# Patient Record
Sex: FEMALE | Race: WHITE | NOT HISPANIC OR LATINO | Employment: OTHER | URBAN - METROPOLITAN AREA
[De-identification: names, ages, dates, MRNs, and addresses within clinical notes are randomized per-mention and may not be internally consistent; named-entity substitution may affect disease eponyms.]

---

## 2017-02-06 ENCOUNTER — APPOINTMENT (OUTPATIENT)
Dept: GENERAL RADIOLOGY | Facility: CLINIC | Age: 72
End: 2017-02-06
Attending: EMERGENCY MEDICINE
Payer: COMMERCIAL

## 2017-02-06 ENCOUNTER — HOSPITAL ENCOUNTER (OUTPATIENT)
Facility: CLINIC | Age: 72
Setting detail: OBSERVATION
Discharge: HOME | End: 2017-02-07
Attending: EMERGENCY MEDICINE | Admitting: HOSPITALIST
Payer: COMMERCIAL

## 2017-02-06 ENCOUNTER — APPOINTMENT (OUTPATIENT)
Dept: CT IMAGING | Facility: CLINIC | Age: 72
End: 2017-02-06
Attending: EMERGENCY MEDICINE
Payer: COMMERCIAL

## 2017-02-06 DIAGNOSIS — R07.9 CHEST PAIN, UNSPECIFIED TYPE: ICD-10-CM

## 2017-02-06 LAB
ANION GAP SERPL CALCULATED.3IONS-SCNC: 7 MMOL/L (ref 3–14)
BASOPHILS # BLD AUTO: 0.1 10E9/L (ref 0–0.2)
BASOPHILS NFR BLD AUTO: 0.7 %
BUN SERPL-MCNC: 12 MG/DL (ref 7–30)
CALCIUM SERPL-MCNC: 9 MG/DL (ref 8.5–10.1)
CHLORIDE SERPL-SCNC: 104 MMOL/L (ref 94–109)
CO2 SERPL-SCNC: 29 MMOL/L (ref 20–32)
CREAT SERPL-MCNC: 0.56 MG/DL (ref 0.52–1.04)
D DIMER PPP FEU-MCNC: 3 UG/ML FEU (ref 0–0.5)
DIFFERENTIAL METHOD BLD: NORMAL
EOSINOPHIL # BLD AUTO: 0.2 10E9/L (ref 0–0.7)
EOSINOPHIL NFR BLD AUTO: 1.9 %
ERYTHROCYTE [DISTWIDTH] IN BLOOD BY AUTOMATED COUNT: 13.3 % (ref 10–15)
GFR SERPL CREATININE-BSD FRML MDRD: NORMAL ML/MIN/1.7M2
GLUCOSE SERPL-MCNC: 96 MG/DL (ref 70–99)
HCT VFR BLD AUTO: 40.5 % (ref 35–47)
HGB BLD-MCNC: 13.7 G/DL (ref 11.7–15.7)
IMM GRANULOCYTES # BLD: 0 10E9/L (ref 0–0.4)
IMM GRANULOCYTES NFR BLD: 0.4 %
LYMPHOCYTES # BLD AUTO: 1.3 10E9/L (ref 0.8–5.3)
LYMPHOCYTES NFR BLD AUTO: 13.1 %
MCH RBC QN AUTO: 31.6 PG (ref 26.5–33)
MCHC RBC AUTO-ENTMCNC: 33.8 G/DL (ref 31.5–36.5)
MCV RBC AUTO: 93 FL (ref 78–100)
MONOCYTES # BLD AUTO: 0.6 10E9/L (ref 0–1.3)
MONOCYTES NFR BLD AUTO: 5.7 %
NEUTROPHILS # BLD AUTO: 7.8 10E9/L (ref 1.6–8.3)
NEUTROPHILS NFR BLD AUTO: 78.2 %
NRBC # BLD AUTO: 0 10*3/UL
NRBC BLD AUTO-RTO: 0 /100
PLATELET # BLD AUTO: 274 10E9/L (ref 150–450)
POTASSIUM SERPL-SCNC: 3.6 MMOL/L (ref 3.4–5.3)
RBC # BLD AUTO: 4.34 10E12/L (ref 3.8–5.2)
SODIUM SERPL-SCNC: 140 MMOL/L (ref 133–144)
TROPONIN I BLD-MCNC: 0 UG/L (ref 0–0.1)
TROPONIN I SERPL-MCNC: NORMAL UG/L (ref 0–0.04)
WBC # BLD AUTO: 9.9 10E9/L (ref 4–11)

## 2017-02-06 PROCEDURE — 93005 ELECTROCARDIOGRAM TRACING: CPT

## 2017-02-06 PROCEDURE — 85025 COMPLETE CBC W/AUTO DIFF WBC: CPT | Performed by: EMERGENCY MEDICINE

## 2017-02-06 PROCEDURE — 84484 ASSAY OF TROPONIN QUANT: CPT

## 2017-02-06 PROCEDURE — 25000132 ZZH RX MED GY IP 250 OP 250 PS 637: Performed by: EMERGENCY MEDICINE

## 2017-02-06 PROCEDURE — 99220 ZZC INITIAL OBSERVATION CARE,LEVL III: CPT | Performed by: PHYSICIAN ASSISTANT

## 2017-02-06 PROCEDURE — 71260 CT THORAX DX C+: CPT

## 2017-02-06 PROCEDURE — 36415 COLL VENOUS BLD VENIPUNCTURE: CPT | Performed by: PHYSICIAN ASSISTANT

## 2017-02-06 PROCEDURE — 84484 ASSAY OF TROPONIN QUANT: CPT | Mod: 91 | Performed by: PHYSICIAN ASSISTANT

## 2017-02-06 PROCEDURE — G0378 HOSPITAL OBSERVATION PER HR: HCPCS

## 2017-02-06 PROCEDURE — 80048 BASIC METABOLIC PNL TOTAL CA: CPT | Performed by: EMERGENCY MEDICINE

## 2017-02-06 PROCEDURE — 25500064 ZZH RX 255 OP 636: Performed by: EMERGENCY MEDICINE

## 2017-02-06 PROCEDURE — 25000128 H RX IP 250 OP 636: Performed by: EMERGENCY MEDICINE

## 2017-02-06 PROCEDURE — 85379 FIBRIN DEGRADATION QUANT: CPT | Performed by: EMERGENCY MEDICINE

## 2017-02-06 PROCEDURE — 71020 XR CHEST 2 VW: CPT

## 2017-02-06 PROCEDURE — 99285 EMERGENCY DEPT VISIT HI MDM: CPT | Mod: 25

## 2017-02-06 RX ORDER — ALUMINA, MAGNESIA, AND SIMETHICONE 2400; 2400; 240 MG/30ML; MG/30ML; MG/30ML
15-30 SUSPENSION ORAL EVERY 4 HOURS PRN
Status: DISCONTINUED | OUTPATIENT
Start: 2017-02-06 | End: 2017-02-07 | Stop reason: HOSPADM

## 2017-02-06 RX ORDER — ASPIRIN 81 MG/1
324 TABLET, CHEWABLE ORAL ONCE
Status: COMPLETED | OUTPATIENT
Start: 2017-02-06 | End: 2017-02-06

## 2017-02-06 RX ORDER — MORPHINE SULFATE 2 MG/ML
1 INJECTION, SOLUTION INTRAMUSCULAR; INTRAVENOUS
Status: DISCONTINUED | OUTPATIENT
Start: 2017-02-06 | End: 2017-02-07 | Stop reason: HOSPADM

## 2017-02-06 RX ORDER — LIDOCAINE 40 MG/G
CREAM TOPICAL
Status: DISCONTINUED | OUTPATIENT
Start: 2017-02-06 | End: 2017-02-06

## 2017-02-06 RX ORDER — NALOXONE HYDROCHLORIDE 0.4 MG/ML
.1-.4 INJECTION, SOLUTION INTRAMUSCULAR; INTRAVENOUS; SUBCUTANEOUS
Status: DISCONTINUED | OUTPATIENT
Start: 2017-02-06 | End: 2017-02-07 | Stop reason: HOSPADM

## 2017-02-06 RX ORDER — NITROGLYCERIN 0.4 MG/1
0.4 TABLET SUBLINGUAL EVERY 5 MIN PRN
Status: DISCONTINUED | OUTPATIENT
Start: 2017-02-06 | End: 2017-02-07 | Stop reason: HOSPADM

## 2017-02-06 RX ORDER — ACETAMINOPHEN 325 MG/1
650 TABLET ORAL EVERY 4 HOURS PRN
Status: DISCONTINUED | OUTPATIENT
Start: 2017-02-06 | End: 2017-02-07 | Stop reason: HOSPADM

## 2017-02-06 RX ORDER — KETOROLAC TROMETHAMINE 30 MG/ML
15 INJECTION, SOLUTION INTRAMUSCULAR; INTRAVENOUS EVERY 6 HOURS PRN
Status: DISCONTINUED | OUTPATIENT
Start: 2017-02-06 | End: 2017-02-07 | Stop reason: HOSPADM

## 2017-02-06 RX ORDER — LIDOCAINE 40 MG/G
CREAM TOPICAL
Status: DISCONTINUED | OUTPATIENT
Start: 2017-02-06 | End: 2017-02-07 | Stop reason: HOSPADM

## 2017-02-06 RX ORDER — ASPIRIN 81 MG/1
81 TABLET ORAL DAILY
Status: DISCONTINUED | OUTPATIENT
Start: 2017-02-07 | End: 2017-02-07 | Stop reason: HOSPADM

## 2017-02-06 RX ORDER — IOPAMIDOL 755 MG/ML
500 INJECTION, SOLUTION INTRAVASCULAR ONCE
Status: COMPLETED | OUTPATIENT
Start: 2017-02-06 | End: 2017-02-06

## 2017-02-06 RX ORDER — NITROGLYCERIN 0.4 MG/1
0.4 TABLET SUBLINGUAL
Status: COMPLETED | OUTPATIENT
Start: 2017-02-06 | End: 2017-02-06

## 2017-02-06 RX ORDER — ACETAMINOPHEN 650 MG/1
650 SUPPOSITORY RECTAL EVERY 4 HOURS PRN
Status: DISCONTINUED | OUTPATIENT
Start: 2017-02-06 | End: 2017-02-07 | Stop reason: HOSPADM

## 2017-02-06 RX ADMIN — NITROGLYCERIN 0.4 MG: 0.4 TABLET SUBLINGUAL at 15:35

## 2017-02-06 RX ADMIN — ASPIRIN 81 MG 324 MG: 81 TABLET ORAL at 15:34

## 2017-02-06 RX ADMIN — SODIUM CHLORIDE 78 ML: 9 INJECTION, SOLUTION INTRAVENOUS at 17:19

## 2017-02-06 RX ADMIN — IOPAMIDOL 63 ML: 755 INJECTION, SOLUTION INTRAVENOUS at 17:19

## 2017-02-06 ASSESSMENT — ENCOUNTER SYMPTOMS
FEVER: 0
NAUSEA: 1
CHILLS: 0
SHORTNESS OF BREATH: 1

## 2017-02-06 NOTE — ED AVS SNAPSHOT
[facility] Observation Department    [address]    [facility] [address]    Phone:  [phone]                                       Tosin Nina   MRN: [id number]    Department:  [facility] Observation Department   Date of Visit:  2/6/2017           After Visit Summary Signature Page     I have received my discharge instructions, and my questions have been answered. I have discussed any challenges I see with this plan with the nurse or doctor.    ..........................................................................................................................................  Patient/Patient Representative Signature      ..........................................................................................................................................  Patient Representative Print Name and Relationship to Patient    ..................................................               ................................................  Date                                            Time    ..........................................................................................................................................  Reviewed by Signature/Title    ...................................................              ..............................................  Date                                                            Time

## 2017-02-06 NOTE — Clinical Note
Admitting Physician: RANCHO MORALEZ [[id number]]   [id number]   Special needs: Fall Risk [8]   Special needs: Telemetry [17]   Bed request comments: obs br @ 7709

## 2017-02-06 NOTE — ED AVS SNAPSHOT
MRN:[id number]                      After Visit Summary   2/6/2017    Tosin Nina    MRN: [id number]           Thank you!     Thank you for choosing [facility] for your care. Our goal is always to provide you with excellent care. Hearing back from our patients is one way we can continue to improve our services. Please take a few minutes to complete the written survey that you may receive in the mail after you visit. If you would like to speak to someone directly about your visit please contact Patient Relations at [phone]. Thank you!          Patient Information     Date Of Birth          1945        About your hospital stay     You were admitted on:  February 6, 2017 You last received care in the:  [facility] Observation Department    You were discharged on:  February 7, 2017        Reason for your hospital stay       You were hospitalized with chest pain.  You had a CT of the chest which normal.  Your laboratory work up was normal.  You had a normal stress echocardiogram with no evidence of stress-induced ischemia.                  Who to Call     For medical emergencies, please call 911.  For non-urgent questions about your medical care, please call your primary care provider or clinic, [phone]          Attending Provider     Provider    Sy Castañeda MD Farnan, MD Jorge Luis Quintero, Terrell Trujillo MD       Primary Care Provider Office Phone # Fax #    Esther Back [phone]       [facility] [address] [place] [address]         When to contact your care team       Notify for fever >101.5; black or bloody stools; severe, persistent, or worsening nausea, vomiting, abdominal pain or distention, diarrhea, constipation; chest pain, difficulty breathing, swelling; dizziness, weakness, lightheadedness, fainting.  Proceed to the nearest emergency room for any urgent concerns.                  After Care  "Instructions     Activity       Your activity upon discharge: activity as tolerated            Diet       Follow this diet upon discharge: Orders Placed This Encounter  Regular                  Follow-up Appointments     Follow-up and recommended labs and tests        Follow up with PCP within 1 week.                             Further instructions from your care team       You have been advised to stay in the hospital overnight for evaluation of your chest pain.    Pending Results     No orders found for last 2 day(s).            Statement of Approval     Ordered          17 1150  I have reviewed and agree with all the recommendations and orders detailed in this document.   EFFECTIVE NOW     Approved and electronically signed by:  Maeve Harding PA-C             Admission Information        Provider Department Dept Phone    2017 Terrell Kang MD  Observation Dept [phone]      Your Vitals Were     Blood Pressure Pulse Temperature    128/64 mmHg 72 96.5  F (35.8  C) (Oral)    Respirations Height Weight    18 1.499 m (4' 11\") 74.588 kg (164 lb 7 oz)    BMI (Body Mass Index) Pulse Oximetry       33.19 kg/m2 95%       MyChart Information     Swish lets you send messages to your doctor, view your test results, renew your prescriptions, schedule appointments and more. To sign up, go to [email or web address] . Click on \"Log in\" on the left side of the screen, which will take you to the Welcome page. Then click on \"Sign up Now\" on the right side of the page.     You will be asked to enter the access code listed below, as well as some personal information. Please follow the directions to create your username and password.     Your access code is: [email or web address]  Expires: 2017 11:50 AM     Your access code will  in 90 days. If you need help or a new code, please call your [place] clinic or [phone].        Care EveryWhere ID     This is your Care EveryWhere ID. This could be used by " other organizations to access your [place] medical records  [id number]           Review of your medicines      CONTINUE these medicines which have NOT CHANGED        Dose / Directions    COZAAR PO        Dose:  100 mg   Take 100 mg by mouth daily   Refills:  0       HYDROCHLOROTHIAZIDE PO        Dose:  50 mg   Take 50 mg by mouth daily   Refills:  0       SYNTHROID PO        Dose:  125 mcg   Take 125 mcg by mouth daily   Refills:  0                Protect others around you: Learn how to safely use, store and throw away your medicines at www.disposemymeds.org.             Medication List: This is a list of all your medications and when to take them. Check marks below indicate your daily home schedule. Keep this list as a reference.      Medications           Morning Afternoon Evening Bedtime As Needed    COZAAR PO   Take 100 mg by mouth daily   Last time this was given:  100 mg on 2/7/2017  8:17 AM                                HYDROCHLOROTHIAZIDE PO   Take 50 mg by mouth daily   Last time this was given:  50 mg on 2/7/2017  8:18 AM                                SYNTHROID PO   Take 125 mcg by mouth daily   Last time this was given:  125 mcg on 2/7/2017  8:18 AM

## 2017-02-06 NOTE — ED PROVIDER NOTES
"  History     Chief Complaint:    Chest Pain      HPI   Tosin Nina is a 71 year old female with a history of hypertension and hypothyroidism who presents with chest pain. Last night at approximately 1700 the patient had onset of mid sternal chest discomfort and initially thought this was reflux. Her pain has persisted since then and will intermittently improve but has never completely gone away. Her pain is currently 3/10 and not positionally affected. She has felt nauseous and tired this morning and feels mildly short of breath. No fevers or chills. Her cholesterol is borderline but no history of diabetes and she is a non smoker. Patient has never had a stress test.    Cardiac Risk Factors   Sex: Female   Tobacco: Negative   Hypertension: Positive  Diabetes: Negative  Hyperlipidemia: \"Borderline\"  Family History: Father  of MI at 48 years old    Allergies:  Amoxicillin       Medications:    Losartan potassium  Percocet   Ibuprofen  Hydralazine   Benicar   Levothyroxine sodium    Past Medical History:    Hypertension   Thyroid disease  Spinal stenosis      Past Surgical History:    Thyroidectomy      Family History:    MI - father    Social History:  Marital Status:   Presents to the ED with    Tobacco Use: No  Alcohol Use: Yes   PCP: Esther Back     Review of Systems   Constitutional: Negative for fever and chills.   Respiratory: Positive for shortness of breath.    Cardiovascular: Positive for chest pain.   Gastrointestinal: Positive for nausea.   All other systems reviewed and are negative.      Physical Exam     Patient Vitals for the past 24 hrs:   BP Temp Temp src Pulse Heart Rate Resp SpO2 Weight   17 1650 106/69 mmHg - - - - - 97 % -   17 1640 101/63 mmHg - - - - - 96 % -   17 1633 - - - - - - 98 % -   17 1632 139/78 mmHg - - - - - - -   17 1540 (!) 150/100 mmHg - - - 90 - - -   17 1509 151/85 mmHg - - - - - - -   17 1341 177/89 mmHg " 98.1  F (36.7  C) Temporal 72 - 16 100 % 72.576 kg (160 lb)       Physical Exam  General: Laying on gurney, alert, comfortable  HEENT:   The scalp and head appear normal    The pupils are equal, round, and reactive to light    Extraocular muscles are intact.    The nose is normal.    The oropharynx is normal.      Uvula is in the midline.  There is no peritonsillar abscess.  Neck:  Normal range of motion.    Lungs:  Clear.      No rales, no wheezing.      There is no tachypnea.  Non-labored.  Cardiac: Regular rate.      Normal S1 and S2.      No S3 or S4.      No pathological murmur.      No pericardial rub.  Abdomen: Soft. No distension. No tympani. No rebound. Non-tender. No epigastric tenderness. No reproducible substernal tenderness.   Lymph: No anterior or posterior cervical lymphadenopathy noted.  MS:  Normal tone.      Normal movement of all extremities.    Neuro:  Normal mentation.  No focal motor or sensory changes.      Speech normal.  Psych:  Awake.     Alert.      Normal affect.      Appropriate interactions.  Skin:  No rash.      No lesions.            Emergency Department Course   ECG:  @ 1330  Indication: chest pain  Vent. Rate 70 bpm. CT interval 152 ms. QRS duration 72 ms. QT/QTc 400/432 ms. P-R-T axis 24 -9 44.   Normal sinus rhythm. Normal ECG.    Read by Dr. Castañeda.    Imaging:  Chest CT, IV contrast only - PE protocol  Preliminary Result  IMPRESSION: No CT evidence of pulmonary embolism. No pulmonary  consolidation.    XR Chest 2 Views  Preliminary Result  IMPRESSION: No acute infiltrates are identified.         Radiographic findings were communicated with the patient who voiced understanding of the findings.    Laboratory:  Troponin POCT: 0.00  CBC:  WBC 9.9, HGB 13.7, , otherwise WNL  BMP: WNL (Creatinine 0.56)  D dimer: 3.0 (H)     Interventions:  Nitroglycerin 0.4 mg SL    Emergency Department Course:  Nursing notes and vitals reviewed.  I performed an exam of the patient as documented  above.   EKG was done, interpretation as above.   A peripheral IV was established. Blood was drawn from the patient. This was sent for laboratory testing, findings above.   The patient was sent for a CT and chest xray while in the emergency department, findings above.   Findings and plan explained to the patient who consents to admission.   (1808) I discussed the patient with Marilu Lucia of the hospitalist service, who will admit the patient to a obs bed for further monitoring, evaluation, and treatment.       Impression & Plan      HEART Score  Background  Calculates the overall risk of adverse event in patient's presenting with chest pain.  Based on 5 criteria (each assigned 0-2 points) including suspiciousness of history, EKG, age, risk factors and troponin.    Data  71 year old female   does not have a problem list on file.   reports that she has never smoked. She does not have any smokeless tobacco history on file.  family history is not on file.  TROPI   *   2/7/2017    Value: <0.015  The 99th percentile for upper reference range is 0.045 ug/L.  Troponin values in   the range of 0.045 - 0.120 ug/L may be associated with risks of adverse   clinical events.    Criteria   0-2 points for each of 5 items (maximum of 10 points):  Score 0- History slightly suspicious for coronary syndrome  Score 0- EKG Normal  Score 1- Age 45 to 65 years old  Score 2- Three or more risk factors for or history of atherosclerotic disease  Score 0- Within normal limits for troponin levels  Interpretation  Risk of adverse outcome  Heart Score: 4  Total Score 4-6- Adverse Outcome Risk 20.3% - Supports admission with standard rule-out management -serial troponins and stress testing    **Risk factors:  HTN, borderline hyperlipidemia, FMH      WELLS SCORE (Pre-test probability for PE)    Clinical signs/Suspected DVT (3)  An alternative diagnosis is less likely than PE (PE is most likely diagnosis) (3)  HR > 100 (1.5)  Immobilization or  surgery in the last 4 weeks (1.5)  Previous DVT or PE (1.5)  Hemoptysis (1)  Malignancy on treatment, treated in last 6 months, palliative)  (1)    Score: 0    Interpretation:    Total number of points, Interpretation based on literature:  0-1:  Probability of PE 3.6%  2-6:  Probability of PE 20%  > 6:  Probability of PE 66%    0-4:  Probability of PE 7.8% [PE unlikely]  >4:   Probability of PE 40% [PE likely]    Score combined with Negative d-dimer:  Score of 0-1 and Negative d-dimer: 99.5% NPV  Score of 0-4 and Negative d-dimer, 0.5% had VTE at 3 months.        Medical Decision Making:  This patient came in with chest pain. Her HEART score is 4. She ruled out for PE. There is no evidence of pneumothorax, no evidence of pericarditis, myocarditis, aortic dissection, or epigastric cause for her symptoms. Normal troponin. She is at increased risk of adverse event if discharged without complete workup. For that reason, she is being admitted to tele observation with a rule out protocol, further enzymatic testing, and EKG. If these all continue to be normal and she remains pain free, she will do a stress test in the morning. She states she can walk on a treadmill.    She did receive aspirin here and she did receive Nitro which took away her symptoms.      Diagnosis:    ICD-10-CM    1.  2.  3.  4. Chest pain, unspecified type  History of hypertension  History of hypercholesterolemia  Strong family history of heart disease at a young age R07.9      Disposition:  Admit for cardiac rule out.       Era Mackey  2/6/2017   [facility] EMERGENCY DEPARTMENT    I, Era Mackey, am serving as a scribe on 2/6/2017 at 2:15 PM to personally document services performed by Dr. Castañeda based on my observations and the provider's statements to me.       Sy Castañeda MD  02/08/17 0049

## 2017-02-06 NOTE — ED NOTES
"Pt very tearful, states \"the blood pressure cuff hurts my arm\". Assisted pt up to bathroom, pt ambulated independently w/o feeling dizzy or light-headed. ABCs intact.   "

## 2017-02-07 ENCOUNTER — APPOINTMENT (OUTPATIENT)
Dept: CARDIOLOGY | Facility: CLINIC | Age: 72
End: 2017-02-07
Attending: PHYSICIAN ASSISTANT
Payer: COMMERCIAL

## 2017-02-07 VITALS
TEMPERATURE: 96.5 F | SYSTOLIC BLOOD PRESSURE: 128 MMHG | HEART RATE: 72 BPM | HEIGHT: 59 IN | BODY MASS INDEX: 33.15 KG/M2 | WEIGHT: 164.44 LBS | OXYGEN SATURATION: 95 % | RESPIRATION RATE: 18 BRPM | DIASTOLIC BLOOD PRESSURE: 64 MMHG

## 2017-02-07 LAB
CHOLEST SERPL-MCNC: 181 MG/DL
HDLC SERPL-MCNC: 69 MG/DL
INTERPRETATION ECG - MUSE: NORMAL
LDLC SERPL CALC-MCNC: 89 MG/DL
NONHDLC SERPL-MCNC: 112 MG/DL
TRIGL SERPL-MCNC: 116 MG/DL
TROPONIN I SERPL-MCNC: NORMAL UG/L (ref 0–0.04)

## 2017-02-07 PROCEDURE — 93350 STRESS TTE ONLY: CPT | Mod: 26 | Performed by: INTERNAL MEDICINE

## 2017-02-07 PROCEDURE — G0378 HOSPITAL OBSERVATION PER HR: HCPCS

## 2017-02-07 PROCEDURE — 25500064 ZZH RX 255 OP 636: Performed by: HOSPITALIST

## 2017-02-07 PROCEDURE — 36415 COLL VENOUS BLD VENIPUNCTURE: CPT | Performed by: HOSPITALIST

## 2017-02-07 PROCEDURE — 25000132 ZZH RX MED GY IP 250 OP 250 PS 637: Performed by: PHYSICIAN ASSISTANT

## 2017-02-07 PROCEDURE — 84484 ASSAY OF TROPONIN QUANT: CPT | Performed by: PHYSICIAN ASSISTANT

## 2017-02-07 PROCEDURE — 93321 DOPPLER ECHO F-UP/LMTD STD: CPT | Mod: 26 | Performed by: INTERNAL MEDICINE

## 2017-02-07 PROCEDURE — 99217 ZZC OBSERVATION CARE DISCHARGE: CPT | Performed by: PHYSICIAN ASSISTANT

## 2017-02-07 PROCEDURE — 36415 COLL VENOUS BLD VENIPUNCTURE: CPT | Performed by: PHYSICIAN ASSISTANT

## 2017-02-07 PROCEDURE — 80061 LIPID PANEL: CPT | Performed by: HOSPITALIST

## 2017-02-07 PROCEDURE — 40000264 ECHO STRESS TEST WITH LUMASON

## 2017-02-07 PROCEDURE — 93018 CV STRESS TEST I&R ONLY: CPT | Performed by: INTERNAL MEDICINE

## 2017-02-07 PROCEDURE — 93325 DOPPLER ECHO COLOR FLOW MAPG: CPT | Mod: 26 | Performed by: INTERNAL MEDICINE

## 2017-02-07 RX ORDER — LEVOTHYROXINE SODIUM 125 UG/1
125 TABLET ORAL DAILY
Status: DISCONTINUED | OUTPATIENT
Start: 2017-02-07 | End: 2017-02-07 | Stop reason: HOSPADM

## 2017-02-07 RX ORDER — HYDROCHLOROTHIAZIDE 50 MG/1
50 TABLET ORAL DAILY
Status: DISCONTINUED | OUTPATIENT
Start: 2017-02-07 | End: 2017-02-07 | Stop reason: HOSPADM

## 2017-02-07 RX ORDER — LOSARTAN POTASSIUM 25 MG/1
100 TABLET ORAL DAILY
Status: DISCONTINUED | OUTPATIENT
Start: 2017-02-07 | End: 2017-02-07 | Stop reason: HOSPADM

## 2017-02-07 RX ADMIN — LOSARTAN POTASSIUM 100 MG: 25 TABLET, FILM COATED ORAL at 08:17

## 2017-02-07 RX ADMIN — LEVOTHYROXINE SODIUM 125 MCG: 125 TABLET ORAL at 08:18

## 2017-02-07 RX ADMIN — HYDROCHLOROTHIAZIDE 50 MG: 50 TABLET ORAL at 08:18

## 2017-02-07 RX ADMIN — SULFUR HEXAFLUORIDE 5 ML: KIT at 07:19

## 2017-02-07 RX ADMIN — ASPIRIN 81 MG: 81 TABLET, COATED ORAL at 08:18

## 2017-02-07 NOTE — H&P
Former[facility], later [facility] Outpatient / Observation Unit  History and Physical Exam     Tosin iNna MRN# [id number]   YOB: 1945 Age: 71 year old      Date of Admission: 2/6/2017    Primary care provider: Esther Back          Assessment:   Tosin Nina is a 71 year old female with a PMH significant for HTN, hypothyroidism, IBS, and spinal stenosis who presents with chest pain.     Work up in ED reveals: BMP unremarkable, negative troponin, CBC WNL, d-dimer of 3.0, CXR negative, CT of chest negative for PE, and EKG shows rate of 70 bpm in NSR.      Patient is being registered to observation for further evaluation and to rule out possible ACS.     1. Acute chest pain: suspect acid reflux, central chest pain with mild shortness of breath and associated nausea. No prior h/o CAD. Negative troponin. D-dimer is elevated but CT of chest is negative for PE. Patient's pain is currently minimal. Will obtain serial troponins and feel this would be sufficient rule out with no prior h/o CAD and could have a stress test as an outpatient. Patient and family would prefer stress testing here, so a stress echocardiogram will be ordered for tomorrow AM.   2. HTN: stable continue home regimen of Losartan and HCTZ  3. Elevated triglycerides: most recent lipid panel in 10/2016 which showed elevated triglycerides of 184 with remainder of panel WNL. Not on any medication for management and following with PCP about this.   4. Hypothyroidism: continue Levothyroxine           Plan:     1. [place] to Observation  2. Continue telemetry  3. Follow serial troponins, check fasting lipids   4. Stress testing with Stress Echo  5. Cont Aspirin EC 81 mg po daily  6. Blood pressure control  7. Morphine and nitroglycerine PRN for pain    8. Regular diet, No caffeine if Nuclear testing selected    DVT prophylaxis: pt at low risk, encourage ambulation  Code Status: Full   Dispo: discharge home within 24-48 hours                 Chief  Complaint:   Chest Pain         History of Present Illness:    [place], a 71 year old female, presents to the ED with complaint of chest pain. Patient states that she started having chest pain yesterday evening around 5-6 PM and thought it was indigestion and went to bed. She was able to sleep through the night but when she woke up she still had central chest pain that would worsen with a deep inhale which also caused mild shortness of breath. She notes associated nausea. Denies vomiting, F/C, RECINOS, LE edema, diaphoresis, cough, congestion, or burning sensation. The patient states that she walked on her treadmill and rode her stationary bike yesterday for a total of 25 minutes without chest pain. She states that her pain did improve with a sublingual nitro in the ED. No prior h/o CAD or previous stress test performed. Denies recent h/o heavy lifting or GERD. Patient does note a previous episode with similar symptoms last year but this was related to an infected gallbladder and much more severe than her current presentation.      Cardiac risk factors: positive for abnormal lipids, family history and hypertension             Past Medical History:     Past Medical History   Diagnosis Date     Hypertension      Thyroid disease      Spinal stenosis           Past Surgical History:     Past Surgical History   Procedure Laterality Date     Thyroidectomy            Social History:     Social History     Social History     Marital Status:      Spouse Name: N/A     Number of Children: N/A     Years of Education: N/A     Occupational History     Not on file.     Social History Main Topics     Smoking status: Never Smoker      Smokeless tobacco: Not on file     Alcohol Use: Yes      Comment: occasionally     Drug Use: No     Sexual Activity: Not on file     Other Topics Concern     Not on file     Social History Narrative          Family History:   Family history reviewed with patient and father has h/o MI.           Allergies:      Allergies   Allergen Reactions     Amoxicillin Hives           Medications:     Prior to Admission medications    Medication Sig Last Dose Taking? Auth Provider   LOSARTAN POTASSIUM PO    Reported, Patient   oxyCODONE-acetaminophen (PERCOCET) 5-325 MG per tablet Take 1-2 tablets by mouth every 4 hours as needed for moderate to severe pain   Mehul Pinto MD   ibuprofen (ADVIL,MOTRIN) 200 MG tablet Take 3 tablets (600 mg) by mouth every 8 hours as needed for mild pain   Mehul Pinto MD   HYDRALAZINE-HCTZ OR None Entered   Reported, Patient   BENICAR OR None Entered   Reported, Patient   LEVOTHYROXINE SODIUM OR None Entered   Reported, Patient   CENTRUM OR None Entered   Reported, Patient          Review of Systems:   A Comprehensive greater than 10 system review of systems was carried out.  Pertinent positives and negatives are noted above.  Otherwise negative for contributory information.          Physical Exam:   Blood pressure 146/77, pulse 72, temperature 98.1  F (36.7  C), temperature source Temporal, resp. rate 16, weight 72.576 kg (160 lb), SpO2 97 %.    GENERAL: healthy, alert and no distress  EYES: Eyes grossly normal to inspection, extraocular movements - intact, and PERRL  HENT: ear canals- normal; TMs- normal; Nose- normal; Mouth- no ulcers, no lesions  NECK: no tenderness, no adenopathy, no asymmetry, no masses, no stiffness; thyroid- normal to palpation  RESP: lungs clear to auscultation - no rales, no rhonchi, no wheezes  CV: regular rates and rhythm, normal S1 S2, no S3 or S4, no murmur, click or rub and peripheral pulses strong  ABDOMEN: soft, no tenderness, no  hepatosplenomegaly, no masses, normal bowel sounds  MS: extremities- no gross deformities noted, no edema  SKIN: no suspicious lesions, no rashes  NEURO: strength and tone- normal, sensory exam- grossly normal, mentation- intact, speech- normal, reflexes- symmetric  PSYCH: Alert and oriented times 3; coherent speech.  Affect is normal.         Data:     EKG demonstrates: rate of 70 bpm in NSR.     Results for orders placed or performed during the hospital encounter of 02/06/17   Chest CT, IV contrast only - PE protocol    Narrative    CT CHEST PULMONARY EMBOLISM W CONTRAST 2/6/2017 5:28 PM     HISTORY: Shortness of breath.     CONTRAST DOSE:  63mL Isovue-370    Radiation dose for this scan was reduced using automated exposure  control, adjustment of the mA and/or kV according to patient size, or  iterative reconstruction technique.    FINDINGS:  There is a good contrast bolus within the pulmonary  arteries. There are no pulmonary arterial filling defects to indicate  pulmonary embolism. Although contrast enhancement within the aorta is  less optimal, there is no apparent aneurysm or dissection. The  mediastinum and claudette appear within normal limits. The lungs appear  clear. No pleural effusion or pneumothorax. Cholecystectomy surgical  clips are noted.      Impression    IMPRESSION: No CT evidence of pulmonary embolism. No pulmonary  consolidation.   XR Chest 2 Views    Narrative    XR CHEST 2 VW   2/6/2017 4:35 PM     HISTORY: Chest pain    COMPARISON: CT dated 9/19/2014    FINDINGS: The heart is negative.  There is a prominent fat pad at the  left lung base. This is also seen on the previous CT scan. No active  infiltrate identified. The pulmonary vasculature is normal.  The bones  and soft tissues are unremarkable.      Impression    IMPRESSION: No acute infiltrates are identified.       CBC with platelets differential   Result Value Ref Range    WBC 9.9 4.0 - 11.0 10e9/L    RBC Count 4.34 3.8 - 5.2 10e12/L    Hemoglobin 13.7 11.7 - 15.7 g/dL    Hematocrit 40.5 35.0 - 47.0 %    MCV 93 78 - 100 fl    MCH 31.6 26.5 - 33.0 pg    MCHC 33.8 31.5 - 36.5 g/dL    RDW 13.3 10.0 - 15.0 %    Platelet Count 274 150 - 450 10e9/L    Diff Method Automated Method     % Neutrophils 78.2 %    % Lymphocytes 13.1 %    % Monocytes 5.7 %    %  Eosinophils 1.9 %    % Basophils 0.7 %    % Immature Granulocytes 0.4 %    Nucleated RBCs 0 0 /100    Absolute Neutrophil 7.8 1.6 - 8.3 10e9/L    Absolute Lymphocytes 1.3 0.8 - 5.3 10e9/L    Absolute Monocytes 0.6 0.0 - 1.3 10e9/L    Absolute Eosinophils 0.2 0.0 - 0.7 10e9/L    Absolute Basophils 0.1 0.0 - 0.2 10e9/L    Abs Immature Granulocytes 0.0 0 - 0.4 10e9/L    Absolute Nucleated RBC 0.0    Basic metabolic panel   Result Value Ref Range    Sodium 140 133 - 144 mmol/L    Potassium 3.6 3.4 - 5.3 mmol/L    Chloride 104 94 - 109 mmol/L    Carbon Dioxide 29 20 - 32 mmol/L    Anion Gap 7 3 - 14 mmol/L    Glucose 96 70 - 99 mg/dL    Urea Nitrogen 12 7 - 30 mg/dL    Creatinine 0.56 0.52 - 1.04 mg/dL    GFR Estimate >90  Non  GFR Calc   >60 mL/min/1.7m2    GFR Estimate If Black >90   GFR Calc   >60 mL/min/1.7m2    Calcium 9.0 8.5 - 10.1 mg/dL   D dimer quantitative   Result Value Ref Range    D Dimer 3.0 (H) 0.0 - 0.50 ug/ml FEU   EKG 12 lead   Result Value Ref Range    Interpretation ECG Click View Image link to view waveform and result    Troponin POCT   Result Value Ref Range    Troponin I 0.00 0.00 - 0.10 ug/L       Tara Lucia PA-C

## 2017-02-07 NOTE — ED NOTES
PRIMARY DIAGNOSIS: CHEST PAIN   OUTPATIENT/OBSERVATION GOALS TO BE MET BEFORE DISCHARGE:   1. Negative Serial Troponin No, neg x2, last draw at 0230   2. Resolution of chest pain Yes    3. Pain status: Pain free.   4. Negative stress test No- Scheduled for tomorrow 2/7   5. Stable vital signs Yes   6. ADLs back to baseline? Yes   7. Activity and level of assistance: Ambulating independently.   8. Barriers to discharge noted Yes- Serial trops and exercise stress test   9.Interpretation of rhythm per telemetry tech: NSR HR 87   Is patient a falls risk? No   Falls armband on? No   Within Arm's Reach? No.Reason not within arm's reach is: N/A   Bed alarm turned on? No   Personal alarm in place and turned on? No   VSS except BP elevated, pt does not tolerate tightness of cuff. Denies CP and other cardiac sx. Trop neg x2, last draw at 0230. Exercise stress test scheduled for tomorrow 2/7. Will continue to monitor.

## 2017-02-07 NOTE — ED NOTES
OBSERVATION patient IN TIME: 1930  ROOM # 208-1    Living Situation (if not independent, order SW consult): Independent with   Facility name: N/A    Activity level at baseline: Independent  Activity level on admit: Independent    Is patient a falls risk? No  Falls armband on? No  Within Arm's Reach? No.Reason not within arm's reach is: N/A  Bed alarm turned on?   No  Personal alarm in place and turned on?   No    Patient registered to observation; given Patient Bill of Rights; given the opportunity to ask questions about observation status and their plan of care.  Patient has been oriented to the observation room, bathroom, and call light is in place.    : - Christiano

## 2017-02-07 NOTE — DISCHARGE SUMMARY
[facility] Observation Unit Discharge Summary          Tosin Nina MRN# [id number]   Age: 71 year old YOB: 1945     Date of Admission:  2/6/2017  Date of Discharge::  2/7/2017  Admitting Physician:  Terrell Kang MD  Discharge Physician:  Maeve Harding PA-C  Primary Physician: Esther Back     Primary Discharge Diagnoses:   Acute Chest Pain     Secondary Discharge Diagnoses:   HTN  Hypothyroidism  IBS  Spinal Stenosis     Hospital Course:   For detail history, please refer to H & P from 2/6/2017. In brief, this is a 71 year old female with a PMH significant for HTN, hypothyroidism, IBS, and spinal stenosis who presented to the ED on 2/6/2017 with chest pain.  Work up revealed BMP unremarkable, negative troponin x3, CBC WNL, lipid panel wnl, d-dimer of 3.0, CXR negative, CT of chest negative for PE, and EKG shows rate of 70 bpm in NSR.  Patient underwent an exercise stress echocardiogram which was a normal stress echocardiogram with no evidence of stress-induced ischemia. Etiology of patient's symptoms were likely related to gastric reflux.  Symptoms had resolved at the time of discharge and patient hemodynamically stable.  Patient to follow up with PCP within one week.         Procedures/Imaging:     Results for orders placed or performed during the hospital encounter of 02/06/17   Chest CT, IV contrast only - PE protocol    Narrative    CT CHEST PULMONARY EMBOLISM W CONTRAST 2/6/2017 5:28 PM     HISTORY: Shortness of breath.     CONTRAST DOSE:  63mL Isovue-370    Radiation dose for this scan was reduced using automated exposure  control, adjustment of the mA and/or kV according to patient size, or  iterative reconstruction technique.    FINDINGS:  There is a good contrast bolus within the pulmonary  arteries. There are no pulmonary arterial filling defects to indicate  pulmonary embolism. Although contrast enhancement within the aorta is  less optimal, there is  "no apparent aneurysm or dissection. The  mediastinum and claudette appear within normal limits. The lungs appear  clear. No pleural effusion or pneumothorax. Cholecystectomy surgical  clips are noted.      Impression    IMPRESSION: No CT evidence of pulmonary embolism. No pulmonary  consolidation.    CHRISTINA CAST MD   XR Chest 2 Views    Narrative    XR CHEST 2 VW   2017 4:35 PM     HISTORY: Chest pain    COMPARISON: CT dated 2014    FINDINGS: The heart is negative.  There is a prominent fat pad at the  left lung base. This is also seen on the previous CT scan. No active  infiltrate identified. The pulmonary vasculature is normal.  The bones  and soft tissues are unremarkable.      Impression    IMPRESSION: No acute infiltrates are identified.        SANDRA ROBISON MD     Consultations:   none    Code Status:   full    Allergies:     Allergies   Allergen Reactions     Amoxicillin Hives        Subjective:   Patient describes a midsternal chest pain after eating a bowl of chili on 17.  Her symptoms persisted to the next day 2017 and were associated with shortness of breath and nausea.  Symptoms resolved completely last night and she tolerated a po diet.  Currently denies chest pain, shortness of breath, cough, abdominal pain, nausea, emesis.  She reports a history of a cholecystectomy.  She states she feels fine and is ready to go home.        Physical Exam:   /64 mmHg  Pulse 72  Temp(Src) 96.5  F (35.8  C) (Oral)  Resp 18  Ht 1.499 m (4' 11\")  Wt 74.588 kg (164 lb 7 oz)  BMI 33.19 kg/m2  SpO2 95%  Temp (24hrs), Av.5  F (36.4  C), Min:96.5  F (35.8  C), Max:98.2  F (36.8  C)    Blood pressure 128/64, pulse 72, temperature 96.5  F (35.8  C), temperature source Oral, resp. rate 18, height 1.499 m (4' 11\"), weight 74.588 kg (164 lb 7 oz), SpO2 95 %.  General: Alert, interactive, NAD, sitting up in bed fully dressed.  HEENT: AT/NC, sclera anicteric, PERRL, EOMI  Resp: clear to auscultation " bilaterally, no crackles or wheezes  Cardiac: regular rate and rhythm, no murmur  Abdomen: Soft, nontender, nondistended. +BS.    Neuro: Alert & oriented x 3   Discharge Medicatios:        Discharge Medication List as of 2/7/2017 12:01 PM      CONTINUE these medications which have NOT CHANGED    Details   Levothyroxine Sodium (SYNTHROID PO) Take 125 mcg by mouth daily, Historical      Losartan Potassium (COZAAR PO) Take 100 mg by mouth daily, Historical      HYDROCHLOROTHIAZIDE PO Take 50 mg by mouth daily, Historical             Instructions Given to Patient as Discharge:     Discharge Procedure Orders  Reason for your hospital stay   Order Comments: You were hospitalized with chest pain.  You had a CT of the chest which normal.  Your laboratory work up was normal.  You had a normal stress echocardiogram with no evidence of stress-induced ischemia.     Follow-up and recommended labs and tests    Order Comments: Follow up with PCP within 1 week.     Activity   Order Comments: Your activity upon discharge: activity as tolerated   Order Specific Question Answer Comments   Is discharge order? Yes      When to contact your care team   Order Comments: Notify for fever >101.5; black or bloody stools; severe, persistent, or worsening nausea, vomiting, abdominal pain or distention, diarrhea, constipation; chest pain, difficulty breathing, swelling; dizziness, weakness, lightheadedness, fainting.  Proceed to the nearest emergency room for any urgent concerns.     Full Code     Diet   Order Comments: Follow this diet upon discharge: Orders Placed This Encounter  Regular   Order Specific Question Answer Comments   Is discharge order? Yes          Pending Tests at Discharge:   none    Discharge Disposition:   Discharged to home     Maeve Harding MS, PA-C  Hospitalist Service  Pager [phone]    >30 minutes was spent in discharge planning, care coordination, physical examination and medication reconciliation on the date of  discharge, 2/7/2017

## 2017-02-07 NOTE — ED NOTES
PRIMARY DIAGNOSIS: CHEST PAIN   OUTPATIENT/OBSERVATION GOALS TO BE MET BEFORE DISCHARGE:   1. Negative Serial Troponin Yes x3  2. Resolution of chest pain Yes   3. Pain status: Pain free.   4. Negative stress test: Results pending  5. Stable vital signs Yes   6. ADLs back to baseline? Yes   7. Activity and level of assistance: Ambulating independently.   8. Barriers to discharge noted: No  9.Interpretation of rhythm per telemetry tech: NSR HR 87   Is patient a falls risk? No     VSS, except BP elevated at 149/82. Denies CP and other cardiac sx. Trop neg x3. Exercise stress test results pending. Will continue to monitor and provide supportives cares.

## 2017-02-07 NOTE — PHARMACY-ADMISSION MEDICATION HISTORY
Admission medication history interview status for this patient is complete. See [facility] admission navigator for allergy information, prior to admission medications and immunization status.     Medication history interview source(s):Patient  Medication history resources (including written lists, pill bottles, clinic record):pill bottles    Changes made to PTA medication list:  Added: all  Deleted: percocet and advil  Changed: none    Actions taken by pharmacist (provider contacted, etc):None     Additional medication history information:None    Medication reconciliation/reorder completed by provider prior to medication history? No    For patients on insulin therapy: no (Yes/No)   Lantus/levemir/NPH/Mix 70/30 dose: _____ in AM/PM or twice daily   Sliding scale Novolog Y/N   If Yes, do you have a baseline novolog pre-meal dose: ______units with meals   Patients eat three meals a day: Y/N   Any Barriers to therapy: cost of medications/comfortable with giving injections (if applicable)/ comfortable and confident with current diabetes regimen       Prior to Admission medications    Medication Sig Last Dose Taking? Auth Provider   Levothyroxine Sodium (SYNTHROID PO) Take 125 mcg by mouth daily 2/6/2017 at am Yes Unknown, Entered By History   Losartan Potassium (COZAAR PO) Take 100 mg by mouth daily 2/6/2017 at am Yes Unknown, Entered By History   HYDROCHLOROTHIAZIDE PO Take 50 mg by mouth daily 2/6/2017 at am Yes Unknown, Entered By History

## 2017-03-15 ENCOUNTER — TRANSFERRED RECORDS (OUTPATIENT)
Dept: HEALTH INFORMATION MANAGEMENT | Facility: CLINIC | Age: 72
End: 2017-03-15

## 2017-03-28 ENCOUNTER — TRANSFERRED RECORDS (OUTPATIENT)
Dept: HEALTH INFORMATION MANAGEMENT | Facility: CLINIC | Age: 72
End: 2017-03-28

## 2017-03-29 ENCOUNTER — TRANSFERRED RECORDS (OUTPATIENT)
Dept: HEALTH INFORMATION MANAGEMENT | Facility: CLINIC | Age: 72
End: 2017-03-29

## 2017-03-30 ENCOUNTER — MEDICAL CORRESPONDENCE (OUTPATIENT)
Dept: HEALTH INFORMATION MANAGEMENT | Facility: CLINIC | Age: 72
End: 2017-03-30

## 2017-04-07 ENCOUNTER — TRANSFERRED RECORDS (OUTPATIENT)
Dept: HEALTH INFORMATION MANAGEMENT | Facility: CLINIC | Age: 72
End: 2017-04-07

## 2017-04-11 ENCOUNTER — APPOINTMENT (OUTPATIENT)
Dept: CT IMAGING | Facility: CLINIC | Age: 72
DRG: 374 | End: 2017-04-11
Attending: INTERNAL MEDICINE
Payer: COMMERCIAL

## 2017-04-11 ENCOUNTER — HOSPITAL ENCOUNTER (INPATIENT)
Facility: CLINIC | Age: 72
LOS: 5 days | Discharge: NURSING, REHAB OR LONG-TERM CARE | DRG: 374 | End: 2017-04-16
Attending: INTERNAL MEDICINE | Admitting: INTERNAL MEDICINE
Payer: COMMERCIAL

## 2017-04-11 ENCOUNTER — APPOINTMENT (OUTPATIENT)
Dept: GENERAL RADIOLOGY | Facility: CLINIC | Age: 72
DRG: 374 | End: 2017-04-11
Attending: INTERNAL MEDICINE
Payer: COMMERCIAL

## 2017-04-11 DIAGNOSIS — R77.8 ELEVATED TOTAL PROTEIN: ICD-10-CM

## 2017-04-11 DIAGNOSIS — R19.00 ABDOMINAL MASS, UNSPECIFIED LOCATION: ICD-10-CM

## 2017-04-11 DIAGNOSIS — I26.99 OTHER PULMONARY EMBOLISM WITHOUT ACUTE COR PULMONALE, UNSPECIFIED CHRONICITY (H): ICD-10-CM

## 2017-04-11 DIAGNOSIS — R20.2 PARESTHESIAS: ICD-10-CM

## 2017-04-11 DIAGNOSIS — R26.81 UNSTEADY GAIT: ICD-10-CM

## 2017-04-11 DIAGNOSIS — C56.9 OVARIAN CANCER, UNSPECIFIED LATERALITY (H): Primary | ICD-10-CM

## 2017-04-11 DIAGNOSIS — H55.00 NYSTAGMUS: ICD-10-CM

## 2017-04-11 DIAGNOSIS — R42 DIZZINESS: ICD-10-CM

## 2017-04-11 LAB
ALBUMIN SERPL-MCNC: 3.6 G/DL (ref 3.4–5)
ALBUMIN UR-MCNC: NEGATIVE MG/DL
ALP SERPL-CCNC: 140 U/L (ref 40–150)
ALT SERPL W P-5'-P-CCNC: 25 U/L (ref 0–50)
ANION GAP SERPL CALCULATED.3IONS-SCNC: 10 MMOL/L (ref 3–14)
APPEARANCE UR: CLEAR
AST SERPL W P-5'-P-CCNC: 17 U/L (ref 0–45)
BASOPHILS # BLD AUTO: 0 10E9/L (ref 0–0.2)
BASOPHILS NFR BLD AUTO: 0.4 %
BILIRUB SERPL-MCNC: 0.6 MG/DL (ref 0.2–1.3)
BILIRUB UR QL STRIP: NEGATIVE
BUN SERPL-MCNC: 9 MG/DL (ref 7–30)
CALCIUM SERPL-MCNC: 8.7 MG/DL (ref 8.5–10.1)
CHLORIDE SERPL-SCNC: 101 MMOL/L (ref 94–109)
CO2 SERPL-SCNC: 26 MMOL/L (ref 20–32)
COLOR UR AUTO: ABNORMAL
CREAT SERPL-MCNC: 0.57 MG/DL (ref 0.52–1.04)
CRP SERPL-MCNC: 14 MG/L (ref 0–8)
DIFFERENTIAL METHOD BLD: ABNORMAL
EOSINOPHIL # BLD AUTO: 0.1 10E9/L (ref 0–0.7)
EOSINOPHIL NFR BLD AUTO: 1.2 %
ERYTHROCYTE [DISTWIDTH] IN BLOOD BY AUTOMATED COUNT: 13.7 % (ref 10–15)
ERYTHROCYTE [SEDIMENTATION RATE] IN BLOOD BY WESTERGREN METHOD: 17 MM/H (ref 0–30)
GFR SERPL CREATININE-BSD FRML MDRD: ABNORMAL ML/MIN/1.7M2
GLUCOSE SERPL-MCNC: 104 MG/DL (ref 70–99)
GLUCOSE UR STRIP-MCNC: NEGATIVE MG/DL
HCT VFR BLD AUTO: 42.8 % (ref 35–47)
HGB BLD-MCNC: 15 G/DL (ref 11.7–15.7)
HGB UR QL STRIP: NEGATIVE
IMM GRANULOCYTES # BLD: 0 10E9/L (ref 0–0.4)
IMM GRANULOCYTES NFR BLD: 0.3 %
INR PPP: 1.06 (ref 0.86–1.14)
KETONES UR STRIP-MCNC: 5 MG/DL
LEUKOCYTE ESTERASE UR QL STRIP: NEGATIVE
LYMPHOCYTES # BLD AUTO: 0.9 10E9/L (ref 0.8–5.3)
LYMPHOCYTES NFR BLD AUTO: 8.6 %
MCH RBC QN AUTO: 31.9 PG (ref 26.5–33)
MCHC RBC AUTO-ENTMCNC: 35 G/DL (ref 31.5–36.5)
MCV RBC AUTO: 91 FL (ref 78–100)
MONOCYTES # BLD AUTO: 0.9 10E9/L (ref 0–1.3)
MONOCYTES NFR BLD AUTO: 7.9 %
NEUTROPHILS # BLD AUTO: 8.9 10E9/L (ref 1.6–8.3)
NEUTROPHILS NFR BLD AUTO: 81.6 %
NITRATE UR QL: NEGATIVE
NRBC # BLD AUTO: 0 10*3/UL
NRBC BLD AUTO-RTO: 0 /100
PH UR STRIP: 7 PH (ref 5–7)
PLATELET # BLD AUTO: 259 10E9/L (ref 150–450)
POTASSIUM SERPL-SCNC: 3.1 MMOL/L (ref 3.4–5.3)
PROT SERPL-MCNC: 9.2 G/DL (ref 6.8–8.8)
RADIOLOGIST FLAGS: ABNORMAL
RBC # BLD AUTO: 4.7 10E12/L (ref 3.8–5.2)
SODIUM SERPL-SCNC: 137 MMOL/L (ref 133–144)
SP GR UR STRIP: 1 (ref 1–1.03)
URN SPEC COLLECT METH UR: ABNORMAL
UROBILINOGEN UR STRIP-MCNC: NORMAL MG/DL (ref 0–2)
WBC # BLD AUTO: 10.9 10E9/L (ref 4–11)

## 2017-04-11 PROCEDURE — 86304 IMMUNOASSAY TUMOR CA 125: CPT | Performed by: INTERNAL MEDICINE

## 2017-04-11 PROCEDURE — 36415 COLL VENOUS BLD VENIPUNCTURE: CPT | Performed by: INTERNAL MEDICINE

## 2017-04-11 PROCEDURE — 25000125 ZZHC RX 250: Performed by: RADIOLOGY

## 2017-04-11 PROCEDURE — 86901 BLOOD TYPING SEROLOGIC RH(D): CPT | Performed by: INTERNAL MEDICINE

## 2017-04-11 PROCEDURE — 81003 URINALYSIS AUTO W/O SCOPE: CPT | Performed by: INTERNAL MEDICINE

## 2017-04-11 PROCEDURE — 99223 1ST HOSP IP/OBS HIGH 75: CPT | Mod: AI | Performed by: INTERNAL MEDICINE

## 2017-04-11 PROCEDURE — 82378 CARCINOEMBRYONIC ANTIGEN: CPT | Performed by: INTERNAL MEDICINE

## 2017-04-11 PROCEDURE — 86335 IMMUNFIX E-PHORSIS/URINE/CSF: CPT | Performed by: INTERNAL MEDICINE

## 2017-04-11 PROCEDURE — 99285 EMERGENCY DEPT VISIT HI MDM: CPT | Mod: Z6 | Performed by: INTERNAL MEDICINE

## 2017-04-11 PROCEDURE — 82784 ASSAY IGA/IGD/IGG/IGM EACH: CPT | Performed by: INTERNAL MEDICINE

## 2017-04-11 PROCEDURE — 40000135 MR OUTSIDE READ

## 2017-04-11 PROCEDURE — 86850 RBC ANTIBODY SCREEN: CPT | Performed by: INTERNAL MEDICINE

## 2017-04-11 PROCEDURE — 93005 ELECTROCARDIOGRAM TRACING: CPT | Performed by: INTERNAL MEDICINE

## 2017-04-11 PROCEDURE — 86900 BLOOD TYPING SEROLOGIC ABO: CPT | Performed by: INTERNAL MEDICINE

## 2017-04-11 PROCEDURE — 86140 C-REACTIVE PROTEIN: CPT | Performed by: INTERNAL MEDICINE

## 2017-04-11 PROCEDURE — 85652 RBC SED RATE AUTOMATED: CPT | Performed by: INTERNAL MEDICINE

## 2017-04-11 PROCEDURE — 85025 COMPLETE CBC W/AUTO DIFF WBC: CPT | Performed by: INTERNAL MEDICINE

## 2017-04-11 PROCEDURE — 85610 PROTHROMBIN TIME: CPT | Performed by: INTERNAL MEDICINE

## 2017-04-11 PROCEDURE — 80053 COMPREHEN METABOLIC PANEL: CPT | Performed by: INTERNAL MEDICINE

## 2017-04-11 PROCEDURE — 86334 IMMUNOFIX E-PHORESIS SERUM: CPT | Performed by: INTERNAL MEDICINE

## 2017-04-11 PROCEDURE — 86301 IMMUNOASSAY TUMOR CA 19-9: CPT | Performed by: INTERNAL MEDICINE

## 2017-04-11 PROCEDURE — 25500064 ZZH RX 255 OP 636: Performed by: RADIOLOGY

## 2017-04-11 PROCEDURE — 12000001 ZZH R&B MED SURG/OB UMMC

## 2017-04-11 PROCEDURE — 74177 CT ABD & PELVIS W/CONTRAST: CPT

## 2017-04-11 PROCEDURE — 99285 EMERGENCY DEPT VISIT HI MDM: CPT | Mod: 25 | Performed by: INTERNAL MEDICINE

## 2017-04-11 PROCEDURE — 71260 CT THORAX DX C+: CPT

## 2017-04-11 RX ORDER — LIDOCAINE 40 MG/G
CREAM TOPICAL
Status: DISCONTINUED | OUTPATIENT
Start: 2017-04-11 | End: 2017-04-16 | Stop reason: HOSPADM

## 2017-04-11 RX ORDER — POTASSIUM CHLORIDE 1.5 G/1.58G
20-40 POWDER, FOR SOLUTION ORAL
Status: DISCONTINUED | OUTPATIENT
Start: 2017-04-11 | End: 2017-04-16 | Stop reason: HOSPADM

## 2017-04-11 RX ORDER — PROCHLORPERAZINE MALEATE 5 MG
5 TABLET ORAL EVERY 6 HOURS PRN
Status: DISCONTINUED | OUTPATIENT
Start: 2017-04-11 | End: 2017-04-16 | Stop reason: HOSPADM

## 2017-04-11 RX ORDER — PROCHLORPERAZINE 25 MG
12.5 SUPPOSITORY, RECTAL RECTAL EVERY 12 HOURS PRN
Status: DISCONTINUED | OUTPATIENT
Start: 2017-04-11 | End: 2017-04-16 | Stop reason: HOSPADM

## 2017-04-11 RX ORDER — NALOXONE HYDROCHLORIDE 0.4 MG/ML
.1-.4 INJECTION, SOLUTION INTRAMUSCULAR; INTRAVENOUS; SUBCUTANEOUS
Status: DISCONTINUED | OUTPATIENT
Start: 2017-04-11 | End: 2017-04-11

## 2017-04-11 RX ORDER — ONDANSETRON 4 MG/1
4 TABLET, ORALLY DISINTEGRATING ORAL EVERY 6 HOURS PRN
Status: DISCONTINUED | OUTPATIENT
Start: 2017-04-11 | End: 2017-04-14

## 2017-04-11 RX ORDER — AMOXICILLIN 250 MG
1-2 CAPSULE ORAL 2 TIMES DAILY PRN
Status: DISCONTINUED | OUTPATIENT
Start: 2017-04-11 | End: 2017-04-11

## 2017-04-11 RX ORDER — MAGNESIUM SULFATE HEPTAHYDRATE 40 MG/ML
4 INJECTION, SOLUTION INTRAVENOUS EVERY 4 HOURS PRN
Status: DISCONTINUED | OUTPATIENT
Start: 2017-04-11 | End: 2017-04-16 | Stop reason: HOSPADM

## 2017-04-11 RX ORDER — POTASSIUM CL/LIDO/0.9 % NACL 10MEQ/0.1L
10 INTRAVENOUS SOLUTION, PIGGYBACK (ML) INTRAVENOUS
Status: DISCONTINUED | OUTPATIENT
Start: 2017-04-11 | End: 2017-04-16 | Stop reason: HOSPADM

## 2017-04-11 RX ORDER — POTASSIUM CHLORIDE 29.8 MG/ML
20 INJECTION INTRAVENOUS
Status: DISCONTINUED | OUTPATIENT
Start: 2017-04-11 | End: 2017-04-16 | Stop reason: HOSPADM

## 2017-04-11 RX ORDER — ONDANSETRON 4 MG/1
4 TABLET, ORALLY DISINTEGRATING ORAL EVERY 6 HOURS PRN
Status: DISCONTINUED | OUTPATIENT
Start: 2017-04-11 | End: 2017-04-11

## 2017-04-11 RX ORDER — POTASSIUM CHLORIDE 7.45 MG/ML
10 INJECTION INTRAVENOUS
Status: DISCONTINUED | OUTPATIENT
Start: 2017-04-11 | End: 2017-04-16 | Stop reason: HOSPADM

## 2017-04-11 RX ORDER — ONDANSETRON 2 MG/ML
4 INJECTION INTRAMUSCULAR; INTRAVENOUS EVERY 6 HOURS PRN
Status: DISCONTINUED | OUTPATIENT
Start: 2017-04-11 | End: 2017-04-11

## 2017-04-11 RX ORDER — LEVOTHYROXINE SODIUM 125 UG/1
125 TABLET ORAL DAILY
Status: DISCONTINUED | OUTPATIENT
Start: 2017-04-12 | End: 2017-04-16 | Stop reason: HOSPADM

## 2017-04-11 RX ORDER — IOPAMIDOL 755 MG/ML
100 INJECTION, SOLUTION INTRAVASCULAR ONCE
Status: COMPLETED | OUTPATIENT
Start: 2017-04-11 | End: 2017-04-11

## 2017-04-11 RX ORDER — POTASSIUM CHLORIDE 750 MG/1
20-40 TABLET, EXTENDED RELEASE ORAL
Status: DISCONTINUED | OUTPATIENT
Start: 2017-04-11 | End: 2017-04-16 | Stop reason: HOSPADM

## 2017-04-11 RX ORDER — ONDANSETRON 2 MG/ML
4 INJECTION INTRAMUSCULAR; INTRAVENOUS EVERY 6 HOURS PRN
Status: DISCONTINUED | OUTPATIENT
Start: 2017-04-11 | End: 2017-04-14

## 2017-04-11 RX ORDER — POLYETHYLENE GLYCOL 3350 17 G/17G
17 POWDER, FOR SOLUTION ORAL DAILY PRN
Status: DISCONTINUED | OUTPATIENT
Start: 2017-04-11 | End: 2017-04-16 | Stop reason: HOSPADM

## 2017-04-11 RX ORDER — NALOXONE HYDROCHLORIDE 0.4 MG/ML
.1-.4 INJECTION, SOLUTION INTRAMUSCULAR; INTRAVENOUS; SUBCUTANEOUS
Status: DISCONTINUED | OUTPATIENT
Start: 2017-04-11 | End: 2017-04-16 | Stop reason: HOSPADM

## 2017-04-11 RX ORDER — AMOXICILLIN 250 MG
1-2 CAPSULE ORAL 2 TIMES DAILY PRN
Status: DISCONTINUED | OUTPATIENT
Start: 2017-04-11 | End: 2017-04-16 | Stop reason: HOSPADM

## 2017-04-11 RX ORDER — HYDROCHLOROTHIAZIDE 50 MG/1
50 TABLET ORAL DAILY
Status: DISCONTINUED | OUTPATIENT
Start: 2017-04-12 | End: 2017-04-16 | Stop reason: HOSPADM

## 2017-04-11 RX ORDER — ACETAMINOPHEN 325 MG/1
650 TABLET ORAL EVERY 4 HOURS PRN
Status: DISCONTINUED | OUTPATIENT
Start: 2017-04-11 | End: 2017-04-11

## 2017-04-11 RX ORDER — LOSARTAN POTASSIUM 100 MG/1
100 TABLET ORAL DAILY
Status: DISCONTINUED | OUTPATIENT
Start: 2017-04-12 | End: 2017-04-16 | Stop reason: HOSPADM

## 2017-04-11 RX ORDER — ACETAMINOPHEN 325 MG/1
650 TABLET ORAL EVERY 4 HOURS PRN
Status: DISCONTINUED | OUTPATIENT
Start: 2017-04-11 | End: 2017-04-16 | Stop reason: HOSPADM

## 2017-04-11 RX ADMIN — IOPAMIDOL 100 ML: 755 INJECTION, SOLUTION INTRAVENOUS at 15:06

## 2017-04-11 RX ADMIN — SODIUM CHLORIDE, PRESERVATIVE FREE 75 ML: 5 INJECTION INTRAVENOUS at 15:06

## 2017-04-11 ASSESSMENT — ENCOUNTER SYMPTOMS
DIZZINESS: 1
WEAKNESS: 1

## 2017-04-11 NOTE — CONSULTS
"[facility]  Neurology Consultation    Patient Name:  Tosin Nina  MRN:  [id number]    :  1945  Date of Service:  2017  Primary care provider:  Esther Back      Neurology consultation service was asked to see Tosin Nina by Emergency department to evaluate nystagmus.    History of Present Illness:   71 year old female h/o spinal stenosis who presents with generalized weakness and gradual deterioration in her ability to walk over the past two months. The patient has experienced increasing unsteadiness which she describes more as \"rocking back and forth\" than the room spinning. This has gradually made it more difficult for her to walk. She initially needed to use a cane, then a walker, and is now completely unable to walk. She feels this is due primarily to her unsteadiness rather than motor weakness. She denies problems with hearing but does note a sensation of ear fullness on the left side. She denies headache, nausea/vomiting, or pre-syncope.     She also reports vision problems over the last two weeks. She says that she has difficulty focusing on objects and reports that things seem to be \"scrolling up and down.\" She has trouble reading the newspaper but is able to watch the TV at farther distances.     The patient also endorses intermittent tingling that started in her upper extremities, moved to her lower extremities, and has since largely resolved. However, she does still report a \"sandpaper\" sensation in her fingers.     She is followed by Dr. Esposito at [facility] and underwent MRIs of the brain and spine as well as an EMG on 3/29/17 which she reports were negative. Paraneoplastic labs were also sent to [place] and are pending.    ROS  A 10-point ROS was performed as per HPI.     PMH  Past Medical History:   Diagnosis Date     Hypertension      Spinal stenosis      Thyroid disease      Past Surgical History: "   Procedure Laterality Date     THYROIDECTOMY         Medications     (Not in a hospital admission)    Allergies  Allergies   Allergen Reactions     Amoxicillin Hives       Social History  I have reviewed this patient's social history    Family History    This patient has no significant family history      Physical Examination   Vitals: BP (!) 143/93  Pulse 79  Temp 97.6  F (36.4  C) (Oral)  Resp 18  SpO2 97%  General: Adult, in NAD, cooperative  HEENT: NC/AT, no icterus, op pink and moist  Abdomen: S/NT/ND  Extremities: No LE swelling.  Skin: No rash or lesion.   Psych: Mood pleasant, affect congruent  Neuro:  Mental Status: Awake and alert, cooperative and interacting appropriately. No difficulty following commands. Oriented. Speech fluent, clear and coherent.   Cranial nerves: Vertical and horizontal nystagmus present on gaze testing, worse with right horizontal eye movement. PERRL. Facial sensation intact in all distributions. Facial movements symmetric. Uvula midline, palate elevation symmetric. Tongue protrusion midline with full lateral motion.   Motor: Tone normal. Muscle bulk symmetric and appropriate. No tremors or other involuntary movements observed. No pronator drift. Strength 5/5 and symmetric in upper and lower extremities.  Reflexes: 2+ and symmetric throughout upper and lower extremities. No clonus. Toes mute.  Sensory:  Normal to light touch throughout upper and lower extremities.   Coordination: FNF no dysmetria  Station and Gait: Significant difficulty rising from bed. Unable to stand for extended period of time without support. Unable to ambulate. Romberg attempted, but patient stated she could not close her eyes while standing.      Investigations   - MRI brain and spine 3/29/17 from outside facility with possible   - Paraneoplastic labs (serum) sent to [facility], pending  - CT CAP 4/11/17  (Radiologist's read)  1. Right lower and upper lobar pulmonary emboli without CT evidence of  right heart  strain.  2. Abdominopelvic lymphadenopathy, omental caking, and peritoneal  carcinomatosis. This is most suspicious for metastatic left ovarian  cancer. There is an irregular soft tissue mass about the sigmoid colon  that could potentially represent a primary malignancy, though  metastatic serosal implants are favored as an etiology for this.     Impression/Recommendations:  71 year old female h/o spinal stenosis who presents with gradual deterioration in her ability to walk over the past two months 2/2 sensation of unsteadiness. Currently completely unable to walk, wheelchair bound.      # Generalized weakness 2/2 sensation of unsteadiness  # Vertical and horizontal nystagmus:  MRI from outside facility 3/29/17 appears to show pontine T1 contrast enhancement. Immediate concern was for possible paraneoplastic syndrome vs metastasis vs primary tumor. As such, we ordered a CT CAP which showed likely diffuse metastatic disease throughout the abdomen (suspicious for left ovarian primary). Also positive for right lower and upper lobar pulmonary emboli. Etiology of symptoms thus likely CNS metastasis vs paraneoplastic process. In light of her CT results, notably the pulmonary emboli, it is felt she would be better managed by the medicine service with neurology team to follow for ongoing neurological deficits.   - Awaiting radiology read on MRI from 3/29/17 regarding pontine enhancing lesion and degree of spinal stenosis  - Paraneoplastic labs (serum) sent to [place], pending  - Neurology will continue to follow        Thank you for involving neurology in the care of Tosin Nina.  Please do not hesitate to call with questions/concerns (consult pager [phone]).      Patient was seen and discussed with Dr. Arriaza.    Ja Cruz, MS3  Diomedes Jones MD  Neurology PGY2  P: [id number]    Neurology Staff Addendum  I have seen and evaluated the patient today and discussed with the resident team and agree with the  documentation.  Patient appears to have metastatic ovarian cancer.  Etiology of brain lessons either paraneoplastic or metastatic.    We will continue to follow.     BLANCA MARION MD

## 2017-04-11 NOTE — ED PROVIDER NOTES
"  History     Chief Complaint   Patient presents with     Generalized Weakness     HPI  Tosin Nina is a 71 year old female with a history of hypertension, hypothyroidism, status-post thyroidectomy (2014) and spinal stenosis who presents with dizziness and generalized weakness. The patient reports that she has been declining over the past couple months. She notes that she went from walking unassisted to requiring a walker just to walk to the bathroom and has otherwise been spending most of the time in bed. She notes that she initially started using a cane about a month ago when she started experiencing \"rocking in my head.\" She complains of feeling dizzy and off balance, though denies room spinning sensation. The patient saw her primary care physician on 3/15/17 regarding this and was referred to a neurologist, who she saw on 3/29/17. She notes that she has since undergone an MRIs of the brain and spine as well as an EMG at [facility]. She notes that all of the results have come back negative without acute changes to explain her symptoms. She was referred to the [facility], and reports she had several tests done, though she has not had relief from her symptoms. She saw her neurologist yesterday, and reports that she had a blood test done to test for cancer, though she is unsure what test it was. The test was sent to [place] and the results won't come back for a couple weeks. Over the past couple weeks, she feels that she has declined very quickly, and now has difficulty moving her head or getting up from bed due to the dizziness. She felt since she was declining so quickly, she needed to be evaluated more urgently, prompting her arrival to the [place] ED.    Currently, the patient reports that she still has the \"rocking\" sensation in her head and reports that her vision has declined over the past couple weeks; she describes her vision as \"scrolling\" and states that " "she cannot focus on things in the distance. She is able to read, though it is difficult for her to read more than a few words at a time because of the vision movement. She also complains of paresthesias in the bilateral feet and hands. She is followed by Dr. Esposito at [facility]. The patient has never smoked. She notes that she has a history of a multinodular goiter, for which she underwent a total thyroidectomy in 2014.     Past Medical History:   Diagnosis Date     Hypertension      Spinal stenosis      Thyroid disease        Past Surgical History:   Procedure Laterality Date     THYROIDECTOMY         No family history on file.    Social History   Substance Use Topics     Smoking status: Never Smoker     Smokeless tobacco: Not on file     Alcohol use Yes      Comment: occasionally     Current Facility-Administered Medications   Medication     iopamidol (ISOVUE-370) solution 100 mL     sodium chloride 0.9 % for CT scan flush dose 75 mL     Current Outpatient Prescriptions   Medication     Levothyroxine Sodium (SYNTHROID PO)     Losartan Potassium (COZAAR PO)     HYDROCHLOROTHIAZIDE PO        Allergies   Allergen Reactions     Amoxicillin Hives      I have reviewed the Medications, Allergies, Past Medical and Surgical History, and Social History in the Epic system.    Review of Systems   Eyes: Positive for visual disturbance (\"scrolling\" vision).   Musculoskeletal: Positive for gait problem (imbalance).   Neurological: Positive for dizziness and weakness (generalized).        Positive for paresthesias in the bilateral hands and feet.   All other systems reviewed and are negative.      Physical Exam      Physical Exam   Constitutional: She is oriented to person, place, and time. No distress.   HENT:   Head: Atraumatic.   Mouth/Throat: Oropharynx is clear and moist. No oropharyngeal exudate.   Eyes: Pupils are equal, round, and reactive to light. No scleral icterus.   Neck: Neck supple. No JVD " present.   Cardiovascular: Normal rate, normal heart sounds and intact distal pulses.  Exam reveals no gallop and no friction rub.    No murmur heard.  Pulmonary/Chest: Effort normal and breath sounds normal. No respiratory distress. She has no wheezes. She has no rales. She exhibits no tenderness.   Abdominal: Soft. Bowel sounds are normal. She exhibits no distension and no mass. There is no tenderness. There is no rebound and no guarding.   Musculoskeletal: She exhibits no edema or tenderness.   Neurological: She is alert and oriented to person, place, and time. A sensory deficit (paresthesia on glove and stocking distribution) is present. No cranial nerve deficit. She exhibits normal muscle tone. Coordination normal. GCS eye subscore is 4. GCS verbal subscore is 5. GCS motor subscore is 6.   Vertical nystagmus   Skin: Skin is warm. No rash noted. She is not diaphoretic.       ED Course     ED Course     Procedures     10:32 AM  The patient was seen and examined by Dr. Lew in Room 17.          Results for orders placed or performed during the hospital encounter of 04/11/17 (from the past 24 hour(s))   CBC with platelets differential     Status: Abnormal    Collection Time: 04/11/17 11:24 AM   Result Value Ref Range    WBC 10.9 4.0 - 11.0 10e9/L    RBC Count 4.70 3.8 - 5.2 10e12/L    Hemoglobin 15.0 11.7 - 15.7 g/dL    Hematocrit 42.8 35.0 - 47.0 %    MCV 91 78 - 100 fl    MCH 31.9 26.5 - 33.0 pg    MCHC 35.0 31.5 - 36.5 g/dL    RDW 13.7 10.0 - 15.0 %    Platelet Count 259 150 - 450 10e9/L    Diff Method Automated Method     % Neutrophils 81.6 %    % Lymphocytes 8.6 %    % Monocytes 7.9 %    % Eosinophils 1.2 %    % Basophils 0.4 %    % Immature Granulocytes 0.3 %    Nucleated RBCs 0 0 /100    Absolute Neutrophil 8.9 (H) 1.6 - 8.3 10e9/L    Absolute Lymphocytes 0.9 0.8 - 5.3 10e9/L    Absolute Monocytes 0.9 0.0 - 1.3 10e9/L    Absolute Eosinophils 0.1 0.0 - 0.7 10e9/L    Absolute Basophils 0.0 0.0 - 0.2 10e9/L     Abs Immature Granulocytes 0.0 0 - 0.4 10e9/L    Absolute Nucleated RBC 0.0    ABO/Rh type and screen     Status: None (In process)    Collection Time: 04/11/17 11:24 AM   Result Value Ref Range    ABO Pending     RH(D) Pending     Antibody Screen Pending     Test Valid Only At Pending     Specimen Expires Pending    INR     Status: None    Collection Time: 04/11/17 11:24 AM   Result Value Ref Range    INR 1.06 0.86 - 1.14   Comprehensive metabolic panel     Status: Abnormal    Collection Time: 04/11/17 11:24 AM   Result Value Ref Range    Sodium 137 133 - 144 mmol/L    Potassium 3.1 (L) 3.4 - 5.3 mmol/L    Chloride 101 94 - 109 mmol/L    Carbon Dioxide 26 20 - 32 mmol/L    Anion Gap 10 3 - 14 mmol/L    Glucose 104 (H) 70 - 99 mg/dL    Urea Nitrogen 9 7 - 30 mg/dL    Creatinine 0.57 0.52 - 1.04 mg/dL    GFR Estimate >90  Non  GFR Calc   >60 mL/min/1.7m2    GFR Estimate If Black >90   GFR Calc   >60 mL/min/1.7m2    Calcium 8.7 8.5 - 10.1 mg/dL    Bilirubin Total 0.6 0.2 - 1.3 mg/dL    Albumin 3.6 3.4 - 5.0 g/dL    Protein Total 9.2 (H) 6.8 - 8.8 g/dL    Alkaline Phosphatase 140 40 - 150 U/L    ALT 25 0 - 50 U/L    AST 17 0 - 45 U/L   CRP inflammation     Status: Abnormal    Collection Time: 04/11/17 11:24 AM   Result Value Ref Range    CRP Inflammation 14.0 (H) 0.0 - 8.0 mg/L   Erythrocyte sedimentation rate auto     Status: None    Collection Time: 04/11/17 11:24 AM   Result Value Ref Range    Sed Rate 17 0 - 30 mm/h   UA reflex to Microscopic and Culture     Status: Abnormal    Collection Time: 04/11/17 12:10 PM   Result Value Ref Range    Color Urine Light Yellow     Appearance Urine Clear     Glucose Urine Negative NEG mg/dL    Bilirubin Urine Negative NEG    Ketones Urine 5 (A) NEG mg/dL    Specific Gravity Urine 1.004 1.003 - 1.035    Blood Urine Negative NEG    pH Urine 7.0 5.0 - 7.0 pH    Protein Albumin Urine Negative NEG mg/dL    Urobilinogen mg/dL Normal 0.0 - 2.0 mg/dL     Nitrite Urine Negative NEG    Leukocyte Esterase Urine Negative NEG    Source Midstream Urine              Labs Ordered and Resulted from Time of ED Arrival Up to the Time of Departure from the ED   CBC WITH PLATELETS DIFFERENTIAL - Abnormal; Notable for the following:        Result Value    Absolute Neutrophil 8.9 (*)     All other components within normal limits   COMPREHENSIVE METABOLIC PANEL - Abnormal; Notable for the following:     Potassium 3.1 (*)     Glucose 104 (*)     Protein Total 9.2 (*)     All other components within normal limits   CRP INFLAMMATION - Abnormal; Notable for the following:     CRP Inflammation 14.0 (*)     All other components within normal limits   UA MACROSCOPIC WITH REFLEX TO MICRO AND CULTURE - Abnormal; Notable for the following:     Ketones Urine 5 (*)     All other components within normal limits   INR   ERYTHROCYTE SEDIMENTATION RATE AUTO   PROTEIN IMMUNOFIXATION SERUM   PROTEIN IMMUNOFIXATION URINE   ABO/RH TYPE AND SCREEN       Assessments & Plan (with Medical Decision Making)  Subacute progressive dizziness, glove and stocking distribution paresthesia now to the point not able to ambulate even with walker, also noted to have elevated total protein, paraneoplastic serologies done yesterday at [facility] to be done at [facility], Neuro consult done-admit to 6A Dr Arriaza. Also sent off immunofixation serum and urine for poss paraprotein/monoclonal.       I have reviewed the nursing notes.    I have reviewed the findings, diagnosis, plan and need for follow up with the patient.    New Prescriptions    No medications on file       Final diagnoses:   Dizziness   Paresthesias   Elevated total protein   Unsteady gait   Nystagmus     INorth, am serving as a trained medical scribe to document services personally performed by Roxann Lew MD, based on the provider's statements to me.   I, Roxann Lew MD, was physically present and have reviewed and verified the  accuracy of this note documented by [place].     4/11/2017   [facility], [place], EMERGENCY DEPARTMENT     Roxann Lew MD  04/11/17 [id number]

## 2017-04-11 NOTE — LETTER
Health Information Management Services               Recipient:  [facility]    Sender:  CEASAR Zuluaga, [facility]  [address]   Pager [phone]  Phone [phone]    Date: April 14, 2017  Patient Name:  Tosin Nina  Routing Message:    Please consider for TCU placement. Notes to follow. Thanks!    The documents accompanying this notice contain confidential information belonging to the sender.  This information is intended only for the use of the individual or entity named above.  The authorized recipient of this information is prohibited from disclosing this information to any other party and is required to destroy the information after its stated need has been fulfilled, unless otherwise required by state law.      If you are not the intended recipient, you are hereby notified that any disclosure, copying, distribution or action taken in reliance on the contents of these documents is strictly prohibited. If you have received this document in error, please notify [place] immediately at [phone].  You may return the document via fax ([phone]) or return mail  (Health Information Management, , [address] [place] [address]).

## 2017-04-11 NOTE — IP AVS SNAPSHOT
Tosin Nina #[id number] (CSN: [id number])  (71 year old F)  (Adm: 17)     [id number]               [address] [facility]: [phone]            Patient Demographics     Patient Name Sex          Age SSN Address Phone    Tosin Nina Female 1945 (71 year old) [id number] [address] [facility] [place] [address] [phone] (Home)  [phone] (Mobile)      Emergency Contact(s)     Name Relation Home Work Mobile    Manoj Nina Spouse [phone]      Admission Information     Attending Provider Admitting Provider Admission Type Admission Date/Time    Mitzi Rios MD Tokar, Esra, MD Emergency 17  1023    Discharge Date Hospital Service Auth/Cert Status Service Area     Internal Medicine [facility]    Unit Room/Bed Admission Status       [facility] 7416/7416-01 Admission (Confirmed)       Admission     Complaint    Abdominal mass      Hospital Account     Name Acct ID Class Status Primary Coverage    Tosin Nina [id number] Inpatient Open UCARE - UCARE SENIORS NON FPA            Guarantor Account (for Hospital Account #[id number])     Name Relation to Pt Service Area Active? Acct Type    Tosin Nina  FCS Yes Personal/Family    Address Phone          [address] [place] [id number] [phone](H)              Coverage Information (for Hospital Account #[id number])     F/O Payor/Plan Precert #    UCARE/UCARE SENIORS NON FPA     Subscriber Subscriber #    Tosin Nina [id number]    Address Phone    [address]  [place] [address] [phone]                                                INTERAGENCY TRANSFER FORM - PHYSICIAN ORDERS   2017                       [address] [facility]: [phone]            Attending Provider: Mitzi Rios MD     Allergies:  Amoxicillin    Infection:  None   Service:  INTERNAL MED    Ht:  --   Wt:  69.6 kg (153 lb 6.4 oz)    Admission Wt:  71.2 kg (157 lb)    BMI:  --   BSA:  --            ED Clinical Impression     Diagnosis Description Comment Added By Time Added    Dizziness [R42] Dizziness [R42]  Roxann Lew MD 4/11/2017  2:52 PM    Paresthesias [R20.2] Paresthesias [R20.2]  Roxann Lew MD 4/11/2017  2:52 PM    Elevated total protein [R77.8] Elevated total protein [R77.8]  Roxann Lew MD 4/11/2017  2:52 PM    Unsteady gait [R26.81] Unsteady gait [R26.81]  Roxann Lew MD 4/11/2017  2:52 PM    Nystagmus [H55.00] Nystagmus [H55.00]  Roxann Lew MD 4/11/2017  2:54 PM    Other pulmonary embolism without acute cor pulmonale, unspecified chronicity (H) [I26.99] Other pulmonary embolism without acute cor pulmonale, unspecified chronicity (H) [I26.99]  Katerin Tabor MD 4/11/2017  6:17 PM    Abdominal mass, unspecified location [R19.00] Abdominal mass, unspecified location [R19.00]  Katerin Tabor MD 4/11/2017  6:17 PM      Hospital Problems as of 4/16/2017              Priority Class Noted POA    Abdominal mass Medium  4/11/2017 Yes    Ovarian cancer, unspecified laterality (H) Medium  4/14/2017 Unknown    Acute pulmonary embolism (H) Medium  4/14/2017 Yes      Non-Hospital Problems as of 4/16/2017              Priority Class Noted    Thyroid nodule Medium  10/7/2010    History of total knee replacement Medium  11/8/2014    Hypertension Medium  4/6/2016      Code Status History     Date Active Date Inactive Code Status Order ID Comments User Context    4/16/2017  7:47 AM  Full Code [id number]  Mitzi Rios MD Outpatient    4/11/2017  8:54 PM 4/16/2017  7:47 AM Full Code [id number]  Diomedes Jones MD Inpatient    2/7/2017 11:49 AM 4/11/2017  8:54 PM Full Code [id number]  Maeve Harding PA-C Outpatient    2/6/2017  7:58 PM 2/7/2017 11:49 AM Full Code [id number]  Tara Lucia PA-C ED      Current Code Status     Date Active Code Status Order ID Comments User Context       Prior       Summary of Visit     Reason for your hospital stay       You were diagnosed with metastatic ovarian cancer.  You got your first dose of chemotherapy.  We also found blood clots in your lungs and you are on shots of blood thinner twice a day for this.                Medication Review      START taking        Dose / Directions Comments    acetaminophen 325 MG tablet   Commonly known as:  TYLENOL        Dose:  650 mg   Take 2 tablets (650 mg) by mouth every 4 hours as needed for mild pain   Quantity:  100 tablet   Refills:  0        enoxaparin 80 MG/0.8ML injection   Commonly known as:  LOVENOX   Used for:  Other pulmonary embolism without acute cor pulmonale, unspecified chronicity (H)        Dose:  1 mg/kg   Inject 0.69 mLs (69 mg) Subcutaneous every 12 hours   Refills:  0        ondansetron 4 MG ODT tab   Commonly known as:  ZOFRAN-ODT        Dose:  4 mg   Start taking on:  4/17/2017   Take 1 tablet (4 mg) by mouth every 6 hours as needed for nausea   Quantity:  120 tablet   Refills:  0        ondansetron 4 MG tablet   Commonly known as:  ZOFRAN        Dose:  4 mg   Take 1 tablet (4 mg) by mouth every 6 hours as needed for nausea   Quantity:  20 tablet   Refills:  0        polyethylene glycol Packet   Commonly known as:  MIRALAX/GLYCOLAX        Dose:  17 g   Take 17 g by mouth daily as needed for constipation   Quantity:  7 packet   Refills:  0        prochlorperazine 5 MG tablet   Commonly known as:  COMPAZINE        Dose:  5 mg   Take 1 tablet (5 mg) by mouth every 6 hours as needed for vomiting   Quantity:  90 tablet   Refills:  0        senna-docusate 8.6-50 MG per tablet   Commonly known as:  SENOKOT-S;PERICOLACE        Dose:  1-2 tablet   Take 1-2 tablets by mouth 2 times daily as needed (constipation )   Quantity:  100 tablet   Refills:  0          CONTINUE these medications which may have CHANGED, or have new prescriptions. If we are uncertain of the size of tablets/capsules you have at home, strength may  be listed as something that might have changed.        Dose / Directions Comments    hydrochlorothiazide 25 MG tablet   Commonly known as:  HYDRODIURIL   This may have changed:  medication strength   Used for:  Dizziness        Dose:  50 mg   Take 2 tablets (50 mg) by mouth daily   Quantity:  30 tablet   Refills:  0        levothyroxine 125 MCG tablet   Commonly known as:  SYNTHROID   This may have changed:  medication strength   Used for:  Other pulmonary embolism without acute cor pulmonale, unspecified chronicity (H)        Dose:  125 mcg   Take 1 tablet (125 mcg) by mouth daily   Quantity:  30 tablet   Refills:  0        losartan 100 MG tablet   Commonly known as:  COZAAR   This may have changed:  medication strength   Used for:  Dizziness        Dose:  100 mg   Take 1 tablet (100 mg) by mouth daily   Quantity:  30 tablet   Refills:  0        Vitamin D (Cholecalciferol) 1000 UNITS Tabs   This may have changed:  medication strength   Used for:  Paresthesias        Dose:  2000 Units   Take 2,000 Units by mouth daily   Quantity:  60 tablet   Refills:  0          CONTINUE these medications which have NOT CHANGED        Dose / Directions Comments    IBUPROFEN PO        Dose:  200 mg   Take 200 mg by mouth every 6 hours as needed for moderate pain   Refills:  0          STOP taking     potassium chloride jere er   Commonly known as:  K-DUR                   After Care     Activity - Up with nursing assistance       Patient is unsteady on her feet.  As this improves her order can change.       Advance Diet as Tolerated       Follow this diet upon discharge: Orders Placed This Encounter      Snacks/Supplements Adult: Ensure Plus (Adult); Between Meals      Regular Diet Adult       General info for SNF       Length of Stay Estimate: Short Term Care: Estimated # of Days <30  Condition at Discharge: Stable  Level of care:skilled   Rehabilitation Potential: Good  Admission H&P remains valid and up-to-date: Yes  Recent  Chemotherapy: Date:          4/14/17             Use Nursing Home Standing Orders: Yes       Mantoux instructions       Give two-step Mantoux (PPD) Per Facility Policy Yes             Radiology & Cardiology Orders     IR Chest Port Placement > 5 Yrs of Age                 Referrals     PALLIATIVE CARE REFERRAL       Your provider has referred you to Palliative Care Services.    To schedule an Outpatient Palliative Care Referral appointment, please call: {LOCATIONS:549308}.    Please be aware that coverage of these services is subject to the terms and limitations of your health insurance plan.  Call member services at your health plan with any benefit or coverage questions.    Please bring the following with you to your appointment:    (1) Any X-Rays, CTs or MRIs which have been performed.  Contact the facility where they were done to arrange for  prior to your scheduled appointment.  (2) If you have recently seen a provider outside of the [facility], please bring your most recent clinic note and/or imaging results  (3) List of current medications - please bring ALL of the medications that you are currently taking (in their original bottles) to your appointment  (4) This referral request  (5) Any documents/labs given to you for this referral    Services Requested: {Services:418250}    Please complete the following questions:  1. What is patient's life-limiting diagnosis? ***  2. What is the reason for the referral? ***  3. What is the patient's prognosis? ***    Palliative Care Definition:  Palliative Care is specialized medical care for people with serious illness.  This type of care is focused on providing patients with relief from symptoms, pain and stresses of serious illness - whatever the diagnosis may be.  The goal of Palliative Care is to improve quality of life for both the patient and the family.  Palliative Care is provided by a team of doctors, nurses and other specialists who work with the  patient s other doctors to provide an extra layer of support.  Palliative Care is appropriate at any age and at any stage in a serious illness, and can be provided together with curative treatment.               Follow-Up Appointment Instructions     Follow Up and recommended labs and tests       Patient has multiple upcoming appointments per her AVS including chemotherapy in 1 week, appointment with Oncology Team at [facility].             Your next 10 appointments already scheduled     Apr 20, 2017 10:40 AM CDT   Return Visit with SALVADOR Solis CNP   [facility] ([facility])    [facility]  [address]  [facility] [address]   [phone]            Apr 21, 2017  9:30 AM CDT   Level 3 with RH INFUSION CHAIR 8   [facility] ([facility])    [facility]  [address]  [facility] [address]   [phone]            Apr 28, 2017  8:00 AM CDT   Level 3 with RH INFUSION CHAIR 12   [facility] ([facility])    [facility]  [address]  [facility] [address]   [phone]            May 04, 2017  9:00 AM CDT   Level 3 with RH INFUSION CHAIR 9   [facility] ([facility])    [facility]  [address]  [facility] [address]   [phone]            May 04, 2017  9:20 AM CDT   Return Visit with SALVADOR Solis CNP   [facility] ([facility])    [facility]  [address]  [facility] [address]   [phone]            May 16, 2017 11:00 AM CDT   New Visit with Nessa Foster MD   [facility] ([facility])    [facility]  [address]  [facility] [address]   [phone]              Statement of Approval     Ordered          04/16/17 0749  I have reviewed and agree with all the recommendations and orders detailed in this document.  EFFECTIVE NOW     Approved and electronically signed by:  Mitzi Rios MD                                                 INTERAGENCY TRANSFER FORM - NURSING   4/11/2017                       [address] [facility] BANK: [phone]            Attending Provider: Mitzi Rios MD     Allergies:  Amoxicillin    Infection:  None   Service:  INTERNAL MED    Ht:  --   Wt:  69.6 kg (153 lb 6.4 oz)   Admission Wt:  71.2 kg (157 lb)    BMI:  --   BSA:  --            Advance Directives        Does patient have a scanned Advance Directive/ACP document in EPIC?           No        Immunizations     None      ASSESSMENT     Discharge Profile Flowsheet     DISCHARGE NEEDS ASSESSMENT     SKIN      Equipment Currently Used at Home  walker, standard;other (see comments) (transport chair, gait belt ) 04/13/17 1054   Inspection  Full 04/16/17 0936    Transportation Available  family or friend will provide 04/13/17 1054   Skin areas NOT inspected  Nose;Cheek, left;Cheek, right;Ear, left;Ear, right 04/12/17 1722    GASTROINTESTINAL (ADULT,PEDIATRIC,OB)     Skin WDL  WDL 04/16/17 0936    GI WDL  WDL 04/16/17 0936   Skin Color/Characteristics  pale 04/16/17 0107    All Quadrants Bowel Sounds  hypoactive 04/16/17 0936   Skin Temperature  warm 04/16/17 0936    Last Bowel Movement  04/15/17 04/16/17 0107   Skin Moisture  dry 04/16/17 0107    GI Signs/Symptoms  diarrhea 04/16/17 0107   Skin Elasticity  quick return to original state 04/16/17 0107    Passing flatus  yes 04/16/17 0936   Skin Integrity  intact 04/16/17 0107    COMMUNICATION ASSESSMENT     SAFETY      Patient's communication style  spoken language (English or Bilingual) 04/11/17 2031   Safety WDL  WDL 04/16/17 0936                 Assessment WDL (Within Defined  "Limits) Definitions           Safety WDL     Effective: 09/28/15    Row Information: <b>WDL Definition:</b> Bed in low position, wheels locked; call light in reach; upper side rails up x 2; ID band on<br> <font color=\"gray\"><i>Item=AS safety wdl>>List=AS safety wdl>>Version=F14</i></font>      Skin WDL     Effective: 09/28/15    Row Information: <b>WDL Definition:</b> Warm; dry; intact; elastic; without discoloration; pressure points without redness<br> <font color=\"gray\"><i>Item=AS skin wdl>>List=AS skin wdl>>Version=F14</i></font>      Vitals     Vital Signs Flowsheet     VITAL SIGNS     HEIGHT AND WEIGHT      Temp  96.5  F (35.8  C) 04/16/17 0727   Weight  69.6 kg (153 lb 6.4 oz) 04/15/17 2257    Temp src  Oral 04/16/17 0727   JENNY COMA SCALE      Resp  16 04/16/17 0727   Best Eye Response  4-->(E4) spontaneous 04/14/17 1444    Pulse  81 04/16/17 0727   Best Motor Response  6-->(M6) obeys commands 04/14/17 1444    Pulse/Heart Rate Source  Monitor 04/16/17 0727   Best Verbal Response  5-->(V5) oriented 04/14/17 1444    BP  125/68 04/16/17 0727   [place] Coma Scale Score  15 04/14/17 1444    BP Location  Left arm 04/16/17 0727   POSITIONING      OXYGEN THERAPY     Body Position  independently positioning 04/16/17 0931    SpO2  95 % 04/16/17 0727   Head of Bed (HOB)  HOB at 30-45 degrees 04/16/17 0936    O2 Device  None (Room air) 04/16/17 0727   Chair  Upright in chair 04/15/17 1031    Oxygen Delivery  1 LPM 04/12/17 2238   Positioning/Transfer Devices  pillows;in use 04/15/17 1856    PAIN/COMFORT     DAILY CARE      Patient Currently in Pain  denies 04/16/17 0930   Activity Type  activity encouraged 04/16/17 0931    Preferred Pain Scale  CAPA (Clinically Aligned Pain Assessment) ([facility] Adults Only) 04/16/17 0930   Activity Level of Assistance  assistance, 1 person 04/16/17 0931    CLINICALLY ALIGNED PAIN ASSESSMENT (CAPA) ([facility] ADULTS ONLY)     ECG      Comfort  " negligible pain 04/16/17 0930   ECG Rhythm  Sinus rhythm 04/12/17 1112    Change in Pain  about the same 04/14/17 0616   Ectopy  None 04/12/17 1112    Pain Control  partially effective 04/14/17 0616   Lead Monitored  Lead II 04/12/17 1112    Functioning  can do everything I need to 04/14/17 0616   ANALGESIA SIDE EFFECTS MONITORING      Sleep  normal sleep 04/14/17 0616   Side Effects Monitoring: Respiratory Quality  R 04/16/17 0547    CHEMO VALIDATION     Side Effects Monitoring: Respiratory Depth  N 04/16/17 0547    Chemo order double check RN 1  chemo 04/14/17 1444   Side Effects Monitoring: Sedation Level  1 04/16/17 0547            Patient Lines/Drains/Airways Status    Active LINES/DRAINS/AIRWAYS     Name: Placement date: Placement time: Site: Days: Last dressing change:    Peripheral IV 04/11/17 Right Upper forearm 04/11/17   1125   Upper forearm   4             Patient Lines/Drains/Airways Status    Active PICC/CVC     None            Intake/Output Detail Report     Date Intake     Output Net    Shift P.O. I.V. IV Piggyback Total Urine Total       Day 04/15/17 0000 - 04/15/17 0659 -- -- -- -- 400 400 -400    Maral 04/15/17 0700 - 04/15/17 1459 360 -- -- 360 -- -- 360    Noc 04/15/17 1500 - 04/15/17 2359 -- -- -- -- 450 450 -450    Day 04/16/17 0000 - 04/16/17 0659 360 0 -- 360 400 400 -40    Maral 04/16/17 0700 - 04/16/17 1459 120 -- -- 120 50 50 70      Last Void/BM       Most Recent Value    Urine Occurrence 1 [pt forgot to save urine] at 04/15/2017 1630    Stool Occurrence 1 at 04/13/2017 0722      Case Management/Discharge Planning     Case Management/Discharge Planning Flowsheet     LIVING ENVIRONMENT     MH/BH CAREGIVER      Lives With  spouse 04/13/17 1054   Filed Complexity Screen Score  7 04/12/17 0806    Living Arrangements  house 04/13/17 1054   ABUSE RISK SCREEN      COPING/STRESS     QUESTION TO PATIENT:  Has a member of your family or a partner(now or in the past) intimidated, hurt, manipulated, or  controlled you in any way?  no 04/11/17 2210    Major Change/Loss/Stressor  hospitalization;illness;medical condition/diagnosis 04/11/17 2210   QUESTION TO PATIENT: Do you feel safe going back to the place where you are living?  yes 04/11/17 2210    DISCHARGE PLANNING     OBSERVATION: Is there reason to believe there has been maltreatment of a vulnerable adult (ie. Physical/Sexual/Emotional abuse, self neglect, lack of adequate food, shelter, medical care, or financial exploitation)?  no 04/11/17 2210    Transportation Available  family or friend will provide 04/13/17 1054   (R) MENTAL HEALTH SUICIDE RISK      FINAL RESOURCES     Are you depressed or being treated for depression?  No 04/11/17 2210    Equipment Currently Used at Home  walker, standard;other (see comments) (transport chair, gait belt ) 04/13/17 1054                       [address] [facility] BANK: [phone]            Medication Administration Report for Tosin Nina as of 04/16/17 1120   Legend:    Given Hold Not Given Due Canceled Entry Other Actions    Time Time (Time) Time  Time-Action       Inactive    Active    Linked        Medications 04/10/17 04/11/17 04/12/17 04/13/17 04/14/17 04/15/17 04/16/17    0.9% sodium chloride BOLUS  Dose: 250 mL Freq: ONCE Route: IV  Start: 04/14/17 1700   Admin Instructions: Concomitant fluids to run chemo in if needed.         (1640)-Not Given [C]             0.9% sodium chloride infusion  Rate: 200 mL/hr Freq: CONTINUOUS PRN Route: IV  PRN Comment: refractory hypotension associated with hypersensitivity  Start: 04/14/17 1441   End: 04/16/17 1440          1440-Med Discontinued       acetaminophen (TYLENOL) tablet 650 mg  Dose: 650 mg Freq: EVERY 4 HOURS PRN Route: PO  PRN Reason: mild pain  Start: 04/11/17 2054   Admin Instructions: Alternate ibuprofen (if ordered) with acetaminophen.  Maximum acetaminophen dose from all sources = 75 mg/kg/day not to exceed 4 grams/day.               albuterol (PROAIR  HFA/PROVENTIL HFA/VENTOLIN HFA) Inhaler 1-2 puff  Dose: 1-2 puff Freq: ONCE PRN Route: IN  PRN Comment: refractory bronchospasm associated with hypersensitivity  Start: 04/14/17 1441   End: 04/16/17 1440 1440-Med Discontinued       albuterol neb solution 2.5 mg  Dose: 2.5 mg Freq: ONCE PRN Route: NEBULIZATION  PRN Comment: refractory bronchospasm associated with hypersensitivity  Start: 04/14/17 1441   End: 04/16/17 1440          1440-Med Discontinued       cholecalciferol (vitamin D) tablet 2,000 Units  Dose: 2,000 Units Freq: DAILY Route: PO  Start: 04/12/17 0800      (0818)-Not Given        0810 (2,000 Units)-Given        0815 (2,000 Units)-Given        0754 (2,000 Units)-Given        0823 (2,000 Units)-Given           diphenhydrAMINE (BENADRYL) injection 50 mg  Dose: 50 mg Freq: ONCE PRN Route: IV  PRN Comment: rash, hives, itching, facial flushing associated with hypersensitivity  Start: 04/14/17 1441   End: 04/16/17 8562          2359-Med Discontinued       enoxaparin (LOVENOX) injection 70 mg  Dose: 1 mg/kg Freq: EVERY 12 HOURS Route: SC  Start: 04/14/17 1345   Admin Instructions: Adjust dose frequency to every 24 hours per renal guidelines if CrCL = 15-30 mL/min         1438 (70 mg)-Given        0609 (70 mg)-Given       1825 (70 mg)-Given        0530 (70 mg)-Given       [ ] 1800           EPINEPHrine (ADRENALIN) injection 0.3 mg  Dose: 0.3 mg Freq: EVERY 5 MIN PRN Route: IM  PRN Comment: swollen lips / tongue, airway obstruction, or hypotension associated with hypersensitivity  Start: 04/14/17 1441   End: 04/16/17 1440   Admin Instructions: May repeat x1 dose. Administer in the mid outer thigh.  Not for direct undiluted intravenous injection (1mg/ml = 1:1000). Protect from light.           1440-Med Discontinued       hydrochlorothiazide (HYDRODIURIL) tablet 50 mg  Dose: 50 mg Freq: DAILY Route: PO  Start: 04/12/17 0800      0817 (50 mg)-Given        0810 (50 mg)-Given        0815 (50 mg)-Given      "   0754 (50 mg)-Given        0823 (50 mg)-Given           levothyroxine (SYNTHROID/LEVOTHROID) tablet 125 mcg  Dose: 125 mcg Freq: DAILY Route: PO  Start: 04/12/17 0800      0817 (125 mcg)-Given        0810 (125 mcg)-Given        0816 (125 mcg)-Given        0754 (125 mcg)-Given        0823 (125 mcg)-Given           lidocaine (LMX4) kit  Freq: EVERY 1 HOUR PRN Route: Top  PRN Reason: pain  PRN Comment: with VAD insertion or accessing implanted port.  Start: 04/11/17 2054   Admin Instructions: Do NOT give if patient has a history of allergy to any local anesthetic or any \"wolf\" product.   Apply 30 minutes prior to VAD insertion or port access.  MAX Dose:  2.5 g (  of 5 g tube)               lidocaine 1 % 1 mL  Dose: 1 mL Freq: EVERY 1 HOUR PRN Route: OTHER  PRN Comment: mild pain with VAD insertion or accessing implanted port  Start: 04/11/17 2054   Admin Instructions: Do NOT give if patient has a history of allergy to any local anesthetic or any \"wolf\" product. MAX dose 1 mL subcutaneous OR intradermal in divided doses.               LORazepam (ATIVAN) half-tab 0.25 mg  Dose: 0.25 mg Freq: EVERY 6 HOURS PRN Route: PO  PRN Reason: anxiety  Start: 04/13/17 1646       1802 (0.25 mg)-Given        0816 (0.25 mg)-Given             LORazepam (ATIVAN) injection 0.5-1 mg  Dose: 0.5-1 mg Freq: EVERY 6 HOURS PRN Route: IV  PRN Reasons: anxiety,other  PRN Comment: nausea/vomiting  Start: 04/14/17 1441   Admin Instructions: IV Push over 2-5 minutes.  For IV PUSH: Dilute with equal volume of NS.               losartan (COZAAR) tablet 100 mg  Dose: 100 mg Freq: DAILY Route: PO  Start: 04/12/17 0800      0817 (100 mg)-Given        0810 (100 mg)-Given        0815 (100 mg)-Given        0754 (100 mg)-Given        0823 (100 mg)-Given           magnesium sulfate 4 g in 100 mL sterile water (premade)  Dose: 4 g Freq: EVERY 4 HOURS PRN Route: IV  PRN Reason: magnesium supplementation  Start: 04/11/17 2054   Admin Instructions: For serum " Mg++ less than 1.6 mg/dL  Give 4 g and recheck magnesium level 2 hours after dose, and next AM.               MEDICATION INSTRUCTION  Freq: CONTINUOUS PRN Route: XX  PRN Comment: hypersensitivity reaction  Start: 04/14/17 1441   End: 04/16/17 1440   Admin Instructions: Hypersensitivity Reaction: Slow rate or stop medication infusion if hypersensitivity reaction occurs. Obtain Hypersensitivity Kit located on patient care unit. Administer only the hypersensitivity medications required for appropriate symptom relief.           1440-Med Discontinued       meperidine (DEMEROL) injection 25 mg  Dose: 25 mg Freq: EVERY 30 MIN PRN Route: IV  PRN Comment: rigors associated with hypersensitivity.  Start: 04/14/17 1441   End: 04/16/17 2359          2359-Med Discontinued       methylPREDNISolone sodium succinate (solu-MEDROL) injection 125 mg  Dose: 125 mg Freq: ONCE PRN Route: IV  PRN Comment: swollen lips / tongue, refractory hypotension, or bronchospasm associated with hypersensitivity  Start: 04/14/17 1441   End: 04/16/17 1440   Admin Instructions: Doses greater than 125 mg need to be in at least 50 mL IVPB           1440-Med Discontinued       naloxone (NARCAN) injection 0.1-0.4 mg  Dose: 0.1-0.4 mg Freq: EVERY 2 MIN PRN Route: IV  PRN Reason: opioid reversal  Start: 04/11/17 2054   Admin Instructions: For respiratory rate LESS than or EQUAL to 8.  Partial reversal dose:  0.1 mg titrated q 2 minutes for Analgesia Side Effects Monitoring Sedation Level of 3 (frequently drowsy, arousable, drifts to sleep during conversation).Full reversal dose:  0.4 mg bolus for Analgesia Side Effects Monitoring Sedation Level of 4 (somnolent, minimal or no response to stimulation).               polyethylene glycol (MIRALAX/GLYCOLAX) Packet 17 g  Dose: 17 g Freq: DAILY PRN Route: PO  PRN Reason: constipation  Start: 04/11/17 2054   Admin Instructions: Give in 8oz of  water, juice, or soda. Hold for loose stools.  This is the second step of  a three step constipation treatment protocol.  1 Packet = 17 grams. Mixed prescribed dose in 8 ounces of water. Follow with 8 oz. of water.               potassium chloride (KLOR-CON) Packet 20-40 mEq  Dose: 20-40 mEq Freq: EVERY 2 HOURS PRN Route: ORAL OR FEED  PRN Reason: potassium supplementation  Start: 04/11/17 2054   Admin Instructions: Use if unable to tolerate tablets.  If Serum K+ 3.0-3.3, dose = 60 mEq po total dose (40 mEq x1 followed in 2 hours by 20 mEq x1). Recheck K+ level 4 hours after dose and the next AM.  If Serum K+ 2.5-2.9, dose = 80 mEq po total dose (40 mEq Q2H x2). Recheck K+ level 4 hours after dose and the next AM.  If Serum K+ less than 2.5, See IV order.  Dissolve packet contents in 4-8 ounces of cold water or juice.               potassium chloride 10 mEq in 100 mL intermittent infusion  Dose: 10 mEq Freq: EVERY 1 HOUR PRN Route: IV  PRN Reason: potassium supplementation  Start: 04/11/17 2054   Admin Instructions: Infuse via PERIPHERAL LINE or CENTRAL LINE. Use for central line replacement if patient weight less than 65 kg, if patient is on TPN with high potassium content or if unit does not stock 20 mEq bags.   If Serum K+ 3.0-3.3, dose = 10 mEq/hr x4 doses (40 mEq IV total dose). Recheck K+ level 2 hours after dose and the next AM.   If Serum K+ less than 3.0, dose = 10 mEq/hr x6 doses (60 mEq IV total dose). Recheck K+ level 2 hours after dose and the next AM.               potassium chloride 10 mEq in 100 mL intermittent infusion with 10 mg lidocaine  Dose: 10 mEq Freq: EVERY 1 HOUR PRN Route: IV  PRN Reason: potassium supplementation  Start: 04/11/17 2054   Admin Instructions: Infuse via PERIPHERAL LINE. Use potassium with lidocaine for pain with peripheral administration.  If Serum K+ 3.0-3.3, dose = 10 mEq/hr x4 doses (40 mEq IV total dose). Recheck K+ level 2 hours after dose and the next AM.  If Serum K+ less than 3.0, dose = 10 mEq/hr x6 doses (60 mEq IV total dose). Recheck K+  level 2 hours after dose and the next AM.       0406 (10 mEq)-New Bag [C]               potassium chloride 20 mEq in 50 mL intermittent infusion  Dose: 20 mEq Freq: EVERY 1 HOUR PRN Route: IV  PRN Reason: potassium supplementation  Start: 04/11/17 2054   Admin Instructions: Infuse via CENTRAL LINE Only. May need EKG if less than 65 kg or on TPN - Max rate is 0.3 mEq/kg/hr for patients not on EKG monitoring.   If Serum K+ 3.0-3.3, dose = 20 mEq/hr x2 doses (40 mEq IV total dose). Recheck K+ level 2 hours after dose and the next AM.  If Serum K+ less than 3.0, dose = 20 mEq/hr x3 doses (60 mEq IV total dose). Recheck K+ level 2 hours after dose and the next AM.               potassium chloride SA (K-DUR/KLOR-CON M) CR tablet 20-40 mEq  Dose: 20-40 mEq Freq: EVERY 2 HOURS PRN Route: PO  PRN Reason: potassium supplementation  Start: 04/11/17 2054   Admin Instructions: Use if able to take PO.   If Serum K+ 3.0-3.3, dose = 60 mEq po total dose (40 mEq x1 followed in 2 hours by 20 mEq x1). Recheck K+ level 4 hours after dose and the next AM.  If Serum K+ 2.5-2.9, dose = 80 mEq po total dose (40 mEq Q2H x2). Recheck K+ level 4 hours after dose and the next AM.  If Serum K+ less than 2.5, See IV order.  DO NOT CRUSH.        0834 (40 mEq)-Given       1026 (20 mEq)-Given              prochlorperazine (COMPAZINE) injection 5 mg  Dose: 5 mg Freq: EVERY 6 HOURS PRN Route: IV  PRN Reasons: nausea,vomiting  Start: 04/11/17 2054   Admin Instructions: This is Step 2 of nausea and vomiting management.   If nausea not resolved in 15 minutes, give metoclopramide (REGLAN) if ordered (step 3 of nausea and vomiting management)                     Or  prochlorperazine (COMPAZINE) tablet 5 mg  Dose: 5 mg Freq: EVERY 6 HOURS PRN Route: PO  PRN Reason: vomiting  Start: 04/11/17 2054   Admin Instructions: This is Step 2 of nausea and vomiting management.   If nausea not resolved in 15 minutes, give metoclopramide (REGLAN) if ordered (step 3 of  nausea and vomiting management)           0823 (5 mg)-Given          Or  prochlorperazine (COMPAZINE) Suppository 12.5 mg  Dose: 12.5 mg Freq: EVERY 12 HOURS PRN Route: RE  PRN Reasons: nausea,vomiting  Start: 04/11/17 2054   Admin Instructions: This is Step 2 of nausea and vomiting management.   If nausea not resolved in 15 minutes, give metoclopramide (REGLAN) if ordered (step 3 of nausea and vomiting management)                      senna-docusate (SENOKOT-S;PERICOLACE) 8.6-50 MG per tablet 1-2 tablet  Dose: 1-2 tablet Freq: 2 TIMES DAILY PRN Route: PO  PRN Comment: constipation   Start: 04/11/17 2054   Admin Instructions: If no bowel movement in 24 hours, increase to 2 tablets PO BID.  Hold for loose stools.   This is the first step of a three step constipation treatment protocol.               sodium chloride (PF) 0.9% PF flush 3 mL  Dose: 3 mL Freq: EVERY 8 HOURS Route: IK  Start: 04/11/17 2100   Admin Instructions: And Q1H PRN, to lock peripheral IV dormant line.      2212 (3 mL)-Given        (0407)-Not Given       (1300)-Not Given       2101 (3 mL)-Given        0640 (3 mL)-Given       (1447)-Not Given       2122 (3 mL)-Given        0413 (3 mL)-Given       (1302)-Not Given       (2052)-Not Given        0500 (3 mL)-Given       1336 (3 mL)-Given [C]       2240 (3 mL)-Given        0530 (3 mL)-Given       [ ] 1300       [ ] 2100           sodium chloride (PF) 0.9% PF flush 3 mL  Dose: 3 mL Freq: EVERY 1 HOUR PRN Route: IK  PRN Reason: line flush  PRN Comment: for peripheral IV flush post IV meds  Start: 04/11/17 2054      0218 (3 mL)-Given [C]              Future Medications  Medications 04/10/17 04/11/17 04/12/17 04/13/17 04/14/17 04/15/17 04/16/17       ondansetron (ZOFRAN-ODT) ODT tab 4 mg  Dose: 4 mg Freq: EVERY 6 HOURS PRN Route: PO  PRN Reason: nausea  Start: 04/17/17 0000   Admin Instructions: This is Step 1 of nausea and vomiting management.  If nausea not resolved in 15 minutes, go to Step 2  prochlorperazine (COMPAZINE). Do not push through foil backing. Peel back foil and gently remove. Place on tongue immediately. Administration with liquid unnecessary              Or  ondansetron (ZOFRAN) injection 4 mg  Dose: 4 mg Freq: EVERY 6 HOURS PRN Route: IV  PRN Reasons: nausea,vomiting  Start: 04/17/17 0000   Admin Instructions: This is Step 1 of nausea and vomiting management.  If nausea not resolved in 15 minutes, go to Step 2 prochlorperazine (COMPAZINE).  Irritant.              Completed Medications  Medications 04/10/17 04/11/17 04/12/17 04/13/17 04/14/17 04/15/17 04/16/17         Dose: 605 mg Freq: ONCE Route: IV  Last Dose: 605 mg (04/14/17 2004)  Start: 04/14/17 1800   End: 04/14/17 2049   Admin Instructions: Irritant         2004 (605 mg)-New Bag               Dose: 12 mg Freq: ONCE Route: IV  Start: 04/14/17 1630   End: 04/14/17 1754   Admin Instructions: 30-min prior to chemo.         1739 (12 mg)-Given               Dose: 25 mg Freq: ONCE Route: IV  Start: 04/14/17 1630   End: 04/14/17 1810   Admin Instructions: 30-min prior to chemo.         1810 (25 mg)-Given            Or    Dose: 25 mg Freq: ONCE Route: PO  Start: 04/14/17 1630   End: 04/14/17 1810   Admin Instructions: 30-min prior to chemo.                        Dose: 40 mg Freq: ONCE Route: IV  Start: 04/14/17 1630   End: 04/14/17 1703   Admin Instructions: 30-min prior to chemo.         1703 (40 mg)-Given               Dose: 80 Units/kg Freq: ONCE Route: IV  Start: 04/13/17 1930   End: 04/13/17 2120   Admin Instructions: Max Dose: 4000 units if patient getting tenecteplase (TNKase) and greater than 70 kg.  High Intensity Heparin Treatment  Nurse to administer dose from existing infusion. If no infusion bag or syringe for this order is available, contact pharmacist to re-enter medication order.        2120 (5,700 Units)-Given                Dose: 80 mg/m2 Freq: ONCE Route: IV  Last Dose: 135 mg (04/14/17 9855)  Start: 04/14/17 1700   End:  04/14/17 1946   Admin Instructions: Possible Vesicant. Use McGaw/Excel bag. Taxol tubing required.  Possible Vesicant.  Dose or Frequency adjustments may be needed for hepatic impairment.   See Chemo Hepatic Dosing reference link for further information.            1846 (135 mg)-New Bag               Dose: 0.25 mg Freq: ONCE Route: IV  Start: 04/14/17 1630   End: 04/14/17 1810   Admin Instructions: Give 30 minutes prior to chemo.  IV Push.         1810 (0.25 mg)-Given            Discontinued Medications  Medications 04/10/17 04/11/17 04/12/17 04/13/17 04/14/17 04/15/17 04/16/17         Dose: 12 mg Freq: ONCE Route: IV  Start: 04/14/17 1445   End: 04/14/17 1457   Admin Instructions: Give 30 minutes prior to chemo.         1457-Med Discontinued  (1700)-Not Given               Dose: 25 mg Freq: ONCE Route: IV  Start: 04/14/17 1445   End: 04/14/17 1457   Admin Instructions: Give 30 minutes prior to chemo. Give IV or PO. Do not give both routes.         1457-Med Discontinued  (1700)-Not Given               Dose: 25 mg Freq: ONCE Route: PO  Start: 04/14/17 1445   End: 04/14/17 1457   Admin Instructions: Give 30 minutes prior to chemo. Give IV or PO. Do not give both routes.         1457-Med Discontinued  (1700)-Not Given               Dose: 0.3 mg Freq: EVERY 5 MIN PRN Route: IM  PRN Comment: swollen lips / tongue, airway obstruction, or hypotension associated with hypersensitivity  Start: 04/14/17 1441   End: 04/14/17 1451   Admin Instructions: May repeat x1 dose. Administer in the mid outer thigh.         1451-Med Discontinued           Dose: 40 mg Freq: ONCE Route: IV  Start: 04/14/17 1445   End: 04/14/17 1457   Admin Instructions: Give 30 mins before chemotherapy.         1457-Med Discontinued  (1700)-Not Given               Rate: 0-35 mL/hr Freq: CONTINUOUS Route: IV  Last Dose: Stopped (04/14/17 1300)  Start: 04/13/17 1930   End: 04/14/17 1333   Admin Instructions: Starting Infusion Rate = 1,300 Units/hr (actual  weight) (18 units/kg/hr).  High Intensity Heparin Treatment.  GOAL: Heparin Xa (10a) LEVEL = 0.30-0.70 international IU/mL (PTT =  seconds).  Max 1000 units/hr if patient getting TNK and greater than 70 kg.  Beginning with the Heparin Xa (10a) Level collected 6 hours after starting heparin, adjust heparin according to guidelines.    Release Heparin Xa (10a) Level order for blood draw 6 hours after start of infusion and 6 hours after any dose change.    If Xa (10a) less than 0.1 see bolus orders and INCREASE by 400 units/hr.    If Xa (10a) 0.1 - 0.17 see bolus orders and INCREASE by 300 units/hr.    If Xa (10a) 0.18 - 0.29 see bolus orders and INCREASE by 150 units/hr.    If Xa (10a) 0.3 - 0.7 then NO CHANGE in rate .    If Xa (10a) 0.71 - 1.01 then HOLD 30 min and Reduce by 100 units/hr.    If Xa (10a) 1.02 - 2 then HOLD 60 min and Reduce by 150 units/hr     If Xa (10a) greater than 2 then HOLD 60 min and Reduce by 200 units/hr .        2121 (1,300 Units/hr)-New Bag        0413-Stopped [C]       0515 (1,150 Units/hr)-Restarted [C]       1259 (1,050 Units/hr)-Rate/Dose Change [C]       1300-Stopped [C]       1333-Med Discontinued           Freq: EVERY 6 HOURS PRN Route: IV  PRN Comment: GOAL: Heparin Xa (10a) LEVEL = 0.30-0.70 IU/mL (PTT =  seconds).  Start: 04/13/17 1928   End: 04/14/17 1333   Admin Instructions: Beginning with the Heparin Xa (10a) level collected 6 hours AFTER starting heparin:  If Heparin Xa (10a) less than  0.1, then bolus dose = 4,250 Units (actual weight) (60 Units/kg x 71.2 kg (actual weight))    If Heparin Xa (10a) 0.1 to 0.17, then bolus dose = 2,850 Units (actual weight) (40 Units/kg x 71.2 kg (actual weight))    If Heparin Xa (10a) 0.18 to 0.29, then bolus dose = 2,150 Units (actual weight) (30 Units/kg x 71.2 kg (actual weight))  Nurse to administer dose from existing infusion. If no infusion bag or syringe for this order is available, contact pharmacist to re-enter medication  order.         1333-Med Discontinued           Dose: 4 mg Freq: EVERY 6 HOURS PRN Route: PO  PRN Reason: nausea  Start: 04/11/17 2054   End: 04/14/17 1521   Admin Instructions: This is Step 1 of nausea and vomiting management.  If nausea not resolved in 15 minutes, go to Step 2 prochlorperazine (COMPAZINE). Do not push through foil backing. Peel back foil and gently remove. Place on tongue immediately. Administration with liquid unnecessary         1521-Med Discontinued        Or    Dose: 4 mg Freq: EVERY 6 HOURS PRN Route: IV  PRN Reasons: nausea,vomiting  Start: 04/11/17 2054   End: 04/14/17 1521   Admin Instructions: This is Step 1 of nausea and vomiting management.  If nausea not resolved in 15 minutes, go to Step 2 prochlorperazine (COMPAZINE).  Irritant.         1521-Med Discontinued      Medications 04/10/17 04/11/17 04/12/17 04/13/17 04/14/17 04/15/17 04/16/17               INTERAGENCY TRANSFER FORM - NOTES (H&P, Discharge Summary, Consults, Procedures, Therapies)   4/11/2017                       [address] [facility] BANK: [phone]               History & Physicals      H&P by Shahana Castro MD at 4/11/2017 10:06 PM     Author:  Shahana Castro MD Service:  General Medicine Author Type:  Physician    Filed:  4/12/2017  3:32 PM Date of Service:  4/11/2017 10:06 PM Note Created:  4/11/2017 10:06 PM    Status:  Signed :  Shahana Castro MD (Physician)                                   History & Physical     Tosin Nina MRN# [id number]   YOB: 1945 Age: 71 year old      Date of Admission:  4/11/2017  Chief Complaint:      Imbalance, vision changes    HPI:      Virginia is a 70 yo F with a h/o thyroid disease s/p thyroidectomy in 2014, HTN, IBS and spinal stenosis with worsening neuropathy over the past year who presents with 1 month of worsening dizziness.  In February she was not walking very well, which she thought may be due to tramadol she was taking for back pain.  In early March, she  started to use a cane when going out for concerts, etc.  She was feeling more fatigued and had her thyroid checked as well.  On 3/10 she was still driving and felt her vision was good. ON 3/15 she saw her PCP and complained of more imbalance and was referred to Neurology.  She was able to see them 2 weeks later, and by that time she was needing to use a walker for imbalance.  She had an MRI there that was reportedly normal, and had some other labs sent.  [facility] was [facility], where she has been working with therapies.  Since then she has needed to use a transport chair and started to have difficulties focusing with her vision that she describes as a scrolling sensation. If she stays still, it is easier to focus.  Sunday, she was unable to read the newspaper and today she had difficulty reading on the internet.  She states she rocks back and forth when she walks.  No HA except for 1 day last week, which was relieved with motrin.  No syncope, CP or SOB.  She feels fatigued and has lost an unknown amt of weight. Decreased appetite.  Has nausea with only 1 episode of emesis last Thursday that was NB/NB.      She has had increased diarrhea over the past 2 weeks, which she states is normal for her when she has increased anxiety, which she has had over the past few weeks.      Since last November she has also had increased numbness in her hands and feet.  Sx's first started in L hand then R hand then RLE.  She now describes a sandpaper type feeling in both hands.      Of note, she was admitted in February for chest pain, and had a normal CTA chest and stress ECHO.    In the ED, Neurology was consulted and noticed vertical nystagmus and upon their review of MRI were concerned about a metastasis to medulla.  CT C/A/P then obtained which revealed a large intraabdominal mass.[BK1.1]        Past Medical History:   Diagnosis Date     Hypertension      Spinal stenosis      Thyroid disease       Past Surgical History:   Procedure Laterality Date     THYROIDECTOMY          Allergies   Allergen Reactions     Amoxicillin Hives     No current outpatient prescriptions on file.[BK1.2]     Family Hx:   Mom - breast cancer, dx'ed at age 69,  at age 79   CHF  Dad -  of MI at age 51 and had an MI in his 40s[BK1.1]      Social History     Social History     Marital status:      Spouse name: N/A     Number of children: N/A     Years of education: N/A     Occupational History     Not on file.     Social History Main Topics     Smoking status: Never Smoker     Smokeless tobacco: Not on file     Alcohol use Yes      Comment: occasionally     Drug use: No     Sexual activity: Not on file     Other Topics Concern     Not on file     Social History Narrative[BK1.2]   Lives with her .  Has 1 daughter and 1 stepson.  Likes to golf and garden.  1-2 drinks per month.  No tobacco or drug use.     ROS:      The remainder of the complete ROS was negative unless noted in the HPI.    Physical Exam:[BK1.1]      /70  Pulse 83  Temp 96.3  F (35.7  C) (Oral)  Resp 18  SpO2 96%[BK1.2]    General: Alert, interactive, NAD, sitting up in chair  HEENT: AT/NC, sclera anicteric, PERRL, EOMI but with horizontal and vertical nystagmus, OP clear with MMM  Resp: clear to auscultation bilaterally, no crackles or wheezes  Cardiac: regular rate and rhythm, no murmurs, rubs or gallops  Abdomen: Soft, nontender, nondistended. +BS, no HSM or masses, no rebound or guarding.  Extremities: wwp, No LE edema  Skin: Warm and dry, no jaundice or rash  Neuro: Alert & oriented to person, place, time. CN II-XII intact, 5/5 motor BUE and BLE, intact to LT sensation throughout but reports sandpaper feeling per HPI, normal FTN with R hand, mild dysmetria with L hand, no dysdiadochokinesia, did not assess gait.      Lab:      CBC[BK1.1]  Recent Labs  Lab 17  1124   WBC 10.9   RBC 4.70   HGB 15.0   HCT 42.8   MCV 91   MCH 31.9  "  MCHC 35.0   RDW 13.7   [BK1.2]     BMP[BK1.1]  Recent Labs  Lab 04/11/17  1124      POTASSIUM 3.1*   CHLORIDE 101   CO2 26   ANIONGAP 10   *   BUN 9   CR 0.57   GFRESTIMATED >90Non  GFR Calc   GFRESTBLACK >90African American GFR Calc   PATTI 8.7[BK1.2]       INR[BK1.1]  Recent Labs  Lab 04/11/17  1124   INR 1.06[BK1.2]     Liver panel[BK1.1]  Recent Labs  Lab 04/11/17  1124   PROTTOTAL 9.2*   ALBUMIN 3.6   BILITOTAL 0.6   ALKPHOS 140   AST 17   ALT 25[BK1.2]       Imaging/procedures:      Read of Outside MRI Brain: Pending    Read of Outside MRI Spine:   \"1. Multilevel cervical spondylosis with mild spinal canal narrowing at  C5-6 and C6-C7. Moderate left neural foraminal stenosis at C5-6 and  moderate bilateral at C6-7. Compared to 7/18/2016 there has been no  significant change in the degenerative findings.   2. Multilevel thoracic spondylosis with mild to moderate spinal canal  stenosis at T9-10 and T12-L1. Mild spinal canal narrowing at T10-11  and T11-12.\"    CT C/A/P:   \"1. Right lower and upper lobar pulmonary emboli without CT evidence of  right heart strain.  2. Abdominopelvic lymphadenopathy, omental caking, and peritoneal  carcinomatosis. This is most suspicious for metastatic left ovarian  cancer. There is an irregular soft tissue mass about the sigmoid colon  that could potentially represent a primary malignancy, though  metastatic serosal implants are favored as an etiology for this. \"    Assessment/Plan:      Virginia is a 72 yo F with a h/o thyroid disease s/p thyroidectomy in 2014, HTN, IBS and spinal stenosis with worsening neuropathy over the past year who presents with 1 month of worsening dizziness.     1. Abdominal mass concerning for malignancy - Gyn Onc consulted, appreciate involvement.  - IR biopsy tomorrow of omentum  - CEA and  pending    2. Imbalance and Dizziness - Likely 2/2 CNS mets vs paraneoplastic syndrome  - Paraneoplastic labs sent and " pending  - Await formal read of outside MRI brain  - Neurology consulted, appreciate recs  - PT, OT and SLP consults    3. Segmental PE's - Pt with 2 segmental PE's on CT scan. No SOB, CP, tachycardia, hypoxia or hypotension.    -[BK1.1] Discussed with Neurology regarding bleeding risk in brain.[ET1.1] Will not treat at this time due to risk of hemorrhagic conversion with possible brain metastases. Await final read of MRI brain and biopsy results as this will affect risk of hemorrhage.     4. HTN - Continue losartan and HCTZ    5. Hypothyroidism - Synthroid 125 mcg qday      FEN:  Regular diet. NPO at [facility] protocol   - Nutrition consult    PPX: PCD's    Disposition: Admit to medicine.  Pending therapy and biopsy evals    Code Status: Full code      Patient and plan discussed with Dr. Castro  Please do not hesitate to contact with me with questions about this patient.       Marck Dobbins MD  Internal Medicine-Pediatrics, PGY4  [phone][BK1.1]      Physician Attestation  I, Shahana Castro MD, saw this patient with the resident and agree with the resident s findings and plan of care as documented in the resident s note with my edits.     I personally reviewed vital signs, medications, labs and imaging.    Shahana Castro MD  Date of Service (when I saw the patient): 4/11/17[ET1.1]             Revision History        User Key Date/Time User Provider Type Action    > ET1.1 4/12/2017  3:32 PM Shahana Castro MD Physician Sign     BK1.2 4/11/2017 10:33 PM Marck Dobbins MD Resident Sign     BK1.1 4/11/2017 10:06 PM Marck Dobbins MD Resident                      Discharge Summaries      Discharge Summaries by Mitzi Rios MD at 4/16/2017  8:00 AM     Author:  Mitzi Rios MD Service:  Hospitalist Author Type:  Physician    Filed:  4/16/2017  8:22 AM Date of Service:  4/16/2017  8:00 AM Note Created:  4/16/2017  7:54 AM    Status:  Signed :  Mitzi Rios MD (Physician)          DISCHARGE SUMMARY FOR Tosin Nina    Admission Date: 4/11/2017    Discharge Date:  April 16, 2017    Primary Discharge Diagnosis:  Stage 4 ovarian cancer      Secondary Discharge Diagnosis:  Patient Active Problem List   Diagnosis     Abdominal mass     Ovarian cancer, unspecified laterality (H)     Acute pulmonary embolism (H)     Hypertension     History of total knee replacement     Thyroid nodule       History of Present Illness  Please see the history and physicial for full details.  Tosin Nina is a 71 year old year old female admitted for ongoing loss of balance and dizziness for over 1 month.  Please see the history and physical dated 4/11/2017 for full details.    Virginia is a 70 yo F with a h/o thyroid disease s/p thyroidectomy in 2014, HTN, IBS and spinal stenosis with worsening neuropathy over the past year who presents with 1 month of worsening dizziness. In February she was not walking very well, which she thought may be due to tramadol she was taking for back pain. In early March, she started to use a cane when going out for concerts, etc. She was feeling more fatigued and had her thyroid checked as well. On 3/10 she was still driving and felt her vision was good. ON 3/15 she saw her PCP and complained of more imbalance and was referred to Neurology. She was able to see them 2 weeks later, and by that time she was needing to use a walker for imbalance. She had an MRI there that was reportedly normal, and had some other labs sent. [facility] where she has been working with therapies. Since then she has needed to use a transport chair and started to have difficulties focusing with her vision that she describes as a scrolling sensation. If she stays still, it is easier to focus. Sunday, she was unable to read the newspaper and today she had difficulty reading on the internet. She states she rocks back and forth when she walks. No  HA except for 1 day last week, which was relieved with motrin. No syncope, CP or SOB. She feels fatigued and has lost an unknown amt of weight. Decreased appetite. Has nausea with only 1 episode of emesis last Thursday that was NB/NB.      She has had increased diarrhea over the past 2 weeks, which she states is normal for her when she has increased anxiety, which she has had over the past few weeks.      Since last November she has also had increased numbness in her hands and feet. Sx's first started in L hand then R hand then RLE. She now describes a sandpaper type feeling in both hands.      Of note, she was admitted in February for chest pain, and had a normal CTA chest and stress ECHO.     In the ED, Neurology was consulted and noticed vertical nystagmus and upon their review of MRI were concerned about a metastasis to medulla. CT C/A/P then obtained which revealed a large intraabdominal mass.     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  Ca125 also very high, making ovarian cancer the most likely diagnosis.  Gyn/Onc saw her and she was given carboplat and taxol here in the hospital, which she tolerated very well.  Their plan is neoadjuvant chemotherapy with IV carboplatin AUC 6 day 1 + dose-dense paclitaxel 80 mg/m2 days 1,8,15 every 21 days per the [facility] protocol (Brit BOND et al and the [facility]. Dose-dense paclitaxel once a week in combination with carboplatin every 3 weeks for advanced ovarain cancer: A phase 3, open-label, randomised controlled trial. Lancet 2009;374(8202):1330-8.). Her  was overwhelmed with the diagnosis and it would be helpful for him to be given support  group information.  -She should follow up with[KB1.1] Oncology as below[KB1.3].[KB1.2]  -Tentative plan for port placement this coming Thursday at [facility]; Oncology was setting this up.[KB1.3]  -Patient has a lot of questions about prognosis.  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  The brain MRI was re-read here by neuroradiology and NO lesions were noted.  She was felt to have paraneoplastic syndrome as the cause for her symptoms and we hope these will improve over the next several weeks and she can go back home.  -Paraneoplastic panel is still PENDING (it was drawn at [facility]) and should be followed up upon as an outpatient[KB1.3].  -Due to her vision issues PT/OT felt rehab would be indicated.  Patient lives on 4 levels with no bathroom or bedroom on main level.    3. PEs: Patient was incidentally noted to have subsegmental PEs on the CT scan done in the ED.  She has had no symptoms with these.  We treated her with heparin and then LMWH shots BID.  Since this is cancer-related, she would do better to stay on LMWH.  -As patient gets close to dc from rehab she needs to learn to give herself her own injections.  -Risks of bleeding were discussed with patient and .[KB1.2]      Discharge Medication List   Tosin Nina   Home Medication Instructions JOLIE:[id number]    Printed on:04/16/17 2702   Medication Information                      acetaminophen (TYLENOL) 325 MG tablet  Take 2 tablets (650 mg) by mouth every 4 hours as needed for mild pain             enoxaparin (LOVENOX) 80 MG/0.8ML injection  Inject 0.69 mLs (69 mg) Subcutaneous every 12 hours             hydrochlorothiazide (HYDRODIURIL) 25 MG tablet  Take 2 tablets (50 mg) by mouth daily             IBUPROFEN PO  Take 200 mg by mouth every 6 hours as needed for moderate pain              levothyroxine (SYNTHROID) 125 MCG tablet  Take 1 tablet (125 mcg) by mouth daily             losartan (COZAAR) 100 MG tablet  Take 1 tablet (100 mg) by mouth daily             ondansetron (ZOFRAN) 4 MG tablet  Take 1 tablet (4 mg) by mouth every 6 hours as needed for nausea             ondansetron (ZOFRAN-ODT) 4 MG ODT tab  Take 1 tablet (4 mg) by mouth every 6 hours as needed for nausea             polyethylene glycol (MIRALAX/GLYCOLAX) Packet  Take 17 g by mouth daily as needed for constipation             prochlorperazine (COMPAZINE) 5 MG tablet  Take 1 tablet (5 mg) by mouth every 6 hours as needed for vomiting             senna-docusate (SENOKOT-S;PERICOLACE) 8.6-50 MG per tablet  Take 1-2 tablets by mouth 2 times daily as needed (constipation )             Vitamin D, Cholecalciferol, 1000 UNITS TABS  Take 2,000 Units by mouth daily                 Discharge Instructions  Follow up with Oncology on the 20th at [facility] (Adelina Bird NP)  Next infusion on the 21st also at [facility]  Appointment with Dr. Ennis on 5/16/17  Please do not hesitate to call our Oncology group with any questions or concerns[KB1.1]  Consider calling the Palliative Care Clinic for an appointment.  Their number is [phone]      It was my absolute pleasure to take care of this wonderful woman and I hope she does well.[KB1.2]  Total time spent on discharge planning, coordination of care, medication reconciliation and performance of physical exam on day of discharge was 45 minutes.    Mitzi Rios MD, MSEd  Hospitalist  Professor of Medicine  [facility]  [phone][KB1.1]         Revision History        User Key Date/Time User Provider Type Action    > KB1.2 4/16/2017  8:22 AM Mitzi Rios MD Physician Sign     KB1.3 4/16/2017  8:01 AM Mitzi Rios MD Physician      KB1.1 4/16/2017  7:54 AM Mitzi Rios MD Physician                      Consult Notes      Consults by Gibran Degroot MD at  4/14/2017 12:03 PM     Author:  Gibran Degroot MD Service:  Physical Medicine and Rehabilitation Author Type:  Physician    Filed:  4/14/2017  1:35 PM Date of Service:  4/14/2017 12:03 PM Note Created:  4/14/2017 12:03 PM    Status:  Signed :  Gibran Degroot MD (Physician)         [facility]   PM&R CONSULT    Consulting Provider:[RN1.1] Dr. Rios[RN1.2]  Reason for Consult: Assessment of rehabilitation   Location of Patient:[RN1.1] 5B[RN1.2]  Date of Encounter: 4/14/2017       ASSESSMENT/PLAN:    M[RN1.1]r[RN1.2]s. Tosin Nina is a[RN1.1] right handed[RN1.2] 71 year old who[RN1.1] has a recent diagnosis of ovarian cancer during workup of progressive dizziness and nystagmus. The visual symptoms and dizziness have resulted in an impairment of her gait/mobility as well as ADLs and this has been progressive over the past 7 weeks. She is still being worked up for causes of her neurological symptoms, Brain MRI was found to be normal and a paraneoplastic panel is currently pending. The neuropathy in her extremities[RN1.2] and visual difficulties due to nystagmus are[JA1.1] also likely contributing to her gait difficulties.  Mrs. Nina is set to begin chemotherapy for her ovarian cancer today with another session planned for this next week and subsequent weekly infusions. Unfortunately this would preclude her from a stay in the ARU. However it would be entirely appropriate for her to undergo rehabilitation in a TCU setting. When discussing this with the patient and her , they indicate that they would prefer the TCU at [facility].[RN1.2]    Thank you for the consult. Please call for any questions. Pager number [phone][RN1.1]    Kendell Blood DO  PGY-4 PM&R Resident[RN1.2]      HPI:    Mrs. Nina is a 71 year old with past medical history including thyroidectomy in 2014, HTN, IBS, spinal stenosis, progressive 1 year history of peripheral  neuropathy who presented due to worsening gait difficulties and dizziness. Her gait has been unsteady for a couple months now but progressively worsening since February. [facility] where she has been working with therapy but her balance and vision continued to worsen. Due to a continued decline in her function, she presented to the [facility] ED for further evaluation. A brain MRI previously was performed which was somewhat concerning for a metastatic lesion. As a result CT chest/abd/pelvis was performed on recommendation by the neurology team which revealed a large intraabdominal mass. She underwent biopsy of this mass on 4/12/2017 and pathology revealed a high grade serous carcinoma, labs show an elevated CA-125. She has been seen by the oncology team and is set to start treatment with carboplatin/taxol within the next couple days.     A re-read of the outside MRI was performed and at this time there is low suspicion for intracranial metastasis. She is being evaluated for other paraneoplastic syndromes at this time and this is felt to be the likely cause of her imbalance and dizziness.   It should be noted that the chest CT found segmental PEs, however the patient is asymptomatic from a respiratory stand point at this time. She is on high intensity heparin as treatment.[RN1.1]    When seen and examined at the bedside this afternoon, Mrs. Nina states that she is feeling ok. Anxious about recent diagnosis and starting treatment. She states that she has had a steady worsening of her dizziness and decline in gait over the past 6-7 weeks. She states that she has a history of cervical and lumbar stenosis which has caused numbnes/tingling/burning sensations in her upper and lower extremities in the past. The constant tingling in her hands and feet have been present for months to the past year. Discussed various options for rehabilitation with the patient and her and her   [facility].[RN1.2]      PREVIOUS LEVEL OF FUNCTION[RN1.1]   Independent with mobility, adls, speech and cognition prior to march. Steady decline in mobility starting in late feb/early march. Started using a cane, then walker then wheelchair.[RN1.2]      CURRENT FUNCTION   PT[RN1.1]: Ambulated 30' with fww and min-CGA. STS and SPT with fww and min-CGA.[RN1.2]    OT[RN1.1]: STS with CGA. Ambulates with alysia-cga and fww.[RN1.2]      SOCIAL HISTORY/Home Setting/Support:[RN1.1]  Lives with  in a multilevel home. 7 steps from garage to main floor. 8 steps up from there to the top floor. Bed and bath are all on top floor.[RN1.2]    Social History     Social History     Marital status:      Spouse name: N/A     Number of children: N/A     Years of education: N/A     Social History Main Topics     Smoking status: Never Smoker     Smokeless tobacco: Not on file     Alcohol use Yes      Comment: occasionally     Drug use: No     Sexual activity: Not on file     Other Topics Concern     Not on file     Social History Narrative         Past Medical History:  Past Medical History:   Diagnosis Date     Hypertension      Spinal stenosis      Thyroid disease        Current Medications:  Current Facility-Administered Medications   Medication     LORazepam (ATIVAN) half-tab 0.25 mg     heparin  drip 25,000 units in 0.45% NaCl 250 mL (see additional administration details for dose)     heparin bolus from infusion pump     hydrochlorothiazide (HYDRODIURIL) tablet 50 mg     levothyroxine (SYNTHROID/LEVOTHROID) tablet 125 mcg     losartan (COZAAR) tablet 100 mg     acetaminophen (TYLENOL) tablet 650 mg     polyethylene glycol (MIRALAX/GLYCOLAX) Packet 17 g     prochlorperazine (COMPAZINE) injection 5 mg    Or     prochlorperazine (COMPAZINE) tablet 5 mg    Or     prochlorperazine (COMPAZINE) Suppository 12.5 mg     cholecalciferol (vitamin D) tablet 2,000 Units     naloxone (NARCAN)  injection 0.1-0.4 mg     lidocaine 1 % 1 mL     lidocaine (LMX4) kit     sodium chloride (PF) 0.9% PF flush 3 mL     sodium chloride (PF) 0.9% PF flush 3 mL     potassium chloride SA (K-DUR/KLOR-CON M) CR tablet 20-40 mEq     potassium chloride (KLOR-CON) Packet 20-40 mEq     potassium chloride 10 mEq in 100 mL intermittent infusion     potassium chloride 10 mEq in 100 mL intermittent infusion with 10 mg lidocaine     potassium chloride 20 mEq in 50 mL intermittent infusion     magnesium sulfate 4 g in 100 mL sterile water (premade)     senna-docusate (SENOKOT-S;PERICOLACE) 8.6-50 MG per tablet 1-2 tablet     ondansetron (ZOFRAN-ODT) ODT tab 4 mg    Or     ondansetron (ZOFRAN) injection 4 mg         Review of Systems:  Total of ten systems reviewed, pertinent positives and negatives as follows  Instability with standing and walking.[RN1.1]    Vision is off, feels like everything is scrolling.  Tingling in the bilateral lowers and in the hands.[RN1.2]  No problems with bladder.   LBM  No chest pain. No cough or SOB.  No headache or photophobia.   No nausea, or abdominal pain.  No joint pain, muscle pain or swelling.    Remainder of the review of the systems was negative.      Labs   Lab Results   Component Value Date    WBC 11.1 (H) 04/13/2017    HGB 13.9 04/13/2017    HCT 41.5 04/13/2017    MCV 92 04/13/2017     04/13/2017     Lab Results   Component Value Date     04/12/2017    POTASSIUM 3.5 04/14/2017    CHLORIDE 105 04/12/2017    CO2 21 04/12/2017    GLC 97 04/12/2017     Lab Results   Component Value Date    GFRESTIMATED >90  Non  GFR Calc   04/12/2017    GFRESTBLACK >90   GFR Calc   04/12/2017     Lab Results   Component Value Date    AST 7 04/12/2017    ALT 20 04/12/2017    ALKPHOS 124 04/12/2017    BILITOTAL 0.6 04/12/2017     Lab Results   Component Value Date    INR 1.06 04/11/2017     Lab Results   Component Value Date    BUN 8 04/12/2017    CR 0.59 04/12/2017        ON EXAMINATION:  Vitals:    04/13/17 2118 04/14/17 0400 04/14/17 0655 04/14/17 0813   BP:  138/74 126/71 137/62   BP Location:  Left arm Left arm Left arm   Pulse:  74 83 75   Resp:  16 16    Temp:  97.1  F (36.2  C) 97.4  F (36.3  C)    TempSrc:  Oral Oral    SpO2:  96% 95%    Weight: 68.5 kg (151 lb)          Physical Exam:  Blood pressure 137/62, pulse 75, temperature 97.4  F (36.3  C), temperature source Oral, resp. rate 16, weight 68.5 kg (151 lb), SpO2 95 %.    GEN: NAD, seated comfortably in[RN1.1] bedside chair,[RN1.2] She is alert, appropriate, cooperative  HEENT: NCAT[RN1.1], horizontal and vertical nystagmus noted, worse when looking to the right. MMM. Hearing is functional.[RN1.2]  RESPIRATORY: CTAB  CARDIAC: RRR  MSK: full active and passive ROM at all major joints of the bilaterally upper and lower extremities  No muscle atrophy noted  ABD: soft, non tender, pos BS  NEURO:   CRANIAL NERVES:[RN1.1] CN II-XII are grossly intact and symmetric.[RN1.2]   Sensation: sensation to[RN1.1] light touch is symmetric and intact. Proprioception at the great toe is intact and symmetric.[RN1.2]   Strength:[RN1.1] 5/5 in bilateral shoulder abduction, 4/5 bilateral elbow flexion, 5/5 bilateral elbow extension, 5/5 wrist extension, 5/5 finger abduction. Hip flexion is 5/5 bilaterally, right knee extension is 4/5 otherwise lower extremities are 5/5 and symmetric.[RN1.2]   Cognition: fund of knowledge and train of thought appropriate[RN1.1]. Speech is fluent and intact.[RN1.2]  SKIN: no rashes or lesions noted.    EXT:[RN1.1] No edema or erythema noted.[RN1.2]  PSYCH:[RN1.1] Signs of depression noted, flat affect.[RN1.2]    I, Dr. Degroot, have independently seen and examined the patient. I have reviewed the above resident's note and agree with content.    JÚNIOR Degroot MD[JA1.1]             Revision History        User Key Date/Time User Provider Type Action    > JA1.1 4/14/2017  1:35 PM Gibran Degroot MD Physician Sign      "RN1.2 2017  1:21 PM Kendell Blood MD Resident Sign     RN1.1 2017 12:03 PM Kendell Blood MD Resident             Consults by Tati Castro MD at 2017  5:13 PM     Author:  Tati Castro MD Service:  Gyn/Onc Author Type:  Physician    Filed:  2017  3:15 PM Date of Service:  2017  5:13 PM Note Created:  2017  5:12 PM    Status:  Addendum :  Tati Castro MD (Physician)         Gynecology Oncology Consult Note    Reason for Consult: Possible GYN malignancy    HPI: Tosin Nina is a 71 year old with Hx of HTN, hypothyroidism s/p thyroidectomy and spinal stenosis who presented to the ED with dizziness and generalized weakness.[LL1.1] Patient reporting that since February she has noted increasing abnormalities of her gait.  Feels unsteady on her feet.  Has followed up with Neurology[LL1.2] for this[LL1.3].[LL1.2] Paraneoplastic labs sent and pending. MRI at outside hospital with[LL1.3] possible[LL1.2] brain mets.[LL1.3] Reports additionally in the last week she has had trouble with her vision, reporting feeling like the room[LL1.2] is \"rolling.\" Reports in the last month she has noticed weight loss, unable to quantify amount.  Additionally reports some mild diarrhea, in the mornings, and decreased appetite.  Denies any nausea or vomiting, constipation, diarrhea, skin changes, or vaginal bleeding.[LL1.3]    Imaging in the ED[LL1.1] noted[MS1.1] abdominopelvic lymphadenopathy, omental caking, and peritoneal carcinomatosis, and an irregular lobulated mass involving the sigmoid colon.[LL1.1] Additionally noted PEs on CT.[LL1.3]    OBHx:[LL1.1] , 1 uncomplicated [LL1.3]    GynHx:[LL1.1] Menopause in early 50s, no vaginal bleeding since then  Last pap possibly 8 years ago, distant history of abnormal no other abnormals since then    Last mammogram: oct/2016, has had a biopsy of a benign cyst  Last colonoscopy: 10 years ago, no " concerns[LL1.3]    PMH:[LL1.1]   Past Medical History:   Diagnosis Date     Hypertension      Spinal stenosis      Thyroid disease[LL1.4]      PSH:[LL1.1]   Past Surgical History:   Procedure Laterality Date     THYROIDECTOMY[LL1.4]       Social Hx:[LL1.1]   Social History   Substance Use Topics     Smoking status: Never Smoker     Smokeless tobacco: Not on file     Alcohol use Yes      Comment: occasionally[LL1.4]        Family History: No family history[LL1.1] of cervical,[LL1.3] ovarian[LL1.1],[LL1.3] uterine[LL1.1], colorectal cancer  MOther with breast cancer diagnosed at age 71.[LL1.3]    ROS:   Negative except per HPI    Objective:[LL1.1]   BP (!) 143/93  Pulse 79  Temp 97.6  F (36.4  C) (Oral)  Resp 18  SpO2 97%[LL1.4]  Constitutional: Healthy appearing  female, no acute distress  HEENT: Normal appearance.  Neck supple, thyroid normal in size without nodularity or masses.  Cardiovascular: Regular rate and rhythm without murmurs, clicks, gallops or rub  Respiratory: Clear to auscultation bilaterally without crackles or wheeze  Gastrointestinal: Abdomen soft, non-tender. BS normal. No masses, organomegaly  Pelvic Exam - : External genitalia normal well-estrogenized, healthy tissue.  No obvious excoriations, lesions, or rashes. Bartholins, urethra, skeins normal.  Normal pink vaginal mucosa[LL1.1] and smooth without nodularity or masses.[MS1.1]   SSE:[LL1.1] Cervical polyp noted on exam, not friable[LL1.3], normal physiologic discharge.   Bimanual: No CMT,[LL1.1] normal sized[LL1.3] uterus. No adnexal masses or tenderness appreciated.[LL1.1] Recto-vaginal exam confirms these findings.[MS1.1]   Skin: No suspicious lesions or rashes  Psychiatric: mentation appears normal and affect normal/bright    Labs/Imaging:[LL1.1]  Results for orders placed or performed during the hospital encounter of 04/11/17   MR Outside Read    Narrative    MR OUTSIDE READ of cervical and thoracic spine MRI without  contrast  performed at the [facility] of neurology 3/29/2017.    Interpretation performed 4/11/2017     History: Spinal stenosis    Comparison: Cervical spine MR 7/18/2016    Technique: Cervical and thoracic spine MR images from [facility] neurology dated 3/29/2017 were submitted for interpretation.  Sagittal T2- and T1-weighted images of the cervical and thoracic  spine, axial T2* gradient echo images of the cervical spine and axial  T2-weighted images were obtained.    Findings:  Cervical: Mild stepwise anterolisthesis C2-C5 . There is no abnormal  cord signal. No abnormal bone marrow signal. Multilevel degenerative  changes with loss of disc height and disc T2 signal, osteophyte  formation, uncinate spurring and facet hypertrophy. The findings on a  level by level basis are as follows:    C2-3:  No spinal canal or neural foraminal stenosis.    C3-4:  Disc bulge and superimposed central tiny protrusion. Effacement  of the ventral subarachnoid space. No spinal canal or neural foraminal  stenosis.    C4-5:  Disc bulge and superimposed left central tiny protrusion  effaces the ventral subarachnoid space. Mild right neural foraminal  narrowing. No spinal canal or left neural foraminal stenosis.    C5-6:  Disc osteophyte complex and bilateral uncinate spurring.  Bilateral facet hypertrophy. Moderate left and mild to moderate right  neural foraminal stenosis. Mild spinal canal narrowing.    C6-7:  Disc osteophyte complex and bilateral uncinate spurring.  Moderate bilateral neural foraminal stenosis. Mild spinal canal  narrowing..    C7-T1:  No spinal canal or neural foraminal stenosis.    Thoracic:   Mild anterolisthesis of T1 on T2. No abnormal cord signal. Modic type  II degeneration at the opposing T8-9 endplates, otherwise normal  abnormal bone marrow signal. Multilevel degenerative changes with loss  of disc height and disc T2 signal, osteophyte formation, facet  hypertrophy and the ligamentum  flavum thickening.    At T9-10; there is disc bulge and thickening of the ligamentum flavum  which leads to mild-to-moderate stenosis of the spinal canal.    At T10-11 and T11-12; disc bulge and thickening of ligamentum flavum  lead to to mild spinal canal narrowing.    At T12-L1; right central protrusion superimposed on disc bulge effaces  the ventral subarachnoid space and lead to mild to moderate stenosis  of the spinal canal.    Multilevel disc bulging. Mild right neural foraminal narrowing T10-L1.    Normal paraspinous soft tissues.      Impression    Impression:   1. Multilevel cervical spondylosis with mild spinal canal narrowing at  C5-6 and C6-C7. Moderate left neural foraminal stenosis at C5-6 and  moderate bilateral at C6-7. Compared to 7/18/2016 there has been no  significant change in the degenerative findings.   2. Multilevel thoracic spondylosis with mild to moderate spinal canal  stenosis at T9-10 and T12-L1. Mild spinal canal narrowing at T10-11  and T11-12.    I have personally reviewed the examination and initial interpretation  and I agree with the findings.    SRINI SANTOS MD   CT Chest/Abdomen/Pelvis w Contrast   Result Value Ref Range    Radiologist flags Pulmonary embolism (AA)     Narrative    EXAMINATION: CT CHEST/ABDOMEN/PELVIS W CONTRAST, 4/11/2017 3:27 PM    TECHNIQUE:  Helical CT images from the thoracic inlet through the  symphysis pubis were obtained  with contrast. Contrast dose: 100 cc of  isovue 370    COMPARISON: 2/6/2017, 9/18/2014    HISTORY: concern for paraneoplastic syndrome. Question of potential  primary.    FINDINGS:    Chest: Thrombus identified within the right lower lobe are artery and  right upper lobar arteries. The right pulmonary artery is enlarged,  similar to prior exams. No CT evidence of right heart strain. No  suspicious mediastinal or perihilar lymphadenopathy. Bovine arch  anatomy.     No suspicious pulmonary nodules, evidence of infarction, or  infection.    Abdomen and pelvis: There are soft tissue nodules identified along the  liver capsule measuring up to 3 cm inferiorly (series 8 image 315).  There is a large amount of omental caking. Enlarged iliac and  retroperitoneal lymph nodes for example a 2.6 cm right external iliac  lymph node or group of lymph nodes on series 8 image 448.    Heterogeneous enhancement of the uterine fundus could be due to  fibroids or a mass. The overall size of the uterus does not appear  significantly different than in 2014. The left ovary does appear  slightly larger and lobular in appearance compared to prior exam.  There is also a nodular area along the round ligament (series 4 image  167). It was not present on prior exam. There is an irregular  lobulated mass involving the sigmoid colon. Abnormal, enlarged  inguinal lymph nodes. Soft tissue implant in the posterior right  retroperitoneum adjacent to the psoas was not present on prior exam  (series 4 image 144).    Bones and soft tissues: Degenerative changes in the spine with flowing  anterior osteophytes in the thoracic spine compatible with dish.  Spondylolisthesis at L3-4. Small periumbilical hernia containing some  of the abnormal omentum.      Impression    IMPRESSION:   1. Right lower and upper lobar pulmonary emboli without CT evidence of  right heart strain.  2. Abdominopelvic lymphadenopathy, omental caking, and peritoneal  carcinomatosis. This is most suspicious for metastatic left ovarian  cancer. There is an irregular soft tissue mass about the sigmoid colon  that could potentially represent a primary malignancy, though  metastatic serosal implants are favored as an etiology for this.     [Critical Result: Pulmonary embolism]    Finding was identified on 4/11/2017 3:29 PM.     Dr. Dash Jones was contacted by Dr. Min Zhang at 4/11/2017  3:40 PM and verbalized understanding of the critical finding.     I have personally reviewed the examination and  initial interpretation  and I agree with the findings.    JAELYN WILLIAMJAX   CBC with platelets differential   Result Value Ref Range    WBC 10.9 4.0 - 11.0 10e9/L    RBC Count 4.70 3.8 - 5.2 10e12/L    Hemoglobin 15.0 11.7 - 15.7 g/dL    Hematocrit 42.8 35.0 - 47.0 %    MCV 91 78 - 100 fl    MCH 31.9 26.5 - 33.0 pg    MCHC 35.0 31.5 - 36.5 g/dL    RDW 13.7 10.0 - 15.0 %    Platelet Count 259 150 - 450 10e9/L    Diff Method Automated Method     % Neutrophils 81.6 %    % Lymphocytes 8.6 %    % Monocytes 7.9 %    % Eosinophils 1.2 %    % Basophils 0.4 %    % Immature Granulocytes 0.3 %    Nucleated RBCs 0 0 /100    Absolute Neutrophil 8.9 (H) 1.6 - 8.3 10e9/L    Absolute Lymphocytes 0.9 0.8 - 5.3 10e9/L    Absolute Monocytes 0.9 0.0 - 1.3 10e9/L    Absolute Eosinophils 0.1 0.0 - 0.7 10e9/L    Absolute Basophils 0.0 0.0 - 0.2 10e9/L    Abs Immature Granulocytes 0.0 0 - 0.4 10e9/L    Absolute Nucleated RBC 0.0    INR   Result Value Ref Range    INR 1.06 0.86 - 1.14   Comprehensive metabolic panel   Result Value Ref Range    Sodium 137 133 - 144 mmol/L    Potassium 3.1 (L) 3.4 - 5.3 mmol/L    Chloride 101 94 - 109 mmol/L    Carbon Dioxide 26 20 - 32 mmol/L    Anion Gap 10 3 - 14 mmol/L    Glucose 104 (H) 70 - 99 mg/dL    Urea Nitrogen 9 7 - 30 mg/dL    Creatinine 0.57 0.52 - 1.04 mg/dL    GFR Estimate >90  Non  GFR Calc   >60 mL/min/1.7m2    GFR Estimate If Black >90   GFR Calc   >60 mL/min/1.7m2    Calcium 8.7 8.5 - 10.1 mg/dL    Bilirubin Total 0.6 0.2 - 1.3 mg/dL    Albumin 3.6 3.4 - 5.0 g/dL    Protein Total 9.2 (H) 6.8 - 8.8 g/dL    Alkaline Phosphatase 140 40 - 150 U/L    ALT 25 0 - 50 U/L    AST 17 0 - 45 U/L   CRP inflammation   Result Value Ref Range    CRP Inflammation 14.0 (H) 0.0 - 8.0 mg/L   Erythrocyte sedimentation rate auto   Result Value Ref Range    Sed Rate 17 0 - 30 mm/h   UA reflex to Microscopic and Culture   Result Value Ref Range    Color Urine Light Yellow      Appearance Urine Clear     Glucose Urine Negative NEG mg/dL    Bilirubin Urine Negative NEG    Ketones Urine 5 (A) NEG mg/dL    Specific Gravity Urine 1.004 1.003 - 1.035    Blood Urine Negative NEG    pH Urine 7.0 5.0 - 7.0 pH    Protein Albumin Urine Negative NEG mg/dL    Urobilinogen mg/dL Normal 0.0 - 2.0 mg/dL    Nitrite Urine Negative NEG    Leukocyte Esterase Urine Negative NEG    Source Midstream Urine    ABO/Rh type and screen   Result Value Ref Range    ABO O     RH(D)  Pos     Antibody Screen Neg     Test Valid Only At       [facility]    Specimen Expires 04/14/2017    ABO/Rh type and screen   Result Value Ref Range    ABO Pending     RH(D) Pending     Antibody Screen Pending     Test Valid Only At Pending     Specimen Expires Pending[LL1.4]         Assessment/Plan:   Tosin Nina is a 71 year old[LL1.1] presenting with symptoms of dizziness and gait abnormalities with new[LL1.3] CT A/P f[MS1.1]indings concerning for[LL1.3] metastatic[MS1.1] malignancy, possibly with brain involvement[LL1.3], unsure if gyn origin or sigmoid colon[MS1.1].       - Recommend[LL1.3] IR[MS1.1] Omental biopsy.  - Would add on lab Ca125, CEA[LL1.3], CA 19-9.[DT1.1]  - Will follow-up results and establish plan of care once reviewed.[LL1.3]    Discussed with [LL1.1] Martin, fellow[LL1.3]    Alyson Engle MD  OBGYN PGY-2  5:13 PM 4/11/2017[LL1.1]    I have discussed this patient's presentation and assessment and plan with fellow Dr. Salazar. Agree with above.    Tati Deepdari Gloria  4/11/2017  8:26 PM[DT1.1]        Patient seen and examined by me.  Agree with assessment and plan.   I personally reviewed vital signs, medications, labs and imaging reports.   Patient is a 72 y/o who presents with increasing unsteadiness in gait and visual changes.  On MRI of the brain, an enhancement of the pontine noted- CNS metastasis vs paraneoplastic process.  A CT a/p showed omental caking,  peritoneal carcinomatosis, slightly enlarged ovarian mass and a sigmoid colon mass.  Images personally reviewed with the radiologist- Unsure of primary origin.  Recommend getting IR biopsy of omentum, checking  and CEA.  Will follow up results.  Karthik Salazar MD  Gynecologic Oncology Fellow[MS1.1]    I have seen and examined the patient.  I have reviewed and edited Dr. Engle'/Dr. Salazar's note above.  Will follow peripherally and follow-up biopsy results, will see the patient again if pathology c/w a malignancy of gynecologic origin.[DT1.2]  Tati Ryan Teoh  2017  3:15 PM[DT1.3]         Revision History        User Key Date/Time User Provider Type Action    > DT1.3 2017  3:15 PM Tati Castro MD Physician Addend     DT1.2 2017  3:13 PM Tati Castro MD Physician      DT1.1 2017  8:26 PM Tati Castro MD Physician Sign     MS1.1 2017  7:15 PM Karthik Salazar MD Resident Sign     LL1.3 2017  6:22 PM Alyson Engle MD Resident Sign     LL1.2 2017  5:43 PM Alyson Engle MD Resident      LL1.4 2017  5:13 PM Alyson Engle MD Resident      LL1.1 2017  5:12 PM Alyson Engle MD Resident             Consults by Ben Arriaza MD at 2017  4:43 PM     Author:  Ben Arriaza MD Service:  Neurology Author Type:  Physician    Filed:  2017  5:13 PM Date of Service:  2017  4:43 PM Note Created:  2017  4:37 PM    Status:  Signed :  Ben Arriaza MD (Physician)         [facility]  Neurology Consultation    Patient Name:  Tosin Nina  MRN:  [id number]    :  1945  Date of Service:  2017  Primary care provider:  Esther Back      Neurology consultation service was asked to see Tosin Nina by[RG1.1] Emergency department[RA1.1] to evaluate[RG1.1] nystagmus[RA1.1].    History of Present Illness:   71 year old female h/o spinal  "stenosis who presents with generalized weakness and gradual deterioration in her ability to walk over the past two months. The patient has experienced increasing unsteadiness which she describes more as \"rocking back and forth\" than the room spinning. This has gradually made it more difficult for her to walk. She initially needed to use a cane, then a walker, and is now completely unable to walk. She feels this is due primarily to her unsteadiness rather than motor weakness. She denies problems with hearing but does note a sensation of ear fullness on the left side. She denies headache, nausea/vomiting, or pre-syncope.     She also reports vision problems over the last two weeks. She says that she has difficulty focusing on objects and reports that things seem to be \"scrolling up and down.\" She has trouble reading the newspaper but is able to watch the TV at farther distances.     The patient also endorses intermittent tingling that started in her upper extremities, moved to her lower extremities, and has since largely resolved. However, she does still report a \"sandpaper\" sensation in her fingers.     She is followed by Dr. Esposito at [facility] and underwent MRIs of the brain and spine as well as an EMG on 3/29/17 which she reports were negative. Paraneoplastic labs were also sent to [facility][RG1.1[facility][RG1.2] and are pending.    ROS  A 10-point ROS was performed as per HPI.     PM  Past Medical History:   Diagnosis Date     Hypertension      Spinal stenosis      Thyroid disease      Past Surgical History:   Procedure Laterality Date     THYROIDECTOMY         Medications     (Not in a hospital admission)    Allergies  Allergies   Allergen Reactions     Amoxicillin Hives       Social History  I have reviewed this patient's social history    Family History    This patient has no significant family history      Physical Examination   Vitals: BP (!) 143/93  Pulse 79  Temp 97.6  F (36.4  C) (Oral)  Resp " 18  SpO2 97%  General: Adult, in NAD, cooperative  HEENT: NC/AT, no icterus, op pink and moist  Abdomen: S/NT/ND  Extremities: No LE swelling.  Skin: No rash or lesion.   Psych: Mood pleasant, affect congruent  Neuro:  Mental Status: Awake and alert, cooperative and interacting appropriately. No difficulty following commands. Oriented. Speech fluent, clear and coherent.   Cranial nerves: Vertical and horizontal nystagmus present on gaze testing, worse with right horizontal eye movement. PERRL. Facial sensation intact in all distributions. Facial movements symmetric. Uvula midline, palate elevation symmetric. Tongue protrusion midline with full lateral motion.   Motor: Tone normal. Muscle bulk symmetric and appropriate. No tremors or other involuntary movements observed. No pronator drift. Strength 5/5 and symmetric in upper and lower extremities.  Reflexes: 2+ and symmetric throughout upper and lower extremities. No clonus. Toes mute.  Sensory:  Normal to light touch throughout upper and lower extremities.   Coordination: FNF no dysmetria  Station and Gait: Significant difficulty rising from bed. Unable to stand for extended period of time without support. Unable to ambulate. Romberg attempted, but patient stated she could not close her eyes while standing.      Investigations   - MRI brain and spine 3/29/17 from outside facility[RG1.1] with possible[RG1.3]   - Paraneoplastic labs (serum)[RG1.1] sent to [place],[RG1.2] pending  - CT CAP[RG1.1] 4/11/17[RG1.2]  (Radiologist's read)  1. Right lower and upper lobar pulmonary emboli without CT evidence of  right heart strain.  2. Abdominopelvic lymphadenopathy, omental caking, and peritoneal  carcinomatosis. This is most suspicious for metastatic left ovarian  cancer. There is an irregular soft tissue mass about the sigmoid colon  that could potentially represent a primary malignancy, though  metastatic serosal implants are favored as an etiology for this.[RG1.1]      Impression/Recommendations[RA1.1]:  71 year old female h/o spinal stenosis who presents with gradual deterioration in her ability to walk over the past two months 2/2 sensation of unsteadiness. Currently completely unable to walk, wheelchair bound.      # Generalized weakness 2/2 sensation of unsteadiness  # Vertical and horizontal nystagmus[RG1.1]:[RA1.1]  MRI from outside facility 3/29/17 appears to show pontine T1 contrast enhancement[RG1.1]. Immediate concern was for possible paraneoplastic syndrome vs metastasis vs primary tumor. As such, we ordered a CT CAP which showed likely[RA1.1] diffuse metastatic disease[RG1.1] throughout the abdomen ([RA1.1]suspicious for left ovarian primary[RG1.1])[RA1.1]. Also positive for right lower and upper lobar pulmonary emboli[RG1.3].[RG1.4] Etiology of symptoms[RG1.1] thus[RA1.1] likely CNS metastasis vs paraneoplastic process[RG1.1]. In light of her CT results, notably the pulmonary emboli, it is felt she would be better managed by the medicine service with neurology team to follow for ongoing neurological deficits.[RA1.1]   - Awaiting radiology read on MRI from 3/29/17 regarding pontine enhancing lesion and degree of spinal stenosis[RG1.3]  - Paraneoplastic labs (serum)[RG1.1] sent to [facility],[RG1.2] pending  - Neurology will continue to follow        Thank you for involving neurology in the care of Tosin Nina.  Please do not hesitate to call with questions/concerns (consult pager [phone]).      Patient was seen and discussed with Dr. Marion.    Ja Cruz, MS3[RG1.1]  Diomedes Jones MD  Neurology PGY2  P: [phone][RA1.1]    Neurology Staff Addendum  I have seen and evaluated the patient today and discussed with the resident team and agree with the documentation.  Patient appears to have metastatic ovarian cancer.  Etiology of brain lessons either paraneoplastic or metastatic.    We will continue to follow.     BLANCA MARION MD[MH1.1]             Revision History         User Key Date/Time User Provider Type Action    > MH1.1 4/11/2017  5:13 PM Ben Arriaza MD Physician Sign     [N/A] 4/11/2017  5:09 PM Diomedes Jones MD Resident Sign     RG1.2 4/11/2017  4:57 PM Ja Cruz Medical Student Pend     RA1.1 4/11/2017  4:57 PM Diomedes Jones MD Resident      RG1.4 4/11/2017  4:46 PM Ja Cruz Medical Student      RG1.3 4/11/2017  4:43 PM Ja Cruz Medical Student      RG1.1 4/11/2017  4:37 PM Ja Cruz Medical Student                      Progress Notes - Physician (Notes for yesterday and today)      Progress Notes by Mitzi Rios MD at 4/15/2017 11:39 AM     Author:  Mitzi Rios MD Service:  Hospitalist Author Type:  Physician    Filed:  4/15/2017  1:38 PM Date of Service:  4/15/2017 11:39 AM Note Created:  4/15/2017 11:39 AM    Status:  Signed :  Mitzi Rios MD (Physician)         Physician Attestation   I, Mitzi Rios, saw this patient with the resident and agree with the resident s findings and plan of care as documented in the resident s note.      I personally reviewed vital signs, medications, labs and imaging.[KB1.1]  Temp: 97.6  F (36.4  C) Temp src: Oral BP: 139/64 Pulse: 68   Resp: 16 SpO2: 95 % O2 Device: None (Room air)[KB1.2]      Key findings: Had chemo.  Some issues sleeping last night likely from steroids.  On LMWH shots now.  Set for port placement this coming Thursday.  Still w/vision issues.[KB1.1]  No fevers.  Appetite actually fairly good.   not doing great with diagnosis.[KB1.3]  Temp: 97.6  F (36.4  C) Temp src: Oral BP: 139/64 Pulse: 68   Resp: 16 SpO2: 95 % O2 Device: None (Room air)[KB1.4]    Sitting in chair, NAD  Lateral nystagmus (unchanged)  RRR  CTA B  Abd: +BS[KB1.1]    Last Basic Metabolic Panel:  Lab Results   Component Value Date     04/12/2017      Lab Results   Component Value Date    POTASSIUM 3.6 04/15/2017     Lab Results   Component Value Date     CHLORIDE 105 04/12/2017     Lab Results   Component Value Date    PATTI 8.5 04/12/2017     Lab Results   Component Value Date    CO2 21 04/12/2017     Lab Results   Component Value Date    BUN 8 04/12/2017     Lab Results   Component Value Date    CR 0.54 04/15/2017     Lab Results   Component Value Date    GLC 97 04/12/2017[KB1.3]         A/P:  1. Stage 4 ovarian cancer: started chemo and set for f/u chemo, clinic appt, port placement.  2. PEs: on LMWH.  Given this is malignancy related likely should remain on LMWH if covered.  3. 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  Should be followed up on as outpatient.  MRI negative.  4. Dispo: Awaiting placement.  [facility] denied.  Spoke w/SW covering for the weekend who will send more referrals.  Patient likely stable to dc tomorrow.    Mitzi Rios  Date of Service (when I saw the patient):[KB1.1] 04/15/17[KB1.5]    SW called and rehab found for Sunday for patient.  Her  can take her; they wish to leave by noon tomorrow.  Will start d/c orders today along with d/c summary.  Mitzi Rios MD, MSEd  Hospitalist  Professor of Medicine  [facility]  [phone][KB1.6]       Revision History        User Key Date/Time User Provider Type Action    > KB1.2 4/15/2017  1:38 PM Mitzi Rios MD Physician Sign     KB1.3 4/15/2017  1:37 PM Mitzi Rios MD Physician      KB1.6 4/15/2017  1:20 PM Mitzi Rios MD Physician      KB1.5 4/15/2017 11:42 AM Mitzi Rios MD Physician      KB1.4 4/15/2017 11:40 AM Mitzi Rios MD Physician      KB1.1 4/15/2017 11:39 AM Mitzi Rios MD Physician             Progress Notes by Mitzi Rios MD at 4/14/2017  6:58 PM     Author:  Mitzi Rios MD Service:  General Medicine Author Type:  Physician    Filed:  4/15/2017  7:54 AM Date of Service:  4/14/2017  6:58 PM Note Created:  4/14/2017  6:58 PM    Status:  Signed :  Mitzi Rios  MD Maru (Physician)         [facility] Internal Medicine Progress Note     Tosin Nina  MRN: [id number]  YOB: 1945    Today's Date: April 14, 2017        Interval History:     Pt's vision is worse today and she has more difficulty focusing.  She denies SOB, CP, abdominal pain. Remains afebrile with stable vitals.  Gyn Onc met with pt today.  Planning to start chemotherapy today.     4 pt ROS negative other than that noted above.          Physical Exam:   Vitals were reviewed  Blood pressure 110/64, pulse 82, temperature 96.1  F (35.6  C), temperature source Oral, resp. rate 18, weight 69.4 kg (152 lb 14.4 oz), SpO2 93 %.    Physical Exam:  General: Alert, interactive, NAD, sitting up in chair  HEENT: AT/NC, sclera anicteric,   MMM  Resp: non-labored breathing  Extremities: wwp  Skin: Warm and dry,   Neuro: Alert & oriented, No focal deficits         Data:     All labs and imaging reviewed         Assessment and Plan:   Assessment:    [place] is a 72 yo F with a h/o thyroid disease s/p thyroidectomy in 2014, HTN, IBS and spinal stenosis with worsening neuropathy over the past year who presents with 1 month of worsening dizziness.      1. 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  - Start chemotherapy today with carboplatin and Taxol  - Ativan, Compazine and Zofran PRN  - Benadryl prior to chemo     2. 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  No mets seen on reread of outside MRI brain.    - Paraneoplastic labs sent and pending  - Neurology consulted, appreciate recs  - PT and OT consults     3. Segmental PE's - Pt with 2 segmental PE's on CT scan. No SOB, CP, tachycardia, hypoxia or hypotension.   - Lovenox 70 mg q 12 H       4. HTN - Continue losartan and HCTZ     5. Hypothyroidism - Synthroid 125 mcg qday        FEN: Regular diet.   - K protocol   - Vit D   - Nutrition  consult     PPX: On lovenox     Disposition: Pending placement to TCU      Code Status: Full code        Patient and plan discussed with Dr. Rios    Please do not hesitate to contact with me with questions about this patient.         Marck Dobbins MD  Internal Medicine-Pediatrics, PGY4  [phone]        [BK1.1]Physician Attestation  I, Mitzi Rios, saw this patient with the resident and agree with the resident s findings and plan of care as documented in the resident s note.      I personally reviewed vital signs, medications, labs and imaging.     Key findings: Saw pt with her  and the Gyn/Onc team. Plan is to start chemo today for her Stage 4 Ovarian Cancer. Per pt's request she will f/u with Dr. Ennis at [facility].  Patient and  understandably overwhelmed with diagnosis  Temp: 97.4  F (36.3  C) Temp src: Oral BP: 137/62 Pulse: 75   Resp: 16 SpO2: 95 % O2 Device: None (Room air)    Sitting in chair, NAD  A/P:  1. Met ovarian cancer: to get chemo today once orders are writen by Oncology. Will then get port next Thursday at [facility] as an outpatient and taxol again in 1 week.  2. PEs: will change to LMWH today.  3. Dispo: Cannot go to ARU as on chemo. Sending TCU referrals as she is likely able to dc once chemo is complete.     Mitzi Rios  Date of Service (when I saw the patient): 04/14/17[KB1.1]     Revision History        User Key Date/Time User Provider Type Action    > KB1.1 4/15/2017  7:54 AM Mitzi Rios MD Physician Sign     BK1.1 4/14/2017  7:02 PM Marck Dobbins MD Resident Sign                  Procedure Notes     No notes of this type exist for this encounter.         Progress Notes - Therapies (Notes from 04/13/17 through 04/16/17)      Progress Notes by Jo Richardson PT at 4/13/2017 11:15 AM     Author:  Jo Richardson PT Service:  (none) Author Type:  Physical Therapist    Filed:  4/13/2017 11:15 AM Date of Service:  4/13/2017 11:15 AM Note Created:   4/13/2017 11:15 AM    Status:  Signed :  Jo Richardson, PT (Physical Therapist)          04/13/17 1000   Quick Adds   Type of Visit Initial PT Evaluation  (Jo Richardson, PT, DPT )       Present no   Living Environment   Lives With spouse   Living Arrangements house   Home Accessibility stairs within home;stairs to enter home;stairs (2 railings present);bed and bath are not on the first floor;bed and bath on same level   Number of Stairs to Enter Home 1  (from garage and into house )   Number of Stairs Within Home 7  (, )   Transportation Available family or friend will provide   Living Environment Comment 1 step up from garage into entry-way, up 7 steps to main level of house - dining room, kitchen, living room - 8 additional steps up to [place] and bathroom. No bathroom on main level. Pt reports they keep FWW on 2 levels of home to avoid hauling them up/down stairs. Pt spends most of her time on bedroom level - SO brings meals up to her.     Self-Care   Dominant Hand right   Usual Activity Tolerance moderate   Current Activity Tolerance poor   Regular Exercise yes   Activity/Exercise Type walking;strength training  (HEP from knee replacement ('14) and HEP from OP PT in Dec. )   Exercise Amount/Frequency 3-5 times/wk;30 mins   Equipment Currently Used at Home walker, standard;other (see comments)  (transport chair, gait belt )   Activity/Exercise/Self-Care Comment Pt reports that as of lately she was completing sponge baths as her shower does not have a seat and she felt unsafe. Reports no grab bars in shower and have purchased a detatchable shower head. Also expressed interest in purchasing shower chair. Completes otherself cares at [facility] in [place] where she is able to sit. Does require some asssitance with donning pants/socks.    Functional Level Prior   Ambulation 3-->assistive equipment and person   Transferring 3-->assistive equipment and person   Toileting 0-->independent   Bathing  1-->assistive equipment   Dressing 2-->assistive person   Eating 0-->independent   Communication 0-->understands/communicates without difficulty   Swallowing 0-->swallows foods/liquids without difficulty   Cognition 0 - no cognition issues reported   Fall history within last six months no   Which of the above functional risks had a recent onset or change? ambulation;transferring   Prior Functional Level Comment  available to assist PRN.    General Information   Onset of Illness/Injury or Date of Surgery - Date 04/11/17   Referring Physician Marck Dobbins MD   Patient/Family Goals Statement Improve walking    Pertinent History of Current Problem (include personal factors and/or comorbidities that impact the POC) 72 yo F with a h/o thyroid disease s/p thyroidectomy in 2014, HTN, IBS and spinal stenosis with worsening neuropathy over the past year who presents with 1 month of worsening dizziness.    Precautions/Limitations fall precautions   General Info Comments Activity: Up with assist    Cognitive Status Examination   Orientation orientation to person, place and time   Level of Consciousness alert   Follows Commands and Answers Questions 100% of the time   Personal Safety and Judgment intact   Memory intact   Pain Assessment   Patient Currently in Pain No   Integumentary/Edema   Integumentary/Edema no deficits were identifed   Posture    Posture Not impaired   Range of Motion (ROM)   ROM Quick Adds No deficits were identified   ROM Comment WFL throughout    Strength   Strength Comments grossly 4/5 - R UE/LE slightly stronger than L    Bed Mobility   Bed Mobility Comments Not assessed    Transfer Skills   Transfer Comments Requires use of UE to push up from and to control descent during STS transfers.    Gait   Gait Comments Ambulates with FWW and min A - dec gait speed and step length waldo (R > L). Pt has difficulty with coordination and controlling step with L LE. Lacks adequate heel strike at  "inital contact with L LE.     Balance   Balance Comments No overt LOB, not formally assessed    Sensory Examination   Sensory Perception Comments Pt reports some \"dullness\" in L lower LE vs R. Some N/T in L digits 3,4,5. Pt also describing visual changes with mobility \"scrolling\". Pt did display some torsional nystagmus upon brief assessment.     Coordination   Coordination Comments Dec coordination of L UE during JULIANA    Muscle Tone   Muscle Tone no deficits were identified   General Therapy Interventions   Planned Therapy Interventions bed mobility training;balance training;gait training;motor coordination training;ROM;strengthening;visual perception;transfer training;progressive activity/exercise;home program guidelines   Clinical Impression   Criteria for Skilled Therapeutic Intervention yes, treatment indicated   PT Diagnosis Dec functional mobility    Influenced by the following impairments visual deficits, coordination deficits, dec strength    Functional limitations due to impairments balance, transfers, gait, endurance    Clinical Presentation Evolving/Changing   Clinical Presentation Rationale Clinical judgement    Clinical Decision Making (Complexity) Moderate complexity   Therapy Frequency` daily   Predicted Duration of Therapy Intervention (days/wks) 4/21/17   Anticipated Equipment Needs at Discharge bedside commode;shower chair   Anticipated Discharge Disposition Home with Assist;Acute Rehabilitation Facility   Risk & Benefits of therapy have been explained Yes   Patient, Family & other staff in agreement with plan of care Yes   Clinical Impression Comments PT eval initiated and completed    [facility] [employer] \"6 Clicks\"   2016, Trustees of [facility], under license to [employer].  All rights reserved.   6 Clicks Short Forms Basic Mobility Inpatient Short Form   [facility] AM-PAC  \"6 Clicks\" V.2 Basic Mobility Inpatient Short Form   1. Turning from your back to your side while " in a flat bed without using bedrails? 4 - None   2. Moving from lying on your back to sitting on the side of a flat bed without using bedrails? 3 - A Little   3. Moving to and from a bed to a chair (including a wheelchair)? 3 - A Little   4. Standing up from a chair using your arms (e.g., wheelchair, or bedside chair)? 3 - A Little   5. To walk in hospital room? 3 - A Little   6. Climbing 3-5 steps with a railing? 2 - A Lot   Basic Mobility Raw Score (Score out of 24.Lower scores equate to lower levels of function) 18   Total Evaluation Time   Total Evaluation Time (Minutes) 11[EW1.1]        Revision History        User Key Date/Time User Provider Type Action    > EW1.1 4/13/2017 11:15 AM Jo Richardson PT Physical Therapist Sign                                                      INTERAGENCY TRANSFER FORM - LAB / IMAGING / EKG / EMG RESULTS   4/11/2017                       [address] [facility] BANK: [phone]            Unresulted Labs (24h ago through future)    Start       Ordered    04/16/17 0600  Platelet count  (enoxaparin (LOVENOX) with labs)  EVERY THREE DAYS,   Routine     Comments:  Repeat every 3 days while on VTE treatment.  Notify provider and hold enoxaparin (LOVENOX) if platelet count falls by 50% of baseline. If no result is listed, this lab has not been done the past 365 days. LATEST LAB RESULT: Platelet Count (10e9/L)       Date                     Value                 04/13/2017               299              ----------    04/14/17 1333    04/15/17 0600  Creatinine  (enoxaparin (LOVENOX) with labs)  EVERY THREE DAYS,   Routine     Comments:  Repeat every 3 days while on VTE treatment.    04/14/17 1333         Lab Results - 3 Days      Heparin 10a Level [[id number]] (Abnormal)  Resulted: 04/16/17 1106, Result status: Final result    Ordering provider: Mitzi Rios MD  04/16/17 0400 Resulting lab: [facility]    Specimen Information    Type Source Collected  On   Blood  17 1018          Components       Value Reference Range Flag Lab   Heparin 10A Level 1.53 IU/mL  51   Comment:         Therapeutic Range:     UFH:   0.15-0.35 IU/mL for low              intensity dosing            0.30-0.70 IU/mL for high              intensity dosing for              DVT and PE     Enoxaparin: If administered              once daily with dose              1.5mg/k.0-2.0 IU/mL                 If administered              twice daily with dose              1mg/k.60-1.0 IU/mL   Critical Value:   Critical value if patient is receiving   unfractionated heparin: >1 IU/mL, critical   range does not apply if patient is receiving   low molecular weight heparin.   Critical Value called to and read back by  NAREN MARKS ON 7C AT 1105 17 BY AM              Basic metabolic panel [[id number]]  Resulted: 17 0757, Result status: Final result    Ordering provider: Marck Dobbins MD  17 0100 Resulting lab: [facility]    Specimen Information    Type Source Collected On   Blood  17 0722          Components       Value Reference Range Flag Lab   Sodium 141 133 - 144 mmol/L  51   Potassium 3.7 3.4 - 5.3 mmol/L  51   Chloride 106 94 - 109 mmol/L  51   Carbon Dioxide 26 20 - 32 mmol/L  51   Anion Gap 8 3 - 14 mmol/L  51   Glucose 90 70 - 99 mg/dL  51   Urea Nitrogen 13 7 - 30 mg/dL  51   Creatinine 0.70 0.52 - 1.04 mg/dL  51   GFR Estimate 83 >60 mL/min/1.7m2  51   Comment:  Non  GFR Calc   GFR Estimate If Black -- >60 mL/min/1.7m2  51   Result:         >90   GFR Calc     Calcium 8.8 8.5 - 10.1 mg/dL  51   Result:              CBC with platelets [[id number]]  Resulted: 17 0741, Result status: Final result    Ordering provider: Marck Dobbins MD  17 Resulting lab: [facility]    Specimen Information    Type Source Collected On   Blood   04/16/17 0722          Components       Value Reference Range Flag Lab   WBC 8.5 4.0 - 11.0 10e9/L  51   RBC Count 4.32 3.8 - 5.2 10e12/L  51   Hemoglobin 13.1 11.7 - 15.7 g/dL  51   Hematocrit 39.9 35.0 - 47.0 %  51   MCV 92 78 - 100 fl  51   MCH 30.3 26.5 - 33.0 pg  51   MCHC 32.8 31.5 - 36.5 g/dL  51   RDW 13.7 10.0 - 15.0 %  51   Platelet Count 239 150 - 450 10e9/L  51            Potassium [[id number]]  Resulted: 04/15/17 0840, Result status: Final result    Ordering provider: Mitzi Rios MD  04/15/17 0100 Resulting lab: [facility]    Specimen Information    Type Source Collected On   Blood  04/15/17 0759          Components       Value Reference Range Flag Lab   Potassium 3.6 3.4 - 5.3 mmol/L  51            Magnesium [[id number]]  Resulted: 04/15/17 0840, Result status: Final result    Ordering provider: Mitzi Rios MD  04/15/17 0100 Resulting lab: [facility]    Specimen Information    Type Source Collected On   Blood  04/15/17 0759          Components       Value Reference Range Flag Lab   Magnesium 1.7 1.6 - 2.3 mg/dL  51            Creatinine [[id number]]  Resulted: 04/15/17 0840, Result status: Final result    Ordering provider: Mitzi Rios MD  04/15/17 0759 Resulting lab: [facility]    Specimen Information    Type Source Collected On     04/15/17 0759          Components       Value Reference Range Flag Lab   Creatinine 0.54 0.52 - 1.04 mg/dL  51   GFR Estimate -- >60 mL/min/1.7m2  51   Result:         >90  Non  GFR Calc     GFR Estimate If Black -- >60 mL/min/1.7m2  51   Result:         >90   GFR Calc              Cancer antigen 19-9 [[id number]]  Resulted: 04/14/17 1538, Result status: Final result    Ordering provider: Roxann Lew MD  04/12/17 0701 Resulting lab: [facility]    Specimen Information    Type Source  Collected On     17 0646          Components       Value Reference Range Flag Lab   Cancer Antigen 19-9 --   51   Result:         Canceled, Test credited   Duplicate request              Creatinine [[id number]]  Resulted: 17 1500, Result status: Final result    Ordering provider: Marck Dobbins MD  17 1333 Resulting lab: [facility]    Specimen Information    Type Source Collected On   Blood  17 1427          Components       Value Reference Range Flag Lab   Creatinine 0.64 0.52 - 1.04 mg/dL  51   GFR Estimate -- >60 mL/min/1.7m2  51   Result:         >90  Non  GFR Calc     GFR Estimate If Black -- >60 mL/min/1.7m2  51   Result:         >90   GFR Calc              Platelet count [[id number]]  Resulted: 17 1440, Result status: Final result    Ordering provider: Marck Dobbins MD  17 1334 Resulting lab: [facility]    Specimen Information    Type Source Collected On   Blood  17 1427          Components       Value Reference Range Flag Lab   Platelet Count 270 150 - 450 10e9/L  51            Heparin Xa (10a) Level [[id number]]  Resulted: 17 1149, Result status: Final result    Ordering provider: Mitzi Rios MD  17 0653 Resulting lab: [facility]    Specimen Information    Type Source Collected On   Blood  17 1121          Components       Value Reference Range Flag Lab   Heparin 10A Level 0.75 IU/mL  51   Comment:         Therapeutic Range:     UFH:   0.15-0.35 IU/mL for low              intensity dosing            0.30-0.70 IU/mL for high              intensity dosing for              DVT and PE     Enoxaparin: If administered              once daily with dose              1.5mg/k.0-2.0 IU/mL                 If administered              twice daily with dose              1mg/k.60-1.0 IU/mL               Magnesium [[id number]]  Resulted: 17 1129, Result status: Final result    Ordering provider: Shahana Castro MD  17 Resulting lab: [facility]    Specimen Information    Type Source Collected On     17          Components       Value Reference Range Flag Lab   Magnesium 1.7 1.6 - 2.3 mg/dL  51            Potassium [[id number]]  Resulted: 17 1129, Result status: Final result    Ordering provider: Shahana Castro MD  17 Resulting lab: [facility]    Specimen Information    Type Source Collected On     17          Components       Value Reference Range Flag Lab   Potassium 3.5 3.4 - 5.3 mmol/L  51            Heparin 10a Level [[id number]] (Abnormal)  Resulted: 17 0408, Result status: Final result    Ordering provider: Mitzi Rios MD  17 Resulting lab: [facility]    Specimen Information    Type Source Collected On   Blood  17          Components       Value Reference Range Flag Lab   Heparin 10A Level 1.10 IU/mL  51   Comment:         Therapeutic Range:     UFH:   0.15-0.35 IU/mL for low              intensity dosing            0.30-0.70 IU/mL for high              intensity dosing for              DVT and PE     Enoxaparin: If administered              once daily with dose              1.5mg/k.0-2.0 IU/mL                 If administered              twice daily with dose              1mg/k.60-1.0 IU/mL   Critical Value:   Critical value if patient is receiving   unfractionated heparin: >1 IU/mL, critical   range does not apply if patient is receiving   low molecular weight heparin.   Critical Value called to and read back by  MIGUEL HOUGH ON 17  BY 3922.              CBC with platelets [[id number]] (Abnormal)  Resulted: 17, Result status: Final result    Ordering provider: Marck Dobbins MD  17  1929 Resulting lab: [facility]    Specimen Information    Type Source Collected On   Blood  04/13/17 1959          Components       Value Reference Range Flag Lab   WBC 11.1 4.0 - 11.0 10e9/L H 51   RBC Count 4.51 3.8 - 5.2 10e12/L  51   Hemoglobin 13.9 11.7 - 15.7 g/dL  51   Hematocrit 41.5 35.0 - 47.0 %  51   MCV 92 78 - 100 fl  51   MCH 30.8 26.5 - 33.0 pg  51   MCHC 33.5 31.5 - 36.5 g/dL  51   RDW 13.8 10.0 - 15.0 %  51   Platelet Count 299 150 - 450 10e9/L  51            Cancer antigen 19-9 [[id number]] (Abnormal)  Resulted: 04/13/17 2029, Result status: Final result    Ordering provider: Roxann Lew MD  04/11/17 1124 Resulting lab: [facility]    Specimen Information    Type Source Collected On     04/11/17 1124          Components       Value Reference Range Flag Lab   Cancer Antigen 19-9 268  H 51   Comment:         Reference range: 0 to 37  Unit: U/mL  (Note)  INTERPRETIVE INFORMATION: Cancer Antigen-GI (CA 19-9)  This test uses Roche CA 19-9 electrochemiluminescent  immunoassay. Results obtained with different test methods  or kits cannot be used interchangeably. CA 19-9 value is  useful in monitoring pancreatic, hepatobiliary, gastric,  hepatocellular, and colorectal cancer. CA 19-9 value,  regardless of level, should not be interpreted as absolute  evidence of the presence or absence of malignant disease.  Performed by [facility],  [address] [place] [id number] [phone]  [email or web address], Emmanuel Millard MD, Lab. Director              Surgical Path Exam [[id number]]  Resulted: 04/13/17 1559, Result status: Final result    Ordering provider: Marck Dobbins MD  04/11/17 2054 Resulting lab: [facility]    Specimen Information    Type Source Collected On     04/12/17 1045          Components       Value Reference Range Flag Lab   Copath Report --      Result:         Patient Name: ZAHIDA DUFF  MR#: [id number]  Specimen  "#: [id number]  Collected: 4/12/2017  Received: 4/12/2017  Reported: 4/13/2017 15:57  Ordering Phy(s): YAMILEX LIU  Additional Phy(s): ROXANN MCCARTNEY    For improved result formatting, select 'View Enhanced Report Format'  under Linked Documents section.    SPECIMEN(S):  Omentum, biopsy, CT guided    FINAL DIAGNOSIS:  OMENTUM, LEFT MASS, NEEDLE CORE BIOPSY:  - Serous high grade carcinoma    I have personally reviewed all specimens and or slides, including the  listed special stains, and used them with my medical judgement to  determine the final diagnosis.    Electronically signed out by:    Gutierrez Duke M.D., PhD, [facility]    CLINICAL HISTORY:  71 year old woman with dizziness.  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    GROSS:  ATP: Left omental mass:  \"Adequate\" (Anaya Morales M.D.  and Cass Hsu M.D. PhD Fellow via  telepathology)    The specimen is received in formalin with proper patient identification,  labeled \"left omental mass\".  The specimen consists of a 2.1 x 1.5 x 0.1  cm in aggregate of red-brown soft tissue cores, which are wrapped and  entirely submitted in cassette 1. (Dictated by: Dedra Capellan 4/12/2017  11:26 AM)    MICROSCOPIC:  Microscopic examination was performed.    CPT Codes:  A: 95865-KK1, 80652-KXIOIX, 81665-PZIDH.P    TESTING LAB LOCATION:  [address]   [address]  [phone]    COLLECTION SITE:  Client: [facility]  Location: [facility] (B)              Protein Immunofixation Serum [[id number]] (Abnormal)  Resulted: 04/13/17 1515, Result status: Final result    Ordering provider: Roxann Lew MD  04/11/17 1111 Resulting lab: [facility]    Specimen Information    Type Source " Collected On   Blood  04/11/17 1124          Components       Value Reference Range Flag Lab   Immunofixation ELP --   51   Result:         (Note)  Small monoclonal IgG immunoglobulin of kappa light chain type and a  possible very small monoclonal IgG immunoglobulin of lambda light  chain type. Monoclonal antibody therapeutics (e.g. Daratumumab) may  appear as monoclonal proteins on serum electrophoresis and immunofixation.  Results should be interpreted with caution if the patient is receiving  monoclonal antibody therapy. Pathological significance requires  clinical correlation. BANG Langley M.D., Ph.D., Pathologist ([phone])       IGG 2570 695 - 1620 mg/dL H 51   Result:      70 - 380 mg/dL  51   IGM 85 60 - 265 mg/dL  51            Testing Performed By     Lab - Abbreviation Name Director Address Valid Date Range    51 - Unknown [facility] Unknown [address] [facility] [address] 12/31/14 1010 - Present    88 - Unknown [facility] Unknown Unknown 10/30/02 0000 - Present               Imaging Results - 3 Days      IR Abdominal Retroperitoneal Biopsy [[id number]]  Resulted: 04/13/17 1612, Result status: Final result    Ordering provider: Yordan Sahni MD  04/11/17 2054 Resulted by: Yordan Sahni MD Kralik, Jon, MD    Performed: 04/12/17 1015 - 04/12/17 1119 Resulting lab: RADIOLOGY RESULTS    Narrative:       Procedures 4/12/2017:  Ultrasound-guided left peritoneal mass biopsy    History: Left ovarian mass with omental caking. Tissue sample  requested.    Comparison: 4/11/2017    Fellow: ROX Rome MD    Staff: NAHUM Chappell, Dr. Yordan Sahni, was present for the critical part of the  procedure and immediately available for the entire procedure.    Medications:   1. 100 mcg Fentanyl  2. 2 mg Versed    Moderate sedation administered by the IR nurse at the supervision of  the attending. Vital signs and oxygenation continuously monitored.  The  patient remained stable throughout the procedure.    Sedation time: 35 minutes    Findings/procedure:    Prior to the procedure, both verbal and written informed consent  obtained from the patient.     The patient was placed in the supine position. Limited preprocedural  ultrasound performed for target localization demonstrating irregular  confluent hypoechoic peritoneal masses in the left upper abdomen. The  abdomen was prepped and draped in usual sterile fashion. Buffered 1%  lidocaine administered for local anesthesia. Under ultrasound  guidance, 17-gauge coaxial introducer needle was advanced into the  confluent hypoechoic peritoneal mass in the left upper quadrant. Image  documenting needle within the mass was saved. Through the introducer  needle, four 18-gauge core samples were obtained. Pathology was  present and confirmed sample adequacy. The introducer needle was  removed and hemostasis achieved with manual compression. Limited  postprocedural ultrasound demonstrated no immediate complication.      Impression:       Impression:  Ultrasound-guided left abdominal omental mass biopsy with four,  18-gauge core samples obtained.    Plan:   Routine post procedure cares.    I have personally reviewed the examination and initial interpretation  and I agree with the findings.    KIKO RO      MR Outside Read [[id number]]  Resulted: 04/13/17 1336, Result status: Edited Result - FINAL    Ordering provider: Diomedes Jones MD  04/11/17 1403 Resulted by: Tr Goldman MD Smith, Min Swenson DO    Performed: 04/11/17 1504 - 04/11/17 1504 Resulting lab: RADIOLOGY RESULTS    Addenda: Focal hyperintensity on post gadolinium axial T1-weighted sequences in   the left anterolateral medulla on single slice is not confirmed on the   other sequences. FLAIR and T2-weighted sequences are normal at this   level. This finding is considered to be an artifactual finding.      TR GOLDMAN MD  Signed:  04/13/17  1336 by Tr Núñez MD    Narrative:       MR OUTSIDE READ of the brain with and without contrast performed at  [facility] neurology 3/29/2017 submitted for dictation on  4/11/2017 3:04 PM    Provided History: Patient with worsening neuropathy over the past year  with one month of worsening dizziness. Recently found to have  pulmonary emboli as well as abdominal omental caking with suspicion  for ovarian mass versus sigmoid colon mass on abdominal CT.    Comparison: No similar prior studies..    Technique: Images available for interpretation and glued T1 sagittal  STIR, axial flair, T2 axial with fat saturation, T1 axial with and  without contrast, coronal T1 with contrast, DWI/ADC.    Findings:  There is no mass effect, midline shift, or evidence of intracranial  hemorrhage. The ventricles are proportionate to the cerebral sulci.  Gray-white matter differentiation is normal. No evidence of acute  infarction, mass effect or shift.  No definite abnormality of the skull marrow signal is noted. The major  vascular intracranial flow-voids are present. The visualized portions  of paranasal sinuses, and mastoid air cells are relatively clear. The  orbits are grossly unremarkable.      Impression:       Impression:  Essentially normal brain MRI.    I have personally reviewed the examination and initial interpretation  and I agree with the findings.    TR NÚÑEZ MD      Testing Performed By     Lab - Abbreviation Name Director Address Valid Date Range    104 - Rad Rslts RADIOLOGY RESULTS Unknown Unknown 02/16/05 1553 - Present            Encounter-Level Documents:     There are no encounter-level documents.      Order-Level Documents:     There are no order-level documents.

## 2017-04-11 NOTE — CONSULTS
"Gynecology Oncology Consult Note    Reason for Consult: Possible GYN malignancy    HPI: Tosin Nina is a 71 year old with Hx of HTN, hypothyroidism s/p thyroidectomy and spinal stenosis who presented to the ED with dizziness and generalized weakness. Patient reporting that since February she has noted increasing abnormalities of her gait.  Feels unsteady on her feet.  Has followed up with Neurology for this. Paraneoplastic labs sent and pending. MRI at outside hospital with possible brain mets. Reports additionally in the last week she has had trouble with her vision, reporting feeling like the room is \"rolling.\" Reports in the last month she has noticed weight loss, unable to quantify amount.  Additionally reports some mild diarrhea, in the mornings, and decreased appetite.  Denies any nausea or vomiting, constipation, diarrhea, skin changes, or vaginal bleeding.    Imaging in the ED noted abdominopelvic lymphadenopathy, omental caking, and peritoneal carcinomatosis, and an irregular lobulated mass involving the sigmoid colon. Additionally noted PEs on CT.    OBHx: , 1 uncomplicated     GynHx: Menopause in early 50s, no vaginal bleeding since then  Last pap possibly 8 years ago, distant history of abnormal no other abnormals since then    Last mammogram: oct/2016, has had a biopsy of a benign cyst  Last colonoscopy: 10 years ago, no concerns    PMH:   Past Medical History:   Diagnosis Date     Hypertension      Spinal stenosis      Thyroid disease      PSH:   Past Surgical History:   Procedure Laterality Date     THYROIDECTOMY       Social Hx:   Social History   Substance Use Topics     Smoking status: Never Smoker     Smokeless tobacco: Not on file     Alcohol use Yes      Comment: occasionally        Family History: No family history of cervical, ovarian, uterine, colorectal cancer  MOther with breast cancer diagnosed at age 69.    ROS:   Negative except per HPI    Objective:   BP (!) 143/93  " Pulse 79  Temp 97.6  F (36.4  C) (Oral)  Resp 18  SpO2 97%  Constitutional: Healthy appearing  female, no acute distress  HEENT: Normal appearance.  Neck supple, thyroid normal in size without nodularity or masses.  Cardiovascular: Regular rate and rhythm without murmurs, clicks, gallops or rub  Respiratory: Clear to auscultation bilaterally without crackles or wheeze  Gastrointestinal: Abdomen soft, non-tender. BS normal. No masses, organomegaly  Pelvic Exam - : External genitalia normal well-estrogenized, healthy tissue.  No obvious excoriations, lesions, or rashes. Bartholins, urethra, skeins normal.  Normal pink vaginal mucosa and smooth without nodularity or masses.   SSE: Cervical polyp noted on exam, not friable, normal physiologic discharge.   Bimanual: No CMT, normal sized uterus. No adnexal masses or tenderness appreciated. Recto-vaginal exam confirms these findings.   Skin: No suspicious lesions or rashes  Psychiatric: mentation appears normal and affect normal/bright    Labs/Imaging:  Results for orders placed or performed during the hospital encounter of 04/11/17   MR Outside Read    Narrative    MR OUTSIDE READ of cervical and thoracic spine MRI without contrast  performed at the [facility] 3/29/2017.    Interpretation performed 4/11/2017     History: Spinal stenosis    Comparison: Cervical spine MR 7/18/2016    Technique: Cervical and thoracic spine MR images from [facility] dated 3/29/2017 were submitted for interpretation.  Sagittal T2- and T1-weighted images of the cervical and thoracic  spine, axial T2* gradient echo images of the cervical spine and axial  T2-weighted images were obtained.    Findings:  Cervical: Mild stepwise anterolisthesis C2-C5 . There is no abnormal  cord signal. No abnormal bone marrow signal. Multilevel degenerative  changes with loss of disc height and disc T2 signal, osteophyte  formation, uncinate spurring and facet  hypertrophy. The findings on a  level by level basis are as follows:    C2-3:  No spinal canal or neural foraminal stenosis.    C3-4:  Disc bulge and superimposed central tiny protrusion. Effacement  of the ventral subarachnoid space. No spinal canal or neural foraminal  stenosis.    C4-5:  Disc bulge and superimposed left central tiny protrusion  effaces the ventral subarachnoid space. Mild right neural foraminal  narrowing. No spinal canal or left neural foraminal stenosis.    C5-6:  Disc osteophyte complex and bilateral uncinate spurring.  Bilateral facet hypertrophy. Moderate left and mild to moderate right  neural foraminal stenosis. Mild spinal canal narrowing.    C6-7:  Disc osteophyte complex and bilateral uncinate spurring.  Moderate bilateral neural foraminal stenosis. Mild spinal canal  narrowing..    C7-T1:  No spinal canal or neural foraminal stenosis.    Thoracic:   Mild anterolisthesis of T1 on T2. No abnormal cord signal. Modic type  II degeneration at the opposing T8-9 endplates, otherwise normal  abnormal bone marrow signal. Multilevel degenerative changes with loss  of disc height and disc T2 signal, osteophyte formation, facet  hypertrophy and the ligamentum flavum thickening.    At T9-10; there is disc bulge and thickening of the ligamentum flavum  which leads to mild-to-moderate stenosis of the spinal canal.    At T10-11 and T11-12; disc bulge and thickening of ligamentum flavum  lead to to mild spinal canal narrowing.    At T12-L1; right central protrusion superimposed on disc bulge effaces  the ventral subarachnoid space and lead to mild to moderate stenosis  of the spinal canal.    Multilevel disc bulging. Mild right neural foraminal narrowing T10-L1.    Normal paraspinous soft tissues.      Impression    Impression:   1. Multilevel cervical spondylosis with mild spinal canal narrowing at  C5-6 and C6-C7. Moderate left neural foraminal stenosis at C5-6 and  moderate bilateral at C6-7.  Compared to 7/18/2016 there has been no  significant change in the degenerative findings.   2. Multilevel thoracic spondylosis with mild to moderate spinal canal  stenosis at T9-10 and T12-L1. Mild spinal canal narrowing at T10-11  and T11-12.    I have personally reviewed the examination and initial interpretation  and I agree with the findings.    SRINI SANTOS MD   CT Chest/Abdomen/Pelvis w Contrast   Result Value Ref Range    Radiologist flags Pulmonary embolism (AA)     Narrative    EXAMINATION: CT CHEST/ABDOMEN/PELVIS W CONTRAST, 4/11/2017 3:27 PM    TECHNIQUE:  Helical CT images from the thoracic inlet through the  symphysis pubis were obtained  with contrast. Contrast dose: 100 cc of  isovue 370    COMPARISON: 2/6/2017, 9/18/2014    HISTORY: concern for paraneoplastic syndrome. Question of potential  primary.    FINDINGS:    Chest: Thrombus identified within the right lower lobe are artery and  right upper lobar arteries. The right pulmonary artery is enlarged,  similar to prior exams. No CT evidence of right heart strain. No  suspicious mediastinal or perihilar lymphadenopathy. Bovine arch  anatomy.     No suspicious pulmonary nodules, evidence of infarction, or infection.    Abdomen and pelvis: There are soft tissue nodules identified along the  liver capsule measuring up to 3 cm inferiorly (series 8 image 315).  There is a large amount of omental caking. Enlarged iliac and  retroperitoneal lymph nodes for example a 2.6 cm right external iliac  lymph node or group of lymph nodes on series 8 image 448.    Heterogeneous enhancement of the uterine fundus could be due to  fibroids or a mass. The overall size of the uterus does not appear  significantly different than in 2014. The left ovary does appear  slightly larger and lobular in appearance compared to prior exam.  There is also a nodular area along the round ligament (series 4 image  167). It was not present on prior exam. There is an  irregular  lobulated mass involving the sigmoid colon. Abnormal, enlarged  inguinal lymph nodes. Soft tissue implant in the posterior right  retroperitoneum adjacent to the psoas was not present on prior exam  (series 4 image 144).    Bones and soft tissues: Degenerative changes in the spine with flowing  anterior osteophytes in the thoracic spine compatible with dish.  Spondylolisthesis at L3-4. Small periumbilical hernia containing some  of the abnormal omentum.      Impression    IMPRESSION:   1. Right lower and upper lobar pulmonary emboli without CT evidence of  right heart strain.  2. Abdominopelvic lymphadenopathy, omental caking, and peritoneal  carcinomatosis. This is most suspicious for metastatic left ovarian  cancer. There is an irregular soft tissue mass about the sigmoid colon  that could potentially represent a primary malignancy, though  metastatic serosal implants are favored as an etiology for this.     [Critical Result: Pulmonary embolism]    Finding was identified on 4/11/2017 3:29 PM.     Dr. Dash Jones was contacted by Dr. Min Zhang at 4/11/2017  3:40 PM and verbalized understanding of the critical finding.     I have personally reviewed the examination and initial interpretation  and I agree with the findings.    JAELYN ANNE   CBC with platelets differential   Result Value Ref Range    WBC 10.9 4.0 - 11.0 10e9/L    RBC Count 4.70 3.8 - 5.2 10e12/L    Hemoglobin 15.0 11.7 - 15.7 g/dL    Hematocrit 42.8 35.0 - 47.0 %    MCV 91 78 - 100 fl    MCH 31.9 26.5 - 33.0 pg    MCHC 35.0 31.5 - 36.5 g/dL    RDW 13.7 10.0 - 15.0 %    Platelet Count 259 150 - 450 10e9/L    Diff Method Automated Method     % Neutrophils 81.6 %    % Lymphocytes 8.6 %    % Monocytes 7.9 %    % Eosinophils 1.2 %    % Basophils 0.4 %    % Immature Granulocytes 0.3 %    Nucleated RBCs 0 0 /100    Absolute Neutrophil 8.9 (H) 1.6 - 8.3 10e9/L    Absolute Lymphocytes 0.9 0.8 - 5.3 10e9/L    Absolute Monocytes 0.9 0.0 -  1.3 10e9/L    Absolute Eosinophils 0.1 0.0 - 0.7 10e9/L    Absolute Basophils 0.0 0.0 - 0.2 10e9/L    Abs Immature Granulocytes 0.0 0 - 0.4 10e9/L    Absolute Nucleated RBC 0.0    INR   Result Value Ref Range    INR 1.06 0.86 - 1.14   Comprehensive metabolic panel   Result Value Ref Range    Sodium 137 133 - 144 mmol/L    Potassium 3.1 (L) 3.4 - 5.3 mmol/L    Chloride 101 94 - 109 mmol/L    Carbon Dioxide 26 20 - 32 mmol/L    Anion Gap 10 3 - 14 mmol/L    Glucose 104 (H) 70 - 99 mg/dL    Urea Nitrogen 9 7 - 30 mg/dL    Creatinine 0.57 0.52 - 1.04 mg/dL    GFR Estimate >90  Non  GFR Calc   >60 mL/min/1.7m2    GFR Estimate If Black >90   GFR Calc   >60 mL/min/1.7m2    Calcium 8.7 8.5 - 10.1 mg/dL    Bilirubin Total 0.6 0.2 - 1.3 mg/dL    Albumin 3.6 3.4 - 5.0 g/dL    Protein Total 9.2 (H) 6.8 - 8.8 g/dL    Alkaline Phosphatase 140 40 - 150 U/L    ALT 25 0 - 50 U/L    AST 17 0 - 45 U/L   CRP inflammation   Result Value Ref Range    CRP Inflammation 14.0 (H) 0.0 - 8.0 mg/L   Erythrocyte sedimentation rate auto   Result Value Ref Range    Sed Rate 17 0 - 30 mm/h   UA reflex to Microscopic and Culture   Result Value Ref Range    Color Urine Light Yellow     Appearance Urine Clear     Glucose Urine Negative NEG mg/dL    Bilirubin Urine Negative NEG    Ketones Urine 5 (A) NEG mg/dL    Specific Gravity Urine 1.004 1.003 - 1.035    Blood Urine Negative NEG    pH Urine 7.0 5.0 - 7.0 pH    Protein Albumin Urine Negative NEG mg/dL    Urobilinogen mg/dL Normal 0.0 - 2.0 mg/dL    Nitrite Urine Negative NEG    Leukocyte Esterase Urine Negative NEG    Source Midstream Urine    ABO/Rh type and screen   Result Value Ref Range    ABO O     RH(D)  Pos     Antibody Screen Neg     Test Valid Only At       [facility]    Specimen Expires 04/14/2017    ABO/Rh type and screen   Result Value Ref Range    ABO Pending     RH(D) Pending     Antibody Screen Pending      Test Valid Only At Pending     Specimen Expires Pending         Assessment/Plan:   Tosin Nina is a 71 year old presenting with symptoms of dizziness and gait abnormalities with new CT A/P findings concerning for metastatic malignancy, possibly with brain involvement, unsure if gyn origin or sigmoid colon.       - Recommend IR Omental biopsy.  - Would add on lab Ca125, CEA, CA 19-9.  - Will follow-up results and establish plan of care once reviewed.    Discussed with Dr. Salazar, fellow    Alyson Engle MD  OBGYN PGY-2  5:13 PM 4/11/2017    I have discussed this patient's presentation and assessment and plan with fellow Dr. Salazar. Agree with above.    Tati Castro  4/11/2017  8:26 PM        Patient seen and examined by me.  Agree with assessment and plan.   I personally reviewed vital signs, medications, labs and imaging reports.   Patient is a 70 y/o who presents with increasing unsteadiness in gait and visual changes.  On MRI of the brain, an enhancement of the pontine noted- CNS metastasis vs paraneoplastic process.  A CT a/p showed omental caking, peritoneal carcinomatosis, slightly enlarged ovarian mass and a sigmoid colon mass.  Images personally reviewed with the radiologist- Unsure of primary origin.  Recommend getting IR biopsy of omentum, checking  and CEA.  Will follow up results.  Karthik Salazar MD  Gynecologic Oncology Fellow    I have seen and examined the patient.  I have reviewed and edited Dr. Engle'/Dr. Salazar's note above.  Will follow peripherally and follow-up biopsy results, will see the patient again if pathology c/w a malignancy of gynecologic origin.  Tati Castro  4/12/2017  3:15 PM

## 2017-04-11 NOTE — IP AVS SNAPSHOT
[address] [address]    Phone:  [phone]                                       After Visit Summary   4/11/2017    Tosin Nina    MRN: [id number]           After Visit Summary Signature Page     I have received my discharge instructions, and my questions have been answered. I have discussed any challenges I see with this plan with the nurse or doctor.    ..........................................................................................................................................  Patient/Patient Representative Signature      ..........................................................................................................................................  Patient Representative Print Name and Relationship to Patient    ..................................................               ................................................  Date                                            Time    ..........................................................................................................................................  Reviewed by Signature/Title    ...................................................              ..............................................  Date                                                            Time

## 2017-04-11 NOTE — IP AVS SNAPSHOT
MRN:[id number]                      After Visit Summary   4/11/2017    Tosin Nina    MRN: [id number]           Thank you!     Thank you for choosing [facility] for your care. Our goal is always to provide you with excellent care. Hearing back from our patients is one way we can continue to improve our services. Please take a few minutes to complete the written survey that you may receive in the mail after you visit with us. Thank you!        Patient Information     Date Of Birth          1945        Designated Caregiver       Most Recent Value    Caregiver    Will someone help with your care after discharge? yes    Name of designated caregiver Christiano    Phone number of caregiver [phone] home phone, [phone]    Caregiver address [address] [place] [address]      About your hospital stay     You were admitted on:  April 11, 2017 You last received care in the:  [address] [facility]    You were discharged on:  April 16, 2017        Reason for your hospital stay       You were diagnosed with metastatic ovarian cancer.  You got your first dose of chemotherapy.  We also found blood clots in your lungs and you are on shots of blood thinner twice a day for this.                  Who to Call     For medical emergencies, please call 911.  For non-urgent questions about your medical care, please call your primary care provider or clinic, [phone]          Attending Provider     Provider Specialty    Roxann Lew MD Internal Medicine    [facility], Katerin Aviles MD Emergency Medicine    Shahana Castro MD Internal Medicine    Blanca, Mitzi Mahoney MD Internal Medicine       Primary Care Provider Office Phone # Fax #    Esther Back [phone]       [facility] [address] [address]        After Care Instructions     Activity - Up with nursing assistance       Patient is unsteady on her feet.  As this improves her order can change.             Advance Diet as Tolerated       Follow this diet upon discharge: Orders Placed This Encounter      Snacks/Supplements Adult: Ensure Plus (Adult); Between Meals      Regular Diet Adult            General info for SNF       Length of Stay Estimate: Short Term Care: Estimated # of Days <30  Condition at Discharge: Stable  Level of care:skilled   Rehabilitation Potential: Good  Admission H&P remains valid and up-to-date: Yes  Recent Chemotherapy: Date:          4/14/17             Use Nursing Home Standing Orders: Yes            Mantoux instructions       Give two-step Mantoux (PPD) Per Facility Policy Yes                  Follow-up Appointments     Follow Up and recommended labs and tests       Patient has multiple upcoming appointments per her AVS including chemotherapy in 1 week, appointment with Oncology Team at [facility].                  Your next 10 appointments already scheduled     Apr 20, 2017 10:40 AM CDT   Return Visit with SALVADOR Solis CNP   [facility] ([facility])    [facility]  [address]  [facility] [address]   [phone]            Apr 21, 2017  9:30 AM CDT   Level 3 with RH INFUSION CHAIR 8   [facility] ([facility])    [facility]  [address]  [facility] [address]   [phone]            Apr 28, 2017  8:00 AM CDT   Level 3 with RH INFUSION CHAIR 12   [facility] ([facility])    [facility]  [address]  [facility] [address]   [phone]            May 04, 2017  9:00 AM CDT   Level 3 with RH INFUSION CHAIR 9   [facility] ([facility])    [facility]  [address]  [place] [address]   [phone]            May 04, 2017  9:20 AM CDT   Return Visit  with SALVADOR Solis CNP   [facility] ([facility])    [facility]  [address]  [place] [address]   [phone]            May 16, 2017 11:00 AM CDT   New Visit with eNssa Foster MD   [facility] ([facility])    [facility]  [address]  [place] [address]   [phone]              Additional Services     PALLIATIVE CARE REFERRAL       Your provider has referred you to Palliative Care Services.    To schedule an Outpatient Palliative Care Referral appointment, please call: {LOCATIONS:701260}.    Please be aware that coverage of these services is subject to the terms and limitations of your health insurance plan.  Call member services at your health plan with any benefit or coverage questions.    Please bring the following with you to your appointment:    (1) Any X-Rays, CTs or MRIs which have been performed.  Contact the facility where they were done to arrange for  prior to your scheduled appointment.  (2) If you have recently seen a provider outside of the [facility], please bring your most recent clinic note and/or imaging results  (3) List of current medications - please bring ALL of the medications that you are currently taking (in their original bottles) to your appointment  (4) This referral request  (5) Any documents/labs given to you for this referral    Services Requested: {Services:713114}    Please complete the following questions:  1. What is patient's life-limiting diagnosis? ***  2. What is the reason for the referral? ***  3. What is the patient's prognosis? ***    Palliative Care Definition:  Palliative Care is specialized medical care for people with serious illness.  This type of care is focused on providing patients with relief from symptoms, pain and stresses of serious illness - whatever the diagnosis may  "be.  The goal of Palliative Care is to improve quality of life for both the patient and the family.  Palliative Care is provided by a team of doctors, nurses and other specialists who work with the patient s other doctors to provide an extra layer of support.  Palliative Care is appropriate at any age and at any stage in a serious illness, and can be provided together with curative treatment.                    Future tests that were ordered for you     IR Chest Port Placement > 5 Yrs of Age                 Pending Results     No orders found from 2017 to 2017.            Statement of Approval     Ordered          17 0749  I have reviewed and agree with all the recommendations and orders detailed in this document.  EFFECTIVE NOW     Approved and electronically signed by:  Mitzi Rios MD             Admission Information     Date & Time Provider Department Dept. Phone    2017 Mitzi Rios MD [address] [facility] [phone]      Your Vitals Were     Blood Pressure Pulse Temperature Respirations Weight Pulse Oximetry    125/68 (BP Location: Left arm) 81 96.5  F (35.8  C) (Oral) 16 69.6 kg (153 lb 6.4 oz) 95%    BMI (Body Mass Index)                   30.98 kg/m2           MyChart Information     [employer] lets you send messages to your doctor, view your test results, renew your prescriptions, schedule appointments and more. To sign up, go to [email or web address] . Click on \"Log in\" on the left side of the screen, which will take you to the Welcome page. Then click on \"Sign up Now\" on the right side of the page.     You will be asked to enter the access code listed below, as well as some personal information. Please follow the directions to create your username and password.     Your access code is: [email or web address]  Expires: 2017 12:50 PM     Your access code will  in 90 days. If you need help or a new code, please call your [place] clinic or [phone].        Care " EveryWhere ID     This is your Care EveryWhere ID. This could be used by other organizations to access your [facility] medical records  [id number]           Review of your medicines      START taking        Dose / Directions    acetaminophen 325 MG tablet   Commonly known as:  TYLENOL        Dose:  650 mg   Take 2 tablets (650 mg) by mouth every 4 hours as needed for mild pain   Quantity:  100 tablet   Refills:  0       enoxaparin 80 MG/0.8ML injection   Commonly known as:  LOVENOX   Used for:  Other pulmonary embolism without acute cor pulmonale, unspecified chronicity (H)        Dose:  60 mg   Inject 0.6 mLs (60 mg) Subcutaneous every 12 hours   Quantity:  60 Syringe   Refills:  1       ondansetron 4 MG ODT tab   Commonly known as:  ZOFRAN-ODT        Dose:  4 mg   Start taking on:  4/17/2017   Take 1 tablet (4 mg) by mouth every 6 hours as needed for nausea   Quantity:  120 tablet   Refills:  0       ondansetron 4 MG tablet   Commonly known as:  ZOFRAN        Dose:  4 mg   Take 1 tablet (4 mg) by mouth every 6 hours as needed for nausea   Quantity:  20 tablet   Refills:  0       polyethylene glycol Packet   Commonly known as:  MIRALAX/GLYCOLAX        Dose:  17 g   Take 17 g by mouth daily as needed for constipation   Quantity:  7 packet   Refills:  0       prochlorperazine 5 MG tablet   Commonly known as:  COMPAZINE        Dose:  5 mg   Take 1 tablet (5 mg) by mouth every 6 hours as needed for vomiting   Quantity:  90 tablet   Refills:  0       senna-docusate 8.6-50 MG per tablet   Commonly known as:  SENOKOT-S;PERICOLACE        Dose:  1-2 tablet   Take 1-2 tablets by mouth 2 times daily as needed (constipation )   Quantity:  100 tablet   Refills:  0         CONTINUE these medicines which may have CHANGED, or have new prescriptions. If we are uncertain of the size of tablets/capsules you have at home, strength may be listed as something that might have changed.        Dose / Directions    hydrochlorothiazide 25 MG  tablet   Commonly known as:  HYDRODIURIL   This may have changed:  medication strength   Used for:  Dizziness        Dose:  50 mg   Take 2 tablets (50 mg) by mouth daily   Quantity:  30 tablet   Refills:  0       levothyroxine 125 MCG tablet   Commonly known as:  SYNTHROID   This may have changed:  medication strength   Used for:  Other pulmonary embolism without acute cor pulmonale, unspecified chronicity (H)        Dose:  125 mcg   Take 1 tablet (125 mcg) by mouth daily   Quantity:  30 tablet   Refills:  0       losartan 100 MG tablet   Commonly known as:  COZAAR   This may have changed:  medication strength   Used for:  Dizziness        Dose:  100 mg   Take 1 tablet (100 mg) by mouth daily   Quantity:  30 tablet   Refills:  0       Vitamin D (Cholecalciferol) 1000 UNITS Tabs   This may have changed:  medication strength   Used for:  Paresthesias        Dose:  2000 Units   Take 2,000 Units by mouth daily   Quantity:  60 tablet   Refills:  0         CONTINUE these medicines which have NOT CHANGED        Dose / Directions    IBUPROFEN PO        Dose:  200 mg   Take 200 mg by mouth every 6 hours as needed for moderate pain   Refills:  0         STOP taking     potassium chloride jere er   Commonly known as:  K-DUR                Where to get your medicines      Some of these will need a paper prescription and others can be bought over the counter. Ask your nurse if you have questions.     You don't need a prescription for these medications     acetaminophen 325 MG tablet    enoxaparin 80 MG/0.8ML injection    hydrochlorothiazide 25 MG tablet    levothyroxine 125 MCG tablet    losartan 100 MG tablet    ondansetron 4 MG ODT tab    ondansetron 4 MG tablet    polyethylene glycol Packet    prochlorperazine 5 MG tablet    senna-docusate 8.6-50 MG per tablet    Vitamin D (Cholecalciferol) 1000 UNITS Tabs                Protect others around you: Learn how to safely use, store and throw away your medicines at  www.disposemymeds.org.             Medication List: This is a list of all your medications and when to take them. Check marks below indicate your daily home schedule. Keep this list as a reference.      Medications           Morning Afternoon Evening Bedtime As Needed    acetaminophen 325 MG tablet   Commonly known as:  TYLENOL   Take 2 tablets (650 mg) by mouth every 4 hours as needed for mild pain                                   enoxaparin 80 MG/0.8ML injection   Commonly known as:  LOVENOX   Inject 0.6 mLs (60 mg) Subcutaneous every 12 hours   Last time this was given:  70 mg on 4/16/2017  5:30 AM                                hydrochlorothiazide 25 MG tablet   Commonly known as:  HYDRODIURIL   Take 2 tablets (50 mg) by mouth daily   Last time this was given:  50 mg on 4/16/2017  8:23 AM                                   IBUPROFEN PO   Take 200 mg by mouth every 6 hours as needed for moderate pain                                   levothyroxine 125 MCG tablet   Commonly known as:  SYNTHROID   Take 1 tablet (125 mcg) by mouth daily   Last time this was given:  125 mcg on 4/16/2017  8:23 AM                                   losartan 100 MG tablet   Commonly known as:  COZAAR   Take 1 tablet (100 mg) by mouth daily   Last time this was given:  100 mg on 4/16/2017  8:23 AM                                   ondansetron 4 MG ODT tab   Commonly known as:  ZOFRAN-ODT   Take 1 tablet (4 mg) by mouth every 6 hours as needed for nausea   Start taking on:  4/17/2017                                   ondansetron 4 MG tablet   Commonly known as:  ZOFRAN   Take 1 tablet (4 mg) by mouth every 6 hours as needed for nausea                                   polyethylene glycol Packet   Commonly known as:  MIRALAX/GLYCOLAX   Take 17 g by mouth daily as needed for constipation                                prochlorperazine 5 MG tablet   Commonly known as:  COMPAZINE   Take 1 tablet (5 mg) by mouth every 6 hours as needed for  vomiting   Last time this was given:  5 mg on 4/16/2017  8:23 AM                                   senna-docusate 8.6-50 MG per tablet   Commonly known as:  SENOKOT-S;PERICOLACE   Take 1-2 tablets by mouth 2 times daily as needed (constipation )                                   Vitamin D (Cholecalciferol) 1000 UNITS Tabs   Take 2,000 Units by mouth daily   Last time this was given:  2,000 Units on 4/16/2017  8:23 AM

## 2017-04-11 NOTE — ED PROVIDER NOTES
Patient was accepted at shift change sign out with plan to monitor in the ER until her Neurology bed became available. Shortly after sign out I did receive a call from the Neurologist. He stated that they had ordered a CT abdomen/pelvis as a cancer screening as there was some question of whether or not the MRI findings in her brain were related to either a metastatic focus of disease vs. a paraneoplastic related issue. The CT unfortunately did come back showing right lower and upper lobar pulmonary emboli without evidence for right heart strain. It also showed abdominopelvic lymphadenopathy, omental caking, and peritoneal carcinomatosis. This is most suspicious for metastatic left ovarian cancer, though there is also an irregular soft tissue mass noted about the sigmoid colon that could potentially represent a primary malignancy. I did go back with the Neurology resident and we did discuss the findings with the patient. She is aware that cancer is on the differential. Certainly, Neurology is not the appropriate service to admit the patient to at this time. She denies any chest pain or shortness of breath. I will let the admitting team decide on anticoagulation, given possible brain met. Ultimately, Medicine did agree to accept the patient for admission, and Gyn/Onc was also made aware and will see the patient.      Katerin Tabor MD  04/11/17 1914

## 2017-04-12 ENCOUNTER — APPOINTMENT (OUTPATIENT)
Dept: OCCUPATIONAL THERAPY | Facility: CLINIC | Age: 72
DRG: 374 | End: 2017-04-12
Attending: INTERNAL MEDICINE
Payer: COMMERCIAL

## 2017-04-12 ENCOUNTER — APPOINTMENT (OUTPATIENT)
Dept: CT IMAGING | Facility: CLINIC | Age: 72
DRG: 374 | End: 2017-04-12
Attending: INTERNAL MEDICINE
Payer: COMMERCIAL

## 2017-04-12 LAB
ABO + RH BLD: NORMAL
ABO + RH BLD: NORMAL
ALBUMIN SERPL-MCNC: 3 G/DL (ref 3.4–5)
ALP SERPL-CCNC: 124 U/L (ref 40–150)
ALT SERPL W P-5'-P-CCNC: 20 U/L (ref 0–50)
ANION GAP SERPL CALCULATED.3IONS-SCNC: 14 MMOL/L (ref 3–14)
AST SERPL W P-5'-P-CCNC: 7 U/L (ref 0–45)
BILIRUB SERPL-MCNC: 0.6 MG/DL (ref 0.2–1.3)
BLD GP AB SCN SERPL QL: NORMAL
BLOOD BANK CMNT PATIENT-IMP: NORMAL
BUN SERPL-MCNC: 8 MG/DL (ref 7–30)
CALCIUM SERPL-MCNC: 8.5 MG/DL (ref 8.5–10.1)
CANCER AG125 SERPL-ACNC: 2648 U/ML (ref 0–30)
CANCER AG125 SERPL-ACNC: 3076 U/ML (ref 0–30)
CEA SERPL-MCNC: 0.6 UG/L (ref 0–2.5)
CEA SERPL-MCNC: 0.9 UG/L (ref 0–2.5)
CHLORIDE SERPL-SCNC: 105 MMOL/L (ref 94–109)
CO2 SERPL-SCNC: 21 MMOL/L (ref 20–32)
CREAT SERPL-MCNC: 0.59 MG/DL (ref 0.52–1.04)
ERYTHROCYTE [DISTWIDTH] IN BLOOD BY AUTOMATED COUNT: 13.9 % (ref 10–15)
GFR SERPL CREATININE-BSD FRML MDRD: ABNORMAL ML/MIN/1.7M2
GLUCOSE SERPL-MCNC: 97 MG/DL (ref 70–99)
HCT VFR BLD AUTO: 40.6 % (ref 35–47)
HGB BLD-MCNC: 13.6 G/DL (ref 11.7–15.7)
IMMUNOFIX ELP, URINE: NORMAL
INTERPRETATION ECG - MUSE: NORMAL
MCH RBC QN AUTO: 30.8 PG (ref 26.5–33)
MCHC RBC AUTO-ENTMCNC: 33.5 G/DL (ref 31.5–36.5)
MCV RBC AUTO: 92 FL (ref 78–100)
PLATELET # BLD AUTO: 241 10E9/L (ref 150–450)
POTASSIUM SERPL-SCNC: 3.2 MMOL/L (ref 3.4–5.3)
POTASSIUM SERPL-SCNC: 3.4 MMOL/L (ref 3.4–5.3)
PROT SERPL-MCNC: 8 G/DL (ref 6.8–8.8)
RBC # BLD AUTO: 4.42 10E12/L (ref 3.8–5.2)
SODIUM SERPL-SCNC: 141 MMOL/L (ref 133–144)
SPECIMEN EXP DATE BLD: NORMAL
WBC # BLD AUTO: 9.2 10E9/L (ref 4–11)

## 2017-04-12 PROCEDURE — 97110 THERAPEUTIC EXERCISES: CPT | Mod: GO | Performed by: OCCUPATIONAL THERAPIST

## 2017-04-12 PROCEDURE — 25000128 H RX IP 250 OP 636: Performed by: RADIOLOGY

## 2017-04-12 PROCEDURE — 86301 IMMUNOASSAY TUMOR CA 19-9: CPT | Performed by: INTERNAL MEDICINE

## 2017-04-12 PROCEDURE — 36415 COLL VENOUS BLD VENIPUNCTURE: CPT | Performed by: INTERNAL MEDICINE

## 2017-04-12 PROCEDURE — 40000895 ZZH STATISTIC SLP IP EVAL DEFER

## 2017-04-12 PROCEDURE — 86304 IMMUNOASSAY TUMOR CA 125: CPT | Performed by: INTERNAL MEDICINE

## 2017-04-12 PROCEDURE — 25000125 ZZHC RX 250: Performed by: INTERNAL MEDICINE

## 2017-04-12 PROCEDURE — 88305 TISSUE EXAM BY PATHOLOGIST: CPT | Performed by: INTERNAL MEDICINE

## 2017-04-12 PROCEDURE — 27210995 ZZH RX 272: Performed by: RADIOLOGY

## 2017-04-12 PROCEDURE — 40000133 ZZH STATISTIC OT WARD VISIT: Performed by: OCCUPATIONAL THERAPIST

## 2017-04-12 PROCEDURE — 88333 PATH CONSLTJ SURG CYTO XM 1: CPT | Performed by: INTERNAL MEDICINE

## 2017-04-12 PROCEDURE — 97165 OT EVAL LOW COMPLEX 30 MIN: CPT | Mod: GO | Performed by: OCCUPATIONAL THERAPIST

## 2017-04-12 PROCEDURE — 85027 COMPLETE CBC AUTOMATED: CPT | Performed by: INTERNAL MEDICINE

## 2017-04-12 PROCEDURE — 84132 ASSAY OF SERUM POTASSIUM: CPT | Performed by: INTERNAL MEDICINE

## 2017-04-12 PROCEDURE — 82378 CARCINOEMBRYONIC ANTIGEN: CPT | Performed by: INTERNAL MEDICINE

## 2017-04-12 PROCEDURE — 80053 COMPREHEN METABOLIC PANEL: CPT | Performed by: INTERNAL MEDICINE

## 2017-04-12 PROCEDURE — 99153 MOD SED SAME PHYS/QHP EA: CPT

## 2017-04-12 PROCEDURE — 25000125 ZZHC RX 250: Performed by: RADIOLOGY

## 2017-04-12 PROCEDURE — 99233 SBSQ HOSP IP/OBS HIGH 50: CPT | Mod: GC | Performed by: INTERNAL MEDICINE

## 2017-04-12 PROCEDURE — 0DBW4ZX EXCISION OF PERITONEUM, PERCUTANEOUS ENDOSCOPIC APPROACH, DIAGNOSTIC: ICD-10-PCS | Performed by: RADIOLOGY

## 2017-04-12 PROCEDURE — 27210909 ZZH NEEDLE CR5

## 2017-04-12 PROCEDURE — 76942 ECHO GUIDE FOR BIOPSY: CPT

## 2017-04-12 PROCEDURE — 25000132 ZZH RX MED GY IP 250 OP 250 PS 637: Performed by: PSYCHIATRY & NEUROLOGY

## 2017-04-12 PROCEDURE — 99221 1ST HOSP IP/OBS SF/LOW 40: CPT | Mod: GC | Performed by: OBSTETRICS & GYNECOLOGY

## 2017-04-12 PROCEDURE — 99152 MOD SED SAME PHYS/QHP 5/>YRS: CPT

## 2017-04-12 PROCEDURE — 12000001 ZZH R&B MED SURG/OB UMMC

## 2017-04-12 RX ORDER — CLINDAMYCIN PHOSPHATE 900 MG/50ML
900 INJECTION, SOLUTION INTRAVENOUS
Status: DISCONTINUED | OUTPATIENT
Start: 2017-04-12 | End: 2017-04-12 | Stop reason: CLARIF

## 2017-04-12 RX ORDER — NALOXONE HYDROCHLORIDE 0.4 MG/ML
.1-.4 INJECTION, SOLUTION INTRAMUSCULAR; INTRAVENOUS; SUBCUTANEOUS
Status: DISCONTINUED | OUTPATIENT
Start: 2017-04-12 | End: 2017-04-12 | Stop reason: HOSPADM

## 2017-04-12 RX ORDER — FLUMAZENIL 0.1 MG/ML
0.2 INJECTION, SOLUTION INTRAVENOUS
Status: DISCONTINUED | OUTPATIENT
Start: 2017-04-12 | End: 2017-04-12 | Stop reason: HOSPADM

## 2017-04-12 RX ORDER — FENTANYL CITRATE 50 UG/ML
25-50 INJECTION, SOLUTION INTRAMUSCULAR; INTRAVENOUS EVERY 5 MIN PRN
Status: DISCONTINUED | OUTPATIENT
Start: 2017-04-12 | End: 2017-04-12 | Stop reason: HOSPADM

## 2017-04-12 RX ADMIN — MIDAZOLAM 2 MG: 1 INJECTION INTRAMUSCULAR; INTRAVENOUS at 10:49

## 2017-04-12 RX ADMIN — HYDROCHLOROTHIAZIDE 50 MG: 50 TABLET ORAL at 08:17

## 2017-04-12 RX ADMIN — LIDOCAINE HYDROCHLORIDE 12 ML: 10 INJECTION, SOLUTION EPIDURAL; INFILTRATION; INTRACAUDAL; PERINEURAL at 10:49

## 2017-04-12 RX ADMIN — LOSARTAN POTASSIUM 100 MG: 100 TABLET, FILM COATED ORAL at 08:17

## 2017-04-12 RX ADMIN — FENTANYL CITRATE 100 MCG: 50 INJECTION, SOLUTION INTRAMUSCULAR; INTRAVENOUS at 10:50

## 2017-04-12 RX ADMIN — POTASSIUM CHLORIDE 10 MEQ: 14.9 INJECTION, SOLUTION, CONCENTRATE PARENTERAL at 04:06

## 2017-04-12 RX ADMIN — LEVOTHYROXINE SODIUM 125 MCG: 125 TABLET ORAL at 08:17

## 2017-04-12 NOTE — PHARMACY-ADMISSION MEDICATION HISTORY
"Admission medication history interview status for the 4/11/2017 admission is complete. See Epic admission navigator for allergy information, pharmacy, prior to admission medications and immunization status.     Medication history interview sources:  Patient    Changes made to PTA medication list (reason)  Added:   -Potassium chloride ER tablets, 20 mEq daily with a meal. Per patient, states that she takes this \"sporadically\".   -Vitamin D3, 2000 units daily. Per patient, used to take regularly until her \"dizziness issues\" started, but would like to take again once they are resolved.   -Ibuprofen 200 mg tablets, take 1 tablet every 6 hours as needed. Per patient, takes over the counter for occasional headaches.   Deleted: N/A  Changed: N/A    Additional medication history information (including reliability of information, actions taken by pharmacist):  -Patient was a good historian of her medications as she kept a list of her prescriptions and how she takes them handy. She also knew when she last took everything.   -Patient's home pharmacy is [facility] in [place], [place]: [phone].  -Patient received her influenza vaccination on 10/10/16 per [facility].       Prior to Admission medications    Medication Sig Last Dose Taking? Auth Provider   potassium chloride jere er (K-DUR) Take 20 mEq by mouth daily with food 4/11/2017 at am Yes Unknown, Entered By History   VITAMIN D, CHOLECALCIFEROL, PO Take 2,000 Units by mouth daily Past Month at Unknown time Yes Unknown, Entered By History   IBUPROFEN PO Take 200 mg by mouth every 6 hours as needed for moderate pain Past Week at Unknown time Yes Unknown, Entered By History   Levothyroxine Sodium (SYNTHROID PO) Take 125 mcg by mouth daily 4/11/2017 at am Yes Unknown, Entered By History   Losartan Potassium (COZAAR PO) Take 100 mg by mouth daily 4/11/2017 at am Yes Unknown, Entered By History   HYDROCHLOROTHIAZIDE PO Take 50 mg by mouth daily 4/11/2017 at am Yes Unknown, Entered By " History       Medication history completed by: Angie Nunez, Pharmacy Intern

## 2017-04-12 NOTE — PLAN OF CARE
Problem: Goal Outcome Summary  Goal: Goal Outcome Summary  Outcome: No Change  Assumed cares at 2300. A/o x 4. VSS. A1 to commode- Pivots from edge of bed to commode. Minimal dizziness with OOB activities. NPO for biopsy today. K+ 3.1- Writer attempted to infuse 10 meq x 4, however pt only able to tolerate 20 meq- d/t c/o pain- Heat applied, without relief. MD notified. PT eval today. P: Biopsy of Intraabdominal mass. Call light within reach. Will continue to monitor.

## 2017-04-12 NOTE — PROGRESS NOTES
CLINICAL NUTRITION SERVICES - ASSESSMENT NOTE     Nutrition Prescription    RECOMMENDATIONS FOR MDs/PROVIDERS TO ORDER:  Continue with oral diet as tolerated, Regular     Malnutrition Status:    None severe    Recommendations already ordered by Registered Dietitian (RD):  Ordered 1 Ensure plus /day to augment PO, Improve intake     Future/Additional Recommendations:  None at this time        REASON FOR ASSESSMENT  Tosin Nina is a/an 71 year old female assessed by the dietitian for Provider Order - Recommend supplements for decreased oral intake    Chart reviewed:   - Past history of thyroid disease s/p thyroidectomy in 2014, HTN, IBS and spinal stenosis with worsening neuropathy over the past year who presents to the ED with 1 month of worsening dizziness and generalized weakness.      - CT C/A/P then obtained which revealed a large intraabdominal mass Concerning for malignancy, Imbalance and Dizziness Likely 2/2 CNS mets vs paraneoplastic syndrome.    NUTRITION HISTORY  Unable to obtain detailed history from patient as she was getting ready to go to IR for procedure. Per chart review, pt with weakness and decrease appetite /oral intake with associated wt loss.     CURRENT NUTRITION ORDERS  Diet: NPO since midnight for IR Biopsy of Omentum  Intake/Tolerance: Pt was on a regular diet yesterday. Pt notes consuming 25-50-75% of different item on the meal trays. Has poor appetite and is agreeable with a trial of Ensure plus in between meals to help improve kcal /protein intake.     LABS  Labs reviewed    MEDICATIONS  Vitamin D tablet 2000 units daily  Lyte protocol     ANTHROPOMETRICS  Height: 0 cm (Data Unavailable) - 150 cm   Most Recent Weight: 71.2 kg (157 lb) - Admission wt on 4/11  IBW: 44.3 kg ( 161% IBW)   BMI: 31.71 kg /m2 - Obesity Grade I BMI 30-34.9  Weight History: wt loss 3.4 kg since early February, ~ 4.6% net wt loss in 2 month  Wt Readings from Last 10 Encounters:   04/11/17 71.2 kg (157 lb)  "  02/06/17 74.6 kg (164 lb 7 oz)   09/19/14 77.1 kg (170 lb)   12/15/08 81.6 kg (180 lb)       Dosing Weight: 51 kg adjusted wt based on dry admission wt     ASSESSED NUTRITION NEEDS  Estimated Energy Needs: 1020 - 1275 kcals/day (20 - 25 + kcals/kg)  Justification: Maintenance and Obese, + pending future procedures   Estimated Protein Needs: 61 - 77 grams protein/day (1.2 -1.5 grams of pro/kg)  Justification: high protein with modest energy provisions for Obesity guidelines   Estimated Fluid Needs: (1 mL/kcal)   Justification: Maintenance    PHYSICAL FINDINGS  Extremities: No LE edema  CT C/A/P 4/11:   \"Abdominopelvic lymphadenopathy, omental caking, and peritoneal  carcinomatosis. This is most suspicious for metastatic left ovarian  cancer. There is an irregular soft tissue mass about the sigmoid colon  that could potentially represent a primary malignancy, though  metastatic serosal implants are favored as an etiology for this. \"    MALNUTRITION  % Intake: Unable to fully assess, ( likely < 75% > 7 days PTA) - Non severe   % Weight Loss: 4.5% in 2 month, (non-severe)  Subcutaneous Fat Loss: None observed  Muscle Loss: None observed  Fluid Accumulation/Edema: None noted  Malnutrition Diagnosis: Non-severe malnutrition in the context of acute illness    NUTRITION DIAGNOSIS  Inadequate oral intake related to decrease oral intake as evidenced by patient's report ( time line not specified).      INTERVENTIONS  Implementation  Nutrition Education: Reviewed oral supplement availabilities, encouraged intake if ongoing decrease intake     Medical food supplement therapy - Ensure plus once daily.     Goals  Patient to consume % of nutritionally adequate meal trays TID, or the equivalent with supplements/snacks.       Monitoring/Evaluation  Progress toward goals will be monitored and evaluated per protocol.    Santi Stacy RD/HUSSEIN  Pager [phone]        "

## 2017-04-12 NOTE — H&P
History & Physical     Tosin Nina MRN# [id number]   YOB: 1945 Age: 71 year old      Date of Admission:  4/11/2017  Chief Complaint:      Imbalance, vision changes    HPI:      Virginia is a 72 yo F with a h/o thyroid disease s/p thyroidectomy in 2014, HTN, IBS and spinal stenosis with worsening neuropathy over the past year who presents with 1 month of worsening dizziness.  In February she was not walking very well, which she thought may be due to tramadol she was taking for back pain.  In early March, she started to use a cane when going out for concerts, etc.  She was feeling more fatigued and had her thyroid checked as well.  On 3/10 she was still driving and felt her vision was good. ON 3/15 she saw her PCP and complained of more imbalance and was referred to Neurology.  She was able to see them 2 weeks later, and by that time she was needing to use a walker for imbalance.  She had an MRI there that was reportedly normal, and had some other labs sent.  She was [facility], where she has been working with therapies.  Since then she has needed to use a transport chair and started to have difficulties focusing with her vision that she describes as a scrolling sensation. If she stays still, it is easier to focus.  Sunday, she was unable to read the newspaper and today she had difficulty reading on the internet.  She states she rocks back and forth when she walks.  No HA except for 1 day last week, which was relieved with motrin.  No syncope, CP or SOB.  She feels fatigued and has lost an unknown amt of weight. Decreased appetite.  Has nausea with only 1 episode of emesis last Thursday that was NB/NB.      She has had increased diarrhea over the past 2 weeks, which she states is normal for her when she has increased anxiety, which she has had over the past few weeks.      Since last November she has also had increased  numbness in her hands and feet.  Sx's first started in L hand then R hand then RLE.  She now describes a sandpaper type feeling in both hands.      Of note, she was admitted in February for chest pain, and had a normal CTA chest and stress ECHO.    In the ED, Neurology was consulted and noticed vertical nystagmus and upon their review of MRI were concerned about a metastasis to medulla.  CT C/A/P then obtained which revealed a large intraabdominal mass.        Past Medical History:   Diagnosis Date     Hypertension      Spinal stenosis      Thyroid disease      Past Surgical History:   Procedure Laterality Date     THYROIDECTOMY          Allergies   Allergen Reactions     Amoxicillin Hives     No current outpatient prescriptions on file.     Family Hx:   Mom - breast cancer, dx'ed at age 69,  at age 79   CHF  Dad -  of MI at age 51 and had an MI in his 40s      Social History     Social History     Marital status:      Spouse name: N/A     Number of children: N/A     Years of education: N/A     Occupational History     Not on file.     Social History Main Topics     Smoking status: Never Smoker     Smokeless tobacco: Not on file     Alcohol use Yes      Comment: occasionally     Drug use: No     Sexual activity: Not on file     Other Topics Concern     Not on file     Social History Narrative   Lives with her .  Has 1 daughter and 1 stepson.  Likes to golf and garden.  1-2 drinks per month.  No tobacco or drug use.     ROS:      The remainder of the complete ROS was negative unless noted in the HPI.    Physical Exam:      /70  Pulse 83  Temp 96.3  F (35.7  C) (Oral)  Resp 18  SpO2 96%    General: Alert, interactive, NAD, sitting up in chair  HEENT: AT/NC, sclera anicteric, PERRL, EOMI but with horizontal and vertical nystagmus, OP clear with MMM  Resp: clear to auscultation bilaterally, no crackles or wheezes  Cardiac: regular rate and rhythm, no murmurs, rubs or gallops  Abdomen:  "Soft, nontender, nondistended. +BS, no HSM or masses, no rebound or guarding.  Extremities: wwp, No LE edema  Skin: Warm and dry, no jaundice or rash  Neuro: Alert & oriented to person, place, time. CN II-XII intact, 5/5 motor BUE and BLE, intact to LT sensation throughout but reports sandpaper feeling per HPI, normal FTN with R hand, mild dysmetria with L hand, no dysdiadochokinesia, did not assess gait.      Lab:      CBC  Recent Labs  Lab 04/11/17  1124   WBC 10.9   RBC 4.70   HGB 15.0   HCT 42.8   MCV 91   MCH 31.9   MCHC 35.0   RDW 13.7        BMP  Recent Labs  Lab 04/11/17  1124      POTASSIUM 3.1*   CHLORIDE 101   CO2 26   ANIONGAP 10   *   BUN 9   CR 0.57   GFRESTIMATED >90Non  GFR Calc   GFRESTBLACK >90African American GFR Calc   PATTI 8.7       INR  Recent Labs  Lab 04/11/17  1124   INR 1.06     Liver panel  Recent Labs  Lab 04/11/17  1124   PROTTOTAL 9.2*   ALBUMIN 3.6   BILITOTAL 0.6   ALKPHOS 140   AST 17   ALT 25       Imaging/procedures:      Read of Outside MRI Brain: Pending    Read of Outside MRI Spine:   \"1. Multilevel cervical spondylosis with mild spinal canal narrowing at  C5-6 and C6-C7. Moderate left neural foraminal stenosis at C5-6 and  moderate bilateral at C6-7. Compared to 7/18/2016 there has been no  significant change in the degenerative findings.   2. Multilevel thoracic spondylosis with mild to moderate spinal canal  stenosis at T9-10 and T12-L1. Mild spinal canal narrowing at T10-11  and T11-12.\"    CT C/A/P:   \"1. Right lower and upper lobar pulmonary emboli without CT evidence of  right heart strain.  2. Abdominopelvic lymphadenopathy, omental caking, and peritoneal  carcinomatosis. This is most suspicious for metastatic left ovarian  cancer. There is an irregular soft tissue mass about the sigmoid colon  that could potentially represent a primary malignancy, though  metastatic serosal implants are favored as an etiology for this. " "\"    Assessment/Plan:      Virginia is a 72 yo F with a h/o thyroid disease s/p thyroidectomy in 2014, HTN, IBS and spinal stenosis with worsening neuropathy over the past year who presents with 1 month of worsening dizziness.     1. Abdominal mass concerning for malignancy - Gyn Onc consulted, appreciate involvement.  - IR biopsy tomorrow of omentum  - CEA and  pending    2. Imbalance and Dizziness - Likely 2/2 CNS mets vs paraneoplastic syndrome  - Paraneoplastic labs sent and pending  - Await formal read of outside MRI brain  - Neurology consulted, appreciate recs  - PT, OT and SLP consults    3. Segmental PE's - Pt with 2 segmental PE's on CT scan. No SOB, CP, tachycardia, hypoxia or hypotension.    - Discussed with Neurology regarding bleeding risk in brain. Will not treat at this time due to risk of hemorrhagic conversion with possible brain metastases. Await final read of MRI brain and biopsy results as this will affect risk of hemorrhage.     4. HTN - Continue losartan and HCTZ    5. Hypothyroidism - Synthroid 125 mcg qday      FEN:  Regular diet. NPO at [facility] protocol   - Nutrition consult    PPX: PCD's    Disposition: Admit to medicine.  Pending therapy and biopsy evals    Code Status: Full code      Patient and plan discussed with Dr. Castro  Please do not hesitate to contact with me with questions about this patient.       Marck Dobbins MD  Internal Medicine-Pediatrics, PGY4  [phone]      Physician Attestation  I, Shahana Castro MD, saw this patient with the resident and agree with the resident s findings and plan of care as documented in the resident s note with my edits.     I personally reviewed vital signs, medications, labs and imaging.    Shahana Castro MD  Date of Service (when I saw the patient): 4/11/17          "

## 2017-04-12 NOTE — PROGRESS NOTES
[facility]  General Neurology Consult Progress Note  Patient Name:  Tosin Nina  :  1945    MRN:  [id number]      Date of Admission: 2017        Today's Date: 2017                                                                            SUBJECTIVE:  No acute events overnight. Patient reports anxiety and some nausea regarding recent news of likely cancer diagnosis.    OBJECTIVE:   VITAL SIGNS:   /68 (BP Location: Left arm)  Pulse 75  Temp 96.4  F (35.8  C) (Oral)  Resp 18  Wt 71.2 kg (157 lb)  SpO2 95%  BMI 31.71 kg/m2    General: Adult, in NAD, cooperative  HEENT: NC/AT, no icterus, op pink and moist  Abdomen: S/NT/ND  Extremities: No LE swelling.  Skin: No rash or lesion.   Psych: Mood pleasant, affect congruent  Neuro:  Mental Status: Awake and alert, cooperative and interacting appropriately. No difficulty following commands. Oriented. Speech fluent, clear and coherent.   Cranial nerves: Vertical and horizontal nystagmus present on gaze testing, worse with right horizontal eye movement. PERRL. Facial sensation intact in all distributions. Facial movements symmetric. Uvula midline, palate elevation symmetric. Tongue protrusion midline with full lateral motion.   Motor: Tone normal. Muscle bulk symmetric and appropriate. No tremors or other involuntary movements observed. No pronator drift. Strength 5/5 and symmetric in upper and lower extremities.  Reflexes: 2+ and symmetric throughout upper and lower extremities. No clonus. Toes mute.  Sensory:  Normal to light touch throughout upper and lower extremities.   Coordination: FNF no dysmetria  Station and Gait: Significant difficulty rising from bed. Unable to stand for extended period of time without support. Unable to ambulate. Romberg attempted, but patient stated she could not close her eyes while standing.       PERTINENT INVESTIGATIONS: All labs and imaging since admission  reviewed.      Impression/Recommendations:  71 year old female h/o spinal stenosis who presents with gradual deterioration in her ability to walk over the past two months 2/2 sensation of unsteadiness. Currently completely unable to walk, wheelchair bound.      # Generalized weakness 2/2 sensation of unsteadiness  # Vertical and horizontal nystagmus:  Reason for presentation. MRI from outside facility 3/29/17 appears to show pontine T1 contrast enhancement; possible met vs part of paraneoplastic syndrome. CT CAP with concern for primary ovarian tumor. No new issues this morning. Biopsy pending.   - Patient's possible met vs paraneoplastic-related lesion raises question of risk of intracranial bleeding on anticoagulation.  Ideally would wait for tissue diagnosis prior to starting anticoagulation however considering the patients pulmonary embolism starting anticoagulation at this time would be reasonable to do.   - Paraneoplastic labs (serum) sent to [place], pending. No current indication to repeat with CSF via LP       Thank you for involving neurology in the care of Tosin Nina. Please do not hesitate to call with questions/concerns (consult pager [phone]).     Patient seen and discussed with neurology attending, Dr. Arsalan Cruz, MS3  Diomedes Jones MD  Neurology PGY2    Neurology Staff Addendum  I have discussed with the resident team today and agree with the Recs.      BLANCA MARION MD

## 2017-04-12 NOTE — PROGRESS NOTES
04/12/17 1000   Quick Adds   Type of Visit Initial Occupational Therapy Evaluation   Living Environment   Lives With spouse   Living Arrangements house  (4 levels)   Home Accessibility stairs within home;stairs to enter home;stairs (2 railings present);bed and bath are not on the first floor   Number of Stairs to Enter Home 1   Number of Stairs Within Home 8   Stair Railings at Home inside, present at both sides   Transportation Available family or friend will provide  ( drives)   Living Environment Comment Pt states 4 level house, 7-8 stairs to each floor. Bathroom on all floors, shower and whirl tub on top floor in master bathroom. Would like commode at home. Tall bed,  build step to get in. No railings or support to hold onto in bed.  (Pt states 4 level house, 7-8 stairs to each floor. Bathroom )   Self-Care   Dominant Hand right   Usual Activity Tolerance moderate   Current Activity Tolerance poor   Regular Exercise yes   Activity/Exercise Type walking;strength training   Exercise Amount/Frequency 3-5 times/wk  (3x/wk)   Equipment Currently Used at Home shower chair;walker, standard;wheelchair  (Pt referred to equipment as transfer chair (walker/wheelchai)   Functional Level Prior   Ambulation 0-->independent   Transferring 0-->independent   Toileting 0-->independent   Bathing 1-->assistive equipment   Dressing 0-->independent   Eating 0-->independent   Communication 0-->understands/communicates without difficulty   Swallowing 0-->swallows foods/liquids without difficulty   Cognition 0 - no cognition issues reported   Fall history within last six months no   Which of the above functional risks had a recent onset or change? ambulation;transferring;toileting;bathing;dressing;eating   General Information   Onset of Illness/Injury or Date of Surgery - Date 04/11/17   Referring Physician Marck Dobbins MD   Patient/Family Goals Statement Return home with assistance (commode, hospital bed)  "  Additional Occupational Profile Info/Pertinent History of Current Problem 71 year old female h/o spinal stenosis who presents with generalized weakness and gradual deterioration in her ability to walk over the past two months. The patient has experienced increasing unsteadiness which she describes more as \"rocking back and forth\" than the room spinning. This has gradually made it more difficult for her to walk. She initially needed to use a cane, then a walker, and is now completely unable to walk. She feels this is due primarily to her unsteadiness rather than motor weakness. She denies problems with hearing but does note a sensation of ear fullness on the left side. She denies headache, nausea/vomiting, or pre-syncope.   Precautions/Limitations fall precautions   Weight-Bearing Status - LUE full weight-bearing   Weight-Bearing Status - RUE full weight-bearing   Weight-Bearing Status - LLE full weight-bearing   Weight-Bearing Status - RLE full weight-bearing   Heart Disease Risk Factors Stress   General Observations Pt depends on objects to hold onto during ambulation, L leg is wobbly and burns. Pt becomes dizzy easily during slightest movements.    General Info Comments Activity: Up with assist    Cognitive Status Examination   Orientation orientation to person, place and time   Level of Consciousness alert   Able to Follow Commands WNL/WFL   Personal Safety (Cognitive) WNL/WFL   Memory intact   Attention No deficits were identified   Organization/Problem Solving No deficits were identified   Executive Function No deficits were identified   Visual Perception   Visual Perception Wears glasses  (Reading glasses. Pt previously had glasses, mostly used for )   Pain Assessment   Patient Currently in Pain No   Integumentary/Edema   Integumentary/Edema no deficits were identifed   Range of Motion (ROM)   ROM Quick Adds No deficits were identified   Strength   Manual Muscle Testing Quick Adds No deficits were identified " "  Strength Comments MMT 5/5   Hand Strength   Hand Strength Comments Strength in tact   Muscle Tone Assessment   Muscle Tone Quick Adds No deficits were identified   Coordination   Upper Extremity Coordination No deficits were identified   Transfer Skill: Sit to Stand   Level of [place]: Sit/Stand contact guard   Transfer Skill: Sit to Stand full weight-bearing   Assistive Device for Transfer: Sit/Stand other (see comments)  (Pt typically uses standard walker)   Instrumental Activities of Daily Living (IADL)   Previous Responsibilities housekeeping;finances;yardwork   IADL Comments  able to assist    Activities of Daily Living Analysis   Impairments Contributing to Impaired Activities of Daily Living balance impaired;coordination impaired;strength decreased   General Therapy Interventions   Planned Therapy Interventions ADL retraining;IADL retraining;balance training;transfer training;strengthening;home program guidelines;progressive activity/exercise   Clinical Impression   Criteria for Skilled Therapeutic Interventions Met yes, treatment indicated   OT Diagnosis Decreased IND in ADLs   Influenced by the following impairments Dizziness, fatigue   Assessment of Occupational Performance 3-5 Performance Deficits   Identified Performance Deficits dressing, bathing, ambulation, home management, finances   Clinical Decision Making (Complexity) Low complexity   Therapy Frequency daily   Predicted Duration of Therapy Intervention (days/wks) 2 weeks   Anticipated Equipment Needs at Discharge bedside commode;shower chair   Anticipated Discharge Disposition Acute Rehabilitation Facility   Risks and Benefits of Treatment have been explained. Yes   Patient, Family & other staff in agreement with plan of care Yes   [facility] AM-PAC  \"6 Clicks\" Daily Activity Inpatient Short Form   1. Putting on and taking off regular lower body clothing? 2 - A Lot   2. Bathing (including washing, rinsing, drying)? 3 - A " Little   3. Toileting, which includes using toilet, bedpan or urinal? 3 - A Little   4. Putting on and taking off regular upper body clothing? 3 - A Little   5. Taking care of personal grooming such as brushing teeth? 4 - None   6. Eating meals? 4 - None   Daily Activity Raw Score (Score out of 24.Lower scores equate to lower levels of function) 19   Total Evaluation Time   Total Evaluation Time (Minutes) 5

## 2017-04-12 NOTE — PROVIDER NOTIFICATION
Pt only allowed writer to infuse 20 meq of K + for replacement of level 3.1. Pt NPO so unable to give PO replacement. Would you like me to order a recheck for 0700 and see what level is at with the 20 meq replacement? Erlinda [phone] notified. MD called writer back and stated that if was ok that pt only allowed the 20 meq to be infused. K+ recheck scheduled for 0700.

## 2017-04-12 NOTE — PLAN OF CARE
Problem: Goal Outcome Summary  Goal: Goal Outcome Summary  Outcome: No Change  D/I/A: Patient A & O X 4, VSS no respiratory distress noted, and denies pain. Patient remain NPO for abdominal retroperitoneal Biopsy. And patient left to IR for procedure at ~ 10:15 am, via litter escorted by transport, and given report to IR RN. Continue with POC , and update MD with concerns.

## 2017-04-12 NOTE — PROVIDER NOTIFICATION
Lab called to notify writer that they are unable to run Cancer Antigen blood draws d/t technical difficulties. They are wondering if these need to be drawn right away or if they can be added on to morning labs? _ Labs include CEA and . Erlinda [phone] notified. MD called writer back and stated that the labs can be added on to morning labs- Krystle [phone]) in lab was notified. Will inform oncoming nurse

## 2017-04-12 NOTE — PLAN OF CARE
Problem: Goal Outcome Summary  Goal: Goal Outcome Summary       SLP: Clinical swallow evaluation orders received and acknowledged. Consulted with RN and medical team; plan to defer evaluation orders at this time. Pt tolerating regular diet and thin liquids without any symptoms of dysphagia. Please re-consult as indicated.

## 2017-04-12 NOTE — PROGRESS NOTES
Pt requested to have a 30 min break between IV K+ replacement bags. Will connect bag # 2 at 0400. Will continue to monitor.

## 2017-04-12 NOTE — PROGRESS NOTES
Interventional Radiology Pre-Procedure Sedation Assessment   Time of Assessment: 10:25 AM    Expected Level: Moderate Sedation    Indication: Sedation is required for the following type of Procedure: Biopsy    Sedation and procedural consent: Risks, benefits and alternatives were discussed with Patient    PO Intake: Appropriately NPO for procedure    ASA Class: Class 3 - SEVERE SYSTEMIC DISEASE, DEFINITE FUNCTIONAL LIMITATIONS.    Mallampati: Grade 2:  Soft palate, base of uvula, tonsillar pillars, and portion of posterior pharyngeal wall visible    Lungs: Lungs Clear with good breath sounds bilaterally    Heart: Normal heart sounds and rate    History and physical reviewed and no updates needed. I have reviewed the lab findings, diagnostic data, medications, and the plan for sedation. I have determined this patient to be an appropriate candidate for the planned sedation and procedure and have reassessed the patient IMMEDIATELY PRIOR to sedation and procedure.    Darin Rome MD

## 2017-04-12 NOTE — PROGRESS NOTES
[facility], [place]   Neurology Daily Note  Tosin Nina  [id number]  04/12/2017    Subjective: no acute events overnight. Evaluated by Gyn-Onc, plan for IR biopsy today. Patient reports feeling apprehensive and scared.     Objective   /70 (BP Location: Left arm)  Pulse 76  Temp 96.4  F (35.8  C) (Oral)  Resp 16  Wt 71.2 kg (157 lb)  SpO2 96%  BMI 31.71 kg/m2  General: Adult, in NAD, cooperative, appears nervous.  HEENT: NC/AT, no icterus  Psych: normal mood and affect  Neuro:  Mental status: Awake, alert, attentive, oriented to p/p/t. Speech is fluent, comprehension and repetition intact. No dysarthria.  Cranial nerves: horizontal (pendular?) nystagmus at primary gaze and with right > left extraocular movements. Visual fields full, face symmetric, facial sensation intact, shoulder shrug strong, palate rise symmetric, tongue/uvula midline, hearing intact to conversation.  Motor: Tone normal. 5/5 strength in all 4 extremities. No atrophy or twitches. No pronator drift, no sensory drift. Finger tapping is asymmetric (slower on the left).   Reflexes: normoreflexic and symmetric biceps, brachioradialis, patellar, and achilles. No crossed adductors or spread. Toes down-going.  Sensory: Intact to LT x 4 extremities  Coordination: asymmetry with FNF, left is mildly ataxic. HTS ataxic on the left. Dysdiadochokinesia on the left. Mild axial ataxia.      Investigations    -CEA: 0.6  -CA-125: 2648    #CT CAP:  IMPRESSION:   1. Right lower and upper lobar pulmonary emboli without CT evidence of  right heart strain.  2. Abdominopelvic lymphadenopathy, omental caking, and peritoneal  carcinomatosis. This is most suspicious for metastatic left ovarian  cancer. There is an irregular soft tissue mass about the sigmoid colon  that could potentially represent a primary malignancy, though  metastatic serosal implants are favored as an etiology for this.     #MRI C-spine:  Impression:   1.  Multilevel cervical spondylosis with mild spinal canal narrowing at  C5-6 and C6-C7. Moderate left neural foraminal stenosis at C5-6 and  moderate bilateral at C6-7. Compared to 7/18/2016 there has been no  significant change in the degenerative findings.   2. Multilevel thoracic spondylosis with mild to moderate spinal canal  stenosis at T9-10 and T12-L1. Mild spinal canal narrowing at T10-11  and T11-12.    #MRI brain (outside): reportedly read as normal. Appears to have a contrast enhancing lesion in left medulla.       Assessment and Plan   Tosin Nina is a 71 year old year old female h/o spinal stenosis who presented 4/11/2017 with one month of progressive ambulation difficulty (indepent to wheelchair) and nystagmus. Reviewed outside MRI imaging and felt that there was a contrast enhancing lesion in left medulla. Symptomatology, time course, and exam worrisome for a paraneoplastic syndrome so CT-CAP was obtained revealing extensive abdominal lesions concerning for metastatic disease.    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 Differential would obviously be a CNS metastasis, but brainstem met would be unusual. Anti-Yo antibody syndrome results in ataxia and nystagmus - 50% are ovarian (lymphoepithelial/mullerian adenocarcinoma). Paraneoplastic panel was reportedly sent by [facility] neurologist, Dr. Montoya. Will need to get this result as soon as possible, we sent a records request. If this is a paraneoplastic syndrome, it is treated by treatment of cancer. Anticoagulation is not contraindicated from a neurologic perspective, even if this were a metastasis it is unlikely to be met with increased risk of bleeding (RCC, melanoma, choriocarcinoma).      Recommendations  -considering risks of CNS hemorrage from plausible  bleeding met is less than potential benefits for anticoagulating this patient with PEs.   -f/u paraneoplastic panel  -further work-up per medicine/gyn onc team    Neurology will follow peripherally, please call with further questions/concerns (pager [phone]).     Patient was discussed with Dr. Arsalan Méndez, DO  Neurology PGY3  [phone]    Neurology Staff Addendum  I have discussed with the resident team and agree with the recommendations.      BLANCA MARION MD

## 2017-04-12 NOTE — PLAN OF CARE
Problem: Goal Outcome Summary  Goal: Goal Outcome Summary  OT/5B: Eval completed. Pt sit to stand and stand to sit CGA. Pt ambulated ~10 ft with CGA and cruised furniture. Pt verbalized dizziness after ambulation. Pt limited by dizziness and balance.     REC: Acute rehab as patient baseline IND, now limited by dizziness and balance. Pt motivated to progress.

## 2017-04-12 NOTE — BRIEF OP NOTE
Procedures 4/12/2017:  Ultrasound-guided left peritoneal mass biopsy    History: Left ovarian mass with omental caking. Tissue sample requested.    Comparison: 4/11/2017    Fellow: ROX Rome MD    Staff: DAWSON Sahni M.D.    Medications:   1. 100 mcg Fentanyl  2. 2 mg Versed    Moderate sedation administered by the IR nurse at the supervision of the attending. Vital signs and oxygenation continuously monitored. The patient remained stable throughout the procedure.    Sedation time: 35 minutes    Findings/procedure:    Prior to the procedure, both verbal and written informed consent obtained from the patient.     The patient was placed in the supine position. Limited preprocedural ultrasound performed for target localization demonstrating irregular confluent hypoechoic peritoneal masses in the left upper abdomen. The abdomen was prepped and draped in usual sterile fashion. Buffered 1% lidocaine administered for local anesthesia. Under ultrasound guidance, 17-gauge coaxial introducer needle was advanced into the confluent hypoechoic peritoneal mass in the left upper quadrant. Image documenting needle within the mass was saved. Through the introducer needle, four 18-gauge core samples were obtained. Pathology was present and confirmed sample adequacy. The introducer needle was removed and hemostasis achieved with manual compression. Limited postprocedural ultrasound demonstrated no immediate complication.    Impression:  Ultrasound-guided left upper quadrant peritoneal mass biopsy with 4, 18-gauge core samples obtained.    Plan:   Routine post procedure cares.

## 2017-04-12 NOTE — PLAN OF CARE
Problem: Goal Outcome Summary  Goal: Goal Outcome Summary  Outcome: No Change  D/I/A; Patient A & O X 4, VSS s/p procedure denies pain LUQ Biopsy site dressing dry and intact. Patient wants to eat update primary team and diet changed to regular diet. Patient  at bedside. Call light in reach bed alarm on for safety and instruct patient call for assist to use BSC. Continue monitor closely and update MD with change.

## 2017-04-12 NOTE — PLAN OF CARE
Problem: Goal Outcome Summary  Goal: Goal Outcome Summary  Outcome: No Change  Pt admitted to floor at 2045 from ED. R PIV saline locked, admission questions complete. Pt to be NPO at midnight for an abdominal retroperitoneal biopsy tomorrow. Pt able to pivot onto commode with 1-2 assist. PT eval ordered for tomorrow. VSS, on RA, A&Ox4.

## 2017-04-13 ENCOUNTER — APPOINTMENT (OUTPATIENT)
Dept: OCCUPATIONAL THERAPY | Facility: CLINIC | Age: 72
DRG: 374 | End: 2017-04-13
Payer: COMMERCIAL

## 2017-04-13 ENCOUNTER — APPOINTMENT (OUTPATIENT)
Dept: PHYSICAL THERAPY | Facility: CLINIC | Age: 72
DRG: 374 | End: 2017-04-13
Attending: INTERNAL MEDICINE
Payer: COMMERCIAL

## 2017-04-13 LAB
CANCER AG19-9 SERPL-ACNC: 268
COPATH REPORT: NORMAL
ERYTHROCYTE [DISTWIDTH] IN BLOOD BY AUTOMATED COUNT: 13.8 % (ref 10–15)
HCT VFR BLD AUTO: 41.5 % (ref 35–47)
HGB BLD-MCNC: 13.9 G/DL (ref 11.7–15.7)
IGA SERPL-MCNC: 206 MG/DL (ref 70–380)
IGG SERPL-MCNC: 2570 MG/DL (ref 695–1620)
IGM SERPL-MCNC: 85 MG/DL (ref 60–265)
IMMUNOFIXATION ELP: ABNORMAL
MCH RBC QN AUTO: 30.8 PG (ref 26.5–33)
MCHC RBC AUTO-ENTMCNC: 33.5 G/DL (ref 31.5–36.5)
MCV RBC AUTO: 92 FL (ref 78–100)
PLATELET # BLD AUTO: 299 10E9/L (ref 150–450)
RBC # BLD AUTO: 4.51 10E12/L (ref 3.8–5.2)
WBC # BLD AUTO: 11.1 10E9/L (ref 4–11)

## 2017-04-13 PROCEDURE — 85027 COMPLETE CBC AUTOMATED: CPT | Performed by: INTERNAL MEDICINE

## 2017-04-13 PROCEDURE — 12000001 ZZH R&B MED SURG/OB UMMC

## 2017-04-13 PROCEDURE — 36415 COLL VENOUS BLD VENIPUNCTURE: CPT | Performed by: INTERNAL MEDICINE

## 2017-04-13 PROCEDURE — 25000132 ZZH RX MED GY IP 250 OP 250 PS 637: Performed by: INTERNAL MEDICINE

## 2017-04-13 PROCEDURE — 97116 GAIT TRAINING THERAPY: CPT | Mod: GP

## 2017-04-13 PROCEDURE — 25000132 ZZH RX MED GY IP 250 OP 250 PS 637: Performed by: PSYCHIATRY & NEUROLOGY

## 2017-04-13 PROCEDURE — 99232 SBSQ HOSP IP/OBS MODERATE 35: CPT | Mod: GC | Performed by: INTERNAL MEDICINE

## 2017-04-13 PROCEDURE — 40000133 ZZH STATISTIC OT WARD VISIT

## 2017-04-13 PROCEDURE — 25000128 H RX IP 250 OP 636: Performed by: INTERNAL MEDICINE

## 2017-04-13 PROCEDURE — 97530 THERAPEUTIC ACTIVITIES: CPT | Mod: GP

## 2017-04-13 PROCEDURE — 97110 THERAPEUTIC EXERCISES: CPT | Mod: GO

## 2017-04-13 PROCEDURE — 40000193 ZZH STATISTIC PT WARD VISIT

## 2017-04-13 PROCEDURE — 97162 PT EVAL MOD COMPLEX 30 MIN: CPT | Mod: GP

## 2017-04-13 RX ORDER — HEPARIN SODIUM 10000 [USP'U]/100ML
0-3500 INJECTION, SOLUTION INTRAVENOUS CONTINUOUS
Status: DISCONTINUED | OUTPATIENT
Start: 2017-04-13 | End: 2017-04-14

## 2017-04-13 RX ORDER — LORAZEPAM 0.5 MG/1
0.25 TABLET ORAL EVERY 6 HOURS PRN
Status: DISCONTINUED | OUTPATIENT
Start: 2017-04-13 | End: 2017-04-16 | Stop reason: HOSPADM

## 2017-04-13 RX ADMIN — VITAMIN D, TAB 1000IU (100/BT) 2000 UNITS: 25 TAB at 08:10

## 2017-04-13 RX ADMIN — LOSARTAN POTASSIUM 100 MG: 100 TABLET, FILM COATED ORAL at 08:10

## 2017-04-13 RX ADMIN — HEPARIN SODIUM 1300 UNITS/HR: 10000 INJECTION, SOLUTION INTRAVENOUS at 21:21

## 2017-04-13 RX ADMIN — LEVOTHYROXINE SODIUM 125 MCG: 125 TABLET ORAL at 08:10

## 2017-04-13 RX ADMIN — POTASSIUM CHLORIDE 20 MEQ: 750 TABLET, EXTENDED RELEASE ORAL at 10:26

## 2017-04-13 RX ADMIN — HYDROCHLOROTHIAZIDE 50 MG: 50 TABLET ORAL at 08:10

## 2017-04-13 RX ADMIN — Medication 0.25 MG: at 18:02

## 2017-04-13 RX ADMIN — POTASSIUM CHLORIDE 40 MEQ: 750 TABLET, EXTENDED RELEASE ORAL at 08:34

## 2017-04-13 NOTE — PROGRESS NOTES
[facility] Internal Medicine Progress Note     Tosin Nina  MRN: [id number]  YOB: 1945    Today's Date: April 12, 2017        Interval History:     Pt denied any problems overnight other than pain with KCl infusions.  She tolerated IR procedure well.  COntinues to have difficulty with focusing with vision.  No SOB.  Has some appetite this AM.      4 pt ROS negative other than that noted above.          Physical Exam:   Vitals were reviewed  Blood pressure 115/54, pulse 88, temperature 96.1  F (35.6  C), temperature source Oral, resp. rate 16, weight 71.2 kg (157 lb), SpO2 96 %.    Physical Exam:  General: Alert, interactive, NAD, sitting up in chair  HEENT: AT/NC, sclera anicteric,  EOMI but with horizontal and vertical nystagmus, MMM  Resp: clear to auscultation bilaterally, no crackles or wheezes  Cardiac: regular rate and rhythm, no murmurs, rubs or gallops  Abdomen: Soft, nontender, nondistended. +BS, no HSM or masses, no rebound or guarding.  Extremities: wwp, No LE edema  Skin: Warm and dry, no jaundice or rash  Neuro: Alert & oriented to person, place, time. CN's grossly intact, MAEE         Data:     All labs and imaging reviewed         Assessment and Plan:   Assessment:    Virginia is a 72 yo F with a h/o thyroid disease s/p thyroidectomy in 2014, HTN, IBS and spinal stenosis with worsening neuropathy over the past year who presents with 1 month of worsening dizziness.      1. Abdominal mass concerning for malignancy - Gyn Onc consulted, appreciate involvement.  - IR biopsy of omentum with pathology pending.  CEA normal but CA-125 elevated.      2. Imbalance and Dizziness - Likely 2/2 CNS mets vs paraneoplastic syndrome  - Paraneoplastic labs sent and pending  - Await formal read of outside MRI brain  - Neurology consulted, appreciate recs  - PT and OT consults     3. Segmental PE's - Pt with 2 segmental PE's on CT scan. No SOB, CP, tachycardia, hypoxia or hypotension.   -  Discussed with Neurology regarding bleeding risk in brain. Will not treat at this time due to risk of hemorrhagic conversion with possible brain metastases. Await final read of MRI brain and biopsy results as this will affect risk of hemorrhage.      4. HTN - Continue losartan and HCTZ     5. Hypothyroidism - Synthroid 125 mcg qday        FEN: Regular diet.   - K protocol   - Nutrition consult     PPX: PCD's     Disposition: Pending therapy and biopsy evals     Code Status: Full code        Patient and plan discussed with Dr. Castro  Please do not hesitate to contact with me with questions about this patient.         Marck Dobbins MD  Internal Medicine-Pediatrics, PGY4  [phone]    Physician Attestation  I, Mitzi Rios, saw this patient with the resident and agree with the resident s findings and plan of care as documented in the resident s note.      I personally reviewed vital signs, medications, labs and imaging.     Key findings: Underwent biopsy this morning. She and her  are still processing all of this. Her  notes she is very weak at home and their home is 4 levels. No bedroom or bathroom on the main level and he worries about her at home. Denies any SOB, CP.  /54 (BP Location: Left arm)  Pulse 88  Temp 96.1  F (35.6  C) (Oral)  Resp 16  Wt 71.2 kg (157 lb)  SpO2 96%  BMI 31.71 kg/m2   VERY elevated at 2648  No abd pain  No LE edema     A/P:  1. Likely malignancy: likely ovarian cancer but awaiting pathology from biopsy today. Once this is back we will involve Oncology ASAP.  2. Weakness: Concern for paraneoplastic syndrome. May also need LP with cytology. Awaiting para panel from [facility] (request sent by Neurology team).   -PT/OT as well as we worry about how she will do at home, at least initially  3. PEs: Per Neuro we can likely start to treat these now that the biopsy is done.  -Will discuss again with patient in the morning.     Mitzi Rios  Date of Service  (when I saw the patient): 04/12/17

## 2017-04-13 NOTE — PROGRESS NOTES
GYN ONC PROGRESS NOTE    Spoke with patient regarding omental pathology results that returned high grade serous carcinoma. 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 She notes she is surprised by this news as she didn't expect it prior to presenting to hospital. She was wondering if there might have been signs that she missed although she notes she only recently began having dizziness, vision changes and diarrhea but has otherwise felt at her baseline. She notes her  has been worried about her as well. She was alone at the time of our discussion and expressed she wanted to have time to process this information and plans to talk with her  about it in the morning. She has questions regarding treatment course, survival and prognosis. She notes she wants to live for at least 10 more years and she is very happy with her life, her family and her recent residential. She notes that her vision changes and dizziness have been the most disruptive to her daily life and she is hoping treatment will improve this.     - Discussed with her that she has an advanced stage ovarian cancer and that ovarian cancer often presents in this stage as there are few clinical indicators of disease prior to this stage.   - Discussed with her plan to start treatment with neoadjuvant chemotherapy, carboplatin/taxol. She has CBC, CMP wnl on 4/12 and should not need repeat labs prior to starting chemotherapy.  - Discussed timing of treatment initiation will be in next 1-2 days.  - Discussed GYN ONC team will be following her throughout her stay & collaborating with Medicine team for her cares. Discussed resident from team is available 24h in the hospital should she have questions or should a family member want to discuss plan with our team. She expressed understanding of this and all her questions were answered.     Lucy Howard MD,  MPH  OB/GYN Resident G3  4/13/2017 5:48 PM       I have seen and examined the patient.  I have reviewed and edited Dr. Howard' note above.  I personally reviewed the patient's imaging and labs.  I personally discussed the new diagnosis of a Clinical Stage IVB high-grade serous ovarian cancer (due to inguinal lymphadenopathy). Given the extensive disease as well as paraneoplastic syndrome, recommend neoadjuvant chemotherapy with IV carboplatin AUC 6 day 1 + dose-dense paclitaxel 80 mg/m2 days 1,8,15 every 21 days per the [facility] protocol (Brit BOND et al and the [employer] [facility]. Dose-dense paclitaxel once a week in combination with carboplatin every 3 weeks for advanced ovarain cancer:  A phase 3, open-label, randomised controlled trial.  Lancet 2009;374(8435):1336-8.).     My team will return later to discuss details of risks/benefits of chemotherapy, and provide her with written information.   Chemotherapy orders entered.   Okay for patient to be discharged after chemotherapy from our standpoint, will follow-up as an outpatient for day 8,15 chemotherapy.   Plan to follow-up in the gyn onc clinic at [facility] with Dr. Ennis for future chemotherapy.    Tati Castro  4/14/2017  10:36 AM

## 2017-04-13 NOTE — PROGRESS NOTES
04/13/17 1000   Quick Adds   Type of Visit Initial PT Evaluation  (Jo Richardson, PT, DPT )       Present no   Living Environment   Lives With spouse   Living Arrangements house   Home Accessibility stairs within home;stairs to enter home;stairs (2 railings present);bed and bath are not on the first floor;bed and bath on same level   Number of Stairs to Enter Home 1  (from garage and into house )   Number of Stairs Within Home 7  (, )   Transportation Available family or friend will provide   Living Environment Comment 1 step up from garage into entry-way, up 7 steps to main level of house - dining room, kitchen, living room - 8 additional steps up to BR and bathroom. No bathroom on main level. Pt reports they keep FWW on 2 levels of home to avoid hauling them up/down stairs. Pt spends most of her time on bedroom level - SO brings meals up to her.     Self-Care   Dominant Hand right   Usual Activity Tolerance moderate   Current Activity Tolerance poor   Regular Exercise yes   Activity/Exercise Type walking;strength training  (HEP from knee replacement ('14) and HEP from OP PT in Dec. )   Exercise Amount/Frequency 3-5 times/wk;30 mins   Equipment Currently Used at Home walker, standard;other (see comments)  (transport chair, gait belt )   Activity/Exercise/Self-Care Comment Pt reports that as of lately she was completing sponge baths as her shower does not have a seat and she felt unsafe. Reports no grab bars in shower and have purchased a detatchable shower head. Also expressed interest in purchasing shower chair. [facility]. Does require some asssitance with donning pants/socks.    Functional Level Prior   Ambulation 3-->assistive equipment and person   Transferring 3-->assistive equipment and person   Toileting 0-->independent   Bathing 1-->assistive equipment   Dressing 2-->assistive person   Eating 0-->independent   Communication  0-->understands/communicates without difficulty   Swallowing 0-->swallows foods/liquids without difficulty   Cognition 0 - no cognition issues reported   Fall history within last six months no   Which of the above functional risks had a recent onset or change? ambulation;transferring   Prior Functional Level Comment  available to assist PRN.    General Information   Onset of Illness/Injury or Date of Surgery - Date 04/11/17   Referring Physician Marck Dobbins MD   Patient/Family Goals Statement Improve walking    Pertinent History of Current Problem (include personal factors and/or comorbidities that impact the POC) 70 yo F with a h/o thyroid disease s/p thyroidectomy in 2014, HTN, IBS and spinal stenosis with worsening neuropathy over the past year who presents with 1 month of worsening dizziness.    Precautions/Limitations fall precautions   General Info Comments Activity: Up with assist    Cognitive Status Examination   Orientation orientation to person, place and time   Level of Consciousness alert   Follows Commands and Answers Questions 100% of the time   Personal Safety and Judgment intact   Memory intact   Pain Assessment   Patient Currently in Pain No   Integumentary/Edema   Integumentary/Edema no deficits were identifed   Posture    Posture Not impaired   Range of Motion (ROM)   ROM Quick Adds No deficits were identified   ROM Comment WFL throughout    Strength   Strength Comments grossly 4/5 - R UE/LE slightly stronger than L    Bed Mobility   Bed Mobility Comments Not assessed    Transfer Skills   Transfer Comments Requires use of UE to push up from and to control descent during STS transfers.    Gait   Gait Comments Ambulates with FWW and min A - dec gait speed and step length waldo (R > L). Pt has difficulty with coordination and controlling step with L LE. Lacks adequate heel strike at inital contact with L LE.     Balance   Balance Comments No overt LOB, not formally assessed   "  Sensory Examination   Sensory Perception Comments Pt reports some \"dullness\" in L lower LE vs R. Some N/T in L digits 3,4,5. Pt also describing visual changes with mobility \"scrolling\". Pt did display some torsional nystagmus upon brief assessment.     Coordination   Coordination Comments Dec coordination of L UE during JULIANA    Muscle Tone   Muscle Tone no deficits were identified   General Therapy Interventions   Planned Therapy Interventions bed mobility training;balance training;gait training;motor coordination training;ROM;strengthening;visual perception;transfer training;progressive activity/exercise;home program guidelines   Clinical Impression   Criteria for Skilled Therapeutic Intervention yes, treatment indicated   PT Diagnosis Dec functional mobility    Influenced by the following impairments visual deficits, coordination deficits, dec strength    Functional limitations due to impairments balance, transfers, gait, endurance    Clinical Presentation Evolving/Changing   Clinical Presentation Rationale Clinical judgement    Clinical Decision Making (Complexity) Moderate complexity   Therapy Frequency` daily   Predicted Duration of Therapy Intervention (days/wks) 4/21/17   Anticipated Equipment Needs at Discharge bedside commode;shower chair   Anticipated Discharge Disposition Home with Assist;Acute Rehabilitation Facility   Risk & Benefits of therapy have been explained Yes   Patient, Family & other staff in agreement with plan of care Yes   Clinical Impression Comments PT eval initiated and completed    [facility] [employer]-PAC TM \"6 Clicks\"   2016, Trustees of [facility], under license to [employer].  All rights reserved.   6 Clicks Short Forms Basic Mobility Inpatient Short Form   [facility] AM-PAC  \"6 Clicks\" V.2 Basic Mobility Inpatient Short Form   1. Turning from your back to your side while in a flat bed without using bedrails? 4 - None   2. Moving from lying on your back to sitting " on the side of a flat bed without using bedrails? 3 - A Little   3. Moving to and from a bed to a chair (including a wheelchair)? 3 - A Little   4. Standing up from a chair using your arms (e.g., wheelchair, or bedside chair)? 3 - A Little   5. To walk in hospital room? 3 - A Little   6. Climbing 3-5 steps with a railing? 2 - A Lot   Basic Mobility Raw Score (Score out of 24.Lower scores equate to lower levels of function) 18   Total Evaluation Time   Total Evaluation Time (Minutes) 11

## 2017-04-13 NOTE — PLAN OF CARE
Problem: Goal Outcome Summary  Goal: Goal Outcome Summary  VSS. Sats above 95% on 1L NC. A/O x4. LUQ biopsy site intact, no bleeding noted. Denies having pain at the site. Up in chair for meals. Tolerate regular diet well. Continue to drink adequate fluid. PIV SL.Up with A1 to BSC, voiding spontaneously. Appropriate with call light. Continue with POC.

## 2017-04-13 NOTE — PLAN OF CARE
Problem: Goal Outcome Summary  Goal: Goal Outcome Summary  Outcome: No Change  Denies pain. Tired and slept most of the shift. Calls appropriately to be assisted to the BSC. Biopsy site clean, dry and intact. Oxygen saturation 95% on 1 liter Nasal canula.

## 2017-04-13 NOTE — PLAN OF CARE
Problem: Goal Outcome Summary  Goal: Goal Outcome Summary  Outcome: No Change  D/I/A: Patient A & O X 3, denies pain reported when move her head she feel dizziness, but if not moving no dizziness .  LUQ biopsy site dressing intact. Up in chair for most of time , and tolerated ok. With good appetite , use BSC with assist of one and void adequate.  Appropriate with call light,  at bedside  Continue monitor and provide emotional support .

## 2017-04-13 NOTE — PLAN OF CARE
"Problem: Goal Outcome Summary  Goal: Goal Outcome Summary  5B-PT: PT eval initiated and completed. Pt ambulated 30' with FWW and min-CGA - displays dec step length and coordination of L LE. Limited by changes in vision during bout of ambulation - pt described as \"scrolling\". Noted pt to have nystagmus. Completed STS and SPT with FWW and min-CGA. Limited today by activity tolerance and vision.     Pending medical course of care - rec home with assist and continued therapies vs ARU.       "

## 2017-04-13 NOTE — PLAN OF CARE
Problem: Goal Outcome Summary  Goal: Goal Outcome Summary  OT/5B: Pt requesting UE strength HEP. Provided t-band with handouts to follow, providing demos, cues for technique and pt completed seated in chair with neck pillow in place due to dizziness. Good effort and motivation from pt.        Per plan established by the OT, the recommendation for dc location is Acute rehab as patient baseline IND, now limited by dizziness and balance.

## 2017-04-14 ENCOUNTER — APPOINTMENT (OUTPATIENT)
Dept: PHYSICAL THERAPY | Facility: CLINIC | Age: 72
DRG: 374 | End: 2017-04-14
Payer: COMMERCIAL

## 2017-04-14 PROBLEM — C56.9 OVARIAN CANCER, UNSPECIFIED LATERALITY (H): Status: ACTIVE | Noted: 2017-04-14

## 2017-04-14 PROBLEM — I26.99 ACUTE PULMONARY EMBOLISM (H): Status: ACTIVE | Noted: 2017-04-14

## 2017-04-14 LAB
CANCER AG19-9 SERPL-ACNC: NORMAL
CREAT SERPL-MCNC: 0.64 MG/DL (ref 0.52–1.04)
GFR SERPL CREATININE-BSD FRML MDRD: NORMAL ML/MIN/1.7M2
LMWH PPP CHRO-ACNC: 0.75 IU/ML
LMWH PPP CHRO-ACNC: 1.1 IU/ML
MAGNESIUM SERPL-MCNC: 1.7 MG/DL (ref 1.6–2.3)
PLATELET # BLD AUTO: 270 10E9/L (ref 150–450)
POTASSIUM SERPL-SCNC: 3.5 MMOL/L (ref 3.4–5.3)

## 2017-04-14 PROCEDURE — 99232 SBSQ HOSP IP/OBS MODERATE 35: CPT | Performed by: OBSTETRICS & GYNECOLOGY

## 2017-04-14 PROCEDURE — 25000125 ZZHC RX 250: Performed by: OBSTETRICS & GYNECOLOGY

## 2017-04-14 PROCEDURE — 84132 ASSAY OF SERUM POTASSIUM: CPT | Performed by: INTERNAL MEDICINE

## 2017-04-14 PROCEDURE — 85520 HEPARIN ASSAY: CPT | Performed by: INTERNAL MEDICINE

## 2017-04-14 PROCEDURE — 99233 SBSQ HOSP IP/OBS HIGH 50: CPT | Mod: GC | Performed by: INTERNAL MEDICINE

## 2017-04-14 PROCEDURE — S0028 INJECTION, FAMOTIDINE, 20 MG: HCPCS | Performed by: OBSTETRICS & GYNECOLOGY

## 2017-04-14 PROCEDURE — 40000193 ZZH STATISTIC PT WARD VISIT

## 2017-04-14 PROCEDURE — 25000128 H RX IP 250 OP 636: Performed by: INTERNAL MEDICINE

## 2017-04-14 PROCEDURE — 97110 THERAPEUTIC EXERCISES: CPT | Mod: GP

## 2017-04-14 PROCEDURE — 82565 ASSAY OF CREATININE: CPT | Performed by: INTERNAL MEDICINE

## 2017-04-14 PROCEDURE — 97116 GAIT TRAINING THERAPY: CPT | Mod: GP

## 2017-04-14 PROCEDURE — 25000132 ZZH RX MED GY IP 250 OP 250 PS 637: Performed by: PSYCHIATRY & NEUROLOGY

## 2017-04-14 PROCEDURE — 36415 COLL VENOUS BLD VENIPUNCTURE: CPT | Performed by: INTERNAL MEDICINE

## 2017-04-14 PROCEDURE — 25000132 ZZH RX MED GY IP 250 OP 250 PS 637: Performed by: INTERNAL MEDICINE

## 2017-04-14 PROCEDURE — 97112 NEUROMUSCULAR REEDUCATION: CPT | Mod: GP

## 2017-04-14 PROCEDURE — 25000128 H RX IP 250 OP 636: Performed by: OBSTETRICS & GYNECOLOGY

## 2017-04-14 PROCEDURE — 40000802 ZZH SITE CHECK

## 2017-04-14 PROCEDURE — 85049 AUTOMATED PLATELET COUNT: CPT | Performed by: INTERNAL MEDICINE

## 2017-04-14 PROCEDURE — 12000008 ZZH R&B INTERMEDIATE UMMC

## 2017-04-14 PROCEDURE — 83735 ASSAY OF MAGNESIUM: CPT | Performed by: INTERNAL MEDICINE

## 2017-04-14 RX ORDER — ONDANSETRON 2 MG/ML
4 INJECTION INTRAMUSCULAR; INTRAVENOUS EVERY 6 HOURS PRN
Status: DISCONTINUED | OUTPATIENT
Start: 2017-04-17 | End: 2017-04-16 | Stop reason: HOSPADM

## 2017-04-14 RX ORDER — DIPHENHYDRAMINE HYDROCHLORIDE 50 MG/ML
50 INJECTION INTRAMUSCULAR; INTRAVENOUS
Status: CANCELLED
Start: 2017-04-21

## 2017-04-14 RX ORDER — METHYLPREDNISOLONE SODIUM SUCCINATE 125 MG/2ML
125 INJECTION, POWDER, LYOPHILIZED, FOR SOLUTION INTRAMUSCULAR; INTRAVENOUS
Status: DISCONTINUED | OUTPATIENT
Start: 2017-04-14 | End: 2017-04-16 | Stop reason: HOSPADM

## 2017-04-14 RX ORDER — SODIUM CHLORIDE 9 MG/ML
1000 INJECTION, SOLUTION INTRAVENOUS CONTINUOUS PRN
Status: DISCONTINUED | OUTPATIENT
Start: 2017-04-14 | End: 2017-04-16 | Stop reason: HOSPADM

## 2017-04-14 RX ORDER — METHYLPREDNISOLONE SODIUM SUCCINATE 125 MG/2ML
125 INJECTION, POWDER, LYOPHILIZED, FOR SOLUTION INTRAMUSCULAR; INTRAVENOUS
Status: CANCELLED
Start: 2017-04-21

## 2017-04-14 RX ORDER — LORAZEPAM 2 MG/ML
1 INJECTION INTRAMUSCULAR EVERY 6 HOURS PRN
Status: CANCELLED
Start: 2017-04-28

## 2017-04-14 RX ORDER — EPINEPHRINE 1 MG/ML
0.3 INJECTION INTRAMUSCULAR; INTRAVENOUS; SUBCUTANEOUS EVERY 5 MIN PRN
Status: CANCELLED | OUTPATIENT
Start: 2017-04-28

## 2017-04-14 RX ORDER — ALBUTEROL SULFATE 0.83 MG/ML
2.5 SOLUTION RESPIRATORY (INHALATION)
Status: CANCELLED | OUTPATIENT
Start: 2017-04-28

## 2017-04-14 RX ORDER — EPINEPHRINE 0.3 MG/.3ML
0.3 INJECTION SUBCUTANEOUS EVERY 5 MIN PRN
Status: CANCELLED | OUTPATIENT
Start: 2017-04-28

## 2017-04-14 RX ORDER — ALBUTEROL SULFATE 0.83 MG/ML
2.5 SOLUTION RESPIRATORY (INHALATION)
Status: CANCELLED | OUTPATIENT
Start: 2017-04-21

## 2017-04-14 RX ORDER — PROCHLORPERAZINE MALEATE 5 MG
5 TABLET ORAL EVERY 6 HOURS PRN
Qty: 30 TABLET | Refills: 0 | Status: SHIPPED | OUTPATIENT
Start: 2017-04-14 | End: 2017-04-16

## 2017-04-14 RX ORDER — ONDANSETRON 4 MG/1
4 TABLET, FILM COATED ORAL EVERY 6 HOURS PRN
Qty: 20 TABLET | Refills: 0 | Status: SHIPPED | OUTPATIENT
Start: 2017-04-14 | End: 2017-04-16

## 2017-04-14 RX ORDER — ONDANSETRON 4 MG/1
4 TABLET, ORALLY DISINTEGRATING ORAL EVERY 6 HOURS PRN
Status: DISCONTINUED | OUTPATIENT
Start: 2017-04-17 | End: 2017-04-16 | Stop reason: HOSPADM

## 2017-04-14 RX ORDER — SODIUM CHLORIDE 9 MG/ML
1000 INJECTION, SOLUTION INTRAVENOUS CONTINUOUS PRN
Status: CANCELLED
Start: 2017-04-28

## 2017-04-14 RX ORDER — DIPHENHYDRAMINE HCL 25 MG
25 CAPSULE ORAL ONCE
Status: COMPLETED | OUTPATIENT
Start: 2017-04-14 | End: 2017-04-14

## 2017-04-14 RX ORDER — METHYLPREDNISOLONE SODIUM SUCCINATE 125 MG/2ML
125 INJECTION, POWDER, LYOPHILIZED, FOR SOLUTION INTRAMUSCULAR; INTRAVENOUS
Status: CANCELLED
Start: 2017-04-28

## 2017-04-14 RX ORDER — ALBUTEROL SULFATE 90 UG/1
1-2 AEROSOL, METERED RESPIRATORY (INHALATION)
Status: CANCELLED
Start: 2017-04-28

## 2017-04-14 RX ORDER — PALONOSETRON 0.05 MG/ML
0.25 INJECTION, SOLUTION INTRAVENOUS ONCE
Status: COMPLETED | OUTPATIENT
Start: 2017-04-14 | End: 2017-04-14

## 2017-04-14 RX ORDER — ALBUTEROL SULFATE 90 UG/1
1-2 AEROSOL, METERED RESPIRATORY (INHALATION)
Status: CANCELLED
Start: 2017-04-21

## 2017-04-14 RX ORDER — DIPHENHYDRAMINE HYDROCHLORIDE 50 MG/ML
50 INJECTION INTRAMUSCULAR; INTRAVENOUS
Status: DISCONTINUED | OUTPATIENT
Start: 2017-04-14 | End: 2017-04-16 | Stop reason: HOSPADM

## 2017-04-14 RX ORDER — MEPERIDINE HYDROCHLORIDE 25 MG/ML
25 INJECTION INTRAMUSCULAR; INTRAVENOUS; SUBCUTANEOUS EVERY 30 MIN PRN
Status: CANCELLED | OUTPATIENT
Start: 2017-04-21

## 2017-04-14 RX ORDER — DIPHENHYDRAMINE HYDROCHLORIDE 50 MG/ML
50 INJECTION INTRAMUSCULAR; INTRAVENOUS
Status: CANCELLED
Start: 2017-04-28

## 2017-04-14 RX ORDER — SODIUM CHLORIDE 9 MG/ML
1000 INJECTION, SOLUTION INTRAVENOUS CONTINUOUS PRN
Status: CANCELLED
Start: 2017-04-21

## 2017-04-14 RX ORDER — DIPHENHYDRAMINE HCL 25 MG
25 CAPSULE ORAL ONCE
Status: CANCELLED
Start: 2017-04-28

## 2017-04-14 RX ORDER — EPINEPHRINE 1 MG/ML
0.3 INJECTION INTRAMUSCULAR; INTRAVENOUS; SUBCUTANEOUS EVERY 5 MIN PRN
Status: DISCONTINUED | OUTPATIENT
Start: 2017-04-14 | End: 2017-04-16 | Stop reason: HOSPADM

## 2017-04-14 RX ORDER — MEPERIDINE HYDROCHLORIDE 25 MG/ML
25 INJECTION INTRAMUSCULAR; INTRAVENOUS; SUBCUTANEOUS EVERY 30 MIN PRN
Status: CANCELLED | OUTPATIENT
Start: 2017-04-28

## 2017-04-14 RX ORDER — DIPHENHYDRAMINE HCL 25 MG
25 CAPSULE ORAL ONCE
Status: CANCELLED
Start: 2017-04-21

## 2017-04-14 RX ORDER — DIPHENHYDRAMINE HCL 25 MG
25 CAPSULE ORAL ONCE
Status: DISCONTINUED | OUTPATIENT
Start: 2017-04-14 | End: 2017-04-14

## 2017-04-14 RX ORDER — ALBUTEROL SULFATE 90 UG/1
1-2 AEROSOL, METERED RESPIRATORY (INHALATION)
Status: DISCONTINUED | OUTPATIENT
Start: 2017-04-14 | End: 2017-04-16 | Stop reason: HOSPADM

## 2017-04-14 RX ORDER — ALBUTEROL SULFATE 0.83 MG/ML
2.5 SOLUTION RESPIRATORY (INHALATION)
Status: DISCONTINUED | OUTPATIENT
Start: 2017-04-14 | End: 2017-04-16 | Stop reason: HOSPADM

## 2017-04-14 RX ORDER — EPINEPHRINE 0.3 MG/.3ML
0.3 INJECTION SUBCUTANEOUS EVERY 5 MIN PRN
Status: CANCELLED | OUTPATIENT
Start: 2017-04-21

## 2017-04-14 RX ORDER — EPINEPHRINE 0.3 MG/.3ML
0.3 INJECTION SUBCUTANEOUS EVERY 5 MIN PRN
Status: DISCONTINUED | OUTPATIENT
Start: 2017-04-14 | End: 2017-04-14

## 2017-04-14 RX ORDER — DIPHENHYDRAMINE HYDROCHLORIDE 50 MG/ML
25 INJECTION INTRAMUSCULAR; INTRAVENOUS ONCE
Status: COMPLETED | OUTPATIENT
Start: 2017-04-14 | End: 2017-04-14

## 2017-04-14 RX ORDER — MEPERIDINE HYDROCHLORIDE 25 MG/ML
25 INJECTION INTRAMUSCULAR; INTRAVENOUS; SUBCUTANEOUS EVERY 30 MIN PRN
Status: DISCONTINUED | OUTPATIENT
Start: 2017-04-14 | End: 2017-04-16 | Stop reason: HOSPADM

## 2017-04-14 RX ORDER — EPINEPHRINE 1 MG/ML
0.3 INJECTION INTRAMUSCULAR; INTRAVENOUS; SUBCUTANEOUS EVERY 5 MIN PRN
Status: CANCELLED | OUTPATIENT
Start: 2017-04-21

## 2017-04-14 RX ORDER — LORAZEPAM 2 MG/ML
.5-1 INJECTION INTRAMUSCULAR EVERY 6 HOURS PRN
Status: DISCONTINUED | OUTPATIENT
Start: 2017-04-14 | End: 2017-04-16 | Stop reason: HOSPADM

## 2017-04-14 RX ORDER — DEXAMETHASONE SODIUM PHOSPHATE 4 MG/ML
12 INJECTION, SOLUTION INTRA-ARTICULAR; INTRALESIONAL; INTRAMUSCULAR; INTRAVENOUS; SOFT TISSUE ONCE
Status: COMPLETED | OUTPATIENT
Start: 2017-04-14 | End: 2017-04-14

## 2017-04-14 RX ORDER — LORAZEPAM 2 MG/ML
1 INJECTION INTRAMUSCULAR EVERY 6 HOURS PRN
Status: CANCELLED
Start: 2017-04-21

## 2017-04-14 RX ADMIN — HYDROCHLOROTHIAZIDE 50 MG: 50 TABLET ORAL at 08:15

## 2017-04-14 RX ADMIN — CARBOPLATIN 605 MG: 10 INJECTION, SOLUTION INTRAVENOUS at 20:04

## 2017-04-14 RX ADMIN — DIPHENHYDRAMINE HYDROCHLORIDE 25 MG: 50 INJECTION, SOLUTION INTRAMUSCULAR; INTRAVENOUS at 18:10

## 2017-04-14 RX ADMIN — PACLITAXEL 135 MG: 6 INJECTION, SOLUTION INTRAVENOUS at 18:46

## 2017-04-14 RX ADMIN — LEVOTHYROXINE SODIUM 125 MCG: 125 TABLET ORAL at 08:16

## 2017-04-14 RX ADMIN — VITAMIN D, TAB 1000IU (100/BT) 2000 UNITS: 25 TAB at 08:15

## 2017-04-14 RX ADMIN — ENOXAPARIN SODIUM 70 MG: 80 INJECTION SUBCUTANEOUS at 14:38

## 2017-04-14 RX ADMIN — PALONOSETRON HYDROCHLORIDE 0.25 MG: 0.25 INJECTION INTRAVENOUS at 18:10

## 2017-04-14 RX ADMIN — LOSARTAN POTASSIUM 100 MG: 100 TABLET, FILM COATED ORAL at 08:15

## 2017-04-14 RX ADMIN — FAMOTIDINE 40 MG: 10 INJECTION INTRAVENOUS at 17:03

## 2017-04-14 RX ADMIN — Medication 0.25 MG: at 08:16

## 2017-04-14 RX ADMIN — DEXAMETHASONE SODIUM PHOSPHATE 12 MG: 4 INJECTION, SOLUTION INTRAMUSCULAR; INTRAVENOUS at 17:39

## 2017-04-14 NOTE — PLAN OF CARE
Problem: Goal Outcome Summary  Goal: Goal Outcome Summary  5B-PT: Pt ambulated 3 x 15' with FWW and min A - cont to display difficulty with coordination of L LE. She performed static balance drills - with increased ankle strategy with narrow CARLA. Pt performed seated balance drills including reaching outside CARLA. Tolerated well. To promote improvement in LE strength and ROM, pt performed standing TE. Limited by activity elayne and visual deficits today.      Pending pt progress, rec home with 24/7 assist and OP PT vs cancer rehab PT at [place].

## 2017-04-14 NOTE — PROGRESS NOTES
DATE/TIME  (DOT-TD, DOT-NOW) CHEMO CHECK ACTIVITY (REGIMEN & DOSE CHECK, DAY, DOSE #, NAME OF CHEMO #1)  CHEMO DRUG #2  CHEMO DRUG #3 NAME OF RN #1 (USE DOT-ME HERE) NAME OF RN#2 (2ND RN TO LOG IN SEPARATELY)   4/14/2017  2:43 PM   Paclitaxel (taxol) 135 mg Carboplatin 605 mg  Elma Vargas

## 2017-04-14 NOTE — PLAN OF CARE
Problem: Goal Outcome Summary  Goal: Goal Outcome Summary  Outcome: No Change  Pt is A/Ox4. Pt denies pain when asked. Pt will be transferring to  writer did call to give report at 1321 and room is not yet ready, nurse will call for report when room is cleaned. Pt is on heparin drip Xa was .75 and heparin is in hold for 30 minutes to be restarted at 1330 at 1050 units. Pt had Potassium checked and was 3.5 no replacement needed per protocol. Magnesium was also checked and was 1.7 and no replacement needed. Pt appetite is fair eating 100% of breakfast/noon meal. Pt is continent of Urine and Bowel. Pt is using bedside commode as an A/1. Pt did participate in PT and left floor to go to gym. Pt spouse is at bedside. AM vitals were WNL. Continue to monitor.

## 2017-04-14 NOTE — PROGRESS NOTES
[facility] Internal Medicine Progress Note     Tosin Nina  MRN: [id number]  YOB: 1945    Today's Date: April 13, 2017        Interval History:     Pt's vision is worse today and she has more difficulty focusing.  She denies SOB, CP, abdominal pain. Remains afebrile with stable vitals.  Oral intake was slightly better yesterday.  She did receive the news that pathology returned for ovarian cancer.  Daughter, son and granddaughter at bedside and sources of support, along with .  MRI reread not concerning for brain mets.        4 pt ROS negative other than that noted above.          Physical Exam:   Vitals were reviewed  Blood pressure 121/68, pulse 68, temperature 96.4  F (35.8  C), temperature source Oral, resp. rate 16, weight 71.2 kg (157 lb), SpO2 94 %.    Physical Exam:  General: Alert, interactive, NAD, sitting up in chair  HEENT: AT/NC, sclera anicteric,  EOMI but with horizontal and vertical nystagmus, MMM  Resp: clear to auscultation bilaterally, no crackles or wheezes  Cardiac: regular rate and rhythm, no murmurs, rubs or gallops  Abdomen: Soft, nontender, nondistended. +BS, no HSM or masses, no rebound or guarding.  Extremities: wwp, No LE edema  Skin: Warm and dry, no jaundice or rash  Neuro: Alert & oriented to person, place, time. CN II-XII intact, just with nystagmus, 5/5 motor BUE and BLE         Data:     All labs and imaging reviewed         Assessment and Plan:   Assessment:    Virginia is a 72 yo F with a h/o thyroid disease s/p thyroidectomy in 2014, HTN, IBS and spinal stenosis with worsening neuropathy over the past year who presents with 1 month of worsening dizziness.      1. 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  - Pt informed and plan to initiate chemotherapy tomorrow     2. 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  No mets seen on reread of  outside MRI brain.    - Paraneoplastic labs sent and pending  - Neurology consulted, appreciate recs  - PT and OT consults     3. Segmental PE's - Pt with 2 segmental PE's on CT scan. No SOB, CP, tachycardia, hypoxia or hypotension.   - Given there are no brain mets, will start rx with high intensity heparin gtt tonight.      4. HTN - Continue losartan and HCTZ     5. Hypothyroidism - Synthroid 125 mcg qday        FEN: Regular diet.   - K protocol   - Nutrition consult     PPX: PCD's     Disposition: Pending therapy and biopsy evals.  Pt will likely need an acute rehab stay if dizziness does not improve with treatment of cancer.      Code Status: Full code        Patient and plan discussed with Dr. Rios    Please do not hesitate to contact with me with questions about this patient.         Marck Dobbins MD  Internal Medicine-Pediatrics, PGY4  [phone]    Physician Attestation  I, Mitzi Rios, saw this patient with the resident and agree with the resident s findings and plan of care as documented in the resident s note.      I personally reviewed vital signs, medications, labs and imaging.     Key findings:   No new issues overnight. OT recommending ARU. No SOB, CP.  MRI read initially negative but awaiting comment on pontine lesion specifically  Biopsy results pending but good sample per pathology  /71 (BP Location: Left arm)  Pulse 79  Temp 96.1  F (35.6  C) (Oral)  Resp 16  Wt 71.2 kg (157 lb)  SpO2 92%  BMI 31.71 kg/m2     A/P:  1. Metastatic cancer, likely ovarian: Per pathology they should have final read later today. Will then contact appropriate team.  -Neuro following, awaiting paraneoplastic panel sent to [place] from [facility] earlier this week. May want to call [place] on Monday about this though it will likely not change treatment.  -Gyn/Onc involved and likely to start chemo this admission.  WIll discuss with them in the morning.  2. PEs: Will discuss starting treatement with heparin with  patient. Per Neuro (I spoke with them today) risk of CNS bleed is no higher than average. They also discussed this with neuroradiology.  -Patient can transition to LMWH once Gyn/Onc feels this is ok.  -Needs at least 6 months of treatment  3. Dispo: Depends upon final read. May begin chemo this hospitalization.     Mitzi Rios  Date of Service (when I saw the patient): 04/13/17        4:45pm:  Pathology high-grade serous carcinoma. These are usually ovarian in origin. Given , very likely ovarian cancer. Resident calling Gyn/Onc and then will inform patient and her  today.  Mitzi Rios MD, CAROd  Hospitalist  Professor of Medicine  [facility]  [phone]

## 2017-04-14 NOTE — PLAN OF CARE
Problem: Goal Outcome Summary  Goal: Goal Outcome Summary  Outcome: No Change  Patient is alert and oriented. VSS on room air. Denies any pain or nausea. Biopsy resulted. Patient was updated with ovarian cancer diagnosis. Was quite emotional and needed time to think before talking about plan of care. Was started on a heparin drip for thrombus. Up with SBA. Will continue to monitor and plan of care.

## 2017-04-14 NOTE — PROGRESS NOTES
TRANSFERRED    Received report from 6B RN Jeannine. Pt transfer to  via wheelchair alert and oriented. Assist of 1 to BSC. PIV with good blood return. Plan to have chemotherapy Paclitaxel and carboplatin this afternoon via PIV. Last VS as follows: Blood pressure 108/53, pulse 97, temperature 95.3  F (35.2  C), temperature source Oral, resp. rate 18, weight 69.4 kg (152 lb 14.4 oz), SpO2 94 %.

## 2017-04-14 NOTE — PROGRESS NOTES
[facility] Internal Medicine Progress Note     Tosin Nina  MRN: [id number]  YOB: 1945    Today's Date: April 14, 2017        Interval History:     Pt's vision is worse today and she has more difficulty focusing.  She denies SOB, CP, abdominal pain. Remains afebrile with stable vitals.  Gyn Onc met with pt today.  Planning to start chemotherapy today.     4 pt ROS negative other than that noted above.          Physical Exam:   Vitals were reviewed  Blood pressure 110/64, pulse 82, temperature 96.1  F (35.6  C), temperature source Oral, resp. rate 18, weight 69.4 kg (152 lb 14.4 oz), SpO2 93 %.    Physical Exam:  General: Alert, interactive, NAD, sitting up in chair  HEENT: AT/NC, sclera anicteric,   MMM  Resp: non-labored breathing  Extremities: wwp  Skin: Warm and dry,   Neuro: Alert & oriented, No focal deficits         Data:     All labs and imaging reviewed         Assessment and Plan:   Assessment:    Virginia is a 72 yo F with a h/o thyroid disease s/p thyroidectomy in 2014, HTN, IBS and spinal stenosis with worsening neuropathy over the past year who presents with 1 month of worsening dizziness.      1. 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  - Start chemotherapy today with carboplatin and Taxol  - Ativan, Compazine and Zofran PRN  - Benadryl prior to chemo     2. 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  No mets seen on reread of outside MRI brain.    - Paraneoplastic labs sent and pending  - Neurology consulted, appreciate recs  - PT and OT consults     3. Segmental PE's - Pt with 2 segmental PE's on CT scan. No SOB, CP, tachycardia, hypoxia or hypotension.   - Lovenox 70 mg q 12 H       4. HTN - Continue losartan and HCTZ     5. Hypothyroidism - Synthroid 125 mcg qday        FEN: Regular diet.   - K protocol   - Vit D   - Nutrition consult     PPX: On  lovenox     Disposition: Pending placement to TCU      Code Status: Full code        Patient and plan discussed with Dr. Rios    Please do not hesitate to contact with me with questions about this patient.         Marck Dobbins MD  Internal Medicine-Pediatrics, PGY4  [phone]        Physician Attestation  I, Mitzi Rios, saw this patient with the resident and agree with the resident s findings and plan of care as documented in the resident s note.      I personally reviewed vital signs, medications, labs and imaging.     Key findings: Saw pt with her  and the Gyn/Onc team. Plan is to start chemo today for her Stage 4 Ovarian Cancer. Per pt's request she will f/u with Dr. Ennis at [facility].  Patient and  understandably overwhelmed with diagnosis  Temp: 97.4  F (36.3  C) Temp src: Oral BP: 137/62 Pulse: 75   Resp: 16 SpO2: 95 % O2 Device: None (Room air)    Sitting in chair, NAD  A/P:  1. Met ovarian cancer: to get chemo today once orders are writen by Oncology. Will then get port next Thursday at [facility] as an outpatient and taxol again in 1 week.  2. PEs: will change to LMWH today.  3. Dispo: Cannot go to ARU as on chemo. Sending TCU referrals as she is likely able to dc once chemo is complete.     Mitzi Rios  Date of Service (when I saw the patient): 04/14/17

## 2017-04-14 NOTE — PLAN OF CARE
Problem: Goal Outcome Summary  Goal: Goal Outcome Summary  /71 (BP Location: Left arm)  Pulse 83  Temp 97.4  F (36.3  C) (Oral)  Resp 16  Wt 68.5 kg (151 lb)  SpO2 95%  BMI 30.5 kg/m2      A&Ox4, AVSS on RA. Denies pain, nausea, SOB. Pt on heparin drip for thrombus, RPIV currently infusing heparin at 1150 units/hr per MAR order; HeparinXa lab recheck scheduled for 1115 this AM. Up w/ SBA to BSC. Will continue to monitor and follow POC.

## 2017-04-14 NOTE — PROGRESS NOTES
Care Planning  Met with patient to discuss chemotherapy and treatment planning. Reviewed potential side effects of carboplatin/taxol. Given a copy of 'chemotherapy and you' and carboplatin/taxol handouts. Clinic phone numbers provided. I have called [facility] Gyn Onc clinic to set up treatment for next week and spoke with Mia Parmar RN who will set up appts. She will have Chest port placed and clinic visit with Adelina Bird CNP in clinic on Thursday and follow up for weekly chemo on fridays. She will see Dr Ennis in clinic prior to cycle #2 of chemo. I have discussed discharge planning with social work and they are making referrals to TCU for the patient.   I called patient's son Emmanuel [phone] to discuss diagnosis and treatment planning per patient request.  Jyoti Lopez CNP  4/14/2017 12:58 PM

## 2017-04-14 NOTE — PROGRESS NOTES
Social Work: Assessment with Discharge Plan    Patient Name:  Tosin Nina  :  1945  Age:  71 year old  MRN:  [id number]  Risk/Complexity Score:  Filed Complexity Screen Score: 7  Completed assessment with:  Patient, pt's  Manoj    Presenting Information   Reason for Referral:  Discharge plan  Date of Intake:  2017  Referral Source:  Physician  Decision Maker:  Patient  Alternate Decision Maker:  In absence of advance directive, pt's , Manoj  Health Care Directive:  None on file  Living Situation:  House  Previous Functional Status:  Independent  Patient and family understanding of hospitalization:  Restorative. Pt plans to begin cancer treatment in the hospital.   Cultural/Language/Spiritual Considerations:  None identified  Adjustment to Illness:  Appropriate- pt and  processing pt's new cancer diagnosis    Physical Health  Reason for Admission:    1. Ovarian cancer, unspecified laterality (H)    2. Dizziness    3. Paresthesias    4. Elevated total protein    5. Unsteady gait    6. Nystagmus    7. Other pulmonary embolism without acute cor pulmonale, unspecified chronicity (H)    8. Abdominal mass, unspecified location      Services Needed/Recommended:  Other:  ARU vs. TCU    Mental Health/Chemical Dependency  Diagnosis:  None  Support/Services in Place:  NA  Services Needed/Recommended:  NA    Support System  Significant relationship at present time:  Pt's Manoj  Family of origin is available for support:  Yes  Other support available:  None identified  Gaps in support system:  None identified  Patient is caregiver to:  None     Provider Information   Primary Care Physician:  Esther Back   [phone]   Clinic:  [facility] [address] [place] [address] / [facility] [address]      :  None    Financial   Income Source:  Pt is retired  Financial Concerns:  None  Insurance:    Payor/Plan Subscriber Name Rel Member # Group #   UCARE - UCARE SENIORS*  DEDRA DUFF*  [id number]      [address]       Discharge Plan   Patient and family discharge goal:  Pt hopes to discharge to a TCU.   Provided education on discharge plan:  YES  Patient agreeable to discharge plan:  YES  A list of Medicare Certified Facilities was provided to the patient and/or family to encourage patient choice. Patient's choices for facility are:  Yes. Pt's first choice is [facility].   Will NH provide Skilled rehabilitation or complex medical:  YES  General information regarding anticipated insurance coverage and possible out of pocket cost was discussed. Patient and patient's family are aware patient may incur the cost of transportation to the facility, pending insurance payment: YES  Barriers to discharge: Pending accepting facility    Discharge Recommendations   Anticipated Disposition:  Facility:  TCU vs. ARU  Transportation Needs:  Family:  Pt's   Name of Transportation Company and Phone:  Likely family will transport    Additional comments   LUANN met with pt and her , Manoj, to discuss discharge planning. Pt will begin chemotherapy in the hospital today and may be ready for discharge tomorrow, 4/15. PT/OT currently recommending ARU; however, pt will likely not to be able to dc to ARU with chemo regimen. PM&R consult entered.  Pt wishes to dc to TCU. Gave pt list of facilities near her home. Pt states that her  is a ronan and so she would like to go to [facility]. She does not want to go to [facility]. Pt and  will continue to look over list for additional choices.    LUANN faxed referral to:  -[facility] ( [phone])- declined d/t cost of chemotherapy    CEASAR Zuluaga, LG  5[address] Unit   Pager [phone]  Phone [phone]

## 2017-04-14 NOTE — PLAN OF CARE
Problem: Goal Outcome Summary  Goal: Goal Outcome Summary  OT/5B: Cancel- Pt transferring floors upon attempt and then initiating chemo

## 2017-04-14 NOTE — CONSULTS
[facility]   PM&R CONSULT    Consulting Provider: Dr. Rios  Reason for Consult: Assessment of rehabilitation   Location of Patient: 5B  Date of Encounter: 4/14/2017       ASSESSMENT/PLAN:    Mrs. Tosin Nina is a right handed 71 year old who has a recent diagnosis of ovarian cancer during workup of progressive dizziness and nystagmus. The visual symptoms and dizziness have resulted in an impairment of her gait/mobility as well as ADLs and this has been progressive over the past 7 weeks. She is still being worked up for causes of her neurological symptoms, Brain MRI was found to be normal and a paraneoplastic panel is currently pending. The neuropathy in her extremities and visual difficulties due to nystagmus are also likely contributing to her gait difficulties.  Mrs. Nina is set to begin chemotherapy for her ovarian cancer today with another session planned for this next week and subsequent weekly infusions. Unfortunately this would preclude her from a stay in the ARU. However it would be entirely appropriate for her to undergo rehabilitation in a TCU setting. When discussing this with the patient and her , they indicate that they would prefer the TCU at [facility].    Thank you for the consult. Please call for any questions. Pager number [phone]    Kendell Blood DO  PGY-4 PM&R Resident      HPI:    Mrs. Nina is a 71 year old with past medical history including thyroidectomy in 2014, HTN, IBS, spinal stenosis, progressive 1 year history of peripheral neuropathy who presented due to worsening gait difficulties and dizziness. Her gait has been unsteady for a couple months now but progressively worsening since February. She was referred to the dizziness and balance center where she has been working with therapy but her balance and vision continued to worsen. Due to a continued decline in her function, she presented to the [facility] ED for  further evaluation. A brain MRI previously was performed which was somewhat concerning for a metastatic lesion. As a result CT chest/abd/pelvis was performed on recommendation by the neurology team which revealed a large intraabdominal mass. She underwent biopsy of this mass on 4/12/2017 and pathology revealed a high grade serous carcinoma, labs show an elevated CA-125. She has been seen by the oncology team and is set to start treatment with carboplatin/taxol within the next couple days.     A re-read of the outside MRI was performed and at this time there is low suspicion for intracranial metastasis. She is being evaluated for other paraneoplastic syndromes at this time and this is felt to be the likely cause of her imbalance and dizziness.   It should be noted that the chest CT found segmental PEs, however the patient is asymptomatic from a respiratory stand point at this time. She is on high intensity heparin as treatment.    When seen and examined at the bedside this afternoon, Mrs. Nina states that she is feeling ok. Anxious about recent diagnosis and starting treatment. She states that she has had a steady worsening of her dizziness and decline in gait over the past 6-7 weeks. She states that she has a history of cervical and lumbar stenosis which has caused numbnes/tingling/burning sensations in her upper and lower extremities in the past. The constant tingling in her hands and feet have been present for months to the past year. Discussed various options for rehabilitation with the patient and her and her  prefer to stay at the [facility] if possible.      PREVIOUS LEVEL OF FUNCTION   Independent with mobility, adls, speech and cognition prior to march. Steady decline in mobility starting in late feb/early march. Started using a cane, then walker then wheelchair.      CURRENT FUNCTION   PT: Ambulated 30' with fww and min-CGA. STS and SPT with fww and min-CGA.    OT: STS with CGA. Ambulates with  chris and fww.      SOCIAL HISTORY/Home Setting/Support:  Lives with  in a multilevel home. 7 steps from garage to main floor. 8 steps up from there to the top floor. Bed and bath are all on top floor.    Social History     Social History     Marital status:      Spouse name: N/A     Number of children: N/A     Years of education: N/A     Social History Main Topics     Smoking status: Never Smoker     Smokeless tobacco: Not on file     Alcohol use Yes      Comment: occasionally     Drug use: No     Sexual activity: Not on file     Other Topics Concern     Not on file     Social History Narrative         Past Medical History:  Past Medical History:   Diagnosis Date     Hypertension      Spinal stenosis      Thyroid disease        Current Medications:  Current Facility-Administered Medications   Medication     LORazepam (ATIVAN) half-tab 0.25 mg     heparin  drip 25,000 units in 0.45% NaCl 250 mL (see additional administration details for dose)     heparin bolus from infusion pump     hydrochlorothiazide (HYDRODIURIL) tablet 50 mg     levothyroxine (SYNTHROID/LEVOTHROID) tablet 125 mcg     losartan (COZAAR) tablet 100 mg     acetaminophen (TYLENOL) tablet 650 mg     polyethylene glycol (MIRALAX/GLYCOLAX) Packet 17 g     prochlorperazine (COMPAZINE) injection 5 mg    Or     prochlorperazine (COMPAZINE) tablet 5 mg    Or     prochlorperazine (COMPAZINE) Suppository 12.5 mg     cholecalciferol (vitamin D) tablet 2,000 Units     naloxone (NARCAN) injection 0.1-0.4 mg     lidocaine 1 % 1 mL     lidocaine (LMX4) kit     sodium chloride (PF) 0.9% PF flush 3 mL     sodium chloride (PF) 0.9% PF flush 3 mL     potassium chloride SA (K-DUR/KLOR-CON M) CR tablet 20-40 mEq     potassium chloride (KLOR-CON) Packet 20-40 mEq     potassium chloride 10 mEq in 100 mL intermittent infusion     potassium chloride 10 mEq in 100 mL intermittent infusion with 10 mg lidocaine     potassium chloride 20 mEq in 50 mL  intermittent infusion     magnesium sulfate 4 g in 100 mL sterile water (premade)     senna-docusate (SENOKOT-S;PERICOLACE) 8.6-50 MG per tablet 1-2 tablet     ondansetron (ZOFRAN-ODT) ODT tab 4 mg    Or     ondansetron (ZOFRAN) injection 4 mg         Review of Systems:  Total of ten systems reviewed, pertinent positives and negatives as follows  Instability with standing and walking.    Vision is off, feels like everything is scrolling.  Tingling in the bilateral lowers and in the hands.  No problems with bladder.   LBM  No chest pain. No cough or SOB.  No headache or photophobia.   No nausea, or abdominal pain.  No joint pain, muscle pain or swelling.    Remainder of the review of the systems was negative.      Labs   Lab Results   Component Value Date    WBC 11.1 (H) 04/13/2017    HGB 13.9 04/13/2017    HCT 41.5 04/13/2017    MCV 92 04/13/2017     04/13/2017     Lab Results   Component Value Date     04/12/2017    POTASSIUM 3.5 04/14/2017    CHLORIDE 105 04/12/2017    CO2 21 04/12/2017    GLC 97 04/12/2017     Lab Results   Component Value Date    GFRESTIMATED >90  Non  GFR Calc   04/12/2017    GFRESTBLACK >90   GFR Calc   04/12/2017     Lab Results   Component Value Date    AST 7 04/12/2017    ALT 20 04/12/2017    ALKPHOS 124 04/12/2017    BILITOTAL 0.6 04/12/2017     Lab Results   Component Value Date    INR 1.06 04/11/2017     Lab Results   Component Value Date    BUN 8 04/12/2017    CR 0.59 04/12/2017       ON EXAMINATION:  Vitals:    04/13/17 2118 04/14/17 0400 04/14/17 0655 04/14/17 0813   BP:  138/74 126/71 137/62   BP Location:  Left arm Left arm Left arm   Pulse:  74 83 75   Resp:  16 16    Temp:  97.1  F (36.2  C) 97.4  F (36.3  C)    TempSrc:  Oral Oral    SpO2:  96% 95%    Weight: 68.5 kg (151 lb)          Physical Exam:  Blood pressure 137/62, pulse 75, temperature 97.4  F (36.3  C), temperature source Oral, resp. rate 16, weight 68.5 kg (151 lb), SpO2 95  %.    GEN: NAD, seated comfortably in bedside chair, She is alert, appropriate, cooperative  HEENT: NCAT, horizontal and vertical nystagmus noted, worse when looking to the right. MMM. Hearing is functional.  RESPIRATORY: CTAB  CARDIAC: RRR  MSK: full active and passive ROM at all major joints of the bilaterally upper and lower extremities  No muscle atrophy noted  ABD: soft, non tender, pos BS  NEURO:   CRANIAL NERVES: CN II-XII are grossly intact and symmetric.   Sensation: sensation to light touch is symmetric and intact. Proprioception at the great toe is intact and symmetric.   Strength: 5/5 in bilateral shoulder abduction, 4/5 bilateral elbow flexion, 5/5 bilateral elbow extension, 5/5 wrist extension, 5/5 finger abduction. Hip flexion is 5/5 bilaterally, right knee extension is 4/5 otherwise lower extremities are 5/5 and symmetric.   Cognition: fund of knowledge and train of thought appropriate. Speech is fluent and intact.  SKIN: no rashes or lesions noted.    EXT: No edema or erythema noted.  PSYCH: Signs of depression noted, flat affect.    I, Dr. Degroot, have independently seen and examined the patient. I have reviewed the above resident's note and agree with content.    JÚNIOR Degroot MD

## 2017-04-14 NOTE — PLAN OF CARE
Problem: Goal Outcome Summary  Goal: Goal Outcome Summary  Outcome: Improving  A&O. AVSS, on room air. Pt denies pain. Regular diet, denies nausea. Drank an ensure and had a rice krispies treat and grapes. PIV flushes well, good blood return. Biopsy site on left abdomen covered with primapore dressing CDI. Baseline neuropathy in fingers and toes. Up with 1-assist to commode. Voiding spont, adequate UOP. Passing flatus, last BM this morning. Pre-meds given. Paclitaxel currently infusing, Carboplatin to follow. No s/s of hypersensitivity noted. Continue with POC.     Addendum:  Paclitaxel and Carboplatin infusions completed, no s/s of hypersensitivity reaction noted. PIV with good blood return, checked before and after infusions per vascular access (blood return only noted with use of tourniquet, due to type of IV catheter). PIV currently SL.

## 2017-04-15 ENCOUNTER — APPOINTMENT (OUTPATIENT)
Dept: PHYSICAL THERAPY | Facility: CLINIC | Age: 72
DRG: 374 | End: 2017-04-15
Payer: COMMERCIAL

## 2017-04-15 ENCOUNTER — APPOINTMENT (OUTPATIENT)
Dept: OCCUPATIONAL THERAPY | Facility: CLINIC | Age: 72
DRG: 374 | End: 2017-04-15
Payer: COMMERCIAL

## 2017-04-15 LAB
CREAT SERPL-MCNC: 0.54 MG/DL (ref 0.52–1.04)
GFR SERPL CREATININE-BSD FRML MDRD: NORMAL ML/MIN/1.7M2
MAGNESIUM SERPL-MCNC: 1.7 MG/DL (ref 1.6–2.3)
POTASSIUM SERPL-SCNC: 3.6 MMOL/L (ref 3.4–5.3)

## 2017-04-15 PROCEDURE — 40000193 ZZH STATISTIC PT WARD VISIT

## 2017-04-15 PROCEDURE — 25000128 H RX IP 250 OP 636: Performed by: INTERNAL MEDICINE

## 2017-04-15 PROCEDURE — 83735 ASSAY OF MAGNESIUM: CPT | Performed by: INTERNAL MEDICINE

## 2017-04-15 PROCEDURE — 12000008 ZZH R&B INTERMEDIATE UMMC

## 2017-04-15 PROCEDURE — 40000133 ZZH STATISTIC OT WARD VISIT

## 2017-04-15 PROCEDURE — 84132 ASSAY OF SERUM POTASSIUM: CPT | Performed by: INTERNAL MEDICINE

## 2017-04-15 PROCEDURE — 97112 NEUROMUSCULAR REEDUCATION: CPT | Mod: GP

## 2017-04-15 PROCEDURE — 99233 SBSQ HOSP IP/OBS HIGH 50: CPT | Performed by: INTERNAL MEDICINE

## 2017-04-15 PROCEDURE — 25000132 ZZH RX MED GY IP 250 OP 250 PS 637: Performed by: INTERNAL MEDICINE

## 2017-04-15 PROCEDURE — 82565 ASSAY OF CREATININE: CPT | Performed by: INTERNAL MEDICINE

## 2017-04-15 PROCEDURE — 36415 COLL VENOUS BLD VENIPUNCTURE: CPT | Performed by: INTERNAL MEDICINE

## 2017-04-15 PROCEDURE — 97116 GAIT TRAINING THERAPY: CPT | Mod: GP

## 2017-04-15 PROCEDURE — 97110 THERAPEUTIC EXERCISES: CPT | Mod: GP

## 2017-04-15 PROCEDURE — 25000132 ZZH RX MED GY IP 250 OP 250 PS 637: Performed by: PSYCHIATRY & NEUROLOGY

## 2017-04-15 PROCEDURE — 97535 SELF CARE MNGMENT TRAINING: CPT | Mod: GO

## 2017-04-15 RX ORDER — ACETAMINOPHEN 325 MG/1
650 TABLET ORAL EVERY 4 HOURS PRN
Qty: 100 TABLET | DISCHARGE
Start: 2017-04-15 | End: 2017-04-16

## 2017-04-15 RX ORDER — PROCHLORPERAZINE MALEATE 5 MG
5 TABLET ORAL EVERY 6 HOURS PRN
Qty: 90 TABLET | DISCHARGE
Start: 2017-04-15 | End: 2017-06-22

## 2017-04-15 RX ORDER — POLYETHYLENE GLYCOL 3350 17 G/17G
17 POWDER, FOR SOLUTION ORAL DAILY PRN
Qty: 7 PACKET | DISCHARGE
Start: 2017-04-15 | End: 2017-04-16

## 2017-04-15 RX ORDER — ONDANSETRON 4 MG/1
4 TABLET, ORALLY DISINTEGRATING ORAL EVERY 6 HOURS PRN
Qty: 120 TABLET | DISCHARGE
Start: 2017-04-17 | End: 2017-06-22

## 2017-04-15 RX ORDER — AMOXICILLIN 250 MG
1-2 CAPSULE ORAL 2 TIMES DAILY PRN
Qty: 100 TABLET | DISCHARGE
Start: 2017-04-15 | End: 2017-04-16

## 2017-04-15 RX ADMIN — ENOXAPARIN SODIUM 70 MG: 80 INJECTION SUBCUTANEOUS at 18:25

## 2017-04-15 RX ADMIN — LOSARTAN POTASSIUM 100 MG: 100 TABLET, FILM COATED ORAL at 07:54

## 2017-04-15 RX ADMIN — VITAMIN D, TAB 1000IU (100/BT) 2000 UNITS: 25 TAB at 07:54

## 2017-04-15 RX ADMIN — HYDROCHLOROTHIAZIDE 50 MG: 50 TABLET ORAL at 07:54

## 2017-04-15 RX ADMIN — ENOXAPARIN SODIUM 70 MG: 80 INJECTION SUBCUTANEOUS at 06:09

## 2017-04-15 RX ADMIN — LEVOTHYROXINE SODIUM 125 MCG: 125 TABLET ORAL at 07:54

## 2017-04-15 ASSESSMENT — VISUAL ACUITY: OU: BLURRED VISION

## 2017-04-15 NOTE — PROGRESS NOTES
Physician Attestation   I, Mitzi Rios, saw this patient with the resident and agree with the resident s findings and plan of care as documented in the resident s note.      I personally reviewed vital signs, medications, labs and imaging.  Temp: 97.6  F (36.4  C) Temp src: Oral BP: 139/64 Pulse: 68   Resp: 16 SpO2: 95 % O2 Device: None (Room air)      Key findings: Had chemo.  Some issues sleeping last night likely from steroids.  On LMWH shots now.  Set for port placement this coming Thursday.  Still w/vision issues.  No fevers.  Appetite actually fairly good.   not doing great with diagnosis.  Temp: 97.6  F (36.4  C) Temp src: Oral BP: 139/64 Pulse: 68   Resp: 16 SpO2: 95 % O2 Device: None (Room air)    Sitting in chair, NAD  Lateral nystagmus (unchanged)  RRR  CTA B  Abd: +BS    Last Basic Metabolic Panel:  Lab Results   Component Value Date     04/12/2017      Lab Results   Component Value Date    POTASSIUM 3.6 04/15/2017     Lab Results   Component Value Date    CHLORIDE 105 04/12/2017     Lab Results   Component Value Date    PATTI 8.5 04/12/2017     Lab Results   Component Value Date    CO2 21 04/12/2017     Lab Results   Component Value Date    BUN 8 04/12/2017     Lab Results   Component Value Date    CR 0.54 04/15/2017     Lab Results   Component Value Date    GLC 97 04/12/2017         A/P:  1. Stage 4 ovarian cancer: started chemo and set for f/u chemo, clinic appt, port placement.  2. PEs: on LMWH.  Given this is malignancy related likely should remain on LMWH if covered.  3. 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  Should be followed up on as outpatient.  MRI negative.  4. Dispo: Awaiting placement.  [facility] denied.  Spoke w/SW covering for the weekend who will send more referrals.  Patient likely stable to CO tomorrow.    Mitzi Rios  Date of Service (when I saw the patient): 04/15/17    SW called and rehab found for Sunday for patient.  Her  can  take her; they wish to leave by noon tomorrow.  Will start d/c orders today along with d/c summary.  Mitzi Rios MD, MSEd  Hospitalist  Professor of Medicine  [facility]  [phone]

## 2017-04-15 NOTE — PLAN OF CARE
Problem: Goal Outcome Summary  Goal: Goal Outcome Summary  PT/7C: Focus on functional gait training ~ 40' w/ FWW and SBA-CGA for safety, pt instructed to focus on object on wall to decrease sensation of objects moving in horizontal manner. Pt continues to demonstrate slow alfonso, decreased heel toe pattern, step length w/ RLE > LLE as pt w/ dec LLE stance time, and impaired coordination, minimal improvement w/ VC noted. Pt seems to have improvement in vision this day. Pt engaged in coordination and proprioception activities as well as standing LE strengthening exercises. Pt demonstrates knee hyperextension during standing LLE exercises. Improvement noted w/ proprioceptive exercises     Recommend DC to TCU to improve gait, coordination, strength, and balance as pt is below baseline levels at this time

## 2017-04-15 NOTE — PLAN OF CARE
Problem: Goal Outcome Summary  Goal: Goal Outcome Summary  Outcome: No Change  AVSS.  97% RA.  Got chemo last evening - no s/s of rxn.  Denies pain and nausea overnight.  A&O.  Baseline neuropathy - no change.  Per pt, blurred vision worse in AM, gets better throughout day.  Up with Assist of 1 to BSComm, void good vols.  SL.  Bx site covered with primapore - c/d/i.    Cont with POC.

## 2017-04-15 NOTE — PLAN OF CARE
Problem: Goal Outcome Summary  Goal: Goal Outcome Summary  Outcome: Therapy, progress toward functional goals as expected  Up with assist of 1 with walker. Denies pain. Pt received chemotherapy. No signs and symptoms of reaction noted in this shift. Pt wheeled by spouse around the unit in a wheelchair. Denies pain when asked. Tolerating regular diet with no c/o NV. OT worked with the pt this am, had shower, voided not measured. PIV-SL. OVSS, afebrile. PLAN: Continue with the plan of care, discharge tomorrow to TCU.  will bring pt to the facility, would like to leave by noon.

## 2017-04-15 NOTE — PLAN OF CARE
Problem: Goal Outcome Summary  Goal: Goal Outcome Summary  OT 7C: Pt completed shower task today. CGA for all transfers and functional mobility with FWW and VCs for safe sit <> stand and shower transfer. Pt with good external awareness and safety with mobility and completion of tasks, asking for help appropriately; however patient encouraged activity independently as able given safe conditions. Rec: TCU as patient would benefit from further therapy to promote independence and safety with functional mobility, transfers, and I/ADLs

## 2017-04-15 NOTE — PROGRESS NOTES
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 Feels \"great\" this morning. Unable to sleep after steroids, but really excited to get up and shower this morning. No N/V. Ongoing minimal dizziness.    O:   Vitals:    17 2150 17 2344 04/15/17 0345 04/15/17 0400   BP: 139/81 105/89 120/70    BP Location: Left arm Left arm Left arm    Pulse: 74 79 71    Resp: 16 16  16   Temp: 96.8  F (36  C) 96.8  F (36  C) 96.1  F (35.6  C)    TempSrc: Oral Oral Oral    SpO2: 94% 97% 95%    Weight:         Gen: NAD, a/o  CV: RRR, no m/r/g  Resp:CTAB, no increased WOB  Abdomen: Soft, nontender, nondistended  Extremities: 1+ edema, nontender    I/O last 3 completed shifts:  In: 820 [P.O.:820]  Out: 1700 [Urine:1700]  Since MN: 0 PO, 400 UOP    Labs: BMP, CBC pending  Ca125 ()- 2648    A/P: 71 year old female with likely serous high grade ovarian cancer and gait instability likely secondary to paraneoplastic syndrome, receiving neoadjuvant chemotherapy and tolerating it well    1. Ovarian carcinoma:  - Elevated Ca 125, disseminated disease on CT and Biopsy proven high grade disease  - Neoadjuvant chemotherapy recommended and initiated in house  - neoadjuvant chemotherapy with IV carboplatin AUC 6 day 1 + dose-dense paclitaxel 80 mg/m2 days 1,8,15 every 21 days   - will likely plan 3 or 4 cycles, to be determined by response and Oncologist preference.  - Please continue scheduled anti emetics including zofran throughout hospitalization and prn upon discharge    2. Imbalance and dizziness, likely due to paraneoplastic syndrome  - anticipate d/c to TCU     3. PEx2, asymptomatic  - " Lovenox 70 mg Q12 H    4. HTN, Hypothyroidism   -  Maintained on home medication    Appreciate primary team cares.    Patient should follow up with Dr. Ennis at [facility] 5/16/17 and [facility] Bird on 4/20/17 to plan for outpatient chemotherapy.  Appreciate Medicine cares, Gyn Onc will sign off today and follow peripherally.     Delaney Lomeli MD  OB GYN PGY-2

## 2017-04-15 NOTE — PROGRESS NOTES
D: LUANN f/u with d/c planning to a transitional/rehab facility. LUANN also updated by MD team.   I: Met with pt. And her , Christiano. Pt. Requested referrals made to the following facilities:  [facility] - referral sent ([phone], fax [phone]) *unsure of private vs. Semi-private avail.  [facility] - referral sent ([phone], fax [phone]) *no private room available  [facility] in [place] - referral sent ([phone], fax [phone]) * no bed available today but tomorrow; private room available for $38/day extra  [facility] in [place] - not taking admission calls this weekend so no referral sent  A: Pt. Medically ready for d/c when can find a ST rehab bed for her.  P: LUANN will continue to work on rehab placement. Will keep pt. And  updated.     Weekend LUANN pager [phone]

## 2017-04-15 NOTE — PROGRESS NOTES
D: Return call from [facility] and they can accept pt. Tomorrow, Sunday, 4/16, for ST rehab.   I: Met with pt. And her  to inform. 2 other facilities also accepted pt. ([facility] and [facility]) but pt. Choosing [facility]. Bedside nurse and charge nurse also informed. Paged Dr. Dobbins to also inform. With pt.'s permission, PAS completed #[id number].   A: Pt. And  aware of d/c for tomorrow.  will transport. Facility is asking for pt. To be there about 1:00. Pt. Asking to leave by noon then. Pt. Informed and is agreeable to extra charge for private room ($38/day).  P: Pt. Will transfer to [facility] in [place] for ST rehab. D/c Sunday, 4/16 at noon.  Please call facility to confirm: [phone], this is the number for the charge nurse  Fax d/c orders ASAP to [phone]    Weekend Social Work pager [phone]

## 2017-04-16 VITALS
SYSTOLIC BLOOD PRESSURE: 125 MMHG | OXYGEN SATURATION: 95 % | HEART RATE: 81 BPM | TEMPERATURE: 96.5 F | DIASTOLIC BLOOD PRESSURE: 68 MMHG | RESPIRATION RATE: 16 BRPM | BODY MASS INDEX: 30.98 KG/M2 | WEIGHT: 153.4 LBS

## 2017-04-16 LAB
ANION GAP SERPL CALCULATED.3IONS-SCNC: 8 MMOL/L (ref 3–14)
BUN SERPL-MCNC: 13 MG/DL (ref 7–30)
CALCIUM SERPL-MCNC: 8.8 MG/DL (ref 8.5–10.1)
CHLORIDE SERPL-SCNC: 106 MMOL/L (ref 94–109)
CO2 SERPL-SCNC: 26 MMOL/L (ref 20–32)
CREAT SERPL-MCNC: 0.7 MG/DL (ref 0.52–1.04)
ERYTHROCYTE [DISTWIDTH] IN BLOOD BY AUTOMATED COUNT: 13.7 % (ref 10–15)
GFR SERPL CREATININE-BSD FRML MDRD: 83 ML/MIN/1.7M2
GLUCOSE SERPL-MCNC: 90 MG/DL (ref 70–99)
HCT VFR BLD AUTO: 39.9 % (ref 35–47)
HGB BLD-MCNC: 13.1 G/DL (ref 11.7–15.7)
LMWH PPP CHRO-ACNC: 1.53 IU/ML
MCH RBC QN AUTO: 30.3 PG (ref 26.5–33)
MCHC RBC AUTO-ENTMCNC: 32.8 G/DL (ref 31.5–36.5)
MCV RBC AUTO: 92 FL (ref 78–100)
PLATELET # BLD AUTO: 239 10E9/L (ref 150–450)
POTASSIUM SERPL-SCNC: 3.7 MMOL/L (ref 3.4–5.3)
RBC # BLD AUTO: 4.32 10E12/L (ref 3.8–5.2)
SODIUM SERPL-SCNC: 141 MMOL/L (ref 133–144)
WBC # BLD AUTO: 8.5 10E9/L (ref 4–11)

## 2017-04-16 PROCEDURE — 36415 COLL VENOUS BLD VENIPUNCTURE: CPT | Performed by: INTERNAL MEDICINE

## 2017-04-16 PROCEDURE — 85027 COMPLETE CBC AUTOMATED: CPT | Performed by: INTERNAL MEDICINE

## 2017-04-16 PROCEDURE — 85520 HEPARIN ASSAY: CPT | Performed by: INTERNAL MEDICINE

## 2017-04-16 PROCEDURE — 99239 HOSP IP/OBS DSCHRG MGMT >30: CPT | Performed by: INTERNAL MEDICINE

## 2017-04-16 PROCEDURE — 80048 BASIC METABOLIC PNL TOTAL CA: CPT | Performed by: INTERNAL MEDICINE

## 2017-04-16 PROCEDURE — 25000132 ZZH RX MED GY IP 250 OP 250 PS 637: Performed by: PSYCHIATRY & NEUROLOGY

## 2017-04-16 PROCEDURE — 25000128 H RX IP 250 OP 636: Performed by: INTERNAL MEDICINE

## 2017-04-16 PROCEDURE — 25000132 ZZH RX MED GY IP 250 OP 250 PS 637: Performed by: INTERNAL MEDICINE

## 2017-04-16 RX ORDER — ACETAMINOPHEN 325 MG/1
650 TABLET ORAL EVERY 4 HOURS PRN
Qty: 100 TABLET | Status: ON HOLD | DISCHARGE
Start: 2017-04-16 | End: 2017-06-08

## 2017-04-16 RX ORDER — MULTIVIT-MIN/IRON/FOLIC ACID/K 18-600-40
2000 CAPSULE ORAL DAILY
Qty: 60 TABLET | DISCHARGE
Start: 2017-04-16 | End: 2024-09-10

## 2017-04-16 RX ORDER — ONDANSETRON 4 MG/1
4 TABLET, FILM COATED ORAL EVERY 6 HOURS PRN
Qty: 20 TABLET | Refills: 0 | DISCHARGE
Start: 2017-04-16 | End: 2017-06-22

## 2017-04-16 RX ORDER — POLYETHYLENE GLYCOL 3350 17 G/17G
17 POWDER, FOR SOLUTION ORAL DAILY PRN
Qty: 7 PACKET | DISCHARGE
Start: 2017-04-16 | End: 2017-04-20

## 2017-04-16 RX ORDER — LOSARTAN POTASSIUM 100 MG/1
100 TABLET ORAL DAILY
Qty: 30 TABLET | Status: ON HOLD | DISCHARGE
Start: 2017-04-16 | End: 2017-06-30

## 2017-04-16 RX ORDER — HYDROCHLOROTHIAZIDE 25 MG/1
50 TABLET ORAL DAILY
Qty: 30 TABLET | Status: ON HOLD | DISCHARGE
Start: 2017-04-16 | End: 2017-06-30

## 2017-04-16 RX ORDER — AMOXICILLIN 250 MG
1-2 CAPSULE ORAL 2 TIMES DAILY PRN
Qty: 100 TABLET | DISCHARGE
Start: 2017-04-16 | End: 2017-04-20

## 2017-04-16 RX ORDER — LEVOTHYROXINE SODIUM 125 UG/1
125 TABLET ORAL DAILY
Qty: 30 TABLET | DISCHARGE
Start: 2017-04-16

## 2017-04-16 RX ADMIN — HYDROCHLOROTHIAZIDE 50 MG: 50 TABLET ORAL at 08:23

## 2017-04-16 RX ADMIN — LOSARTAN POTASSIUM 100 MG: 100 TABLET, FILM COATED ORAL at 08:23

## 2017-04-16 RX ADMIN — PROCHLORPERAZINE MALEATE 5 MG: 5 TABLET, FILM COATED ORAL at 08:23

## 2017-04-16 RX ADMIN — ENOXAPARIN SODIUM 70 MG: 80 INJECTION SUBCUTANEOUS at 05:30

## 2017-04-16 RX ADMIN — VITAMIN D, TAB 1000IU (100/BT) 2000 UNITS: 25 TAB at 08:23

## 2017-04-16 RX ADMIN — LEVOTHYROXINE SODIUM 125 MCG: 125 TABLET ORAL at 08:23

## 2017-04-16 NOTE — PLAN OF CARE
Problem: Goal Outcome Summary  Goal: Goal Outcome Summary  Outcome: Adequate for Discharge Date Met:  04/16/17  DISCHARGE     D: Orders written for discharge to [facility]     A/I: Alert and oriented. Denies pain. Up with assist of 1, using walker. Has her own wheelchair for long distance movement. OVSS, afebrile. Seen by Dr. Mitzi Rios this morning. AVS reviewed by this RN. Will provide copy for pt/family. Will remove PIV before leaving . Will give call the care center after 10:30 am to give report, will fax AVS once ready.      PLAN: Discharge to [facility] at [facility], spouse will bring the pt.     ADDENDUM: Report given to MYRA Mcclain from the said facility. Contact number: [phone]

## 2017-04-16 NOTE — PLAN OF CARE
Problem: Goal Outcome Summary  Goal: Goal Outcome Summary  Outcome: Improving  AVSS. Denies pain and nausea overnight. PIV saline locked. Up with assist of 1 and walker. Voiding in commode with good output. D/C today to TCU. TCU wants pt by 1300 today so she should d/c before noon. Continue with plan.

## 2017-04-16 NOTE — PROVIDER NOTIFICATION
Notified MD at 1115 AM regarding lab results.  Hep 10A critical value: 1.53    Spoke with: Dr. Fabiola Rios    Orders : to change dose of lovenox as of this time.    Comments: Waiting for the MD to call for any orders prior to discharge to TCU

## 2017-04-16 NOTE — PLAN OF CARE
Problem: Goal Outcome Summary  Goal: Goal Outcome Summary  Outcome: No Change  Patient denies pain, no nausea, tolerating Regular diet, PIV in place, pt is voiding and ambulating without difficulty. Patient would like to be discharge tomorrow no later than noon. Plan is to discharge to TCU.

## 2017-04-16 NOTE — PLAN OF CARE
Problem: Goal Outcome Summary  Goal: Goal Outcome Summary     Occupational Therapy Discharge Summary     Reason for therapy discharge:    Discharged to transitional care facility.     Progress towards therapy goal(s). See goals on Care Plan in [facility] electronic health record for goal details.  Goals partially met.  Barriers to achieving goals:   discharge from facility.     Therapy recommendation(s):    Continued therapy is recommended.  Rationale/Recommendations:  continue OT at TCU.

## 2017-04-16 NOTE — DISCHARGE SUMMARY
DISCHARGE SUMMARY FOR Tosin Nina    Admission Date: 4/11/2017    Discharge Date:  April 16, 2017    Primary Discharge Diagnosis:  Stage 4 ovarian cancer      Secondary Discharge Diagnosis:  Patient Active Problem List   Diagnosis     Abdominal mass     Ovarian cancer, unspecified laterality (H)     Acute pulmonary embolism (H)     Hypertension     History of total knee replacement     Thyroid nodule       History of Present Illness  Please see the history and physicial for full details.  Tosin Nina is a 71 year old year old female admitted for ongoing loss of balance and dizziness for over 1 month.  Please see the history and physical dated 4/11/2017 for full details.    [place] is a 70 yo F with a h/o thyroid disease s/p thyroidectomy in 2014, HTN, IBS and spinal stenosis with worsening neuropathy over the past year who presents with 1 month of worsening dizziness. In February she was not walking very well, which she thought may be due to tramadol she was taking for back pain. In early March, she started to use a cane when going out for concerts, etc. She was feeling more fatigued and had her thyroid checked as well. On 3/10 she was still driving and felt her vision was good. ON 3/15 she saw her PCP and complained of more imbalance and was referred to Neurology. She was able to see them 2 weeks later, and by that time she was needing to use a walker for imbalance. She had an MRI there that was reportedly normal, and had some other labs sent. She was [facility], where she has been working with therapies. Since then she has needed to use a transport chair and started to have difficulties focusing with her vision that she describes as a scrolling sensation. If she stays still, it is easier to focus. Sunday, she was unable to read the newspaper and today she had difficulty reading on the internet. She states she rocks back and forth when she walks. No HA  except for 1 day last week, which was relieved with motrin. No syncope, CP or SOB. She feels fatigued and has lost an unknown amt of weight. Decreased appetite. Has nausea with only 1 episode of emesis last Thursday that was NB/NB.      She has had increased diarrhea over the past 2 weeks, which she states is normal for her when she has increased anxiety, which she has had over the past few weeks.      Since last November she has also had increased numbness in her hands and feet. Sx's first started in L hand then R hand then RLE. She now describes a sandpaper type feeling in both hands.      Of note, she was admitted in February for chest pain, and had a normal CTA chest and stress ECHO.     In the ED, Neurology was consulted and noticed vertical nystagmus and upon their review of MRI were concerned about a metastasis to medulla. CT C/A/P then obtained which revealed a large intraabdominal mass.     Physical Examination of Day of Discharge  B/P: 125/68, T: 96.5, P: 81, R: 16  Lying in bed, NAD  Alert and oriented times 3  RRR no murmurs  CTA B  Abd: +BS, NTND  No rashes  No edema    Hospital Course      1. Stage 4 Ovarian cancer: Omental cacking/liver lesions were biopsied and found to be high grade serous carcinoma.  Ca125 also very high, making ovarian cancer the most likely diagnosis.  Gyn/Onc saw her and she was given carboplat and taxol here in the hospital, which she tolerated very well.  Their plan is neoadjuvant chemotherapy with IV carboplatin AUC 6 day 1 + dose-dense paclitaxel 80 mg/m2 days 1,8,15 every 21 days per the [facility] protocol (Brit BOND et al and the [employer] [facility]. Dose-dense paclitaxel once a week in combination with carboplatin every 3 weeks for advanced ovarain cancer: A phase 3, open-label, randomised controlled trial. Lancet 2009;374(3546):1331-8.). Her  was overwhelmed with the diagnosis and it would be helpful for him to be given support group information.  -She should  follow up with Oncology as below.  -Tentative plan for port placement this coming Thursday at [facility]; Oncology was setting this up.  -Patient has a lot of questions about prognosis.  She also might benefit from Palliative Care Clinic referral.     2. Paraneoplastic syndrome:  She was seen by Neuro in the ED who ordered the chest/abd CT which led to the above diagnosis.  The brain MRI was re-read here by neuroradiology and NO lesions were noted.  She was felt to have paraneoplastic syndrome as the cause for her symptoms and we hope these will improve over the next several weeks and she can go back home.  -Paraneoplastic panel is still PENDING (it was drawn at [facility]) and should be followed up upon as an outpatient.  -Due to her vision issues PT/OT felt rehab would be indicated.  Patient lives on 4 levels with no bathroom or bedroom on main level.    3. PEs: Patient was incidentally noted to have subsegmental PEs on the CT scan done in the ED.  She has had no symptoms with these.  We treated her with heparin and then LMWH shots BID.  Since this is cancer-related, she would do better to stay on LMWH.  -As patient gets close to dc from rehab she needs to learn to give herself her own injections.  -Risks of bleeding were discussed with patient and .  -Heparin Xa was a bit high at 1.53 on day of discharge.  I discussed this with Pharmacy who recommended decreasing the dose from 70 to 60mg SQ BID.  -She should also have a Heparin Xa drawn this coming week about 4 hours after a dose if possible.      Discharge Medication List   Tosin Nina   [facility] Medication Instructions JOLIE:[id number]    Printed on:04/16/17 4790   Medication Information                      acetaminophen (TYLENOL) 325 MG tablet  Take 2 tablets (650 mg) by mouth every 4 hours as needed for mild pain             enoxaparin (LOVENOX) 80 MG/0.8ML injection  Inject 0.60 mLs (60 mg) Subcutaneous every 12 hours             hydrochlorothiazide  (HYDRODIURIL) 25 MG tablet  Take 2 tablets (50 mg) by mouth daily             IBUPROFEN PO  Take 200 mg by mouth every 6 hours as needed for moderate pain             levothyroxine (SYNTHROID) 125 MCG tablet  Take 1 tablet (125 mcg) by mouth daily             losartan (COZAAR) 100 MG tablet  Take 1 tablet (100 mg) by mouth daily             ondansetron (ZOFRAN) 4 MG tablet  Take 1 tablet (4 mg) by mouth every 6 hours as needed for nausea             ondansetron (ZOFRAN-ODT) 4 MG ODT tab  Take 1 tablet (4 mg) by mouth every 6 hours as needed for nausea             polyethylene glycol (MIRALAX/GLYCOLAX) Packet  Take 17 g by mouth daily as needed for constipation             prochlorperazine (COMPAZINE) 5 MG tablet  Take 1 tablet (5 mg) by mouth every 6 hours as needed for vomiting             senna-docusate (SENOKOT-S;PERICOLACE) 8.6-50 MG per tablet  Take 1-2 tablets by mouth 2 times daily as needed (constipation )             Vitamin D, Cholecalciferol, 1000 UNITS TABS  Take 2,000 Units by mouth daily                 Discharge Instructions  Follow up with Oncology on the 20th at [facility] (Adelina Bird NP)  Next infusion on the 21st also at [facility]  Appointment with Dr. Ennis on 5/16/17  Please do not hesitate to call our Oncology group with any questions or concerns  Get Heparin Xa drawn this coming week if possible about 4 hours after a dose (note send via Epic to Dr. Ennis about this as well)  Consider calling the Palliative Care Clinic for an appointment.  Their number is [phone]      It was my absolute pleasure to take care of this wonderful woman and I hope she does well.  Total time spent on discharge planning, coordination of care, medication reconciliation and performance of physical exam on day of discharge was 45 minutes.    Mitzi Rios MD, MSEd  Hospitalist  Professor of Medicine  [facility]  [phone]

## 2017-04-17 ENCOUNTER — CARE COORDINATION (OUTPATIENT)
Dept: CARDIOLOGY | Facility: CLINIC | Age: 72
End: 2017-04-17

## 2017-04-17 ENCOUNTER — TELEPHONE (OUTPATIENT)
Dept: ONCOLOGY | Facility: CLINIC | Age: 72
End: 2017-04-17

## 2017-04-17 DIAGNOSIS — C56.9 OVARIAN CANCER, UNSPECIFIED LATERALITY (H): Primary | ICD-10-CM

## 2017-04-17 NOTE — TELEPHONE ENCOUNTER
[facility], the living facility where patient resides, called today to ask for pre-procedure instructions for patient's port placement on 4/20/17.  After reviewing chart, it was noted that pt has not been scheduled by IR yet for port placement.  Our schedulers called IR again this morning to get pt scheduled for port placement on 4/20/17.  IR will call [facility] with dates/times/etc.  They will also go over all pre-procedure instructions.  Gave IR [facility] phone number [phone].  They will be in contact [facility] shortly.  No further questions or concerns at this time.

## 2017-04-17 NOTE — PROGRESS NOTES
"[facility]  \"Hello, my name is Anai Hurley , and I am calling from the [facility].  I want to check in and see how you are doing, after leaving the hospital.  You may also receive a call from your Care Coordinator (care team), but I want to make sure you don t have any urgent needs.  I have a couple questions to review with you:     Post-Discharge Outreach                                                    Tosin Nina is a 71 year old female     Follow-up Appointments           Follow Up and recommended labs and tests       Patient has multiple upcoming appointments per her AVS including chemotherapy in 1 week, appointment with Oncology Team at [facility].                       Your next 10 appointments already scheduled            Apr 20, 2017 10:40 AM CDT   Return Visit with SALVADOR Solis CNP   [facility] ([facility])     [facility]  [address]  [facility] [place] [address]   [phone]                  Apr 21, 2017 9:30 AM CDT   Level 3 with RH INFUSION CHAIR 8   [facility] ([facility])     [facility]  [address]  [place] [address]   [phone]                  Apr 28, 2017 8:00 AM CDT   Level 3 with RH INFUSION CHAIR 12   [facility] ([facility])     [facility]  [address]  [place] [address]   [phone]                  May 04, 2017 9:00 AM CDT   Level 3 with RH INFUSION CHAIR 9   [facility] ([facility])     [facility]  [address]  [place] [address]   [phone]                  May 04, 2017 9:20 AM CDT   Return Visit with SALVADOR Solis CNP   [facility] ([facility])     [facility]  [address]  [facility] [address]   [phone]              Care Team:    Patient Care Team       Relationship Specialty Notifications Start End    Esther Back MD PCP - General Family Practice  11/7/13     Phone: [phone] Fax: [phone]         [facility] [address] [facility] [address] [facility] [address]            Transition of Care Review                                                      Did you have a surgery or procedure during your hospital visit? No   If yes, do you have any of the following:     Signs of infection: No    Pain:  No     Pain Scale (0-10) 0/10     Location: NA    Wound/incision concerns? NA    Do you have all of your medications/refills?  Yes    Are you having any side effects or questions about your medication(s)? No    Do you have any new or worsening symptoms?  Yes- nausea, tired    Do you have any future appointments scheduled?   Yes       Next 5 appointments (look out 90 days)     Apr 20, 2017 10:40 AM CDT   Return Visit with SALVADOR Solis CNP   [facility] ([facility])    [facility]  [address]  [facility] [address]   [phone]            May 04, 2017  9:20 AM CDT   Return Visit with SALVADOR Solis CNP   [facility] ([facility])    [facility]  [address]  [facility] [address]   [phone]                      Plan                                                      Thanks for your time.  Your Care Coordinator may follow-up within the next couple days.  In the meantime if you have questions, concerns or problems call your care team.        Anai Hurley

## 2017-04-18 ENCOUNTER — TELEPHONE (OUTPATIENT)
Dept: ONCOLOGY | Facility: CLINIC | Age: 72
End: 2017-04-18

## 2017-04-18 NOTE — PLAN OF CARE
Problem: Goal Outcome Summary  Goal: Goal Outcome Summary  Physical Therapy Discharge Summary     Reason for therapy discharge:    Discharged to transitional care facility.     Progress towards therapy goal(s). See goals on Care Plan in [facility] electronic health record for goal details.  Goals not met.  Barriers to achieving goals:   discharge from facility.     Therapy recommendation(s):    Continued therapy is recommended.  Rationale/Recommendations:  due to below baseline strength, activity tolerance and balance.

## 2017-04-18 NOTE — TELEPHONE ENCOUNTER
Received call from Joslyn, nurse at [facility] to ask about port placement time and prep.  Informed that procedure is not scheduled yet and awaiting call from IR dept.  IR was also given facility contact information.  Dr Ennis notified and aware and pt to still keep appt for follow up with NP 4/20 and chemo 4/21 if port not scheduled yet.  Joslyn aware and will continue to follow and call with update when available.    Kim Jones, RN, BSN

## 2017-04-19 NOTE — TELEPHONE ENCOUNTER
[place]ia is scheduled for her port placement at [facility] tomorrow (4/20/17) at 10:30 and then she will see Adelina Bird at 12:40 for appt.  Patient has been contacted regarding these appointments. Carmen clements

## 2017-04-20 ENCOUNTER — HOSPITAL ENCOUNTER (OUTPATIENT)
Facility: CLINIC | Age: 72
Discharge: HOME | End: 2017-04-20
Attending: RADIOLOGY | Admitting: RADIOLOGY
Payer: COMMERCIAL

## 2017-04-20 ENCOUNTER — HOSPITAL ENCOUNTER (OUTPATIENT)
Facility: CLINIC | Age: 72
Setting detail: SPECIMEN
Discharge: HOME | End: 2017-04-20
Attending: OBSTETRICS & GYNECOLOGY | Admitting: OBSTETRICS & GYNECOLOGY
Payer: COMMERCIAL

## 2017-04-20 ENCOUNTER — ONCOLOGY VISIT (OUTPATIENT)
Dept: ONCOLOGY | Facility: CLINIC | Age: 72
End: 2017-04-20
Attending: NURSE PRACTITIONER
Payer: COMMERCIAL

## 2017-04-20 ENCOUNTER — APPOINTMENT (OUTPATIENT)
Dept: INTERVENTIONAL RADIOLOGY/VASCULAR | Facility: CLINIC | Age: 72
End: 2017-04-20
Attending: NURSE PRACTITIONER
Payer: COMMERCIAL

## 2017-04-20 ENCOUNTER — INFUSION THERAPY VISIT (OUTPATIENT)
Dept: INFUSION THERAPY | Facility: CLINIC | Age: 72
End: 2017-04-20
Attending: NURSE PRACTITIONER
Payer: COMMERCIAL

## 2017-04-20 VITALS
OXYGEN SATURATION: 96 % | HEART RATE: 66 BPM | BODY MASS INDEX: 29.35 KG/M2 | RESPIRATION RATE: 16 BRPM | DIASTOLIC BLOOD PRESSURE: 57 MMHG | SYSTOLIC BLOOD PRESSURE: 85 MMHG | HEIGHT: 59 IN | TEMPERATURE: 98.3 F | WEIGHT: 145.6 LBS

## 2017-04-20 VITALS
DIASTOLIC BLOOD PRESSURE: 48 MMHG | HEART RATE: 76 BPM | RESPIRATION RATE: 18 BRPM | SYSTOLIC BLOOD PRESSURE: 98 MMHG | TEMPERATURE: 97 F | OXYGEN SATURATION: 96 %

## 2017-04-20 DIAGNOSIS — R11.2 CHEMOTHERAPY INDUCED NAUSEA AND VOMITING: ICD-10-CM

## 2017-04-20 DIAGNOSIS — Z79.01 ON ANTICOAGULANT THERAPY: ICD-10-CM

## 2017-04-20 DIAGNOSIS — R42 DIZZINESS: ICD-10-CM

## 2017-04-20 DIAGNOSIS — T45.1X5A CHEMOTHERAPY INDUCED NAUSEA AND VOMITING: ICD-10-CM

## 2017-04-20 DIAGNOSIS — C56.9 OVARIAN CANCER, UNSPECIFIED LATERALITY (H): Primary | ICD-10-CM

## 2017-04-20 DIAGNOSIS — Z51.11 ENCOUNTER FOR ANTINEOPLASTIC CHEMOTHERAPY: ICD-10-CM

## 2017-04-20 DIAGNOSIS — K11.7 DISTURBANCE OF SALIVARY SECRETION: ICD-10-CM

## 2017-04-20 DIAGNOSIS — C56.9 OVARIAN CANCER, UNSPECIFIED LATERALITY (H): ICD-10-CM

## 2017-04-20 DIAGNOSIS — R19.7 DIARRHEA, UNSPECIFIED TYPE: ICD-10-CM

## 2017-04-20 DIAGNOSIS — E87.6 HYPOKALEMIA: ICD-10-CM

## 2017-04-20 LAB
BASOPHILS # BLD AUTO: 0 10E9/L (ref 0–0.2)
BASOPHILS NFR BLD AUTO: 0.4 %
DIFFERENTIAL METHOD BLD: ABNORMAL
EOSINOPHIL # BLD AUTO: 0.1 10E9/L (ref 0–0.7)
EOSINOPHIL NFR BLD AUTO: 1.5 %
ERYTHROCYTE [DISTWIDTH] IN BLOOD BY AUTOMATED COUNT: 13.2 % (ref 10–15)
HCT VFR BLD AUTO: 42 % (ref 35–47)
HGB BLD-MCNC: 14.2 G/DL (ref 11.7–15.7)
IMM GRANULOCYTES # BLD: 0 10E9/L (ref 0–0.4)
IMM GRANULOCYTES NFR BLD: 0.5 %
LYMPHOCYTES # BLD AUTO: 0.7 10E9/L (ref 0.8–5.3)
LYMPHOCYTES NFR BLD AUTO: 9.4 %
MAGNESIUM SERPL-MCNC: 1.9 MG/DL (ref 1.6–2.3)
MCH RBC QN AUTO: 30.9 PG (ref 26.5–33)
MCHC RBC AUTO-ENTMCNC: 33.8 G/DL (ref 31.5–36.5)
MCV RBC AUTO: 92 FL (ref 78–100)
MONOCYTES # BLD AUTO: 0.2 10E9/L (ref 0–1.3)
MONOCYTES NFR BLD AUTO: 3.1 %
NEUTROPHILS # BLD AUTO: 6.3 10E9/L (ref 1.6–8.3)
NEUTROPHILS NFR BLD AUTO: 85.1 %
NRBC # BLD AUTO: 0 10*3/UL
NRBC BLD AUTO-RTO: 0 /100
PLATELET # BLD AUTO: 290 10E9/L (ref 150–450)
POTASSIUM SERPL-SCNC: 3.3 MMOL/L (ref 3.4–5.3)
RBC # BLD AUTO: 4.59 10E12/L (ref 3.8–5.2)
WBC # BLD AUTO: 7.4 10E9/L (ref 4–11)

## 2017-04-20 PROCEDURE — 25000128 H RX IP 250 OP 636

## 2017-04-20 PROCEDURE — 99215 OFFICE O/P EST HI 40 MIN: CPT | Performed by: NURSE PRACTITIONER

## 2017-04-20 PROCEDURE — 27210904 ZZH KIT CR6

## 2017-04-20 PROCEDURE — 27210820 ZZH WOUND GLUE CR3

## 2017-04-20 PROCEDURE — 77001 FLUOROGUIDE FOR VEIN DEVICE: CPT

## 2017-04-20 PROCEDURE — C1769 GUIDE WIRE: HCPCS

## 2017-04-20 PROCEDURE — 99152 MOD SED SAME PHYS/QHP 5/>YRS: CPT

## 2017-04-20 PROCEDURE — 36415 COLL VENOUS BLD VENIPUNCTURE: CPT

## 2017-04-20 PROCEDURE — 27210742 ZZH CATH CR1

## 2017-04-20 PROCEDURE — 27210908 ZZH NEEDLE CR4

## 2017-04-20 PROCEDURE — 36561 INSERT TUNNELED CV CATH: CPT

## 2017-04-20 PROCEDURE — 25000125 ZZHC RX 250: Performed by: RADIOLOGY

## 2017-04-20 PROCEDURE — 76937 US GUIDE VASCULAR ACCESS: CPT

## 2017-04-20 PROCEDURE — 99212 OFFICE O/P EST SF 10 MIN: CPT

## 2017-04-20 PROCEDURE — C1788 PORT, INDWELLING, IMP: HCPCS

## 2017-04-20 RX ORDER — CEFAZOLIN SODIUM 2 G/100ML
INJECTION, SOLUTION INTRAVENOUS
Status: COMPLETED
Start: 2017-04-20 | End: 2017-04-20

## 2017-04-20 RX ORDER — ONDANSETRON 2 MG/ML
INJECTION INTRAMUSCULAR; INTRAVENOUS
Status: COMPLETED
Start: 2017-04-20 | End: 2017-04-20

## 2017-04-20 RX ORDER — LIDOCAINE 40 MG/G
CREAM TOPICAL
Status: DISCONTINUED | OUTPATIENT
Start: 2017-04-20 | End: 2017-04-20 | Stop reason: HOSPADM

## 2017-04-20 RX ORDER — FENTANYL CITRATE 50 UG/ML
25-50 INJECTION, SOLUTION INTRAMUSCULAR; INTRAVENOUS EVERY 5 MIN PRN
Status: DISCONTINUED | OUTPATIENT
Start: 2017-04-20 | End: 2017-04-20 | Stop reason: HOSPADM

## 2017-04-20 RX ORDER — CLINDAMYCIN PHOSPHATE 900 MG/50ML
900 INJECTION, SOLUTION INTRAVENOUS
Status: DISCONTINUED | OUTPATIENT
Start: 2017-04-20 | End: 2017-04-20 | Stop reason: HOSPADM

## 2017-04-20 RX ORDER — FENTANYL CITRATE 50 UG/ML
INJECTION, SOLUTION INTRAMUSCULAR; INTRAVENOUS
Status: DISCONTINUED
Start: 2017-04-20 | End: 2017-04-20 | Stop reason: HOSPADM

## 2017-04-20 RX ORDER — NALOXONE HYDROCHLORIDE 0.4 MG/ML
.1-.4 INJECTION, SOLUTION INTRAMUSCULAR; INTRAVENOUS; SUBCUTANEOUS
Status: DISCONTINUED | OUTPATIENT
Start: 2017-04-20 | End: 2017-04-20 | Stop reason: HOSPADM

## 2017-04-20 RX ORDER — FLUMAZENIL 0.1 MG/ML
0.2 INJECTION, SOLUTION INTRAVENOUS
Status: DISCONTINUED | OUTPATIENT
Start: 2017-04-20 | End: 2017-04-20 | Stop reason: HOSPADM

## 2017-04-20 RX ORDER — PALONOSETRON 0.05 MG/ML
0.25 INJECTION, SOLUTION INTRAVENOUS ONCE
Status: CANCELLED
Start: 2017-04-21 | End: 2017-04-21

## 2017-04-20 RX ORDER — HEPARIN SODIUM 1000 [USP'U]/ML
INJECTION, SOLUTION INTRAVENOUS; SUBCUTANEOUS
Status: DISCONTINUED
Start: 2017-04-20 | End: 2017-04-20 | Stop reason: HOSPADM

## 2017-04-20 RX ORDER — ACETAMINOPHEN 325 MG/1
650-975 TABLET ORAL
Status: CANCELLED | OUTPATIENT
Start: 2017-04-20

## 2017-04-20 RX ORDER — LIDOCAINE HYDROCHLORIDE 10 MG/ML
1-30 INJECTION, SOLUTION EPIDURAL; INFILTRATION; INTRACAUDAL; PERINEURAL
Status: DISCONTINUED | OUTPATIENT
Start: 2017-04-20 | End: 2017-04-20 | Stop reason: HOSPADM

## 2017-04-20 RX ORDER — LORAZEPAM 2 MG/ML
0.5 INJECTION INTRAMUSCULAR EVERY 6 HOURS PRN
Status: CANCELLED
Start: 2017-04-21

## 2017-04-20 RX ADMIN — ONDANSETRON 4 MG: 2 INJECTION INTRAMUSCULAR; INTRAVENOUS at 09:43

## 2017-04-20 RX ADMIN — MIDAZOLAM 2 MG: 1 INJECTION INTRAMUSCULAR; INTRAVENOUS at 10:58

## 2017-04-20 RX ADMIN — FENTANYL CITRATE 50 MCG: 50 INJECTION INTRAMUSCULAR; INTRAVENOUS at 10:58

## 2017-04-20 RX ADMIN — CEFAZOLIN SODIUM 2 G: 2 INJECTION, SOLUTION INTRAVENOUS at 11:00

## 2017-04-20 ASSESSMENT — PAIN SCALES - GENERAL: PAINLEVEL: NO PAIN (0)

## 2017-04-20 NOTE — PROGRESS NOTES
Infusion Nursing Note:  Tosin Nina presents today for PIV lab draw.    Patient seen by provider today: Yes: Adelina   present during visit today: Not Applicable.    Note: Port placed this morning but too tender to use for blood draw.    Intravenous Access:  Lab draw site right forearm, Needle type butterfly, Gauge 23.  Labs drawn without difficulty.    Treatment Conditions:  Not Applicable.      Post Infusion Assessment:  Site patent and intact, free from redness, edema or discomfort.  No evidence of extravasations.  Access discontinued per protocol.    Discharge Plan:   Patient discharged in stable condition accompanied by: self,  and daughter.  Departure Mode: Wheelchair.    Phyllis Maldonado RN

## 2017-04-20 NOTE — IP AVS SNAPSHOT
[facility]    [address]    [facility] [address]    Phone:  [phone]                                       After Visit Summary   4/20/2017    Tosin Nina    MRN: [id number]           After Visit Summary Signature Page     I have received my discharge instructions, and my questions have been answered. I have discussed any challenges I see with this plan with the nurse or doctor.    ..........................................................................................................................................  Patient/Patient Representative Signature      ..........................................................................................................................................  Patient Representative Print Name and Relationship to Patient    ..................................................               ................................................  Date                                            Time    ..........................................................................................................................................  Reviewed by Signature/Title    ...................................................              ..............................................  Date                                                            Time

## 2017-04-20 NOTE — PROCEDURES
RADIOLOGY PROCEDURE NOTE  Patient name: Tosin Nina  MRN: [id number]  : 1945    Pre-procedure diagnosis: ovarian cancer  Post-procedure diagnosis: Same    Procedure Date/Time: 2017  11:15 AM  Procedure: port placement  Estimated blood loss: None  Specimen(s) collected with description: none  The patient tolerated the procedure well with no immediate complications.  Significant findings:tip at AC jxn    See imaging dictation for procedural details.    Provider name: Vikash Simmons  Assistant(s):None

## 2017-04-20 NOTE — PROGRESS NOTES
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    HPI: Namita comes to the clinic accompanied by her  Christiano and daughter Cece. She initially presented to the ED with 1-2 months of progressive dizziness, weakness, and vision changes ultimately causing her to require a transport chair. She also had worsening neuropathy to her hands and feet that began five months ago. A CT CAP was obtained and revealed abdominopelvic lymphadenopathy, omental caking, and peritoneal carcinomatosis along with an irregular soft tissue mass about the sigmoid colon. Her  was elevated to 3076 and a biopsy of the pelvic mass was positive for high grade serous carcinoma. She received C1D1 carboplatin AUC 6 and paclitaxel 80mg/m2 while inpatient. 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 She was also found to have right lower and upper lobar pulmonary emboli without evidence of R heart strain- she was started on therapeutic Lovenox, however, was found to be supratherapeutic and was dose reduced to 60mg/kg SQ BID. She was discharged to [facility] on 17. This morning she was seen by interventional radiology for a port-a-cath placement- her Lovenox was held appropriately last night and this morning per her nursing staff at [facility]. She was hypotensive in IR and was given a fluid bolus. She is currently feeling poorly secondary to nausea, diarrhea, dizziness/vision changes. She states she has been persistently nauseated since receiving chemotherapy but that lorazepam has been helpful for her- she is receiving lorazepam 0.25mg PO BID per her  palliative care NP recommendations. She is also taking Zofran 8mg ODT q8h but reports this is ineffective. She is unsure whether she is taking Compazine. She states her nausea causes her to have dry heaves but she has not had any emesis. It does impact her oral intake and she is having difficulty drinking 2L fluids daily and has been eating very small sporadic meals (peaches, a few bites of turkey or mashed potatoes, an Ensure) since her hospital discharge. Her vision has not changed since discharge and she continues to feel like her vision is scrolling up and down. She feels strong physically but states her vision causes her to be dizzy and she has difficulty with walking and balance due to this but participates in physical therapy at her TCU. She has had diarrhea that recurred four days ago- has a hx of IBS but states she has had watery diarrhea now nearly every time she goes to the bathroom- states it is brown in color, denies abdominal cramping or fevers. Her palliative NP ordered a c diff sample but she states this has not yet been done. She also notes dry mouth which is irritating to her. She continues with neuropathy to her hands and feet but states this is stable. On Lovenox for PE, denies any cough, shortness of breath, or chest pain- states she has been taking this regularly at the TCU, no bleeding concerns. She reports chronic issues with hypokalemia due to her anti-hypertensive medications and states that at home she supplements her potassium as needed. She reports feeling quite anxious about her diagnosis- has started Lexapro per palliative NP but understands this takes time to become fully effective. She denies need to see cancer psychology. She is concerned about hair loss and would like to find a place where she can try on wigs. She and her family report that her diagnosis is difficult for them but that they have support through their [employer] and their family/friends. She feels prepared to receive D8  paclitaxel as an outpatient tomorrow but her biggest concern is controlling her nausea.    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 Right lower and upper lobar pulmonary emboli without CT evidence of  right heart strain.  2. Abdominopelvic lymphadenopathy, omental caking, and peritoneal  carcinomatosis. This is most suspicious for metastatic left ovarian  cancer. There is an irregular soft tissue mass about the sigmoid colon  that could potentially represent a primary malignancy, though  metastatic serosal implants are favored as an etiology for this.   4/12/17:  2648, CEA 0.6, CT guided needle core biopsy of left omental mass suggestive of high grade serous carcinoma    4/14/17: C1D1 carboplatin AUC 6 and dose dense Taxol 80mg/m2. Plan for three cycles of chemotherapy followed by imaging for consideration of surgery.    Past Medical History:   Diagnosis Date     Hypertension      Spinal stenosis      Thyroid disease        Past Surgical History:   Procedure Laterality Date     THYROIDECTOMY         Current Outpatient Prescriptions   Medication     acetaminophen (TYLENOL) 325 MG tablet     ondansetron (ZOFRAN) 4 MG tablet     losartan (COZAAR) 100 MG tablet     hydrochlorothiazide (HYDRODIURIL) 25 MG tablet     polyethylene glycol (MIRALAX/GLYCOLAX) Packet     senna-docusate (SENOKOT-S;PERICOLACE) 8.6-50 MG per tablet     levothyroxine (SYNTHROID) 125 MCG tablet     Vitamin D, Cholecalciferol, 1000 UNITS TABS     enoxaparin (LOVENOX) 80 MG/0.8ML injection     ondansetron (ZOFRAN-ODT) 4 MG ODT tab     prochlorperazine (COMPAZINE) 5 MG tablet     IBUPROFEN PO     No current facility-administered medications for this visit.           Allergies   Allergen Reactions      Amoxicillin Hives       No family history on file.    Social History     Social History     Marital status:      Spouse name: N/A     Number of children: N/A     Years of education: N/A     Occupational History     Not on file.     Social History Main Topics     Smoking status: Never Smoker     Smokeless tobacco: Not on file     Alcohol use Yes      Comment: occasionally     Drug use: No     Sexual activity: Not on file     Other Topics Concern     Not on file     Social History Narrative           ROS  General: + fatigue, decreased appetite, sleeping difficulty. Denies changes in weight, weakness, night sweats, hot flashes, fever, chills  HEENT: + visual disturbances. Denies headaches, hair loss, spots or floaters, diplopia, masses, head injury, tinnitus, hearing loss, epistaxis, congestion, problems with teeth or gums, dysphonia, or dysphagia  Pulmonary: Denies cough, sputum, hemoptysis, shortness of breath, dyspnea on exertion, wheezing, or allergies  Cardiovascular: + hypertension. Denies chest pain, fainting, palpitations, murmurs, activity intolerance, swelling in legs  Gastrointestinal: + nausea, dry heaves, diarrhea, heartburn. Denies vomiting, constipation,  abdominal pain, bloating, melena, hematochezia, or jaundice  Genitourinary: Denies dysuria, urinary urgency or frequency, hematuria, cloudy or malodorous urine, incontinence, repeat urinary tract infections, flank pain, pelvic pain, vaginal bleeding, vaginal discharge, or vaginal dryness  Sexual Function: Denies pain with intercourse, changes in libido, arousal difficulty, or changes in orgasm  Integumentary: Denies rashes, sores, changing moles, or scarring  Hematologic: Denies swollen lymph nodes, masses, easy bruising, or easy bleeding  Musculoskeletal: Denies falls, back pain, myalgias, arthralgias, stiffness, muscle weakness or muscle cramps  Neurologic: + numbness/tingling, dizziness, difficulty walking. Denies changes in memory, seizures,  "or tremors  Psychiatric: + anxiety. Denies depression, mood changes, suicidal thoughts, or difficulty concentrating  Endocrine: Denies polydipsia, polyuria, temperature intolerance, or history of thyroid disease      Physical Exam:    BP (!) 85/57  Pulse 66  Temp 98.3  F (36.8  C) (Tympanic)  Resp 16  Ht 1.499 m (4' 11\")  Wt 66 kg (145 lb 9.6 oz)  SpO2 96%  BMI 29.41 kg/m2    CONSTITUTIONAL: Alert non-toxic appearing female in no acute distress  HEAD: Normocephalic, atraumatic  EYES: PERRLA; no scleral icterus; nystagmus noted in all fields of gaze  ENT: Oropharynx pink without lesions  NECK: Neck supple without lymphadenopathy  RESPIRATORY: Lungs clear to auscultation, no increased work of breathing noted  CV: Regular rate and rhythm, S1S2, no clicks, murmurs, rubs, or gallops; bilateral lower extremities without edema, dorsalis pedis pulses 2+ bilaterally  GASTROINTESTINAL: Normoactive bowel sounds x4 quadrants, abdomen soft, non-distended, and non-tender to palpation without masses or organomegaly  GENITOURINARY: Not indicated  LYMPHATIC: Cervical, supraclavicular, and inguinal nodes without lymphadenopathy  MUSCULOSKELETAL: Moves all extremities, no obvious muscle wasting, 5/5 strength in major muscle groups in BUE and BLE  NEUROLOGIC: CN II-XII intact but notable for nystagmus, cerebellar testing shows difficulty with L hand repetitive motions and L heel to shin motions; gait not assessed due to dizziness and nausea, sitting in wheelchair  SKIN: Appropriate color for race, warm and dry, no rashes or lesions to unclothed skin  PSYCHIATRIC: Pleasant and interactive, affect bright, makes appropriate eye contact, thought process linear    Labs:      4/20/2017   Hemoglobin 11.7 - 15.7 g/dL 14.2   Hematocrit 35.0 - 47.0 % 42.0   Platelet Count 150 - 450 10e9/L 290   Absolute Neutrophil 1.6 - 8.3 10e9/L 6.3   Potassium 3.4 - 5.3 mmol/L 3.3 (A)   Magnesium 1.6 - 2.3 mg/dL 1.9   WBC 4.0 - 11.0 10e9/L 7.4 "   Heparin Xa level ordered for 4/21/17    Assessment/Plan:  1) Ovarian cancer, likely stage IV: Proceed with C1D8 carboplatin AUC 6 and dose dense Taxol  80mg/m2 as prescribed by gynecologic oncology team while she was inpatient pending chemotherapy labs. Reviewed signs and symptoms for when she should contact the clinic or seek additional care, including but not limited to fever, chills, inability to keep down food or fluids, nausea and vomiting not controlled with antiemetics, and diarrhea leading to dehydration. Patient to contact the clinic with any questions or concerns in the interim. To see Dr. Ennis on 5/2/17 for new patient visit and further treatment planning. She saw Milena Vick NP with the [facility] Palliative [facility] team at her TCU yesterday. Discussed support groups that may be helpful for her.  2) Nausea and dry-heaving: Given that her onset of nausea was a few days after chemotherapy, suspect that this is related to her chemotherapy regimen. Will administer Emend 150mg IV x1 and Aloxi 0.25mg IV x1 prior to chemotherapy tomorrow as pre-medication- dexamethasone dose reduced by 50% secondary to addition of Emend. I do not anticipate that Emend will continue to be needed on days when she receives Taxol only, but this may need to be given on D1 with her carboplatin in the future. Called [facility]- she has Compazine as a PRN and received it only one time yesterday. Ativan is also PRN and she has not been receiving this. Ordered RN to hold Zofran for 72h post chemotherapy tomorrow due to Aloxi administration. During this time, Compazine 5mg PO will need to be scheduled four times daily before meals and at HS. Nursing staff to offer Ativan 0.25mg BID PRN for nausea/anxiety. RN verbalized understanding and read back orders. Olanzapine would be a good next step if these measures are not effective, however, this can again make her dizzy and I do not want to worsen her current symptoms. We also  reviewed non-pharmacologic measures to reduce nausea including use of nathan, peppermint, sea bands (discussed proper use- she presented with one sea band on that was not in proper position), small frequent meals, and sipping fluids through a straw. Nausea could potentially be caused by her dizziness and nystagmus- continue to monitor.  3) Diarrhea: Per note by Milena Vick, palliative NP, an order for C diff testing was placed. Patient reports this has not yet been done. We discussed that while infection is possible, her labs and overall clinical picture are hopeful that her source is not infectious. We discussed possibility of her sigmoid colon mass causing diarrhea post-chemotherapy. Called [facility] to confirm that they have a C diff order- RN stated he did and that he will attempt to collect this tonight. To fax me the results.  4) Xerostomia: Discussed use of Biotene spray and sucking on hard candies in addition to remaining adequately hydrated.  5) Concerns about alopecia: Will send message to Jamila Duque, ACS navigator, to discuss wigs with Namita. Also given information on Look Good, Feel Better.  6) Coping: Namita appears to be coping well- her  and daughter appear quite saddened today. We discussed resources such as [facility], setting boundaries with family and friends in order to conserve energy, and support groups. They decline cancer psychology at the moment but they can ask for a referral if they ever feel they would like this.  7) Nutrition and hydration: Discussed importance of eating small, calorically dense meals and that taking anti-emetics prior to meals may help. We also discussed recommendations for 64oz non-caffeinated fluids daily and ways to attain this goal. If she feels dehydrated she is to inform our clinic so she can receive IV hydration.  8) Hypokalemia: She reports this is chronic for her. Will have this replaced intravenously in clinic tomorrow as I suspect that oral  replacement would worsen her nausea.  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 Regardless, she has started cancer treatment which will hopefully help improve her neurologic symptoms. Continue to monitor neurologic status. Continue with TCU cares in aiding her to become more independent and able to transition home.  10) Pulmonary emboli on BID LMWH: Therapeutic Lovenox dose reduced due to supratherapeutic levels while in the hospital. Lovenox held last night and this morning in preparation for IR port-a-cath placement today- this was confirmed with her nurse at [facility]. I have asked her nurse at [facility] to have her morning Lovenox administered at 0700 tomorrow so that she can have a heparin Xa level drawn tomorrow around 1100 after her paclitaxel administration. Will reassess when this lab results tomorrow.  11) Genetic counseling: Risk factors have been assessed and patient does meet criteria for genetic counseling given her diagnosis of likely epithelial ovarian cancer. Given that her  reported their family feels overwhelmed, this discussion was deferred and will need to be addressed at an upcoming visit.  12) Health maintenance issues discussed include to follow up with PCP for non-gynecologic concerns and co-morbid conditions- to discuss her BP (hypotensive today) with her PCP  13) Patient and family verbalized understanding of and agreement with plan    Greater than 45 minutes of face to face time were spent with the patient with over 50% of that time spent in counseling, coordination of care, education, and symptom management.    SALVADOR Solis, FNP-C  Division of Gynecologic Oncology  [facility]  Pager: [phone]

## 2017-04-20 NOTE — MR AVS SNAPSHOT
After Visit Summary   4/20/2017    Tosin Nina    MRN: [id number]           Patient Information     Date Of Birth          1945        Visit Information        Provider Department      4/20/2017 3:30 PM RH INFUSION CHAIR 9 [facility]        Today's Diagnoses     Ovarian cancer, unspecified laterality (H)    -  1       Follow-ups after your visit        Your next 10 appointments already scheduled     Apr 21, 2017  9:30 AM CDT   Level 3 with RH INFUSION CHAIR 8   [facility] ([facility])    [address]  [place] [address]   [phone]            Apr 28, 2017  8:00 AM CDT   Level 3 with RH INFUSION CHAIR 12   [facility] ([facility])    [address]  [place] [address]   [phone]            May 02, 2017  8:00 AM CDT   New Visit with Nessa Foster MD   [facility] ([facility])    Critical access hospital [address]  [place] [address]   [phone]            May 05, 2017 11:00 AM CDT   Level 3 with RH INFUSION CHAIR 10   [facility] ([facility])    [facility]  [address]  [place] [address]   [phone]              Future tests that were ordered for you today     Open Future Orders        Priority Expected Expires Ordered    Heparin 10a Level Routine 4/21/2017 4/20/2018 4/20/2017            Who to contact     If you have questions or need follow up information about today's clinic visit or your schedule please contact [facility] INF[facility]ION [facility] directly at [phone].  Normal or non-critical lab and imaging results will be communicated to you by Fabi  "letter or phone within 4 business days after the clinic has received the results. If you do not hear from us within 7 days, please contact the clinic through [employer] or phone. If you have a critical or abnormal lab result, we will notify you by phone as soon as possible.  Submit refill requests through [employer] or call your pharmacy and they will forward the refill request to us. Please allow 3 business days for your refill to be completed.          Additional Information About Your Visit        [employer] Information     [employer] lets you send messages to your doctor, view your test results, renew your prescriptions, schedule appointments and more. To sign up, go to [email or web address] . Click on \"Log in\" on the left side of the screen, which will take you to the Welcome page. Then click on \"Sign up Now\" on the right side of the page.     You will be asked to enter the access code listed below, as well as some personal information. Please follow the directions to create your username and password.     Your access code is: [email or web address]  Expires: 2017 12:50 PM     Your access code will  in 90 days. If you need help or a new code, please call your [place] clinic or [phone].        Care EveryWhere ID     This is your Care EveryWhere ID. This could be used by other organizations to access your [place] medical records  [id number]         Blood Pressure from Last 3 Encounters:   17 (!) 85/57   17 98/48   17 125/68    Weight from Last 3 Encounters:   17 66 kg (145 lb 9.6 oz)   04/15/17 69.6 kg (153 lb 6.4 oz)   17 74.6 kg (164 lb 7 oz)              Today, you had the following     No orders found for display       Primary Care Provider Office Phone # Fax #    Esther Back [phone]       [facility] [address] [address]        Thank you!     Thank you for choosing [facility]  for your care. Our " goal is always to provide you with excellent care. Hearing back from our patients is one way we can continue to improve our services. Please take a few minutes to complete the written survey that you may receive in the mail after your visit with us. Thank you!             Your Updated Medication List - Protect others around you: Learn how to safely use, store and throw away your medicines at www.disposemymeds.org.          This list is accurate as of: 4/20/17  4:17 PM.  Always use your most recent med list.                   Brand Name Dispense Instructions for use    acetaminophen 325 MG tablet    TYLENOL    100 tablet    Take 2 tablets (650 mg) by mouth every 4 hours as needed for mild pain       enoxaparin 80 MG/0.8ML injection    LOVENOX    60 Syringe    Inject 0.6 mLs (60 mg) Subcutaneous every 12 hours       hydrochlorothiazide 25 MG tablet    HYDRODIURIL    30 tablet    Take 2 tablets (50 mg) by mouth daily       IBUPROFEN PO      Take 200 mg by mouth every 6 hours as needed for moderate pain Reported on 4/20/2017       levothyroxine 125 MCG tablet    SYNTHROID    30 tablet    Take 1 tablet (125 mcg) by mouth daily       losartan 100 MG tablet    COZAAR    30 tablet    Take 1 tablet (100 mg) by mouth daily       ondansetron 4 MG ODT tab    ZOFRAN-ODT    120 tablet    Take 1 tablet (4 mg) by mouth every 6 hours as needed for nausea       ondansetron 4 MG tablet    ZOFRAN    20 tablet    Take 1 tablet (4 mg) by mouth every 6 hours as needed for nausea       prochlorperazine 5 MG tablet    COMPAZINE    90 tablet    Take 1 tablet (5 mg) by mouth every 6 hours as needed for vomiting       Vitamin D (Cholecalciferol) 1000 UNITS Tabs     60 tablet    Take 2,000 Units by mouth daily

## 2017-04-20 NOTE — NURSING NOTE
"Tosin Nina is a 71 year old female who presents for:  Chief Complaint   Patient presents with     Oncology Clinic Visit     Ovarian cancer, unspecified laterality - follow up        Initial Vitals:  Ht 1.499 m (4' 11\")  Wt 66 kg (145 lb 9.6 oz)  BMI 29.41 kg/m2 Estimated body mass index is 29.41 kg/(m^2) as calculated from the following:    Height as of this encounter: 1.499 m (4' 11\").    Weight as of this encounter: 66 kg (145 lb 9.6 oz).. Body surface area is 1.66 meters squared. BP completed using cuff size: large  No Pain (0) No LMP recorded. Patient is postmenopausal. Allergies and medications reviewed.     Medications: Medication refills not needed today.  Pharmacy name entered into [employer]: [facility] [address] [place] [address] - [address] [place] [address] [address] AT [facility] [address]    Comments: Follow-up    8 minutes for nursing intake (face to face time)   Teresa Sanches  DISCHARGE PLAN:  Next appointments: See patient instruction section  Departure Mode: Ambulatory  Accompanied by: daughter Cece and  Christiano  8 minutes for nursing discharge (face to face time)   Teresa Sanches          "

## 2017-04-20 NOTE — MR AVS SNAPSHOT
"              After Visit Summary   4/20/2017    Tosin Nina    MRN: [id number]           Patient Information     Date Of Birth          1945        Visit Information        Provider Department      4/20/2017 12:40 PM Adelina Bird APRN CNP [facility]        Today's Diagnoses     Ovarian cancer, unspecified laterality (H)    -  1    Encounter for antineoplastic chemotherapy        On anticoagulant therapy        Chemotherapy induced nausea and vomiting        Diarrhea, unspecified type        Disturbance of salivary secretion        Hypokalemia        Dizziness          Care Instructions    Dear [place], here are a few resources for you to look into:    MOCA ([place] [place])  https://mnovarian.org    Ely's Club:  www.gildasclubtwincities.org    Chemo Chicks:  www.chemochickstwincities.com    Look Good, Feel Better:  www.lookgoodfeelbetter.org      For your mouth:  Biotene mouth spray  Sucking on hard candies such as lemon or nathan can help    For nausea:  I'm going to call your nursing home and have them schedule anti-nausea medications before meals  Nathan can help along with peppermint  Sea bands- you need to wear them on both wrists at the specific point three fingers down from your wrist crease right in the middle    For food:  Eat small frequent meals that are high in calories- things that can help are nut butters, avocados, Greek yogurt, and coconut oil    For fluids:  Try to keep hydrated- we recommend two liters of non-caffeinated beverages daily. Flavored beverages can help as well as drinking through a straw    For diarrhea:  I'm going to call your nursing home to make sure they check a C diff sample    Blood pressure:  Please follow up with your primary care provider about this    For hair:  I will contact our [employer] navigator to have her discuss wigs with you  Look into \"Look Good, Feel Better\"          Follow-ups after " your visit        Your next 10 appointments already scheduled     Apr 21, 2017  9:30 AM CDT   Level 3 with RH INFUSION CHAIR 8   [facility] ([facility])    [address]  [place] [address]   [phone]            Apr 28, 2017  8:00 AM CDT   Level 3 with RH INFUSION CHAIR 12   [facility] ([facility])    [address] [address]  [place] [address]   [phone]            May 02, 2017  8:00 AM CDT   New Visit with Nessa Foster MD   [facility] ([facility])    [address]  [place] [address]   [phone]            May 05, 2017 11:00 AM CDT   Level 3 with RH INFUSION CHAIR 10   [facility] ([facility])    [address]  [place] [address]   [phone]              Future tests that were ordered for you today     Open Future Orders        Priority Expected Expires Ordered    Heparin 10a Level Routine 4/21/2017 4/20/2018 4/20/2017            Who to contact     If you have questions or need follow up information about today's clinic visit or your schedule please contact [facility] directly at [phone].  Normal or non-critical lab and imaging results will be communicated to you by MyChart, letter or phone within 4 business days after the clinic has received the results. If you do not hear from us within 7 days, please contact the clinic through MyChart or phone. If you have a critical or abnormal lab result, we will notify you by phone as soon as possible.  Submit refill requests through [employer] or call your pharmacy and they will forward the refill request to us. Please allow 3 business days for  "your refill to be completed.          Additional Information About Your Visit        [employer] Information     [employer] lets you send messages to your doctor, view your test results, renew your prescriptions, schedule appointments and more. To sign up, go to [email or web address] . Click on \"Log in\" on the left side of the screen, which will take you to the Welcome page. Then click on \"Sign up Now\" on the right side of the page.     You will be asked to enter the access code listed below, as well as some personal information. Please follow the directions to create your username and password.     Your access code is: [email or web address]  Expires: 2017 12:50 PM     Your access code will  in 90 days. If you need help or a new code, please call your [place] clinic or [phone].        Care EveryWhere ID     This is your [facility] EveryWhere ID. This could be used by other organizations to access your [place] medical records  [id number]        Your Vitals Were     Pulse Temperature Respirations Height Pulse Oximetry BMI (Body Mass Index)    66 98.3  F (36.8  C) (Tympanic) 16 1.499 m (4' 11\") 96% 29.41 kg/m2       Blood Pressure from Last 3 Encounters:   17 (!) 85/57   17 98/48   17 125/68    Weight from Last 3 Encounters:   17 66 kg (145 lb 9.6 oz)   04/15/17 69.6 kg (153 lb 6.4 oz)   17 74.6 kg (164 lb 7 oz)              We Performed the Following     CBC with platelets differential     Magnesium     Potassium        Primary Care Provider Office Phone # Fax #    Esther Back [phone]       [facility] [address] [address]        Thank you!     Thank you for choosing [facility]  for your care. Our goal is always to provide you with excellent care. Hearing back from our patients is one way we can continue to improve our services. Please take a few minutes to complete the written survey that you may receive in the mail " after your visit with us. Thank you!             Your Updated Medication List - Protect others around you: Learn how to safely use, store and throw away your medicines at www.disposemymeds.org.          This list is accurate as of: 4/20/17  4:14 PM.  Always use your most recent med list.                   Brand Name Dispense Instructions for use    acetaminophen 325 MG tablet    TYLENOL    100 tablet    Take 2 tablets (650 mg) by mouth every 4 hours as needed for mild pain       enoxaparin 80 MG/0.8ML injection    LOVENOX    60 Syringe    Inject 0.6 mLs (60 mg) Subcutaneous every 12 hours       hydrochlorothiazide 25 MG tablet    HYDRODIURIL    30 tablet    Take 2 tablets (50 mg) by mouth daily       IBUPROFEN PO      Take 200 mg by mouth every 6 hours as needed for moderate pain Reported on 4/20/2017       levothyroxine 125 MCG tablet    SYNTHROID    30 tablet    Take 1 tablet (125 mcg) by mouth daily       losartan 100 MG tablet    COZAAR    30 tablet    Take 1 tablet (100 mg) by mouth daily       ondansetron 4 MG ODT tab    ZOFRAN-ODT    120 tablet    Take 1 tablet (4 mg) by mouth every 6 hours as needed for nausea       ondansetron 4 MG tablet    ZOFRAN    20 tablet    Take 1 tablet (4 mg) by mouth every 6 hours as needed for nausea       prochlorperazine 5 MG tablet    COMPAZINE    90 tablet    Take 1 tablet (5 mg) by mouth every 6 hours as needed for vomiting       Vitamin D (Cholecalciferol) 1000 UNITS Tabs     60 tablet    Take 2,000 Units by mouth daily

## 2017-04-20 NOTE — PATIENT INSTRUCTIONS
"Dear [place], here are a few resources for you to look into:    MOCA ([place] Cancer [place])  https://mnovarian.org    Ely's Club:  www.gildasclubtwincities.org    Chemo Chicks:  www.chemochickstwincities.com    Look Good, Feel Better:  www.lookgoodfeelbetter.org      For your mouth:  Biotene mouth spray  Sucking on hard candies such as lemon or mayela can help    For nausea:  I'm going to call your nursing home and have them schedule anti-nausea medications before meals  Mayela can help along with peppermint  Sea bands- you need to wear them on both wrists at the specific point three fingers down from your wrist crease right in the middle    For food:  Eat small frequent meals that are high in calories- things that can help are nut butters, avocados, Greek yogurt, and coconut oil    For fluids:  Try to keep hydrated- we recommend two liters of non-caffeinated beverages daily. Flavored beverages can help as well as drinking through a straw    For diarrhea:  I'm going to call your nursing home to make sure they check a C diff sample    Blood pressure:  Please follow up with your primary care provider about this    For hair:  I will contact our [employer] navigator to have her discuss wigs with you  Look into \"Look Good, Feel Better\"    "

## 2017-04-20 NOTE — PROGRESS NOTES
Pt tolerated procedure.  Awake and oriented, vitals stable, dc instructions reviewed with pt and .  All questions answered.

## 2017-04-20 NOTE — IP AVS SNAPSHOT
MRN:[id number]                      After Visit Summary   4/20/2017    Tosin Nina    MRN: [id number]           Visit Information        Department      4/20/2017  9:07 AM [facility]          Review of your medicines      UNREVIEWED medicines. Ask your doctor about these medicines        Dose / Directions    acetaminophen 325 MG tablet   Commonly known as:  TYLENOL   Used for:  Ovarian cancer, unspecified laterality (H)        Dose:  650 mg   Take 2 tablets (650 mg) by mouth every 4 hours as needed for mild pain   Quantity:  100 tablet   Refills:  0       enoxaparin 80 MG/0.8ML injection   Commonly known as:  LOVENOX   Used for:  Other pulmonary embolism without acute cor pulmonale, unspecified chronicity (H)        Dose:  60 mg   Inject 0.6 mLs (60 mg) Subcutaneous every 12 hours   Quantity:  60 Syringe   Refills:  1       hydrochlorothiazide 25 MG tablet   Commonly known as:  HYDRODIURIL   Used for:  Dizziness        Dose:  50 mg   Take 2 tablets (50 mg) by mouth daily   Quantity:  30 tablet   Refills:  0       IBUPROFEN PO        Dose:  200 mg   Take 200 mg by mouth every 6 hours as needed for moderate pain   Refills:  0       levothyroxine 125 MCG tablet   Commonly known as:  SYNTHROID   Used for:  Other pulmonary embolism without acute cor pulmonale, unspecified chronicity (H)        Dose:  125 mcg   Take 1 tablet (125 mcg) by mouth daily   Quantity:  30 tablet   Refills:  0       losartan 100 MG tablet   Commonly known as:  COZAAR   Used for:  Dizziness        Dose:  100 mg   Take 1 tablet (100 mg) by mouth daily   Quantity:  30 tablet   Refills:  0       ondansetron 4 MG ODT tab   Commonly known as:  ZOFRAN-ODT   Used for:  Ovarian cancer, unspecified laterality (H)        Dose:  4 mg   Take 1 tablet (4 mg) by mouth every 6 hours as needed for nausea   Quantity:  120 tablet   Refills:  0       ondansetron 4 MG tablet   Commonly known as:  ZOFRAN   Used for:  Ovarian  cancer, unspecified laterality (H)        Dose:  4 mg   Take 1 tablet (4 mg) by mouth every 6 hours as needed for nausea   Quantity:  20 tablet   Refills:  0       polyethylene glycol Packet   Commonly known as:  MIRALAX/GLYCOLAX   Used for:  Ovarian cancer, unspecified laterality (H)        Dose:  17 g   Take 17 g by mouth daily as needed for constipation   Quantity:  7 packet   Refills:  0       prochlorperazine 5 MG tablet   Commonly known as:  COMPAZINE   Used for:  Ovarian cancer, unspecified laterality (H)        Dose:  5 mg   Take 1 tablet (5 mg) by mouth every 6 hours as needed for vomiting   Quantity:  90 tablet   Refills:  0       senna-docusate 8.6-50 MG per tablet   Commonly known as:  SENOKOT-S;PERICOLACE   Used for:  Ovarian cancer, unspecified laterality (H)        Dose:  1-2 tablet   Take 1-2 tablets by mouth 2 times daily as needed (constipation )   Quantity:  100 tablet   Refills:  0       Vitamin D (Cholecalciferol) 1000 UNITS Tabs   Used for:  Paresthesias        Dose:  2000 Units   Take 2,000 Units by mouth daily   Quantity:  60 tablet   Refills:  0                Protect others around you: Learn how to safely use, store and throw away your medicines at www.disposemymeds.org.         Follow-ups after your visit        Your next 10 appointments already scheduled     Apr 20, 2017 12:40 PM CDT   Return Visit with SALVADOR Solis CNP   [facility] ([facility])    [facility]  [address]  [place] [address]   [phone]            Apr 21, 2017  9:30 AM CDT   Level 3 with RH INFUSION CHAIR 8   [facility] ([facility])    [facility]  [address]  [place] [address]   [phone]            Apr 28, 2017  8:00 AM CDT   Level 3 with RH INFUSION CHAIR 12   [facility] ([facility])     [facility]  [address]  [place] [address]   [phone]            May 02, 2017  8:00 AM CDT   New Visit with Nessa Foster MD   [facility] ([facility])    [facility]  [address]  [place] [address]   [phone]            May 04, 2017  9:00 AM CDT   Level 3 with  INFUSION CHAIR 9   [facility] ([facility])    [facility]  [address]  [place] [address]   [phone]            May 04, 2017  9:20 AM CDT   Return Visit with SALVADOR Solis CNP   [facility] ([facility])    [facility]  [address]  [facility] [address]   [phone]               Care Instructions        Further instructions from your care team         Going Home after Your Port Is Inserted  _______________________________________    Patient Name: Tosin Nina  Today's Date: April 20, 2017    The doctor who inserted your port was:  Dr. Simmons at [facility]     Have your port site and/or neck wound checked on:  4/21 at [facility]        Your port may be accessed right away. A nurse needs to flush your port every 30 days or after each use.     When you get home:    You should have an adult with you for the first 6 hours.    No driving or drinking alcohol until tomorrow. You may still have side effects from the medicine you received today (you may feel drowsy, unsteady or forgetful).     You may go back to your regular diet today.     If you take aspirin or Plavix, you may begin taking it again tomorrow. You may restart all other medicines today. Use pain medicine as directed.    Avoid heavy lifting or the overuse of your shoulder for three days.    Caring for the port site and/or neck wound:    Keep the port site clean and dry. Cover  "the site with plastic before taking a shower. Do this until the site has healed.     Keep the bandage on your port site for three days. Change it if it gets wet or dirty. After three days, you may use Band-Aids until the wound has healed.    For a neck wound, leave the tape on for three days. You may cover it with a Band-Aid for comfort.     If you have oozing or bleeding from the port site or from the cut in your neck:     Put direct pressure on the wound for 5 to 10 minutes with a gauze pad.  If you still have bleeding after 10 minutes, call your doctor.    If you are bleeding a lot and can't control it with direct pressure, call 911.    Call your doctor     Bleeding from a wound after 10 minutes of direct pressure.    Swelling in your neck or over your port site.    Signs of infection: a fever over 100 F (37.8 C) under the tongue; the site is red, tender or draining.       Additional Information About Your Visit        [employer] Information     [employer] lets you send messages to your doctor, view your test results, renew your prescriptions, schedule appointments and more. To sign up, go to [email or web address] . Click on \"Log in\" on the left side of the screen, which will take you to the Welcome page. Then click on \"Sign up Now\" on the right side of the page.     You will be asked to enter the access code listed below, as well as some personal information. Please follow the directions to create your username and password.     Your access code is: [email or web address]  Expires: 2017 12:50 PM     Your access code will  in 90 days. If you need help or a new code, please call your [place] clinic or [phone].        Care EveryWhere ID     This is your Care EveryWhere ID. This could be used by other organizations to access your [place] medical records  [id number]        Your Vitals Were     Blood Pressure Pulse Temperature Respirations Pulse Oximetry       109/69 (BP Location: Left arm) 76 97  F (36.1  C) " (Temporal) 18 96%        Primary Care Provider Office Phone # Fax #    Esther Back [phone]      Thank you!     Thank you for choosing [facility] for your care. Our goal is always to provide you with excellent care. Hearing back from our patients is one way we can continue to improve our services. Please take a few minutes to complete the written survey that you may receive in the mail after you visit. If you would like to speak to someone directly about your visit please contact Patient Relations at [phone]. Thank you!               Medication List: This is a list of all your medications and when to take them. Check marks below indicate your daily home schedule. Keep this list as a reference.      Medications           Morning Afternoon Evening Bedtime As Needed    acetaminophen 325 MG tablet   Commonly known as:  TYLENOL   Take 2 tablets (650 mg) by mouth every 4 hours as needed for mild pain                                enoxaparin 80 MG/0.8ML injection   Commonly known as:  LOVENOX   Inject 0.6 mLs (60 mg) Subcutaneous every 12 hours                                hydrochlorothiazide 25 MG tablet   Commonly known as:  HYDRODIURIL   Take 2 tablets (50 mg) by mouth daily                                IBUPROFEN PO   Take 200 mg by mouth every 6 hours as needed for moderate pain                                levothyroxine 125 MCG tablet   Commonly known as:  SYNTHROID   Take 1 tablet (125 mcg) by mouth daily                                losartan 100 MG tablet   Commonly known as:  COZAAR   Take 1 tablet (100 mg) by mouth daily                                ondansetron 4 MG ODT tab   Commonly known as:  ZOFRAN-ODT   Take 1 tablet (4 mg) by mouth every 6 hours as needed for nausea                                ondansetron 4 MG tablet   Commonly known as:  ZOFRAN   Take 1 tablet (4 mg) by mouth every 6 hours as needed for nausea                                 polyethylene glycol Packet   Commonly known as:  MIRALAX/GLYCOLAX   Take 17 g by mouth daily as needed for constipation                                prochlorperazine 5 MG tablet   Commonly known as:  COMPAZINE   Take 1 tablet (5 mg) by mouth every 6 hours as needed for vomiting                                senna-docusate 8.6-50 MG per tablet   Commonly known as:  SENOKOT-S;PERICOLACE   Take 1-2 tablets by mouth 2 times daily as needed (constipation )                                Vitamin D (Cholecalciferol) 1000 UNITS Tabs   Take 2,000 Units by mouth daily

## 2017-04-21 ENCOUNTER — TRANSFERRED RECORDS (OUTPATIENT)
Dept: HEALTH INFORMATION MANAGEMENT | Facility: CLINIC | Age: 72
End: 2017-04-21

## 2017-04-21 ENCOUNTER — INFUSION THERAPY VISIT (OUTPATIENT)
Dept: INFUSION THERAPY | Facility: CLINIC | Age: 72
End: 2017-04-21
Attending: NURSE PRACTITIONER
Payer: COMMERCIAL

## 2017-04-21 ENCOUNTER — TELEPHONE (OUTPATIENT)
Dept: ONCOLOGY | Facility: CLINIC | Age: 72
End: 2017-04-21

## 2017-04-21 ENCOUNTER — HOSPITAL ENCOUNTER (OUTPATIENT)
Facility: CLINIC | Age: 72
Setting detail: SPECIMEN
Discharge: HOME | End: 2017-04-21
Attending: NURSE PRACTITIONER | Admitting: NURSE PRACTITIONER
Payer: COMMERCIAL

## 2017-04-21 VITALS
SYSTOLIC BLOOD PRESSURE: 104 MMHG | RESPIRATION RATE: 16 BRPM | HEART RATE: 67 BPM | DIASTOLIC BLOOD PRESSURE: 53 MMHG | TEMPERATURE: 98.1 F

## 2017-04-21 DIAGNOSIS — C56.9 OVARIAN CANCER, UNSPECIFIED LATERALITY (H): Primary | ICD-10-CM

## 2017-04-21 DIAGNOSIS — Z79.01 ON ANTICOAGULANT THERAPY: ICD-10-CM

## 2017-04-21 LAB — LMWH PPP CHRO-ACNC: 0.62 IU/ML

## 2017-04-21 PROCEDURE — 25000125 ZZHC RX 250: Performed by: NURSE PRACTITIONER

## 2017-04-21 PROCEDURE — 96368 THER/DIAG CONCURRENT INF: CPT

## 2017-04-21 PROCEDURE — 96413 CHEMO IV INFUSION 1 HR: CPT

## 2017-04-21 PROCEDURE — 25000128 H RX IP 250 OP 636: Performed by: NURSE PRACTITIONER

## 2017-04-21 PROCEDURE — S0028 INJECTION, FAMOTIDINE, 20 MG: HCPCS | Performed by: OBSTETRICS & GYNECOLOGY

## 2017-04-21 PROCEDURE — 96367 TX/PROPH/DG ADDL SEQ IV INF: CPT

## 2017-04-21 PROCEDURE — 25000125 ZZHC RX 250: Performed by: OBSTETRICS & GYNECOLOGY

## 2017-04-21 PROCEDURE — 85520 HEPARIN ASSAY: CPT | Performed by: NURSE PRACTITIONER

## 2017-04-21 PROCEDURE — 25000128 H RX IP 250 OP 636: Performed by: OBSTETRICS & GYNECOLOGY

## 2017-04-21 PROCEDURE — 96375 TX/PRO/DX INJ NEW DRUG ADDON: CPT

## 2017-04-21 RX ORDER — PALONOSETRON 0.05 MG/ML
0.25 INJECTION, SOLUTION INTRAVENOUS ONCE
Status: COMPLETED | OUTPATIENT
Start: 2017-04-21 | End: 2017-04-21

## 2017-04-21 RX ADMIN — DEXAMETHASONE SODIUM PHOSPHATE 6 MG: 10 INJECTION, SOLUTION INTRAMUSCULAR; INTRAVENOUS at 10:16

## 2017-04-21 RX ADMIN — POTASSIUM CHLORIDE: 2 INJECTION, SOLUTION, CONCENTRATE INTRAVENOUS at 11:45

## 2017-04-21 RX ADMIN — SODIUM CHLORIDE 150 MG: 0.9 INJECTION, SOLUTION INTRAVENOUS at 10:32

## 2017-04-21 RX ADMIN — FAMOTIDINE 40 MG: 10 INJECTION INTRAVENOUS at 11:07

## 2017-04-21 RX ADMIN — DIPHENHYDRAMINE HYDROCHLORIDE 25 MG: 50 INJECTION, SOLUTION INTRAMUSCULAR; INTRAVENOUS at 10:55

## 2017-04-21 RX ADMIN — PALONOSETRON HYDROCHLORIDE 0.25 MG: 0.25 INJECTION INTRAVENOUS at 10:22

## 2017-04-21 RX ADMIN — SODIUM CHLORIDE 250 ML: 9 INJECTION, SOLUTION INTRAVENOUS at 10:20

## 2017-04-21 RX ADMIN — PACLITAXEL 135 MG: 6 INJECTION, SOLUTION INTRAVENOUS at 11:34

## 2017-04-21 NOTE — MR AVS SNAPSHOT
After Visit Summary   4/21/2017    Tosin Nina    MRN: [id number]           Patient Information     Date Of Birth          1945        Visit Information        Provider Department      4/21/2017 9:30 AM RH INFUSION CHAIR 8 [facility]        Today's Diagnoses     Ovarian cancer, unspecified laterality (H)    -  1    On anticoagulant therapy           Follow-ups after your visit        Your next 10 appointments already scheduled     Apr 28, 2017  8:00 AM CDT   Level 3 with RH INFUSION CHAIR 12   [facility] ([facility])    [facility]  [address]  [facility] [address]   [phone]            May 02, 2017  8:00 AM CDT   New Visit with Nessa Foster MD   [facility] ([facility])    [facility]  [address]  [facility] [address]   [phone]            May 05, 2017 11:00 AM CDT   Level 3 with RH INFUSION CHAIR 10   [facility] ([facility])    [facility]  [address]  [facility] [address]   [phone]              Who to contact     If you have questions or need follow up information about today's clinic visit or your schedule please contact [facility] directly at [phone].  Normal or non-critical lab and imaging results will be communicated to you by MyChart, letter or phone within 4 business days after the clinic has received the results. If you do not hear from us within 7 days, please contact the clinic through MyChart or phone. If you have a critical or abnormal lab result, we will notify you by phone as soon as possible.  Submit refill requests through [employer] or call your pharmacy and they will forward the refill request to us. Please allow 3  "business days for your refill to be completed.          Additional Information About Your Visit        MyChart Information     [employer] lets you send messages to your doctor, view your test results, renew your prescriptions, schedule appointments and more. To sign up, go to [email or web address] . Click on \"Log in\" on the left side of the screen, which will take you to the Welcome page. Then click on \"Sign up Now\" on the right side of the page.     You will be asked to enter the access code listed below, as well as some personal information. Please follow the directions to create your username and password.     Your access code is: [email or web address]  Expires: 2017 12:50 PM     Your access code will  in 90 days. If you need help or a new code, please call your [place] clinic or [phone].        Care EveryWhere ID     This is your Care EveryWhere ID. This could be used by other organizations to access your [place] medical records  [id number]         Blood Pressure from Last 3 Encounters:   17 (!) 85/57   17 98/48   17 125/68    Weight from Last 3 Encounters:   17 66 kg (145 lb 9.6 oz)   04/15/17 69.6 kg (153 lb 6.4 oz)   17 74.6 kg (164 lb 7 oz)              We Performed the Following     Electrolyte Replacement     Heparin 10a Level     Treatment Conditions     Vital signs        Primary Care Provider Office Phone # Fax #    Esther Back [phone]       [facility] [address] [address]        Thank you!     Thank you for choosing [facility]  for your care. Our goal is always to provide you with excellent care. Hearing back from our patients is one way we can continue to improve our services. Please take a few minutes to complete the written survey that you may receive in the mail after your visit with us. Thank you!             Your Updated Medication List - Protect others around you: Learn " how to safely use, store and throw away your medicines at www.disposemymeds.org.          This list is accurate as of: 4/21/17  1:57 PM.  Always use your most recent med list.                   Brand Name Dispense Instructions for use    acetaminophen 325 MG tablet    TYLENOL    100 tablet    Take 2 tablets (650 mg) by mouth every 4 hours as needed for mild pain       enoxaparin 80 MG/0.8ML injection    LOVENOX    60 Syringe    Inject 0.6 mLs (60 mg) Subcutaneous every 12 hours       hydrochlorothiazide 25 MG tablet    HYDRODIURIL    30 tablet    Take 2 tablets (50 mg) by mouth daily       IBUPROFEN PO      Take 200 mg by mouth every 6 hours as needed for moderate pain Reported on 4/20/2017       levothyroxine 125 MCG tablet    SYNTHROID    30 tablet    Take 1 tablet (125 mcg) by mouth daily       losartan 100 MG tablet    COZAAR    30 tablet    Take 1 tablet (100 mg) by mouth daily       ondansetron 4 MG ODT tab    ZOFRAN-ODT    120 tablet    Take 1 tablet (4 mg) by mouth every 6 hours as needed for nausea       ondansetron 4 MG tablet    ZOFRAN    20 tablet    Take 1 tablet (4 mg) by mouth every 6 hours as needed for nausea       prochlorperazine 5 MG tablet    COMPAZINE    90 tablet    Take 1 tablet (5 mg) by mouth every 6 hours as needed for vomiting       Vitamin D (Cholecalciferol) 1000 UNITS Tabs     60 tablet    Take 2,000 Units by mouth daily

## 2017-04-21 NOTE — PROGRESS NOTES
Infusion Nursing Note:  Tosin Nina presents today for C1D8 Paclitaxel/premends and KCL 40 meq.    Patient seen by provider today: No   present during visit today: Not Applicable.    Note: came from [facility]. Has been having Diarrhea many a day, along with nausea and dry heaves and poor intake. She states she was good from last week chemo til Sunday and was bad since.then to today  Continues with diarrhea and Cdiff pending precautions taken.  -Adelina talked with HC facility to up her antiemetics compazine and Zofran to use more regular.  -heparin 10a drawn 6 hours after dose of lovenox    Intravenous Access:  Implanted Port.    Treatment Conditions:  Lab Results   Component Value Date    HGB 14.2 04/20/2017     Lab Results   Component Value Date    WBC 7.4 04/20/2017      Lab Results   Component Value Date    ANEU 6.3 04/20/2017     Lab Results   Component Value Date     04/20/2017      Lab Results   Component Value Date     04/16/2017                   Lab Results   Component Value Date    POTASSIUM 3.3 04/20/2017           Lab Results   Component Value Date    MAG 1.9 04/20/2017            Lab Results   Component Value Date    CR 0.70 04/16/2017                   Lab Results   Component Value Date    PATTI 8.8 04/16/2017                Lab Results   Component Value Date    BILITOTAL 0.6 04/12/2017           Lab Results   Component Value Date    ALBUMIN 3.0 04/12/2017                    Lab Results   Component Value Date    ALT 20 04/12/2017           Lab Results   Component Value Date    AST 7 04/12/2017     Results reviewed, labs MET treatment parameters, ok to proceed with treatment.  Results reviewed, labs did NOT meet treatment parameters: KCL.needed 40 meq given      Post Infusion Assessment:  Patient tolerated infusion without incident.  Blood return noted pre and post infusion.  Site patent and intact, free from redness, edema or discomfort.  No evidence of  extravasations.  Access discontinued per protocol.    Discharge Plan:   Patient declined prescription refills.  Discharge instructions reviewed with: Patient/.  Patient and/or family verbalized understanding of discharge instructions and all questions answered.  Copy of AVS reviewed with patient and/or family.  Patient will return 4/28/17 for next appointment.  Patient discharged in stable condition accompanied by: self and . To [facility] with report and labs along with AVS  Departure Mode: Wheelchair.  Face to Face time: 2 min for lab review with Adelina.    Maru More RN

## 2017-04-21 NOTE — TELEPHONE ENCOUNTER
Got a call from [facility].  They didn't receive the orders that were faxed to them this morning.  Re-faxed orders again to [phone].

## 2017-04-21 NOTE — TELEPHONE ENCOUNTER
Called [facility] where patient resides.  Spoke with Nurse Lee.  Asked if they had received orders for patient from yesterdays visit.  They had received a verbal order on Compazine instructions and holding Zofran for 72 hrs.  They also knew about collecting a c.diff sample from patient, which they had done.  Waiting for results.  They needed the rest of the orders.  Faxed over pt instructions and latest progress note from visit with BOYD Sahu yesterday.   Fax sent to [phone].

## 2017-04-24 ENCOUNTER — CARE COORDINATION (OUTPATIENT)
Dept: ONCOLOGY | Facility: CLINIC | Age: 72
End: 2017-04-24

## 2017-04-24 NOTE — PROGRESS NOTES
Goldie RENTERIA at [facility] notified that Namita is adequately anticoagulated. Adelina [id number]

## 2017-04-24 NOTE — PROGRESS NOTES
MYRA Amezcua from [facility] where patient is residing called to verify orders.  Clarified orders for the first 72 hours after chemo ~ Aloxi and rational for holding Zofran.  Explained to Goldie as, verbal order, Zofran can be given starting on 17 as Aloxi's long term effects have .  Nursing is to dose Zofran as prescribed and my give Compazine as well, if patient is in need of additional anti emetic prior to next dosing of Zofran.  Zofran is every 6 hours, PRN.  Ativan is BID,PRN and Compazine is every 6 hours, PRN.  These notes, along with original orders were faxed to [facility] today per request from MYRA Amezcua.  Henrik Martins RN, BSN, OCN  [facility]  Patient Care Coordinator   VORB from MAYURI Solis to MYRA Graff

## 2017-04-25 ENCOUNTER — MEDICAL CORRESPONDENCE (OUTPATIENT)
Dept: HEALTH INFORMATION MANAGEMENT | Facility: CLINIC | Age: 72
End: 2017-04-25

## 2017-04-28 ENCOUNTER — HOSPITAL ENCOUNTER (OUTPATIENT)
Facility: CLINIC | Age: 72
Setting detail: SPECIMEN
Discharge: HOME | End: 2017-04-28
Attending: NURSE PRACTITIONER | Admitting: OBSTETRICS & GYNECOLOGY
Payer: COMMERCIAL

## 2017-04-28 ENCOUNTER — INFUSION THERAPY VISIT (OUTPATIENT)
Dept: INFUSION THERAPY | Facility: CLINIC | Age: 72
End: 2017-04-28
Attending: NURSE PRACTITIONER
Payer: COMMERCIAL

## 2017-04-28 VITALS
BODY MASS INDEX: 31.04 KG/M2 | HEIGHT: 59 IN | TEMPERATURE: 97.7 F | OXYGEN SATURATION: 97 % | HEART RATE: 60 BPM | SYSTOLIC BLOOD PRESSURE: 130 MMHG | WEIGHT: 154 LBS | RESPIRATION RATE: 16 BRPM | DIASTOLIC BLOOD PRESSURE: 67 MMHG

## 2017-04-28 DIAGNOSIS — E87.6 HYPOKALEMIA: ICD-10-CM

## 2017-04-28 DIAGNOSIS — C56.9 OVARIAN CANCER, UNSPECIFIED LATERALITY (H): Primary | ICD-10-CM

## 2017-04-28 LAB
BASOPHILS # BLD AUTO: 0 10E9/L (ref 0–0.2)
BASOPHILS NFR BLD AUTO: 0.3 %
DIFFERENTIAL METHOD BLD: ABNORMAL
EOSINOPHIL # BLD AUTO: 0 10E9/L (ref 0–0.7)
EOSINOPHIL NFR BLD AUTO: 0 %
ERYTHROCYTE [DISTWIDTH] IN BLOOD BY AUTOMATED COUNT: 13 % (ref 10–15)
HCT VFR BLD AUTO: 36.1 % (ref 35–47)
HGB BLD-MCNC: 12.3 G/DL (ref 11.7–15.7)
IMM GRANULOCYTES # BLD: 0 10E9/L (ref 0–0.4)
IMM GRANULOCYTES NFR BLD: 0.9 %
LYMPHOCYTES # BLD AUTO: 0.9 10E9/L (ref 0.8–5.3)
LYMPHOCYTES NFR BLD AUTO: 27.3 %
MAGNESIUM SERPL-MCNC: 1.5 MG/DL (ref 1.6–2.3)
MCH RBC QN AUTO: 31.1 PG (ref 26.5–33)
MCHC RBC AUTO-ENTMCNC: 34.1 G/DL (ref 31.5–36.5)
MCV RBC AUTO: 91 FL (ref 78–100)
MONOCYTES # BLD AUTO: 0.3 10E9/L (ref 0–1.3)
MONOCYTES NFR BLD AUTO: 7.8 %
NEUTROPHILS # BLD AUTO: 2.1 10E9/L (ref 1.6–8.3)
NEUTROPHILS NFR BLD AUTO: 63.7 %
NRBC # BLD AUTO: 0 10*3/UL
NRBC BLD AUTO-RTO: 0 /100
PLATELET # BLD AUTO: 163 10E9/L (ref 150–450)
POTASSIUM SERPL-SCNC: 3.1 MMOL/L (ref 3.4–5.3)
RBC # BLD AUTO: 3.96 10E12/L (ref 3.8–5.2)
WBC # BLD AUTO: 3.2 10E9/L (ref 4–11)

## 2017-04-28 PROCEDURE — 96375 TX/PRO/DX INJ NEW DRUG ADDON: CPT

## 2017-04-28 PROCEDURE — 25000128 H RX IP 250 OP 636: Performed by: NURSE PRACTITIONER

## 2017-04-28 PROCEDURE — 96368 THER/DIAG CONCURRENT INF: CPT

## 2017-04-28 PROCEDURE — 85025 COMPLETE CBC W/AUTO DIFF WBC: CPT | Performed by: OBSTETRICS & GYNECOLOGY

## 2017-04-28 PROCEDURE — 25000128 H RX IP 250 OP 636: Performed by: OBSTETRICS & GYNECOLOGY

## 2017-04-28 PROCEDURE — 25000125 ZZHC RX 250: Performed by: NURSE PRACTITIONER

## 2017-04-28 PROCEDURE — 83735 ASSAY OF MAGNESIUM: CPT | Performed by: OBSTETRICS & GYNECOLOGY

## 2017-04-28 PROCEDURE — 25000125 ZZHC RX 250: Performed by: OBSTETRICS & GYNECOLOGY

## 2017-04-28 PROCEDURE — S0028 INJECTION, FAMOTIDINE, 20 MG: HCPCS | Performed by: OBSTETRICS & GYNECOLOGY

## 2017-04-28 PROCEDURE — 96413 CHEMO IV INFUSION 1 HR: CPT

## 2017-04-28 PROCEDURE — 84132 ASSAY OF SERUM POTASSIUM: CPT | Performed by: OBSTETRICS & GYNECOLOGY

## 2017-04-28 RX ORDER — LORAZEPAM 2 MG/ML
0.5 INJECTION INTRAMUSCULAR EVERY 6 HOURS PRN
Status: CANCELLED
Start: 2017-05-12

## 2017-04-28 RX ORDER — DIPHENHYDRAMINE HCL 25 MG
25 CAPSULE ORAL ONCE
Status: CANCELLED
Start: 2017-05-12

## 2017-04-28 RX ORDER — DIPHENHYDRAMINE HCL 25 MG
25 CAPSULE ORAL ONCE
Status: CANCELLED
Start: 2017-05-05

## 2017-04-28 RX ORDER — HEPARIN SODIUM (PORCINE) LOCK FLUSH IV SOLN 100 UNIT/ML 100 UNIT/ML
500 SOLUTION INTRAVENOUS EVERY 8 HOURS
Status: CANCELLED
Start: 2017-05-19

## 2017-04-28 RX ORDER — LORAZEPAM 2 MG/ML
0.5 INJECTION INTRAMUSCULAR EVERY 6 HOURS PRN
Status: CANCELLED
Start: 2017-05-05

## 2017-04-28 RX ORDER — LORAZEPAM 2 MG/ML
0.5 INJECTION INTRAMUSCULAR EVERY 6 HOURS PRN
Status: CANCELLED
Start: 2017-05-19

## 2017-04-28 RX ORDER — ALBUTEROL SULFATE 0.83 MG/ML
2.5 SOLUTION RESPIRATORY (INHALATION)
Status: CANCELLED | OUTPATIENT
Start: 2017-05-12

## 2017-04-28 RX ORDER — ALBUTEROL SULFATE 90 UG/1
1-2 AEROSOL, METERED RESPIRATORY (INHALATION)
Status: CANCELLED
Start: 2017-05-19

## 2017-04-28 RX ORDER — METHYLPREDNISOLONE SODIUM SUCCINATE 125 MG/2ML
125 INJECTION, POWDER, LYOPHILIZED, FOR SOLUTION INTRAMUSCULAR; INTRAVENOUS
Status: CANCELLED
Start: 2017-05-05

## 2017-04-28 RX ORDER — SODIUM CHLORIDE 9 MG/ML
1000 INJECTION, SOLUTION INTRAVENOUS CONTINUOUS PRN
Status: CANCELLED
Start: 2017-05-19

## 2017-04-28 RX ORDER — EPINEPHRINE 0.3 MG/.3ML
0.3 INJECTION SUBCUTANEOUS EVERY 5 MIN PRN
Status: CANCELLED | OUTPATIENT
Start: 2017-05-05

## 2017-04-28 RX ORDER — METHYLPREDNISOLONE SODIUM SUCCINATE 125 MG/2ML
125 INJECTION, POWDER, LYOPHILIZED, FOR SOLUTION INTRAMUSCULAR; INTRAVENOUS
Status: CANCELLED
Start: 2017-05-12

## 2017-04-28 RX ORDER — HEPARIN SODIUM (PORCINE) LOCK FLUSH IV SOLN 100 UNIT/ML 100 UNIT/ML
500 SOLUTION INTRAVENOUS EVERY 8 HOURS
Status: DISCONTINUED | OUTPATIENT
Start: 2017-04-28 | End: 2017-04-28 | Stop reason: HOSPADM

## 2017-04-28 RX ORDER — EPINEPHRINE 0.3 MG/.3ML
0.3 INJECTION SUBCUTANEOUS EVERY 5 MIN PRN
Status: CANCELLED | OUTPATIENT
Start: 2017-05-19

## 2017-04-28 RX ORDER — EPINEPHRINE 0.3 MG/.3ML
0.3 INJECTION SUBCUTANEOUS EVERY 5 MIN PRN
Status: CANCELLED | OUTPATIENT
Start: 2017-05-12

## 2017-04-28 RX ORDER — MEPERIDINE HYDROCHLORIDE 25 MG/ML
25 INJECTION INTRAMUSCULAR; INTRAVENOUS; SUBCUTANEOUS EVERY 30 MIN PRN
Status: CANCELLED | OUTPATIENT
Start: 2017-05-19

## 2017-04-28 RX ORDER — HEPARIN SODIUM (PORCINE) LOCK FLUSH IV SOLN 100 UNIT/ML 100 UNIT/ML
500 SOLUTION INTRAVENOUS EVERY 8 HOURS
Status: CANCELLED
Start: 2017-05-05

## 2017-04-28 RX ORDER — MEPERIDINE HYDROCHLORIDE 25 MG/ML
25 INJECTION INTRAMUSCULAR; INTRAVENOUS; SUBCUTANEOUS EVERY 30 MIN PRN
Status: CANCELLED | OUTPATIENT
Start: 2017-05-12

## 2017-04-28 RX ORDER — DIPHENHYDRAMINE HYDROCHLORIDE 50 MG/ML
50 INJECTION INTRAMUSCULAR; INTRAVENOUS
Status: CANCELLED
Start: 2017-05-05

## 2017-04-28 RX ORDER — ALBUTEROL SULFATE 90 UG/1
1-2 AEROSOL, METERED RESPIRATORY (INHALATION)
Status: CANCELLED
Start: 2017-05-12

## 2017-04-28 RX ORDER — ALBUTEROL SULFATE 0.83 MG/ML
2.5 SOLUTION RESPIRATORY (INHALATION)
Status: CANCELLED | OUTPATIENT
Start: 2017-05-19

## 2017-04-28 RX ORDER — HEPARIN SODIUM (PORCINE) LOCK FLUSH IV SOLN 100 UNIT/ML 100 UNIT/ML
500 SOLUTION INTRAVENOUS EVERY 8 HOURS
Status: CANCELLED
Start: 2017-05-12

## 2017-04-28 RX ORDER — SODIUM CHLORIDE 9 MG/ML
1000 INJECTION, SOLUTION INTRAVENOUS CONTINUOUS PRN
Status: CANCELLED
Start: 2017-05-05

## 2017-04-28 RX ORDER — ALBUTEROL SULFATE 90 UG/1
1-2 AEROSOL, METERED RESPIRATORY (INHALATION)
Status: CANCELLED
Start: 2017-05-05

## 2017-04-28 RX ORDER — ALBUTEROL SULFATE 0.83 MG/ML
2.5 SOLUTION RESPIRATORY (INHALATION)
Status: CANCELLED | OUTPATIENT
Start: 2017-05-05

## 2017-04-28 RX ORDER — SODIUM CHLORIDE 9 MG/ML
1000 INJECTION, SOLUTION INTRAVENOUS CONTINUOUS PRN
Status: CANCELLED
Start: 2017-05-12

## 2017-04-28 RX ORDER — DIPHENHYDRAMINE HYDROCHLORIDE 50 MG/ML
50 INJECTION INTRAMUSCULAR; INTRAVENOUS
Status: CANCELLED
Start: 2017-05-19

## 2017-04-28 RX ORDER — MEPERIDINE HYDROCHLORIDE 25 MG/ML
25 INJECTION INTRAMUSCULAR; INTRAVENOUS; SUBCUTANEOUS EVERY 30 MIN PRN
Status: CANCELLED | OUTPATIENT
Start: 2017-05-05

## 2017-04-28 RX ORDER — MAGNESIUM SULFATE HEPTAHYDRATE 40 MG/ML
4 INJECTION, SOLUTION INTRAVENOUS EVERY 4 HOURS PRN
Status: DISCONTINUED | OUTPATIENT
Start: 2017-04-28 | End: 2017-04-28

## 2017-04-28 RX ORDER — PALONOSETRON 0.05 MG/ML
0.25 INJECTION, SOLUTION INTRAVENOUS ONCE
Status: CANCELLED
Start: 2017-05-05

## 2017-04-28 RX ORDER — DIPHENHYDRAMINE HYDROCHLORIDE 50 MG/ML
50 INJECTION INTRAMUSCULAR; INTRAVENOUS
Status: CANCELLED
Start: 2017-05-12

## 2017-04-28 RX ORDER — DIPHENHYDRAMINE HCL 25 MG
25 CAPSULE ORAL ONCE
Status: CANCELLED
Start: 2017-05-19

## 2017-04-28 RX ORDER — METHYLPREDNISOLONE SODIUM SUCCINATE 125 MG/2ML
125 INJECTION, POWDER, LYOPHILIZED, FOR SOLUTION INTRAMUSCULAR; INTRAVENOUS
Status: CANCELLED
Start: 2017-05-19

## 2017-04-28 RX ORDER — POTASSIUM CHLORIDE 1500 MG/1
20 TABLET, EXTENDED RELEASE ORAL DAILY
Qty: 30 TABLET | Refills: 3
Start: 2017-04-28 | End: 2017-09-14

## 2017-04-28 RX ADMIN — DEXAMETHASONE SODIUM PHOSPHATE 12 MG: 10 INJECTION, SOLUTION INTRAMUSCULAR; INTRAVENOUS at 09:11

## 2017-04-28 RX ADMIN — PACLITAXEL 135 MG: 6 INJECTION, SOLUTION INTRAVENOUS at 09:59

## 2017-04-28 RX ADMIN — SODIUM CHLORIDE, PRESERVATIVE FREE 500 UNITS: 5 INJECTION INTRAVENOUS at 12:05

## 2017-04-28 RX ADMIN — DIPHENHYDRAMINE HYDROCHLORIDE 25 MG: 50 INJECTION, SOLUTION INTRAMUSCULAR; INTRAVENOUS at 09:41

## 2017-04-28 RX ADMIN — MAGNESIUM SULFATE HEPTAHYDRATE: 500 INJECTION, SOLUTION INTRAMUSCULAR; INTRAVENOUS at 10:00

## 2017-04-28 RX ADMIN — FAMOTIDINE 40 MG: 10 INJECTION INTRAVENOUS at 09:26

## 2017-04-28 RX ADMIN — SODIUM CHLORIDE 250 ML: 9 INJECTION, SOLUTION INTRAVENOUS at 09:11

## 2017-04-28 NOTE — PROGRESS NOTES
Infusion Nursing Note:  Tosin Nina presents today for cycle 1 day 15 taxol, magnesium, potassium.    Patient seen by provider today: No   present during visit today: Not Applicable.    Note: Patient is feeling better this week, diarrhea now resolved and nausea mostly resolved.  She is taking antiemetics on a schedule at the nursing home.  Patient continues to be weak and using wheelchair.    Reviewed potassium and magnesium with Adelina TRENT.  Replaced per protocol.  Patient will start on PO potassium at the nursing home.  Order sent with paperwork.  Gave patient written information for dietary sources of both.    Intravenous Access:  Labs drawn without difficulty.  Implanted Port.    Treatment Conditions:  Lab Results   Component Value Date    HGB 12.3 04/28/2017     Lab Results   Component Value Date    WBC 3.2 04/28/2017      Lab Results   Component Value Date    ANEU 2.1 04/28/2017     Lab Results   Component Value Date     04/28/2017      Lab Results   Component Value Date     04/16/2017                   Lab Results   Component Value Date    POTASSIUM 3.1 04/28/2017           Lab Results   Component Value Date    MAG 1.5 04/28/2017            Lab Results   Component Value Date    CR 0.70 04/16/2017                   Lab Results   Component Value Date    PATTI 8.8 04/16/2017                Lab Results   Component Value Date    BILITOTAL 0.6 04/12/2017           Lab Results   Component Value Date    ALBUMIN 3.0 04/12/2017                    Lab Results   Component Value Date    ALT 20 04/12/2017           Lab Results   Component Value Date    AST 7 04/12/2017     Results reviewed, labs MET treatment parameters, ok to proceed with treatment.      Post Infusion Assessment:  Patient tolerated infusion without incident.  Blood return noted pre and post infusion.  Site patent and intact, free from redness, edema or discomfort.  No evidence of extravasations.  Access discontinued per  protocol.    Discharge Plan:   Copy of AVS reviewed with patient and/or family.  Patient will return 5/2 with Dr. Ennis and 5/5 for chemo for next appointment.  Patient discharged in stable condition accompanied by: self and .  Departure Mode: Wheelchair.    Phyllis Maldonado RN

## 2017-04-28 NOTE — MR AVS SNAPSHOT
"              After Visit Summary   4/28/2017    Tosin Nina    MRN: [id number]           Patient Information     Date Of Birth          1945        Visit Information        Provider Department      4/28/2017 8:00 AM RH INFUSION CHAIR 12 [facility]         Follow-ups after your visit        Your next 10 appointments already scheduled     May 02, 2017  8:00 AM CDT   New Visit with Nessa Foster MD   [facility] ([facility])    On license of [facility]  [address]  [facility] [address] [phone]            May 05, 2017 11:00 AM CDT   Level 3 with RH INFUSION CHAIR 10   [facility] ([facility])    On license of [facility]  [address]  [facility] [address] [phone]              Who to contact     If you have questions or need follow up information about today's clinic visit or your schedule please contact [facility] INF[facility]ION [facility] directly at [phone].  Normal or non-critical lab and imaging results will be communicated to you by Eneedohart, letter or phone within 4 business days after the clinic has received the results. If you do not hear from us within 7 days, please contact the clinic through Eneedohart or phone. If you have a critical or abnormal lab result, we will notify you by phone as soon as possible.  Submit refill requests through Axeda or call your pharmacy and they will forward the refill request to us. Please allow 3 business days for your refill to be completed.          Additional Information About Your Visit        MyChart Information     Axeda lets you send messages to your doctor, view your test results, renew your prescriptions, schedule appointments and more. To sign up, go to [email or web address] . Click on \"Log in\" on the left side of the screen, which will take " "you to the Welcome page. Then click on \"Sign up Now\" on the right side of the page.     You will be asked to enter the access code listed below, as well as some personal information. Please follow the directions to create your username and password.     Your access code is: [email or web address]  Expires: 2017 12:50 PM     Your access code will  in 90 days. If you need help or a new code, please call your [place] clinic or [phone].        Care EveryWhere ID     This is your Care EveryWhere ID. This could be used by other organizations to access your [place] medical records  [id number]         Blood Pressure from Last 3 Encounters:   17 104/53   17 (!) 85/57   17 98/48    Weight from Last 3 Encounters:   17 66 kg (145 lb 9.6 oz)   04/15/17 69.6 kg (153 lb 6.4 oz)   17 74.6 kg (164 lb 7 oz)              Today, you had the following     No orders found for display       Primary Care Provider Office Phone # Fax #    Esther Back [phone]       [facility] [address] [address]        Thank you!     Thank you for choosing [facility] INFUSION SERVICES  for your care. Our goal is always to provide you with excellent care. Hearing back from our patients is one way we can continue to improve our services. Please take a few minutes to complete the written survey that you may receive in the mail after your visit with us. Thank you!             Your Updated Medication List - Protect others around you: Learn how to safely use, store and throw away your medicines at www.disposemymeds.org.          This list is accurate as of: 17  8:06 AM.  Always use your most recent med list.                   Brand Name Dispense Instructions for use    acetaminophen 325 MG tablet    TYLENOL    100 tablet    Take 2 tablets (650 mg) by mouth every 4 hours as needed for mild pain       enoxaparin 80 MG/0.8ML injection    LOVENOX    60 " Syringe    Inject 0.6 mLs (60 mg) Subcutaneous every 12 hours       hydrochlorothiazide 25 MG tablet    HYDRODIURIL    30 tablet    Take 2 tablets (50 mg) by mouth daily       IBUPROFEN PO      Take 200 mg by mouth every 6 hours as needed for moderate pain Reported on 4/20/2017       levothyroxine 125 MCG tablet    SYNTHROID    30 tablet    Take 1 tablet (125 mcg) by mouth daily       losartan 100 MG tablet    COZAAR    30 tablet    Take 1 tablet (100 mg) by mouth daily       ondansetron 4 MG ODT tab    ZOFRAN-ODT    120 tablet    Take 1 tablet (4 mg) by mouth every 6 hours as needed for nausea       ondansetron 4 MG tablet    ZOFRAN    20 tablet    Take 1 tablet (4 mg) by mouth every 6 hours as needed for nausea       prochlorperazine 5 MG tablet    COMPAZINE    90 tablet    Take 1 tablet (5 mg) by mouth every 6 hours as needed for vomiting       Vitamin D (Cholecalciferol) 1000 UNITS Tabs     60 tablet    Take 2,000 Units by mouth daily

## 2017-04-28 NOTE — PATIENT INSTRUCTIONS
Chemotherapy cycle #2 on 5/5 Scheduled  Carmen DOWLING      Next visit and chemotherapy cycle #3 with Dr Ennis on 5/30 Scheduled  Carmen DOWLING    Genetic counseling Scheduled  Carmen DOWLING  AVS given to patient

## 2017-05-02 ENCOUNTER — ONCOLOGY VISIT (OUTPATIENT)
Dept: ONCOLOGY | Facility: CLINIC | Age: 72
End: 2017-05-02
Attending: OBSTETRICS & GYNECOLOGY
Payer: COMMERCIAL

## 2017-05-02 VITALS
RESPIRATION RATE: 16 BRPM | OXYGEN SATURATION: 97 % | WEIGHT: 154 LBS | TEMPERATURE: 99 F | HEART RATE: 68 BPM | SYSTOLIC BLOOD PRESSURE: 133 MMHG | DIASTOLIC BLOOD PRESSURE: 74 MMHG | HEIGHT: 59 IN | BODY MASS INDEX: 31.04 KG/M2

## 2017-05-02 DIAGNOSIS — C56.9 OVARIAN CANCER, UNSPECIFIED LATERALITY (H): Primary | ICD-10-CM

## 2017-05-02 PROCEDURE — 99215 OFFICE O/P EST HI 40 MIN: CPT | Performed by: OBSTETRICS & GYNECOLOGY

## 2017-05-02 PROCEDURE — 99212 OFFICE O/P EST SF 10 MIN: CPT

## 2017-05-02 ASSESSMENT — PAIN SCALES - GENERAL: PAINLEVEL: NO PAIN (0)

## 2017-05-02 NOTE — NURSING NOTE
"Oncology Rooming Note    May 2, 2017 8:19 AM   Tosin Nina is a 71 year old female who presents for: Oncology Clinic Visit    Initial Vitals: /74  Pulse 68  Temp 99  F (37.2  C) (Tympanic)  Resp 16  Ht 1.499 m (4' 11.02\")  Wt 69.9 kg (154 lb)  SpO2 97%  BMI 31.08 kg/m2 Estimated body mass index is 31.08 kg/(m^2) as calculated from the following:    Height as of this encounter: 1.499 m (4' 11.02\").    Weight as of this encounter: 69.9 kg (154 lb). Body surface area is 1.71 meters squared.  No Pain (0) Comment: Data Unavailable   No LMP recorded. Patient is postmenopausal.  Allergies reviewed: Yes  Medications reviewed: Yes    Medications: Medication refills not needed today.  Pharmacy name entered into SkillsTrak: [facility] [address] [place] [address] - [address] [place] [address] [address] AT [facility] [address]    Clinical concerns:New Consult    8 minutes for nursing intake (face to face time)     Teresa Sanches  DISCHARGE PLAN:  Next appointments: See patient instruction section  Departure Mode: Ambulatory  Accompanied by: self  0 minutes for nursing discharge (face to face time)   Teresa Sanches                  "

## 2017-05-02 NOTE — MR AVS SNAPSHOT
After Visit Summary   5/2/2017    Tosin Nina    MRN: [id number]           Patient Information     Date Of Birth          1945        Visit Information        Provider Department      5/2/2017 8:00 AM Nessa Christina MD [facility]        Today's Diagnoses     Ovarian cancer, unspecified laterality (H)    -  1      Care Instructions    Chemotherapy cycle #2 on 5/5 Scheduled  Carmen DOWLING      Next visit and chemotherapy cycle #3 with Dr Ennis on 5/30 Scheduled  Carmen DOWLING    Genetic counseling Scheduled  Carmen DOWLING  AVS given to patient          Follow-ups after your visit        Your next 10 appointments already scheduled     May 05, 2017 11:00 AM CDT   Level 3 with RH INFUSION CHAIR 10   [facility] ([facility])    [facility]  [address]  [place] [address]   [phone]            May 12, 2017  8:00 AM CDT   Level 3 with RH INFUSION CHAIR 12   [facility] ([facility])    [facility]  [address]  [facility] [address]   [phone]            May 19, 2017  8:00 AM CDT   Level 3 with RH INFUSION CHAIR 7   [facility] ([facility])    [facility]  [address]  [facility] [address]   [phone]            May 30, 2017 12:30 PM CDT   Level 3 with RH INFUSION CHAIR 11   [facility] ([facility])    [facility]  [address]  [place] [address]   [phone]            May 30, 2017  1:00 PM CDT   Return Visit with Nessa Foster MD   [facility] ([facility])    [facility]  [address]  [place] [address]   [phone]             "2017  9:00 AM CDT   New Visit with Kelsie Nguyen GC   Cancer Risk Management Program ([facility])    [facility]  [address] [place]  [facility] [id number] [phone]              Who to contact     If you have questions or need follow up information about today's clinic visit or your schedule please contact [facility] directly at [phone].  Normal or non-critical lab and imaging results will be communicated to you by HealthyChichart, letter or phone within 4 business days after the clinic has received the results. If you do not hear from us within 7 days, please contact the clinic through HealthyChichart or phone. If you have a critical or abnormal lab result, we will notify you by phone as soon as possible.  Submit refill requests through aroundtheway or call your pharmacy and they will forward the refill request to us. Please allow 3 business days for your refill to be completed.          Additional Information About Your Visit        aroundtheway Information     aroundtheway lets you send messages to your doctor, view your test results, renew your prescriptions, schedule appointments and more. To sign up, go to [email or web address] . Click on \"Log in\" on the left side of the screen, which will take you to the Welcome page. Then click on \"Sign up Now\" on the right side of the page.     You will be asked to enter the access code listed below, as well as some personal information. Please follow the directions to create your username and password.     Your access code is: [email or web address]  Expires: 2017 12:50 PM     Your access code will  in 90 days. If you need help or a new code, please call your [place] clinic or [phone].        Care EveryWhere ID     This is your Care EveryWhere ID. This could be used by other organizations to access your [place] medical records  [id number]        Your Vitals Were     Pulse Temperature Respirations Height " "Pulse Oximetry BMI (Body Mass Index)    68 99  F (37.2  C) (Tympanic) 16 1.499 m (4' 11.02\") 97% 31.08 kg/m2       Blood Pressure from Last 3 Encounters:   05/02/17 133/74   04/28/17 130/67   04/21/17 104/53    Weight from Last 3 Encounters:   05/02/17 69.9 kg (154 lb)   04/28/17 69.9 kg (154 lb)   04/20/17 66 kg (145 lb 9.6 oz)              Today, you had the following     No orders found for display       Primary Care Provider Office Phone # Fax #    Esther Back [phone]       [facility] [address] [place] [address]        Thank you!     Thank you for choosing [facility] CANCER [facility]  for your care. Our goal is always to provide you with excellent care. Hearing back from our patients is one way we can continue to improve our services. Please take a few minutes to complete the written survey that you may receive in the mail after your visit with us. Thank you!             Your Updated Medication List - Protect others around you: Learn how to safely use, store and throw away your medicines at www.disposemymeds.org.          This list is accurate as of: 5/2/17  9:27 AM.  Always use your most recent med list.                   Brand Name Dispense Instructions for use    acetaminophen 325 MG tablet    TYLENOL    100 tablet    Take 2 tablets (650 mg) by mouth every 4 hours as needed for mild pain       enoxaparin 80 MG/0.8ML injection    LOVENOX    60 Syringe    Inject 0.6 mLs (60 mg) Subcutaneous every 12 hours       hydrochlorothiazide 25 MG tablet    HYDRODIURIL    30 tablet    Take 2 tablets (50 mg) by mouth daily       IBUPROFEN PO      Take 200 mg by mouth every 6 hours as needed for moderate pain Reported on 5/2/2017       levothyroxine 125 MCG tablet    SYNTHROID    30 tablet    Take 1 tablet (125 mcg) by mouth daily       losartan 100 MG tablet    COZAAR    30 tablet    Take 1 tablet (100 mg) by mouth daily       ondansetron 4 MG ODT tab    ZOFRAN-ODT    120 " tablet    Take 1 tablet (4 mg) by mouth every 6 hours as needed for nausea       ondansetron 4 MG tablet    ZOFRAN    20 tablet    Take 1 tablet (4 mg) by mouth every 6 hours as needed for nausea       Potassium Chloride ER 20 MEQ Tbcr     30 tablet    Take 1 tablet (20 mEq) by mouth daily       prochlorperazine 5 MG tablet    COMPAZINE    90 tablet    Take 1 tablet (5 mg) by mouth every 6 hours as needed for vomiting       UNABLE TO FIND      3 times daily MEDICATION NAME: BDRbenadryl/dexamethisone       Vitamin D (Cholecalciferol) 1000 UNITS Tabs     60 tablet    Take 2,000 Units by mouth daily

## 2017-05-05 ENCOUNTER — HOSPITAL ENCOUNTER (OUTPATIENT)
Facility: CLINIC | Age: 72
Setting detail: SPECIMEN
Discharge: HOME | End: 2017-05-05
Attending: NURSE PRACTITIONER | Admitting: OBSTETRICS & GYNECOLOGY
Payer: COMMERCIAL

## 2017-05-05 ENCOUNTER — INFUSION THERAPY VISIT (OUTPATIENT)
Dept: INFUSION THERAPY | Facility: CLINIC | Age: 72
End: 2017-05-05
Attending: NURSE PRACTITIONER
Payer: COMMERCIAL

## 2017-05-05 VITALS
DIASTOLIC BLOOD PRESSURE: 65 MMHG | TEMPERATURE: 97.7 F | OXYGEN SATURATION: 98 % | RESPIRATION RATE: 16 BRPM | SYSTOLIC BLOOD PRESSURE: 129 MMHG | HEART RATE: 94 BPM

## 2017-05-05 DIAGNOSIS — C56.9 OVARIAN CANCER, UNSPECIFIED LATERALITY (H): Primary | ICD-10-CM

## 2017-05-05 LAB
ALBUMIN SERPL-MCNC: 3.2 G/DL (ref 3.4–5)
ALP SERPL-CCNC: 80 U/L (ref 40–150)
ALT SERPL W P-5'-P-CCNC: 37 U/L (ref 0–50)
ANION GAP SERPL CALCULATED.3IONS-SCNC: 9 MMOL/L (ref 3–14)
AST SERPL W P-5'-P-CCNC: 17 U/L (ref 0–45)
BASOPHILS # BLD AUTO: 0 10E9/L (ref 0–0.2)
BASOPHILS NFR BLD AUTO: 0.2 %
BILIRUB SERPL-MCNC: 0.2 MG/DL (ref 0.2–1.3)
BUN SERPL-MCNC: 22 MG/DL (ref 7–30)
CALCIUM SERPL-MCNC: 8.6 MG/DL (ref 8.5–10.1)
CANCER AG125 SERPL-ACNC: 370 U/ML (ref 0–30)
CHLORIDE SERPL-SCNC: 103 MMOL/L (ref 94–109)
CO2 SERPL-SCNC: 25 MMOL/L (ref 20–32)
CREAT SERPL-MCNC: 0.54 MG/DL (ref 0.52–1.04)
DIFFERENTIAL METHOD BLD: NORMAL
EOSINOPHIL # BLD AUTO: 0 10E9/L (ref 0–0.7)
EOSINOPHIL NFR BLD AUTO: 0 %
ERYTHROCYTE [DISTWIDTH] IN BLOOD BY AUTOMATED COUNT: 14.6 % (ref 10–15)
GFR SERPL CREATININE-BSD FRML MDRD: ABNORMAL ML/MIN/1.7M2
GLUCOSE SERPL-MCNC: 133 MG/DL (ref 70–99)
HCT VFR BLD AUTO: 37.3 % (ref 35–47)
HGB BLD-MCNC: 12.6 G/DL (ref 11.7–15.7)
IMM GRANULOCYTES # BLD: 0.4 10E9/L (ref 0–0.4)
IMM GRANULOCYTES NFR BLD: 3.9 %
LYMPHOCYTES # BLD AUTO: 1.2 10E9/L (ref 0.8–5.3)
LYMPHOCYTES NFR BLD AUTO: 12.7 %
MAGNESIUM SERPL-MCNC: 1.8 MG/DL (ref 1.6–2.3)
MCH RBC QN AUTO: 31.8 PG (ref 26.5–33)
MCHC RBC AUTO-ENTMCNC: 33.8 G/DL (ref 31.5–36.5)
MCV RBC AUTO: 94 FL (ref 78–100)
MONOCYTES # BLD AUTO: 0.6 10E9/L (ref 0–1.3)
MONOCYTES NFR BLD AUTO: 6.8 %
NEUTROPHILS # BLD AUTO: 7 10E9/L (ref 1.6–8.3)
NEUTROPHILS NFR BLD AUTO: 76.4 %
NRBC # BLD AUTO: 0 10*3/UL
NRBC BLD AUTO-RTO: 0 /100
PLATELET # BLD AUTO: 217 10E9/L (ref 150–450)
POTASSIUM SERPL-SCNC: 3.6 MMOL/L (ref 3.4–5.3)
PROT SERPL-MCNC: 6.9 G/DL (ref 6.8–8.8)
RBC # BLD AUTO: 3.96 10E12/L (ref 3.8–5.2)
SODIUM SERPL-SCNC: 137 MMOL/L (ref 133–144)
WBC # BLD AUTO: 9.1 10E9/L (ref 4–11)

## 2017-05-05 PROCEDURE — 85025 COMPLETE CBC W/AUTO DIFF WBC: CPT | Performed by: OBSTETRICS & GYNECOLOGY

## 2017-05-05 PROCEDURE — 96375 TX/PRO/DX INJ NEW DRUG ADDON: CPT

## 2017-05-05 PROCEDURE — 96413 CHEMO IV INFUSION 1 HR: CPT

## 2017-05-05 PROCEDURE — S0028 INJECTION, FAMOTIDINE, 20 MG: HCPCS | Performed by: OBSTETRICS & GYNECOLOGY

## 2017-05-05 PROCEDURE — 80053 COMPREHEN METABOLIC PANEL: CPT | Performed by: OBSTETRICS & GYNECOLOGY

## 2017-05-05 PROCEDURE — 86304 IMMUNOASSAY TUMOR CA 125: CPT | Performed by: OBSTETRICS & GYNECOLOGY

## 2017-05-05 PROCEDURE — 83735 ASSAY OF MAGNESIUM: CPT | Performed by: OBSTETRICS & GYNECOLOGY

## 2017-05-05 PROCEDURE — 96417 CHEMO IV INFUS EACH ADDL SEQ: CPT

## 2017-05-05 PROCEDURE — 25000125 ZZHC RX 250: Performed by: OBSTETRICS & GYNECOLOGY

## 2017-05-05 PROCEDURE — 25000128 H RX IP 250 OP 636: Performed by: OBSTETRICS & GYNECOLOGY

## 2017-05-05 RX ORDER — HEPARIN SODIUM (PORCINE) LOCK FLUSH IV SOLN 100 UNIT/ML 100 UNIT/ML
500 SOLUTION INTRAVENOUS EVERY 8 HOURS
Status: DISCONTINUED | OUTPATIENT
Start: 2017-05-05 | End: 2017-05-05 | Stop reason: HOSPADM

## 2017-05-05 RX ORDER — PALONOSETRON 0.05 MG/ML
0.25 INJECTION, SOLUTION INTRAVENOUS ONCE
Status: COMPLETED | OUTPATIENT
Start: 2017-05-05 | End: 2017-05-05

## 2017-05-05 RX ADMIN — CARBOPLATIN 650 MG: 10 INJECTION, SOLUTION INTRAVENOUS at 14:29

## 2017-05-05 RX ADMIN — PALONOSETRON HYDROCHLORIDE 0.25 MG: 0.25 INJECTION INTRAVENOUS at 12:40

## 2017-05-05 RX ADMIN — DIPHENHYDRAMINE HYDROCHLORIDE 25 MG: 50 INJECTION, SOLUTION INTRAMUSCULAR; INTRAVENOUS at 13:11

## 2017-05-05 RX ADMIN — FAMOTIDINE 40 MG: 10 INJECTION INTRAVENOUS at 12:55

## 2017-05-05 RX ADMIN — SODIUM CHLORIDE 250 ML: 9 INJECTION, SOLUTION INTRAVENOUS at 12:34

## 2017-05-05 RX ADMIN — DEXAMETHASONE SODIUM PHOSPHATE 12 MG: 10 INJECTION, SOLUTION INTRAMUSCULAR; INTRAVENOUS at 12:40

## 2017-05-05 RX ADMIN — PACLITAXEL 135 MG: 6 INJECTION, SOLUTION INTRAVENOUS at 13:24

## 2017-05-05 RX ADMIN — SODIUM CHLORIDE, PRESERVATIVE FREE 500 UNITS: 5 INJECTION INTRAVENOUS at 15:20

## 2017-05-05 ASSESSMENT — PAIN SCALES - GENERAL: PAINLEVEL: NO PAIN (0)

## 2017-05-05 NOTE — PROGRESS NOTES
Infusion Nursing Note:  Tosin Nina presents today for C2D1 Paclitaxel/carbo.    Patient seen by provider today: No   present during visit today: Not Applicable.    Note: She did not need Potassium today. She states good appetite. Dizzines/Visual shakiness still continue.  Regular bowels leaning toward constipation.  -she is at Care facility and report written and sent with patient and .    Intravenous Access:  Implanted Port.    Treatment Conditions:  Lab Results   Component Value Date    HGB 12.6 05/05/2017     Lab Results   Component Value Date    WBC 9.1 05/05/2017      Lab Results   Component Value Date    ANEU 7.0 05/05/2017     Lab Results   Component Value Date     05/05/2017      Lab Results   Component Value Date     05/05/2017                   Lab Results   Component Value Date    POTASSIUM 3.6 05/05/2017           Lab Results   Component Value Date    MAG 1.8 05/05/2017            Lab Results   Component Value Date    CR 0.54 05/05/2017                   Lab Results   Component Value Date    PATTI 8.6 05/05/2017                Lab Results   Component Value Date    BILITOTAL 0.2 05/05/2017           Lab Results   Component Value Date    ALBUMIN 3.2 05/05/2017                    Lab Results   Component Value Date    ALT 37 05/05/2017           Lab Results   Component Value Date    AST 17 05/05/2017     Results reviewed, labs MET treatment parameters, ok to proceed with treatment.      Post Infusion Assessment:  Patient tolerated infusion without incident.  Blood return noted pre and post infusion.  Site patent and intact, free from redness, edema or discomfort.  No evidence of extravasations.  Access discontinued per protocol.    Discharge Plan:   Patient declined prescription refills.  Discharge instructions reviewed with: Patient and Family.  Patient and/or family verbalized understanding of discharge instructions and all questions answered.  AVS to patient via  FRANCISCA.  Patient will return Next week for next appointment.   Patient discharged in stable condition accompanied by: self and .  Departure Mode: Wheelchair.    Maru More RN

## 2017-05-05 NOTE — MR AVS SNAPSHOT
After Visit Summary   5/5/2017    Tosin Nina    MRN: [id number]           Patient Information     Date Of Birth          1945        Visit Information        Provider Department      5/5/2017 11:00 AM RH INFUSION CHAIR 10 [facility]fusion [facility]        Today's Diagnoses     Ovarian cancer, unspecified laterality (H)    -  1       Follow-ups after your visit        Your next 10 appointments already scheduled     May 12, 2017  8:00 AM CDT   Level 3 with RH INFUSION CHAIR 12   [facility] ([facility])    [facility] [place] [address]  [place] [address]   [phone]            May 19, 2017  8:00 AM CDT   Level 3 with RH INFUSION CHAIR 7   [facility] ([facility])    [facility]  [address]  [facility] [address]   [phone]            May 30, 2017 12:30 PM CDT   Level 3 with RH INFUSION CHAIR 11   [facility] ([facility])    [facility] [place] [address]  [facility] [address]   [phone]            May 30, 2017  1:00 PM CDT   Return Visit with Nessa Foster MD   [facility] ([facility])    [facility]  [address]  [facility] [address]   [phone]            Jul 05, 2017  9:00 AM CDT   New Visit with Kelsie Nguyen GC   Cancer Risk Management Program ([facility])    [facility] [place] [id number]  [facility] [address]   [phone]              Who to contact     If you have questions or need follow up information about today's clinic visit or your schedule please contact [facility] INFUSION SERVICES directly at [phone].  Normal or non-critical lab and  "imaging results will be communicated to you by MyChart, letter or phone within 4 business days after the clinic has received the results. If you do not hear from us within 7 days, please contact the clinic through Datoramat or phone. If you have a critical or abnormal lab result, we will notify you by phone as soon as possible.  Submit refill requests through Kranem or call your pharmacy and they will forward the refill request to us. Please allow 3 business days for your refill to be completed.          Additional Information About Your Visit        CookistoharWayfair Information     Kranem lets you send messages to your doctor, view your test results, renew your prescriptions, schedule appointments and more. To sign up, go to [email or web address] . Click on \"Log in\" on the left side of the screen, which will take you to the Welcome page. Then click on \"Sign up Now\" on the right side of the page.     You will be asked to enter the access code listed below, as well as some personal information. Please follow the directions to create your username and password.     Your access code is: [email or web address]  Expires: 2017 12:50 PM     Your access code will  in 90 days. If you need help or a new code, please call your [place] clinic or [phone].        Care EveryWhere ID     This is your Care EveryWhere ID. This could be used by other organizations to access your [place] medical records  [id number]        Your Vitals Were     Pulse Temperature Respirations Pulse Oximetry          94 97.7  F (36.5  C) (Oral) 16 98%         Blood Pressure from Last 3 Encounters:   17 129/65   17 133/74   17 130/67    Weight from Last 3 Encounters:   17 69.9 kg (154 lb)   17 69.9 kg (154 lb)   17 66 kg (145 lb 9.6 oz)              We Performed the Following          CBC with platelets differential     Comprehensive metabolic panel     Magnesium     Treatment Conditions        Primary Care " Provider Office Phone # Fax #    Esther Back [phone]       [facility] [address] [address]        Thank you!     Thank you for choosing [facility] INFUSION SERVICES  for your care. Our goal is always to provide you with excellent care. Hearing back from our patients is one way we can continue to improve our services. Please take a few minutes to complete the written survey that you may receive in the mail after your visit with us. Thank you!             Your Updated Medication List - Protect others around you: Learn how to safely use, store and throw away your medicines at www.disposemymeds.org.          This list is accurate as of: 5/5/17  3:37 PM.  Always use your most recent med list.                   Brand Name Dispense Instructions for use    acetaminophen 325 MG tablet    TYLENOL    100 tablet    Take 2 tablets (650 mg) by mouth every 4 hours as needed for mild pain       enoxaparin 80 MG/0.8ML injection    LOVENOX    60 Syringe    Inject 0.6 mLs (60 mg) Subcutaneous every 12 hours       hydrochlorothiazide 25 MG tablet    HYDRODIURIL    30 tablet    Take 2 tablets (50 mg) by mouth daily       IBUPROFEN PO      Take 200 mg by mouth every 6 hours as needed for moderate pain Reported on 5/2/2017       levothyroxine 125 MCG tablet    SYNTHROID    30 tablet    Take 1 tablet (125 mcg) by mouth daily       losartan 100 MG tablet    COZAAR    30 tablet    Take 1 tablet (100 mg) by mouth daily       ondansetron 4 MG ODT tab    ZOFRAN-ODT    120 tablet    Take 1 tablet (4 mg) by mouth every 6 hours as needed for nausea       ondansetron 4 MG tablet    ZOFRAN    20 tablet    Take 1 tablet (4 mg) by mouth every 6 hours as needed for nausea       Potassium Chloride ER 20 MEQ Tbcr     30 tablet    Take 1 tablet (20 mEq) by mouth daily       prochlorperazine 5 MG tablet    COMPAZINE    90 tablet    Take 1 tablet (5 mg) by mouth every 6 hours as needed for  vomiting       UNABLE TO FIND      3 times daily MEDICATION NAME: BDRbenadryl/dexamethisone       Vitamin D (Cholecalciferol) 1000 UNITS Tabs     60 tablet    Take 2,000 Units by mouth daily

## 2017-05-10 ENCOUNTER — MEDICAL CORRESPONDENCE (OUTPATIENT)
Dept: HEALTH INFORMATION MANAGEMENT | Facility: CLINIC | Age: 72
End: 2017-05-10

## 2017-05-11 ENCOUNTER — TELEPHONE (OUTPATIENT)
Dept: ONCOLOGY | Facility: CLINIC | Age: 72
End: 2017-05-11

## 2017-05-11 DIAGNOSIS — C56.9 OVARIAN CANCER, UNSPECIFIED LATERALITY (H): Primary | ICD-10-CM

## 2017-05-11 NOTE — TELEPHONE ENCOUNTER
Patients  Manoj called requesting a refill, please call him back, did not leave any other info.      Indy Contreras CMA

## 2017-05-11 NOTE — TELEPHONE ENCOUNTER
Called  back regarding refill. Needs refill on BDRbenadryl/dexamethsone.    Spoke with [facility] NIX patient needs to have this RX refilled through their palliative care.  Patient should call [facility]     Left message for patient regarding the above information.    Sheela Vasquez RN BSN OCN

## 2017-05-12 ENCOUNTER — INFUSION THERAPY VISIT (OUTPATIENT)
Dept: INFUSION THERAPY | Facility: CLINIC | Age: 72
End: 2017-05-12
Attending: OBSTETRICS & GYNECOLOGY
Payer: COMMERCIAL

## 2017-05-12 ENCOUNTER — HOSPITAL ENCOUNTER (OUTPATIENT)
Facility: CLINIC | Age: 72
Setting detail: SPECIMEN
Discharge: HOME | End: 2017-05-12
Attending: OBSTETRICS & GYNECOLOGY | Admitting: OBSTETRICS & GYNECOLOGY
Payer: COMMERCIAL

## 2017-05-12 VITALS
DIASTOLIC BLOOD PRESSURE: 62 MMHG | HEART RATE: 77 BPM | OXYGEN SATURATION: 98 % | SYSTOLIC BLOOD PRESSURE: 114 MMHG | WEIGHT: 156 LBS | BODY MASS INDEX: 31.49 KG/M2 | TEMPERATURE: 98.3 F | RESPIRATION RATE: 16 BRPM

## 2017-05-12 DIAGNOSIS — C56.9 OVARIAN CANCER, UNSPECIFIED LATERALITY (H): Primary | ICD-10-CM

## 2017-05-12 DIAGNOSIS — R11.0 NAUSEA: Primary | ICD-10-CM

## 2017-05-12 DIAGNOSIS — C56.9 OVARIAN CANCER, UNSPECIFIED LATERALITY (H): ICD-10-CM

## 2017-05-12 LAB
BASOPHILS # BLD AUTO: 0 10E9/L (ref 0–0.2)
BASOPHILS NFR BLD AUTO: 0.2 %
DIFFERENTIAL METHOD BLD: NORMAL
EOSINOPHIL # BLD AUTO: 0 10E9/L (ref 0–0.7)
EOSINOPHIL NFR BLD AUTO: 0 %
ERYTHROCYTE [DISTWIDTH] IN BLOOD BY AUTOMATED COUNT: 14.9 % (ref 10–15)
HCT VFR BLD AUTO: 36.8 % (ref 35–47)
HGB BLD-MCNC: 12.4 G/DL (ref 11.7–15.7)
IMM GRANULOCYTES # BLD: 0 10E9/L (ref 0–0.4)
IMM GRANULOCYTES NFR BLD: 0.5 %
LYMPHOCYTES # BLD AUTO: 0.9 10E9/L (ref 0.8–5.3)
LYMPHOCYTES NFR BLD AUTO: 17.1 %
MAGNESIUM SERPL-MCNC: 1.8 MG/DL (ref 1.6–2.3)
MCH RBC QN AUTO: 31.6 PG (ref 26.5–33)
MCHC RBC AUTO-ENTMCNC: 33.7 G/DL (ref 31.5–36.5)
MCV RBC AUTO: 94 FL (ref 78–100)
MONOCYTES # BLD AUTO: 0.3 10E9/L (ref 0–1.3)
MONOCYTES NFR BLD AUTO: 4.5 %
NEUTROPHILS # BLD AUTO: 4.3 10E9/L (ref 1.6–8.3)
NEUTROPHILS NFR BLD AUTO: 77.7 %
NRBC # BLD AUTO: 0 10*3/UL
NRBC BLD AUTO-RTO: 0 /100
PLATELET # BLD AUTO: 206 10E9/L (ref 150–450)
POTASSIUM SERPL-SCNC: 4 MMOL/L (ref 3.4–5.3)
RBC # BLD AUTO: 3.92 10E12/L (ref 3.8–5.2)
WBC # BLD AUTO: 5.5 10E9/L (ref 4–11)

## 2017-05-12 PROCEDURE — 84132 ASSAY OF SERUM POTASSIUM: CPT | Performed by: OBSTETRICS & GYNECOLOGY

## 2017-05-12 PROCEDURE — 83735 ASSAY OF MAGNESIUM: CPT | Performed by: OBSTETRICS & GYNECOLOGY

## 2017-05-12 PROCEDURE — 85025 COMPLETE CBC W/AUTO DIFF WBC: CPT | Performed by: OBSTETRICS & GYNECOLOGY

## 2017-05-12 PROCEDURE — 25000128 H RX IP 250 OP 636: Performed by: OBSTETRICS & GYNECOLOGY

## 2017-05-12 PROCEDURE — S0028 INJECTION, FAMOTIDINE, 20 MG: HCPCS | Performed by: OBSTETRICS & GYNECOLOGY

## 2017-05-12 PROCEDURE — 96375 TX/PRO/DX INJ NEW DRUG ADDON: CPT

## 2017-05-12 PROCEDURE — 96413 CHEMO IV INFUSION 1 HR: CPT

## 2017-05-12 PROCEDURE — 25000125 ZZHC RX 250: Performed by: OBSTETRICS & GYNECOLOGY

## 2017-05-12 RX ORDER — HEPARIN SODIUM (PORCINE) LOCK FLUSH IV SOLN 100 UNIT/ML 100 UNIT/ML
500 SOLUTION INTRAVENOUS EVERY 8 HOURS
Status: DISCONTINUED | OUTPATIENT
Start: 2017-05-12 | End: 2017-05-12 | Stop reason: HOSPADM

## 2017-05-12 RX ADMIN — PACLITAXEL 135 MG: 6 INJECTION, SOLUTION INTRAVENOUS at 10:17

## 2017-05-12 RX ADMIN — SODIUM CHLORIDE, PRESERVATIVE FREE 500 UNITS: 5 INJECTION INTRAVENOUS at 11:21

## 2017-05-12 RX ADMIN — DEXAMETHASONE SODIUM PHOSPHATE 12 MG: 10 INJECTION, SOLUTION INTRAMUSCULAR; INTRAVENOUS at 09:46

## 2017-05-12 RX ADMIN — DIPHENHYDRAMINE HYDROCHLORIDE 25 MG: 50 INJECTION, SOLUTION INTRAMUSCULAR; INTRAVENOUS at 09:18

## 2017-05-12 RX ADMIN — FAMOTIDINE 40 MG: 10 INJECTION, SOLUTION INTRAVENOUS at 09:28

## 2017-05-12 RX ADMIN — SODIUM CHLORIDE 250 ML: 9 INJECTION, SOLUTION INTRAVENOUS at 09:17

## 2017-05-12 NOTE — TELEPHONE ENCOUNTER
Patient was unable to get nausea medication (diphenhydramine suspension) filled because it was filled while she was living at [facility] and doesn't have access to that provider.  Obtained order from [facility] Bird to order this.  Called the pharmacy who originally ordered it ([facility] [phone]) to get the formula that was used.  Formula is: diphenhydramine elixir 12.5mg/5ml (use 200ml), metoclopramide 5mg/5ml (use 200ml), dexamethasone 4mg tablets (use 20 tablets crushed).  Notified patient that script was called to [facility] pharmacy in [place] [phone] and that she needs to call them before picking it up to make sure it is ready.

## 2017-05-12 NOTE — PROGRESS NOTES
Refilled BDR gel that had been prescribed for nausea per her palliative team that saw her while she was at [facility]. Orders for outpatient follow up with our palliative medicine team placed to ensure proper follow up and care.  SALVADOR Solis, FNP-C  Family Nurse Practitioner  Gynecologic Oncology  [facility]  Pager: [phone]

## 2017-05-12 NOTE — PROGRESS NOTES
Infusion Nursing Note:  Tosin Nina presents today for taxol.    Patient seen by provider today: No   present during visit today: Not Applicable.    Note: Virginia wants us to refill a prescription for her of benadryl/reglan/dexamethasone. It was filled by palliative care at [facility]. Stefani Bird notified, and she is not comfortable refilling this. Phyllis RN is looking into getting this refilled by [facility] or Dr. Ennis.    Intravenous Access:  Labs drawn without difficulty.  Implanted Port.    Treatment Conditions:  Lab Results   Component Value Date    HGB 12.4 05/12/2017     Lab Results   Component Value Date    WBC 5.5 05/12/2017      Lab Results   Component Value Date    ANEU 4.3 05/12/2017     Lab Results   Component Value Date     05/12/2017      Lab Results   Component Value Date     05/05/2017                   Lab Results   Component Value Date    POTASSIUM 4.0 05/12/2017           Lab Results   Component Value Date    MAG 1.8 05/12/2017            Lab Results   Component Value Date    CR 0.54 05/05/2017                   Lab Results   Component Value Date    PATTI 8.6 05/05/2017                Lab Results   Component Value Date    BILITOTAL 0.2 05/05/2017           Lab Results   Component Value Date    ALBUMIN 3.2 05/05/2017                    Lab Results   Component Value Date    ALT 37 05/05/2017           Lab Results   Component Value Date    AST 17 05/05/2017     Results reviewed, labs MET treatment parameters, ok to proceed with treatment.      Post Infusion Assessment:  Patient tolerated infusion without incident.  Blood return noted pre and post infusion.  Site patent and intact, free from redness, edema or discomfort.  No evidence of extravasations.  Access discontinued per protocol.    Discharge Plan:   Discharge instructions reviewed with: Patient.  Patient and/or family verbalized understanding of discharge instructions and all questions  answered.  Copy of AVS reviewed with patient and/or family.  Patient will return 5/19/17 for next appointment.  Patient discharged in stable condition accompanied by: .  Departure Mode: Wheelchair.    Shruthi Crockett RN

## 2017-05-12 NOTE — MR AVS SNAPSHOT
After Visit Summary   5/12/2017    Tosin Nina    MRN: [id number]           Patient Information     Date Of Birth          1945        Visit Information        Provider Department      5/12/2017 8:00 AM RH INFUSION CHAIR 12 [facility]        Today's Diagnoses     Ovarian cancer, unspecified laterality (H)    -  1       Follow-ups after your visit        Your next 10 appointments already scheduled     May 19, 2017  8:00 AM CDT   Level 3 with RH INFUSION CHAIR 7   [facility] ([facility])    [address] [place]  [place] [address]   [phone]            May 30, 2017 12:30 PM CDT   Level 3 with RH INFUSION CHAIR 11   [facility] ([facility])    [place] [address] [address]  [facility] [address]   [phone]            May 30, 2017  1:00 PM CDT   Return Visit with Nessa oFster MD   [facility] ([facility])    [address] [address] [address]  [place] [address]   [phone]            Jul 05, 2017  9:00 AM CDT   New Visit with Kelsie Nguyen GC   [facility] ([facility])    [place] [address] [address]  [facility] [address]   [phone]              Who to contact     If you have questions or need follow up information about today's clinic visit or your schedule please contact [facility] directly at [phone].  Normal or non-critical lab and imaging results will be communicated to you by MyChart, letter or phone within 4 business days after the clinic has received the results. If you do not hear from us within 7 days, please contact the clinic through MyChart or phone. If you have a critical  "or abnormal lab result, we will notify you by phone as soon as possible.  Submit refill requests through Defix[employer] or call your pharmacy and they will forward the refill request to us. Please allow 3 business days for your refill to be completed.          Additional Information About Your Visit        [employer] Information     Defixo lets you send messages to your doctor, view your test results, renew your prescriptions, schedule appointments and more. To sign up, go to [email or web address] . Click on \"Log in\" on the left side of the screen, which will take you to the Welcome page. Then click on \"Sign up Now\" on the right side of the page.     You will be asked to enter the access code listed below, as well as some personal information. Please follow the directions to create your username and password.     Your access code is: [email or web address]  Expires: 8/10/2017 11:53 AM     Your access code will  in 90 days. If you need help or a new code, please call your [place] clinic or [phone].        Care EveryWhere ID     This is your Care EveryWhere ID. This could be used by other organizations to access your [place] medical records  [id number]        Your Vitals Were     Pulse Temperature Respirations Pulse Oximetry BMI (Body Mass Index)       77 98.3  F (36.8  C) (Tympanic) 16 98% 31.49 kg/m2        Blood Pressure from Last 3 Encounters:   17 114/62   17 129/65   17 133/74    Weight from Last 3 Encounters:   17 70.8 kg (156 lb)   17 69.9 kg (154 lb)   17 69.9 kg (154 lb)              We Performed the Following     CBC with platelets differential     Magnesium     Potassium     Treatment Conditions          Today's Medication Changes          These changes are accurate as of: 17 11:53 AM.  If you have any questions, ask your nurse or doctor.               Start taking these medicines.        Dose/Directions    diphenhydrAMINE 2.5%, dexamethasone 0.4%, metoclopramide 1% " 2.5%-0.4%-1% Gel in PLO gel   Commonly known as:  BDR   Used for:  Nausea   Started by:  Adelina Bird APRN CNP        Dose:  1 applicator   Apply 1 g topically 2 times daily   Quantity:  100 g   Refills:  1            Where to get your medicines      Some of these will need a paper prescription and others can be bought over the counter.  Ask your nurse if you have questions.     Bring a paper prescription for each of these medications     diphenhydrAMINE 2.5%, dexamethasone 0.4%, metoclopramide 1% 2.5%-0.4%-1% Gel in PLO gel                Primary Care Provider Office Phone # Fax #    Esther Back [phone]       [facility] [address] [address]        Thank you!     Thank you for choosing [facility]  for your care. Our goal is always to provide you with excellent care. Hearing back from our patients is one way we can continue to improve our services. Please take a few minutes to complete the written survey that you may receive in the mail after your visit with us. Thank you!             Your Updated Medication List - Protect others around you: Learn how to safely use, store and throw away your medicines at www.disposemymeds.org.          This list is accurate as of: 5/12/17 11:53 AM.  Always use your most recent med list.                   Brand Name Dispense Instructions for use    acetaminophen 325 MG tablet    TYLENOL    100 tablet    Take 2 tablets (650 mg) by mouth every 4 hours as needed for mild pain       diphenhydrAMINE 2.5%, dexamethasone 0.4%, metoclopramide 1% 2.5%-0.4%-1% Gel in PLO gel    BDR    100 g    Apply 1 g topically 2 times daily       enoxaparin 80 MG/0.8ML injection    LOVENOX    60 Syringe    Inject 0.6 mLs (60 mg) Subcutaneous every 12 hours       hydrochlorothiazide 25 MG tablet    HYDRODIURIL    30 tablet    Take 2 tablets (50 mg) by mouth daily       IBUPROFEN PO      Take 200 mg by mouth every 6  hours as needed for moderate pain Reported on 5/2/2017       levothyroxine 125 MCG tablet    SYNTHROID    30 tablet    Take 1 tablet (125 mcg) by mouth daily       losartan 100 MG tablet    COZAAR    30 tablet    Take 1 tablet (100 mg) by mouth daily       ondansetron 4 MG ODT tab    ZOFRAN-ODT    120 tablet    Take 1 tablet (4 mg) by mouth every 6 hours as needed for nausea       ondansetron 4 MG tablet    ZOFRAN    20 tablet    Take 1 tablet (4 mg) by mouth every 6 hours as needed for nausea       Potassium Chloride ER 20 MEQ Tbcr     30 tablet    Take 1 tablet (20 mEq) by mouth daily       prochlorperazine 5 MG tablet    COMPAZINE    90 tablet    Take 1 tablet (5 mg) by mouth every 6 hours as needed for vomiting       UNABLE TO FIND      3 times daily MEDICATION NAME: BDRbenadryl/dexamethisone       Vitamin D (Cholecalciferol) 1000 UNITS Tabs     60 tablet    Take 2,000 Units by mouth daily

## 2017-05-19 ENCOUNTER — HOSPITAL ENCOUNTER (OUTPATIENT)
Facility: CLINIC | Age: 72
Setting detail: SPECIMEN
Discharge: HOME | End: 2017-05-19
Attending: OBSTETRICS & GYNECOLOGY | Admitting: OBSTETRICS & GYNECOLOGY
Payer: COMMERCIAL

## 2017-05-19 ENCOUNTER — INFUSION THERAPY VISIT (OUTPATIENT)
Dept: INFUSION THERAPY | Facility: CLINIC | Age: 72
End: 2017-05-19
Attending: OBSTETRICS & GYNECOLOGY
Payer: COMMERCIAL

## 2017-05-19 VITALS
RESPIRATION RATE: 16 BRPM | TEMPERATURE: 97.5 F | DIASTOLIC BLOOD PRESSURE: 71 MMHG | HEART RATE: 68 BPM | SYSTOLIC BLOOD PRESSURE: 120 MMHG | OXYGEN SATURATION: 97 %

## 2017-05-19 DIAGNOSIS — C56.9 OVARIAN CANCER, UNSPECIFIED LATERALITY (H): Primary | ICD-10-CM

## 2017-05-19 LAB
BASOPHILS # BLD AUTO: 0 10E9/L (ref 0–0.2)
BASOPHILS NFR BLD AUTO: 0 %
DIFFERENTIAL METHOD BLD: ABNORMAL
EOSINOPHIL # BLD AUTO: 0 10E9/L (ref 0–0.7)
EOSINOPHIL NFR BLD AUTO: 0 %
ERYTHROCYTE [DISTWIDTH] IN BLOOD BY AUTOMATED COUNT: 15.1 % (ref 10–15)
HCT VFR BLD AUTO: 33.6 % (ref 35–47)
HGB BLD-MCNC: 11.5 G/DL (ref 11.7–15.7)
IMM GRANULOCYTES # BLD: 0.1 10E9/L (ref 0–0.4)
IMM GRANULOCYTES NFR BLD: 2.4 %
LYMPHOCYTES # BLD AUTO: 1.2 10E9/L (ref 0.8–5.3)
LYMPHOCYTES NFR BLD AUTO: 47.8 %
MAGNESIUM SERPL-MCNC: 1.9 MG/DL (ref 1.6–2.3)
MCH RBC QN AUTO: 31.8 PG (ref 26.5–33)
MCHC RBC AUTO-ENTMCNC: 34.2 G/DL (ref 31.5–36.5)
MCV RBC AUTO: 93 FL (ref 78–100)
MONOCYTES # BLD AUTO: 0.1 10E9/L (ref 0–1.3)
MONOCYTES NFR BLD AUTO: 5.5 %
NEUTROPHILS # BLD AUTO: 1.1 10E9/L (ref 1.6–8.3)
NEUTROPHILS NFR BLD AUTO: 44.3 %
NRBC # BLD AUTO: 0 10*3/UL
NRBC BLD AUTO-RTO: 0 /100
PLATELET # BLD AUTO: 145 10E9/L (ref 150–450)
POTASSIUM SERPL-SCNC: 3.8 MMOL/L (ref 3.4–5.3)
RBC # BLD AUTO: 3.62 10E12/L (ref 3.8–5.2)
WBC # BLD AUTO: 2.6 10E9/L (ref 4–11)

## 2017-05-19 PROCEDURE — 96367 TX/PROPH/DG ADDL SEQ IV INF: CPT

## 2017-05-19 PROCEDURE — 96375 TX/PRO/DX INJ NEW DRUG ADDON: CPT

## 2017-05-19 PROCEDURE — 96413 CHEMO IV INFUSION 1 HR: CPT

## 2017-05-19 PROCEDURE — 25000125 ZZHC RX 250: Performed by: OBSTETRICS & GYNECOLOGY

## 2017-05-19 PROCEDURE — S0028 INJECTION, FAMOTIDINE, 20 MG: HCPCS | Performed by: OBSTETRICS & GYNECOLOGY

## 2017-05-19 PROCEDURE — 83735 ASSAY OF MAGNESIUM: CPT | Performed by: OBSTETRICS & GYNECOLOGY

## 2017-05-19 PROCEDURE — 84132 ASSAY OF SERUM POTASSIUM: CPT | Performed by: OBSTETRICS & GYNECOLOGY

## 2017-05-19 PROCEDURE — 25000128 H RX IP 250 OP 636: Performed by: OBSTETRICS & GYNECOLOGY

## 2017-05-19 PROCEDURE — 85025 COMPLETE CBC W/AUTO DIFF WBC: CPT | Performed by: OBSTETRICS & GYNECOLOGY

## 2017-05-19 RX ORDER — HEPARIN SODIUM (PORCINE) LOCK FLUSH IV SOLN 100 UNIT/ML 100 UNIT/ML
500 SOLUTION INTRAVENOUS EVERY 8 HOURS
Status: DISCONTINUED | OUTPATIENT
Start: 2017-05-19 | End: 2017-05-19 | Stop reason: HOSPADM

## 2017-05-19 RX ADMIN — PACLITAXEL 135 MG: 6 INJECTION, SOLUTION INTRAVENOUS at 10:07

## 2017-05-19 RX ADMIN — FAMOTIDINE 40 MG: 10 INJECTION INTRAVENOUS at 09:55

## 2017-05-19 RX ADMIN — DIPHENHYDRAMINE HYDROCHLORIDE 25 MG: 50 INJECTION, SOLUTION INTRAMUSCULAR; INTRAVENOUS at 09:43

## 2017-05-19 RX ADMIN — SODIUM CHLORIDE, PRESERVATIVE FREE 500 UNITS: 5 INJECTION INTRAVENOUS at 11:15

## 2017-05-19 RX ADMIN — DEXAMETHASONE SODIUM PHOSPHATE 12 MG: 10 INJECTION, SOLUTION INTRAMUSCULAR; INTRAVENOUS at 09:25

## 2017-05-19 RX ADMIN — SODIUM CHLORIDE 250 ML: 9 INJECTION, SOLUTION INTRAVENOUS at 09:25

## 2017-05-19 NOTE — PROGRESS NOTES
Infusion Nursing Note:  Tosin Barbernidia presents today for Taxol.    Patient seen by provider today: No   present during visit today: Not Applicable.    Note: ANC = 1.1.  Called to Dr Ennis.  Since pt only receiving Taxol today, OK to proceed with chemo.  Has open area in between buttocks at very upper area of crack.  Has fungal cream at home, as well as zinc ointment.  Tries to change pressure areas on bottom when at home    Intravenous Access:  Lab draw site R chest, Needle type schmitt, Gauge 20.  Labs drawn without difficulty.  Implanted Port.    Treatment Conditions:  Lab Results   Component Value Date    HGB 11.5 05/19/2017     Lab Results   Component Value Date    WBC 2.6 05/19/2017      Lab Results   Component Value Date    ANEU 1.1 05/19/2017     Lab Results   Component Value Date     05/19/2017      Potassium  3.8  .mag.  1.9      Post Infusion Assessment:  Patient tolerated infusion without incident.  Blood return noted pre and post infusion.  Site patent and intact, free from redness, edema or discomfort.  No evidence of extravasations.  Access discontinued per protocol.    Discharge Plan:   Patient declined prescription refills.  Discharge instructions reviewed with: Patient and Family.  AVS to patient via [employer].  Patient will return 5/30 for next appointment.   Patient discharged in stable condition accompanied by: self and .  Departure Mode: Wheelchair.    Toshia Morrow RN

## 2017-05-19 NOTE — MR AVS SNAPSHOT
After Visit Summary   5/19/2017    Tosin Nina    MRN: [id number]           Patient Information     Date Of Birth          1945        Visit Information        Provider Department      5/19/2017 8:00 AM RH INFUSION CHAIR 7 [facility]        Today's Diagnoses     Ovarian cancer, unspecified laterality (H)    -  1       Follow-ups after your visit        Your next 10 appointments already scheduled     May 30, 2017 12:30 PM CDT   Level 3 with RH INFUSION CHAIR 11   [facility] ([facility])    [address] [address] [address]  [place] [address]   [phone]            May 30, 2017  1:00 PM CDT   Return Visit with Nessa Foster MD   [facility] ([facility])    [address] [address]  [place] [address]   [phone]            Jun 09, 2017 12:30 PM CDT   New Visit with Maeve Ca MD   [facility] ([facility])    [address] [address] [address]  [facility] [address]   [phone]            Jul 05, 2017  9:00 AM CDT   New Visit with Kelsie gNuyen GC   Cancer Risk Management Program ([facility])    [address] [address] [address]  [place] [address]   [phone]              Who to contact     If you have questions or need follow up information about today's clinic visit or your schedule please contact [facility] directly at [phone].  Normal or non-critical lab and imaging results will be communicated to you by MyChart, letter or phone within 4 business days after the clinic has received the results. If you do not hear from us within 7 days, please contact the clinic through MyChart or phone. If you have a critical or  "abnormal lab result, we will notify you by phone as soon as possible.  Submit refill requests through ipnexus or call your pharmacy and they will forward the refill request to us. Please allow 3 business days for your refill to be completed.          Additional Information About Your Visit        [employer] Information     ipnexus lets you send messages to your doctor, view your test results, renew your prescriptions, schedule appointments and more. To sign up, go to [email or web address] . Click on \"Log in\" on the left side of the screen, which will take you to the Welcome page. Then click on \"Sign up Now\" on the right side of the page.     You will be asked to enter the access code listed below, as well as some personal information. Please follow the directions to create your username and password.     Your access code is: [email or web address]  Expires: 8/10/2017 11:53 AM     Your access code will  in 90 days. If you need help or a new code, please call your [place] clinic or [phone].        Care EveryWhere ID     This is your Care EveryWhere ID. This could be used by other organizations to access your [place] medical records  [id number]        Your Vitals Were     Pulse Temperature Respirations Pulse Oximetry          68 97.5  F (36.4  C) (Tympanic) 16 97%         Blood Pressure from Last 3 Encounters:   17 120/71   17 114/62   17 129/65    Weight from Last 3 Encounters:   17 70.8 kg (156 lb)   17 69.9 kg (154 lb)   17 69.9 kg (154 lb)              We Performed the Following     CBC with platelets differential     Magnesium     Potassium     Treatment Conditions     Vital signs        Primary Care Provider Office Phone # Fax #    Esther Back [phone]       [facility] [address] [address]        Thank you!     Thank you for choosing [facility]  for your care. Our goal is always to provide " you with excellent care. Hearing back from our patients is one way we can continue to improve our services. Please take a few minutes to complete the written survey that you may receive in the mail after your visit with us. Thank you!             Your Updated Medication List - Protect others around you: Learn how to safely use, store and throw away your medicines at www.disposemymeds.org.          This list is accurate as of: 5/19/17 11:20 AM.  Always use your most recent med list.                   Brand Name Dispense Instructions for use    acetaminophen 325 MG tablet    TYLENOL    100 tablet    Take 2 tablets (650 mg) by mouth every 4 hours as needed for mild pain       diphenhydrAMINE 2.5%, dexamethasone 0.4%, metoclopramide 1% 2.5%-0.4%-1% Gel in PLO gel    BDR    100 g    Apply 1 g topically 2 times daily       enoxaparin 80 MG/0.8ML injection    LOVENOX    60 Syringe    Inject 0.6 mLs (60 mg) Subcutaneous every 12 hours       hydrochlorothiazide 25 MG tablet    HYDRODIURIL    30 tablet    Take 2 tablets (50 mg) by mouth daily       IBUPROFEN PO      Take 200 mg by mouth every 6 hours as needed for moderate pain Reported on 5/2/2017       levothyroxine 125 MCG tablet    SYNTHROID    30 tablet    Take 1 tablet (125 mcg) by mouth daily       losartan 100 MG tablet    COZAAR    30 tablet    Take 1 tablet (100 mg) by mouth daily       NEW MED     400 mL    Diphenhydramine elixir 12.5mg/ml  (200       ondansetron 4 MG ODT tab    ZOFRAN-ODT    120 tablet    Take 1 tablet (4 mg) by mouth every 6 hours as needed for nausea       ondansetron 4 MG tablet    ZOFRAN    20 tablet    Take 1 tablet (4 mg) by mouth every 6 hours as needed for nausea       Potassium Chloride ER 20 MEQ Tbcr     30 tablet    Take 1 tablet (20 mEq) by mouth daily       prochlorperazine 5 MG tablet    COMPAZINE    90 tablet    Take 1 tablet (5 mg) by mouth every 6 hours as needed for vomiting       UNABLE TO FIND      3 times daily MEDICATION  NAME: BDRbenadryl/dexamethisone       Vitamin D (Cholecalciferol) 1000 UNITS Tabs     60 tablet    Take 2,000 Units by mouth daily

## 2017-05-30 ENCOUNTER — ONCOLOGY VISIT (OUTPATIENT)
Dept: ONCOLOGY | Facility: CLINIC | Age: 72
End: 2017-05-30
Attending: OBSTETRICS & GYNECOLOGY
Payer: COMMERCIAL

## 2017-05-30 ENCOUNTER — HOSPITAL ENCOUNTER (OUTPATIENT)
Facility: CLINIC | Age: 72
Setting detail: SPECIMEN
Discharge: HOME | End: 2017-05-30
Attending: OBSTETRICS & GYNECOLOGY | Admitting: OBSTETRICS & GYNECOLOGY
Payer: COMMERCIAL

## 2017-05-30 ENCOUNTER — INFUSION THERAPY VISIT (OUTPATIENT)
Dept: INFUSION THERAPY | Facility: CLINIC | Age: 72
End: 2017-05-30
Attending: OBSTETRICS & GYNECOLOGY
Payer: COMMERCIAL

## 2017-05-30 VITALS
HEART RATE: 81 BPM | RESPIRATION RATE: 16 BRPM | OXYGEN SATURATION: 97 % | SYSTOLIC BLOOD PRESSURE: 94 MMHG | BODY MASS INDEX: 32.28 KG/M2 | HEIGHT: 59 IN | WEIGHT: 160.13 LBS | TEMPERATURE: 97.2 F | DIASTOLIC BLOOD PRESSURE: 47 MMHG

## 2017-05-30 DIAGNOSIS — C56.9 OVARIAN CANCER, UNSPECIFIED LATERALITY (H): Primary | ICD-10-CM

## 2017-05-30 DIAGNOSIS — G13.0 PARANEOPLASTIC NEUROMYOPATHY AND NEUROPATHY (H): ICD-10-CM

## 2017-05-30 LAB
ALBUMIN SERPL-MCNC: 2.8 G/DL (ref 3.4–5)
ALP SERPL-CCNC: 82 U/L (ref 40–150)
ALT SERPL W P-5'-P-CCNC: 34 U/L (ref 0–50)
ANION GAP SERPL CALCULATED.3IONS-SCNC: 8 MMOL/L (ref 3–14)
AST SERPL W P-5'-P-CCNC: 18 U/L (ref 0–45)
BASOPHILS # BLD AUTO: 0 10E9/L (ref 0–0.2)
BASOPHILS NFR BLD AUTO: 0.4 %
BILIRUB SERPL-MCNC: 0.3 MG/DL (ref 0.2–1.3)
BUN SERPL-MCNC: 14 MG/DL (ref 7–30)
CALCIUM SERPL-MCNC: 8.6 MG/DL (ref 8.5–10.1)
CANCER AG125 SERPL-ACNC: 63 U/ML (ref 0–30)
CHLORIDE SERPL-SCNC: 100 MMOL/L (ref 94–109)
CO2 SERPL-SCNC: 28 MMOL/L (ref 20–32)
CREAT SERPL-MCNC: 0.47 MG/DL (ref 0.52–1.04)
DIFFERENTIAL METHOD BLD: ABNORMAL
EOSINOPHIL # BLD AUTO: 0 10E9/L (ref 0–0.7)
EOSINOPHIL NFR BLD AUTO: 0.8 %
ERYTHROCYTE [DISTWIDTH] IN BLOOD BY AUTOMATED COUNT: 17.4 % (ref 10–15)
GFR SERPL CREATININE-BSD FRML MDRD: ABNORMAL ML/MIN/1.7M2
GLUCOSE SERPL-MCNC: 110 MG/DL (ref 70–99)
HCT VFR BLD AUTO: 29.6 % (ref 35–47)
HGB BLD-MCNC: 10.2 G/DL (ref 11.7–15.7)
IMM GRANULOCYTES # BLD: 0.1 10E9/L (ref 0–0.4)
IMM GRANULOCYTES NFR BLD: 2.1 %
LYMPHOCYTES # BLD AUTO: 1 10E9/L (ref 0.8–5.3)
LYMPHOCYTES NFR BLD AUTO: 41.8 %
MAGNESIUM SERPL-MCNC: 1.8 MG/DL (ref 1.6–2.3)
MCH RBC QN AUTO: 32.4 PG (ref 26.5–33)
MCHC RBC AUTO-ENTMCNC: 34.5 G/DL (ref 31.5–36.5)
MCV RBC AUTO: 94 FL (ref 78–100)
MONOCYTES # BLD AUTO: 0.2 10E9/L (ref 0–1.3)
MONOCYTES NFR BLD AUTO: 6.3 %
NEUTROPHILS # BLD AUTO: 1.2 10E9/L (ref 1.6–8.3)
NEUTROPHILS NFR BLD AUTO: 48.6 %
NRBC # BLD AUTO: 0 10*3/UL
NRBC BLD AUTO-RTO: 1 /100
PLATELET # BLD AUTO: 210 10E9/L (ref 150–450)
POTASSIUM SERPL-SCNC: 3.1 MMOL/L (ref 3.4–5.3)
PROT SERPL-MCNC: 6.1 G/DL (ref 6.8–8.8)
RBC # BLD AUTO: 3.15 10E12/L (ref 3.8–5.2)
SODIUM SERPL-SCNC: 136 MMOL/L (ref 133–144)
WBC # BLD AUTO: 2.4 10E9/L (ref 4–11)

## 2017-05-30 PROCEDURE — 25000125 ZZHC RX 250: Performed by: OBSTETRICS & GYNECOLOGY

## 2017-05-30 PROCEDURE — S0028 INJECTION, FAMOTIDINE, 20 MG: HCPCS | Performed by: OBSTETRICS & GYNECOLOGY

## 2017-05-30 PROCEDURE — 96413 CHEMO IV INFUSION 1 HR: CPT

## 2017-05-30 PROCEDURE — 83735 ASSAY OF MAGNESIUM: CPT | Performed by: OBSTETRICS & GYNECOLOGY

## 2017-05-30 PROCEDURE — 96375 TX/PRO/DX INJ NEW DRUG ADDON: CPT

## 2017-05-30 PROCEDURE — 80053 COMPREHEN METABOLIC PANEL: CPT | Performed by: OBSTETRICS & GYNECOLOGY

## 2017-05-30 PROCEDURE — 25000132 ZZH RX MED GY IP 250 OP 250 PS 637: Performed by: OBSTETRICS & GYNECOLOGY

## 2017-05-30 PROCEDURE — 25000128 H RX IP 250 OP 636: Performed by: OBSTETRICS & GYNECOLOGY

## 2017-05-30 PROCEDURE — 86304 IMMUNOASSAY TUMOR CA 125: CPT | Performed by: OBSTETRICS & GYNECOLOGY

## 2017-05-30 PROCEDURE — 85025 COMPLETE CBC W/AUTO DIFF WBC: CPT | Performed by: OBSTETRICS & GYNECOLOGY

## 2017-05-30 PROCEDURE — 99214 OFFICE O/P EST MOD 30 MIN: CPT | Performed by: OBSTETRICS & GYNECOLOGY

## 2017-05-30 PROCEDURE — 96417 CHEMO IV INFUS EACH ADDL SEQ: CPT

## 2017-05-30 RX ORDER — DIPHENHYDRAMINE HYDROCHLORIDE 50 MG/ML
50 INJECTION INTRAMUSCULAR; INTRAVENOUS
Status: CANCELLED
Start: 2017-06-13

## 2017-05-30 RX ORDER — METHYLPREDNISOLONE SODIUM SUCCINATE 125 MG/2ML
125 INJECTION, POWDER, LYOPHILIZED, FOR SOLUTION INTRAMUSCULAR; INTRAVENOUS
Status: CANCELLED
Start: 2017-05-30

## 2017-05-30 RX ORDER — DIPHENHYDRAMINE HYDROCHLORIDE 50 MG/ML
50 INJECTION INTRAMUSCULAR; INTRAVENOUS
Status: CANCELLED
Start: 2017-05-30

## 2017-05-30 RX ORDER — PALONOSETRON 0.05 MG/ML
0.25 INJECTION, SOLUTION INTRAVENOUS ONCE
Status: COMPLETED | OUTPATIENT
Start: 2017-05-30 | End: 2017-05-30

## 2017-05-30 RX ORDER — SODIUM CHLORIDE 9 MG/ML
1000 INJECTION, SOLUTION INTRAVENOUS CONTINUOUS PRN
Status: CANCELLED
Start: 2017-06-06

## 2017-05-30 RX ORDER — EPINEPHRINE 0.3 MG/.3ML
0.3 INJECTION SUBCUTANEOUS EVERY 5 MIN PRN
Status: CANCELLED | OUTPATIENT
Start: 2017-05-30

## 2017-05-30 RX ORDER — METHYLPREDNISOLONE SODIUM SUCCINATE 125 MG/2ML
125 INJECTION, POWDER, LYOPHILIZED, FOR SOLUTION INTRAMUSCULAR; INTRAVENOUS
Status: CANCELLED
Start: 2017-06-13

## 2017-05-30 RX ORDER — ALBUTEROL SULFATE 0.83 MG/ML
2.5 SOLUTION RESPIRATORY (INHALATION)
Status: CANCELLED | OUTPATIENT
Start: 2017-06-06

## 2017-05-30 RX ORDER — HEPARIN SODIUM (PORCINE) LOCK FLUSH IV SOLN 100 UNIT/ML 100 UNIT/ML
500 SOLUTION INTRAVENOUS EVERY 8 HOURS
Status: DISCONTINUED | OUTPATIENT
Start: 2017-05-30 | End: 2017-05-30 | Stop reason: HOSPADM

## 2017-05-30 RX ORDER — DIPHENHYDRAMINE HCL 25 MG
25 CAPSULE ORAL ONCE
Status: CANCELLED
Start: 2017-06-06

## 2017-05-30 RX ORDER — LORAZEPAM 2 MG/ML
0.5 INJECTION INTRAMUSCULAR EVERY 6 HOURS PRN
Status: CANCELLED
Start: 2017-06-06

## 2017-05-30 RX ORDER — MEPERIDINE HYDROCHLORIDE 25 MG/ML
25 INJECTION INTRAMUSCULAR; INTRAVENOUS; SUBCUTANEOUS EVERY 30 MIN PRN
Status: CANCELLED | OUTPATIENT
Start: 2017-05-30

## 2017-05-30 RX ORDER — ALBUTEROL SULFATE 0.83 MG/ML
2.5 SOLUTION RESPIRATORY (INHALATION)
Status: CANCELLED | OUTPATIENT
Start: 2017-05-30

## 2017-05-30 RX ORDER — EPINEPHRINE 1 MG/ML
0.3 INJECTION INTRAMUSCULAR; INTRAVENOUS; SUBCUTANEOUS EVERY 5 MIN PRN
Status: CANCELLED | OUTPATIENT
Start: 2017-06-06

## 2017-05-30 RX ORDER — SODIUM CHLORIDE 9 MG/ML
1000 INJECTION, SOLUTION INTRAVENOUS CONTINUOUS PRN
Status: CANCELLED
Start: 2017-06-13

## 2017-05-30 RX ORDER — SENNOSIDES 8.6 MG
1 TABLET ORAL DAILY
Status: ON HOLD | COMMUNITY
End: 2017-06-30

## 2017-05-30 RX ORDER — EPINEPHRINE 1 MG/ML
0.3 INJECTION INTRAMUSCULAR; INTRAVENOUS; SUBCUTANEOUS EVERY 5 MIN PRN
Status: CANCELLED | OUTPATIENT
Start: 2017-05-30

## 2017-05-30 RX ORDER — METHYLPREDNISOLONE SODIUM SUCCINATE 125 MG/2ML
125 INJECTION, POWDER, LYOPHILIZED, FOR SOLUTION INTRAMUSCULAR; INTRAVENOUS
Status: CANCELLED
Start: 2017-06-06

## 2017-05-30 RX ORDER — ALBUTEROL SULFATE 0.83 MG/ML
2.5 SOLUTION RESPIRATORY (INHALATION)
Status: CANCELLED | OUTPATIENT
Start: 2017-06-13

## 2017-05-30 RX ORDER — PALONOSETRON 0.05 MG/ML
0.25 INJECTION, SOLUTION INTRAVENOUS ONCE
Status: CANCELLED
Start: 2017-05-30

## 2017-05-30 RX ORDER — DIPHENHYDRAMINE HCL 25 MG
25 CAPSULE ORAL ONCE
Status: CANCELLED
Start: 2017-05-30

## 2017-05-30 RX ORDER — HEPARIN SODIUM (PORCINE) LOCK FLUSH IV SOLN 100 UNIT/ML 100 UNIT/ML
500 SOLUTION INTRAVENOUS EVERY 8 HOURS
Status: CANCELLED
Start: 2017-06-06

## 2017-05-30 RX ORDER — DIPHENHYDRAMINE HCL 25 MG
25 CAPSULE ORAL ONCE
Status: CANCELLED
Start: 2017-06-13

## 2017-05-30 RX ORDER — EPINEPHRINE 0.3 MG/.3ML
0.3 INJECTION SUBCUTANEOUS EVERY 5 MIN PRN
Status: CANCELLED | OUTPATIENT
Start: 2017-06-13

## 2017-05-30 RX ORDER — EPINEPHRINE 1 MG/ML
0.3 INJECTION INTRAMUSCULAR; INTRAVENOUS; SUBCUTANEOUS EVERY 5 MIN PRN
Status: CANCELLED | OUTPATIENT
Start: 2017-06-13

## 2017-05-30 RX ORDER — MEPERIDINE HYDROCHLORIDE 25 MG/ML
25 INJECTION INTRAMUSCULAR; INTRAVENOUS; SUBCUTANEOUS EVERY 30 MIN PRN
Status: CANCELLED | OUTPATIENT
Start: 2017-06-06

## 2017-05-30 RX ORDER — HEPARIN SODIUM (PORCINE) LOCK FLUSH IV SOLN 100 UNIT/ML 100 UNIT/ML
500 SOLUTION INTRAVENOUS EVERY 8 HOURS
Status: CANCELLED
Start: 2017-06-13

## 2017-05-30 RX ORDER — ALBUTEROL SULFATE 90 UG/1
1-2 AEROSOL, METERED RESPIRATORY (INHALATION)
Status: CANCELLED
Start: 2017-05-30

## 2017-05-30 RX ORDER — POTASSIUM CHLORIDE 1500 MG/1
40 TABLET, EXTENDED RELEASE ORAL ONCE
Status: COMPLETED | OUTPATIENT
Start: 2017-05-30 | End: 2017-05-30

## 2017-05-30 RX ORDER — LORAZEPAM 2 MG/ML
0.5 INJECTION INTRAMUSCULAR EVERY 6 HOURS PRN
Status: CANCELLED
Start: 2017-05-30

## 2017-05-30 RX ORDER — ALBUTEROL SULFATE 90 UG/1
1-2 AEROSOL, METERED RESPIRATORY (INHALATION)
Status: CANCELLED
Start: 2017-06-13

## 2017-05-30 RX ORDER — ALBUTEROL SULFATE 90 UG/1
1-2 AEROSOL, METERED RESPIRATORY (INHALATION)
Status: CANCELLED
Start: 2017-06-06

## 2017-05-30 RX ORDER — EPINEPHRINE 0.3 MG/.3ML
0.3 INJECTION SUBCUTANEOUS EVERY 5 MIN PRN
Status: CANCELLED | OUTPATIENT
Start: 2017-06-06

## 2017-05-30 RX ORDER — SODIUM CHLORIDE 9 MG/ML
1000 INJECTION, SOLUTION INTRAVENOUS CONTINUOUS PRN
Status: CANCELLED
Start: 2017-05-30

## 2017-05-30 RX ORDER — HEPARIN SODIUM (PORCINE) LOCK FLUSH IV SOLN 100 UNIT/ML 100 UNIT/ML
500 SOLUTION INTRAVENOUS EVERY 8 HOURS
Status: CANCELLED
Start: 2017-05-30

## 2017-05-30 RX ORDER — LORAZEPAM 2 MG/ML
0.5 INJECTION INTRAMUSCULAR EVERY 6 HOURS PRN
Status: CANCELLED
Start: 2017-06-13

## 2017-05-30 RX ORDER — MEPERIDINE HYDROCHLORIDE 25 MG/ML
25 INJECTION INTRAMUSCULAR; INTRAVENOUS; SUBCUTANEOUS EVERY 30 MIN PRN
Status: CANCELLED | OUTPATIENT
Start: 2017-06-13

## 2017-05-30 RX ORDER — DIPHENHYDRAMINE HYDROCHLORIDE 50 MG/ML
50 INJECTION INTRAMUSCULAR; INTRAVENOUS
Status: CANCELLED
Start: 2017-06-06

## 2017-05-30 RX ADMIN — CARBOPLATIN 650 MG: 10 INJECTION, SOLUTION INTRAVENOUS at 15:53

## 2017-05-30 RX ADMIN — DIPHENHYDRAMINE HYDROCHLORIDE 25 MG: 50 INJECTION, SOLUTION INTRAMUSCULAR; INTRAVENOUS at 13:50

## 2017-05-30 RX ADMIN — DEXAMETHASONE SODIUM PHOSPHATE 12 MG: 10 INJECTION, SOLUTION INTRAMUSCULAR; INTRAVENOUS at 14:00

## 2017-05-30 RX ADMIN — PACLITAXEL 135 MG: 6 INJECTION, SOLUTION INTRAVENOUS at 14:37

## 2017-05-30 RX ADMIN — SODIUM CHLORIDE, PRESERVATIVE FREE 500 UNITS: 5 INJECTION INTRAVENOUS at 16:41

## 2017-05-30 RX ADMIN — FAMOTIDINE 40 MG: 10 INJECTION INTRAVENOUS at 14:20

## 2017-05-30 RX ADMIN — POTASSIUM CHLORIDE 40 MEQ: 1500 TABLET, EXTENDED RELEASE ORAL at 13:59

## 2017-05-30 RX ADMIN — SODIUM CHLORIDE 250 ML: 9 INJECTION, SOLUTION INTRAVENOUS at 14:00

## 2017-05-30 RX ADMIN — PALONOSETRON HYDROCHLORIDE 0.25 MG: 0.25 INJECTION INTRAVENOUS at 13:48

## 2017-05-30 ASSESSMENT — PAIN SCALES - GENERAL: PAINLEVEL: NO PAIN (0)

## 2017-05-30 NOTE — MR AVS SNAPSHOT
After Visit Summary   5/30/2017    Tosin Nina    MRN: [id number]           Patient Information     Date Of Birth          1945        Visit Information        Provider Department      5/30/2017 12:30 PM [facility] INFUSION CHAIR 11 [facility]        Today's Diagnoses     Ovarian cancer, unspecified laterality (H)    -  1       Follow-ups after your visit        Your next 10 appointments already scheduled     Jun 09, 2017 12:30 PM CDT   New Visit with Maeve Ca MD   [facility] ([facility])    [facility]  [address]  [facility] [address]   [phone]            Jun 16, 2017  9:30 AM CDT   CT CHEST/ABDOMEN/PELVIS W CONTRAST with [id number]   [facility] ([facility])    [address]  [facility] [id number] [phone]           Please bring any scans or X-rays taken at other hospitals, if similar tests were done. Also bring a list of your medicines, including vitamins, minerals and over-the-counter drugs. It is safest to leave personal items at home.  Be sure to tell your doctor:   If you have any allergies.   If there s any chance you are pregnant.   If you are breastfeeding.   If you have any special needs.  You may have contrast for this exam. To prepare:   Do not eat or drink for 2 hours before your exam. If you need to take medicine, you may take it with small sips of water. (We may ask you to take liquid medicine as well.)   The day before your exam, drink extra fluids at least six 8-ounce glasses (unless your doctor tells you to restrict your fluids).  Patients over 70 or patients with diabetes or kidney problems:   If you haven t had a blood test (creatinine test) within the last 30 days, go to your clinic or Diagnostic Imaging Department for this test.  If you have diabetes:   If your kidney  function is normal, continue taking your metformin (Avandamet, Glucophage, Glucovance, Metaglip) on the day of your exam.   If your kidney function is abnormal, wait 48 hours before restarting this medicine.  You will have oral contrast for this exam:   You will drink the contrast at home. Get this from your clinic or Diagnostic Imaging Department. Please follow the directions given.  Please wear loose clothing, such as a sweat suit or jogging clothes. Avoid snaps, zippers and other metal. We may ask you to undress and put on a hospital gown.  If you have any questions, please call the Imaging Department where you will have your exam.            Jun 20, 2017 12:00 PM CDT   Return Visit with Nessa Foster MD   [facility] ([facility])    [facility]  [address]  [facility] [address]   [phone]            Jul 05, 2017  9:00 AM CDT   New Visit with Kelsie Nguyen GC   Cancer Risk Management Program ([facility])    [facility]  [address]  [facility] [address]   [phone]              Future tests that were ordered for you today     Open Future Orders        Priority Expected Expires Ordered    CT Chest/Abdomen/Pelvis w Contrast Routine 6/16/2017 5/30/2018 5/30/2017    NEUROLOGY ADULT REFERRAL Routine  5/30/2018 5/30/2017            Who to contact     If you have questions or need follow up information about today's clinic visit or your schedule please contact [facility] directly at [phone].  Normal or non-critical lab and imaging results will be communicated to you by MyChart, letter or phone within 4 business days after the clinic has received the results. If you do not hear from us within 7 days, please contact the clinic through MyChart or phone. If you have a critical or abnormal lab result, we will notify you by phone as soon as  "possible.  Submit refill requests through [employer] or call your pharmacy and they will forward the refill request to us. Please allow 3 business days for your refill to be completed.          Additional Information About Your Visit        [employer] Information     [employer] lets you send messages to your doctor, view your test results, renew your prescriptions, schedule appointments and more. To sign up, go to [email or web address] . Click on \"Log in\" on the left side of the screen, which will take you to the Welcome page. Then click on \"Sign up Now\" on the right side of the page.     You will be asked to enter the access code listed below, as well as some personal information. Please follow the directions to create your username and password.     Your access code is: [email or web address]  Expires: 8/10/2017 11:53 AM     Your access code will  in 90 days. If you need help or a new code, please call your [facility] clinic or [phone].        Care EveryWhere ID     This is your Care EveryWhere ID. This could be used by other organizations to access your [facility] medical records  [id number]         Blood Pressure from Last 3 Encounters:   17 94/47   17 120/71   17 114/62    Weight from Last 3 Encounters:   17 72.6 kg (160 lb 2 oz)   17 70.8 kg (156 lb)   17 69.9 kg (154 lb)              We Performed the Following          CBC with platelets differential     Comprehensive metabolic panel     Magnesium          Today's Medication Changes          These changes are accurate as of: 17  4:58 PM.  If you have any questions, ask your nurse or doctor.               Start taking these medicines.        Dose/Directions    Filgrastim 300 MCG/0.5ML Sosy syringe   Commonly known as:  NEUPOGEN   Used for:  Ovarian cancer, unspecified laterality (H)        Dose:  300 mcg   Inject 0.5 mLs (300 mcg) Subcutaneous daily   Quantity:  12 Syringe   Refills:  4            Where to get your " medicines      These medications were sent to [id number] - [place], [place] - [address]  [address], [facility] [address]     Phone:  [phone]     Filgrastim 300 MCG/0.5ML Sosy syringe                Primary Care Provider Office Phone # Fax #    Esther Back [phone]       [facility] [address] [address]        Thank you!     Thank you for choosing [facility]  for your care. Our goal is always to provide you with excellent care. Hearing back from our patients is one way we can continue to improve our services. Please take a few minutes to complete the written survey that you may receive in the mail after your visit with us. Thank you!             Your Updated Medication List - Protect others around you: Learn how to safely use, store and throw away your medicines at www.disposemymeds.org.          This list is accurate as of: 5/30/17  4:58 PM.  Always use your most recent med list.                   Brand Name Dispense Instructions for use    acetaminophen 325 MG tablet    TYLENOL    100 tablet    Take 2 tablets (650 mg) by mouth every 4 hours as needed for mild pain       diphenhydrAMINE 2.5%, dexamethasone 0.4%, metoclopramide 1% 2.5%-0.4%-1% Gel in PLO gel    BDR    100 g    Apply 1 g topically 2 times daily       enoxaparin 80 MG/0.8ML injection    LOVENOX    60 Syringe    Inject 0.6 mLs (60 mg) Subcutaneous every 12 hours       Filgrastim 300 MCG/0.5ML Sosy syringe    NEUPOGEN    12 Syringe    Inject 0.5 mLs (300 mcg) Subcutaneous daily       hydrochlorothiazide 25 MG tablet    HYDRODIURIL    30 tablet    Take 2 tablets (50 mg) by mouth daily       IBUPROFEN PO      Take 200 mg by mouth every 6 hours as needed for moderate pain Reported on 5/2/2017       levothyroxine 125 MCG tablet    SYNTHROID    30 tablet    Take 1 tablet (125 mcg) by mouth daily        losartan 100 MG tablet    COZAAR    30 tablet    Take 1 tablet (100 mg) by mouth daily       NEW MED     400 mL    Diphenhydramine elixir 12.5mg/ml  (200       ondansetron 4 MG ODT tab    ZOFRAN-ODT    120 tablet    Take 1 tablet (4 mg) by mouth every 6 hours as needed for nausea       ondansetron 4 MG tablet    ZOFRAN    20 tablet    Take 1 tablet (4 mg) by mouth every 6 hours as needed for nausea       Potassium Chloride ER 20 MEQ Tbcr     30 tablet    Take 1 tablet (20 mEq) by mouth daily       prochlorperazine 5 MG tablet    COMPAZINE    90 tablet    Take 1 tablet (5 mg) by mouth every 6 hours as needed for vomiting       sennosides 8.6 MG tablet    SENOKOT     Take 1 tablet by mouth daily       UNABLE TO FIND      3 times daily MEDICATION NAME: BDRbenadryl/dexamethisone       Vitamin D (Cholecalciferol) 1000 UNITS Tabs     60 tablet    Take 2,000 Units by mouth daily

## 2017-05-30 NOTE — PROGRESS NOTES
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  She is accompanied today by her . She feels she is tolerating chemotherapy quite well and overall feels much improved on chemotherapy.  She is now living at home but is still having significant neurologic difficulties.  She is still unable to walk and this is not due to lack of strength as she can stand for prolonged periods of time but more due to balance issues and feeling very \"wobbly.\"  Her vision is somewhat improved as she is now able to read the newspaper as focus on watching television if she concentrates a lot.  She has developed some neuropathy in her feet and hands but this is not interfering with her daily activities at all.  She has noted some swelling of her lower extremities that is equal and has ordered compression stockings for this.  She has also noted a sore on her buttocks that she has been applying a barrier cream to.      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  She was discharged to a TCU where she is still residing with some but minimal improvement in her neurologic symptoms.  She still has quite a difficult time seeing especially with her right eye.  She also notes weakness mostly on her left side.  Both of these make it very difficult for her to walk and thus she is having to use a wheelchair for most of her mobility.  She tolerated her first cycle quite well with her biggest side effect being nausea which improved drastically with a change in anti-emetics.      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 The right pulmonary artery is enlarged, similar to prior exams. No CT evidence of right heart strain. No suspicious mediastinal or perihilar lymphadenopathy. Bovine arch anatomy. No suspicious pulmonary nodules, evidence of infarction, or infection. Abdomen and pelvis: There are soft tissue nodules identified along the liver capsule measuring up to 3 cm inferiorly. There is a large amount of omental caking. Enlarged iliac and retroperitoneal lymph nodes for example a 2.6 cm right external iliac lymph node or group of lymph nodes. Heterogeneous enhancement of the uterine fundus could be due to fibroids or a mass. The overall size of the uterus does not appear significantly different than in 2014. The left ovary does appear slightly larger and lobular in appearance compared to prior exam. There is also a nodular area along the round ligament. It was not present on prior exam. There is an irregular lobulated mass involving the sigmoid colon. Abnormal, enlarged inguinal lymph nodes. Soft tissue implant in the posterior right retroperitoneum adjacent to the psoas was not present on prior exam. 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 Spondylolisthesis at L3-4. Small periumbilical hernia containing some of the abnormal omentum.    4/11/17:   268, CEA .6    4/12/17:  IR omental biopsy   Pathology:  High grade serous carcinoma    4/14/17: Cycle #1 carboplatin AUC 6 and weekly Taxol 80mg/m2.  = 2648    5/5/17:  Cycle #2 carboplatin AUC 6 and weekly Taxol 80mg/m2.  = 370      Review of Systems:  Systemic           no weight gain; no fatigue; no fever; + chills; no night sweats; no appetite changes  Skin           no rashes, or lesions; + hair loss  Eye           no irritation; no changes in vision; + visual difficulty  Ward-Laryngeal           no dysphagia; no  hoarseness   Pulmonary    no cough; no shortness of breath  Cardiovascular    no chest pain; no palpitations  Gastrointestinal    + diarrhea; + constipation; no abdominal pain; no changes in bowel  habits; no blood in stool  Genitourinary   no urinary frequency; no urinary urgency; no dysuria; no pain; no abnormal vaginal discharge; no abnormal vaginal bleeding  Breast    no breast discharge; no breast changes; no breast pain  Musculoskeletal    no myalgias; no arthralgias; no back pain  Psychiatric           no depressed mood; no anxiety    Hematologic            no tender lymph nodes; no noticeable swellings or lumps   Endocrine    no hot flashes; no heat/cold intolerance         Neurological   no tremor; + numbness and tingling; + loss of balance; + difficulty walking; no headaches; no difficulty sleeping    Obstetrics and Gynecology History:  ,   Menopause at age 52, took HRT for a short while, maybe 1 year        Past Medical History:  Past Medical History:   Diagnosis Date     Hypertension      Ovarian cancer (H)      Paraneoplastic neuromyopathy and neuropathy (H)      PE (pulmonary thromboembolism) (H)      Spinal stenosis      Thyroid disease        Past Surgical History:  Past Surgical History:   Procedure Laterality Date     AS TOTAL KNEE ARTHROPLASTY Left      THYROIDECTOMY         Health Maintenance:  Last Pap Smear: 5 years              Result: normal  She has not had a history of abnormal Pap smears.    Last Mammogram: 10/19/16              Result: normal      She has not had a history of abnormal mammograms.    Last Colonoscopy:               Result: normal      Current Medications:   has a current medication list which includes the following prescription(s): sennosides, diphenhydramine 2.5%, dexamethasone 0.4%, metoclopramide 1%, UNABLE TO FIND, potassium chloride er, acetaminophen, ondansetron, losartan, levothyroxine, vitamin d (cholecalciferol), enoxaparin, ondansetron,  "prochlorperazine, ibuprofen, filgrastim, NEW MED, and hydrochlorothiazide.     Allergies:   Amoxicillin      Social History:  Social History     Social History     Marital status:      Spouse name: N/A     Number of children: N/A     Years of education: N/A     Occupational History     Not on file.     Social History Main Topics     Smoking status: Never Smoker     Smokeless tobacco: Not on file     Alcohol use Yes      Comment: occasionally     Drug use: No     Sexual activity: Not on file     Other Topics Concern     Not on file     Social History Narrative     Lives with , feels safe at home.  Retired .  Enjoys playing golf, gardening.  Does have an advanced directive on file and would like her Christiano to be her POA.  DNR/DNI    Family History:   The patient's family history is significant for.  Family History   Problem Relation Age of Onset     Breast Cancer Mother      Breast Cancer Maternal Grandmother      Breast Cancer Maternal Aunt          Physical Exam:   BP 94/47  Pulse 81  Temp 97.2  F (36.2  C) (Tympanic)  Resp 16  Ht 1.499 m (4' 11.02\")  Wt 72.6 kg (160 lb 2 oz)  SpO2 97%  BMI 32.32 kg/m2  Body mass index is 32.32 kg/(m^2).    General Appearance: healthy and alert, no distress     HEENT:  no thyromegaly, no palpable nodules or masses        Cardiovascular: regular rate and rhythm, no gallops, rubs or murmurs     Respiratory: lungs clear, no rales, rhonchi or wheezes, normal diaphragmatic excursion    Musculoskeletal: extremities non tender and with 1 + edema bilaterally    Skin: no lesions or rashes     Neurological: normal gait, no gross defects     Psychiatric: appropriate mood and affect                               Hematological: normal cervical, supraclavicular and inguinal lymph nodes     Gastrointestinal:       abdomen soft, non-tender, non-distended, no organomegaly or masses    Genitourinary: Approximately 1 cm stage 1 pressure ulcer on her buttocks " just above the anus without surrounding erythema/induration or drainage to suggest infection    Labs:  WBC 2.4 with ANC 1.2.  Hemoglobin 10.2.  Platelets 210.  Creatinine 0.47.  Potassium 3.1.  Magnesium 1.8.  Remainder of electrolytes within normal limits.  AST 18, ALT 34, alkaline phosphatase 82, total bilirubin 0.3.  Albumin 2.8.       Assessment:    Tosin Nina is a 71 year old woman with a diagnosis of Stage IVB high grade serous ovarian cancer.     A total of 45 minutes was spent with the patient, >50% of which were spent in counseling the patient and/or treatment planning.      Plan:     1.)    Stage IVB high grade serous ovarian cancer.  Plan is for 3 cycles of neoadjuvant chemotherapy with carboplatin AUC 6 q3 and paclitaxel 80 mg/m2 weekly and re-image to assess for surgical debulking at that point.  Ok to proceed with cycle #3 of chemo as planned today.  She will have a CT on 6/16 and then return on 6/20 for treatment planning.  If good response on CT we will plan for interval debulking procedure on 6/28.  If still unresectable disease, will plan 3 more cycles prior to any surgical procedure.    2.) Chemotherapy side effects:  Myelosuppression with neutropenia, will add Neupogen to this cycle for 4 days following each infusion.  Neuropathy is stable and not bothersome at this point.  Will continue to monitor.    3.) Para-neoplastic syndrome.  Her symptoms have not improved much and I have asked her to see neurology to assess if there is any further interventions that can be done to improve her symptoms especially in light of her hopeful upcoming surgery on 6/28.  She has home PT/OT services in place as well as a home nurse weekly visit    4.) PE.  On therapuetic lovenox.  Will consider placement of IVC filter prior to surgical debulking procedure pending CT results     5.) Genetic risk factors were assessed and the patient does meet the qualifications for a referral and she has a visit scheduled  on 7/5    6.) Advanced malignancy and paraneoplastic symptoms.  She follows with palliative care and has another visit scheduled on 6/9.    7.) Sacral ulcer.  Patient already has home health arranged, however we will arrange for them to assess this lesion as well     8.) Labs and/or tests ordered include:  Pre-chemo labs reviewed and ok to proceed with addition of neupogen due to mild neutropenia.     9.) Health maintenance issues addressed today include pt due for colonoscopy which can be addressed following her upfront treatment for ovarian cancer.            Thank you for allowing us to participate in the care of your patient.         Sincerely,    Nessa Ennis MD  Gynecologic Oncology  [facility]       CC

## 2017-05-30 NOTE — NURSING NOTE
"Oncology Rooming Note    May 30, 2017 12:23 PM   Tosin Nina is a 71 year old female who presents for:    Chief Complaint   Patient presents with     Oncology Clinic Visit     Ovarian cancer, unspecified laterality- follow up     Initial Vitals: BP 94/47  Pulse 81  Temp 97.2  F (36.2  C) (Tympanic)  Resp 16  Ht 1.499 m (4' 11.02\")  Wt 72.6 kg (160 lb 2 oz)  SpO2 97%  BMI 32.32 kg/m2 Estimated body mass index is 32.32 kg/(m^2) as calculated from the following:    Height as of this encounter: 1.499 m (4' 11.02\").    Weight as of this encounter: 72.6 kg (160 lb 2 oz). Body surface area is 1.74 meters squared.  No Pain (0) Comment: Data Unavailable   No LMP recorded. Patient is postmenopausal.  Allergies reviewed: Yes  Medications reviewed: Yes    Medications: Medication refills not needed today.  Ph[facility] into [employer] [address] [place] [address] - [address] [place] [address] [address] [facility] [address]    Clinical concerns: Follow up     8 minutes for nursing intake (face to face time)     Vi Delaney CMA     DISCHARGE PLAN:  Next appointments: See patient instruction section  Departure Mode: wheelchair  Accompanied by:   0 minutes for nursing discharge (face to face time)   Vi Delaney CMA                  "

## 2017-05-30 NOTE — PATIENT INSTRUCTIONS
Neurology at the [facility] We will call [place] with this date/time. Carmen DOWLING    CT C/A/P on 6/16 Scheduled  Carmen DOWLING      Next visit with Dr Ennis on 6/20/17 Scheduled  Carmen DOWLING      Tentative surgery date on 6/28/17 pending CT results  AVS given to patient    6/5/17 - [place] is scheduled with Dr. Lopez Neurology 6/7/17

## 2017-05-30 NOTE — MR AVS SNAPSHOT
After Visit Summary   5/30/2017    Tosin Nina    MRN: [id number]           Patient Information     Date Of Birth          1945        Visit Information        Provider Department      5/30/2017 1:00 PM Nessa Christina MD [facility]        Today's Diagnoses     Ovarian cancer, unspecified laterality (H)    -  1    Paraneoplastic neuromyopathy and neuropathy (H)          Care Instructions    Neurology at the [facility] We will call [place] with this date/time. Carmen DOWLING    CT C/A/P on 6/16 Scheduled  Carmen DOWLING      Next visit with Dr Ennis on 6/20/17 Scheduled  Carmen DOWLING      Tentative surgery date on 6/28/17 pending CT results  AVS given to patient            Follow-ups after your visit        Additional Services     NEUROLOGY ADULT REFERRAL       Your provider has referred you to: [facility]: Neurology Clinic [facility] [phone]   [email or web address]  General Neurology    Reason for Referral: Consult    Please be aware that coverage of these services is subject to the terms and limitations of your health insurance plan.  Call member services at your health plan with any benefit or coverage questions.      Please bring the following with you to your appointment:    (1) Any X-Rays, CTs or MRIs which have been performed.  Contact the facility where they were done to arrange for  prior to your scheduled appointment.    (2) List of current medications  (3) This referral request   (4) Any documents/labs given to you for this referral                  Your next 10 appointments already scheduled     Jun 09, 2017 12:30 PM CDT   New Visit with Maeve Ca MD   [facility] ([facility])    [facility]  [address]  [facility] [address]   [phone]            Jun 16, 2017  9:30 AM CDT   CT CHEST/ABDOMEN/PELVIS W CONTRAST with  RSCCCT1   [facility] [place] ([facility])    [address]  [facility] [address] [phone]           Please bring any scans or X-rays taken at other hospitals, if similar tests were done. Also bring a list of your medicines, including vitamins, minerals and over-the-counter drugs. It is safest to leave personal items at home.  Be sure to tell your doctor:   If you have any allergies.   If there s any chance you are pregnant.   If you are breastfeeding.   If you have any special needs.  You may have contrast for this exam. To prepare:   Do not eat or drink for 2 hours before your exam. If you need to take medicine, you may take it with small sips of water. (We may ask you to take liquid medicine as well.)   The day before your exam, drink extra fluids at least six 8-ounce glasses (unless your doctor tells you to restrict your fluids).  Patients over 70 or patients with diabetes or kidney problems:   If you haven t had a blood test (creatinine test) within the last 30 days, go to your clinic or Diagnostic Imaging Department for this test.  If you have diabetes:   If your kidney function is normal, continue taking your metformin (Avandamet, Glucophage, Glucovance, Metaglip) on the day of your exam.   If your kidney function is abnormal, wait 48 hours before restarting this medicine.  You will have oral contrast for this exam:   You will drink the contrast at home. Get this from your clinic or Diagnostic Imaging Department. Please follow the directions given.  Please wear loose clothing, such as a sweat suit or jogging clothes. Avoid snaps, zippers and other metal. We may ask you to undress and put on a hospital gown.  If you have any questions, please call the Imaging Department where you will have your exam.            Jun 20, 2017 12:00 PM CDT   Return Visit with Nessa Foster MD   [facility] ([facility])  "   [facility]  [address]  [place] [address]   [phone]            Jul 05, 2017  9:00 AM CDT   New Visit with Kelsie Nguyen GC   Can[facility] Risk Management Program ([facility])    [facility]  [address]  [place] [address]   [phone]              Future tests that were ordered for you today     Open Future Orders        Priority Expected Expires Ordered    CT Chest/Abdomen/Pelvis w Contrast Routine 6/16/2017 5/30/2018 5/30/2017    NEUROLOGY ADULT REFERRAL Routine  5/30/2018 5/30/2017            Who to contact     If you have questions or need follow up information about today's clinic visit or your schedule please contact [facility] directly at [phone].  Normal or non-critical lab and imaging results will be communicated to you by MyChart, letter or phone within 4 business days after the clinic has received the results. If you do not hear from us within 7 days, please contact the clinic through [employer] or phone. If you have a critical or abnormal lab result, we will notify you by phone as soon as possible.  Submit refill requests through [employer] or call your pharmacy and they will forward the refill request to us. Please allow 3 business days for your refill to be completed.          Additional Information About Your Visit        [employer] Information     [employer] lets you send messages to your doctor, view your test results, renew your prescriptions, schedule appointments and more. To sign up, go to [email or web address] . Click on \"Log in\" on the left side of the screen, which will take you to the Welcome page. Then click on \"Sign up Now\" on the right side of the page.     You will be asked to enter the access code listed below, as well as some personal information. Please follow the directions to create your username and password.     Your access code is: [email or web address]  Expires: " "8/10/2017 11:53 AM     Your access code will  in 90 days. If you need help or a new code, please call your [facility] or [phone].        Care EveryWhere ID     This is your Care EveryWhere ID. This could be used by other organizations to access your [place] medical records  [id number]        Your Vitals Were     Pulse Temperature Respirations Height Pulse Oximetry BMI (Body Mass Index)    81 97.2  F (36.2  C) (Tympanic) 16 1.499 m (4' 11.02\") 97% 32.32 kg/m2       Blood Pressure from Last 3 Encounters:   17 94/47   17 120/71   17 114/62    Weight from Last 3 Encounters:   17 72.6 kg (160 lb 2 oz)   17 70.8 kg (156 lb)   17 69.9 kg (154 lb)                 Today's Medication Changes          These changes are accurate as of: 17  4:01 PM.  If you have any questions, ask your nurse or doctor.               Start taking these medicines.        Dose/Directions    Filgrastim 300 MCG/0.5ML Sosy syringe   Commonly known as:  NEUPOGEN   Used for:  Ovarian cancer, unspecified laterality (H)        Dose:  300 mcg   Inject 0.5 mLs (300 mcg) Subcutaneous daily   Quantity:  12 Syringe   Refills:  4            Where to get your medicines      Call your pharmacy to confirm that your medication is ready for pickup. It may take up to 24 hours for them to receive the prescription. If the prescription is not ready within 3 business days, please contact your clinic or your provider.     We will let you know when these medications are ready. If you don't hear back within 3 business days, please contact us.     Filgrastim 300 MCG/0.5ML Sosy syringe                Primary Care Provider Office Phone # Fax #    Esther Back [phone]       [facility] [address] [address]        Thank you!     Thank you for choosing [facility]  for your care. Our goal is always to provide you with excellent care. Hearing back " from our patients is one way we can continue to improve our services. Please take a few minutes to complete the written survey that you may receive in the mail after your visit with us. Thank you!             Your Updated Medication List - Protect others around you: Learn how to safely use, store and throw away your medicines at www.disposemymeds.org.          This list is accurate as of: 5/30/17  4:01 PM.  Always use your most recent med list.                   Brand Name Dispense Instructions for use    acetaminophen 325 MG tablet    TYLENOL    100 tablet    Take 2 tablets (650 mg) by mouth every 4 hours as needed for mild pain       diphenhydrAMINE 2.5%, dexamethasone 0.4%, metoclopramide 1% 2.5%-0.4%-1% Gel in PLO gel    BDR    100 g    Apply 1 g topically 2 times daily       enoxaparin 80 MG/0.8ML injection    LOVENOX    60 Syringe    Inject 0.6 mLs (60 mg) Subcutaneous every 12 hours       Filgrastim 300 MCG/0.5ML Sosy syringe    NEUPOGEN    12 Syringe    Inject 0.5 mLs (300 mcg) Subcutaneous daily       hydrochlorothiazide 25 MG tablet    HYDRODIURIL    30 tablet    Take 2 tablets (50 mg) by mouth daily       IBUPROFEN PO      Take 200 mg by mouth every 6 hours as needed for moderate pain Reported on 5/2/2017       levothyroxine 125 MCG tablet    SYNTHROID    30 tablet    Take 1 tablet (125 mcg) by mouth daily       losartan 100 MG tablet    COZAAR    30 tablet    Take 1 tablet (100 mg) by mouth daily       NEW MED     400 mL    Diphenhydramine elixir 12.5mg/ml  (200       ondansetron 4 MG ODT tab    ZOFRAN-ODT    120 tablet    Take 1 tablet (4 mg) by mouth every 6 hours as needed for nausea       ondansetron 4 MG tablet    ZOFRAN    20 tablet    Take 1 tablet (4 mg) by mouth every 6 hours as needed for nausea       Potassium Chloride ER 20 MEQ Tbcr     30 tablet    Take 1 tablet (20 mEq) by mouth daily       prochlorperazine 5 MG tablet    COMPAZINE    90 tablet    Take 1 tablet (5 mg) by mouth every 6 hours  as needed for vomiting       sennosides 8.6 MG tablet    SENOKOT     Take 1 tablet by mouth daily       UNABLE TO FIND      3 times daily MEDICATION NAME: BDRbenadryl/dexamethisone       Vitamin D (Cholecalciferol) 1000 UNITS Tabs     60 tablet    Take 2,000 Units by mouth daily

## 2017-05-30 NOTE — PROGRESS NOTES
Infusion Nursing Note:  Tosin Barbernidia presents today for taxol, carbo.    Patient seen by provider today: Yes: Dr Ennis   present during visit today: Not Applicable.    Note: ANC still low - will start neupogen.  K = 3.1, 40 mEq KDUR given po.  Dr Ennis has seen and is aware of buttock sore.     Intravenous Access:  Lab draw site R chest, Needle type schmitt, Gauge 20.  Labs drawn without difficulty.  Implanted Port.    Treatment Conditions:  Lab Results   Component Value Date    HGB 10.2 05/30/2017     Lab Results   Component Value Date    WBC 2.4 05/30/2017      Lab Results   Component Value Date    ANEU 1.2 05/30/2017     Lab Results   Component Value Date     05/30/2017      Lab Results   Component Value Date     05/30/2017                   Lab Results   Component Value Date    POTASSIUM 3.1 05/30/2017           Lab Results   Component Value Date    MAG 1.8 05/30/2017            Lab Results   Component Value Date    CR 0.47 05/30/2017                   Lab Results   Component Value Date    PATTI 8.6 05/30/2017                Lab Results   Component Value Date    BILITOTAL 0.3 05/30/2017           Lab Results   Component Value Date    ALBUMIN 2.8 05/30/2017                    Lab Results   Component Value Date    ALT 34 05/30/2017           Lab Results   Component Value Date    AST 18 05/30/2017     Results reviewed, labs MET treatment parameters, ok to proceed with treatment.      Post Infusion Assessment:  Patient tolerated infusion without incident.  Blood return noted pre and post infusion.  Site patent and intact, free from redness, edema or discomfort.  No evidence of extravasations.  Access discontinued per protocol.    Discharge Plan:   Discharge instructions reviewed with: Patient.  Copy of AVS reviewed with patient and/or family.  Patient will return 6/9 for appointment with Dr Ennis  Patient discharged in stable condition accompanied by: self and .  Departure Mode:  Wheelchair.    Toshia Morrow RN

## 2017-05-31 ENCOUNTER — INFUSION THERAPY VISIT (OUTPATIENT)
Dept: INFUSION THERAPY | Facility: CLINIC | Age: 72
End: 2017-05-31
Attending: OBSTETRICS & GYNECOLOGY
Payer: COMMERCIAL

## 2017-05-31 ENCOUNTER — TELEPHONE (OUTPATIENT)
Dept: ONCOLOGY | Facility: CLINIC | Age: 72
End: 2017-05-31

## 2017-05-31 VITALS — RESPIRATION RATE: 16 BRPM | HEART RATE: 84 BPM | DIASTOLIC BLOOD PRESSURE: 56 MMHG | SYSTOLIC BLOOD PRESSURE: 90 MMHG

## 2017-05-31 DIAGNOSIS — D70.1 CHEMOTHERAPY-INDUCED NEUTROPENIA (H): Primary | ICD-10-CM

## 2017-05-31 DIAGNOSIS — T45.1X5A CHEMOTHERAPY-INDUCED NEUTROPENIA (H): Primary | ICD-10-CM

## 2017-05-31 DIAGNOSIS — C56.9 OVARIAN CANCER, UNSPECIFIED LATERALITY (H): ICD-10-CM

## 2017-05-31 PROCEDURE — 25000128 H RX IP 250 OP 636: Performed by: OBSTETRICS & GYNECOLOGY

## 2017-05-31 PROCEDURE — 96372 THER/PROPH/DIAG INJ SC/IM: CPT

## 2017-05-31 RX ORDER — HEPARIN SODIUM 10000 [USP'U]/ML
5000 INJECTION, SOLUTION INTRAVENOUS; SUBCUTANEOUS
Status: CANCELLED | OUTPATIENT
Start: 2017-05-31

## 2017-05-31 RX ORDER — PHENAZOPYRIDINE HYDROCHLORIDE 100 MG/1
200 TABLET, FILM COATED ORAL ONCE
Status: CANCELLED | OUTPATIENT
Start: 2017-05-31 | End: 2017-05-31

## 2017-05-31 RX ADMIN — FILGRASTIM 300 MCG: 300 INJECTION, SOLUTION INTRAVENOUS; SUBCUTANEOUS at 15:06

## 2017-05-31 NOTE — MR AVS SNAPSHOT
After Visit Summary   5/31/2017    Tosin Nina    MRN: [id number]           Patient Information     Date Of Birth          1945        Visit Information        Provider Department      5/31/2017 3:00 PM RH INFUSION CHAIR 8 [facility]        Today's Diagnoses     Chemotherapy-induced neutropenia (H)    -  1    Ovarian cancer, unspecified laterality (H)           Follow-ups after your visit        Your next 10 appointments already scheduled     Jun 01, 2017  2:00 PM CDT   Level 1 with RH INFUSION CHAIR 8   [facility] ([facility])    [facility]  [address]  [place] [address]   [phone]            Jun 02, 2017  2:00 PM CDT   Level 1 with RH INFUSION CHAIR 3   [facility] ([facility])    [facility]  [address]  [place] [address]   [phone]            Jun 06, 2017 11:00 AM CDT   Level 4 with RH INFUSION CHAIR 8   [facility] ([facility])    [facility]  [address] [place] [address]  [address]  [place] [address]   [phone]            Jun 09, 2017 12:30 PM CDT   New Visit with Maeve Ca MD   [facility] ([facility])    [facility]  [address]  [place] [address]   [phone]            Jun 13, 2017 11:00 AM CDT   Level 3 with RH INFUSION CHAIR 8   [facility] ([facility])    [facility]  [address] [place] [address]  [address]  [place] [address]   [phone]            Jun 16, 2017  9:30 AM CDT   CT CHEST/ABDOMEN/PELVIS W CONTRAST with RSCCCT1   [facility] ([facility])    [address] [place] [address]  [address]  [facility] [id number] [phone]           Please bring any scans or X-rays taken at other hospitals, if similar tests were done. Also bring a list of your medicines, including vitamins, minerals and over-the-counter drugs. It is safest to leave personal items at home.  Be sure to tell your doctor:   If you have any allergies.   If there s any chance you are pregnant.   If you are breastfeeding.   If you have any special needs.  You may have contrast for this exam. To prepare:   Do not eat or drink for 2 hours before your exam. If you need to take medicine, you may take it with small sips of water. (We may ask you to take liquid medicine as well.)   The day before your exam, drink extra fluids at least six 8-ounce glasses (unless your doctor tells you to restrict your fluids).  Patients over 70 or patients with diabetes or kidney problems:   If you haven t had a blood test (creatinine test) within the last 30 days, go to your clinic or Diagnostic Imaging Department for this test.  If you have diabetes:   If your kidney function is normal, continue taking your metformin (Avandamet, Glucophage, Glucovance, Metaglip) on the day of your exam.   If your kidney function is abnormal, wait 48 hours before restarting this medicine.  You will have oral contrast for this exam:   You will drink the contrast at home. Get this from your clinic or Diagnostic Imaging Department. Please follow the directions given.  Please wear loose clothing, such as a sweat suit or jogging clothes. Avoid snaps, zippers and other metal. We may ask you to undress and put on a hospital gown.  If you have any questions, please call the Imaging Department where you will have your exam.            Jun 20, 2017 11:00 AM CDT   Level 3 with  INFUSION CHAIR 5   [facility] ([facility])    [facility]  [address]  [facility] [address] [phone]             Jun 20, 2017 12:00 PM CDT   Return Visit with Nessa Foster MD   [facility] ([facility])    [facility]  [address]  [facility] [address]   [phone]            Jun 27, 2017 11:00 AM CDT   Level 3 with RH INFUSION CHAIR 4   [facility] ([facility])    [facility]  [address]  [facility] [address]   [phone]            Jul 05, 2017  9:00 AM CDT   New Visit with Kelsie Nguyen GC   Cancer Risk Management Program ([facility])    [facility]  [address]  [facility] [address]   [phone]              Future tests that were ordered for you today     Open Future Orders        Priority Expected Expires Ordered    CT Chest/Abdomen/Pelvis w Contrast Routine 6/16/2017 5/30/2018 5/30/2017    NEUROLOGY ADULT REFERRAL Routine  5/30/2018 5/30/2017            Who to contact     If you have questions or need follow up information about today's clinic visit or your schedule please contact [facility] directly at [phone].  Normal or non-critical lab and imaging results will be communicated to you by Paper[employer], letter or phone within 4 business days after the clinic has received the results. If you do not hear from us within 7 days, please contact the clinic through [employer] or phone. If you have a critical or abnormal lab result, we will notify you by phone as soon as possible.  Submit refill requests through [employer] or call your pharmacy and they will forward the refill request to us. Please allow 3 business days for your refill to be completed.          Additional Information About Your Visit        [employer] Information     [employer] lets you send messages to your doctor, view your test results, renew your prescriptions, schedule appointments and more. To sign  "up, go to [email or web address] . Click on \"Log in\" on the left side of the screen, which will take you to the Welcome page. Then click on \"Sign up Now\" on the right side of the page.     You will be asked to enter the access code listed below, as well as some personal information. Please follow the directions to create your username and password.     Your access code is: [email or web address]  Expires: 8/10/2017 11:53 AM     Your access code will  in 90 days. If you need help or a new code, please call your [place] clinic or [phone].        Care EveryWhere ID     This is your Care EveryWhere ID. This could be used by other organizations to access your [place] medical records  [id number]        Your Vitals Were     Pulse Respirations                84 16           Blood Pressure from Last 3 Encounters:   17 90/56   17 94/47   17 120/71    Weight from Last 3 Encounters:   17 72.6 kg (160 lb 2 oz)   17 70.8 kg (156 lb)   17 69.9 kg (154 lb)              Today, you had the following     No orders found for display       Primary Care Provider Office Phone # Fax #    Esther Back [phone]       [facility] [address] [address]        Thank you!     Thank you for choosing [facility]  for your care. Our goal is always to provide you with excellent care. Hearing back from our patients is one way we can continue to improve our services. Please take a few minutes to complete the written survey that you may receive in the mail after your visit with us. Thank you!             Your Updated Medication List - Protect others around you: Learn how to safely use, store and throw away your medicines at www.disposemymeds.org.          This list is accurate as of: 17  3:13 PM.  Always use your most recent med list.                   Brand Name Dispense Instructions for use    acetaminophen 325 MG tablet    " TYLENOL    100 tablet    Take 2 tablets (650 mg) by mouth every 4 hours as needed for mild pain       diphenhydrAMINE 2.5%, dexamethasone 0.4%, metoclopramide 1% 2.5%-0.4%-1% Gel in PLO gel    BDR    100 g    Apply 1 g topically 2 times daily       enoxaparin 80 MG/0.8ML injection    LOVENOX    60 Syringe    Inject 0.6 mLs (60 mg) Subcutaneous every 12 hours       Filgrastim 300 MCG/0.5ML Sosy syringe    NEUPOGEN    12 Syringe    Inject 0.5 mLs (300 mcg) Subcutaneous daily       hydrochlorothiazide 25 MG tablet    HYDRODIURIL    30 tablet    Take 2 tablets (50 mg) by mouth daily       IBUPROFEN PO      Take 200 mg by mouth every 6 hours as needed for moderate pain Reported on 5/2/2017       levothyroxine 125 MCG tablet    SYNTHROID    30 tablet    Take 1 tablet (125 mcg) by mouth daily       losartan 100 MG tablet    COZAAR    30 tablet    Take 1 tablet (100 mg) by mouth daily       NEW MED     400 mL    Diphenhydramine elixir 12.5mg/ml  (200       ondansetron 4 MG ODT tab    ZOFRAN-ODT    120 tablet    Take 1 tablet (4 mg) by mouth every 6 hours as needed for nausea       ondansetron 4 MG tablet    ZOFRAN    20 tablet    Take 1 tablet (4 mg) by mouth every 6 hours as needed for nausea       Potassium Chloride ER 20 MEQ Tbcr     30 tablet    Take 1 tablet (20 mEq) by mouth daily       prochlorperazine 5 MG tablet    COMPAZINE    90 tablet    Take 1 tablet (5 mg) by mouth every 6 hours as needed for vomiting       sennosides 8.6 MG tablet    SENOKOT     Take 1 tablet by mouth daily       UNABLE TO FIND      3 times daily MEDICATION NAME: BDRbenadryl/dexamethisone       Vitamin D (Cholecalciferol) 1000 UNITS Tabs     60 tablet    Take 2,000 Units by mouth daily

## 2017-05-31 NOTE — TELEPHONE ENCOUNTER
Talked with pt's .  States that they went to the pharmacy yesterday to  new Rx for neupogen.  The cost will be $1200 and pt's  states that they cannot afford this.  Pt will need to come to the clinic for neupogen at 3pm today.  Will also need to have neupogen in the clinic on 6/1, 6/2, and possibly at [facility] on 6/3.  Talked with Phuong ROSAS in scheduling.  Phuong ROSAS will contact pt now to schedule neupogen appointments.    Anna Goodwin RN BSN

## 2017-05-31 NOTE — TELEPHONE ENCOUNTER
Patient spouse Christiano, called and states that they needed to  a new medication from the pharmacy yesterday and that it was not there when he went to pick it up. He was also wondering if there is an alternative one that would be less costly. States he was told it would cost $1200.00.

## 2017-05-31 NOTE — PROGRESS NOTES
Infusion Nursing Note:  Tosin Nina presents today for Neupogen.    Patient seen by provider today: No   present during visit today: Not Applicable.    Note: N/A.    Intravenous Access:  No Intravenous access/labs at this visit.    Treatment Conditions:  Not Applicable.      Post Infusion Assessment:  Patient tolerated injection without incident.    Discharge Plan:   Discharge instructions reviewed with: Patient.  Patient and/or family verbalized understanding of discharge instructions and all questions answered.  AVS to patient via DirectRMT.  Patient will return 6/1/17 for next appointment.   Patient discharged in stable condition accompanied by: .  Departure Mode: Wheelchair.    Olivia Golden RN

## 2017-06-01 ENCOUNTER — TELEPHONE (OUTPATIENT)
Dept: ONCOLOGY | Facility: CLINIC | Age: 72
End: 2017-06-01

## 2017-06-01 ENCOUNTER — INFUSION THERAPY VISIT (OUTPATIENT)
Dept: INFUSION THERAPY | Facility: CLINIC | Age: 72
End: 2017-06-01
Attending: OBSTETRICS & GYNECOLOGY
Payer: COMMERCIAL

## 2017-06-01 VITALS — SYSTOLIC BLOOD PRESSURE: 94 MMHG | TEMPERATURE: 97.2 F | HEART RATE: 81 BPM | DIASTOLIC BLOOD PRESSURE: 52 MMHG

## 2017-06-01 DIAGNOSIS — D70.1 CHEMOTHERAPY-INDUCED NEUTROPENIA (H): Primary | ICD-10-CM

## 2017-06-01 DIAGNOSIS — C56.9 OVARIAN CANCER, UNSPECIFIED LATERALITY (H): ICD-10-CM

## 2017-06-01 DIAGNOSIS — T45.1X5A CHEMOTHERAPY-INDUCED NEUTROPENIA (H): Primary | ICD-10-CM

## 2017-06-01 PROCEDURE — 96372 THER/PROPH/DIAG INJ SC/IM: CPT

## 2017-06-01 PROCEDURE — 25000128 H RX IP 250 OP 636: Performed by: OBSTETRICS & GYNECOLOGY

## 2017-06-01 RX ADMIN — FILGRASTIM 300 MCG: 300 INJECTION, SOLUTION INTRAVENOUS; SUBCUTANEOUS at 13:38

## 2017-06-01 NOTE — TELEPHONE ENCOUNTER
Called [facility]  Jaida Petty RN as requested by MD to verify RN was aware of coccyx breakdown at distal end of patient's gluteal cleft.  MYRA Sena reported she had been out to see patient yesterday 5/31/17 and had observed site.  Area was not infected, draining or larger then previous visits.  Home Health MD has Rx cream for pressure ulcer and patient's spouse has been instructed on how and when to apply.  RN states she is aware patient is on chemotherapy and at risk for infection. She sees the patient weekly and will continue to monitor area closely + notify /clinic with any questions, concerns, or changes in wound.  Henrik Martins RN, BSN, OCN  [facility]  Patient Care Coordinator     Patient must come into clinic for her Neupogen injections following her chemotherapy per her Medicare insurance requirements.  Henrik Martins RN, BSN, OCN  [facility]  Patient Care Coordinator

## 2017-06-01 NOTE — PROGRESS NOTES
Infusion Nursing Note:  Tosin Nina presents today for Neupogen.    Patient seen by provider today: No   present during visit today: Not Applicable.    Note: Talked with Dr Ennis regarding patient only wanting 3 neupogen injections.  She stated we could check a CBC after tomorrow's dose and see where she's at for her nuetrophils.    Intravenous Access:  No Intravenous access/labs at this visit.    Treatment Conditions:  Not Applicable.      Post Infusion Assessment:  Patient tolerated injection without incident.    Discharge Plan:   Discharge instructions reviewed with: Patient.  Patient and/or family verbalized understanding of discharge instructions and all questions answered.  Patient discharged in stable condition accompanied by: .  Departure Mode: Wheelchair.    Olivia Golden RN

## 2017-06-01 NOTE — MR AVS SNAPSHOT
After Visit Summary   6/1/2017    Tosin Nina    MRN: [id number]           Patient Information     Date Of Birth          1945        Visit Information        Provider Department      6/1/2017 2:00 PM RH INFUSION CHAIR 8 [facility] Infusion [facility]         Follow-ups after your visit        Your next 10 appointments already scheduled     Jun 01, 2017  2:00 PM CDT   Level 1 with RH INFUSION CHAIR 8   [facility] Infusion Services ([facility])    [facility]  [address]  [facility] [address]   [phone]            Jun 02, 2017  2:00 PM CDT   Level 1 with RH INFUSION CHAIR 3   [facility] Infusion Services ([facility])    [facility]  [address]  [facility] [address]   [phone]            Jun 06, 2017 11:00 AM CDT   Level 4 with RH INFUSION CHAIR 8   [facility] Infusion Services ([facility])    [facility]  [address]  [facility] [address]   [phone]            Jun 09, 2017 12:30 PM CDT   New Visit with Maeve Ca MD   [facility] ([facility])    [facility]  [address]  [facility] [address]   [phone]            Jun 13, 2017 11:00 AM CDT   Level 3 with RH INFUSION CHAIR 8   [facility] Infusion Services ([facility])    [facility]  [address]  [facility] [address]   [phone]            Jun 16, 2017  9:30 AM CDT   CT CHEST/ABDOMEN/PELVIS W CONTRAST with RSCCCT1   [facility] ([facility])    [address]  [facility] [address]   [phone]           Please bring any scans or X-rays taken at other hospitals, if similar tests  were done. Also bring a list of your medicines, including vitamins, minerals and over-the-counter drugs. It is safest to leave personal items at home.  Be sure to tell your doctor:   If you have any allergies.   If there s any chance you are pregnant.   If you are breastfeeding.   If you have any special needs.  You may have contrast for this exam. To prepare:   Do not eat or drink for 2 hours before your exam. If you need to take medicine, you may take it with small sips of water. (We may ask you to take liquid medicine as well.)   The day before your exam, drink extra fluids at least six 8-ounce glasses (unless your doctor tells you to restrict your fluids).  Patients over 70 or patients with diabetes or kidney problems:   If you haven t had a blood test (creatinine test) within the last 30 days, go to your clinic or Diagnostic Imaging Department for this test.  If you have diabetes:   If your kidney function is normal, continue taking your metformin (Avandamet, Glucophage, Glucovance, Metaglip) on the day of your exam.   If your kidney function is abnormal, wait 48 hours before restarting this medicine.  You will have oral contrast for this exam:   You will drink the contrast at home. Get this from your clinic or Diagnostic Imaging Department. Please follow the directions given.  Please wear loose clothing, such as a sweat suit or jogging clothes. Avoid snaps, zippers and other metal. We may ask you to undress and put on a hospital gown.  If you have any questions, please call the Imaging Department where you will have your exam.            Jun 20, 2017 11:00 AM CDT   Level 3 with  INFUSION CHAIR 5   [facility] Infusion Services ([facility])    [facility]  [address]  [facility] [address]   [phone]            Jun 20, 2017 12:00 PM CDT   Return Visit with Nessa Foster MD   [facility] ([facility]  "[facility])    [facility]  [address]  [place] [address]   [phone]            2017 11:00 AM CDT   Level 3 with  INFUSION CHAIR 4   [facility] In[facility] Services ([facility])    [facility]  [address]  [place] [address]   [phone]            2017   Procedure with Nessa Foster MD   [facility] [place] Same Day Surgery (--)    [address]  [place] [address] [phone]              Who to contact     If you have questions or need follow up information about today's clinic visit or your schedule please contact [facility] INFUSION SERVICES directly at [phone].  Normal or non-critical lab and imaging results will be communicated to you by [employer], letter or phone within 4 business days after the clinic has received the results. If you do not hear from us within 7 days, please contact the clinic through [employer] or phone. If you have a critical or abnormal lab result, we will notify you by phone as soon as possible.  Submit refill requests through [employer] or call your pharmacy and they will forward the refill request to us. Please allow 3 business days for your refill to be completed.          Additional Information About Your Visit        [employer] Information     [employer] lets you send messages to your doctor, view your test results, renew your prescriptions, schedule appointments and more. To sign up, go to [email or web address] . Click on \"Log in\" on the left side of the screen, which will take you to the Welcome page. Then click on \"Sign up Now\" on the right side of the page.     You will be asked to enter the access code listed below, as well as some personal information. Please follow the directions to create your username and password.     Your access code is: [email or web address]  Expires: 8/10/2017 11:53 AM     Your access code will  in 90 " days. If you need help or a new code, please call your [place] clinic or [phone].        Care EveryWhere ID     This is your Care EveryWhere ID. This could be used by other organizations to access your [place] medical records  [id number]         Blood Pressure from Last 3 Encounters:   05/31/17 90/56   05/30/17 94/47   05/19/17 120/71    Weight from Last 3 Encounters:   05/30/17 72.6 kg (160 lb 2 oz)   05/12/17 70.8 kg (156 lb)   05/02/17 69.9 kg (154 lb)              Today, you had the following     No orders found for display       Primary Care Provider Office Phone # Fax #    Esther Back [phone]       [facility] [address] [address]        Thank you!     Thank you for choosing [facility] INFUSION SERVICES  for your care. Our goal is always to provide you with excellent care. Hearing back from our patients is one way we can continue to improve our services. Please take a few minutes to complete the written survey that you may receive in the mail after your visit with us. Thank you!             Your Updated Medication List - Protect others around you: Learn how to safely use, store and throw away your medicines at www.disposemymeds.org.          This list is accurate as of: 6/1/17  1:27 PM.  Always use your most recent med list.                   Brand Name Dispense Instructions for use    acetaminophen 325 MG tablet    TYLENOL    100 tablet    Take 2 tablets (650 mg) by mouth every 4 hours as needed for mild pain       diphenhydrAMINE 2.5%, dexamethasone 0.4%, metoclopramide 1% 2.5%-0.4%-1% Gel in PLO gel    BDR    100 g    Apply 1 g topically 2 times daily       enoxaparin 80 MG/0.8ML injection    LOVENOX    60 Syringe    Inject 0.6 mLs (60 mg) Subcutaneous every 12 hours       Filgrastim 300 MCG/0.5ML Sosy syringe    NEUPOGEN    12 Syringe    Inject 0.5 mLs (300 mcg) Subcutaneous daily       hydrochlorothiazide 25 MG tablet     HYDRODIURIL    30 tablet    Take 2 tablets (50 mg) by mouth daily       IBUPROFEN PO      Take 200 mg by mouth every 6 hours as needed for moderate pain Reported on 5/2/2017       levothyroxine 125 MCG tablet    SYNTHROID    30 tablet    Take 1 tablet (125 mcg) by mouth daily       losartan 100 MG tablet    COZAAR    30 tablet    Take 1 tablet (100 mg) by mouth daily       NEW MED     400 mL    Diphenhydramine elixir 12.5mg/ml  (200       ondansetron 4 MG ODT tab    ZOFRAN-ODT    120 tablet    Take 1 tablet (4 mg) by mouth every 6 hours as needed for nausea       ondansetron 4 MG tablet    ZOFRAN    20 tablet    Take 1 tablet (4 mg) by mouth every 6 hours as needed for nausea       Potassium Chloride ER 20 MEQ Tbcr     30 tablet    Take 1 tablet (20 mEq) by mouth daily       prochlorperazine 5 MG tablet    COMPAZINE    90 tablet    Take 1 tablet (5 mg) by mouth every 6 hours as needed for vomiting       sennosides 8.6 MG tablet    SENOKOT     Take 1 tablet by mouth daily       UNABLE TO FIND      3 times daily MEDICATION NAME: BDRbenadryl/dexamethisone       Vitamin D (Cholecalciferol) 1000 UNITS Tabs     60 tablet    Take 2,000 Units by mouth daily

## 2017-06-01 NOTE — TELEPHONE ENCOUNTER
Prior Authorization Approval    Date Range of Authorization: 4/30/17 thru 11/26/17  Medication: Neupogen 300mg  Approved Dose/Quantity  Diagnosis: Chemotherapy-induced neutropenia (H) (D70.1)  Reference #: [id number]  Insurance Company: [employer]  Insurance I/D #: [id number]

## 2017-06-02 ENCOUNTER — HOSPITAL ENCOUNTER (OUTPATIENT)
Facility: CLINIC | Age: 72
Setting detail: SPECIMEN
Discharge: HOME | End: 2017-06-02
Attending: OBSTETRICS & GYNECOLOGY | Admitting: OBSTETRICS & GYNECOLOGY
Payer: COMMERCIAL

## 2017-06-02 ENCOUNTER — INFUSION THERAPY VISIT (OUTPATIENT)
Dept: INFUSION THERAPY | Facility: CLINIC | Age: 72
End: 2017-06-02
Attending: OBSTETRICS & GYNECOLOGY
Payer: COMMERCIAL

## 2017-06-02 VITALS — TEMPERATURE: 98.4 F | HEART RATE: 94 BPM | SYSTOLIC BLOOD PRESSURE: 101 MMHG | DIASTOLIC BLOOD PRESSURE: 54 MMHG

## 2017-06-02 DIAGNOSIS — T45.1X5A CHEMOTHERAPY-INDUCED NEUTROPENIA (H): Primary | ICD-10-CM

## 2017-06-02 DIAGNOSIS — D70.1 CHEMOTHERAPY-INDUCED NEUTROPENIA (H): Primary | ICD-10-CM

## 2017-06-02 DIAGNOSIS — C56.9 OVARIAN CANCER, UNSPECIFIED LATERALITY (H): ICD-10-CM

## 2017-06-02 LAB
BASOPHILS # BLD AUTO: 0.1 10E9/L (ref 0–0.2)
BASOPHILS NFR BLD AUTO: 1 %
DIFFERENTIAL METHOD BLD: ABNORMAL
EOSINOPHIL # BLD AUTO: 0.1 10E9/L (ref 0–0.7)
EOSINOPHIL NFR BLD AUTO: 1 %
ERYTHROCYTE [DISTWIDTH] IN BLOOD BY AUTOMATED COUNT: 17.8 % (ref 10–15)
HCT VFR BLD AUTO: 28.7 % (ref 35–47)
HGB BLD-MCNC: 9.8 G/DL (ref 11.7–15.7)
LYMPHOCYTES # BLD AUTO: 0.6 10E9/L (ref 0.8–5.3)
LYMPHOCYTES NFR BLD AUTO: 11 %
MCH RBC QN AUTO: 32.3 PG (ref 26.5–33)
MCHC RBC AUTO-ENTMCNC: 34.1 G/DL (ref 31.5–36.5)
MCV RBC AUTO: 95 FL (ref 78–100)
METAMYELOCYTES # BLD: 0.3 10E9/L
METAMYELOCYTES NFR BLD MANUAL: 5 %
MONOCYTES # BLD AUTO: 0.2 10E9/L (ref 0–1.3)
MONOCYTES NFR BLD AUTO: 3 %
NEUTROPHILS # BLD AUTO: 4.3 10E9/L (ref 1.6–8.3)
NEUTROPHILS NFR BLD AUTO: 79 %
PLATELET # BLD AUTO: 238 10E9/L (ref 150–450)
PLATELET # BLD EST: NORMAL 10*3/UL
RBC # BLD AUTO: 3.03 10E12/L (ref 3.8–5.2)
RBC MORPH BLD: ABNORMAL
WBC # BLD AUTO: 5.5 10E9/L (ref 4–11)

## 2017-06-02 PROCEDURE — 85025 COMPLETE CBC W/AUTO DIFF WBC: CPT | Performed by: OBSTETRICS & GYNECOLOGY

## 2017-06-02 PROCEDURE — 96372 THER/PROPH/DIAG INJ SC/IM: CPT

## 2017-06-02 PROCEDURE — 25000128 H RX IP 250 OP 636: Performed by: OBSTETRICS & GYNECOLOGY

## 2017-06-02 RX ADMIN — FILGRASTIM 300 MCG: 300 INJECTION, SOLUTION INTRAVENOUS; SUBCUTANEOUS at 13:56

## 2017-06-02 NOTE — MR AVS SNAPSHOT
After Visit Summary   6/2/2017    Tosin Nina    MRN: [id number]           Patient Information     Date Of Birth          1945        Visit Information        Provider Department      6/2/2017 2:00 PM RH INFUSION CHAIR 3 [facility] Infusion Services        Today's Diagnoses     Chemotherapy-induced neutropenia (H)    -  1    Ovarian cancer, unspecified laterality (H)           Follow-ups after your visit        Your next 10 appointments already scheduled     Jun 06, 2017 11:00 AM CDT   Level 4 with RH INFUSION CHAIR 8   [facility] Infusion Services ([facility])    [facility]  [address]  [facility] [address]   [phone]            Jun 07, 2017  4:00 PM CDT   (Arrive by 3:45 PM)   New Patient Visit with James Lopez MD   [facility] Neurology ([facility] [place])    [address] [address]   [phone]            Jun 09, 2017 12:30 PM CDT   New Visit with Maeve Ca MD   [facility] ([facility])    [facility]  [address]  [facility] [address]   [phone]            Jun 13, 2017 11:00 AM CDT   Level 3 with RH INFUSION CHAIR 8   [facility] Infusion Services ([facility])    [facility]  [address]  [facility] [address]   [phone]            Jun 16, 2017  9:30 AM CDT   CT CHEST/ABDOMEN/PELVIS W CONTRAST with RSCCCT1   [facility] ([facility])    [address]  [facility] [address]   [phone]           Please bring any scans or X-rays taken at other hospitals, if similar tests were done. Also bring a list of your medicines, including vitamins, minerals and over-the-counter drugs. It is safest to leave personal  items at home.  Be sure to tell your doctor:   If you have any allergies.   If there s any chance you are pregnant.   If you are breastfeeding.   If you have any special needs.  You may have contrast for this exam. To prepare:   Do not eat or drink for 2 hours before your exam. If you need to take medicine, you may take it with small sips of water. (We may ask you to take liquid medicine as well.)   The day before your exam, drink extra fluids at least six 8-ounce glasses (unless your doctor tells you to restrict your fluids).  Patients over 70 or patients with diabetes or kidney problems:   If you haven t had a blood test (creatinine test) within the last 30 days, go to your clinic or Diagnostic Imaging Department for this test.  If you have diabetes:   If your kidney function is normal, continue taking your metformin (Avandamet, Glucophage, Glucovance, Metaglip) on the day of your exam.   If your kidney function is abnormal, wait 48 hours before restarting this medicine.  You will have oral contrast for this exam:   You will drink the contrast at home. Get this from your clinic or Diagnostic Imaging Department. Please follow the directions given.  Please wear loose clothing, such as a sweat suit or jogging clothes. Avoid snaps, zippers and other metal. We may ask you to undress and put on a hospital gown.  If you have any questions, please call the Imaging Department where you will have your exam.            Jun 20, 2017 11:00 AM CDT   Level 3 with  INFUSION CHAIR 5   [facility] Infusion Services ([facility])    [facility]  [address]  [facility] [address]   [phone]            Jun 20, 2017 12:00 PM CDT   Return Visit with Nessa Foster MD   [facility] ([facility])    [facility]  [address]  [facility] [address]   [phone]            Jun 27,  "2017 11:00 AM CDT   Level 3 with RH INFUSION CHAIR 4   [facility] Infusion Services ([facility])    [facility]  [address]  [place] [address] [phone]            Jun 28, 2017   Procedure with Nessa Foster MD   [facility] [place] (--)    [address]  [place] [address]   [phone]            Jul 05, 2017  9:00 AM CDT   New Visit with Kelsie Nguyen GC   Can[facility] Risk Management Program ([facility])    [facility]  [address]  [facility] [address] [phone]              Who to contact     If you have questions or need follow up information about today's clinic visit or your schedule please contact [facility] INFUSION SERVICES directly at [phone].  Normal or non-critical lab and imaging results will be communicated to you by [employer], letter or phone within 4 business days after the clinic has received the results. If you do not hear from us within 7 days, please contact the clinic through [employer] or phone. If you have a critical or abnormal lab result, we will notify you by phone as soon as possible.  Submit refill requests through [employer] or call your pharmacy and they will forward the refill request to us. Please allow 3 business days for your refill to be completed.          Additional Information About Your Visit        [employer] Information     [employer] lets you send messages to your doctor, view your test results, renew your prescriptions, schedule appointments and more. To sign up, go to [email or web address] . Click on \"Log in\" on the left side of the screen, which will take you to the Welcome page. Then click on \"Sign up Now\" on the right side of the page.     You will be asked to enter the access code listed below, as well as some personal information. Please follow the directions to create your username and password.   "   Your access code is: [email or web address]  Expires: 8/10/2017 11:53 AM     Your access code will  in 90 days. If you need help or a new code, please call your [facility] or [phone].        Care EveryWhere ID     This is your Care EveryWhere ID. This could be used by other organizations to access your [facility] medical records  [id number]        Your Vitals Were     Pulse Temperature                94 98.4  F (36.9  C) (Tympanic)           Blood Pressure from Last 3 Encounters:   17 101/54   17 94/52   17 90/56    Weight from Last 3 Encounters:   17 72.6 kg (160 lb 2 oz)   17 70.8 kg (156 lb)   17 69.9 kg (154 lb)              We Performed the Following     CBC with platelets and differential        Primary Care Provider Office Phone # Fax #    Esther Back [phone]       [facility] [address] [address]        Thank you!     Thank you for choosing [facility] INFUSION SERVICES  for your care. Our goal is always to provide you with excellent care. Hearing back from our patients is one way we can continue to improve our services. Please take a few minutes to complete the written survey that you may receive in the mail after your visit with us. Thank you!             Your Updated Medication List - Protect others around you: Learn how to safely use, store and throw away your medicines at www.disposemymeds.org.          This list is accurate as of: 17  3:28 PM.  Always use your most recent med list.                   Brand Name Dispense Instructions for use    acetaminophen 325 MG tablet    TYLENOL    100 tablet    Take 2 tablets (650 mg) by mouth every 4 hours as needed for mild pain       diphenhydrAMINE 2.5%, dexamethasone 0.4%, metoclopramide 1% 2.5%-0.4%-1% Gel in PLO gel    BDR    100 g    Apply 1 g topically 2 times daily       enoxaparin 80 MG/0.8ML injection    LOVENOX    60 Syringe    Inject 0.6 mLs  (60 mg) Subcutaneous every 12 hours       Filgrastim 300 MCG/0.5ML Sosy syringe    NEUPOGEN    12 Syringe    Inject 0.5 mLs (300 mcg) Subcutaneous daily       hydrochlorothiazide 25 MG tablet    HYDRODIURIL    30 tablet    Take 2 tablets (50 mg) by mouth daily       IBUPROFEN PO      Take 200 mg by mouth every 6 hours as needed for moderate pain Reported on 5/2/2017       levothyroxine 125 MCG tablet    SYNTHROID    30 tablet    Take 1 tablet (125 mcg) by mouth daily       losartan 100 MG tablet    COZAAR    30 tablet    Take 1 tablet (100 mg) by mouth daily       NEW MED     400 mL    Diphenhydramine elixir 12.5mg/ml  (200       ondansetron 4 MG ODT tab    ZOFRAN-ODT    120 tablet    Take 1 tablet (4 mg) by mouth every 6 hours as needed for nausea       ondansetron 4 MG tablet    ZOFRAN    20 tablet    Take 1 tablet (4 mg) by mouth every 6 hours as needed for nausea       Potassium Chloride ER 20 MEQ Tbcr     30 tablet    Take 1 tablet (20 mEq) by mouth daily       prochlorperazine 5 MG tablet    COMPAZINE    90 tablet    Take 1 tablet (5 mg) by mouth every 6 hours as needed for vomiting       sennosides 8.6 MG tablet    SENOKOT     Take 1 tablet by mouth daily       UNABLE TO FIND      3 times daily MEDICATION NAME: BDRbenadryl/dexamethisone       Vitamin D (Cholecalciferol) 1000 UNITS Tabs     60 tablet    Take 2,000 Units by mouth daily

## 2017-06-02 NOTE — PROGRESS NOTES
Infusion Nursing Note:  Tosin Bahkarena presents today for Neupogen.    Patient seen by provider today: No   present during visit today: Not Applicable.    Note: Neutrophils were 4.3 today prior to receiving 3rd neupogen.  Talked with Dr Ennis regarding receiving a 4th dose of neupogen or not. She stated patient would not need 4th dose based on the ANC from today.  Called patient and informed them of decision, they were in agreement with plan.  Next appointment will be 6/6/17 for chemo.    Intravenous Access:  Labs drawn without difficulty.  Implanted Port.    Treatment Conditions:  Lab Results   Component Value Date    HGB 9.8 06/02/2017     Lab Results   Component Value Date    WBC 5.5 06/02/2017      Lab Results   Component Value Date    ANEU 4.3 06/02/2017     Lab Results   Component Value Date     06/02/2017      Results reviewed, labs MET treatment parameters, ok to proceed with treatment.    Post Infusion Assessment:  Patient tolerated injection without incident.  Blood return noted pre and post infusion.  Site patent and intact, free from redness, edema or discomfort.  No evidence of extravasations.  Access discontinued per protocol.    Discharge Plan:   Discharge instructions reviewed with: Patient and Family.  Patient and/or family verbalized understanding of discharge instructions and all questions answered.  Patient discharged in stable condition accompanied by: .  Departure Mode: Wheelchair.    Olivia Golden RN

## 2017-06-05 ENCOUNTER — PRE VISIT (OUTPATIENT)
Dept: NEUROLOGY | Facility: CLINIC | Age: 72
End: 2017-06-05

## 2017-06-05 ENCOUNTER — TELEPHONE (OUTPATIENT)
Dept: ONCOLOGY | Facility: CLINIC | Age: 72
End: 2017-06-05

## 2017-06-05 NOTE — TELEPHONE ENCOUNTER
Call to pt to follow up regarding upcoming appt with neurology 6/7/17. Pt states she is aware of appt at [facility] at 4 pm with Dr Lopez  and will check in at 3:45 pm.  Also reviewed directions. Pt verbalized understanding of plan.    Kim Jones, RN, BSN

## 2017-06-05 NOTE — TELEPHONE ENCOUNTER
1.  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  Referring provider: unkown  3.  Call to patient (Yes / No - short description): No, referred   4.  Previous care at / records requested from:   [facility]- office notes are in epic.

## 2017-06-06 ENCOUNTER — DOCUMENTATION ONLY (OUTPATIENT)
Dept: ONCOLOGY | Facility: CLINIC | Age: 72
End: 2017-06-06

## 2017-06-06 ENCOUNTER — INFUSION THERAPY VISIT (OUTPATIENT)
Dept: INFUSION THERAPY | Facility: CLINIC | Age: 72
DRG: 098 | End: 2017-06-06
Attending: OBSTETRICS & GYNECOLOGY
Payer: COMMERCIAL

## 2017-06-06 ENCOUNTER — HOSPITAL ENCOUNTER (OUTPATIENT)
Facility: CLINIC | Age: 72
Setting detail: SPECIMEN
Discharge: HOME | End: 2017-06-06
Attending: OBSTETRICS & GYNECOLOGY | Admitting: OBSTETRICS & GYNECOLOGY
Payer: COMMERCIAL

## 2017-06-06 DIAGNOSIS — D70.1 CHEMOTHERAPY-INDUCED NEUTROPENIA (H): Primary | ICD-10-CM

## 2017-06-06 DIAGNOSIS — T45.1X5A CHEMOTHERAPY-INDUCED NEUTROPENIA (H): Primary | ICD-10-CM

## 2017-06-06 DIAGNOSIS — C56.9 OVARIAN CANCER, UNSPECIFIED LATERALITY (H): ICD-10-CM

## 2017-06-06 LAB
ANISOCYTOSIS BLD QL SMEAR: SLIGHT
BASOPHILS # BLD AUTO: 0 10E9/L (ref 0–0.2)
BASOPHILS NFR BLD AUTO: 0 %
DIFFERENTIAL METHOD BLD: ABNORMAL
EOSINOPHIL # BLD AUTO: 0 10E9/L (ref 0–0.7)
EOSINOPHIL NFR BLD AUTO: 0 %
ERYTHROCYTE [DISTWIDTH] IN BLOOD BY AUTOMATED COUNT: 16.9 % (ref 10–15)
HCT VFR BLD AUTO: 27.8 % (ref 35–47)
HGB BLD-MCNC: 9.7 G/DL (ref 11.7–15.7)
LYMPHOCYTES # BLD AUTO: 1.3 10E9/L (ref 0.8–5.3)
LYMPHOCYTES NFR BLD AUTO: 25 %
MAGNESIUM SERPL-MCNC: 1.5 MG/DL (ref 1.6–2.3)
MCH RBC QN AUTO: 32.3 PG (ref 26.5–33)
MCHC RBC AUTO-ENTMCNC: 34.9 G/DL (ref 31.5–36.5)
MCV RBC AUTO: 93 FL (ref 78–100)
MONOCYTES # BLD AUTO: 0.7 10E9/L (ref 0–1.3)
MONOCYTES NFR BLD AUTO: 13 %
MYELOCYTES # BLD: 0.1 10E9/L
MYELOCYTES NFR BLD MANUAL: 2 %
NEUTROPHILS # BLD AUTO: 3 10E9/L (ref 1.6–8.3)
NEUTROPHILS NFR BLD AUTO: 60 %
PLATELET # BLD AUTO: 246 10E9/L (ref 150–450)
PLATELET # BLD EST: NORMAL 10*3/UL
POTASSIUM SERPL-SCNC: 3.5 MMOL/L (ref 3.4–5.3)
RBC # BLD AUTO: 3 10E12/L (ref 3.8–5.2)
WBC # BLD AUTO: 5 10E9/L (ref 4–11)

## 2017-06-06 PROCEDURE — 84132 ASSAY OF SERUM POTASSIUM: CPT | Performed by: OBSTETRICS & GYNECOLOGY

## 2017-06-06 PROCEDURE — 85025 COMPLETE CBC W/AUTO DIFF WBC: CPT | Performed by: OBSTETRICS & GYNECOLOGY

## 2017-06-06 PROCEDURE — 83735 ASSAY OF MAGNESIUM: CPT | Performed by: OBSTETRICS & GYNECOLOGY

## 2017-06-06 RX ORDER — HEPARIN SODIUM (PORCINE) LOCK FLUSH IV SOLN 100 UNIT/ML 100 UNIT/ML
500 SOLUTION INTRAVENOUS EVERY 8 HOURS
Status: DISCONTINUED | OUTPATIENT
Start: 2017-06-06 | End: 2017-06-06 | Stop reason: HOSPADM

## 2017-06-06 RX ADMIN — DEXAMETHASONE SODIUM PHOSPHATE 12 MG: 10 INJECTION, SOLUTION INTRAMUSCULAR; INTRAVENOUS at 12:11

## 2017-06-06 RX ADMIN — FAMOTIDINE 40 MG: 10 INJECTION INTRAVENOUS at 12:30

## 2017-06-06 RX ADMIN — MAGNESIUM SULFATE HEPTAHYDRATE 2 G: 500 INJECTION, SOLUTION INTRAMUSCULAR; INTRAVENOUS at 13:02

## 2017-06-06 RX ADMIN — SODIUM CHLORIDE, PRESERVATIVE FREE 500 UNITS: 5 INJECTION INTRAVENOUS at 14:35

## 2017-06-06 RX ADMIN — DIPHENHYDRAMINE HYDROCHLORIDE 25 MG: 50 INJECTION, SOLUTION INTRAMUSCULAR; INTRAVENOUS at 12:47

## 2017-06-06 RX ADMIN — PACLITAXEL 135 MG: 6 INJECTION, SOLUTION INTRAVENOUS at 12:58

## 2017-06-06 RX ADMIN — SODIUM CHLORIDE 250 ML: 9 INJECTION, SOLUTION INTRAVENOUS at 12:11

## 2017-06-06 NOTE — MR AVS SNAPSHOT
After Visit Summary   6/6/2017    Tosin Nina    MRN: [id number]           Patient Information     Date Of Birth          1945        Visit Information        Provider Department      6/6/2017 11:00 AM RH INFUSION CHAIR 8 [facility]        Today's Diagnoses     Chemotherapy-induced neutropenia (H)    -  1    Ovarian cancer, unspecified laterality (H)           Follow-ups after your visit        Your next 10 appointments already scheduled     Jun 07, 2017  4:00 PM CDT   (Arrive by 3:45 PM)   New Patient Visit with James Lopez MD   [facility] Neurology ([facility])    [address]  3rd [facility] [address]   [phone]            Jun 09, 2017 12:30 PM CDT   New Visit with Maeve Ca MD   [facility] Cancer Care ([facility])    [facility]  [address]  [facility] [address]   [phone]            Jun 13, 2017 11:00 AM CDT   Level 3 with RH INFUSION CHAIR 8   [facility] Infusion [facility] ([facility])    [facility]  [address]  [facility] [address]   [phone]            Jun 16, 2017  9:30 AM CDT   CT CHEST/ABDOMEN/PELVIS W CONTRAST with RSCCCT1   [facility] ([facility])    [address]  [facility] [address]   [phone]           Please bring any scans or X-rays taken at other hospitals, if similar tests were done. Also bring a list of your medicines, including vitamins, minerals and over-the-counter drugs. It is safest to leave personal items at home.  Be sure to tell your doctor:   If you have any allergies.   If there s any chance you are pregnant.   If you are breastfeeding.   If you have any special needs.  You may have contrast for this exam. To prepare:   Do not eat or drink  for 2 hours before your exam. If you need to take medicine, you may take it with small sips of water. (We may ask you to take liquid medicine as well.)   The day before your exam, drink extra fluids at least six 8-ounce glasses (unless your doctor tells you to restrict your fluids).  Patients over 70 or patients with diabetes or kidney problems:   If you haven t had a blood test (creatinine test) within the last 30 days, go to your clinic or Diagnostic Imaging Department for this test.  If you have diabetes:   If your kidney function is normal, continue taking your metformin (Avandamet, Glucophage, Glucovance, Metaglip) on the day of your exam.   If your kidney function is abnormal, wait 48 hours before restarting this medicine.  You will have oral contrast for this exam:   You will drink the contrast at home. Get this from your clinic or Diagnostic Imaging Department. Please follow the directions given.  Please wear loose clothing, such as a sweat suit or jogging clothes. Avoid snaps, zippers and other metal. We may ask you to undress and put on a hospital gown.  If you have any questions, please call the Imaging Department where you will have your exam.            Jun 20, 2017 11:00 AM CDT   Level 3 with RH INFUSION CHAIR 5   [facility] ([facility])    [facility]  [address]  [facility] [address]   [phone]            Jun 20, 2017 12:00 PM CDT   Return Visit with Nessa Foster MD   [facility] ([facility])    [facility]  [address]  [place] [address]   [phone]            Jun 27, 2017 11:00 AM CDT   Level 3 with RH INFUSION CHAIR 4   [facility] ([facility])    [facility]  [address]  [place] [address]   [phone]            Jun 28,  "2017   Procedure with Nessa Foster MD   [facility] [place], Same Day Surgery (--)    [address]  [place] [address]   [phone]            Jul 05, 2017  9:00 AM CDT   New Visit with Kelsie Nguyen GC   [facility] ([facility])    [facility]  [address]  [facility] [address] [phone]            Jul 05, 2017 11:00 AM CDT   Level 3 with RH INFUSION CHAIR 7   [facility] Infusion Services ([facility])    [facility]  [address]  [facility] [address] [phone]              Who to contact     If you have questions or need follow up information about today's clinic visit or your schedule please contact [facility] INFUSION SERVICES directly at [phone].  Normal or non-critical lab and imaging results will be communicated to you by SEWORKShart, letter or phone within 4 business days after the clinic has received the results. If you do not hear from us within 7 days, please contact the clinic through SEWORKShart or phone. If you have a critical or abnormal lab result, we will notify you by phone as soon as possible.  Submit refill requests through Tucoola or call your pharmacy and they will forward the refill request to us. Please allow 3 business days for your refill to be completed.          Additional Information About Your Visit        SEWORKSharBizen Information     Tucoola lets you send messages to your doctor, view your test results, renew your prescriptions, schedule appointments and more. To sign up, go to [email or web address] . Click on \"Log in\" on the left side of the screen, which will take you to the Welcome page. Then click on \"Sign up Now\" on the right side of the page.     You will be asked to enter the access code listed below, as well as some personal information. Please follow the directions to create your username and password.   "   Your access code is: [email or web address]  Expires: 8/10/2017 11:53 AM     Your access code will  in 90 days. If you need help or a new code, please call your [facility] or [phone].        Care EveryWhere ID     This is your Care EveryWhere ID. This could be used by other organizations to access your [place] medical records  [id number]         Blood Pressure from Last 3 Encounters:   17 101/54   17 94/52   17 90/56    Weight from Last 3 Encounters:   17 72.6 kg (160 lb 2 oz)   17 70.8 kg (156 lb)   17 69.9 kg (154 lb)              We Performed the Following     CBC with platelets differential     Magnesium     Potassium        Primary Care Provider Office Phone # Fax #    Esther Back [phone]       [facility] [address] [address]        Thank you!     Thank you for choosing [facility] INFUSION SERVICES  for your care. Our goal is always to provide you with excellent care. Hearing back from our patients is one way we can continue to improve our services. Please take a few minutes to complete the written survey that you may receive in the mail after your visit with us. Thank you!             Your Updated Medication List - Protect others around you: Learn how to safely use, store and throw away your medicines at www.disposemymeds.org.          This list is accurate as of: 17  1:53 PM.  Always use your most recent med list.                   Brand Name Dispense Instructions for use    acetaminophen 325 MG tablet    TYLENOL    100 tablet    Take 2 tablets (650 mg) by mouth every 4 hours as needed for mild pain       diphenhydrAMINE 2.5%, dexamethasone 0.4%, metoclopramide 1% 2.5%-0.4%-1% Gel in PLO gel    BDR    100 g    Apply 1 g topically 2 times daily       enoxaparin 80 MG/0.8ML injection    LOVENOX    60 Syringe    Inject 0.6 mLs (60 mg) Subcutaneous every 12 hours       Filgrastim 300 MCG/0.5ML  Sosy syringe    NEUPOGEN    12 Syringe    Inject 0.5 mLs (300 mcg) Subcutaneous daily       hydrochlorothiazide 25 MG tablet    HYDRODIURIL    30 tablet    Take 2 tablets (50 mg) by mouth daily       IBUPROFEN PO      Take 200 mg by mouth every 6 hours as needed for moderate pain Reported on 5/2/2017       levothyroxine 125 MCG tablet    SYNTHROID    30 tablet    Take 1 tablet (125 mcg) by mouth daily       losartan 100 MG tablet    COZAAR    30 tablet    Take 1 tablet (100 mg) by mouth daily       NEW MED     400 mL    Diphenhydramine elixir 12.5mg/ml  (200       ondansetron 4 MG ODT tab    ZOFRAN-ODT    120 tablet    Take 1 tablet (4 mg) by mouth every 6 hours as needed for nausea       ondansetron 4 MG tablet    ZOFRAN    20 tablet    Take 1 tablet (4 mg) by mouth every 6 hours as needed for nausea       Potassium Chloride ER 20 MEQ Tbcr     30 tablet    Take 1 tablet (20 mEq) by mouth daily       prochlorperazine 5 MG tablet    COMPAZINE    90 tablet    Take 1 tablet (5 mg) by mouth every 6 hours as needed for vomiting       sennosides 8.6 MG tablet    SENOKOT     Take 1 tablet by mouth daily       UNABLE TO FIND      3 times daily MEDICATION NAME: BDRbenadryl/dexamethisone       Vitamin D (Cholecalciferol) 1000 UNITS Tabs     60 tablet    Take 2,000 Units by mouth daily

## 2017-06-06 NOTE — PROGRESS NOTES
Infusion Nursing Note:  Tosin Healychad presents today for C3D8 Paclitaxel and Magnesium.    Patient seen by provider today: No   present during visit today: Not Applicable.    Note: Lurdes needed Mag replacement today  -Reviewed ANC with Dr Ennis - Plan is to hold neupogen this week.    Intravenous Access:  Implanted Port.    Treatment Conditions:  Lab Results   Component Value Date    HGB 9.7 06/06/2017     Lab Results   Component Value Date    WBC 5.0 06/06/2017      Lab Results   Component Value Date    ANEU 3.0 06/06/2017     Lab Results   Component Value Date     06/06/2017      Lab Results   Component Value Date     05/30/2017                   Lab Results   Component Value Date    POTASSIUM 3.5 06/06/2017           Lab Results   Component Value Date    MAG 1.5 06/06/2017            Lab Results   Component Value Date    CR 0.47 05/30/2017                   Lab Results   Component Value Date    PATTI 8.6 05/30/2017                Lab Results   Component Value Date    BILITOTAL 0.3 05/30/2017           Lab Results   Component Value Date    ALBUMIN 2.8 05/30/2017                    Lab Results   Component Value Date    ALT 34 05/30/2017           Lab Results   Component Value Date    AST 18 05/30/2017     Results reviewed, labs MET treatment parameters, ok to proceed with treatment.      Post Infusion Assessment:  Patient tolerated infusion without incident.  Blood return noted pre and post infusion.  Site patent and intact, free from redness, edema or discomfort.  No evidence of extravasations.  Access discontinued per protocol.    Discharge Plan:   Discharge instructions reviewed with: Patient and Family.  Patient and/or family verbalized understanding of discharge instructions and all questions answered.  Copy of AVS reviewed with patient and/or family.  Patient will return next Tues for next appointment.  Patient discharged in stable condition accompanied by: self and  .  Departure Mode: Wheelchair.  Face to Face time: 10 min calling about Neupogen plan.    Maru More RN

## 2017-06-06 NOTE — PROGRESS NOTES
Received request by RN to see patient.  Patient states tolerating food and has good appetite.  Patient states her potassium has been low so was eating banana every day.  Patient also with low magnesium.  Patient questioning high potassium foods.  Provided list of high potassium and high magnesium foods.  Also provided RD name and number.  Patient verbalized understanding of plan.    Misha Grubbs, RD, LD  Clinical Dietitian  [facility]  [phone] (direct)

## 2017-06-07 ENCOUNTER — OFFICE VISIT (OUTPATIENT)
Dept: NEUROLOGY | Facility: CLINIC | Age: 72
End: 2017-06-07

## 2017-06-07 VITALS — HEIGHT: 59 IN | DIASTOLIC BLOOD PRESSURE: 54 MMHG | HEART RATE: 78 BPM | SYSTOLIC BLOOD PRESSURE: 96 MMHG

## 2017-06-07 DIAGNOSIS — G93.9 CEREBELLAR DISORDER: Primary | ICD-10-CM

## 2017-06-07 ASSESSMENT — PAIN SCALES - GENERAL: PAINLEVEL: NO PAIN (0)

## 2017-06-07 NOTE — NURSING NOTE
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    Bibi Sheridan, [facility]

## 2017-06-07 NOTE — MR AVS SNAPSHOT
After Visit Summary   6/7/2017    Tosin Nina    MRN: [id number]           Patient Information     Date Of Birth          1945        Visit Information        Provider Department      6/7/2017 4:00 PM James Lopez MD [facility] Neurology        Today's Diagnoses     Cerebellar disorder    -  1       Follow-ups after your visit        Follow-up notes from your care team     Return if symptoms worsen or fail to improve.      Your next 10 appointments already scheduled     Jun 09, 2017 12:30 PM CDT   New Visit with Maeve Ca MD   [facility] ([facility])    [facility]  [address]  [facility] [address]   [phone]            Jun 13, 2017 11:00 AM CDT   Level 3 with RH INFUSION CHAIR 8   [facility] Infusion Services ([facility])    [facility]  [address]  [facility] [address]   [phone]            Jun 16, 2017  9:00 AM CDT   Level 1 with RH INFUSION CHAIR 12   [facility] Infusion Services ([facility])    [facility]  [address]  [facility] [address]   [phone]            Jun 16, 2017  9:30 AM CDT   CT CHEST/ABDOMEN/PELVIS W CONTRAST with RSCCCT1   [facility] ([facility])    [address]  [facility] [address]   [phone]           Please bring any scans or X-rays taken at other hospitals, if similar tests were done. Also bring a list of your medicines, including vitamins, minerals and over-the-counter drugs. It is safest to leave personal items at home.  Be sure to tell your doctor:   If you have any allergies.   If there s any chance you are pregnant.   If you are breastfeeding.   If you have any special needs.  You may have contrast for this exam. To prepare:   Do not eat  or drink for 2 hours before your exam. If you need to take medicine, you may take it with small sips of water. (We may ask you to take liquid medicine as well.)   The day before your exam, drink extra fluids at least six 8-ounce glasses (unless your doctor tells you to restrict your fluids).  Patients over 70 or patients with diabetes or kidney problems:   If you haven t had a blood test (creatinine test) within the last 30 days, go to your clinic or Diagnostic Imaging Department for this test.  If you have diabetes:   If your kidney function is normal, continue taking your metformin (Avandamet, Glucophage, Glucovance, Metaglip) on the day of your exam.   If your kidney function is abnormal, wait 48 hours before restarting this medicine.  You will have oral contrast for this exam:   You will drink the contrast at home. Get this from your clinic or Diagnostic Imaging Department. Please follow the directions given.  Please wear loose clothing, such as a sweat suit or jogging clothes. Avoid snaps, zippers and other metal. We may ask you to undress and put on a hospital gown.  If you have any questions, please call the Imaging Department where you will have your exam.            Jun 20, 2017 11:00 AM CDT   Level 3 with RH INFUSION CHAIR 5   [facility] Infusion Services ([facility])    [facility]  [address]  [facility] [address]   [phone]            Jun 20, 2017 12:00 PM CDT   Return Visit with Nessa Foster MD   [facility] ([facility])    [facility]  [address]  [place] [address]   [phone]            Jun 27, 2017 11:00 AM CDT   Level 3 with RH INFUSION CHAIR 4   [facility] Infusion Services ([facility])    [facility]  [address]  [place] [address]   [phone]             2017   Procedure with Nessa Foster MD   [facility] [place], Same Day Surgery (--)    [address]  [place] [address] [phone]            2017  9:00 AM CDT   New Visit with Kelsie Nguyen GC   Can[facility] Risk Management Program ([facility])    [facility]  [address] [place] [address]  [facility] [address] [phone]            2017 11:00 AM CDT   Level 3 with  INFUSION CHAIR 7   [facility] Infusion Services ([facility])    [facility]  [address] [place] [address]  [facility] [address] [phone]              Who to contact     Please call your clinic at [phone] to:    Ask questions about your health    Make or cancel appointments    Discuss your medicines    Learn about your test results    Speak to your doctor   If you have compliments or concerns about an experience at your clinic, or if you wish to file a complaint, please contact [facility] Patient Relations at [phone] or email us at [email or web address]         Additional Information About Your Visit        Colibriahart Information     netFactort is an electronic gateway that provides easy, online access to your medical records. With Marketo, you can request a clinic appointment, read your test results, renew a prescription or communicate with your care team.     To sign up for netFactort visit the website at [email or web address]   You will be asked to enter the access code listed below, as well as some personal information. Please follow the directions to create your username and password.     Your access code is: [email or web address]  Expires: 8/10/2017 11:53 AM     Your access code will  in 90 days. If you need help or a new code, please contact your [facility] Clinic or call [phone] for assistance.        Care EveryWhere ID     This is your Care EveryWhere  "ID. This could be used by other organizations to access your [facility] medical records  [id number]        Your Vitals Were     Pulse Height                78 1.499 m (4' 11\")           Blood Pressure from Last 3 Encounters:   06/07/17 96/54   06/02/17 101/54   06/01/17 94/52    Weight from Last 3 Encounters:   05/30/17 72.6 kg (160 lb 2 oz)   05/12/17 70.8 kg (156 lb)   05/02/17 69.9 kg (154 lb)              Today, you had the following     No orders found for display       Primary Care Provider Office Phone # Fax #    Esther Back [phone]       [facility] [address] [address]        Thank you!     Thank you for choosing [facility] NEUROLOGY  for your care. Our goal is always to provide you with excellent care. Hearing back from our patients is one way we can continue to improve our services. Please take a few minutes to complete the written survey that you may receive in the mail after your visit with us. Thank you!             Your Updated Medication List - Protect others around you: Learn how to safely use, store and throw away your medicines at www.disposemymeds.org.          This list is accurate as of: 6/7/17  4:17 PM.  Always use your most recent med list.                   Brand Name Dispense Instructions for use    acetaminophen 325 MG tablet    TYLENOL    100 tablet    Take 2 tablets (650 mg) by mouth every 4 hours as needed for mild pain       diphenhydrAMINE 2.5%, dexamethasone 0.4%, metoclopramide 1% 2.5%-0.4%-1% Gel in PLO gel    BDR    100 g    Apply 1 g topically 2 times daily       enoxaparin 80 MG/0.8ML injection    LOVENOX    60 Syringe    Inject 0.6 mLs (60 mg) Subcutaneous every 12 hours       Filgrastim 300 MCG/0.5ML Sosy syringe    NEUPOGEN    12 Syringe    Inject 0.5 mLs (300 mcg) Subcutaneous daily       hydrochlorothiazide 25 MG tablet    HYDRODIURIL    30 tablet    Take 2 tablets (50 mg) by mouth daily       IBUPROFEN PO      Take 200 mg by " mouth every 6 hours as needed for moderate pain Reported on 5/2/2017       levothyroxine 125 MCG tablet    SYNTHROID    30 tablet    Take 1 tablet (125 mcg) by mouth daily       losartan 100 MG tablet    COZAAR    30 tablet    Take 1 tablet (100 mg) by mouth daily       NEW MED     400 mL    Diphenhydramine elixir 12.5mg/ml  (200       ondansetron 4 MG ODT tab    ZOFRAN-ODT    120 tablet    Take 1 tablet (4 mg) by mouth every 6 hours as needed for nausea       ondansetron 4 MG tablet    ZOFRAN    20 tablet    Take 1 tablet (4 mg) by mouth every 6 hours as needed for nausea       Potassium Chloride ER 20 MEQ Tbcr     30 tablet    Take 1 tablet (20 mEq) by mouth daily       prochlorperazine 5 MG tablet    COMPAZINE    90 tablet    Take 1 tablet (5 mg) by mouth every 6 hours as needed for vomiting       sennosides 8.6 MG tablet    SENOKOT     Take 1 tablet by mouth daily       UNABLE TO FIND      3 times daily MEDICATION NAME: BDRbenadryl/dexamethisone       Vitamin D (Cholecalciferol) 1000 UNITS Tabs     60 tablet    Take 2,000 Units by mouth daily

## 2017-06-08 ENCOUNTER — HOSPITAL ENCOUNTER (INPATIENT)
Facility: CLINIC | Age: 72
LOS: 4 days | Discharge: HOME | DRG: 098 | End: 2017-06-12
Attending: PSYCHIATRY & NEUROLOGY | Admitting: PSYCHIATRY & NEUROLOGY
Payer: COMMERCIAL

## 2017-06-08 DIAGNOSIS — R27.0 ATAXIA: Primary | ICD-10-CM

## 2017-06-08 LAB
ALBUMIN SERPL-MCNC: 2.9 G/DL (ref 3.4–5)
ALBUMIN UR-MCNC: NEGATIVE MG/DL
ALP SERPL-CCNC: 82 U/L (ref 40–150)
ALT SERPL W P-5'-P-CCNC: 31 U/L (ref 0–50)
ANION GAP SERPL CALCULATED.3IONS-SCNC: 11 MMOL/L (ref 3–14)
ANISOCYTOSIS BLD QL SMEAR: SLIGHT
APPEARANCE UR: CLEAR
AST SERPL W P-5'-P-CCNC: 20 U/L (ref 0–45)
BASOPHILS # BLD AUTO: 0 10E9/L (ref 0–0.2)
BASOPHILS NFR BLD AUTO: 0 %
BILIRUB SERPL-MCNC: 0.4 MG/DL (ref 0.2–1.3)
BILIRUB UR QL STRIP: NEGATIVE
BUN SERPL-MCNC: 10 MG/DL (ref 7–30)
CALCIUM SERPL-MCNC: 8.6 MG/DL (ref 8.5–10.1)
CHLORIDE SERPL-SCNC: 101 MMOL/L (ref 94–109)
CO2 SERPL-SCNC: 28 MMOL/L (ref 20–32)
COLOR UR AUTO: ABNORMAL
CREAT SERPL-MCNC: 0.48 MG/DL (ref 0.52–1.04)
DIFFERENTIAL METHOD BLD: ABNORMAL
EOSINOPHIL # BLD AUTO: 0 10E9/L (ref 0–0.7)
EOSINOPHIL NFR BLD AUTO: 0 %
ERYTHROCYTE [DISTWIDTH] IN BLOOD BY AUTOMATED COUNT: 17.7 % (ref 10–15)
GFR SERPL CREATININE-BSD FRML MDRD: ABNORMAL ML/MIN/1.7M2
GLUCOSE SERPL-MCNC: 84 MG/DL (ref 70–99)
GLUCOSE UR STRIP-MCNC: NEGATIVE MG/DL
HCT VFR BLD AUTO: 27.3 % (ref 35–47)
HGB BLD-MCNC: 9.5 G/DL (ref 11.7–15.7)
HGB UR QL STRIP: NEGATIVE
IGA SERPL-MCNC: 143 MG/DL (ref 70–380)
KETONES UR STRIP-MCNC: NEGATIVE MG/DL
LEUKOCYTE ESTERASE UR QL STRIP: NEGATIVE
LYMPHOCYTES # BLD AUTO: 1 10E9/L (ref 0.8–5.3)
LYMPHOCYTES NFR BLD AUTO: 27.9 %
MCH RBC QN AUTO: 32.5 PG (ref 26.5–33)
MCHC RBC AUTO-ENTMCNC: 34.8 G/DL (ref 31.5–36.5)
MCV RBC AUTO: 94 FL (ref 78–100)
METAMYELOCYTES # BLD: 0.1 10E9/L
METAMYELOCYTES NFR BLD MANUAL: 3.6 %
MONOCYTES # BLD AUTO: 0.1 10E9/L (ref 0–1.3)
MONOCYTES NFR BLD AUTO: 3.6 %
MUCOUS THREADS #/AREA URNS LPF: PRESENT /LPF
MYELOCYTES # BLD: 0 10E9/L
MYELOCYTES NFR BLD MANUAL: 0.9 %
NEUTROPHILS # BLD AUTO: 2.4 10E9/L (ref 1.6–8.3)
NEUTROPHILS NFR BLD AUTO: 64 %
NITRATE UR QL: NEGATIVE
PH UR STRIP: 7 PH (ref 5–7)
PLATELET # BLD AUTO: 183 10E9/L (ref 150–450)
PLATELET # BLD EST: ABNORMAL 10*3/UL
POTASSIUM SERPL-SCNC: 3.1 MMOL/L (ref 3.4–5.3)
PROT SERPL-MCNC: 6.2 G/DL (ref 6.8–8.8)
RBC # BLD AUTO: 2.92 10E12/L (ref 3.8–5.2)
RBC #/AREA URNS AUTO: 0 /HPF (ref 0–2)
SODIUM SERPL-SCNC: 139 MMOL/L (ref 133–144)
SP GR UR STRIP: 1 (ref 1–1.03)
SQUAMOUS #/AREA URNS AUTO: <1 /HPF (ref 0–1)
URN SPEC COLLECT METH UR: ABNORMAL
UROBILINOGEN UR STRIP-MCNC: NORMAL MG/DL (ref 0–2)
WBC # BLD AUTO: 3.7 10E9/L (ref 4–11)
WBC #/AREA URNS AUTO: 1 /HPF (ref 0–2)

## 2017-06-08 PROCEDURE — 12000008 ZZH R&B INTERMEDIATE UMMC

## 2017-06-08 PROCEDURE — 85025 COMPLETE CBC W/AUTO DIFF WBC: CPT | Performed by: PSYCHIATRY & NEUROLOGY

## 2017-06-08 PROCEDURE — 25000128 H RX IP 250 OP 636: Performed by: STUDENT IN AN ORGANIZED HEALTH CARE EDUCATION/TRAINING PROGRAM

## 2017-06-08 PROCEDURE — 81001 URINALYSIS AUTO W/SCOPE: CPT | Performed by: PSYCHIATRY & NEUROLOGY

## 2017-06-08 PROCEDURE — 93005 ELECTROCARDIOGRAM TRACING: CPT

## 2017-06-08 PROCEDURE — 93010 ELECTROCARDIOGRAM REPORT: CPT | Performed by: INTERNAL MEDICINE

## 2017-06-08 PROCEDURE — 36592 COLLECT BLOOD FROM PICC: CPT | Performed by: PSYCHIATRY & NEUROLOGY

## 2017-06-08 PROCEDURE — 25000125 ZZHC RX 250: Performed by: STUDENT IN AN ORGANIZED HEALTH CARE EDUCATION/TRAINING PROGRAM

## 2017-06-08 PROCEDURE — 25000132 ZZH RX MED GY IP 250 OP 250 PS 637: Performed by: STUDENT IN AN ORGANIZED HEALTH CARE EDUCATION/TRAINING PROGRAM

## 2017-06-08 PROCEDURE — 25000132 ZZH RX MED GY IP 250 OP 250 PS 637: Performed by: PSYCHIATRY & NEUROLOGY

## 2017-06-08 PROCEDURE — 82784 ASSAY IGA/IGD/IGG/IGM EACH: CPT | Performed by: PSYCHIATRY & NEUROLOGY

## 2017-06-08 PROCEDURE — 80053 COMPREHEN METABOLIC PANEL: CPT | Performed by: PSYCHIATRY & NEUROLOGY

## 2017-06-08 RX ORDER — POTASSIUM CHLORIDE 29.8 MG/ML
20 INJECTION INTRAVENOUS
Status: DISCONTINUED | OUTPATIENT
Start: 2017-06-08 | End: 2017-06-12 | Stop reason: HOSPADM

## 2017-06-08 RX ORDER — DIPHENHYDRAMINE HYDROCHLORIDE 50 MG/ML
50 INJECTION INTRAMUSCULAR; INTRAVENOUS
Status: DISCONTINUED | OUTPATIENT
Start: 2017-06-08 | End: 2017-06-12 | Stop reason: HOSPADM

## 2017-06-08 RX ORDER — HYDROCHLOROTHIAZIDE 25 MG/1
50 TABLET ORAL DAILY
Status: DISCONTINUED | OUTPATIENT
Start: 2017-06-08 | End: 2017-06-12 | Stop reason: HOSPADM

## 2017-06-08 RX ORDER — PROCHLORPERAZINE 25 MG
12.5 SUPPOSITORY, RECTAL RECTAL EVERY 12 HOURS PRN
Status: DISCONTINUED | OUTPATIENT
Start: 2017-06-08 | End: 2017-06-12 | Stop reason: HOSPADM

## 2017-06-08 RX ORDER — DIPHENHYDRAMINE HCL 25 MG
50 CAPSULE ORAL
Status: DISCONTINUED | OUTPATIENT
Start: 2017-06-08 | End: 2017-06-12 | Stop reason: HOSPADM

## 2017-06-08 RX ORDER — POTASSIUM CL/LIDO/0.9 % NACL 10MEQ/0.1L
10 INTRAVENOUS SOLUTION, PIGGYBACK (ML) INTRAVENOUS
Status: DISCONTINUED | OUTPATIENT
Start: 2017-06-08 | End: 2017-06-12 | Stop reason: HOSPADM

## 2017-06-08 RX ORDER — ONDANSETRON 2 MG/ML
4 INJECTION INTRAMUSCULAR; INTRAVENOUS EVERY 6 HOURS PRN
Status: DISCONTINUED | OUTPATIENT
Start: 2017-06-08 | End: 2017-06-12 | Stop reason: HOSPADM

## 2017-06-08 RX ORDER — METHYLPREDNISOLONE SODIUM SUCCINATE 125 MG/2ML
125 INJECTION, POWDER, LYOPHILIZED, FOR SOLUTION INTRAMUSCULAR; INTRAVENOUS
Status: DISCONTINUED | OUTPATIENT
Start: 2017-06-08 | End: 2017-06-12 | Stop reason: HOSPADM

## 2017-06-08 RX ORDER — PANTOPRAZOLE SODIUM 40 MG/1
40 TABLET, DELAYED RELEASE ORAL EVERY MORNING
Status: DISCONTINUED | OUTPATIENT
Start: 2017-06-08 | End: 2017-06-12 | Stop reason: HOSPADM

## 2017-06-08 RX ORDER — ACETAMINOPHEN 325 MG/1
650 TABLET ORAL
Status: DISCONTINUED | OUTPATIENT
Start: 2017-06-08 | End: 2017-06-12 | Stop reason: HOSPADM

## 2017-06-08 RX ORDER — POTASSIUM CHLORIDE 750 MG/1
20-40 TABLET, EXTENDED RELEASE ORAL
Status: DISCONTINUED | OUTPATIENT
Start: 2017-06-08 | End: 2017-06-12 | Stop reason: HOSPADM

## 2017-06-08 RX ORDER — ACETAMINOPHEN 325 MG/1
650 TABLET ORAL ONCE
Status: DISCONTINUED | OUTPATIENT
Start: 2017-06-08 | End: 2017-06-08

## 2017-06-08 RX ORDER — NALOXONE HYDROCHLORIDE 0.4 MG/ML
.1-.4 INJECTION, SOLUTION INTRAMUSCULAR; INTRAVENOUS; SUBCUTANEOUS
Status: DISCONTINUED | OUTPATIENT
Start: 2017-06-08 | End: 2017-06-12 | Stop reason: HOSPADM

## 2017-06-08 RX ORDER — HEPARIN SODIUM,PORCINE 10 UNIT/ML
5-10 VIAL (ML) INTRAVENOUS
Status: DISCONTINUED | OUTPATIENT
Start: 2017-06-08 | End: 2017-06-12 | Stop reason: HOSPADM

## 2017-06-08 RX ORDER — ONDANSETRON 4 MG/1
4 TABLET, ORALLY DISINTEGRATING ORAL EVERY 6 HOURS PRN
Status: DISCONTINUED | OUTPATIENT
Start: 2017-06-08 | End: 2017-06-12 | Stop reason: HOSPADM

## 2017-06-08 RX ORDER — HEPARIN SODIUM (PORCINE) LOCK FLUSH IV SOLN 100 UNIT/ML 100 UNIT/ML
5 SOLUTION INTRAVENOUS
Status: DISCONTINUED | OUTPATIENT
Start: 2017-06-08 | End: 2017-06-12 | Stop reason: HOSPADM

## 2017-06-08 RX ORDER — ACETAMINOPHEN 325 MG/1
650 TABLET ORAL EVERY 24 HOURS
Status: COMPLETED | OUTPATIENT
Start: 2017-06-08 | End: 2017-06-12

## 2017-06-08 RX ORDER — HEPARIN SODIUM,PORCINE 10 UNIT/ML
5-10 VIAL (ML) INTRAVENOUS EVERY 24 HOURS
Status: DISCONTINUED | OUTPATIENT
Start: 2017-06-08 | End: 2017-06-12 | Stop reason: HOSPADM

## 2017-06-08 RX ORDER — METOCLOPRAMIDE 5 MG/1
5 TABLET ORAL EVERY 6 HOURS PRN
Status: DISCONTINUED | OUTPATIENT
Start: 2017-06-08 | End: 2017-06-12 | Stop reason: HOSPADM

## 2017-06-08 RX ORDER — SENNOSIDES 8.6 MG
1 TABLET ORAL DAILY
Status: DISCONTINUED | OUTPATIENT
Start: 2017-06-08 | End: 2017-06-08

## 2017-06-08 RX ORDER — MAGNESIUM SULFATE HEPTAHYDRATE 40 MG/ML
4 INJECTION, SOLUTION INTRAVENOUS EVERY 4 HOURS PRN
Status: DISCONTINUED | OUTPATIENT
Start: 2017-06-08 | End: 2017-06-12 | Stop reason: HOSPADM

## 2017-06-08 RX ORDER — POTASSIUM CHLORIDE 7.45 MG/ML
10 INJECTION INTRAVENOUS
Status: DISCONTINUED | OUTPATIENT
Start: 2017-06-08 | End: 2017-06-12 | Stop reason: HOSPADM

## 2017-06-08 RX ORDER — LEVOTHYROXINE SODIUM 125 UG/1
125 TABLET ORAL DAILY
Status: DISCONTINUED | OUTPATIENT
Start: 2017-06-08 | End: 2017-06-12 | Stop reason: HOSPADM

## 2017-06-08 RX ORDER — POTASSIUM CHLORIDE 1.5 G/1.58G
20-40 POWDER, FOR SOLUTION ORAL
Status: DISCONTINUED | OUTPATIENT
Start: 2017-06-08 | End: 2017-06-12 | Stop reason: HOSPADM

## 2017-06-08 RX ORDER — DIPHENHYDRAMINE HYDROCHLORIDE 50 MG/ML
50 INJECTION INTRAMUSCULAR; INTRAVENOUS EVERY 24 HOURS
Status: COMPLETED | OUTPATIENT
Start: 2017-06-08 | End: 2017-06-12

## 2017-06-08 RX ORDER — AMOXICILLIN 250 MG
1-2 CAPSULE ORAL 2 TIMES DAILY
Status: DISCONTINUED | OUTPATIENT
Start: 2017-06-08 | End: 2017-06-12 | Stop reason: HOSPADM

## 2017-06-08 RX ORDER — ACETAMINOPHEN 325 MG/1
650 TABLET ORAL EVERY 4 HOURS PRN
Status: DISCONTINUED | OUTPATIENT
Start: 2017-06-08 | End: 2017-06-12 | Stop reason: HOSPADM

## 2017-06-08 RX ORDER — METOCLOPRAMIDE HYDROCHLORIDE 5 MG/ML
5 INJECTION INTRAMUSCULAR; INTRAVENOUS EVERY 6 HOURS PRN
Status: DISCONTINUED | OUTPATIENT
Start: 2017-06-08 | End: 2017-06-12 | Stop reason: HOSPADM

## 2017-06-08 RX ORDER — DIPHENHYDRAMINE HCL 25 MG
50 CAPSULE ORAL EVERY 24 HOURS
Status: COMPLETED | OUTPATIENT
Start: 2017-06-08 | End: 2017-06-12

## 2017-06-08 RX ORDER — PROCHLORPERAZINE MALEATE 5 MG
5 TABLET ORAL EVERY 6 HOURS PRN
Status: DISCONTINUED | OUTPATIENT
Start: 2017-06-08 | End: 2017-06-12 | Stop reason: HOSPADM

## 2017-06-08 RX ORDER — LOSARTAN POTASSIUM 100 MG/1
100 TABLET ORAL DAILY
Status: DISCONTINUED | OUTPATIENT
Start: 2017-06-08 | End: 2017-06-12 | Stop reason: HOSPADM

## 2017-06-08 RX ADMIN — DIPHENHYDRAMINE HYDROCHLORIDE 50 MG: 25 CAPSULE ORAL at 13:02

## 2017-06-08 RX ADMIN — SODIUM CHLORIDE, PRESERVATIVE FREE 5 ML: 5 INJECTION INTRAVENOUS at 18:05

## 2017-06-08 RX ADMIN — ACETAMINOPHEN 650 MG: 325 TABLET, FILM COATED ORAL at 13:01

## 2017-06-08 RX ADMIN — ENOXAPARIN SODIUM 60 MG: 60 INJECTION SUBCUTANEOUS at 21:11

## 2017-06-08 RX ADMIN — HUMAN IMMUNOGLOBULIN G 30 G: 20 LIQUID INTRAVENOUS at 13:45

## 2017-06-08 RX ADMIN — SODIUM CHLORIDE 1000 MG: 9 INJECTION, SOLUTION INTRAVENOUS at 11:45

## 2017-06-08 ASSESSMENT — VISUAL ACUITY
OU: GLASSES
OU: GLASSES
OU: BASELINE;GLASSES
OU: BASELINE;GLASSES

## 2017-06-08 NOTE — PROGRESS NOTES
"REASON FOR VISIT:  This patient is a 71-year-old right-handed woman seen at the request of Dr. Raymon Foster for neurologic consultation with chief complaint of imbalance.  She is here with her , Christiano.        HISTORY OF PRESENT ILLNESS:  The patient has a complex history.  She initially presented in March to her doctor with problems of walking and gait imbalance.  She had neurologic evaluation.  MRI imaging of the brain was performed that was unremarkable.  She had progression in her symptoms.  She presented to [facility] ED in early April.  She had a cerebellar syndrome.  She had a CT scan of the chest, abdomen and pelvis and was diagnosed with ovarian cancer.  It was felt that her presentation was due a paraneoplastic syndrome.  This was subsequently confirmed on laboratory testing when her paraneoplastic autoantibody panel came back and was positive for PCA-1 at a high titer of 1:[id number].  The patient has been through chemotherapy for the cancer.  Her gait has not deteriorated but it is not improving.  She feels \"precarious\" in a walker.  She has lesser problems with her arms and hands.  She does have problems with her vision.  She does not have problems with speech or swallowing or with her hearing.      PAST MEDICAL HISTORY:  Significant for high blood pressure and low thyroid.  She does not have diabetes.  She does not drink or smoke.  She did have low back surgery in 2009.  She has had a left knee replacement.      SOCIAL HISTORY:  She is  with 1 child and is a retired .      FAMILY HISTORY:  Positive for breast cancer in her mother and heart disease in her father.      PHYSICAL EXAMINATION:   GENERAL:  The patient is cooperative and in no distress.   VITAL SIGNS:  Her blood pressure is 96/54.   NEUROLOGIC:   The patient is alert, oriented and lucid.  Visual fields are full to confrontation.  Funduscopic exam shows sharp discs bilaterally.  She does have a " vertical component of nystagmus noted on funduscopic exam.  Eye movements are complete and conjugate.  There is obvious direction-changing nystagmus in horizontal gaze, but not so much on vertical gaze.  Pupils react to light.  Facies are symmetric.  Tongue protrudes in the midline.  Motor evaluation shows no pronator drift.  Finger tapping is somewhat slowed on the left compared to the right and does not seem normal on the right either.  Finger-nose-finger shows mild ataxia on the right and more moderate ataxia on the left.  This is similar for the heel-knee-shin maneuver.  There is some mild weakness in left ankle dorsiflexion, toe dorsiflexion, and in the left interosseous muscles, graded at about 4+ compared to normal strength the right.  Muscle stretch reflexes are trace at right biceps, normal at the left biceps, normal at triceps, absent at brachioradialis, trace at the knees and absent at the ankles.  Right toe is equivocal and left toe is downgoing.  Sensory exam shows absent vibration in the toes.  She chose not to stand up because she felt that her balance was too impaired without sufficient support.      ASSESSMENT:   1.  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   2.  Recent diagnosis of ovarian cancer.      DISCUSSION:  The patient is seen with the above problem list.  The main issue has to do with her gait difficulties.  She would like more than anything to be able to walk again, even with a walker.  I explained to her the autoimmune nature of her illness.  The damage to the cerebellum likely began in March when she became symptomatic.  She has not really progressed much since therapy for the ovarian cancer began in April.  However, she is not improved either.  I discussed options for treatment.  She has elected to be admitted to the hospital tomorrow for a 5-day course of methylprednisolone 1000 mg daily and intravenous immunoglobulin 0.4 g/kg daily for 5 days.  The chance  of a response is low, but she wants to give it a try.  She will be having further treatment for her cancer and is scheduled for surgery later in the month.      Radha Lopez MD      cc:   Nessa Foster MD   [facility]    [address] [place] [id number]         RADHA LOPEZ MD             D: 2017 16:33   T: 2017 08:29   MT: AMARIS      Name:     ZAHIDA DUFF   MRN:      [id number]        Account:      [id number]   :      1945           Service Date: 2017      Document: [id number]

## 2017-06-08 NOTE — H&P
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 From March to April pt had a rapid decline in ability to ambulate which appeared to be due to ataxic syndrome without weakness, eventually progressing from full ambulation to wheelchair bound in a month. She was evaluated on  and found to have ovarian cancer, stage 4b. She is on carboplatin and taxol for chemo, c/b neutropenia. 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 She had a pulmonary embolism incidentally found on CT on  and is on enoxaparin BID injections for presumed hypercoagulable state 2/2 cancer. She takes filgrastim for her neutropenia. She saw Dr. Lopez in clinic , who wanted to admit for IVIG and methylprednisolone. Patient's gait has been unchanged since early April, at which point it had plateaued to where she can no longer walk. Also c/o \"vision jumping\" when focusing on images, which will normalize after sustained vision.     ROS: A 10-point ROS is negative unless indicated above.      Allergies:  Allergies   Allergen Reactions     Amoxicillin Hives       Medications:    Prescriptions Prior to Admission   Medication Sig Dispense Refill Last Dose     sennosides (SENOKOT) 8.6 MG tablet Take 1 tablet by mouth daily   Taking     Filgrastim (NEUPOGEN) 300 MCG/0.5ML SOSY syringe Inject 0.5 mLs (300 mcg) Subcutaneous daily 12 Syringe 4 Taking     diphenhydrAMINE 2.5%, dexamethasone 0.4%, metoclopramide 1% (BDR) 2.5%-0.4%-1% GEL in PLO gel Apply 1 g " topically 2 times daily 100 g 1 Taking     NEW MED Diphenhydramine elixir 12.5mg/ml  (200 400 mL 0 Taking     UNABLE TO FIND 3 times daily MEDICATION NAME: BDRbenadryl/dexamethisone   Taking     Potassium Chloride ER 20 MEQ TBCR Take 1 tablet (20 mEq) by mouth daily 30 tablet 3 Taking     acetaminophen (TYLENOL) 325 MG tablet Take 2 tablets (650 mg) by mouth every 4 hours as needed for mild pain 100 tablet  Taking     ondansetron (ZOFRAN) 4 MG tablet Take 1 tablet (4 mg) by mouth every 6 hours as needed for nausea 20 tablet 0 Taking     losartan (COZAAR) 100 MG tablet Take 1 tablet (100 mg) by mouth daily 30 tablet  Taking     hydrochlorothiazide (HYDRODIURIL) 25 MG tablet Take 2 tablets (50 mg) by mouth daily 30 tablet  Taking     levothyroxine (SYNTHROID) 125 MCG tablet Take 1 tablet (125 mcg) by mouth daily 30 tablet  Taking     Vitamin D, Cholecalciferol, 1000 UNITS TABS Take 2,000 Units by mouth daily 60 tablet  Taking     enoxaparin (LOVENOX) 80 MG/0.8ML injection Inject 0.6 mLs (60 mg) Subcutaneous every 12 hours 60 Syringe 1 Taking     ondansetron (ZOFRAN-ODT) 4 MG ODT tab Take 1 tablet (4 mg) by mouth every 6 hours as needed for nausea 120 tablet  Taking     prochlorperazine (COMPAZINE) 5 MG tablet Take 1 tablet (5 mg) by mouth every 6 hours as needed for vomiting 90 tablet  Taking     IBUPROFEN PO Take 200 mg by mouth every 6 hours as needed for moderate pain Reported on 5/2/2017   Taking       PMH:  Past Medical History:   Diagnosis Date     Hypertension      Ovarian cancer (H)      Paraneoplastic neuromyopathy and neuropathy (H)      PE (pulmonary thromboembolism) (H)      Spinal stenosis      Thyroid disease      Past Surgical History:   Procedure Laterality Date     AS TOTAL KNEE ARTHROPLASTY Left      THYROIDECTOMY         Social History:  Social History   Substance Use Topics     Smoking status: Never Smoker     Smokeless tobacco: Not on file     Alcohol use Yes      Comment: occasionally        Family History:    Family History   Problem Relation Age of Onset     Breast Cancer Mother      Breast Cancer Maternal Grandmother      Breast Cancer Maternal Aunt        Physical Examination:   Vitals:  B/P: 102/63, T: Data Unavailable, P: 89, R: 16  General:  Laying in bed  HEENT:  NC/AT, no icterus, op pink and moist, no ear or nose drainage.   Cardiac:  RRR, no m/r/g  Chest:  CTAB  Abdomen:  S/NT/ND  Extremities:  No LE swelling.    Skin:  No rash or lesion.      Neuro Exam:  Mental status: AOx4, speech intact, very pleasant  Cranial nerves: bilateral lateral nystagmus worse to the R. EOMI otherwise, PERRL, no VF cut. Face symmetric  Motor: 5/5 strength diffusely. Normal tone  Reflexes: absent in lower extremities. Toes down. No clonus  Sensory: intact to light touch BL. No vibration in BL toes.   Coordination: dysmetria of LUE and LLE. Dysdiadochokinesis of L hand only.   Gait: unable to walk    Investigations:  Data     CMP   Recent Labs  Lab 06/06/17  1105   POTASSIUM 3.5   MAG 1.5*        CBC   Recent Labs  Lab 06/06/17  1105 06/02/17  1404   WBC 5.0 5.5   RBC 3.00* 3.03*   HGB 9.7* 9.8*   HCT 27.8* 28.7*   MCV 93 95   MCH 32.3 32.3   MCHC 34.9 34.1   RDW 16.9* 17.8*    238       INR, PTT No lab results found in last 7 days.     Arterial Blood Gas No lab results found in last 7 days.    UA  No lab results found in last 7 days.    Micro No lab results found in last 7 days.       Radiological Data  Data   No results found for this or any previous visit (from the past 48 hour(s)).       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 Lopez in neurology clinic. previously diagnosed with anti-PCA-1  with high titers. 2/2 ovarian cancer. Initially presented as one month of rapid decline in ability to walk with profound cerebellar signs on exam in 4/2017. Has been WCB for two months. With cerebellar signs on exam.   --IVIG 0.4g/kg x5d  --methylpred 1g x3d for now  --PT/OT  --pantoprazole 40mg qd for steroids  --may need sleeping aid as she has hx of not being able to sleep on steroids  --f/u admission labs  --f/u IgA    ## pulmonary embolism: likely 2/2 hypercoagulable state of malignancy.   --PTA enoxaparin 60mg q12h    ## ovarian cancer: follows with oncology and palliative outpatient. Takes chemo with carboplatin and taxol. Planned for bulk removal on 6/24, tentatively. With secondary neutropenia d/t chemo. With paraneoplastic syndrome as above.   --PTA filgrastim for prior neutropenia  --PRN zofran, prochlorperazine, metoclopramide    ## HTN: PTA losartan 100mg, PTA HCTZ 25mg  ## hypothyroidism: PTA levothyroxine    Diet/IVF: Regular   Ppx: enoxaparin  Code status: FULL  Dispo: IVIG    =========================================================    This patient was seen & discussed with my attending, Dr. Patel, who agrees with my assessment and plan.     Bernardino Tyler   Neurology  [phone]

## 2017-06-08 NOTE — PLAN OF CARE
Problem: Goal Outcome Summary  Goal: Goal Outcome Summary  Outcome: No Change  Patient admitted for treatment of paraneoplastic cerebellar ataxia associated with ovarian cancer.  VSS.  Neuros intact except general weakness, LLE < RUE.  Pt denies pain.  Voids spont on commode.  Need a UA with next void.  +BM this shift.  Reguar diet, good PO.  PAC accessed and at TKO btwn meds.  On MD list for heparin orders.  Started IVIG at 1345, continue to increase infusion rate until goal of 2.4 ml/kg/hr.  Pt up with moderate assist of 1 and GB to pivot to commode or chair.  Pt w/c bound at home.  Pt has a diffuse rash to legs and arms.  Small rash to left buttock, pt has been using a cream at home that has helped, criticaid applied here.  Pan for IVIG treatments and pt due for chemo on Tuesday.  Continue with POC.

## 2017-06-09 ENCOUNTER — APPOINTMENT (OUTPATIENT)
Dept: OCCUPATIONAL THERAPY | Facility: CLINIC | Age: 72
DRG: 098 | End: 2017-06-09
Attending: PSYCHIATRY & NEUROLOGY
Payer: COMMERCIAL

## 2017-06-09 LAB
INTERPRETATION ECG - MUSE: NORMAL
POTASSIUM SERPL-SCNC: 3.2 MMOL/L (ref 3.4–5.3)
POTASSIUM SERPL-SCNC: 3.9 MMOL/L (ref 3.4–5.3)

## 2017-06-09 PROCEDURE — 97535 SELF CARE MNGMENT TRAINING: CPT | Mod: GO | Performed by: OCCUPATIONAL THERAPIST

## 2017-06-09 PROCEDURE — 12000008 ZZH R&B INTERMEDIATE UMMC

## 2017-06-09 PROCEDURE — 25000132 ZZH RX MED GY IP 250 OP 250 PS 637: Performed by: PSYCHIATRY & NEUROLOGY

## 2017-06-09 PROCEDURE — 25000125 ZZHC RX 250: Performed by: STUDENT IN AN ORGANIZED HEALTH CARE EDUCATION/TRAINING PROGRAM

## 2017-06-09 PROCEDURE — 36592 COLLECT BLOOD FROM PICC: CPT | Performed by: PSYCHIATRY & NEUROLOGY

## 2017-06-09 PROCEDURE — 97165 OT EVAL LOW COMPLEX 30 MIN: CPT | Mod: GO | Performed by: OCCUPATIONAL THERAPIST

## 2017-06-09 PROCEDURE — 97110 THERAPEUTIC EXERCISES: CPT | Mod: GO | Performed by: OCCUPATIONAL THERAPIST

## 2017-06-09 PROCEDURE — 84132 ASSAY OF SERUM POTASSIUM: CPT | Performed by: PSYCHIATRY & NEUROLOGY

## 2017-06-09 PROCEDURE — 25000132 ZZH RX MED GY IP 250 OP 250 PS 637: Performed by: STUDENT IN AN ORGANIZED HEALTH CARE EDUCATION/TRAINING PROGRAM

## 2017-06-09 PROCEDURE — 25000128 H RX IP 250 OP 636: Performed by: STUDENT IN AN ORGANIZED HEALTH CARE EDUCATION/TRAINING PROGRAM

## 2017-06-09 PROCEDURE — 40000133 ZZH STATISTIC OT WARD VISIT: Performed by: OCCUPATIONAL THERAPIST

## 2017-06-09 RX ADMIN — LOSARTAN POTASSIUM 100 MG: 100 TABLET, FILM COATED ORAL at 08:46

## 2017-06-09 RX ADMIN — ACETAMINOPHEN 650 MG: 325 TABLET, FILM COATED ORAL at 11:02

## 2017-06-09 RX ADMIN — PANTOPRAZOLE SODIUM 40 MG: 40 TABLET, DELAYED RELEASE ORAL at 08:46

## 2017-06-09 RX ADMIN — POTASSIUM CHLORIDE 20 MEQ: 750 TABLET, EXTENDED RELEASE ORAL at 12:51

## 2017-06-09 RX ADMIN — ENOXAPARIN SODIUM 60 MG: 60 INJECTION SUBCUTANEOUS at 08:46

## 2017-06-09 RX ADMIN — SODIUM CHLORIDE, PRESERVATIVE FREE 5 ML: 5 INJECTION INTRAVENOUS at 14:26

## 2017-06-09 RX ADMIN — SODIUM CHLORIDE 1000 MG: 9 INJECTION, SOLUTION INTRAVENOUS at 08:46

## 2017-06-09 RX ADMIN — SODIUM CHLORIDE, PRESERVATIVE FREE 5 ML: 5 INJECTION INTRAVENOUS at 17:36

## 2017-06-09 RX ADMIN — DIPHENHYDRAMINE HYDROCHLORIDE 50 MG: 25 CAPSULE ORAL at 11:01

## 2017-06-09 RX ADMIN — SENNOSIDES AND DOCUSATE SODIUM 1 TABLET: 8.6; 5 TABLET ORAL at 21:10

## 2017-06-09 RX ADMIN — POTASSIUM CHLORIDE 40 MEQ: 750 TABLET, EXTENDED RELEASE ORAL at 11:02

## 2017-06-09 RX ADMIN — HYDROCHLOROTHIAZIDE 50 MG: 25 TABLET ORAL at 08:47

## 2017-06-09 RX ADMIN — ENOXAPARIN SODIUM 60 MG: 60 INJECTION SUBCUTANEOUS at 21:10

## 2017-06-09 RX ADMIN — SENNOSIDES AND DOCUSATE SODIUM 1 TABLET: 8.6; 5 TABLET ORAL at 08:46

## 2017-06-09 RX ADMIN — HUMAN IMMUNOGLOBULIN G 30 G: 20 LIQUID INTRAVENOUS at 11:45

## 2017-06-09 RX ADMIN — LEVOTHYROXINE SODIUM 125 MCG: 125 TABLET ORAL at 08:46

## 2017-06-09 ASSESSMENT — VISUAL ACUITY
OU: BASELINE;GLASSES
OU: BASELINE;GLASSES

## 2017-06-09 NOTE — PLAN OF CARE
Problem: Goal Outcome Summary  Goal: Goal Outcome Summary  OT 6A: Evaluation completed & treatment initiated. Pt transfers with CGA-Sandeep of 1 person & GB utilizing unconventional BUE support on bed/chair. Motivated to participate in BUE HEP     REC: Currently home with family assist & resumption of home OT/PT; may need TCU pending potential surgery next week

## 2017-06-09 NOTE — PLAN OF CARE
"Problem: Goal Outcome Summary  Goal: Goal Outcome Summary  Outcome: No Change  VSS.  Neuros intact except slight LE weakness and \"bouncy\" vision.  Pt denies pain all shift.  Voids spont on commode.  Reguar diet, good PO.  PAC HLed btwn meds.  Second dose of IVIG given today without issue.  Pt up with moderate assist of 1 and GB to pivot to commode or chair.  Pt w/c bound at home.  Pt has been up in the chair all day, encouraging repositioning in the chair.  Pt has diffuse rash to legs and arms.  Small rash to left buttock, criticaid applied with voids.  K 3.2 today, replaced and redraw 3.9.  Plan for IVIG treatments and methylpred IV.  Continue with POC.      "

## 2017-06-09 NOTE — PROGRESS NOTES
[facility] acute hospital: [place]    General Neurology Progress Note    Patient Name:  Tosin Nina  MRN:  [id number]    :  1945  Date of Admission:  2017  Date of Service:  2017  Primary care provider:  Esther Back      Overnight events: NAEO.    Physical Examination:   Vitals:  B/P: 123/65, T: 97.2, P: 89, R: 16  General:  Sitting up in chair  HEENT:  NC/AT, no icterus, op pink and moist, no ear or nose drainage.   Cardiac:  RRR, no m/r/g  Chest:  CTAB  Abdomen:  S/NT/ND  Extremities:  No LE swelling.    Skin:  No rash or lesion.      Neuro Exam:  Mental status: AOx4, speech intact, very pleasant  Cranial nerves: bilateral lateral nystagmus worse to the R. EOMI otherwise, PERRL, no VF cut. Face symmetric  Motor: 5/5 strength diffusely. Normal tone  Reflexes: absent in lower extremities. Toes down. No clonus  Sensory: intact to light touch BL. No vibration in BL toes.   Coordination: dysmetria of LUE and LLE. Dysdiadochokinesis of L hand only.   Gait: unable to walk    Investigations:  Data     CMP   Recent Labs  Lab 17  1103 17  1105     --    POTASSIUM 3.1* 3.5   CHLORIDE 101  --    CO2 28  --    ANIONGAP 11  --    GLC 84  --    BUN 10  --    CR 0.48*  --    GFRESTIMATED >90Non  GFR Calc  --    GFRESTBLACK >90African American GFR Calc  --    PATTI 8.6  --    MAG  --  1.5*   PROTTOTAL 6.2*  --    ALBUMIN 2.9*  --    BILITOTAL 0.4  --    ALKPHOS 82  --    AST 20  --    ALT 31  --         CBC   Recent Labs  Lab 17  1103 17  1105 17  1404   WBC 3.7* 5.0 5.5   RBC 2.92* 3.00* 3.03*   HGB 9.5* 9.7* 9.8*   HCT 27.3* 27.8* 28.7*   MCV 94 93 95   MCH 32.5 32.3 32.3   MCHC 34.8 34.9 34.1   RDW 17.7* 16.9* 17.8*    246 238       INR, PTT No lab results found in last 7 days.     Arterial Blood Gas No lab results found in last 7 days.    UA    Recent Labs  Lab 17  1640   COLOR Light Yellow   APPEARANCE Clear    URINEGLC Negative   URINEBILI Negative   URINEKETONE Negative   SG 1.004   UBLD Negative   URINEPH 7.0   PROTEIN Negative   NITRITE Negative   LEUKEST Negative   RBCU 0   WBCU 1       Micro No lab results found in last 7 days.       Radiological Data  Data   No results found for this or any previous visit (from the past 48 hour(s)).       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 Lopez in neurology clinic. 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 Initially presented as one month of rapid decline in ability to walk with profound cerebellar signs on exam in 4/2017. Has been WCB for two months. With cerebellar signs on exam.   --IVIG 0.4g/kg today is 2/5.   --methylpred 1g x5d today is 2/5.   --PT/OT  --pantoprazole 40mg qd for steroids  --may need sleeping aid as she has hx of not being able to sleep on steroids     ## pulmonary embolism: likely 2/2 hypercoagulable state of malignancy.   --PTA enoxaparin 60mg q12h     ## ovarian cancer: follows with oncology and palliative outpatient. Takes chemo with carboplatin and taxol. Planned for bulk removal on 6/24, tentatively. With secondary neutropenia d/t chemo. With paraneoplastic syndrome as above.   --PRN zofran, prochlorperazine, metoclopramide     ## HTN: PTA losartan 100mg, PTA HCTZ 25mg  ## hypothyroidism: PTA levothyroxine     Diet/IVF: Regular   Ppx: enoxaparin  Code status: FULL  Dispo: IVIG    =========================================================    This patient was seen & discussed with my attending, Dr. Patel, who agrees with my assessment and plan.     Bernardino Tyler   Neurology  [phone]

## 2017-06-09 NOTE — PROGRESS NOTES
06/09/17 0850   Living Environment   Lives With spouse   Living Arrangements house   Home Accessibility bed and bath are not on the first floor;stairs (2 railings present);stairs to enter home;stairs within home;bed and bath on same level   Number of Stairs to Enter Home 1   Number of Stairs Within Home 7   Stair Railings at Home inside, present at both sides   Transportation Available family or friend will provide   Self-Care   Dominant Hand right   Usual Activity Tolerance moderate   Current Activity Tolerance poor   Regular Exercise yes   Activity/Exercise Type strength training  (UE 1# weights AROM, LE standing HEP)   Exercise Amount/Frequency 30 mins;5-7 times/wk   Equipment Currently Used at Home commode   Activity/Exercise/Self-Care Comment Pt was getting home PT/OT past month, working on progressing HEP.    Functional Level Prior   Ambulation 4-->completely dependent   Transferring 3-->assistive equipment and person   Toileting 1-->assistive equipment   Bathing 3-->assistive equipment and person   Dressing 2-->assistive person   Eating 0-->independent   Communication 0-->understands/communicates without difficulty   Swallowing 0-->swallows foods/liquids without difficulty   Cognition 0 - no cognition issues reported   Fall history within last six months no   Which of the above functional risks had a recent onset or change? ambulation;transferring   Prior Functional Level Comment Home care past month.  is available prn & dtr assists 2x/week with bathing & other I/ADLs   General Information   Onset of Illness/Injury or Date of Surgery - Date 06/08/17   Referring Physician Maxx Patel MD   Patient/Family Goals Statement maintain strength going into surgery next week   Additional Occupational Profile Info/Pertinent History of Current Problem Pt presenting with ataxia, generalized weakness, LE weakness & impaired balance, and visual deficits. Pt was ambulatory and completing all I/ADLs independenty  "up until Mar 2017; within one month pt became w/c bound & requiring significant increase in assist with ADLs. Pt motivated and completing HEP 5-7x/week   Precautions/Limitations fall precautions   Cognitive Status Examination   Orientation orientation to person, place and time   Level of Consciousness alert   Able to Follow Commands WNL/WFL   Personal Safety (Cognitive) WNL/WFL   Memory intact   Attention No deficits were identified   Organization/Problem Solving No deficits were identified   Executive Function No deficits were identified   Visual Perception   Visual Perception Comments Pt's vision \"bounces\" when attempting to first focus on things nearby; distant vision is always somewhat blurry. Pt can regain near focus after ~1 min of focuse   Sensory Examination   Sensory Comments B feet and hand \"tingling/numbness\" 2/2 pinched nerve in back; has had for years & compensates appropriately   Pain Assessment   Patient Currently in Pain No   Integumentary/Edema   Integumentary/Edema no deficits were identifed   Range of Motion (ROM)   ROM Quick Adds No deficits were identified   Strength   Strength Comments UE strength grossly 5/5, however, pt at significant risk for weakening and deconditioining given current hospitalization status and anticipated surgery next week   Hand Strength   Hand Strength Comments good grasp   Muscle Tone Assessment   Muscle Tone Quick Adds No deficits were identified   Coordination   Upper Extremity Coordination Left UE impaired   Gross Motor Coordination ataxic; fluctuates in severity   Fine Motor Coordination impaired, primarily in L   Functional Limitations (at times struggles with writing, computer use, FM tasks)   Coordination Comments per pt, overall LUE improved today   Mobility   Bed Mobility Comments NT   Transfer Skill: Bed to Chair/Chair to Bed   Level of [place]: Bed to Chair minimum assist (75% patients effort)   Physical Assist/Nonphysical Assist: Bed to Chair 1 person " assist;set-up required   Transfer Skill: Sit to Stand   Level of [place]: Sit/Stand contact guard   Transfer Skill: Toilet Transfer   Level of [place]: Toilet unable to perform   Toilet Transfer Skill Comments BSC in room   Bathing   Level of [place] unable to perform   Lower Body Dressing   Level of [place]: Dress Lower Body maximum assist (25% patients effort)   Toileting   Level of [place]: Toilet maximum assist (25% patients effort)   Grooming   Level of [place]: Grooming stand-by assist   Physical Assist/Nonphysical Assist: Grooming set-up required   Eating/Self Feeding   Level of [place]: Eating independent   Instrumental Activities of Daily Living (IADL)   IADL Comments  & dtr assisting with all past few months   Activities of Daily Living Analysis   Impairments Contributing to Impaired Activities of Daily Living balance impaired;coordination impaired;motor control impaired;strength decreased   ADL Comments below baseline endurance and LE strength   General Therapy Interventions   Planned Therapy Interventions ADL retraining;IADL retraining;balance training;fine motor coordination training;motor coordination training;strengthening;transfer training;home program guidelines;progressive activity/exercise   Clinical Impression   Criteria for Skilled Therapeutic Interventions Met yes, treatment indicated   OT Diagnosis LE weakness, impaired balance for I/ADLs, visual deficits   Influenced by the following impairments visual changes, sensory deficits in hands & feet   Assessment of Occupational Performance 1-3 Performance Deficits   Identified Performance Deficits decreased dressing skills, decreased toileting skills, decreased participation in home mgmt tasks   Clinical Decision Making (Complexity) Low complexity   Therapy Frequency daily   Predicted Duration of Therapy Intervention (days/wks) 2 weeks   Anticipated Discharge Disposition Transitional Care Facility;Home with  "Home Therapy;Home with Assist  (home with family assist & home OT/PT, may need TCU post surg)   Risks and Benefits of Treatment have been explained. Yes   Patient, Family & other staff in agreement with plan of care Yes   [facility] TM \"6 Clicks\"   2016, Trustees of [facility], under license to [employer].  All rights reserved.   6 Clicks Short Forms Daily Activity Inpatient Short Form   [facility]  \"6 Clicks\" Daily Activity Inpatient Short Form   1. Putting on and taking off regular lower body clothing? 2 - A Lot   2. Bathing (including washing, rinsing, drying)? 2 - A Lot   3. Toileting, which includes using toilet, bedpan or urinal? 2 - A Lot   4. Putting on and taking off regular upper body clothing? 4 - None   5. Taking care of personal grooming such as brushing teeth? 4 - None   6. Eating meals? 4 - None   Daily Activity Raw Score (Score out of 24.Lower scores equate to lower levels of function) 18   Total Evaluation Time   Total Evaluation Time (Minutes) 15     "

## 2017-06-09 NOTE — PROGRESS NOTES
Gynecologic Oncology Social Visit 6/9/2017      Patient seen this AM for social visit from gyn onc.  S: Patient feeling fairly well. Has been tolerating chemo as an outpatient. Happy about reduction in . Notes she is tolerating inpatient treatment so far well.    O:  Vitals:    06/09/17 1250 06/09/17 1305 06/09/17 1320 06/09/17 1335   BP: 119/68 126/66 127/74 133/76   HR 73   O2 Sats 96% RA  RR 16    Gen: Alert and oriented x 4. Pleasant     A: 71 year old HD#2 with hx of stage IVB ovarian cancer, PE on lovenox, HTN, hypothyroidism, and PCA 1 mediated paraneoplastic syndrome and inability to walk.    Dz: Stage IVB Ovarian Cancer has received cycle #3 Day #8 of carbo/dose dense taxol. Continued follow up with gyn onc as scheduled.   FEN: Regular diet. ERP.  Pain:  PRN tylenol  Heme: Anemia of chronic disease/Chemo induced anemia, Hgb 9.5, Plt 183. Hx PE continue lovenox  CV: Hx HTN continue home losartan and HCTZ  Endo: Hx hypothyroidism   Psych/Neuro: Paraneoplastic cerebellitis. Anti PCA - 1 secondary to ovarian cancer. Admitted to neuro. IVIG x 5 days with high dose steroids. OT/PT.    Appreciate neurology care.    Jyoti Lopez, MILEY  6/9/2017 1:56 PM

## 2017-06-09 NOTE — PLAN OF CARE
Problem: Goal Outcome Summary  Goal: Goal Outcome Summary  PT 6A: Cancel - Attempted for eval this PM, however pt with family/visitors. Will reschedule for eval and initiation of treatment on 6/10.

## 2017-06-09 NOTE — PLAN OF CARE
Problem: Goal Outcome Summary  Goal: Goal Outcome Summary  Outcome: No Change  Pt A/Ox4. Neuro unchanged with BLE weakness. VSS. Denies pain. Central line Heparin locked. Up/transfer with assist 1 with GB. Voiding spontaneously in bedside commode. Up in chair most of shift and tolerated well. On regular diet and tolerating well. UA sample sent to lab. Continue with POC

## 2017-06-09 NOTE — PLAN OF CARE
Problem: Goal Outcome Summary  Goal: Goal Outcome Summary  Pt AxOx4. Neuro unchanged with BLE weakness. VSS. Denies pain. Central line Heparin locked. Up/transfer with assist 1 with GB; pt unable to ambulate but can shift to commode. Voiding spontaneously in bedside commode. Call light appropriate.  On regular diet and tolerating well. Continue with POC

## 2017-06-10 ENCOUNTER — APPOINTMENT (OUTPATIENT)
Dept: PHYSICAL THERAPY | Facility: CLINIC | Age: 72
DRG: 098 | End: 2017-06-10
Attending: PSYCHIATRY & NEUROLOGY
Payer: COMMERCIAL

## 2017-06-10 LAB
MAGNESIUM SERPL-MCNC: 1.6 MG/DL (ref 1.6–2.3)
POTASSIUM SERPL-SCNC: 3.2 MMOL/L (ref 3.4–5.3)
POTASSIUM SERPL-SCNC: 3.5 MMOL/L (ref 3.4–5.3)

## 2017-06-10 PROCEDURE — 97116 GAIT TRAINING THERAPY: CPT | Mod: GP

## 2017-06-10 PROCEDURE — 97162 PT EVAL MOD COMPLEX 30 MIN: CPT | Mod: GP

## 2017-06-10 PROCEDURE — 25000125 ZZHC RX 250: Performed by: STUDENT IN AN ORGANIZED HEALTH CARE EDUCATION/TRAINING PROGRAM

## 2017-06-10 PROCEDURE — 36592 COLLECT BLOOD FROM PICC: CPT | Performed by: PSYCHIATRY & NEUROLOGY

## 2017-06-10 PROCEDURE — 97530 THERAPEUTIC ACTIVITIES: CPT | Mod: GP

## 2017-06-10 PROCEDURE — 83735 ASSAY OF MAGNESIUM: CPT | Performed by: PSYCHIATRY & NEUROLOGY

## 2017-06-10 PROCEDURE — 25000128 H RX IP 250 OP 636: Performed by: STUDENT IN AN ORGANIZED HEALTH CARE EDUCATION/TRAINING PROGRAM

## 2017-06-10 PROCEDURE — 84132 ASSAY OF SERUM POTASSIUM: CPT | Performed by: PSYCHIATRY & NEUROLOGY

## 2017-06-10 PROCEDURE — 25000132 ZZH RX MED GY IP 250 OP 250 PS 637: Performed by: STUDENT IN AN ORGANIZED HEALTH CARE EDUCATION/TRAINING PROGRAM

## 2017-06-10 PROCEDURE — 40000193 ZZH STATISTIC PT WARD VISIT

## 2017-06-10 PROCEDURE — 25000132 ZZH RX MED GY IP 250 OP 250 PS 637: Performed by: PSYCHIATRY & NEUROLOGY

## 2017-06-10 PROCEDURE — 12000008 ZZH R&B INTERMEDIATE UMMC

## 2017-06-10 RX ADMIN — POTASSIUM CHLORIDE 20 MEQ: 750 TABLET, EXTENDED RELEASE ORAL at 16:39

## 2017-06-10 RX ADMIN — DIPHENHYDRAMINE HYDROCHLORIDE 50 MG: 25 CAPSULE ORAL at 11:06

## 2017-06-10 RX ADMIN — HYDROCHLOROTHIAZIDE 50 MG: 25 TABLET ORAL at 08:54

## 2017-06-10 RX ADMIN — ENOXAPARIN SODIUM 60 MG: 60 INJECTION SUBCUTANEOUS at 08:54

## 2017-06-10 RX ADMIN — LOSARTAN POTASSIUM 100 MG: 100 TABLET, FILM COATED ORAL at 08:55

## 2017-06-10 RX ADMIN — ACETAMINOPHEN 650 MG: 325 TABLET, FILM COATED ORAL at 11:06

## 2017-06-10 RX ADMIN — SODIUM CHLORIDE, PRESERVATIVE FREE 5 ML: 5 INJECTION INTRAVENOUS at 20:36

## 2017-06-10 RX ADMIN — SODIUM CHLORIDE 1000 MG: 9 INJECTION, SOLUTION INTRAVENOUS at 08:54

## 2017-06-10 RX ADMIN — ENOXAPARIN SODIUM 60 MG: 60 INJECTION SUBCUTANEOUS at 20:59

## 2017-06-10 RX ADMIN — HUMAN IMMUNOGLOBULIN G 30 G: 20 LIQUID INTRAVENOUS at 11:38

## 2017-06-10 RX ADMIN — SENNOSIDES AND DOCUSATE SODIUM 2 TABLET: 8.6; 5 TABLET ORAL at 08:54

## 2017-06-10 RX ADMIN — SODIUM CHLORIDE, PRESERVATIVE FREE 5 ML: 5 INJECTION INTRAVENOUS at 16:39

## 2017-06-10 RX ADMIN — LEVOTHYROXINE SODIUM 125 MCG: 125 TABLET ORAL at 08:55

## 2017-06-10 RX ADMIN — POTASSIUM CHLORIDE 40 MEQ: 750 TABLET, EXTENDED RELEASE ORAL at 11:06

## 2017-06-10 RX ADMIN — PANTOPRAZOLE SODIUM 40 MG: 40 TABLET, DELAYED RELEASE ORAL at 08:54

## 2017-06-10 ASSESSMENT — VISUAL ACUITY
OU: OTHER (SEE COMMENT)

## 2017-06-10 NOTE — PLAN OF CARE
Problem: Goal Outcome Summary  Goal: Goal Outcome Summary  Outcome: No Change  Alert and oriented x 4. Denies pain. Tingling in finger tips of bilateral hands and in bilateral feet which is baseline per pt. Rash in arms and legs improved per pt. Up with 1 assist to BSC and gait belt. Stated that she was tired and slept most of he shift.

## 2017-06-10 NOTE — PROGRESS NOTES
[facility]: [place]    General Neurology Progress Note    Patient Name:  Tosin Nina  MRN:  [id number]    :  1945  Date of Admission:  2017  Date of Service:  Maryan 10, 2017  Primary care provider:  Esther Back      Overnight events: unchanged neuros, no acute events.    Physical Examination:   Vitals:  B/P: 118/64, T: 95.9, P: 91, R: 16  General:  Sitting up in bed  HEENT:  NC/AT, no icterus, op pink and moist, no ear or nose drainage.   Cardiac:  RRR, no m/r/g  Chest:  CTAB  Abdomen:  S/NT/ND  Extremities:  No LE swelling.    Skin:  No rash or lesion.      Neuro Exam:  Mental status: AOx4, speech intact, very pleasant  Cranial nerves: bilateral lateral nystagmus worse to the R. EOMI otherwise, PERRL, no VF cut. Face symmetric  Motor: 5/5 strength diffusely. Normal tone  Reflexes: absent in lower extremities. Toes down. No clonus  Sensory: intact to light touch BL. No vibration in BL toes.   Coordination: dysmetria of LUE and LLE. Dysdiadochokinesis of L hand only.   Gait: unable to walk    Investigations:  Data     CMP   Recent Labs  Lab 17  1740 17  0940 17  1103 17  1105   NA  --   --  139  --    POTASSIUM 3.9 3.2* 3.1* 3.5   CHLORIDE  --   --  101  --    CO2  --   --  28  --    ANIONGAP  --   --  11  --    GLC  --   --  84  --    BUN  --   --  10  --    CR  --   --  0.48*  --    GFRESTIMATED  --   --  >90Non  GFR Calc  --    GFRESTBLACK  --   --  >90African American GFR Calc  --    PATTI  --   --  8.6  --    MAG  --   --   --  1.5*   PROTTOTAL  --   --  6.2*  --    ALBUMIN  --   --  2.9*  --    BILITOTAL  --   --  0.4  --    ALKPHOS  --   --  82  --    AST  --   --  20  --    ALT  --   --  31  --         CBC   Recent Labs  Lab 17  1103 17  1105   WBC 3.7* 5.0   RBC 2.92* 3.00*   HGB 9.5* 9.7*   HCT 27.3* 27.8*   MCV 94 93   MCH 32.5 32.3   MCHC 34.8 34.9   RDW 17.7* 16.9*    246       INR, PTT No  lab results found in last 7 days.     Arterial Blood Gas No lab results found in last 7 days.    UA    Recent Labs  Lab 06/08/17  1640   COLOR Light Yellow   APPEARANCE Clear   URINEGLC Negative   URINEBILI Negative   URINEKETONE Negative   SG 1.004   UBLD Negative   URINEPH 7.0   PROTEIN Negative   NITRITE Negative   LEUKEST Negative   RBCU 0   WBCU 1       Micro No lab results found in last 7 days.       Radiological Data  Data   No results found for this or any previous visit (from the past 48 hour(s)).       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 Lopez in neurology clinic. 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 Initially presented as one month of rapid decline in ability to walk with profound cerebellar signs on exam in 4/2017. Has been WCB for two months. With cerebellar signs on exam.   --IVIG 0.4g/kg today is 3/5.   --methylpred 1g x5d today is 3/5.   --PT/OT  --pantoprazole 40mg qd for steroids  --f/u Dr. Lopez after eventual DC.      ## pulmonary embolism: likely 2/2 hypercoagulable state of malignancy.   --PTA enoxaparin 60mg q12h      ## ovarian cancer: follows with oncology and palliative outpatient. Takes chemo with carboplatin and taxol. Planned for bulk removal on 6/24, tentatively. With secondary neutropenia d/t chemo. With paraneoplastic syndrome as above.   --PRN zofran, prochlorperazine, metoclopramide  --f/u gyn-onc after eventual DC. Has appt already.       ## HTN: PTA losartan 100mg, PTA HCTZ 25mg  ## hypothyroidism: PTA levothyroxine      Diet/IVF: Regular   Ppx: enoxaparin  Code status: FULL  Dispo:  IVIG    =========================================================    This patient was seen & discussed with my attending, Dr. Patel, who agrees with my assessment and plan.     Bernardino Tyler   Neurology  [phone]

## 2017-06-10 NOTE — PLAN OF CARE
"Problem: Goal Outcome Summary  Goal: Goal Outcome Summary  Outcome: No Change  VSS.  Neuros intact except slight LE weakness and \"bouncy\" vision, maybe improving per pt.  Pt denies pain all shift.  Voids spont on commode.  2 large BMs today.  Reguar diet, good PO.  PAC HLed btwn meds. Third dose of IVIG given today without issue.  Pt up with assist of 1 and GB to pivot to commode or chair.  Able to take 2 walks into [place] and back to chair with 1-2 assist and walker, fatigues quickly.  Pt basically w/c bound at home.  Pt has been up in the chair most of the day, encouraging repositioning in the chair.  Small rash to left buttock, criticaid applied with voids. BLE edema noted today, legs elevated while in chair and bed.  K+ 3.2 today, replaced and redraw scheduled for 2100.  Plan for IVIG treatments and methylpred IV.  Continue with POC.      "

## 2017-06-10 NOTE — PROGRESS NOTES
"Patient A&O, VSS. Neuros intact except slight weakness to LLE, pt states \"bouncy\". M/S strong but is w/c bound at home d/t balance with LLE. Patient denies pain. Tolerates regular diet. Patient sitting up in chair this evening. Requires A1 stand pivot with gait belt to bedside commode, voiding spontaneously. Small rash to bilateral arms and legs which pt states is improving. Pt stated small rash to buttock, paste available prn. Continue POC.   "

## 2017-06-10 NOTE — PROGRESS NOTES
06/10/17 0945   Quick Adds   Type of Visit Initial PT Evaluation   Living Environment   Lives With spouse   Living Arrangements house   Home Accessibility bed and bath are not on the first floor;stairs (2 railings present);stairs to enter home;stairs within home;bed and bath on same level   Number of Stairs to Enter Home 1   Number of Stairs Within Home 15  (7 to upstairs, 8 to downstairs)   Stair Railings at Home inside, present at both sides   Transportation Available family or friend will provide   Living Environment Comment Pt spends much of time on uppermost level with bathroom and bedroom availabile.  Puts her commode on main level.   Self-Care   Dominant Hand right   Usual Activity Tolerance moderate   Current Activity Tolerance moderate   Regular Exercise yes   Activity/Exercise Type strength training  (UE 1# weights AROM, LE standing HEP)   Exercise Amount/Frequency 30 mins;5-7 times/wk  (previous PT HEP's, was also seeing HHPT/OT in last month)   Equipment Currently Used at Home commode;raised toilet;shower chair;walker, rolling;grab bar  (transport chair)   Activity/Exercise/Self-Care Comment Ambulates minimally at home 5-10ft at a time on the main level.  Otherwise, family push her in transport chair.  is main caregiver. Prior to march 2017 was ambulating community distances.   Functional Level Prior   Ambulation 3-->assistive equipment and person   Transferring 3-->assistive equipment and person   Toileting 3-->assistive equipment and person   Bathing 3-->assistive equipment and person   Dressing 2-->assistive person   Eating 0-->independent   Communication 0-->understands/communicates without difficulty   Swallowing 0-->swallows foods/liquids without difficulty   Cognition 0 - no cognition issues reported   Fall history within last six months no   General Information   Onset of Illness/Injury or Date of Surgery - Date 06/08/17   Referring Physician Maxx Patel MD   Patient/Family Goals  "Statement \"I want to be able to walk more.  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   Precautions/Limitations fall precautions;immunosuppressed   General Info Comments Activity:  ambulate with assist   Cognitive Status Examination   Orientation orientation to person, place and time   Level of Consciousness alert   Follows Commands and Answers Questions 100% of the time   Personal Safety and Judgment intact   Memory intact   Pain Assessment   Patient Currently in Pain No   Integumentary/Edema   Integumentary/Edema Comments 2+ pitting edema B feet, and overall circumference bigger L>R   Posture    Posture Protracted shoulders   Range of Motion (ROM)   ROM Comment WFL BLE   Strength   Strength Comments RLE:  4/5 hip flexors, 5/5 knee extensors, 4/5 knee flexors, 5/5 DF.  LLE:  3+/5 hip flexors, 4+/5 knee extensors, 4-/5 knee flexors, 4/5 DF.  L  strength grossly less than R   Bed Mobility   Bed Mobility Comments not assessed   Transfer Skills   Transfer Comments sit<>stand CGA to FWW requiring increased time to complete and rise into upright   Gait   Gait Comments prefers B sandals donned.  Uses FWW.  L step lengths are variable and typically shorter than R.  L knee in extension throughout all phases of gait.  Step-to pattern utilized.  Demo's trunk ataxia.  needs CGA or very close SBA   Balance   Balance Comments impaired in standing.  without UE support assumes wide CARLA and can maintain x9sec max before needing UE support for righting   Sensory Examination   Sensory Perception Comments baseline decreased light touch B plantar feet and hands.  otherwise intact.   Coordination   Coordination Comments impaired LUE and LLE.  also demo's trunk ataxia in " "standing.   Muscle Tone   Muscle Tone Comments abnormal extensor tone LLE based upon observation in gait.  No clonus with B ankle testing   Oculomotor Exam   Smooth Pursuit Abnormal   Smooth Pursuit Comment when tracking R   Saccades Normal   VOR Abnormal   Rapid Head Thrust Other   Rapid Head Thrust Comments horizontal nystagmus noted both directions   General Therapy Interventions   Planned Therapy Interventions balance training;gait training;neuromuscular re-education;stretching;transfer training;home program guidelines;progressive activity/exercise;motor coordination training   Clinical Impression   Criteria for Skilled Therapeutic Intervention yes, treatment indicated   PT Diagnosis impaired gait   Influenced by the following impairments impaired balance, impaired VOR, impaired tone and strength   Functional limitations due to impairments decreased (I) transfers, gait, ADL   Clinical Presentation Evolving/Changing   Clinical Presentation Rationale Pt receiving IVIG during this admission.   Clinical Decision Making (Complexity) Moderate complexity   Therapy Frequency` daily   Predicted Duration of Therapy Intervention (days/wks) 6/17/2017   Anticipated Discharge Disposition Home with Assist   Risk & Benefits of therapy have been explained Yes   Patient, Family & other staff in agreement with plan of care Yes   Clinical Impression Comments Pt will benefit from PT for training in strategies that will increase safety and (I) of gait.  Will benefit from ongoing PT after she is s/p gyn/onc surgery later this month.   [facility] AM-PAC  \"6 Clicks\" V.2 Basic Mobility Inpatient Short Form   1. Turning from your back to your side while in a flat bed without using bedrails? 4 - None   2. Moving from lying on your back to sitting on the side of a flat bed without using bedrails? 3 - A Little   3. Moving to and from a bed to a chair (including a wheelchair)? 3 - A Little   4. Standing up from a chair using your arms " (e.g., wheelchair, or bedside chair)? 3 - A Little   5. To walk in hospital room? 3 - A Little   6. Climbing 3-5 steps with a railing? 2 - A Lot   Basic Mobility Raw Score (Score out of 24.Lower scores equate to lower levels of function) 18   Total Evaluation Time   Total Evaluation Time (Minutes) 15

## 2017-06-10 NOTE — PLAN OF CARE
Problem: Goal Outcome Summary  Goal: Goal Outcome Summary  OT 6A: Cancel - pt fatigued and declined activity. Will reschedule and cont OT POC

## 2017-06-10 NOTE — PLAN OF CARE
Problem: Goal Outcome Summary  Goal: Goal Outcome Summary  PT 6A:  Eval completed and treatment initiated.  Barriers to (I) functional home mobility include balance, vision and gait impairments.  In addition to L UE/LE and trunk ataxia, had impaired dynamic visual acuity and VOR.  Pt was ambulating short distances at home with FWW and min A 5-10ft, otherwise using w/c for most mobility.  She was able to ambulate x22ft, x15ft with CGA and FWW today using various strategies.  CGA-Sandeep with transfers.  Also B pedal edema significantly worse currently compared to her baseline at home.        RN staff please assist with 3 short walks/day with FWW, gait belt, min A.        Rec d/c home with 24hr assist from family and more frequent ambulation in the home.  Given pt is likely to return to hospital in next few weeks for gyn/onc surgery, do not rec pt resume HHPT.  She will likely require rehab post-op given multiple co-morbidities.

## 2017-06-11 ENCOUNTER — APPOINTMENT (OUTPATIENT)
Dept: PHYSICAL THERAPY | Facility: CLINIC | Age: 72
DRG: 098 | End: 2017-06-11
Attending: PSYCHIATRY & NEUROLOGY
Payer: COMMERCIAL

## 2017-06-11 PROCEDURE — 25000132 ZZH RX MED GY IP 250 OP 250 PS 637: Performed by: STUDENT IN AN ORGANIZED HEALTH CARE EDUCATION/TRAINING PROGRAM

## 2017-06-11 PROCEDURE — 12000008 ZZH R&B INTERMEDIATE UMMC

## 2017-06-11 PROCEDURE — 25000128 H RX IP 250 OP 636: Performed by: STUDENT IN AN ORGANIZED HEALTH CARE EDUCATION/TRAINING PROGRAM

## 2017-06-11 PROCEDURE — 97530 THERAPEUTIC ACTIVITIES: CPT | Mod: GP

## 2017-06-11 PROCEDURE — 25000132 ZZH RX MED GY IP 250 OP 250 PS 637: Performed by: PSYCHIATRY & NEUROLOGY

## 2017-06-11 PROCEDURE — 25000125 ZZHC RX 250: Performed by: STUDENT IN AN ORGANIZED HEALTH CARE EDUCATION/TRAINING PROGRAM

## 2017-06-11 PROCEDURE — 40000193 ZZH STATISTIC PT WARD VISIT

## 2017-06-11 RX ADMIN — DIPHENHYDRAMINE HYDROCHLORIDE 50 MG: 25 CAPSULE ORAL at 11:08

## 2017-06-11 RX ADMIN — ENOXAPARIN SODIUM 60 MG: 60 INJECTION SUBCUTANEOUS at 08:00

## 2017-06-11 RX ADMIN — OYSTER SHELL CALCIUM WITH VITAMIN D 1 TABLET: 500; 200 TABLET, FILM COATED ORAL at 11:44

## 2017-06-11 RX ADMIN — HYDROCHLOROTHIAZIDE 50 MG: 25 TABLET ORAL at 08:01

## 2017-06-11 RX ADMIN — LEVOTHYROXINE SODIUM 125 MCG: 125 TABLET ORAL at 08:01

## 2017-06-11 RX ADMIN — SODIUM CHLORIDE, PRESERVATIVE FREE 10 ML: 5 INJECTION INTRAVENOUS at 14:41

## 2017-06-11 RX ADMIN — ENOXAPARIN SODIUM 60 MG: 60 INJECTION SUBCUTANEOUS at 20:35

## 2017-06-11 RX ADMIN — SENNOSIDES AND DOCUSATE SODIUM 2 TABLET: 8.6; 5 TABLET ORAL at 08:01

## 2017-06-11 RX ADMIN — SODIUM CHLORIDE, PRESERVATIVE FREE 5 ML: 5 INJECTION INTRAVENOUS at 20:39

## 2017-06-11 RX ADMIN — SODIUM CHLORIDE 1000 MG: 9 INJECTION, SOLUTION INTRAVENOUS at 08:00

## 2017-06-11 RX ADMIN — HUMAN IMMUNOGLOBULIN G 30 G: 20 LIQUID INTRAVENOUS at 11:45

## 2017-06-11 RX ADMIN — ACETAMINOPHEN 650 MG: 325 TABLET, FILM COATED ORAL at 11:08

## 2017-06-11 RX ADMIN — PANTOPRAZOLE SODIUM 40 MG: 40 TABLET, DELAYED RELEASE ORAL at 08:02

## 2017-06-11 RX ADMIN — LOSARTAN POTASSIUM 100 MG: 100 TABLET, FILM COATED ORAL at 08:02

## 2017-06-11 ASSESSMENT — VISUAL ACUITY
OU: BASELINE;GLASSES

## 2017-06-11 NOTE — PLAN OF CARE
Problem: Goal Outcome Summary  Goal: Goal Outcome Summary  Outcome: No Change  Alert and oriented x 4. Still has tingling on the tip of fingers as well as in bilateral feet, stated no numbness. Denies pain. Up to the BSC with 1 assist and gait belt, voided and had BM. Rash in arms and legs improved.

## 2017-06-11 NOTE — PLAN OF CARE
Problem: Goal Outcome Summary  Goal: Goal Outcome Summary  PT: Pt amb 15 ft x2 with close CGA and FWW. Pt continues to demonstrate LLE ataxia with amb and stairs but able to improve mechanics with VC. Pt ascended/descended 3 steps x1 with BUE support on handrails and close CGA. Pt performs stairs very slowly but overall steady with no LOBs. Pt states SO will assist at home and only performs steps when needed. PT recommends DC home with spouse and to continue HEP for several weeks until sx.

## 2017-06-11 NOTE — PLAN OF CARE
"Problem: Goal Outcome Summary  Goal: Goal Outcome Summary  Outcome: No Change  Pt is A/Ox4. VSS. Neuro unchanged with generalized weakness, R eye \"shaky\" per pt, unbalanced gait. Denies pain. Immune globulin is infusing and pt is tolerating well. Central line R chest intact. Up with assist 1 with GB/walker. Voiding spontaneously in bedside commode, BM today. On regular diet and tolerating well. Pt's  at bedside and supportive. Continue with POC      "

## 2017-06-11 NOTE — PROGRESS NOTES
"Patient A&O. VSS. Neuros intact except slight LE weakness and \"bouncy vision\" and improving per pt. Patient denies numbness at this time but reports \"tingling to tips of fingers and from ankles to toes\". Edema noted to bilateral LE. Sitting up in chair this evening with LE elevated. Patient w/c bound, washed-up at bedside. A1 with gait belt, stand pivot to commode and bed. Voiding spontaneously per bedside commode. Patient refused senna, had 2 BM on prior shift. Tolerating regular diet. Rash to bilateral UE and LE improving and faint. K+ 3.5 upon recheck after replaced on prior shift. [place] for IVIG treatments and methylpred IV. Continue with POC.  "

## 2017-06-11 NOTE — PROGRESS NOTES
"[facility]: [place]    General Neurology Progress Note    Patient Name:  Tosin Nina  MRN:  [id number]    :  1945  Date of Admission:  2017  Date of Service:  2017  Primary care provider:  Esther Back      Overnight events: unchanged neuros, no acute events.    Physical Examination:   Vital signs:  Temp: 96.8  F (36  C) Temp src: Oral BP: 126/69 Pulse: 76 Heart Rate: 85 Resp: 16 SpO2: 96 % O2 Device: None (Room air)   Height: 149.9 cm (4' 11\") Weight: 73.6 kg (162 lb 3.2 oz)  Estimated body mass index is 32.76 kg/(m^2) as calculated from the following:    Height as of this encounter: 1.499 m (4' 11\").    Weight as of this encounter: 73.6 kg (162 lb 3.2 oz).    General:  Sitting up in bed  HEENT:  NC/AT, no icterus,  no ear or nose drainage.   Cardiac:  Regular rate  Chest:  No respiratory distress  Abdomen:  S/NT/ND  Extremities:  No LE swelling.    Skin:  No rash or lesion.      Neuro Exam:  Mental status: AOx4, speech intact, very pleasant  Cranial nerves: bilateral lateral nystagmus worse to the R. EOMI otherwise, PERRL, no VF cut. Face symmetric  Motor: 5/5 strength diffusely. Normal tone  Reflexes: absent in lower extremities. Toes down. No clonus  Sensory: intact to light touch BL. No vibration in BL toes.   Coordination: dysmetria of LUE and LLE. Dysdiadochokinesis worse on L  Gait: deferred    Investigations:  Data     CMP     Recent Labs  Lab 06/10/17  2041 06/10/17  0931 17  1740 17  0940 17  1103 17  1105   NA  --   --   --   --  139  --    POTASSIUM 3.5 3.2* 3.9 3.2* 3.1* 3.5   CHLORIDE  --   --   --   --  101  --    CO2  --   --   --   --  28  --    ANIONGAP  --   --   --   --  11  --    GLC  --   --   --   --  84  --    BUN  --   --   --   --  10  --    CR  --   --   --   --  0.48*  --    GFRESTIMATED  --   --   --   --  >90Non  GFR Calc  --    GFRESTBLACK  --   --   --   --  >90African American " GFR Calc  --    PATTI  --   --   --   --  8.6  --    MAG  --  1.6  --   --   --  1.5*   PROTTOTAL  --   --   --   --  6.2*  --    ALBUMIN  --   --   --   --  2.9*  --    BILITOTAL  --   --   --   --  0.4  --    ALKPHOS  --   --   --   --  82  --    AST  --   --   --   --  20  --    ALT  --   --   --   --  31  --         CBC     Recent Labs  Lab 06/08/17  1103 06/06/17  1105   WBC 3.7* 5.0   RBC 2.92* 3.00*   HGB 9.5* 9.7*   HCT 27.3* 27.8*   MCV 94 93   MCH 32.5 32.3   MCHC 34.8 34.9   RDW 17.7* 16.9*    246       INR, PTT No lab results found in last 7 days.     Arterial Blood Gas No lab results found in last 7 days.    UA    Recent Labs  Lab 06/08/17  1640   COLOR Light Yellow   APPEARANCE Clear   URINEGLC Negative   URINEBILI Negative   URINEKETONE Negative   SG 1.004   UBLD Negative   URINEPH 7.0   PROTEIN Negative   NITRITE Negative   LEUKEST Negative   RBCU 0   WBCU 1       Micro No lab results found in last 7 days.       Radiological Data  Data   No results found for this or any previous visit (from the past 48 hour(s)).       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 Lopez in neurology clinic. 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 Initially presented as one month of rapid decline in ability to walk with profound cerebellar signs on exam in 4/2017. Has been WCB for two months. With cerebellar signs on exam.   --IVIG 0.4g/kg today is 4/5.   --methylpred 1g x5d today is 4/5.   --PT/OT  --pantoprazole 40mg qd for steroids  --f/u Dr. Lopez after eventual DC, may need eventual follow up in [facility].        ## pulmonary embolism: likely 2/2  hypercoagulable state of malignancy.   --PTA enoxaparin 60mg q12h      ## ovarian cancer: follows with oncology and palliative outpatient. Takes chemo with carboplatin and taxol. Planned for bulk removal on 6/24, tentatively. With secondary neutropenia d/t chemo. With paraneoplastic syndrome as above.   --PRN zofran, prochlorperazine, metoclopramide  --f/u gyn-onc after eventual DC. Has appt already.       ## HTN: PTA losartan 100mg, PTA HCTZ 25mg  ## hypothyroidism: PTA levothyroxine      Diet/IVF: Regular   Ppx: enoxaparin  Code status: FULL  Dispo: home with 24 hour assist likely tomorrow    =========================================================    This patient was seen & discussed with my attending, Dr. Patel, who agrees with my assessment and plan.     Jeremias Cox PGY-3  Neurology

## 2017-06-12 ENCOUNTER — APPOINTMENT (OUTPATIENT)
Dept: PHYSICAL THERAPY | Facility: CLINIC | Age: 72
DRG: 098 | End: 2017-06-12
Attending: PSYCHIATRY & NEUROLOGY
Payer: COMMERCIAL

## 2017-06-12 VITALS
SYSTOLIC BLOOD PRESSURE: 125 MMHG | OXYGEN SATURATION: 98 % | HEIGHT: 59 IN | DIASTOLIC BLOOD PRESSURE: 67 MMHG | RESPIRATION RATE: 16 BRPM | BODY MASS INDEX: 32.7 KG/M2 | WEIGHT: 162.2 LBS | HEART RATE: 62 BPM | TEMPERATURE: 96.1 F

## 2017-06-12 PROCEDURE — 97530 THERAPEUTIC ACTIVITIES: CPT | Mod: GP

## 2017-06-12 PROCEDURE — 25000132 ZZH RX MED GY IP 250 OP 250 PS 637: Performed by: PSYCHIATRY & NEUROLOGY

## 2017-06-12 PROCEDURE — 25000128 H RX IP 250 OP 636: Performed by: STUDENT IN AN ORGANIZED HEALTH CARE EDUCATION/TRAINING PROGRAM

## 2017-06-12 PROCEDURE — 97116 GAIT TRAINING THERAPY: CPT | Mod: GP

## 2017-06-12 PROCEDURE — 25000132 ZZH RX MED GY IP 250 OP 250 PS 637: Performed by: STUDENT IN AN ORGANIZED HEALTH CARE EDUCATION/TRAINING PROGRAM

## 2017-06-12 PROCEDURE — 40000193 ZZH STATISTIC PT WARD VISIT

## 2017-06-12 RX ADMIN — PANTOPRAZOLE SODIUM 40 MG: 40 TABLET, DELAYED RELEASE ORAL at 08:33

## 2017-06-12 RX ADMIN — SENNOSIDES AND DOCUSATE SODIUM 1 TABLET: 8.6; 5 TABLET ORAL at 08:33

## 2017-06-12 RX ADMIN — ENOXAPARIN SODIUM 60 MG: 60 INJECTION SUBCUTANEOUS at 08:33

## 2017-06-12 RX ADMIN — HYDROCHLOROTHIAZIDE 50 MG: 25 TABLET ORAL at 08:33

## 2017-06-12 RX ADMIN — LEVOTHYROXINE SODIUM 125 MCG: 125 TABLET ORAL at 08:33

## 2017-06-12 RX ADMIN — ACETAMINOPHEN 650 MG: 325 TABLET, FILM COATED ORAL at 10:35

## 2017-06-12 RX ADMIN — DIPHENHYDRAMINE HYDROCHLORIDE 50 MG: 25 CAPSULE ORAL at 10:34

## 2017-06-12 RX ADMIN — SODIUM CHLORIDE 1000 MG: 9 INJECTION, SOLUTION INTRAVENOUS at 08:32

## 2017-06-12 RX ADMIN — LOSARTAN POTASSIUM 100 MG: 100 TABLET, FILM COATED ORAL at 08:33

## 2017-06-12 RX ADMIN — OYSTER SHELL CALCIUM WITH VITAMIN D 1 TABLET: 500; 200 TABLET, FILM COATED ORAL at 08:33

## 2017-06-12 ASSESSMENT — VISUAL ACUITY
OU: BASELINE;GLASSES
OU: BASELINE;GLASSES

## 2017-06-12 NOTE — DISCHARGE SUMMARY
[facility]  NEUROLOGY DISCHARGE SUMMARY    Patient Name:  Tosin Nina  MRN:  [id number]      :  1945      Date of Admission:  2017  Date of Discharge:  2017  Admitting Physician:  Maxx Patel MD  Discharge Physician:  Dequan Allison MD  Primary Care Provider:   Esther Back  Discharge Disposition:   Discharged to home    Admission Diagnoses:  paraneoplastic syndrome  Ataxia    Discharge Diagnoses:    Paraneoplastic syndrome  Ataxia     Brief History of Illness:   Patient initially presented to this hospital on  with gait instability. During that hospitalization, stabge 4b ovanrian cancer was found and thought to be driving a Starting in March, Ms. Nina began to have difficulty walking and focusing on distant objects. The gait instability progressed to a wheelchair bound state by the end of March. She presented to the ED on 17 due to her gait instability and was diagnosed with stage 4b ovarian cancer. 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 She was seen by Dr. Lopez in neurology clinic on  and elected to undergo hospital admission for a 5 day course of IVIG and methylprednisolone to see if it would help her ataxia.     Hospital Course:  Patient admitted as above for 5 day course of IVIg and methylprednisolone. She received these medications without complication. Cerebellar signs persisted on exam and patient did not report any improvement in her ability to walk or her general ataxia. She was seen by PT and OT while inpatient. Ultimately discharged home per her request (versus possible rehabilitation stay) with plans to follow-up with her PCP, neurology, and her primary oncology team for ongoing evaluation and care.     Pertinent Investigations:  Potassium  3.5  Magnesium  1.7  IgA   143   WBC   3.7  RBC count  2.92  Hb   9.5  Hct  "   27.3  RDW   17.7  Absolute  Metamyelocytes 0.1    Consultations:    PT/OT    Discharge physical examination:   /67 (BP Location: Left arm)  Pulse 62  Temp 96.1  F (35.6  C) (Oral)  Resp 16  Ht 1.499 m (4' 11\")  Wt 73.6 kg (162 lb 3.2 oz)  SpO2 98%  BMI 32.76 kg/m2  General: Pleasant, sitting up in chair, dressed in gown   HEENT:  NC/AT, no icterus,  no ear or nose drainage.   Cardiac: Clear S1 and S2, regular rate and rhythm   Chest: Good air movement in posterior fields bilaterally   Abdomen:  S/NT/ND  Extremities: Mild LE edema up to mid calf     Skin:  No rash or lesion.     Neuro:  Mental status: alert and oriented x 3  Cranial nerves: bilateral horizontal nystagmus that is worse on rightward gaze. No vertical nystagmus noted. EOM otherwise intact. PERRL. Visual fields are full to confrontation. Face is symmetric with full sensation and strength.  Motor: No abnormal posture, tone, atrophy, or movements/fasciculations. 5/5 power diffusely and symmetrically.   Reflexes: Absent in lower extremities. Toes down going. No clonus.   Sensory: Intact to light touch at distal UE and LE, bilaterally. Vibration sensation diminished at toes bilaterally, present in distal UE bilaterally.   Coordination: Dysmetria of LUE and LLE. Dysdiadochokinesis worse on the left.   Gait: deferred.    Discharge Medications:  Discharge Medication List as of 6/12/2017  4:52 PM      CONTINUE these medications which have NOT CHANGED    Details   Omeprazole (PRILOSEC PO) Take 20 mg by mouth every morning, Historical      ESCITALOPRAM OXALATE PO Take 10 mg by mouth daily, Historical      sennosides (SENOKOT) 8.6 MG tablet Take 1 tablet by mouth daily, Historical      Filgrastim (NEUPOGEN) 300 MCG/0.5ML SOSY syringe Inject 0.5 mLs (300 mcg) Subcutaneous daily, Disp-12 Syringe, R-4, E-PrescribeTake one injection daily for 4 days starting the day after chemotherapy      NEW MED Diphenhydramine elixir 12.5mg/ml  (200, Disp-400 mL, " R-0, Local PrintSuspension:  Diphenhydramine elixir 12.5mg/5ml (use 200ml)  Metoclopramide 5mg/5ml  (use 200ml)  Dexamethasone 4 mg tablets (use 20 tablets crushed)    Sig: take 20ml every 8 hours PRN  nausea      UNABLE TO FIND 3 times daily MEDICATION NAME: BDRbenadryl/dexamethisone, Historical      Potassium Chloride ER 20 MEQ TBCR Take 1 tablet (20 mEq) by mouth daily, Disp-30 tablet, R-3, No Print Out      ondansetron (ZOFRAN) 4 MG tablet Take 1 tablet (4 mg) by mouth every 6 hours as needed for nausea, Disp-20 tablet, R-0, Transitional      losartan (COZAAR) 100 MG tablet Take 1 tablet (100 mg) by mouth daily, Disp-30 tablet, Transitional      hydrochlorothiazide (HYDRODIURIL) 25 MG tablet Take 2 tablets (50 mg) by mouth daily, Disp-30 tablet, Transitional      levothyroxine (SYNTHROID) 125 MCG tablet Take 1 tablet (125 mcg) by mouth daily, Disp-30 tablet, Transitional      Vitamin D, Cholecalciferol, 1000 UNITS TABS Take 2,000 Units by mouth daily, Disp-60 tablet, Transitional      enoxaparin (LOVENOX) 80 MG/0.8ML injection Inject 0.6 mLs (60 mg) Subcutaneous every 12 hours, Disp-60 Syringe, R-1, TransitionalPlease note the dose is 60mg SQ BID due to her high anti-Xa level.      ondansetron (ZOFRAN-ODT) 4 MG ODT tab Take 1 tablet (4 mg) by mouth every 6 hours as needed for nausea, Disp-120 tablet, Transitional      prochlorperazine (COMPAZINE) 5 MG tablet Take 1 tablet (5 mg) by mouth every 6 hours as needed for vomiting, Disp-90 tablet, Transitional         STOP taking these medications       acetaminophen (TYLENOL) 325 MG tablet Comments:   Reason for Stopping:               Discharge follow up and instructions:    Occupational Therapy Referral     Physical Therapy Referral     Reason for your hospital stay   You were in the hospital for steroid and IVIg therapy due to your recent difficulties walking in the setting of your ovarian cancer.     Adult [facility] Follow-up and recommended labs and tests   Follow  up with primary care provider, Esther Back, within 1-2 weeks, for hospital follow- up. No follow up labs or test are needed.   Follow up with Dr. James Lopez, within 1-2 months. for hospital follow- up.  No follow up labs or test are needed.  Follow up with your primary oncologist for ongoing management of your ovarian cancer and potential upcoming surgery. They will determine any further testing/interventions needed.    Appointments on [facility]/or [facility] (with [facility] or [facility] provider or service). Call [phone] if you haven't heard regarding these appointments within 7 days of discharge.     Activity   Your activity upon discharge: activity as tolerated     Full Code     Diet   Follow this diet upon discharge: Orders Placed This Encounter     Regular Diet Adult         Patient seen and discussed with Dr. Dequan Jones MD  Neurology PGY-2  P: [id number]    PATIENT SEEN AND EXAMINED BY ME on June 12, 2017 I AGREE WITH THE FINDINGS DETAILED BY THE NEUROLOGY RESIDENT and documented in their note on June 12, 2017   PLEASE REFER TO THEIR NOTE FOR ADDITIONAL DETAILS.     DIAGNOSIS paraneoplastic ataxia  MANAGEMENT as above  D/c planning was less than 30 minutes    DEQUAN ZAMARRIPA MD

## 2017-06-12 NOTE — PLAN OF CARE
Problem: Goal Outcome Summary  Goal: Goal Outcome Summary  Outcome: No Change  VSS. A&O x4. Neuro unchanged: General weakness, ataxic gait. Denies pain. IVIG 4/5 completed today. R central line HL. Up with 1, GB, and walker. Voiding spontaneously. 1 loose BM this shift. Regular diet, good intake. Continue to monitor and follow current POC.

## 2017-06-12 NOTE — PLAN OF CARE
Problem: Goal Outcome Summary  Goal: Goal Outcome Summary  Outcome: No Change  Alert and oriented x 4. Neuro's unchanged with same tingling on finger tips and legs. Bilateral LE edematous, keeping them elevated. Denies pain. Up to the [facility] with 1 assist and gait belt. Voided and had BM. Sitting in the chair this morning awaiting breakfast.

## 2017-06-12 NOTE — PLAN OF CARE
Problem: Goal Outcome Summary  Goal: Goal Outcome Summary  Physical Therapy Discharge Summary     Reason for therapy discharge:    Discharged to home.     Progress towards therapy goal(s). See goals on Care Plan in [facility] electronic health record for goal details.  Goals partially met.  Barriers to achieving goals:   discharge from facility.     Therapy recommendation(s):    Continue home exercise program.  Pt would benefit from ongoing PT for gait training and LE/trunk coordination training.  Therapy plan pends results of CT scan next week.  If pt pursues OP chemotherapy, rec she re-start HHPT.  If she plans for gyn/onc surgery, rec she wait until post-op to determine therapy needs.

## 2017-06-13 ENCOUNTER — INFUSION THERAPY VISIT (OUTPATIENT)
Dept: INFUSION THERAPY | Facility: CLINIC | Age: 72
End: 2017-06-13
Attending: OBSTETRICS & GYNECOLOGY
Payer: COMMERCIAL

## 2017-06-13 ENCOUNTER — HOSPITAL ENCOUNTER (OUTPATIENT)
Facility: CLINIC | Age: 72
Setting detail: SPECIMEN
Discharge: HOME | End: 2017-06-13
Attending: OBSTETRICS & GYNECOLOGY | Admitting: OBSTETRICS & GYNECOLOGY
Payer: COMMERCIAL

## 2017-06-13 ENCOUNTER — CARE COORDINATION (OUTPATIENT)
Dept: CARE COORDINATION | Facility: CLINIC | Age: 72
End: 2017-06-13

## 2017-06-13 ENCOUNTER — DOCUMENTATION ONLY (OUTPATIENT)
Dept: SPIRITUAL SERVICES | Facility: CLINIC | Age: 72
End: 2017-06-13

## 2017-06-13 VITALS
SYSTOLIC BLOOD PRESSURE: 129 MMHG | DIASTOLIC BLOOD PRESSURE: 68 MMHG | WEIGHT: 169 LBS | TEMPERATURE: 98.5 F | HEART RATE: 73 BPM | BODY MASS INDEX: 34.13 KG/M2 | RESPIRATION RATE: 20 BRPM

## 2017-06-13 DIAGNOSIS — C56.9 OVARIAN CANCER, UNSPECIFIED LATERALITY (H): ICD-10-CM

## 2017-06-13 DIAGNOSIS — D70.1 CHEMOTHERAPY-INDUCED NEUTROPENIA (H): Primary | ICD-10-CM

## 2017-06-13 DIAGNOSIS — T45.1X5A CHEMOTHERAPY-INDUCED NEUTROPENIA (H): Primary | ICD-10-CM

## 2017-06-13 LAB
ANISOCYTOSIS BLD QL SMEAR: ABNORMAL
BASOPHILS # BLD AUTO: 0 10E9/L (ref 0–0.2)
BASOPHILS NFR BLD AUTO: 0 %
DIFFERENTIAL METHOD BLD: ABNORMAL
EOSINOPHIL # BLD AUTO: 0 10E9/L (ref 0–0.7)
EOSINOPHIL NFR BLD AUTO: 0 %
ERYTHROCYTE [DISTWIDTH] IN BLOOD BY AUTOMATED COUNT: 18.1 % (ref 10–15)
HCT VFR BLD AUTO: 23.1 % (ref 35–47)
HGB BLD-MCNC: 7.9 G/DL (ref 11.7–15.7)
LYMPHOCYTES # BLD AUTO: 3.1 10E9/L (ref 0.8–5.3)
LYMPHOCYTES NFR BLD AUTO: 20 %
MAGNESIUM SERPL-MCNC: 1.6 MG/DL (ref 1.6–2.3)
MCH RBC QN AUTO: 32.2 PG (ref 26.5–33)
MCHC RBC AUTO-ENTMCNC: 34.2 G/DL (ref 31.5–36.5)
MCV RBC AUTO: 94 FL (ref 78–100)
METAMYELOCYTES # BLD: 0.5 10E9/L
METAMYELOCYTES NFR BLD MANUAL: 3 %
MONOCYTES # BLD AUTO: 0.2 10E9/L (ref 0–1.3)
MONOCYTES NFR BLD AUTO: 1 %
MYELOCYTES # BLD: 0.2 10E9/L
MYELOCYTES NFR BLD MANUAL: 1 %
NEUTROPHILS # BLD AUTO: 11.7 10E9/L (ref 1.6–8.3)
NEUTROPHILS NFR BLD AUTO: 75 %
NRBC # BLD AUTO: 0.8 10*3/UL
NRBC BLD AUTO-RTO: 5 /100
PLASMA CELLS # BLD MANUAL: 0.2 10E9/L
PLATELET # BLD AUTO: 100 10E9/L (ref 150–450)
PLATELET # BLD EST: ABNORMAL 10*3/UL
POIKILOCYTOSIS BLD QL SMEAR: SLIGHT
POLYCHROMASIA BLD QL SMEAR: SLIGHT
POTASSIUM SERPL-SCNC: 3.1 MMOL/L (ref 3.4–5.3)
RBC # BLD AUTO: 2.45 10E12/L (ref 3.8–5.2)
WBC # BLD AUTO: 15.6 10E9/L (ref 4–11)

## 2017-06-13 PROCEDURE — 85025 COMPLETE CBC W/AUTO DIFF WBC: CPT | Performed by: OBSTETRICS & GYNECOLOGY

## 2017-06-13 PROCEDURE — 84132 ASSAY OF SERUM POTASSIUM: CPT | Performed by: OBSTETRICS & GYNECOLOGY

## 2017-06-13 PROCEDURE — 83735 ASSAY OF MAGNESIUM: CPT | Performed by: OBSTETRICS & GYNECOLOGY

## 2017-06-13 PROCEDURE — 96375 TX/PRO/DX INJ NEW DRUG ADDON: CPT

## 2017-06-13 PROCEDURE — 25000125 ZZHC RX 250: Performed by: OBSTETRICS & GYNECOLOGY

## 2017-06-13 PROCEDURE — S0028 INJECTION, FAMOTIDINE, 20 MG: HCPCS | Performed by: OBSTETRICS & GYNECOLOGY

## 2017-06-13 PROCEDURE — 25000128 H RX IP 250 OP 636: Performed by: OBSTETRICS & GYNECOLOGY

## 2017-06-13 PROCEDURE — 96413 CHEMO IV INFUSION 1 HR: CPT

## 2017-06-13 PROCEDURE — 96368 THER/DIAG CONCURRENT INF: CPT

## 2017-06-13 RX ORDER — POTASSIUM CHLORIDE 29.8 MG/ML
20 INJECTION INTRAVENOUS
Status: DISCONTINUED | OUTPATIENT
Start: 2017-06-13 | End: 2017-06-13

## 2017-06-13 RX ORDER — HEPARIN SODIUM (PORCINE) LOCK FLUSH IV SOLN 100 UNIT/ML 100 UNIT/ML
500 SOLUTION INTRAVENOUS EVERY 8 HOURS
Status: DISCONTINUED | OUTPATIENT
Start: 2017-06-13 | End: 2017-06-13 | Stop reason: HOSPADM

## 2017-06-13 RX ADMIN — DIPHENHYDRAMINE HYDROCHLORIDE 25 MG: 50 INJECTION INTRAMUSCULAR; INTRAVENOUS at 12:13

## 2017-06-13 RX ADMIN — FAMOTIDINE 40 MG: 10 INJECTION INTRAVENOUS at 12:40

## 2017-06-13 RX ADMIN — POTASSIUM CHLORIDE: 2 INJECTION, SOLUTION, CONCENTRATE INTRAVENOUS at 13:07

## 2017-06-13 RX ADMIN — SODIUM CHLORIDE 250 ML: 9 INJECTION, SOLUTION INTRAVENOUS at 12:13

## 2017-06-13 RX ADMIN — PACLITAXEL 135 MG: 6 INJECTION, SOLUTION INTRAVENOUS at 13:17

## 2017-06-13 RX ADMIN — SODIUM CHLORIDE, PRESERVATIVE FREE 500 UNITS: 5 INJECTION INTRAVENOUS at 15:10

## 2017-06-13 RX ADMIN — DEXAMETHASONE SODIUM PHOSPHATE 12 MG: 10 INJECTION, SOLUTION INTRAMUSCULAR; INTRAVENOUS at 12:23

## 2017-06-13 NOTE — PROGRESS NOTES
Infusion Nursing Note:  Tosin Nina presents today for cycle 3 day 15 taxol, also had IV potassium.    Patient seen by provider today: No   present during visit today: Not Applicable.    Note: Patient was discharged from hospital yesterday.  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  She doesn't feel like it has helped yet.  She had a bloody nose yesterday from 3PM until 10PM.  She did not call in for this and she decided not to take her lovenox last night and this morning.  PLT today are 100,000.  Notified Dr. Ennis.  She said to give chemo today as ordered.  She didn't have an explanation for the nose bleed.  She said the patient should not skip lovenox doses and should resume today.  This was all communicated to the patient.  Instructed her that she needs to call the clinic or go to the ER if she has another nose bleed that does not stop within an hour.  Patient stated understanding.    Also no blood transfusion ordered for HGB 7.9.  Dr. Ennis may order one next week depending on HGB.  She is having surgery the week after next.    Inbasket sent to Dr. Ennis to ask if neupogen is needed starting tomorrow.  Her response back was it is not needed since her WBC was elevated today.      Intravenous Access:  Labs drawn without difficulty.  Implanted Port.    Treatment Conditions:  Lab Results   Component Value Date    HGB 7.9 06/13/2017     Lab Results   Component Value Date    WBC 15.6 06/13/2017      Lab Results   Component Value Date    ANEU 11.7 06/13/2017     Lab Results   Component Value Date     06/13/2017      Lab Results   Component Value Date     06/08/2017                   Lab Results   Component Value Date    POTASSIUM 3.1 06/13/2017           Lab Results   Component Value Date    MAG 1.6 06/13/2017            Lab Results   Component Value Date    CR 0.48 06/08/2017                   Lab Results   Component Value Date    PATTI 8.6 06/08/2017                 Lab Results   Component Value Date    BILITOTAL 0.4 06/08/2017           Lab Results   Component Value Date    ALBUMIN 2.9 06/08/2017                    Lab Results   Component Value Date    ALT 31 06/08/2017           Lab Results   Component Value Date    AST 20 06/08/2017     Results reviewed, labs MET treatment parameters, ok to proceed with treatment.      Post Infusion Assessment:  Patient tolerated infusion without incident.  Blood return noted pre and post infusion.  Site patent and intact, free from redness, edema or discomfort.  No evidence of extravasations.  Access discontinued per protocol.    Discharge Plan:   Copy of AVS reviewed with patient and/or family.  Patient will return 6/16 for labs, chemo and MD for next appointment.  Patient discharged in stable condition accompanied by: self and .  Departure Mode: Wheelchair.    Phyllis Maldonado RN

## 2017-06-13 NOTE — PROGRESS NOTES
"SPIRITUAL HEALTH SERVICES  SPIRITUAL ASSESSMENT Progress Note  [facility] Cancer/Infusion Clinic    PRIMARY FOCUS:     Goals of care    Emotional/spiritual/Hoahaoism distress    Support for coping    ILLNESS CIRCUMSTANCES:   Reviewed documentation. Oriented pt Yolanda and her  Christiano to  services.  Offered emotional support and reflective conversation, which integrated elements of illness and family narratives.     Context of Serious Illness/Symptom(s) - Yolanda reported that she was diagnosed with stage IV ovarian cancer in April after coming into the ED with neurological symptoms, such as dizziness.  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    Persons/Resources Involved - Yolanda named Christiano and her daughter Cece, who lives nearby in [place], and a good [place] of friends.  They have a son, who lives with his family in the [facility] area.    DISTRESS:     Emotional/Existential/Relational Distress -   o Yolanda became tearful she shared that she \"accepts\" her cancer but loosing her ability to drive, walk, and do activities of daily living such as using the toilet and dressing have been very difficult.  o Christiano acknowledged that it is stressful to be her caregiver 24/7.  He had to give up a couple of volunteer activities that he did before Yolanda's diagnosis.     Spiritual/Gnosticist Distress - Yolanda questions whether God is a personal Higher Power, such as \"a father figure\" (an image she grew up with) or an impersonal force in the universe.  However, she added \"I have no questions about Hasmukh.\"  She became tearful again when she acknowledged \"I want to feel God's presence, but I don't have that feeling.\"  Explore with her how God might be working in her life.    Social/Cultural/Economic Distress - none identified.    SPIRIT (Coping):     Gnosticism/Preethi - Yolanda is Caodaism and is affiliated with [facility]" [facility].    Spiritual Practice(s) - She talked about people praying for her and welcomed prayer.    Emotional/Existential/Relational Connections -   aylin Guerrero called her daughter \"my strength.\"  Cece sees Yolanda twice a week to help with bathing, to provide emotional support, and to give Christiano respite support.  On Thursdays, when Cece is at the house, Christiano goes out to exercise and to do some volunteering.  aylin Guerrero finds some enjoyment in watching TV, reading the newspaper, and eating.  She wants to learn some card games to play with Christiano.  o While at [facility], she received message and [facility], which she found supportive.    SENSE-MAKING:    Goals of Care - Yolanda hopes to have surgery in two weeks if her scans show that the chemo has reduced the cancer.  Informed her and Christiano how they can request further  support and gave them contact information.    Meaning/Sense-Making - Yolanda reflected on how the cancer has affected her life, processed her sadness about the loss of functioning, and named a few resources within her support system.    PLAN: I remain available in the clinic or by phone per pt/family request.    Fernando Hodge M.Div., [facility]  Staff   Pager [phone]  "

## 2017-06-13 NOTE — PROGRESS NOTES
"[facility]  \"Hello, my name is Anai Hurley , and I am calling from the [facility].  I want to check in and see how you are doing, after leaving the hospital.  You may also receive a call from your Care Coordinator (care team), but I want to make sure you don t have any urgent needs.  I have a couple questions to review with you:     Post-Discharge Outreach                                                    Tosin Nina is a 71 year old female     Adult [facility] Follow-up and recommended labs and tests   Follow up with primary care provider, Esther Back, within 1-2 weeks, for hospital follow- up. No follow up labs or test are needed.   Follow up with Dr. James Lopez, within 1-2 months. for hospital follow- up.  No follow up labs or test are needed.  Follow up with your primary oncologist for ongoing management of your ovarian cancer and potential upcoming surgery. They will determine any further testing/interventions needed.         Care Team:    Patient Care Team       Relationship Specialty Notifications Start End    Esther Back MD PCP - General Family Practice  11/7/13     Phone: [phone] Fax: [phone]         [facility] [address] [place] [address]    Henrik Martins, RN Clinic Care Coordinator  Admissions 6/1/17     Heather Hassan, RN Nurse Coordinator Neurology Admissions 6/5/17     Phone: [phone] Pager: [phone]        Anastasia Almodovar, RN Nurse Coordinator Neurology Admissions 6/8/17     Phone: [phone] Pager: [phone]                Transition of Care Review                                                      Did you have a surgery or procedure during your hospital visit? No   If yes, do you have any of the following:     Signs of infection:  NO    Pain:  No     Pain Scale (0-10) 0/10     Location: NA    Wound/incision concerns? NA    Do you have all of your medications/refills?  Yes    Are you " having any side effects or questions about your medication(s)? No    Do you have any new or worsening symptoms?  Yes- had Epistaxis from 3 pm-10 pm both nostrils last night, held lovenox    Do you have any future appointments scheduled?   Yes       Next 5 appointments (look out 90 days)     Jun 20, 2017 12:00 PM CDT   Return Visit with Nessa Foster MD   [facility] ([facility])    [facility]  [address]  [address]  [facility] [address] [phone]                      Plan                                                      Thanks for your time.  Your Care Coordinator may follow-up within the next couple days.  In the meantime if you have questions, concerns or problems call your care team.        Anai Hurley

## 2017-06-13 NOTE — PLAN OF CARE
Problem: Goal Outcome Summary  Goal: Goal Outcome Summary  Late entry PT 6A:  Epic downtime 9:30-16:30 6/12, see paper documentation in pt chart

## 2017-06-13 NOTE — MR AVS SNAPSHOT
After Visit Summary   6/13/2017    Tosin Nina    MRN: [id number]           Patient Information     Date Of Birth          1945        Visit Information        Provider Department      6/13/2017 11:00 AM RH INFUSION CHAIR 8 [facility]        Today's Diagnoses     Chemotherapy-induced neutropenia (H)    -  1    Ovarian cancer, unspecified laterality (H)           Follow-ups after your visit        Your next 10 appointments already scheduled     Jun 16, 2017  9:00 AM CDT   Level 1 with RH INFUSION CHAIR 12   [facility] ([facility])    [facility]  [address]  [facility] [address]   [phone]            Jun 16, 2017  9:30 AM CDT   CT CHEST/ABDOMEN/PELVIS W CONTRAST with RSCCCT1   [facility] ([facility])    [address]  [facility] [address]   [phone]           Please bring any scans or X-rays taken at other hospitals, if similar tests were done. Also bring a list of your medicines, including vitamins, minerals and over-the-counter drugs. It is safest to leave personal items at home.  Be sure to tell your doctor:   If you have any allergies.   If there s any chance you are pregnant.   If you are breastfeeding.   If you have any special needs.  You may have contrast for this exam. To prepare:   Do not eat or drink for 2 hours before your exam. If you need to take medicine, you may take it with small sips of water. (We may ask you to take liquid medicine as well.)   The day before your exam, drink extra fluids at least six 8-ounce glasses (unless your doctor tells you to restrict your fluids).  Patients over 70 or patients with diabetes or kidney problems:   If you haven t had a blood test (creatinine test) within the last 30 days, go to your clinic or Diagnostic Imaging Department for this test.   If you have diabetes:   If your kidney function is normal, continue taking your metformin (Avandamet, Glucophage, Glucovance, Metaglip) on the day of your exam.   If your kidney function is abnormal, wait 48 hours before restarting this medicine.  You will have oral contrast for this exam:   You will drink the contrast at home. Get this from your clinic or Diagnostic Imaging Department. Please follow the directions given.  Please wear loose clothing, such as a sweat suit or jogging clothes. Avoid snaps, zippers and other metal. We may ask you to undress and put on a hospital gown.  If you have any questions, please call the Imaging Department where you will have your exam.            Jun 20, 2017 11:00 AM CDT   Level 3 with RH INFUSION CHAIR 5   [facility] ([facility])    [address]  [facility] [address]   [phone]            Jun 20, 2017 12:00 PM CDT   Return Visit with Nessa Foster MD   [facility] ([facility])    [place] [address]  [facility] [address]   [phone]            Jun 27, 2017 11:00 AM CDT   Level 3 with RH INFUSION CHAIR 4   [facility] ([facility])    [place] [address]  [facility] [address]   [phone]            Jun 28, 2017   Procedure with Nessa Fostre MD   [facility], Same Day Surgery (--)    [address]  [facility] [address]   [phone]            Jul 05, 2017  9:00 AM CDT   New Visit with Kelsie Nguyen GC   [facility] ([facility])    [place] [address]  [facility] [address]   [phone]            Jul 05, 2017 11:00 AM CDT   Level 3 with RH INFUSION CHAIR 7   [facility]  "Services ([facility])    [facility]  [address]  [facility] [address] [phone]              Who to contact     If you have questions or need follow up information about today's clinic visit or your schedule please contact [facility] IN[facility] directly at [phone].  Normal or non-critical lab and imaging results will be communicated to you by MyChart, letter or phone within 4 business days after the clinic has received the results. If you do not hear from us within 7 days, please contact the clinic through MyChart or phone. If you have a critical or abnormal lab result, we will notify you by phone as soon as possible.  Submit refill requests through [facility] ([facility]) or call your pharmacy and they will forward the refill request to us. Please allow 3 business days for your refill to be completed.          Additional Information About Your Visit        [id number] Information     [facility] ([facility]) lets you send messages to your doctor, view your test results, renew your prescriptions, schedule appointments and more. To sign up, go to [email or web address] ([email or web address]) . Click on \"Log in\" on the left side of the screen, which will take you to the Welcome page. Then click on \"Sign up Now\" on the right side of the page.     You will be asked to enter the access code listed below, as well as some personal information. Please follow the directions to create your username and password.     Your access code is: [email or web address]  Expires: 8/10/2017 11:53 AM     Your access code will  in 90 days. If you need help or a new code, please call your [place] clinic or [phone].        Care EveryWhere ID     This is your Care EveryWhere ID. This could be used by other organizations to access your [place] medical records  [id number]        Your Vitals Were     Pulse Temperature Respirations BMI (Body Mass Index)          73 98.5  F (36.9  C) (Tympanic) 20 34.13 kg/m2   "       Blood Pressure from Last 3 Encounters:   06/13/17 129/68   06/12/17 125/67   06/07/17 96/54    Weight from Last 3 Encounters:   06/13/17 76.7 kg (169 lb)   06/08/17 73.6 kg (162 lb 3.2 oz)   05/30/17 72.6 kg (160 lb 2 oz)              We Performed the Following     CBC with platelets differential     Magnesium     Potassium        Primary Care Provider Office Phone # Fax #    Esther Back [phone]       [facility] [address] [address]        Thank you!     Thank you for choosing [facility]  for your care. Our goal is always to provide you with excellent care. Hearing back from our patients is one way we can continue to improve our services. Please take a few minutes to complete the written survey that you may receive in the mail after your visit with us. Thank you!             Your Updated Medication List - Protect others around you: Learn how to safely use, store and throw away your medicines at www.disposemymeds.org.          This list is accurate as of: 6/13/17  3:09 PM.  Always use your most recent med list.                   Brand Name Dispense Instructions for use    enoxaparin 80 MG/0.8ML injection    LOVENOX    60 Syringe    Inject 0.6 mLs (60 mg) Subcutaneous every 12 hours       ESCITALOPRAM OXALATE PO      Take 10 mg by mouth daily       Filgrastim 300 MCG/0.5ML Sosy syringe    NEUPOGEN    12 Syringe    Inject 0.5 mLs (300 mcg) Subcutaneous daily       hydrochlorothiazide 25 MG tablet    HYDRODIURIL    30 tablet    Take 2 tablets (50 mg) by mouth daily       levothyroxine 125 MCG tablet    SYNTHROID    30 tablet    Take 1 tablet (125 mcg) by mouth daily       losartan 100 MG tablet    COZAAR    30 tablet    Take 1 tablet (100 mg) by mouth daily       NEW MED     400 mL    Diphenhydramine elixir 12.5mg/ml  (200       ondansetron 4 MG ODT tab    ZOFRAN-ODT    120 tablet    Take 1 tablet (4 mg) by mouth every 6 hours as  needed for nausea       ondansetron 4 MG tablet    ZOFRAN    20 tablet    Take 1 tablet (4 mg) by mouth every 6 hours as needed for nausea       Potassium Chloride ER 20 MEQ Tbcr     30 tablet    Take 1 tablet (20 mEq) by mouth daily       PRILOSEC PO      Take 20 mg by mouth every morning       prochlorperazine 5 MG tablet    COMPAZINE    90 tablet    Take 1 tablet (5 mg) by mouth every 6 hours as needed for vomiting       sennosides 8.6 MG tablet    SENOKOT     Take 1 tablet by mouth daily       UNABLE TO FIND      3 times daily MEDICATION NAME: BDRbenadryl/dexamethisone       Vitamin D (Cholecalciferol) 1000 UNITS Tabs     60 tablet    Take 2,000 Units by mouth daily

## 2017-06-13 NOTE — PLAN OF CARE
Problem: Goal Outcome Summary  Goal: Goal Outcome Summary  Occupational Therapy Discharge Summary     Reason for therapy discharge:    Discharged to home with home therapy.     Progress towards therapy goal(s). See goals on Care Plan in [facility] electronic health record for goal details.  Goals partially met.  Barriers to achieving goals:   discharge from facility.     Therapy recommendation(s):    Continued therapy is recommended.  Rationale/Recommendations:  Resume home PT/OT to increase safety and IND within home environment.

## 2017-06-16 ENCOUNTER — HOSPITAL ENCOUNTER (OUTPATIENT)
Dept: CT IMAGING | Facility: CLINIC | Age: 72
Discharge: HOME | End: 2017-06-16
Attending: OBSTETRICS & GYNECOLOGY | Admitting: OBSTETRICS & GYNECOLOGY
Payer: COMMERCIAL

## 2017-06-16 ENCOUNTER — HOSPITAL ENCOUNTER (OUTPATIENT)
Facility: CLINIC | Age: 72
Setting detail: SPECIMEN
Discharge: HOME | End: 2017-06-16
Attending: OBSTETRICS & GYNECOLOGY | Admitting: OBSTETRICS & GYNECOLOGY
Payer: COMMERCIAL

## 2017-06-16 ENCOUNTER — INFUSION THERAPY VISIT (OUTPATIENT)
Dept: INFUSION THERAPY | Facility: CLINIC | Age: 72
End: 2017-06-16
Attending: OBSTETRICS & GYNECOLOGY
Payer: COMMERCIAL

## 2017-06-16 DIAGNOSIS — C56.9 OVARIAN CANCER, UNSPECIFIED LATERALITY (H): ICD-10-CM

## 2017-06-16 DIAGNOSIS — T45.1X5A CHEMOTHERAPY-INDUCED NEUTROPENIA (H): Primary | ICD-10-CM

## 2017-06-16 DIAGNOSIS — D70.1 CHEMOTHERAPY-INDUCED NEUTROPENIA (H): Primary | ICD-10-CM

## 2017-06-16 LAB
ALBUMIN SERPL-MCNC: 2.7 G/DL (ref 3.4–5)
ALP SERPL-CCNC: 68 U/L (ref 40–150)
ALT SERPL W P-5'-P-CCNC: 29 U/L (ref 0–50)
ANION GAP SERPL CALCULATED.3IONS-SCNC: 6 MMOL/L (ref 3–14)
AST SERPL W P-5'-P-CCNC: 38 U/L (ref 0–45)
BILIRUB SERPL-MCNC: 0.3 MG/DL (ref 0.2–1.3)
BUN SERPL-MCNC: 16 MG/DL (ref 7–30)
CALCIUM SERPL-MCNC: 7.8 MG/DL (ref 8.5–10.1)
CANCER AG125 SERPL-ACNC: 27 U/ML (ref 0–30)
CHLORIDE SERPL-SCNC: 99 MMOL/L (ref 94–109)
CO2 SERPL-SCNC: 28 MMOL/L (ref 20–32)
CREAT SERPL-MCNC: 0.54 MG/DL (ref 0.52–1.04)
GFR SERPL CREATININE-BSD FRML MDRD: ABNORMAL ML/MIN/1.7M2
GLUCOSE SERPL-MCNC: 105 MG/DL (ref 70–99)
POTASSIUM SERPL-SCNC: 3.2 MMOL/L (ref 3.4–5.3)
PROT SERPL-MCNC: 7.6 G/DL (ref 6.8–8.8)
SODIUM SERPL-SCNC: 133 MMOL/L (ref 133–144)

## 2017-06-16 PROCEDURE — 71260 CT THORAX DX C+: CPT

## 2017-06-16 PROCEDURE — 36591 DRAW BLOOD OFF VENOUS DEVICE: CPT

## 2017-06-16 PROCEDURE — 25000128 H RX IP 250 OP 636: Performed by: RADIOLOGY

## 2017-06-16 PROCEDURE — 74177 CT ABD & PELVIS W/CONTRAST: CPT

## 2017-06-16 PROCEDURE — 25000128 H RX IP 250 OP 636

## 2017-06-16 RX ORDER — HEPARIN SODIUM (PORCINE) LOCK FLUSH IV SOLN 100 UNIT/ML 100 UNIT/ML
500 SOLUTION INTRAVENOUS ONCE
Status: COMPLETED | OUTPATIENT
Start: 2017-06-16 | End: 2017-06-16

## 2017-06-16 RX ORDER — HEPARIN SODIUM (PORCINE) LOCK FLUSH IV SOLN 100 UNIT/ML 100 UNIT/ML
SOLUTION INTRAVENOUS
Status: COMPLETED
Start: 2017-06-16 | End: 2017-06-16

## 2017-06-16 RX ORDER — IOPAMIDOL 755 MG/ML
500 INJECTION, SOLUTION INTRAVASCULAR ONCE
Status: COMPLETED | OUTPATIENT
Start: 2017-06-16 | End: 2017-06-16

## 2017-06-16 RX ADMIN — IOPAMIDOL 83 ML: 755 INJECTION, SOLUTION INTRAVENOUS at 09:44

## 2017-06-16 RX ADMIN — HEPARIN SODIUM (PORCINE) LOCK FLUSH IV SOLN 100 UNIT/ML 500 UNITS: 100 SOLUTION at 09:46

## 2017-06-16 RX ADMIN — SODIUM CHLORIDE 61 ML: 9 INJECTION, SOLUTION INTRAVENOUS at 09:44

## 2017-06-16 RX ADMIN — SODIUM CHLORIDE, PRESERVATIVE FREE 500 UNITS: 5 INJECTION INTRAVENOUS at 09:46

## 2017-06-16 NOTE — PROGRESS NOTES
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  She is accompanied today by her . She is feeling well.  She did not see much improvement following her admission for IV IG and steroids unfortunately.  She is otherwise feeling quite well and is ready for surgery.      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  She was discharged to a TCU where she is still residing with some but minimal improvement in her neurologic symptoms.  She still has quite a difficult time seeing especially with her right eye.  She also notes weakness mostly on her left side.  Both of these make it very difficult for her to walk and thus she is having to use a wheelchair for most of her mobility.  She tolerated her first cycle quite well with her biggest side effect being nausea which improved drastically with a change in anti-emetics.      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 The right pulmonary artery is enlarged, similar to prior exams. No CT evidence of right heart strain. No suspicious mediastinal or perihilar lymphadenopathy. Bovine arch anatomy. No suspicious pulmonary nodules, evidence of infarction, or infection. Abdomen and pelvis: There are soft tissue nodules identified along the liver capsule measuring up to 3 cm inferiorly. There is a large amount of omental caking. Enlarged iliac and retroperitoneal lymph nodes for example a 2.6 cm right external iliac lymph node or group of lymph nodes. Heterogeneous enhancement of the uterine fundus  could be due to fibroids or a mass. The overall size of the uterus does not appear significantly different than in 2014. The left ovary does appear slightly larger and lobular in appearance compared to prior exam. There is also a nodular area along the round ligament. It was not present on prior exam. There is an irregular lobulated mass involving the sigmoid colon. Abnormal, enlarged inguinal lymph nodes. Soft tissue implant in the posterior right retroperitoneum adjacent to the psoas was not present on prior exam. 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 Spondylolisthesis at L3-4. Small periumbilical hernia containing some of the abnormal omentum.    4/11/17:   268, CEA .6    4/12/17:  IR omental biopsy   Pathology:  High grade serous carcinoma    4/14/17: Cycle #1 carboplatin AUC 6 and weekly Taxol 80mg/m2.  = 2648    5/5/17:  Cycle #2 carboplatin AUC 6 and weekly Taxol 80mg/m2.  = 370    5/26/17:  Cycle #3 carboplatin AUC 6 and weekly Taxol 80mg/m2.  = 63    6/16/17:  CT C/A/P:  Chest:  Resolution of previous right-sided pulmonary emboli since April. No mediastinal, hilar or axillary adenopathy. No pleural fluid.  Port-A-Cath right superior chest with tip in the SVC.  Short linear fibrosis or discoid atelectasis anterior medial right upper lobe. Lungs otherwise clear. Abdomen and Pelvis: Marked improvement in carcinomatosis and omental metastasis since 4/11/2017. In the anterior left pelvis there is a 1.2 cm nodule with adjacent linear opacity approximately 4 cm in length in an area of previous dense omental caking and metastatic disease. There is resolution of the previous anterior right-sided omental caking with subcentimeter trace of residual nodularity in this location. There are multiple new indeterminate nodular densities in the anterior abdominal wall subcutaneous fat as well as small  collections of subcutaneous air, suggesting that these are medication injection sites.  Previous subserosal implants along the inferior liver have resolved. No focal liver lesions. Normal-appearing pancreas, adrenal glands and kidneys. Tiny hypodense spleen lesion is too small to characterize, not previously seen. Spleen otherwise appears normal. No periaortic or pelvic adenopathy with resolution of previous adenopathy. Lobulated enlarged uterus redemonstrated consistent with multiple fibroids. No free fluid. No acute bowel abnormality. Resolution of mesenteric right lower quadrant nodule is compatible with metastasis. On coronal images the terminal ileum takes an unusual circular course posterior to the right colon, but there is no evidence for obstruction. No aggressive bone lesions.    6/16/17:   = 27    Review of Systems:  Systemic           no weight gain; no fatigue; no fever; no chills; no night sweats; no appetite changes  Skin           no rashes, or lesions; + hair loss  Eye           no irritation; no changes in vision; + visual difficulty  Ward-Laryngeal           no dysphagia; no hoarseness   Pulmonary    no cough; no shortness of breath  Cardiovascular    no chest pain; no palpitations  Gastrointestinal    + diarrhea; + constipation; no abdominal pain; no changes in bowel  habits; no blood in stool  Genitourinary   no urinary frequency; no urinary urgency; no dysuria; no pain; no abnormal vaginal discharge; no abnormal vaginal bleeding  Breast    no breast discharge; no breast changes; no breast pain  Musculoskeletal    no myalgias; no arthralgias; no back pain  Psychiatric           no depressed mood; no anxiety    Hematologic            no tender lymph nodes; no noticeable swellings or lumps   Endocrine    no hot flashes; no heat/cold intolerance         Neurological   no tremor; + numbness and tingling; + loss of balance; + difficulty walking; no headaches; no difficulty sleeping    Obstetrics  and Gynecology History:  ,   Menopause at age 52, took HRT for a short while, maybe 1 year        Past Medical History:  Past Medical History:   Diagnosis Date     Hypertension      Ovarian cancer (H)      Paraneoplastic neuromyopathy and neuropathy (H)      PE (pulmonary thromboembolism) (H)      Spinal stenosis      Thyroid disease        Past Surgical History:  Past Surgical History:   Procedure Laterality Date     AS TOTAL KNEE ARTHROPLASTY Left      BACK SURGERY  2009     THYROIDECTOMY         Health Maintenance:  Last Pap Smear: 5 years              Result: normal  She has not had a history of abnormal Pap smears.    Last Mammogram: 10/19/16              Result: normal      She has not had a history of abnormal mammograms.    Last Colonoscopy:               Result: normal      Current Medications:   has a current medication list which includes the following prescription(s): omeprazole, escitalopram oxalate, sennosides, filgrastim, NEW MED, UNABLE TO FIND, potassium chloride er, ondansetron, losartan, hydrochlorothiazide, levothyroxine, vitamin d (cholecalciferol), enoxaparin, ondansetron, and prochlorperazine.     Allergies:   Amoxicillin      Social History:  Social History     Social History     Marital status:      Spouse name: N/A     Number of children: 1     Years of education: N/A     Occupational History     Current: Retired      Former:       Social History Main Topics     Smoking status: Never Smoker     Smokeless tobacco: Not on file     Alcohol use Yes      Comment: occasionally     Drug use: No     Sexual activity: Not on file     Other Topics Concern     Not on file     Social History Narrative     Lives with , feels safe at home.  Retired .  Enjoys playing golf, gardening.  Does have an advanced directive on file and would like her , Christiano to be her POA.  DNR/DNI    Family History:   The patient's family history is significant  "for.  Family History   Problem Relation Age of Onset     Breast Cancer Mother      Breast Cancer Maternal Grandmother      Breast Cancer Maternal Aunt          Physical Exam:   /67 (BP Location: Left arm)  Pulse 83  Temp 98.5  F (36.9  C) (Tympanic)  Resp 16  Ht 1.499 m (4' 11\")  Wt 75.3 kg (165 lb 14.4 oz)  SpO2 99%  BMI 33.51 kg/m2  Body mass index is 33.51 kg/(m^2).    General Appearance: healthy and alert, no distress     HEENT:  no thyromegaly, no palpable nodules or masses        Cardiovascular: regular rate and rhythm, no gallops, rubs or murmurs     Respiratory: lungs clear, no rales, rhonchi or wheezes, normal diaphragmatic excursion    Musculoskeletal: extremities non tender and with 1 + edema bilaterally    Skin: no lesions or rashes     Neurological: normal gait, no gross defects     Psychiatric: appropriate mood and affect                               Hematological: normal cervical, supraclavicular and inguinal lymph nodes     Gastrointestinal:       abdomen soft, non-tender, non-distended, no organomegaly or masses    Genitourinary: deferred    Labs:  WBC 4.9 with ANC 3.  Hemoglobin 7.7.  Platelets 76.  Creatinine 0.53.  Potassium 3.3.  Magnesium 1.4.  Remainder of electrolytes within normal limits.  AST 33, ALT 31, alkaline phosphatase 69, total bilirubin 0.2.  Albumin 2.7.          Assessment:    Tosin Nina is a 71 year old woman with a diagnosis of Stage IVB high grade serous ovarian cancer.     A total of 45 minutes was spent with the patient, >50% of which were spent in counseling the patient and/or treatment planning.      Plan:     1.)    Stage IVB high grade serous ovarian cancer.  CT results were reviewed and she had an excellent response to her neoadjuvant chemotherapy.  Given her excellent response and minimal residual disease, I believe she is an excellent candidate for robotic interval debulking.  We discussed that the goal of surgery would be to remove all visible " disease and given this there is still a 50/50 chance of her requiring conversion to an open procedure.  We discussed that at a minimum we would remove the uterus, cervix, both ovaries and fallopian tubes and the omentum but that the remainder of the procedure would be dependent on intra-operative findings and could potentially include bowel resection or even ostomy formation.  We discussed the need for 3 more cycles of chemotherapy following surgery with the hope of starting within 3-4 weeks of surgery.  Risks, benefits, indications and alternatives were discussed with the patient.  All her questions were answered and consents were signed for robotic removal of uterus, cervix, both ovaries and fallopian tubes, cancer debulking procedure, possible open on 6/28.  Pt will see PAC for clearance pre-operatively and we will defer labs until that visit.  Will also plan admission post-op.  Given that I will be on maternity leave for her post-operative check, she will have this with NP, Jyoti Lopez, who will also re-initiate her chemotherapy.  Plan will be for 3 more cycles of dose dense carbo/taxol.    2.) Deconditioning and wheelchair bound state.  We will plan an admission to the hospital post-operatively for PT/OT evaluation as she will likely require rehab stay.    3.) Chemotherapy side effects:  Myelosuppression with thrombocytopenia and anemia.  We discussed that in light of upcoming surgery, we will recheck her labs at the PAC visit.  We discussed that given her anemia she may require transfusion intra-operatively, especially in the event of conversion to an open procedure.    4.) Para-neoplastic syndrome.  Unfortunately, she did not show improvement with her recent admission and IVIG/steroids.  This is likely to be a long term issue.  She should have follow up with neurology in 1-2 months    5.) PE.  On therapuetic lovenox.  Given complete resolution of PE on recent CT will not plan for IVC filter prior to surgery.   Given her ovarian cancer, we will plan to continue her lovenox therapy until she has completed all her therapy at which time we will consult with hematology if it is reasonable for her to stop anti-coagulation.     6.) Genetic risk factors were assessed and the patient does meet the qualifications for a referral and she has a visit scheduled on 7/5    7.) Advanced malignancy and paraneoplastic symptoms.  She follows with palliative care and just saw them 6/9.    8.) Labs and/or tests ordered include:  None-deferred to PAC clinic     9.) Health maintenance issues addressed today include pt due for colonoscopy which can be addressed following her upfront treatment for ovarian cancer.            Thank you for allowing us to participate in the care of your patient.         Sincerely,    Nessa Ennis MD  Gynecologic Oncology  [facility]       CC

## 2017-06-16 NOTE — PROGRESS NOTES
Infusion Nursing Note:  Tosin Bahkarena presents today for Labs and Port access for CT scan.    Patient seen by provider today: No   present during visit today: Not Applicable.    Note: N/A.    Intravenous Access:  Lab draw site right port, Needle type Power port, Gauge 20.  Labs drawn without difficulty.  Implanted Port.    Treatment Conditions:  Not Applicable.    Post Infusion Assessment:  Blood return noted pre and post infusion.  Site patent and intact, free from redness, edema or discomfort.  No evidence of extravasations.  Port left accessed for CT. CT will flush with heparin and de-access after her scan.    Discharge Plan:   Discharge instructions reviewed with: Patient and Family.  Patient and/or family verbalized understanding of discharge instructions and all questions answered.  AVS to patient via [facility].  Patient will return 6/20/2017 for next appointment.   Patient discharged in stable condition accompanied by: son.  Departure Mode: Ambulatory.    Chelsea Garcia RN

## 2017-06-16 NOTE — MR AVS SNAPSHOT
After Visit Summary   6/16/2017    Tosin Nina    MRN: [id number]           Patient Information     Date Of Birth          1945        Visit Information        Provider Department      6/16/2017 9:00 AM RH INFUSION CHAIR 12 [facility] Infusion [facility]        Today's Diagnoses     Chemotherapy-induced neutropenia (H)    -  1    Ovarian cancer, unspecified laterality (H)           Follow-ups after your visit        Your next 10 appointments already scheduled     Jun 20, 2017 11:00 AM CDT   Level 3 with RH INFUSION CHAIR 5   [facility] Infusion [facility] ([facility])    [place] [address]  [place] [address]   [phone]            Jun 20, 2017 12:00 PM CDT   Return Visit with Nessa Foster MD   [facility] ([facility])    [place] [address]  [place] [address]   [phone]            Jun 27, 2017 11:00 AM CDT   Level 3 with RH INFUSION CHAIR 4   [facility] Infusion [facility] ([facility])    [place] [address]  [place] [address]   [phone]            Jun 28, 2017   Procedure with Nessa Foster MD   [facility], Same Day Surgery (--)    [address] [facility] [address]   [phone]            Jul 05, 2017  9:00 AM CDT   New Visit with Kelsie Nguyen GC   Cancer Risk Management Program ([facility])    [place] [address]  [place] [address]   [phone]            Jul 05, 2017 11:00 AM CDT   Level 3 with RH INFUSION CHAIR 7   [facility] ([facility])    [place] [address]  [place] [address]   [phone]              Who to contact     If you have  "questions or need follow up information about today's clinic visit or your schedule please contact [facility] INFUSION SERVICES directly at [phone].  Normal or non-critical lab and imaging results will be communicated to you by eMazeMehart, letter or phone within 4 business days after the clinic has received the results. If you do not hear from us within 7 days, please contact the clinic through eMazeMehart or phone. If you have a critical or abnormal lab result, we will notify you by phone as soon as possible.  Submit refill requests through Haha Pinche or call your pharmacy and they will forward the refill request to us. Please allow 3 business days for your refill to be completed.          Additional Information About Your Visit        eMazeMeharJawbone Information     Haha Pinche lets you send messages to your doctor, view your test results, renew your prescriptions, schedule appointments and more. To sign up, go to [email or web address] . Click on \"Log in\" on the left side of the screen, which will take you to the Welcome page. Then click on \"Sign up Now\" on the right side of the page.     You will be asked to enter the access code listed below, as well as some personal information. Please follow the directions to create your username and password.     Your access code is: [email or web address]  Expires: 8/10/2017 11:53 AM     Your access code will  in 90 days. If you need help or a new code, please call your [place] clinic or [phone].        Care EveryWhere ID     This is your Care EveryWhere ID. This could be used by other organizations to access your [place] medical records  [id number]         Blood Pressure from Last 3 Encounters:   17 129/68   17 125/67   17 96/54    Weight from Last 3 Encounters:   17 76.7 kg (169 lb)   17 73.6 kg (162 lb 3.2 oz)   17 72.6 kg (160 lb 2 oz)              We Performed the Following          Comprehensive metabolic panel (BMP + " Alb, Alk Phos, ALT, AST, Total. Bili, TP)        Primary Care Provider Office Phone # Fax #    Esther Back [phone]       [facility] [address] [address]        Thank you!     Thank you for choosing  INFUSION SERVICES  for your care. Our goal is always to provide you with excellent care. Hearing back from our patients is one way we can continue to improve our services. Please take a few minutes to complete the written survey that you may receive in the mail after your visit with us. Thank you!             Your Updated Medication List - Protect others around you: Learn how to safely use, store and throw away your medicines at www.disposemymeds.org.          This list is accurate as of: 6/16/17 10:51 AM.  Always use your most recent med list.                   Brand Name Dispense Instructions for use    enoxaparin 80 MG/0.8ML injection    LOVENOX    60 Syringe    Inject 0.6 mLs (60 mg) Subcutaneous every 12 hours       ESCITALOPRAM OXALATE PO      Take 10 mg by mouth daily       Filgrastim 300 MCG/0.5ML Sosy syringe    NEUPOGEN    12 Syringe    Inject 0.5 mLs (300 mcg) Subcutaneous daily       hydrochlorothiazide 25 MG tablet    HYDRODIURIL    30 tablet    Take 2 tablets (50 mg) by mouth daily       levothyroxine 125 MCG tablet    SYNTHROID    30 tablet    Take 1 tablet (125 mcg) by mouth daily       losartan 100 MG tablet    COZAAR    30 tablet    Take 1 tablet (100 mg) by mouth daily       NEW MED     400 mL    Diphenhydramine elixir 12.5mg/ml  (200       ondansetron 4 MG ODT tab    ZOFRAN-ODT    120 tablet    Take 1 tablet (4 mg) by mouth every 6 hours as needed for nausea       ondansetron 4 MG tablet    ZOFRAN    20 tablet    Take 1 tablet (4 mg) by mouth every 6 hours as needed for nausea       Potassium Chloride ER 20 MEQ Tbcr     30 tablet    Take 1 tablet (20 mEq) by mouth daily       PRILOSEC PO      Take 20 mg by mouth every  morning       prochlorperazine 5 MG tablet    COMPAZINE    90 tablet    Take 1 tablet (5 mg) by mouth every 6 hours as needed for vomiting       sennosides 8.6 MG tablet    SENOKOT     Take 1 tablet by mouth daily       UNABLE TO FIND      3 times daily MEDICATION NAME: BDRbenadryl/dexamethisone       Vitamin D (Cholecalciferol) 1000 UNITS Tabs     60 tablet    Take 2,000 Units by mouth daily

## 2017-06-20 ENCOUNTER — INFUSION THERAPY VISIT (OUTPATIENT)
Dept: INFUSION THERAPY | Facility: CLINIC | Age: 72
End: 2017-06-20
Attending: OBSTETRICS & GYNECOLOGY
Payer: COMMERCIAL

## 2017-06-20 ENCOUNTER — ONCOLOGY VISIT (OUTPATIENT)
Dept: ONCOLOGY | Facility: CLINIC | Age: 72
End: 2017-06-20
Attending: OBSTETRICS & GYNECOLOGY
Payer: COMMERCIAL

## 2017-06-20 ENCOUNTER — HOSPITAL ENCOUNTER (OUTPATIENT)
Facility: CLINIC | Age: 72
Setting detail: SPECIMEN
Discharge: HOME | End: 2017-06-20
Attending: OBSTETRICS & GYNECOLOGY | Admitting: OBSTETRICS & GYNECOLOGY
Payer: COMMERCIAL

## 2017-06-20 VITALS
HEIGHT: 59 IN | HEART RATE: 83 BPM | SYSTOLIC BLOOD PRESSURE: 110 MMHG | RESPIRATION RATE: 16 BRPM | OXYGEN SATURATION: 99 % | TEMPERATURE: 98.5 F | WEIGHT: 165.9 LBS | BODY MASS INDEX: 33.44 KG/M2 | DIASTOLIC BLOOD PRESSURE: 67 MMHG

## 2017-06-20 DIAGNOSIS — D70.1 CHEMOTHERAPY-INDUCED NEUTROPENIA (H): Primary | ICD-10-CM

## 2017-06-20 DIAGNOSIS — C56.9 OVARIAN CANCER, UNSPECIFIED LATERALITY (H): ICD-10-CM

## 2017-06-20 DIAGNOSIS — T45.1X5A CHEMOTHERAPY-INDUCED NEUTROPENIA (H): Primary | ICD-10-CM

## 2017-06-20 DIAGNOSIS — I26.99 OTHER ACUTE PULMONARY EMBOLISM WITHOUT ACUTE COR PULMONALE (H): ICD-10-CM

## 2017-06-20 DIAGNOSIS — C56.9 MALIGNANT NEOPLASM OF OVARY, UNSPECIFIED LATERALITY (H): Primary | ICD-10-CM

## 2017-06-20 LAB
ALBUMIN SERPL-MCNC: 2.7 G/DL (ref 3.4–5)
ALP SERPL-CCNC: 69 U/L (ref 40–150)
ALT SERPL W P-5'-P-CCNC: 31 U/L (ref 0–50)
ANION GAP SERPL CALCULATED.3IONS-SCNC: 7 MMOL/L (ref 3–14)
ANISOCYTOSIS BLD QL SMEAR: ABNORMAL
AST SERPL W P-5'-P-CCNC: 33 U/L (ref 0–45)
BASOPHILS # BLD AUTO: 0 10E9/L (ref 0–0.2)
BASOPHILS NFR BLD AUTO: 1 %
BILIRUB SERPL-MCNC: 0.2 MG/DL (ref 0.2–1.3)
BUN SERPL-MCNC: 7 MG/DL (ref 7–30)
CALCIUM SERPL-MCNC: 7.8 MG/DL (ref 8.5–10.1)
CHLORIDE SERPL-SCNC: 101 MMOL/L (ref 94–109)
CO2 SERPL-SCNC: 28 MMOL/L (ref 20–32)
CREAT SERPL-MCNC: 0.53 MG/DL (ref 0.52–1.04)
DIFFERENTIAL METHOD BLD: ABNORMAL
EOSINOPHIL # BLD AUTO: 0 10E9/L (ref 0–0.7)
EOSINOPHIL NFR BLD AUTO: 0 %
ERYTHROCYTE [DISTWIDTH] IN BLOOD BY AUTOMATED COUNT: 21.2 % (ref 10–15)
GFR SERPL CREATININE-BSD FRML MDRD: ABNORMAL ML/MIN/1.7M2
GLUCOSE SERPL-MCNC: 110 MG/DL (ref 70–99)
HCT VFR BLD AUTO: 22.2 % (ref 35–47)
HGB BLD-MCNC: 7.7 G/DL (ref 11.7–15.7)
LYMPHOCYTES # BLD AUTO: 1.6 10E9/L (ref 0.8–5.3)
LYMPHOCYTES NFR BLD AUTO: 33 %
MACROCYTES BLD QL SMEAR: PRESENT
MAGNESIUM SERPL-MCNC: 1.4 MG/DL (ref 1.6–2.3)
MCH RBC QN AUTO: 33.5 PG (ref 26.5–33)
MCHC RBC AUTO-ENTMCNC: 34.7 G/DL (ref 31.5–36.5)
MCV RBC AUTO: 97 FL (ref 78–100)
METAMYELOCYTES # BLD: 0 10E9/L
METAMYELOCYTES NFR BLD MANUAL: 1 %
MONOCYTES # BLD AUTO: 0.2 10E9/L (ref 0–1.3)
MONOCYTES NFR BLD AUTO: 4 %
NEUTROPHILS # BLD AUTO: 3 10E9/L (ref 1.6–8.3)
NEUTROPHILS NFR BLD AUTO: 61 %
NRBC # BLD AUTO: 0.2 10*3/UL
NRBC BLD AUTO-RTO: 4 /100
PLATELET # BLD AUTO: 76 10E9/L (ref 150–450)
PLATELET # BLD EST: ABNORMAL 10*3/UL
POLYCHROMASIA BLD QL SMEAR: SLIGHT
POTASSIUM SERPL-SCNC: 3.3 MMOL/L (ref 3.4–5.3)
PROT SERPL-MCNC: 7.1 G/DL (ref 6.8–8.8)
RBC # BLD AUTO: 2.3 10E12/L (ref 3.8–5.2)
SODIUM SERPL-SCNC: 136 MMOL/L (ref 133–144)
WBC # BLD AUTO: 4.9 10E9/L (ref 4–11)

## 2017-06-20 PROCEDURE — 96361 HYDRATE IV INFUSION ADD-ON: CPT

## 2017-06-20 PROCEDURE — 80053 COMPREHEN METABOLIC PANEL: CPT | Performed by: OBSTETRICS & GYNECOLOGY

## 2017-06-20 PROCEDURE — 25000128 H RX IP 250 OP 636: Performed by: OBSTETRICS & GYNECOLOGY

## 2017-06-20 PROCEDURE — 25000125 ZZHC RX 250: Performed by: OBSTETRICS & GYNECOLOGY

## 2017-06-20 PROCEDURE — 99215 OFFICE O/P EST HI 40 MIN: CPT | Performed by: OBSTETRICS & GYNECOLOGY

## 2017-06-20 PROCEDURE — 99211 OFF/OP EST MAY X REQ PHY/QHP: CPT | Mod: 25

## 2017-06-20 PROCEDURE — 85025 COMPLETE CBC W/AUTO DIFF WBC: CPT | Performed by: OBSTETRICS & GYNECOLOGY

## 2017-06-20 PROCEDURE — 83735 ASSAY OF MAGNESIUM: CPT | Performed by: OBSTETRICS & GYNECOLOGY

## 2017-06-20 PROCEDURE — 96365 THER/PROPH/DIAG IV INF INIT: CPT

## 2017-06-20 RX ADMIN — MAGNESIUM SULFATE HEPTAHYDRATE: 500 INJECTION, SOLUTION INTRAMUSCULAR; INTRAVENOUS at 13:47

## 2017-06-20 ASSESSMENT — PAIN SCALES - GENERAL: PAINLEVEL: NO PAIN (0)

## 2017-06-20 NOTE — MR AVS SNAPSHOT
After Visit Summary   6/20/2017    Tosin Nina    MRN: [id number]           Patient Information     Date Of Birth          1945        Visit Information        Provider Department      6/20/2017 12:00 PM Nessa Christina MD [facility]        Today's Diagnoses     Malignant neoplasm of ovary, unspecified laterality (H)    -  1    Other acute pulmonary embolism without acute cor pulmonale (H)          Care Instructions    You have been scheduled for surgery on: 6/28/17    Diagnosis:  Ovarian cancer    The surgical procedure is: Robotic removal of uterus, cervix, both ovaries and fallopian tubes, cancer debulking procedure, possible open    Anticipated length of surgery: 2-4 hrs.  You will be in the recovery room for approximately 2-3 hrs after surgery.  Your family will not be able to see you until after you leave the recovery room.    Length of hospital stay:  This is an outpatient, extended stay surgery.    ______________________________________________________________________    Preparation for Surgery:    To Schedule   1.  PAC clinic visit prior to surgery Scheduled Thursday 6/22/17 at 12:15. Carmen DOWLING   2.  Post-operative exam with NP (Jyoti Lopez) 1-2 weeks following surgery  Postoperative Restrictions:  No heavy lifting >20lbs for eight weeks, nothing in the vagina (no tampons, no intercourse, no douching) for eight weeks.     Postoperative visit:  Return to clinic 1-2 weeks after surgery for post operative visit.-Scheduled for 7/13/17 @ 1:30pm. [place].  AVS printed and given to PtNicolas RANDOLPH.      Please contact the clinic with any questions or concerns.    Nessa Ennis MD  Gynecologic Oncology  [facility]               Follow-ups after your visit        Your next 10 appointments already scheduled     Jun 22, 2017 12:30 PM CDT   (Arrive by 12:15 PM)   PAC Pharmacist with  Pac Pharmacist   [facility] [place]  ([facility])    [address] [facility] [address]  4th Floor  [facility] [address]   [phone]            Jun 22, 2017  1:00 PM CDT   (Arrive by 12:45 PM)   PAC EVALUATION with Uc Pac Mony 8   [facility] ([facility])    [address]  4th Floor  [facility] [address]   [phone]            Jun 22, 2017  2:00 PM CDT   (Arrive by 1:45 PM)   PAC RN ASSESSMENT with Uc Pac Rn   [facility] Preoperative Assessment [place] ([facility])    [address]  4th Floor  [facility] [address]   [phone]            Jun 22, 2017  2:40 PM CDT   (Arrive by 2:25 PM)   PAC Anesthesia Consult with Adrile Pac Anesthesiologist   [facility] Preoperative Assessment [place] ([facility])    [address]  4th [facility] [address]   [phone]            Jun 27, 2017 11:00 AM CDT   Level 3 with RH INFUSION CHAIR 4   [facility] ([facility])    [facility]  [address]  [facility] [address]   [phone]            Jun 28, 2017   Procedure with Nessa Foster MD   [facility], [place], Same Day Surgery (--)    [address] [facility] [address]   [phone]            Jul 05, 2017  9:00 AM CDT   New Visit with Kelsie Nguyen GC   Cancer Risk Management Program ([facility])    [facility]  [address]  [facility] [address]   [phone]            Jul 05, 2017 11:00 AM CDT   Level 3 with RH INFUSION CHAIR 7   [facility] ([facility])    [facility]  [address]  [facility] [address]   [phone]            Jul 13, 2017  1:30 PM CDT   Return Visit with SALVADOR Gil CNP   [facility] ([facility])    [facility]  [address]  [facility] [address]   [phone]              Future tests that were ordered for you today     Open Future Orders        Priority Expected Expires Ordered    US Lower Extremity Venous Duplex Bilateral Routine  2018            Who to contact     If you have questions or need follow up information about today's clinic visit or your schedule please contact [facility] directly at [phone].  Normal or non-critical lab and imaging results will be communicated to you by beBetter Healthhart, letter or phone within 4 business days after the clinic has received the results. If you do not hear from us within 7 days, please contact the clinic through beBetter Healthhart or phone. If you have a critical or abnormal lab result, we will notify you by phone as soon as possible.  Submit refill requests through Softricity or call your pharmacy and they will forward the refill request to us. Please allow 3 business days for your refill to be completed.          Additional Information About Your Visit        Softricity Information     Softricity lets you send messages to your doctor, view your test results, renew your prescriptions, schedule appointments and more. To sign up, go to [email or web address] . Click on \"Log in\" on the left side of the screen, which will take you to the Welcome page. Then click on \"Sign up Now\" on the right side of the page.     You will be asked to enter the access code listed below, as well as some personal information. Please follow the directions to create your username and password.     Your access code is: [email or web address]  Expires: 8/10/2017 11:53 AM     Your access code will  in 90 days. If you need help or a new code, please call your [place] clinic or [phone].        Care EveryWhere ID     This is your Care EveryWhere ID. This could be used by other organizations to access your [place] medical records  [id number]      " "  Your Vitals Were     Pulse Temperature Respirations Height Pulse Oximetry BMI (Body Mass Index)    83 98.5  F (36.9  C) (Tympanic) 16 1.499 m (4' 11\") 99% 33.51 kg/m2       Blood Pressure from Last 3 Encounters:   06/20/17 110/67   06/13/17 129/68   06/12/17 125/67    Weight from Last 3 Encounters:   06/20/17 75.3 kg (165 lb 14.4 oz)   06/13/17 76.7 kg (169 lb)   06/08/17 73.6 kg (162 lb 3.2 oz)               Primary Care Provider Office Phone # Fax #    Esther Back [phone]       [facility] [address] [address]        Thank you!     Thank you for choosing [facility]  for your care. Our goal is always to provide you with excellent care. Hearing back from our patients is one way we can continue to improve our services. Please take a few minutes to complete the written survey that you may receive in the mail after your visit with us. Thank you!             Your Updated Medication List - Protect others around you: Learn how to safely use, store and throw away your medicines at www.disposemymeds.org.          This list is accurate as of: 6/20/17  3:08 PM.  Always use your most recent med list.                   Brand Name Dispense Instructions for use    enoxaparin 80 MG/0.8ML injection    LOVENOX    60 Syringe    Inject 0.6 mLs (60 mg) Subcutaneous every 12 hours       ESCITALOPRAM OXALATE PO      Take 10 mg by mouth daily       Filgrastim 300 MCG/0.5ML Sosy syringe    NEUPOGEN    12 Syringe    Inject 0.5 mLs (300 mcg) Subcutaneous daily       hydrochlorothiazide 25 MG tablet    HYDRODIURIL    30 tablet    Take 2 tablets (50 mg) by mouth daily       levothyroxine 125 MCG tablet    SYNTHROID    30 tablet    Take 1 tablet (125 mcg) by mouth daily       losartan 100 MG tablet    COZAAR    30 tablet    Take 1 tablet (100 mg) by mouth daily       NEW MED     400 mL    Diphenhydramine elixir 12.5mg/ml  (200       ondansetron 4 MG ODT tab    " ZOFRAN-ODT    120 tablet    Take 1 tablet (4 mg) by mouth every 6 hours as needed for nausea       ondansetron 4 MG tablet    ZOFRAN    20 tablet    Take 1 tablet (4 mg) by mouth every 6 hours as needed for nausea       Potassium Chloride ER 20 MEQ Tbcr     30 tablet    Take 1 tablet (20 mEq) by mouth daily       PRILOSEC PO      Take 20 mg by mouth every morning       prochlorperazine 5 MG tablet    COMPAZINE    90 tablet    Take 1 tablet (5 mg) by mouth every 6 hours as needed for vomiting       sennosides 8.6 MG tablet    SENOKOT     Take 1 tablet by mouth daily       UNABLE TO FIND      3 times daily MEDICATION NAME: BDRbenadryl/dexamethisone       Vitamin D (Cholecalciferol) 1000 UNITS Tabs     60 tablet    Take 2,000 Units by mouth daily

## 2017-06-20 NOTE — NURSING NOTE
"Oncology Rooming Note    June 20, 2017 11:43 AM   Tosin Nina is a 71 year old female who presents for:    Chief Complaint   Patient presents with     Oncology Clinic Visit     Follow-up     Initial Vitals: /67 (BP Location: Left arm)  Pulse 83  Temp 98.5  F (36.9  C) (Tympanic)  Resp 16  Ht 1.499 m (4' 11\")  Wt 75.3 kg (165 lb 14.4 oz)  SpO2 99%  BMI 33.51 kg/m2 Estimated body mass index is 33.51 kg/(m^2) as calculated from the following:    Height as of this encounter: 1.499 m (4' 11\").    Weight as of this encounter: 75.3 kg (165 lb 14.4 oz). Body surface area is 1.77 meters squared.  No Pain (0) Comment: Data Unavailable   No LMP recorded. Patient is postmenopausal.  Allergies reviewed: Yes  Medications reviewed: Yes    Medications: Medication refills not needed today.  Pharmacy name entered into RedBee: [facility] [address] [place] [address] - [address] [place] [address] [address] [facility] [address]    Clinical concerns: Follow-up to discuss CT results and management     8 minutes for nursing intake (face to face time)     [place] Sanches  DISCHARGE PLAN:  Next appointments: See patient instruction section  Departure Mode: Ambulatory  Accompanied by: -Christiano  0 minutes for nursing discharge (face to face time)   [place]                "

## 2017-06-20 NOTE — MR AVS SNAPSHOT
After Visit Summary   6/20/2017    Tosin Nina    MRN: [id number]           Patient Information     Date Of Birth          1945        Visit Information        Provider Department      6/20/2017 11:00 AM RH INFUSION CHAIR 5 [facility] Infusion Services        Today's Diagnoses     Chemotherapy-induced neutropenia (H)    -  1    Ovarian cancer, unspecified laterality (H)           Follow-ups after your visit        Your next 10 appointments already scheduled     Jun 22, 2017 12:30 PM CDT   (Arrive by 12:15 PM)   PAC Pharmacist with  Pac Pharmacist   [facility] Preoperative Assessment [place] ([facility])    [address] [address]   [phone]            Jun 22, 2017  1:00 PM CDT   (Arrive by 12:45 PM)   PAC EVALUATION with  Pac Mony 8   [facility] Preoperative Assessment [place] ([facility])    [address] [address]   [phone]            Jun 22, 2017  2:00 PM CDT   (Arrive by 1:45 PM)   PAC RN ASSESSMENT with Adriel Pac Rn   [facility] Preoperative Assessment [place] ([facility])    [address] [address]   [phone]            Jun 22, 2017  2:40 PM CDT   (Arrive by 2:25 PM)   PAC Anesthesia Consult with Adriel Pac Anesthesiologist   [facility] Assessment [place] ([facility])    [address] [place] [address]   [phone]            Jun 27, 2017 11:00 AM CDT   Level 3 with RH INFUSION CHAIR 4   [facility] In[facility] ([facility])    [facility]  [address]  [place] [address]   [phone]            Jun 28, 2017   Procedure with Nessa Foster MD   [facility], [place], Same Day Surgery (--)    [address] [facility] [address]   [phone]        "     Jul 05, 2017  9:00 AM CDT   New Visit with Kelsie Nguyen GC   Cancer Risk Management Program ([facility])    [facility]  [address]  [facility] [address]   [phone]            Jul 05, 2017 11:00 AM CDT   Level 3 with RH INFUSION CHAIR 7   [facility] Infusion Services ([facility])    [facility]  [address]  [facility] [address] [phone]            Jul 13, 2017  1:30 PM CDT   Return Visit with SALVADOR Gil CNP   [facility] ([facility])    [facility]  [address]  [facility] [address] [phone]              Future tests that were ordered for you today     Open Future Orders        Priority Expected Expires Ordered    US Lower Extremity Venous Duplex Bilateral Routine  6/20/2018 6/20/2017            Who to contact     If you have questions or need follow up information about today's clinic visit or your schedule please contact [facility] INFUSION SERVICES directly at [phone].  Normal or non-critical lab and imaging results will be communicated to you by MyChart, letter or phone within 4 business days after the clinic has received the results. If you do not hear from us within 7 days, please contact the clinic through MyChart or phone. If you have a critical or abnormal lab result, we will notify you by phone as soon as possible.  Submit refill requests through FohBoh or call your pharmacy and they will forward the refill request to us. Please allow 3 business days for your refill to be completed.          Additional Information About Your Visit        Adept CloudharVhayu Technologies Information     FohBoh lets you send messages to your doctor, view your test results, renew your prescriptions, schedule appointments and more. To sign up, go to [email or web address] . Click on \"Log in\" on " "the left side of the screen, which will take you to the Welcome page. Then click on \"Sign up Now\" on the right side of the page.     You will be asked to enter the access code listed below, as well as some personal information. Please follow the directions to create your username and password.     Your access code is: [email or web address]  Expires: 8/10/2017 11:53 AM     Your access code will  in 90 days. If you need help or a new code, please call your [facility] or [phone].        Care EveryWhere ID     This is your Care EveryWhere ID. This could be used by other organizations to access your [place] medical records  [id number]         Blood Pressure from Last 3 Encounters:   17 110/67   17 129/68   17 125/67    Weight from Last 3 Encounters:   17 75.3 kg (165 lb 14.4 oz)   17 76.7 kg (169 lb)   17 73.6 kg (162 lb 3.2 oz)              We Performed the Following     CBC with platelets differential     Comprehensive metabolic panel     Magnesium        Primary Care Provider Office Phone # Fax #    Esther Back [phone]       [place] [address]        Thank you!     Thank you for choosing [facility] INFUSION SERVICES  for your care. Our goal is always to provide you with excellent care. Hearing back from our patients is one way we can continue to improve our services. Please take a few minutes to complete the written survey that you may receive in the mail after your visit with us. Thank you!             Your Updated Medication List - Protect others around you: Learn how to safely use, store and throw away your medicines at www.disposemymeds.org.          This list is accurate as of: 17  3:45 PM.  Always use your most recent med list.                   Brand Name Dispense Instructions for use    enoxaparin 80 MG/0.8ML injection    LOVENOX    60 Syringe    Inject 0.6 mLs (60 mg) Subcutaneous " every 12 hours       ESCITALOPRAM OXALATE PO      Take 10 mg by mouth daily       Filgrastim 300 MCG/0.5ML Sosy syringe    NEUPOGEN    12 Syringe    Inject 0.5 mLs (300 mcg) Subcutaneous daily       hydrochlorothiazide 25 MG tablet    HYDRODIURIL    30 tablet    Take 2 tablets (50 mg) by mouth daily       levothyroxine 125 MCG tablet    SYNTHROID    30 tablet    Take 1 tablet (125 mcg) by mouth daily       losartan 100 MG tablet    COZAAR    30 tablet    Take 1 tablet (100 mg) by mouth daily       NEW MED     400 mL    Diphenhydramine elixir 12.5mg/ml  (200       ondansetron 4 MG ODT tab    ZOFRAN-ODT    120 tablet    Take 1 tablet (4 mg) by mouth every 6 hours as needed for nausea       ondansetron 4 MG tablet    ZOFRAN    20 tablet    Take 1 tablet (4 mg) by mouth every 6 hours as needed for nausea       Potassium Chloride ER 20 MEQ Tbcr     30 tablet    Take 1 tablet (20 mEq) by mouth daily       PRILOSEC PO      Take 20 mg by mouth every morning       prochlorperazine 5 MG tablet    COMPAZINE    90 tablet    Take 1 tablet (5 mg) by mouth every 6 hours as needed for vomiting       sennosides 8.6 MG tablet    SENOKOT     Take 1 tablet by mouth daily       UNABLE TO FIND      3 times daily MEDICATION NAME: BDRbenadryl/dexamethisone       Vitamin D (Cholecalciferol) 1000 UNITS Tabs     60 tablet    Take 2,000 Units by mouth daily

## 2017-06-20 NOTE — PATIENT INSTRUCTIONS
You have been scheduled for surgery on: 6/28/17--Preop teach completed per DajuanRN on 6/20/17    Patient will not receive chemotherapy today.  Please cancel chemo treatment on 7/5/17 as well.  Patient will restart chemo ~ 4 weeks after surgery.  appts canceled; rescheduled appts ~ end of July DAWSON MartinsRN      Please replace K++ and Mg++ per protocol: completed by infusion 6/20/17    Diagnosis:  Ovarian cancer    The surgical procedure is: Robotic removal of uterus, cervix, both ovaries and fallopian tubes, cancer debulking procedure, possible open    Anticipated length of surgery: 2-4 hrs.  You will be in the recovery room for approximately 2-3 hrs after surgery.  Your family will not be able to see you until after you leave the recovery room.    Length of hospital stay:  This is an outpatient, extended stay surgery.    ______________________________________________________________________    Preparation for Surgery:    To Schedule   1.  PAC clinic visit prior to surgery Scheduled Thursday 6/22/17 at 12:15. Carmen DOWLING   2.  Post-operative exam with NP (Jyoti Lopez) 1-2 weeks following surgery  Postoperative Restrictions:  No heavy lifting >20lbs for eight weeks, nothing in the vagina (no tampons, no intercourse, no douching) for eight weeks.     Postoperative visit:  Return to clinic 1-2 weeks after surgery for post operative visit.-Scheduled for 7/13/17 @ 1:30pm. Laney.  AVS printed and given to Sonia RANDOLPH.      Please contact the clinic with any questions or concerns.    Nessa Ennis MD  Gynecologic Oncology  [facility]

## 2017-06-21 NOTE — NURSING NOTE
LATE ENTRY for 6/20/17:  Preop teaching completed.  Consent package faxed to scheduling @ [phone].  Copy made and orig sent for scanning. Patient did not receive chemo today and will not receive any additional chemo until after surgery.  POC will be determined based on surgical findings.At this point: plan will be to restart chemo ~ 4 weeks after surgery for 2-3 more cycles.   For entire teach, please see info under Education Tab.  Henrik Martins RN, BSN, OCN  [facility] [place]  Patient Care Coordinator

## 2017-06-22 ENCOUNTER — ALLIED HEALTH/NURSE VISIT (OUTPATIENT)
Dept: ONCOLOGY | Facility: CLINIC | Age: 72
End: 2017-06-22

## 2017-06-22 ENCOUNTER — ANESTHESIA EVENT (OUTPATIENT)
Dept: SURGERY | Facility: CLINIC | Age: 72
DRG: 737 | End: 2017-06-22
Payer: COMMERCIAL

## 2017-06-22 ENCOUNTER — TELEPHONE (OUTPATIENT)
Dept: ONCOLOGY | Facility: CLINIC | Age: 72
End: 2017-06-22

## 2017-06-22 ENCOUNTER — ALLIED HEALTH/NURSE VISIT (OUTPATIENT)
Dept: SURGERY | Facility: CLINIC | Age: 72
End: 2017-06-22

## 2017-06-22 ENCOUNTER — OFFICE VISIT (OUTPATIENT)
Dept: SURGERY | Facility: CLINIC | Age: 72
End: 2017-06-22

## 2017-06-22 VITALS
TEMPERATURE: 99.3 F | HEIGHT: 59 IN | OXYGEN SATURATION: 97 % | SYSTOLIC BLOOD PRESSURE: 91 MMHG | DIASTOLIC BLOOD PRESSURE: 64 MMHG | HEART RATE: 100 BPM | WEIGHT: 164 LBS | RESPIRATION RATE: 14 BRPM | BODY MASS INDEX: 33.06 KG/M2

## 2017-06-22 DIAGNOSIS — Z01.818 PREOP EXAMINATION: ICD-10-CM

## 2017-06-22 DIAGNOSIS — Z71.9 VISIT FOR COUNSELING: Primary | ICD-10-CM

## 2017-06-22 DIAGNOSIS — Z01.818 PREOP EXAMINATION: Primary | ICD-10-CM

## 2017-06-22 LAB
ERYTHROCYTE [DISTWIDTH] IN BLOOD BY AUTOMATED COUNT: 24.1 % (ref 10–15)
FERRITIN SERPL-MCNC: 451 NG/ML (ref 8–252)
HCT VFR BLD AUTO: 24.8 % (ref 35–47)
HGB BLD-MCNC: 8.4 G/DL (ref 11.7–15.7)
IRON SATN MFR SERPL: 27 % (ref 15–46)
IRON SERPL-MCNC: 103 UG/DL (ref 35–180)
MCH RBC QN AUTO: 34.3 PG (ref 26.5–33)
MCHC RBC AUTO-ENTMCNC: 33.9 G/DL (ref 31.5–36.5)
MCV RBC AUTO: 101 FL (ref 78–100)
PLATELET # BLD AUTO: 104 10E9/L (ref 150–450)
POTASSIUM SERPL-SCNC: 3 MMOL/L (ref 3.4–5.3)
RBC # BLD AUTO: 2.45 10E12/L (ref 3.8–5.2)
TIBC SERPL-MCNC: 383 UG/DL (ref 240–430)
WBC # BLD AUTO: 5 10E9/L (ref 4–11)

## 2017-06-22 RX ORDER — CALCIUM CARBONATE 500 MG/1
1 TABLET, CHEWABLE ORAL DAILY PRN
COMMUNITY
End: 2017-10-12

## 2017-06-22 NOTE — TELEPHONE ENCOUNTER
Adelina from the anesthesia office called and patient needs to have more K++ she is low again today @ 3.0.  Recommend to have K++ infusions to get her WNL in order for surgery.  Anesthesia will not OK patient for surgery until K++ is recovered.  Suggested IV infusion, but also increasing oral amount of K++ as well.  Requested writer reach out to provider.  Hgb today 8.4.  MD notified.  Henrik Martins, RN, BSN, OCN  [facility]  Patient Care Coordinator

## 2017-06-22 NOTE — H&P
Pre-Operative H & P     CC:  Preoperative exam to assess for increased cardiopulmonary risk while undergoing surgery and anesthesia.    Date of Encounter: 6/22/2017  Primary Care Physician:  Esther Back  Tosin Nina is a 71 year old female who presents for pre-operative H & P in preparation for a DaVinci Assisted Removal Of Uterus, Cervix, Both Ovaries And Fallopian Tubes, Cancer Debulking Procedure, Possible Bowel Resection, Possible Ostomy, Possible Intraperitoneal Port Placement with Dr. Kim on 6/28/17 at [facility].     Tosin Nina is a 71 year old female with hypertension, history of PE, anemia, thrombocytopenia, obesity, paraneoplstic neuromyopathy and neuropathy, hypothyroidism, GERD, and depression that has ovarian cancer.  This was diagnosed in April 2017 after being admitted to the hospital for multiple abnormal neurologic symptoms.  During that admission she was additionally diagnosed with a pulmonary embolism and paraneoplastic neuromyopathy and neuropathy.  She has undergone chemotherapy for treatment thus far and now the above listed procedure has been recommended for further treatment.    History is obtained from the patient and her .     Past Medical History  Past Medical History:   Diagnosis Date     Anemia      Cervical spinal stenosis      Depression      GERD (gastroesophageal reflux disease)      Hypertension      Hypokalemia      Hypothyroid      Lumbar spinal stenosis      Obesity      Ovarian cancer (H)      Paraneoplastic neuromyopathy and neuropathy (H)      PE (pulmonary thromboembolism) (H)        Past Surgical History  Past Surgical History:   Procedure Laterality Date     AS TOTAL KNEE ARTHROPLASTY Left      BACK SURGERY  2009     CHOLECYSTECTOMY  01/01/2016     OPEN REDUCTION INTERNAL FIXATION WRIST Right 2009     THYROIDECTOMY         Hx of Blood transfusions/reactions: none    Hx of abnormal  "bleeding or anti-platelet use: none    Menstrual history: No LMP recorded. Patient is postmenopausal.    Steroid use in the last year: IV steroids in April for abnormal neurologic symptoms, but no long term steroids.      Personal or FH with difficulty with Anesthesia:  none    Prior to Admission Medications  Current Outpatient Prescriptions   Medication Sig Dispense Refill     DOCUSATE SODIUM PO Take 100 mg by mouth daily as needed for constipation       calcium carbonate (TUMS) 500 MG chewable tablet Take 1 chew tab by mouth daily as needed for heartburn       Omeprazole (PRILOSEC PO) Take 20 mg by mouth every morning       ESCITALOPRAM OXALATE PO Take 10 mg by mouth daily       sennosides (SENOKOT) 8.6 MG tablet Take 1 tablet by mouth daily       Potassium Chloride ER 20 MEQ TBCR Take 1 tablet (20 mEq) by mouth daily 30 tablet 3     losartan (COZAAR) 100 MG tablet Take 1 tablet (100 mg) by mouth daily (Patient taking differently: Take 100 mg by mouth every morning ) 30 tablet      hydrochlorothiazide (HYDRODIURIL) 25 MG tablet Take 2 tablets (50 mg) by mouth daily (Patient taking differently: Take 25 mg by mouth daily ) 30 tablet      levothyroxine (SYNTHROID) 125 MCG tablet Take 1 tablet (125 mcg) by mouth daily 30 tablet      Vitamin D, Cholecalciferol, 1000 UNITS TABS Take 2,000 Units by mouth daily (Patient taking differently: Take 2,000 Units by mouth daily LD 6/20/17, told to hold until after surgery) 60 tablet      enoxaparin (LOVENOX) 80 MG/0.8ML injection Inject 0.6 mLs (60 mg) Subcutaneous every 12 hours 60 Syringe 1       Allergies  Allergies   Allergen Reactions     Amoxicillin Hives     Clarithromycin Other (See Comments)     \"I felt dopey, sleepy and like a druggie\"     Lisinopril Cough     Penicillins Rash       Social History  Social History     Social History     Marital status:      Spouse name: Christiano Nina     Number of children: 1     Years of education: N/A     Occupational History "     Current: Retired      Former:       Social History Main Topics     Smoking status: Never Smoker     Smokeless tobacco: Not on file     Alcohol use Yes      Comment: occasionally     Drug use: No     Sexual activity: Not on file     Other Topics Concern     Not on file     Social History Narrative       Family History  Family History   Problem Relation Age of Onset     Breast Cancer Mother      Heart Failure Mother      Breast Cancer Maternal Grandmother      Breast Cancer Maternal Aunt      Myocardial Infarction Father      Unknown/Adopted Brother      Unknown/Adopted Maternal Grandfather      Stomach Cancer Paternal Grandmother      Lung Cancer Paternal Grandfather      mustard gas in WW[place]           ROS/MED HX  The complete review of systems is negative other than noted in the HPI or here.     ENT/Pulmonary:     (+)KAILASH risk factors hypertension, , . .    Neurologic:     (+)neuropathy - hands and feet, other neuro paraneoplastic neuromyopathy and neuropathy    Cardiovascular:     (+) hypertension-range: unknown, ---. : . . RECINOS, .   METS/Exercise Tolerance:  1 - Eating, dressing   Hematologic:     (+) History of blood clots pt is anticoagulated, Anemia, -     (-) History of Transfusion   Musculoskeletal:   (+) , , other musculoskeletal- cervical and lumbar spine stenosis, generalized weakness      GI/Hepatic:     (+) GERD Asymptomatic on medication,       Renal/Genitourinary:  - ROS Renal section negative       Endo:     (+) thyroid problem hypothyroidism, Obesity, .      Psychiatric:     (+) psychiatric history depression      Infectious Disease:  - neg infectious disease ROS       Malignancy:   (+) Malignancy History of Other  Other CA ovarian Active status post Chemo         Other:    (+) No chance of pregnancy no H/O Chronic Pain,other significant disability Wheelchair bound                     Temp: 99.3  F (37.4  C) (After drinking coffee) Temp src: Oral BP: 91/64 Pulse: 100   Resp: 14 SpO2: 97  "%         164 lbs 0 oz  4' 11\"   Body mass index is 33.12 kg/(m^2).       Physical Exam  Constitutional: Awake, alert, cooperative, no apparent distress, and appears stated age. obese  Eyes: Pupils equal, round and reactive to light, extra ocular muscles intact, sclera clear, conjunctiva normal.  HENT: Normocephalic, oral pharynx with moist mucus membranes, good dentition. No goiter appreciated.   Respiratory: Clear to auscultation bilaterally, no crackles or wheezing.  Cardiovascular: Regular rate and rhythm, normal S1 and S2, and no murmur noted.  Carotids +2, no bruits. BLE 1+ pitting edema. Palpable radial pulses.  Diminished pedal pulses.  GI: Normal bowel sounds, soft, non-distended, non-tender, no masses palpated, no hepatosplenomegaly.    Lymph/Hematologic: No cervical lymphadenopathy and no supraclavicular lymphadenopathy.  Genitourinary:  deferred  Skin: Warm and dry.  No rashes at anticipated surgical site.   Musculoskeletal: Full ROM of neck. There is no redness, warmth, or swelling of the exposed joints. Gross motor strength is slightly decreased throughout.  Required 2 person assist with gait belt for transfer.    Neurologic: Awake, alert, oriented to name, place and time. Cranial nerves II-XII are grossly intact. Gait is not assessed.    Neuropsychiatric: Calm, cooperative. Normal affect.     Labs: (personally reviewed)   Component      Latest Ref Rng & Units 6/22/2017   WBC      4.0 - 11.0 10e9/L 5.0   RBC Count      3.8 - 5.2 10e12/L 2.45 (L)   Hemoglobin      11.7 - 15.7 g/dL 8.4 (L)   Hematocrit      35.0 - 47.0 % 24.8 (L)   MCV      78 - 100 fl 101 (H)   MCH      26.5 - 33.0 pg 34.3 (H)   MCHC      31.5 - 36.5 g/dL 33.9   RDW      10.0 - 15.0 % 24.1 (H)   Platelet Count      150 - 450 10e9/L 104 (L)   Potassium      3.4 - 5.3 mmol/L 3.0 (L)       Stress echo  2/7/2017  Interpretation Summary  This was a normal stress echocardiogram with no evidence of stress-induced  ischemia. Contrast was used " without apparent complications.  The visual ejection fraction is estimated at >70%.    EKG  6/8/2017  Sinus rhythm  Cannot rule out Anterior infarct , age undetermined  Abnormal ECG  When compared with ECG of 11-APR-2017 16:39,  No significant change was found      Outside records reviewed from:  Care Everywhere      ASSESSMENT and PLAN  Tosin Nina is a 71 year old female scheduled for a DaVinci Assisted Removal Of Uterus, Cervix, Both Ovaries And Fallopian Tubes, Cancer Debulking Procedure, Possible Bowel Resection, Possible Ostomy, Possible Intraperitoneal Port Placement on 6/28/2017 by Dr. Kim in treatment of ovarian cancer.  PAC referral for risk assessment and optimization for anesthesia with comorbid conditions of: hypertension, anemia, thrombocytopenia, history of PE, obesity, paraneoplstic neuromyopathy and neuropathy, hypothyroidism, GERD, and depression.    Pre-operative considerations:  1.  Cardiac:  Functional status- METS 1.  The patient is fairly sedentary, sitting most of the day.  She reports she will get exertional dyspnea when she has to go up or down the stairs in her home, but prior to her cancer diagnosis and physical declined she was able to exercise regularly with no exertional dyspnea.  She had a negative stress echo in Feb 2017.   Hypertension is managed with hctz and losartan, but recently the blood pressure readings have been lower than normal for her (91/64 today); possibly due to her anemia.  A call was placed today to her primary care office to see if her primary care provider would want to make an adjustment to her hypertension medication, but she won't be in the office tomorrow.  Message left with RN to call the patient with any new instructions.   Instructed to hold her losartan and hctz the DOS.  Intermediate risk surgery with 0.9% risk of major adverse cardiac event.  No further cardiac evaluation needed per 2014 ACC/AHA guidelines for non-cardiac surgery.  2.  Pulm:   Airway feasible.  KAILASH risk: intermediate.    3.  GI:  Risk of PONV score = 2.  If > 2, anti-emetic intervention recommended.  GERD is well controlled with prilosec.   4. Heme:  She was diagnosed with a PE in April 2017 which has since resolved.  She continues on BID lovenox injections, but has been instructed by heme/onc to hold the dose the night before surgery and the morning of surgery.  Her hemoglobin has gradually been decreasing and on 6/20/2017 it was 7.7. The hgb today (requested recheck per surgeon) is 8.4.  She has not been transfused or had any iron infusions initiated.  Per the surgeons note, they did discuss the likelihood of needing to do a blood transfusion during surgery.   DOS Hgb recheck ordered.  VTE risk:  4.5%.  5. Neuromuscular:  The patient is deconditioned and uses a wheelchair at most times.  The transfer from wheelchair to exam table required the assist of two with a gait belt.  Her  has to assist her with a gait belt at home, especially on the stairs.  Fall risk precautions should be considered.    6. FEN:  Her K+ level has consistently been running low. Her level on 6/20/17 was 3.3, but she reports she was given a potassium infusion that day and has been taking oral Kcl as ordered.  Repeat K+ level today is lower again at 3.0.  Call placed to Dr. Ennis's RN care coordinator to notify of K+ level and to see if Dr. Ennis would consider ordering another K+ infusion prior to surgery and possibly increasing her oral K+ dosing.  RN to discuss with Dr. Ennis and determine plan.  K+ recheck ordered for DOS.          Patient is optimized and is acceptable candidate for the proposed procedure.  No further diagnostic evaluation is needed.     Patient also evaluated by Dr. Juarez. See recommendations below.     For further details of assessment, testing, and physical exam please see H and P completed on same date.          Adelina Marmolejo, DANELLE, RN, APRN  Preoperative Assessment  [facility]  Phone: [phone]  Fax: [phone]

## 2017-06-22 NOTE — MR AVS SNAPSHOT
After Visit Summary   6/22/2017    Tosin Nina    MRN: [id number]           Patient Information     Date Of Birth          1945        Visit Information        Provider Department      6/22/2017 2:02 PM Traci Mcwilliams, [facility]        Today's Diagnoses     Visit for counseling    -  1       Follow-ups after your visit        Your next 10 appointments already scheduled     Sep 14, 2017 11:00 AM CDT   Level 4 with RH INFUSION CHAIR 10   [facility] ([facility])    [facility]  [address]  [facility] [address]   [phone]            Sep 15, 2017  2:00 PM CDT   Level 1 with RH INFUSION CHAIR 5   [facility] ([facility])    [facility]  [address]  [facility] [address]   [phone]            Sep 16, 2017 10:00 AM CDT   IV Infusion with RH OP PROCEDURE RN#2   [facility] Outpatient Procedures ([facility])    [address]  [facility] [address]   [phone]            Sep 17, 2017 10:00 AM CDT   IV Infusion with RH OP PROCEDURE RN#2   [facility] Outpatient [facility] ([facility])    [address]  [facility] [address]   [phone]            Sep 18, 2017  2:00 PM CDT   Level 1 with RH INFUSION CHAIR 3   [facility] ([facility])    [facility]  [address]  [facility] [address]   [phone]            Sep 26, 2017  9:00 AM CDT   New Visit with Kelsie Nguyen GC   Cancer Risk Management Program ([facility])    [facility]  [address]  [place] [address]   [phone]            Oct 03, 2017 11:00 AM CDT   Return Visit with Nessa Foster MD   [facility]  ([facility])    [facility] [place]  [address]  [place] [id number] [phone]              Who to contact     Please call your clinic at [phone] to:    Ask questions about your health    Make or cancel appointments    Discuss your medicines    Learn about your test results    Speak to your doctor   If you have compliments or concerns about an experience at your clinic, or if you wish to file a complaint, please contact [facility] Patient Relations at [phone] or email us at [email or web address]         Additional Information About Your Visit        [employer] Information     [employer] is an electronic gateway that provides easy, online access to your medical records. With [employer], you can request a clinic appointment, read your test results, renew a prescription or communicate with your care team.     To sign up for [employer] visit the website at [email or web address]   You will be asked to enter the access code listed below, as well as some personal information. Please follow the directions to create your username and password.     Your access code is: [email or web address]  Expires: 2017 12:37 PM     Your access code will  in 90 days. If you need help or a new code, please contact your [facility] or call [phone] for assistance.        Care EveryWhere ID     This is your Care EveryWhere ID. This could be used by other organizations to access your [place] medical records  [id number]         Blood Pressure from Last 3 Encounters:   09/10/17 117/69   17 119/59   17 130/73    Weight from Last 3 Encounters:   17 74.4 kg (164 lb 0.4 oz)   17 74.5 kg (164 lb 3.9 oz)   17 74.8 kg (165 lb)              Today, you had the following     No orders found for display         Today's Medication Changes          These changes are accurate as of: 17 11:59 PM.   If you have any questions, ask your nurse or doctor.               These medicines have changed or have updated prescriptions.        Dose/Directions    Vitamin D (Cholecalciferol) 1000 UNITS Tabs   This may have changed:  additional instructions   Used for:  Paresthesias        Dose:  2000 Units   Take 2,000 Units by mouth daily   Quantity:  60 tablet   Refills:  0         Stop taking these medicines if you haven't already. Please contact your care team if you have questions.     Filgrastim 300 MCG/0.5ML Sosy syringe   Commonly known as:  NEUPOGEN   Stopped by:  Pharmacist, Adriel Pac           NEW MED   Stopped by:  PharmacistAdriel Pac           ondansetron 4 MG ODT tab   Commonly known as:  ZOFRAN-ODT   Stopped by:  PharmacistAdriel Pac           ondansetron 4 MG tablet   Commonly known as:  ZOFRAN   Stopped by:  PharmacistAdriel Pac           prochlorperazine 5 MG tablet   Commonly known as:  COMPAZINE   Stopped by:  PharmacistAdriel Pac           UNABLE TO FIND   Stopped by:  PharmacistAdriel Pac                    Primary Care Provider Office Phone # Fax #    Esther Back [phone]       [facility] [address] [place] [address]        Equal Access to Services     [facility]: Hadii zita fields hadasho Sofely, waaxda luqadaha, qaybta kaallouie anderson, hetal regan . So [facility] [phone].    ATENCIÓN: Si habla español, tiene a alarcon disposición servicios gratuitos de asistencia lingüística. [facility] [phone].    We comply with applicable federal civil rights laws and [place] laws. We do not discriminate on the basis of race, color, national origin, age, disability sex, sexual orientation or gender identity.            Thank you!     Thank you for choosing [facility]  for your care. Our goal is always to provide you with excellent care. Hearing back from our patients is one way we can continue to improve our services. Please take a few  minutes to complete the written survey that you may receive in the mail after your visit with us. Thank you!             Your Updated Medication List - Protect others around you: Learn how to safely use, store and throw away your medicines at www.disposemymeds.org.          This list is accurate as of: 6/22/17 11:59 PM.  Always use your most recent med list.                   Brand Name Dispense Instructions for use Diagnosis    calcium carbonate 500 MG chewable tablet    TUMS     Take 1 chew tab by mouth daily as needed for heartburn        ESCITALOPRAM OXALATE PO      Take 10 mg by mouth daily        hydrochlorothiazide 25 MG tablet    HYDRODIURIL     Take 25 mg by mouth daily    Ovarian cancer, unspecified laterality (H)       levothyroxine 125 MCG tablet    SYNTHROID    30 tablet    Take 1 tablet (125 mcg) by mouth daily    Other pulmonary embolism without acute cor pulmonale, unspecified chronicity (H)       polyethylene glycol Packet    MIRALAX/GLYCOLAX    7 packet    Take 17 g by mouth daily    Ovarian cancer, unspecified laterality (H)       Potassium Chloride ER 20 MEQ Tbcr     30 tablet    Take 1 tablet (20 mEq) by mouth daily    Hypokalemia       PRILOSEC PO      Take 20 mg by mouth every morning        senna-docusate 8.6-50 MG per tablet    SENOKOT-S;PERICOLACE    60 tablet    Take 1-2 tablets by mouth 2 times daily Hold for loose stools    Ovarian cancer, unspecified laterality (H)       Vitamin D (Cholecalciferol) 1000 UNITS Tabs     60 tablet    Take 2,000 Units by mouth daily    Paresthesias

## 2017-06-22 NOTE — MR AVS SNAPSHOT
After Visit Summary   2017    Tosin Nina    MRN: [id number]           Patient Information     Date Of Birth          1945        Visit Information        Provider Department      2017 2:00 PM Rn, [facility]        Care Instructions    Preparing for Your Surgery      Name:  Tosin Nina   MRN:  [id number]   :  1945   Today's Date:  2017     Arriving for surgery:  Surgery date:    Surgery time:  11:50AM  Arrival time:  9:50AM  Please come to:       [facility] [address]  [address] [place]  [address]    -   parking is available in front of the hospital from 5:15 am to 8:00 pm    -  Stop at the Information Desk in the lobby    -   Inform the information person that you are here for surgery. An escort to 3c will be provided. If you would not like an escort, please proceed to 3C on the 3rd floor. [phone]     What can I eat or drink?  -  You may have solid food or milk products until 8 hours prior to your surgery--do not eat food after 3:00AM on the night before surgery   -  You may have water, apple juice or 7up/Sprite until 2 hours prior to your surgery--OK to have CLEAR liquids until 9:50AM on the day of surgery     Which medicines can I take?  -  Do NOT take these medications in the morning, the day of surgery:    Losartan  Hydrodiuril  Stop vitamins/minerals/supplements one week before surgery   Last dose of lovenox is on  in the AM   Do not take laxatives or stool softeners day of surgery       -  Please take these medications the day of surgery:    Omeprazole   lexapro   Synthroid  OK to take potassium         How do I prepare myself?  -  Take two showers: one the night before surgery; and one the morning of surgery.         Use Scrubcare or Hibiclens to wash from neck down.  You may use your own shampoo and conditioner. No other hair products.   -  Do NOT use  lotion, powder, deodorant, or antiperspirant the day of your surgery.  -  Do NOT wear any makeup, fingernail polish or jewelry.  -  Begin using Incentive Spirometer 1 week prior to surgery.  Use 4 times per day, up to 5-10 breaths each time.  Bring Incentive Spirometer to hospital.  -Do not bring your own medications to the hospital, except for inhalers and eye drops.  -  Bring your ID and insurance card.    Questions or Concerns:  If you have questions or concerns, please call the  Preoperative Assessment Center, Monday-Friday 7AM-7PM:  [phone]            AFTER YOUR SURGERY  Breathing exercises   Breathing exercises help you recover faster. Take deep breaths and let the air out slowly. This will:     Help you wake up after surgery.    Help prevent complications like pneumonia.  Preventing complications will help you go home sooner.   We may give you a breathing device (incentive spirometer) to encourage you to breathe deeply.   Nausea and vomiting   You may feel sick to your stomach after surgery; if so, let your nurse know.    Pain control:  After surgery, you may have pain. Our goal is to help you manage your pain. Pain medicine will help you feel comfortable enough to do activities that will help you heal.  These activities may include breathing exercises, walking and physical therapy.   To help your health care team treat your pain we will ask: 1) If you have pain  2) where it is located 3) describe your pain in your words  Methods of pain control include medications given by mouth, vein or by nerve block for some surgeries.  We may give you a pain control pump that will:  1) Deliver the medicine through a tube placed in your vein  2) Control the amount of medicine you receive  3) Allow you to push a button to deliver a dose of pain medicine  Sequential Compression Device (SCD) or Pneumo Boots:  You may need to wear SCD S on your legs or feet. These are wraps connected to a machine that pumps in air and  releases it. The repeated pumping helps prevent blood clots from forming.     Using an Incentive Spirometer  Soon after your surgery, a nurse or therapist will teach you breathing exercises. These keep your lungs clear, strengthen your breathing muscles, and help prevent complications.  The exercises include doing a deep-breathing exercise using a device called an incentive spirometer.  To do these exercises, you will breathe in through your mouth and not your nose. The incentive spirometer only works correctly if you breathe in through your mouth.     Deep breathing expands the lungs, aids circulation, and helps prevent pneumonia.   Steps to clear lungs  Step 1. Exhale normally.    Relax and breathe out.  Step 2. Place your lips tightly around the mouthpiece.    Make sure the device is upright and not tilted.  Step 3. Inhale as much air as you can through the mouthpiece (don't breath through your nose).    Inhale slowly and deeply.    Hold your breath long enough to keep the balls or disk raised for at least 3 seconds.    Some spirometers have an indicator to let you know that you are breathing in too fast. If the indicator goes off, breathe in more slowly.  Step 4. Repeat the exercise regularly.    Do this exercise every hour while you're awake, or as instructed by your healthcare provider.    You will also be taught deep breathing and coughing exercises and be asked to do them regularly on your own.  Date Last Reviewed: 1/1/2017 2000-2017 [employer]. [address] [place] [address]. All rights reserved. This information is not intended as a substitute for professional medical care. Always follow your healthcare professional's instructions.                Follow-ups after your visit        Your next 10 appointments already scheduled     Jun 22, 2017  2:00 PM CDT   (Arrive by 1:45 PM)   PAC RN ASSESSMENT with  Pac Rn   [facility] ([facility])    [address] [place] [address]  [facility] [address]   [phone]            Jun 22, 2017  2:40 PM CDT   (Arrive by 2:25 PM)   PAC Anesthesia Consult with  Pac Anesthesiologist   [facility] ([facility])    [address] [place] [address]  [facility] [id number] [phone]            Jun 22, 2017  3:00 PM CDT   LAB with  LAB   [facility] ([facility])    [address] [place] [id number] [phone]           Patient must bring picture ID.  Patient should be prepared to give a urine specimen  Please do not eat 10-12 hours before your appointment if you are coming in fasting for labs on lipids, cholesterol, or glucose (sugar).  Pregnant women should follow their Care Team instructions. Water with medications is okay. Do not drink coffee or other fluids.   If you have concerns about taking  your medications, please ask at office or if scheduling via [employer], send a message by clicking on Secure Messaging, Message Your Care Team.            Jun 22, 2017  4:00 PM CDT   US LOWER EXTREMITY VENOUS DUPLEX BILATERAL with [id number]   [facility] US ([facility])    [address] [place] [id number] [phone]           Please bring a list of your medicines (including vitamins, minerals and over-the-counter drugs). Also, tell your doctor about any allergies you may have. Wear comfortable clothes and leave your valuables at home.  You do not need to do anything special to prepare for your exam.  Please call the Imaging Department at your exam site with any questions.            Jun 28, 2017   Procedure with Nessa Foster MD   [facility] [place] Same Day Surgery (--)    [address]   [place] [address]   [phone]            Jul 05, 2017  9:00 AM CDT   New Visit with Kelsie Nguyen GC   Cancer Risk Management  Program ([facility])    [facility] [place]  [address] [place] [address]  [place] [address]   [phone]            2017  1:30 PM CDT   Return Visit with SALVADOR Gil CNP   [facility] ([facility])    [facility] [place]  [address] [place] [address]  [place] [address]   [phone]              Future tests that were ordered for you today     Open Future Orders        Priority Expected Expires Ordered    ABO/Rh type and screen Routine 2017    CBC with platelets Routine 2017    Potassium Routine 2017            Who to contact     Please call your clinic at [phone] to:    Ask questions about your health    Make or cancel appointments    Discuss your medicines    Learn about your test results    Speak to your doctor   If you have compliments or concerns about an experience at your clinic, or if you wish to file a complaint, please contact [facility] Patient Relations at [phone] or email us at [email or web address]         Additional Information About Your Visit        [employer] Information     [employer] is an electronic gateway that provides easy, online access to your medical records. With [employer], you can request a clinic appointment, read your test results, renew a prescription or communicate with your care team.     To sign up for [employer] visit the website at [email or web address]   You will be asked to enter the access code listed below, as well as some personal information. Please follow the directions to create your username and password.     Your access code is: [email or web address]  Expires: 8/10/2017 11:53 AM     Your access code will  in 90 days. If you need help or a new code, please contact your [facility] or call [phone] for assistance.         Care EveryWhere ID     This is your Care EveryWhere ID. This could be used by other organizations to access your [facility] medical records  [id number]         Blood Pressure from Last 3 Encounters:   06/22/17 91/64   06/20/17 110/67   06/13/17 129/68    Weight from Last 3 Encounters:   06/22/17 74.4 kg (164 lb)   06/20/17 75.3 kg (165 lb 14.4 oz)   06/13/17 76.7 kg (169 lb)              Today, you had the following     No orders found for display         Today's Medication Changes          These changes are accurate as of: 6/22/17  1:51 PM.  If you have any questions, ask your nurse or doctor.               These medicines have changed or have updated prescriptions.        Dose/Directions    hydrochlorothiazide 25 MG tablet   Commonly known as:  HYDRODIURIL   This may have changed:  how much to take   Used for:  Dizziness        Dose:  50 mg   Take 2 tablets (50 mg) by mouth daily   Quantity:  30 tablet   Refills:  0       losartan 100 MG tablet   Commonly known as:  COZAAR   This may have changed:  when to take this   Used for:  Dizziness        Dose:  100 mg   Take 1 tablet (100 mg) by mouth daily   Quantity:  30 tablet   Refills:  0       Vitamin D (Cholecalciferol) 1000 UNITS Tabs   This may have changed:  additional instructions   Used for:  Paresthesias        Dose:  2000 Units   Take 2,000 Units by mouth daily   Quantity:  60 tablet   Refills:  0         Stop taking these medicines if you haven't already. Please contact your care team if you have questions.     Filgrastim 300 MCG/0.5ML Sosy syringe   Commonly known as:  NEUPOGEN   Stopped by:  PharmacistAdriel Pac           NEW MED   Stopped by:  PharmacistAdriel Pac           ondansetron 4 MG ODT tab   Commonly known as:  ZOFRAN-ODT   Stopped by:  PharmacistAdriel Pac           ondansetron 4 MG tablet   Commonly known as:  ZOFRAN   Stopped by:  Pharmacist, Uc Pac           prochlorperazine 5 MG tablet   Commonly known as:  COMPAZINE   Stopped by:  Pharmacist,   Pac           UNABLE TO FIND   Stopped by:  Pharmacist,  Pac                    Primary Care Provider Office Phone # Fax #    Esther Back [phone]       [facility] [address] [address]        Equal Access to Services     SARAILESLEY CUATE : Hadii aad ku hadmaximo Soomaali, waaxda luqadaha, qaybta kaalmada adeegyada, waxay idiin hayflynnn adewendy barrera laantoineed long. So [facility] [phone].    ATENCIÓN: Si habla español, tiene a alarcon disposición servicios gratuitos de asistencia lingüística. Llame al [phone].    We comply with applicable federal civil rights laws and [place] laws. We do not discriminate on the basis of race, color, national origin, age, disability sex, sexual orientation or gender identity.            Thank you!     Thank you for choosing [facility]  for your care. Our goal is always to provide you with excellent care. Hearing back from our patients is one way we can continue to improve our services. Please take a few minutes to complete the written survey that you may receive in the mail after your visit with us. Thank you!             Your Updated Medication List - Protect others around you: Learn how to safely use, store and throw away your medicines at www.disposemymeds.org.          This list is accurate as of: 6/22/17  1:51 PM.  Always use your most recent med list.                   Brand Name Dispense Instructions for use Diagnosis    calcium carbonate 500 MG chewable tablet    TUMS     Take 1 chew tab by mouth daily as needed for heartburn        DOCUSATE SODIUM PO      Take 100 mg by mouth daily as needed for constipation        enoxaparin 80 MG/0.8ML injection    LOVENOX    60 Syringe    Inject 0.6 mLs (60 mg) Subcutaneous every 12 hours    Other pulmonary embolism without acute cor pulmonale, unspecified chronicity (H)       ESCITALOPRAM OXALATE PO      Take 10 mg by mouth daily        hydrochlorothiazide 25 MG tablet     HYDRODIURIL    30 tablet    Take 2 tablets (50 mg) by mouth daily    Dizziness       levothyroxine 125 MCG tablet    SYNTHROID    30 tablet    Take 1 tablet (125 mcg) by mouth daily    Other pulmonary embolism without acute cor pulmonale, unspecified chronicity (H)       losartan 100 MG tablet    COZAAR    30 tablet    Take 1 tablet (100 mg) by mouth daily    Dizziness       Potassium Chloride ER 20 MEQ Tbcr     30 tablet    Take 1 tablet (20 mEq) by mouth daily    Hypokalemia       PRILOSEC PO      Take 20 mg by mouth every morning        sennosides 8.6 MG tablet    SENOKOT     Take 1 tablet by mouth daily        Vitamin D (Cholecalciferol) 1000 UNITS Tabs     60 tablet    Take 2,000 Units by mouth daily    Paresthesias

## 2017-06-22 NOTE — PROGRESS NOTES
Preoperative Assessment Center medication history for June 22, 2017 is complete.    See Epic admission navigator for allergy information, pharmacy and prior to admission medications.    Operating room staff will still need to confirm medications and last dose information on day of surgery.     Medication history interview sources:  Patient, medication vials    Changes made to [facility] medication list (reason)  Added: Docsuate, tums  Deleted: filgrastim, magic mouthwash, ondansetron, prochlorperazine  Changed: Hydrochlorothiazide decreased from 50mg to 25mg po daily.    Additional medication history information (including reliability of information, actions taken by pharmacist):  -Patient last had chemo on 6/13/2017.  -Patient received potassium and magnesium replacement in clinic on 6/20/17.  -Has been holding Cholecalciferol x 2 days.      Prior to Admission medications    Medication Sig Last Dose Taking? Auth Provider   DOCUSATE SODIUM PO Take 100 mg by mouth daily as needed for constipation Taking Yes Unknown, Entered By History   calcium carbonate (TUMS) 500 MG chewable tablet Take 1 chew tab by mouth daily as needed for heartburn Taking Yes Unknown, Entered By History   Omeprazole (PRILOSEC PO) Take 20 mg by mouth every morning Taking Yes Reported, Patient   ESCITALOPRAM OXALATE PO Take 10 mg by mouth daily Taking Yes Reported, Patient   sennosides (SENOKOT) 8.6 MG tablet Take 1 tablet by mouth daily Taking Yes Reported, Patient   Potassium Chloride ER 20 MEQ TBCR Take 1 tablet (20 mEq) by mouth daily Taking Yes Adelina Bird APRN CNP   losartan (COZAAR) 100 MG tablet Take 1 tablet (100 mg) by mouth daily  Patient taking differently: Take 100 mg by mouth every morning  Taking Yes Mitzi Rios MD   hydrochlorothiazide (HYDRODIURIL) 25 MG tablet Take 2 tablets (50 mg) by mouth daily  Patient taking differently: Take 25 mg by mouth daily  Taking Yes Mitzi Rios MD   levothyroxine (SYNTHROID) 125  MCG tablet Take 1 tablet (125 mcg) by mouth daily Taking Yes Mitzi Rios MD   Vitamin D, Cholecalciferol, 1000 UNITS TABS Take 2,000 Units by mouth daily  Patient taking differently: Take 2,000 Units by mouth daily LD 6/20/17, told to hold until after surgery Taking Yes Mitzi Rios MD   enoxaparin (LOVENOX) 80 MG/0.8ML injection Inject 0.6 mLs (60 mg) Subcutaneous every 12 hours Taking Yes Mitzi Rios MD         Medication history completed by: Cynthia Espinosa [facility]

## 2017-06-22 NOTE — MR AVS SNAPSHOT
After Visit Summary   6/22/2017    Tosin Nina    MRN: [id number]           Patient Information     Date Of Birth          1945        Visit Information        Provider Department      6/22/2017 12:30 PM Pharmacist, Catalino Galan [facility]        Today's Diagnoses     Preop examination    -  1       Follow-ups after your visit        Your next 10 appointments already scheduled     Jun 22, 2017  1:00 PM CDT   (Arrive by 12:45 PM)   PAC EVALUATION with Catalino Pac Mony 8   [facility] ([facility])    [address] [place] [address]   [phone]            Jun 22, 2017  2:00 PM CDT   (Arrive by 1:45 PM)   PAC RN ASSESSMENT with Catalino Pac Rn   [facility] ([facility])    [address] [place] [address]   [phone]            Jun 22, 2017  2:40 PM CDT   (Arrive by 2:25 PM)   PAC Anesthesia Consult with Catalino Pac Anesthesiologist   [facility] ([facility])    [address] [place] [address]   [phone]            Jun 22, 2017  3:00 PM CDT   LAB with CATALINO LAB   [facility])    [address] [place] [address]   [phone]           Patient must bring picture ID.  Patient should be prepared to give a urine specimen  Please do not eat 10-12 hours before your appointment if you are coming in fasting for labs on lipids, cholesterol, or glucose (sugar).  Pregnant women should follow their Care Team instructions. Water with medications is okay. Do not drink coffee or other fluids.   If you have concerns about taking  your medications, please ask at office or if scheduling via [email or web address], send a message by clicking on Secure Messaging, Message Your Care Team.            Jun 22, 2017  4:00  PM CDT   US LOWER EXTREMITY VENOUS DUPLEX BILATERAL with UCUSV2   [facility] ([facility])    [address]  1st Floor  [facility] [id number] [phone]           Please bring a list of your medicines (including vitamins, minerals and over-the-counter drugs). Also, tell your doctor about any allergies you may have. Wear comfortable clothes and leave your valuables at home.  You do not need to do anything special to prepare for your exam.  Please call the Imaging Department at your exam site with any questions.            Jun 28, 2017   Procedure with Nessa Foster MD   [facility] [place], Same Day Surgery (--)    [address] [id number] [phone]            Jul 05, 2017  9:00 AM CDT   New Visit with Kelsie Nguyen GC   [facility] ([facility])    [facility]  [address]  [facility] [address] [phone]            Jul 13, 2017  1:30 PM CDT   Return Visit with SALVADOR Gil CNP   [facility] ([facility])    [facility]  [address] [place] [address] [address]  [facility] [id number] [phone]              Who to contact     Please call your clinic at [phone] to:    Ask questions about your health    Make or cancel appointments    Discuss your medicines    Learn about your test results    Speak to your doctor   If you have compliments or concerns about an experience at your clinic, or if you wish to file a complaint, please contact [facility] Patient Relations at [phone] or email us at [email or web address]         Additional Information About Your Visit        [employer] Information     [employer] is an electronic gateway that provides easy, online access to your medical records. With [employer], you can request a clinic appointment, read your test results, renew a  prescription or communicate with your care team.     To sign up for [employer] visit the website at [email or web address]   You will be asked to enter the access code listed below, as well as some personal information. Please follow the directions to create your username and password.     Your access code is: [email or web address]  Expires: 8/10/2017 11:53 AM     Your access code will  in 90 days. If you need help or a new code, please contact your [facility] or call [phone] for assistance.        Care EveryWhere ID     This is your Care EveryWhere ID. This could be used by other organizations to access your [facility] medical records  [id number]         Blood Pressure from Last 3 Encounters:   17 110/67   17 129/68   17 125/67    Weight from Last 3 Encounters:   17 75.3 kg (165 lb 14.4 oz)   17 76.7 kg (169 lb)   17 73.6 kg (162 lb 3.2 oz)              Today, you had the following     No orders found for display         Today's Medication Changes          These changes are accurate as of: 17 12:57 PM.  If you have any questions, ask your nurse or doctor.               These medicines have changed or have updated prescriptions.        Dose/Directions    hydrochlorothiazide 25 MG tablet   Commonly known as:  HYDRODIURIL   This may have changed:  how much to take   Used for:  Dizziness        Dose:  50 mg   Take 2 tablets (50 mg) by mouth daily   Quantity:  30 tablet   Refills:  0       losartan 100 MG tablet   Commonly known as:  COZAAR   This may have changed:  when to take this   Used for:  Dizziness        Dose:  100 mg   Take 1 tablet (100 mg) by mouth daily   Quantity:  30 tablet   Refills:  0       Vitamin D (Cholecalciferol) 1000 UNITS Tabs   This may have changed:  additional instructions   Used for:  Paresthesias        Dose:  2000 Units   Take 2,000 Units by mouth daily   Quantity:  60 tablet   Refills:  0         Stop taking these  medicines if you haven't already. Please contact your care team if you have questions.     Filgrastim 300 MCG/0.5ML Sosy syringe   Commonly known as:  NEUPOGEN   Stopped by:  Pharmacist, Uc Pac           NEW MED   Stopped by:  Pharmacist, [facility] Pac           ondansetron 4 MG ODT tab   Commonly known as:  ZOFRAN-ODT   Stopped by:  Pharmacist, Uc Pac           ondansetron 4 MG tablet   Commonly known as:  ZOFRAN   Stopped by:  Pharmacist, Uc Pac           prochlorperazine 5 MG tablet   Commonly known as:  COMPAZINE   Stopped by:  Adriel Merrill           UNABLE TO FIND   Stopped by:  PharmacistAdriel                    Primary Care Provider Office Phone # Fax #    Esther Back [phone]       [facility] [address] [address]        Equal Access to Services     [facility]: Hadii zita fields hadasho Sofely, waaxda luqadaha, qaybta kaalmada adeegyada, hetal regan . So [facility] [phone].    ATENCIÓN: Si habla español, tiene a alarcon disposición servicios gratuitos de asistencia lingüística. Llame al [phone].    We comply with applicable federal civil rights laws and [place] laws. We do not discriminate on the basis of race, color, national origin, age, disability sex, sexual orientation or gender identity.            Thank you!     Thank you for choosing [facility] PREOPERATIVE ASSESSMENT CENTER  for your care. Our goal is always to provide you with excellent care. Hearing back from our patients is one way we can continue to improve our services. Please take a few minutes to complete the written survey that you may receive in the mail after your visit with us. Thank you!             Your Updated Medication List - Protect others around you: Learn how to safely use, store and throw away your medicines at www.disposemymeds.org.          This list is accurate as of: 6/22/17 12:57 PM.  Always use your most recent med list.                   Brand  Name Dispense Instructions for use Diagnosis    calcium carbonate 500 MG chewable tablet    TUMS     Take 1 chew tab by mouth daily as needed for heartburn        DOCUSATE SODIUM PO      Take 100 mg by mouth daily as needed for constipation        enoxaparin 80 MG/0.8ML injection    LOVENOX    60 Syringe    Inject 0.6 mLs (60 mg) Subcutaneous every 12 hours    Other pulmonary embolism without acute cor pulmonale, unspecified chronicity (H)       ESCITALOPRAM OXALATE PO      Take 10 mg by mouth daily        hydrochlorothiazide 25 MG tablet    HYDRODIURIL    30 tablet    Take 2 tablets (50 mg) by mouth daily    Dizziness       levothyroxine 125 MCG tablet    SYNTHROID    30 tablet    Take 1 tablet (125 mcg) by mouth daily    Other pulmonary embolism without acute cor pulmonale, unspecified chronicity (H)       losartan 100 MG tablet    COZAAR    30 tablet    Take 1 tablet (100 mg) by mouth daily    Dizziness       Potassium Chloride ER 20 MEQ Tbcr     30 tablet    Take 1 tablet (20 mEq) by mouth daily    Hypokalemia       PRILOSEC PO      Take 20 mg by mouth every morning        sennosides 8.6 MG tablet    SENOKOT     Take 1 tablet by mouth daily        Vitamin D (Cholecalciferol) 1000 UNITS Tabs     60 tablet    Take 2,000 Units by mouth daily    Paresthesias

## 2017-06-22 NOTE — ANESTHESIA PREPROCEDURE EVALUATION
Anesthesia Evaluation     . Pt has had prior anesthetic. Type: General           ROS/MED HX    ENT/Pulmonary:     (+)KAILASH risk factors hypertension, , . .    Neurologic:     (+)neuropathy - hands and feet, other neuro paraneoplastic neuromyopathy and neuropathy    Cardiovascular:     (+) hypertension-range: unknown, ---. : . . RECINOS, . :. . Previous cardiac testing date:results:Stress Testdate:2/7/2017 results:Interpretation Summary  This was a normal stress echocardiogram with no evidence of stress-induced  ischemia. Contrast was used without apparent complications.  The visual ejection fraction is estimated at >70%.ECG reviewed date:6/8/2017 results:Sinus rhythm  Cannot rule out Anterior infarct , age undetermined  Abnormal ECG  When compared with ECG of 11-APR-2017 16:39,  No significant change was found date: results:          METS/Exercise Tolerance:  1 - Eating, dressing   Hematologic:     (+) History of blood clots pt is anticoagulated, Anemia, Other Hematologic Disorder-thrombocytopenia     (-) History of Transfusion   Musculoskeletal:   (+) , , other musculoskeletal- cervical and lumbar spine stenosis, generalized weakness      GI/Hepatic:     (+) GERD Asymptomatic on medication,       Renal/Genitourinary:  - ROS Renal section negative       Endo:     (+) thyroid problem hypothyroidism, Obesity, .      Psychiatric:     (+) psychiatric history depression      Infectious Disease:  - neg infectious disease ROS       Malignancy:   (+) Malignancy History of Other  Other CA ovarian Active status post Chemo         Other:    (+) No chance of pregnancy no H/O Chronic Pain,other significant disability Wheelchair bound                   Physical Exam  Normal systems: pulmonary and dental    Airway   Mallampati: IV  TM distance: >3 FB  Neck ROM: full    Dental     Cardiovascular   Rhythm and rate: regular and normal  (+) peripheral edema       Pulmonary    breath sounds clear to auscultation                  Lab /  Radiology Results:   Reviewed current labs when avail, see EMR for details.      BMP:  Recent Labs   Lab Test  06/28/17   1035  06/27/17   1125   NA   --   136   POTASSIUM  4.3  3.9   CHLORIDE   --   102   CO2   --   25   BUN   --   8   CR   --   0.54   GLC   --   118*   PATTI   --   8.9       LFTs:   Recent Labs   Lab Test  06/27/17   1125   PROTTOTAL  7.6   ALBUMIN  2.9*   BILITOTAL  0.3   ALKPHOS  96   AST  23   ALT  24       CBC:   Recent Labs   Lab Test  06/28/17   1035  06/27/17   1125   WBC   --   4.9   HGB  8.0*  9.2*   PLT   --   140*       Coags:  Recent Labs   Lab Test  04/11/17   1124   INR  1.06       Blood Bank:  Lab Results   Component Value Date    ABO O 06/22/2017    RH  Pos 06/22/2017    AS Neg 06/22/2017       Studies:  See EMR for current studies, reviewed when available.      PAC Discussion and Assessment    ASA Classification: 3  Case is suitable for: [place]  Anesthetic techniques and relevant risks discussed: GA  Invasive monitoring and risk discussed: No  Types:   Possibility and Risk of blood transfusion discussed: Yes  NPO instructions given:   Additional anesthetic preparation and risks discussed:   Needs early admission to pre-op area:   Other:     PAC Resident/NP Anesthesia Assessment:  Tosin Nina is a 71 year old female scheduled for a DaVinci Assisted Removal Of Uterus, Cervix, Both Ovaries And Fallopian Tubes, Cancer Debulking Procedure, Possible Bowel Resection, Possible Ostomy, Possible Intraperitoneal Port Placement on 6/28/2017 by Dr. Kim in treatment of ovarian cancer.  PAC referral for risk assessment and optimization for anesthesia with comorbid conditions of: hypertension, history of PE, obesity, anemia, thrombocytopenia, paraneoplstic neuromyopathy and neuropathy, hypothyroidism, GERD, and depression.    Pre-operative considerations:  1.  Cardiac:  Functional status- METS 1.  The patient is fairly sedentary, sitting most of the day.  She reports she will get  exertional dyspnea when she has to go up or down the stairs in her home, but prior to her cancer diagnosis and physical declined she was able to exercise regularly with no exertional dyspnea.  She had a negative stress echo in Feb 2017.   Hypertension is managed with hctz and losartan, but recently the blood pressure readings have been lower than normal for her (91/64 today); possibly due to her anemia.  A call was placed today to her primary care office to see if her primary care provider would want to make an adjustment to her hypertension medication, but she won't be in the office tomorrow.  Message left with RN to call the patient with any new instructions.   Instructed to hold her losartan and hctz the DOS.  Intermediate risk surgery with 0.9% risk of major adverse cardiac event.  No further cardiac evaluation needed per 2014 ACC/AHA guidelines for non-cardiac surgery.  2.  Pulm:  Airway feasible.  KAILASH risk: intermediate.    3.  GI:  Risk of PONV score = 2.  If > 2, anti-emetic intervention recommended.  GERD is well controlled with prilosec.   4. Heme:  She was diagnosed with a PE in April 2017 which has since resolved.  She continues on BID lovenox injections, but has been instructed by heme/onc to hold the dose the night before surgery and the morning of surgery.  Her hemoglobin has gradually been decreasing and on 6/20/2017 it was 7.7. The hgb today (requested recheck per surgeon) is 8.4.  She has not been transfused or had any iron infusions initiated.  Per the surgeons note, they did discuss the likelihood of needing to do a blood transfusion during surgery.   DOS Hgb recheck ordered.  VTE risk:  4.5%.  5. Neuromuscular:  The patient is deconditioned and uses a wheelchair at most times.  The transfer from wheelchair to exam table required the assist of two with a gait belt.  Her  has to assist her with a gait belt at home, especially on the stairs.  Fall risk precautions should be considered.    6.  FEN:  Her K+ level has consistently been running low. Her level on 6/20/17 was 3.3, but she reports she was given a potassium infusion that day and has been taking oral Kcl as ordered.  Repeat K+ level today is lower again at 3.0.  Call placed to Dr. Ennis's RN care coordinator to notify of K+ level and to see if Dr. Ennis would consider ordering another K+ infusion prior to surgery and possibly increasing her oral K+ dosing.  RN to discuss with Dr. Ennis and determine plan.  K+ recheck ordered for DOS.          Patient is optimized and is acceptable candidate for the proposed procedure.  No further diagnostic evaluation is needed.     Patient also evaluated by Dr. Juarez. See recommendations below.     For further details of assessment, testing, and physical exam please see H and P completed on same date.          Adelina Marmolejo DNP, RN, APRN      Reviewed and Signed by PAC Mid-Level Provider/Resident  Mid-Level Provider/Resident: Adelina Marmolejo DNP, RN, APRN  Date: 6/22/2017  Time: 6000    Attending Anesthesiologist Anesthesia Assessment:  I saw the patient and discussed with the ZO as above.  Patient currently medically optimized for the proposed procedure.   Final anesthetic plan and recommendations to be made by the attending anesthesiologist on the day of surgery.       Reviewed and Signed by PAC Anesthesiologist  Anesthesiologist: CLIFF  Date: 6/22/17  Time: 6408  Pass/Fail: Pass  Disposition:     PAC Pharmacist Assessment:        Pharmacist:   Date:   Time:      Anesthesia Plan      History & Physical Review  History and physical reviewed and following examination; no interval change.    ASA Status:  3 .    NPO Status:  > 2 hours (Water before 0800.  No other oral intake after midnight.  )    Plan for General and ETT with Intravenous induction. Maintenance will be Balanced.    PONV prophylaxis:  Ondansetron (or other 5HT-3) and Dexamethasone or Solumedrol  Additional equipment: Videolaryngoscope and  2nd IV      Postoperative Care  Postoperative pain management:  Multi-modal analgesia.      Consents  Anesthetic plan, risks, benefits and alternatives discussed with:  Patient.  Use of blood products discussed: Yes.   Use of blood products discussed with Patient.  Consented to blood products.  .      Sy Myers MD  Anesthesiologist  11:18 AM  June 28, 2017                        .

## 2017-06-22 NOTE — PHARMACY - PREOPERATIVE ASSESSMENT CENTER
ANTICOAGULATION DOCUMENTATION - Preoperative Assessment Center (PAC) Pharmacist   Patient seen and interviewed during time of PAC Clinic appointment June 22, 2017.     Based on profile review and patient interview Tosin Nina has been on enoxaparin 60mg sq q12h for treatment of pulmonary embolus since 6/10/2017. It is prescribed by Dr. Rios.  The expected duration of therapy will be determined as an outpatient after surgery.    Tosin Nina is scheduled for surgery on 6/28/2017 and the perioperative anticoagulation plan outlined by Dr. Raymon Foster's team is:  Continue enoxaparin through the morning before surgery, do not give the dose the evening before surgery or the morning of surgery.  This plan may require re-assessment and modification by her primary team in the perioperative setting depending on patients clinical situation.        Cynthia Espinosa RPH  June 22, 2017  12:59 PM

## 2017-06-22 NOTE — PROGRESS NOTES
"Social Work: Outpatient Pre-Operative Assessment with Discharge Plan    Patient Name: Tosin Nina  : 1945  Age: 71 year old  MRN: [id number]  Completed assessment with: pt and     Presenting Information   Reason for Referral: Pre-Operative discharge planning, MRAPT  Date of intake: 2017  Referral Source: pt and   Reason for Admission: hysterectomy and possibly more for gyn cancer  Decision Maker:self  Alternate Decision Maker: Christiano Nina  Health Care Directive: Copy in Chart  Living Situation: independently, with assistance for .  Just discontinued home care: was getting PT, OT and a home care nurse (blood pressure, meds, and sore on bottom)  Previous Functional Status:  independent but much assistance from   Patient and family understanding of hospitalization: hysterectomy/cancer removal  Cultural/Language/Spiritual Considerations: no  Coping with Illness: feels coping well at this point    Mental Health/Chemical Dependency:   Diagnosis: ovarian cancer  Support/Services in Place: just discontinued home care because of upcoming surgery  Services Needed/Recommended: PT, OT    Support System   Significant Relationship at Present time:   Christiano, dtrhonda Cece Wood  Family of Origin is available for support:   Other support available:  dtr  Current in home services: just discontinued  Gaps in Support System: no  Community services receiving at home: [facility]    Provider Information   Primary Care Physician:Esther Back [phone]      Clinic: [facility] [address] [place] [address] / [facility] [address]    /Care Coordinator: evaristo mercado/mimi lopez    Financial   Financial Concerns: none identified  Insurance:   Payor/Plan [employer]/Medicare Subscriber Name Rel Member # Group #   [employer]     Pt    Under \"primary coverage\" tab      Discharge Plan   Patient and family discharge goal: get back or stay at home but feels needs nursing home " temporarily because she comes into this surgery with much weakness, winded easily from steps, and was needing PT/OT services  Provided Education on discharge plan: YES     A list of Medicare Certified Facilities was provided to the patient and/or family to encourage patient choice. Patient's choices for facility are:[facility] in [place]  Patient's choices for Medicare Certified Skilled Home Care are:  Not discussed  Will NH provide Skilled rehabilitation or complex medical:  YES    General information regarding anticipated insurance coverage and possible out of pocket cost was discussed. Patient and patient's family are aware patient may incur the cost of transportation to the facility, pending insurance payment: [facility]/Medicare  Barriers to discharge: surgery plus weakness so may need nursing home    Discharge Recommendations   Disposition: Home, home with homecare, or SNF. To be determined by medical team after surgery.  Transportation Plan:       Additional comments   Inpatient social work/care coordination team to follow upon admission.    Please involve 7C social work and RNCC in discharge planning    Traci Mcwilliams, [facility]  [phone]

## 2017-06-22 NOTE — PATIENT INSTRUCTIONS
Preparing for Your Surgery      Name:  Tosin Nina   MRN:  [id number]   :  1945   Today's Date:  2017     Arriving for surgery:  Surgery date:    Surgery time:  11:50AM  Arrival time:  9:50AM  Please come to:       [facility] [address]  [address] [place]  [address]    -   parking is available in front of the hospital from 5:15 am to 8:00 pm    -  Stop at the Information Desk in the lobby    -   Inform the information person that you are here for surgery. An escort to 3c will be provided. If you would not like an escort, please proceed to 3C on the 3rd floor. [phone]     What can I eat or drink?  -  You may have solid food or milk products until 8 hours prior to your surgery--do not eat food after 3:00AM on the night before surgery   -  You may have water, apple juice or 7up/Sprite until 2 hours prior to your surgery--OK to have CLEAR liquids until 9:50AM on the day of surgery     Which medicines can I take?  -  Do NOT take these medications in the morning, the day of surgery:    Losartan  Hydrodiuril  Stop vitamins/minerals/supplements one week before surgery   Last dose of lovenox is on  in the AM   Do not take laxatives or stool softeners day of surgery       -  Please take these medications the day of surgery:    Omeprazole   lexapro   Synthroid  OK to take potassium         How do I prepare myself?  -  Take two showers: one the night before surgery; and one the morning of surgery.         Use Scrubcare or Hibiclens to wash from neck down.  You may use your own shampoo and conditioner. No other hair products.   -  Do NOT use lotion, powder, deodorant, or antiperspirant the day of your surgery.  -  Do NOT wear any makeup, fingernail polish or jewelry.  -  Begin using Incentive Spirometer 1 week prior to surgery.  Use 4 times per day, up to 5-10 breaths each time.  Bring Incentive Spirometer to hospital.  -Do not bring your own  medications to the hospital, except for inhalers and eye drops.  -  Bring your ID and insurance card.    Questions or Concerns:  If you have questions or concerns, please call the  Preoperative Assessment Center, Monday-Friday 7AM-7PM:  [phone]            AFTER YOUR SURGERY  Breathing exercises   Breathing exercises help you recover faster. Take deep breaths and let the air out slowly. This will:     Help you wake up after surgery.    Help prevent complications like pneumonia.  Preventing complications will help you go home sooner.   We may give you a breathing device (incentive spirometer) to encourage you to breathe deeply.   Nausea and vomiting   You may feel sick to your stomach after surgery; if so, let your nurse know.    Pain control:  After surgery, you may have pain. Our goal is to help you manage your pain. Pain medicine will help you feel comfortable enough to do activities that will help you heal.  These activities may include breathing exercises, walking and physical therapy.   To help your health care team treat your pain we will ask: 1) If you have pain  2) where it is located 3) describe your pain in your words  Methods of pain control include medications given by mouth, vein or by nerve block for some surgeries.  We may give you a pain control pump that will:  1) Deliver the medicine through a tube placed in your vein  2) Control the amount of medicine you receive  3) Allow you to push a button to deliver a dose of pain medicine  Sequential Compression Device (SCD) or Pneumo Boots:  You may need to wear SCD S on your legs or feet. These are wraps connected to a machine that pumps in air and releases it. The repeated pumping helps prevent blood clots from forming.     Using an Incentive Spirometer  Soon after your surgery, a nurse or therapist will teach you breathing exercises. These keep your lungs clear, strengthen your breathing muscles, and help prevent complications.  The exercises include  doing a deep-breathing exercise using a device called an incentive spirometer.  To do these exercises, you will breathe in through your mouth and not your nose. The incentive spirometer only works correctly if you breathe in through your mouth.     Deep breathing expands the lungs, aids circulation, and helps prevent pneumonia.   Steps to clear lungs  Step 1. Exhale normally.    Relax and breathe out.  Step 2. Place your lips tightly around the mouthpiece.    Make sure the device is upright and not tilted.  Step 3. Inhale as much air as you can through the mouthpiece (don't breath through your nose).    Inhale slowly and deeply.    Hold your breath long enough to keep the balls or disk raised for at least 3 seconds.    Some spirometers have an indicator to let you know that you are breathing in too fast. If the indicator goes off, breathe in more slowly.  Step 4. Repeat the exercise regularly.    Do this exercise every hour while you're awake, or as instructed by your healthcare provider.    You will also be taught deep breathing and coughing exercises and be asked to do them regularly on your own.  Date Last Reviewed: 1/1/2017 2000-2017 [employer]. [address] [place] [address], [place] [address]. All rights reserved. This information is not intended as a substitute for professional medical care. Always follow your healthcare professional's instructions.

## 2017-06-22 NOTE — OR NURSING
Is in W/C and has stairs at home.  Namita believes she will need rehab help post op.   here in PAC  to see patient.

## 2017-06-23 ENCOUNTER — TELEPHONE (OUTPATIENT)
Dept: ONCOLOGY | Facility: CLINIC | Age: 72
End: 2017-06-23

## 2017-06-23 DIAGNOSIS — R19.00 ABDOMINAL MASS, UNSPECIFIED LOCATION: ICD-10-CM

## 2017-06-23 DIAGNOSIS — C56.9 OVARIAN CANCER, UNSPECIFIED LATERALITY (H): Primary | ICD-10-CM

## 2017-06-23 NOTE — TELEPHONE ENCOUNTER
Called patient to let her know that she needed to increase her daily oral K++ to 40mEq BID starting today.  Increasing from 20mEq daily to 40mEq BID.  Patient verbalized understanding.  Patient will have infusion appt on Tuesday 6/27/17 at 1130 for port lab draw: per WO from Dr. Ennis to [facility]: patient is to have CBC; CMP; Mg++ and possible electrolyte replacement per protocol, in preparation for her surgery on 6/28.  Anesthesia requested K++ increase due to most current lab of 3.0 which is too low for surgery.  Patient is understanding of updated POC and rational for change in K++ administration.   Will copntact clinc with any additional questions or concerns.  Henrik Martins, RN, BSN, OCN  [facility]  Patient Care Coordinator

## 2017-06-27 ENCOUNTER — INFUSION THERAPY VISIT (OUTPATIENT)
Dept: INFUSION THERAPY | Facility: CLINIC | Age: 72
DRG: 737 | End: 2017-06-27
Attending: OBSTETRICS & GYNECOLOGY
Payer: COMMERCIAL

## 2017-06-27 ENCOUNTER — HOSPITAL ENCOUNTER (OUTPATIENT)
Facility: CLINIC | Age: 72
Setting detail: SPECIMEN
Discharge: HOME | End: 2017-06-27
Attending: OBSTETRICS & GYNECOLOGY | Admitting: OBSTETRICS & GYNECOLOGY
Payer: COMMERCIAL

## 2017-06-27 VITALS
RESPIRATION RATE: 16 BRPM | DIASTOLIC BLOOD PRESSURE: 65 MMHG | OXYGEN SATURATION: 98 % | SYSTOLIC BLOOD PRESSURE: 103 MMHG | TEMPERATURE: 98 F | HEART RATE: 81 BPM

## 2017-06-27 DIAGNOSIS — C56.9 OVARIAN CANCER, UNSPECIFIED LATERALITY (H): ICD-10-CM

## 2017-06-27 DIAGNOSIS — R19.00 ABDOMINAL MASS, UNSPECIFIED LOCATION: ICD-10-CM

## 2017-06-27 LAB
ALBUMIN SERPL-MCNC: 2.9 G/DL (ref 3.4–5)
ALBUMIN UR-MCNC: NEGATIVE MG/DL
ALP SERPL-CCNC: 96 U/L (ref 40–150)
ALT SERPL W P-5'-P-CCNC: 24 U/L (ref 0–50)
ANION GAP SERPL CALCULATED.3IONS-SCNC: 9 MMOL/L (ref 3–14)
APPEARANCE UR: ABNORMAL
AST SERPL W P-5'-P-CCNC: 23 U/L (ref 0–45)
BACTERIA #/AREA URNS HPF: ABNORMAL /HPF
BILIRUB SERPL-MCNC: 0.3 MG/DL (ref 0.2–1.3)
BILIRUB UR QL STRIP: NEGATIVE
BUN SERPL-MCNC: 8 MG/DL (ref 7–30)
CALCIUM SERPL-MCNC: 8.9 MG/DL (ref 8.5–10.1)
CHLORIDE SERPL-SCNC: 102 MMOL/L (ref 94–109)
CO2 SERPL-SCNC: 25 MMOL/L (ref 20–32)
COLOR UR AUTO: YELLOW
CREAT SERPL-MCNC: 0.54 MG/DL (ref 0.52–1.04)
ERYTHROCYTE [DISTWIDTH] IN BLOOD BY AUTOMATED COUNT: 25.5 % (ref 10–15)
GFR SERPL CREATININE-BSD FRML MDRD: ABNORMAL ML/MIN/1.7M2
GLUCOSE SERPL-MCNC: 118 MG/DL (ref 70–99)
GLUCOSE UR STRIP-MCNC: NEGATIVE MG/DL
HCT VFR BLD AUTO: 28.8 % (ref 35–47)
HGB BLD-MCNC: 9.2 G/DL (ref 11.7–15.7)
HGB UR QL STRIP: ABNORMAL
KETONES UR STRIP-MCNC: NEGATIVE MG/DL
LEUKOCYTE ESTERASE UR QL STRIP: ABNORMAL
MAGNESIUM SERPL-MCNC: 1.4 MG/DL (ref 1.6–2.3)
MCH RBC QN AUTO: 33.8 PG (ref 26.5–33)
MCHC RBC AUTO-ENTMCNC: 31.9 G/DL (ref 31.5–36.5)
MCV RBC AUTO: 106 FL (ref 78–100)
MUCOUS THREADS #/AREA URNS LPF: PRESENT /LPF
NITRATE UR QL: NEGATIVE
PH UR STRIP: 7 PH (ref 5–7)
PLATELET # BLD AUTO: 140 10E9/L (ref 150–450)
POTASSIUM SERPL-SCNC: 3.9 MMOL/L (ref 3.4–5.3)
PROT SERPL-MCNC: 7.6 G/DL (ref 6.8–8.8)
RBC # BLD AUTO: 2.72 10E12/L (ref 3.8–5.2)
RBC #/AREA URNS AUTO: >182 /HPF (ref 0–2)
SODIUM SERPL-SCNC: 136 MMOL/L (ref 133–144)
SP GR UR STRIP: 1.01 (ref 1–1.03)
SQUAMOUS #/AREA URNS AUTO: 3 /HPF (ref 0–1)
TRANS CELLS #/AREA URNS HPF: 2 /HPF (ref 0–1)
URN SPEC COLLECT METH UR: ABNORMAL
UROBILINOGEN UR STRIP-MCNC: 0 MG/DL (ref 0–2)
WBC # BLD AUTO: 4.9 10E9/L (ref 4–11)
WBC #/AREA URNS AUTO: 5 /HPF (ref 0–2)

## 2017-06-27 PROCEDURE — 83735 ASSAY OF MAGNESIUM: CPT | Performed by: OBSTETRICS & GYNECOLOGY

## 2017-06-27 PROCEDURE — 81001 URINALYSIS AUTO W/SCOPE: CPT

## 2017-06-27 PROCEDURE — 85027 COMPLETE CBC AUTOMATED: CPT | Performed by: OBSTETRICS & GYNECOLOGY

## 2017-06-27 PROCEDURE — 80053 COMPREHEN METABOLIC PANEL: CPT | Performed by: OBSTETRICS & GYNECOLOGY

## 2017-06-27 RX ORDER — MAGNESIUM SULFATE HEPTAHYDRATE 40 MG/ML
2 INJECTION, SOLUTION INTRAVENOUS ONCE
Status: DISCONTINUED | OUTPATIENT
Start: 2017-06-27 | End: 2017-06-27 | Stop reason: CLARIF

## 2017-06-27 RX ORDER — HEPARIN SODIUM (PORCINE) LOCK FLUSH IV SOLN 100 UNIT/ML 100 UNIT/ML
500 SOLUTION INTRAVENOUS EVERY 8 HOURS
Status: DISCONTINUED | OUTPATIENT
Start: 2017-06-27 | End: 2017-06-27 | Stop reason: HOSPADM

## 2017-06-27 RX ADMIN — SODIUM CHLORIDE, PRESERVATIVE FREE 500 UNITS: 5 INJECTION INTRAVENOUS at 13:56

## 2017-06-27 RX ADMIN — MAGNESIUM SULFATE HEPTAHYDRATE 2 G: 500 INJECTION, SOLUTION INTRAMUSCULAR; INTRAVENOUS at 12:55

## 2017-06-27 NOTE — MR AVS SNAPSHOT
After Visit Summary   6/27/2017    Tosin Nina    MRN: [id number]           Patient Information     Date Of Birth          1945        Visit Information        Provider Department      6/27/2017 11:30 AM [facility] INFUSION CHAIR 5 [facility]        Today's Diagnoses     Ovarian cancer, unspecified laterality (H)        Abdominal mass, unspecified location           Follow-ups after your visit        Your next 10 appointments already scheduled     Jun 28, 2017   Procedure with Nessa Foster MD   [facility] [place], Same Day Surgery (--)    [address]  [facility] [address]   [phone]            Jul 05, 2017  9:00 AM CDT   New Visit with Kelsie Nguyen GC   Cancer Risk Management Program ([facility])    [facility]  [address]  [facility] [address] [phone]            Jul 13, 2017  1:30 PM CDT   Return Visit with SALVADOR Gil CNP   [facility] ([facility])    [facility]  [address]  [facility] [id number] [phone]              Who to contact     If you have questions or need follow up information about today's clinic visit or your schedule please contact [facility] directly at [phone].  Normal or non-critical lab and imaging results will be communicated to you by MyChart, letter or phone within 4 business days after the clinic has received the results. If you do not hear from us within 7 days, please contact the clinic through MyChart or phone. If you have a critical or abnormal lab result, we will notify you by phone as soon as possible.  Submit refill requests through [employer] or call your pharmacy and they will forward the refill request to us. Please allow 3 business days for your refill to be completed.          Additional Information About Your Visit       "  MyChart Information     [facility] lets you send messages to your doctor, view your test results, renew your prescriptions, schedule appointments and more. To sign up, go to [email or web address] . Click on \"Log in\" on the left side of the screen, which will take you to the Welcome page. Then click on \"Sign up Now\" on the right side of the page.     You will be asked to enter the access code listed below, as well as some personal information. Please follow the directions to create your username and password.     Your access code is: [email or web address]  Expires: 8/10/2017 11:53 AM     Your access code will  in 90 days. If you need help or a new code, please call your [place] clinic or [phone].        Care EveryWhere ID     This is your Care EveryWhere ID. This could be used by other organizations to access your [place] medical records  [id number]        Your Vitals Were     Pulse Temperature Respirations Pulse Oximetry          81 98  F (36.7  C) (Tympanic) 16 98%         Blood Pressure from Last 3 Encounters:   17 103/65   17 91/64   17 110/67    Weight from Last 3 Encounters:   17 74.4 kg (164 lb)   17 75.3 kg (165 lb 14.4 oz)   17 76.7 kg (169 lb)              We Performed the Following     CBC with platelets     Comprehensive metabolic panel (BMP + Alb, Alk Phos, ALT, AST, Total. Bili, TP)     Magnesium     UA with Microscopic reflex to Culture          Today's Medication Changes          These changes are accurate as of: 17  2:27 PM.  If you have any questions, ask your nurse or doctor.               These medicines have changed or have updated prescriptions.        Dose/Directions    hydrochlorothiazide 25 MG tablet   Commonly known as:  HYDRODIURIL   This may have changed:  how much to take   Used for:  Dizziness        Dose:  50 mg   Take 2 tablets (50 mg) by mouth daily   Quantity:  30 tablet   Refills:  0       losartan 100 MG tablet   Commonly known as:  " COZAAR   This may have changed:  when to take this   Used for:  Dizziness        Dose:  100 mg   Take 1 tablet (100 mg) by mouth daily   Quantity:  30 tablet   Refills:  0       Vitamin D (Cholecalciferol) 1000 UNITS Tabs   This may have changed:  additional instructions   Used for:  Paresthesias        Dose:  2000 Units   Take 2,000 Units by mouth daily   Quantity:  60 tablet   Refills:  0                Primary Care Provider Office Phone # Fax #    Esther Back [phone]       [facility] [address] [facility] [address]  [facility] [address]        Equal Access to Services     [facility]: Hadii zita fields hadasho Sofely, waaxda luqadaha, qaybta kaalmada monica, hetal regan . So [facility] [phone].    ATENCIÓN: Si habla español, tiene a alarcon disposición servicios gratuitos de asistencia lingüística. [facility] [phone].    We comply with applicable federal civil rights laws and [place] laws. We do not discriminate on the basis of race, color, national origin, age, disability sex, sexual orientation or gender identity.            Thank you!     Thank you for choosing [facility]  for your care. Our goal is always to provide you with excellent care. Hearing back from our patients is one way we can continue to improve our services. Please take a few minutes to complete the written survey that you may receive in the mail after your visit with us. Thank you!             Your Updated Medication List - Protect others around you: Learn how to safely use, store and throw away your medicines at www.disposemymeds.org.          This list is accurate as of: 6/27/17  2:27 PM.  Always use your most recent med list.                   Brand Name Dispense Instructions for use Diagnosis    calcium carbonate 500 MG chewable tablet    TUMS     Take 1 chew tab by mouth daily as needed for heartburn        DOCUSATE SODIUM PO      Take 100 mg by mouth daily  as needed for constipation        enoxaparin 80 MG/0.8ML injection    LOVENOX    60 Syringe    Inject 0.6 mLs (60 mg) Subcutaneous every 12 hours    Other pulmonary embolism without acute cor pulmonale, unspecified chronicity (H)       ESCITALOPRAM OXALATE PO      Take 10 mg by mouth daily        hydrochlorothiazide 25 MG tablet    HYDRODIURIL    30 tablet    Take 2 tablets (50 mg) by mouth daily    Dizziness       levothyroxine 125 MCG tablet    SYNTHROID    30 tablet    Take 1 tablet (125 mcg) by mouth daily    Other pulmonary embolism without acute cor pulmonale, unspecified chronicity (H)       losartan 100 MG tablet    COZAAR    30 tablet    Take 1 tablet (100 mg) by mouth daily    Dizziness       Potassium Chloride ER 20 MEQ Tbcr     30 tablet    Take 1 tablet (20 mEq) by mouth daily    Hypokalemia       PRILOSEC PO      Take 20 mg by mouth every morning        sennosides 8.6 MG tablet    SENOKOT     Take 1 tablet by mouth daily        Vitamin D (Cholecalciferol) 1000 UNITS Tabs     60 tablet    Take 2,000 Units by mouth daily    Paresthesias

## 2017-06-27 NOTE — PROGRESS NOTES
Infusion Nursing Note:  Tosin Nina presents today for port labs and possible potassium replacement.  Potassium level WNL. Magnesium replaced  Patient seen by provider today: No   present during visit today: Not Applicable.    Note: Patient reports her urine has been brown in color. UA obtained, but urine appears more blood tinges, pink. Patient denies frequency, ugency, burning with urination, fever or other symptoms. Dr. Ennis notifed of UA results. Per Dr. Ennis, no intervention needed at this time. She will assess patient bladder in surgery tomorrow. Patient aware.     Patient having surgery tomorrow. All future chemotherapy infusions on hold at this time.     Intravenous Access:  Labs drawn without difficulty.  Implanted Port.    Treatment Conditions:  Lab Results   Component Value Date    HGB 9.2 06/27/2017     Lab Results   Component Value Date    WBC 4.9 06/27/2017      Lab Results   Component Value Date    ANEU 3.0 06/20/2017     Lab Results   Component Value Date     06/27/2017      Lab Results   Component Value Date     06/27/2017                   Lab Results   Component Value Date    POTASSIUM 3.9 06/27/2017           Lab Results   Component Value Date    MAG 1.4 06/27/2017            Lab Results   Component Value Date    CR 0.54 06/27/2017                   Lab Results   Component Value Date    PATTI 8.9 06/27/2017                Lab Results   Component Value Date    BILITOTAL 0.3 06/27/2017           Lab Results   Component Value Date    ALBUMIN 2.9 06/27/2017                    Lab Results   Component Value Date    ALT 24 06/27/2017           Lab Results   Component Value Date    AST 23 06/27/2017     Magnesium Sulfate 2 gm given IV per protocol.      Post Infusion Assessment:  Patient tolerated infusion without incident.  Blood return noted pre and post infusion.  Site patent and intact, free from redness, edema or discomfort.  No evidence of extravasations.  Access  discontinued per protocol.    Discharge Plan:   Patient declined prescription refills.  Copy of AVS reviewed with patient and/or family.  Patient will return 7/13/17 for post of appointment. Future infusion appt's yet to be determined.     Allyn Boyer RN

## 2017-06-28 ENCOUNTER — SURGERY (OUTPATIENT)
Age: 72
End: 2017-06-28
Payer: COMMERCIAL

## 2017-06-28 ENCOUNTER — HOSPITAL ENCOUNTER (INPATIENT)
Facility: CLINIC | Age: 72
LOS: 3 days | Discharge: NURSING, REHAB OR LONG-TERM CARE | DRG: 737 | End: 2017-07-01
Attending: OBSTETRICS & GYNECOLOGY | Admitting: OBSTETRICS & GYNECOLOGY
Payer: COMMERCIAL

## 2017-06-28 ENCOUNTER — ANESTHESIA (OUTPATIENT)
Dept: SURGERY | Facility: CLINIC | Age: 72
DRG: 737 | End: 2017-06-28
Payer: COMMERCIAL

## 2017-06-28 DIAGNOSIS — C56.9 OVARIAN CANCER, UNSPECIFIED LATERALITY (H): Primary | ICD-10-CM

## 2017-06-28 LAB
BASE EXCESS BLDV CALC-SCNC: 1.5 MMOL/L
BLD PROD TYP BPU: NORMAL
BLD PROD TYP BPU: NORMAL
BLD UNIT ID BPU: 0
BLD UNIT ID BPU: 0
BLOOD PRODUCT CODE: NORMAL
BLOOD PRODUCT CODE: NORMAL
BPU ID: NORMAL
BPU ID: NORMAL
CA-I BLD-MCNC: 4.7 MG/DL (ref 4.4–5.2)
GLUCOSE BLD-MCNC: 160 MG/DL (ref 70–99)
HCO3 BLDV-SCNC: 27 MMOL/L (ref 21–28)
HGB BLD-MCNC: 7.8 G/DL (ref 11.7–15.7)
HGB BLD-MCNC: 7.9 G/DL (ref 11.7–15.7)
HGB BLD-MCNC: 8 G/DL (ref 11.7–15.7)
O2/TOTAL GAS SETTING VFR VENT: 60 %
PCO2 BLDV: 51 MM HG (ref 40–50)
PH BLDV: 7.34 PH (ref 7.32–7.43)
PO2 BLDV: 44 MM HG (ref 25–47)
POTASSIUM BLD-SCNC: 3.5 MMOL/L (ref 3.4–5.3)
POTASSIUM SERPL-SCNC: 4.3 MMOL/L (ref 3.4–5.3)
SODIUM BLD-SCNC: 138 MMOL/L (ref 133–144)
TRANSFUSION STATUS PATIENT QL: NORMAL

## 2017-06-28 PROCEDURE — 85018 HEMOGLOBIN: CPT | Performed by: OBSTETRICS & GYNECOLOGY

## 2017-06-28 PROCEDURE — 0DBN4ZX EXCISION OF SIGMOID COLON, PERCUTANEOUS ENDOSCOPIC APPROACH, DIAGNOSTIC: ICD-10-PCS | Performed by: OBSTETRICS & GYNECOLOGY

## 2017-06-28 PROCEDURE — 25000128 H RX IP 250 OP 636: Performed by: NURSE ANESTHETIST, CERTIFIED REGISTERED

## 2017-06-28 PROCEDURE — 25000128 H RX IP 250 OP 636: Performed by: OBSTETRICS & GYNECOLOGY

## 2017-06-28 PROCEDURE — 12000001 ZZH R&B MED SURG/OB UMMC

## 2017-06-28 PROCEDURE — 0WBH4ZX EXCISION OF RETROPERITONEUM, PERCUTANEOUS ENDOSCOPIC APPROACH, DIAGNOSTIC: ICD-10-PCS | Performed by: OBSTETRICS & GYNECOLOGY

## 2017-06-28 PROCEDURE — 25000566 ZZH SEVOFLURANE, EA 15 MIN: Performed by: OBSTETRICS & GYNECOLOGY

## 2017-06-28 PROCEDURE — 0UT9FZZ RESECTION OF UTERUS, VIA NATURAL OR ARTIFICIAL OPENING WITH PERCUTANEOUS ENDOSCOPIC ASSISTANCE: ICD-10-PCS | Performed by: OBSTETRICS & GYNECOLOGY

## 2017-06-28 PROCEDURE — 88305 TISSUE EXAM BY PATHOLOGIST: CPT | Performed by: OBSTETRICS & GYNECOLOGY

## 2017-06-28 PROCEDURE — 71000015 ZZH RECOVERY PHASE 1 LEVEL 2 EA ADDTL HR: Performed by: OBSTETRICS & GYNECOLOGY

## 2017-06-28 PROCEDURE — 27210794 ZZH OR GENERAL SUPPLY STERILE: Performed by: OBSTETRICS & GYNECOLOGY

## 2017-06-28 PROCEDURE — 58953 TAH RAD DISSECT FOR DEBULK: CPT | Mod: GC | Performed by: OBSTETRICS & GYNECOLOGY

## 2017-06-28 PROCEDURE — 82330 ASSAY OF CALCIUM: CPT | Performed by: ANESTHESIOLOGY

## 2017-06-28 PROCEDURE — 82803 BLOOD GASES ANY COMBINATION: CPT | Performed by: ANESTHESIOLOGY

## 2017-06-28 PROCEDURE — 8E0W4CZ ROBOTIC ASSISTED PROCEDURE OF TRUNK REGION, PERCUTANEOUS ENDOSCOPIC APPROACH: ICD-10-PCS | Performed by: OBSTETRICS & GYNECOLOGY

## 2017-06-28 PROCEDURE — 0UTC4ZZ RESECTION OF CERVIX, PERCUTANEOUS ENDOSCOPIC APPROACH: ICD-10-PCS | Performed by: OBSTETRICS & GYNECOLOGY

## 2017-06-28 PROCEDURE — 40000170 ZZH STATISTIC PRE-PROCEDURE ASSESSMENT II: Performed by: OBSTETRICS & GYNECOLOGY

## 2017-06-28 PROCEDURE — 82947 ASSAY GLUCOSE BLOOD QUANT: CPT | Performed by: ANESTHESIOLOGY

## 2017-06-28 PROCEDURE — 0UT2FZZ RESECTION OF BILATERAL OVARIES, VIA NATURAL OR ARTIFICIAL OPENING WITH PERCUTANEOUS ENDOSCOPIC ASSISTANCE: ICD-10-PCS | Performed by: OBSTETRICS & GYNECOLOGY

## 2017-06-28 PROCEDURE — 0TJB8ZZ INSPECTION OF BLADDER, VIA NATURAL OR ARTIFICIAL OPENING ENDOSCOPIC: ICD-10-PCS | Performed by: OBSTETRICS & GYNECOLOGY

## 2017-06-28 PROCEDURE — 25000132 ZZH RX MED GY IP 250 OP 250 PS 637: Performed by: OBSTETRICS & GYNECOLOGY

## 2017-06-28 PROCEDURE — 36000086 ZZH SURGERY LEVEL 8 1ST 30 MIN UMMC: Performed by: OBSTETRICS & GYNECOLOGY

## 2017-06-28 PROCEDURE — 0DBS4ZZ: ICD-10-PCS | Performed by: OBSTETRICS & GYNECOLOGY

## 2017-06-28 PROCEDURE — P9016 RBC LEUKOCYTES REDUCED: HCPCS | Performed by: NURSE PRACTITIONER

## 2017-06-28 PROCEDURE — 0TBB4ZX EXCISION OF BLADDER, PERCUTANEOUS ENDOSCOPIC APPROACH, DIAGNOSTIC: ICD-10-PCS | Performed by: OBSTETRICS & GYNECOLOGY

## 2017-06-28 PROCEDURE — C9399 UNCLASSIFIED DRUGS OR BIOLOG: HCPCS | Performed by: NURSE ANESTHETIST, CERTIFIED REGISTERED

## 2017-06-28 PROCEDURE — 25000128 H RX IP 250 OP 636: Performed by: ANESTHESIOLOGY

## 2017-06-28 PROCEDURE — 37000009 ZZH ANESTHESIA TECHNICAL FEE, EACH ADDTL 15 MIN: Performed by: OBSTETRICS & GYNECOLOGY

## 2017-06-28 PROCEDURE — 0UT7FZZ RESECTION OF BILATERAL FALLOPIAN TUBES, VIA NATURAL OR ARTIFICIAL OPENING WITH PERCUTANEOUS ENDOSCOPIC ASSISTANCE: ICD-10-PCS | Performed by: OBSTETRICS & GYNECOLOGY

## 2017-06-28 PROCEDURE — 88309 TISSUE EXAM BY PATHOLOGIST: CPT | Performed by: OBSTETRICS & GYNECOLOGY

## 2017-06-28 PROCEDURE — 84132 ASSAY OF SERUM POTASSIUM: CPT | Performed by: NURSE PRACTITIONER

## 2017-06-28 PROCEDURE — 37000008 ZZH ANESTHESIA TECHNICAL FEE, 1ST 30 MIN: Performed by: OBSTETRICS & GYNECOLOGY

## 2017-06-28 PROCEDURE — 25000125 ZZHC RX 250: Performed by: NURSE ANESTHETIST, CERTIFIED REGISTERED

## 2017-06-28 PROCEDURE — 36000088 ZZH SURGERY LEVEL 8 EA 15 ADDTL MIN - UMMC: Performed by: OBSTETRICS & GYNECOLOGY

## 2017-06-28 PROCEDURE — 85018 HEMOGLOBIN: CPT | Performed by: NURSE PRACTITIONER

## 2017-06-28 PROCEDURE — 36592 COLLECT BLOOD FROM PICC: CPT | Performed by: OBSTETRICS & GYNECOLOGY

## 2017-06-28 PROCEDURE — 84295 ASSAY OF SERUM SODIUM: CPT | Performed by: ANESTHESIOLOGY

## 2017-06-28 PROCEDURE — 84132 ASSAY OF SERUM POTASSIUM: CPT | Performed by: ANESTHESIOLOGY

## 2017-06-28 PROCEDURE — 71000014 ZZH RECOVERY PHASE 1 LEVEL 2 FIRST HR: Performed by: OBSTETRICS & GYNECOLOGY

## 2017-06-28 RX ORDER — ONDANSETRON 4 MG/1
4 TABLET, ORALLY DISINTEGRATING ORAL EVERY 8 HOURS PRN
Status: DISCONTINUED | OUTPATIENT
Start: 2017-06-29 | End: 2017-07-01 | Stop reason: HOSPADM

## 2017-06-28 RX ORDER — SODIUM CHLORIDE, SODIUM LACTATE, POTASSIUM CHLORIDE, CALCIUM CHLORIDE 600; 310; 30; 20 MG/100ML; MG/100ML; MG/100ML; MG/100ML
INJECTION, SOLUTION INTRAVENOUS CONTINUOUS
Status: DISCONTINUED | OUTPATIENT
Start: 2017-06-28 | End: 2017-06-29

## 2017-06-28 RX ORDER — ONDANSETRON 4 MG/1
4 TABLET, ORALLY DISINTEGRATING ORAL EVERY 8 HOURS
Status: DISPENSED | OUTPATIENT
Start: 2017-06-28 | End: 2017-06-29

## 2017-06-28 RX ORDER — HYDROMORPHONE HYDROCHLORIDE 2 MG/1
2-4 TABLET ORAL
Status: DISCONTINUED | OUTPATIENT
Start: 2017-06-28 | End: 2017-07-01 | Stop reason: HOSPADM

## 2017-06-28 RX ORDER — DIPHENHYDRAMINE HYDROCHLORIDE 50 MG/ML
12.5 INJECTION INTRAMUSCULAR; INTRAVENOUS EVERY 6 HOURS PRN
Status: DISCONTINUED | OUTPATIENT
Start: 2017-06-28 | End: 2017-07-01 | Stop reason: HOSPADM

## 2017-06-28 RX ORDER — PHENAZOPYRIDINE HYDROCHLORIDE 200 MG/1
200 TABLET, FILM COATED ORAL ONCE
Status: COMPLETED | OUTPATIENT
Start: 2017-06-28 | End: 2017-06-28

## 2017-06-28 RX ORDER — HYDROMORPHONE HYDROCHLORIDE 1 MG/ML
.3-.5 INJECTION, SOLUTION INTRAMUSCULAR; INTRAVENOUS; SUBCUTANEOUS
Status: DISCONTINUED | OUTPATIENT
Start: 2017-06-28 | End: 2017-06-29

## 2017-06-28 RX ORDER — ONDANSETRON 2 MG/ML
INJECTION INTRAMUSCULAR; INTRAVENOUS PRN
Status: DISCONTINUED | OUTPATIENT
Start: 2017-06-28 | End: 2017-06-28

## 2017-06-28 RX ORDER — DIPHENHYDRAMINE HCL 12.5MG/5ML
12.5 LIQUID (ML) ORAL EVERY 6 HOURS PRN
Status: DISCONTINUED | OUTPATIENT
Start: 2017-06-28 | End: 2017-07-01 | Stop reason: HOSPADM

## 2017-06-28 RX ORDER — ONDANSETRON 2 MG/ML
4 INJECTION INTRAMUSCULAR; INTRAVENOUS EVERY 30 MIN PRN
Status: DISCONTINUED | OUTPATIENT
Start: 2017-06-28 | End: 2017-06-28 | Stop reason: HOSPADM

## 2017-06-28 RX ORDER — CEFAZOLIN SODIUM 1 G/3ML
1 INJECTION, POWDER, FOR SOLUTION INTRAMUSCULAR; INTRAVENOUS SEE ADMIN INSTRUCTIONS
Status: DISCONTINUED | OUTPATIENT
Start: 2017-06-28 | End: 2017-06-28 | Stop reason: HOSPADM

## 2017-06-28 RX ORDER — HEPARIN SODIUM 5000 [USP'U]/.5ML
5000 INJECTION, SOLUTION INTRAVENOUS; SUBCUTANEOUS
Status: COMPLETED | OUTPATIENT
Start: 2017-06-28 | End: 2017-06-28

## 2017-06-28 RX ORDER — SODIUM CHLORIDE, SODIUM LACTATE, POTASSIUM CHLORIDE, CALCIUM CHLORIDE 600; 310; 30; 20 MG/100ML; MG/100ML; MG/100ML; MG/100ML
INJECTION, SOLUTION INTRAVENOUS CONTINUOUS PRN
Status: DISCONTINUED | OUTPATIENT
Start: 2017-06-28 | End: 2017-06-28

## 2017-06-28 RX ORDER — POTASSIUM CHLORIDE 750 MG/1
20 TABLET, EXTENDED RELEASE ORAL DAILY
Status: DISCONTINUED | OUTPATIENT
Start: 2017-06-29 | End: 2017-07-01 | Stop reason: HOSPADM

## 2017-06-28 RX ORDER — CEFAZOLIN SODIUM 2 G/100ML
2 INJECTION, SOLUTION INTRAVENOUS
Status: COMPLETED | OUTPATIENT
Start: 2017-06-28 | End: 2017-06-28

## 2017-06-28 RX ORDER — LIDOCAINE 40 MG/G
CREAM TOPICAL
Status: DISCONTINUED | OUTPATIENT
Start: 2017-06-28 | End: 2017-07-01 | Stop reason: HOSPADM

## 2017-06-28 RX ORDER — LEVOTHYROXINE SODIUM 125 UG/1
125 TABLET ORAL DAILY
Status: DISCONTINUED | OUTPATIENT
Start: 2017-06-29 | End: 2017-07-01 | Stop reason: HOSPADM

## 2017-06-28 RX ORDER — POLYETHYLENE GLYCOL 3350 17 G/17G
17 POWDER, FOR SOLUTION ORAL DAILY
Status: DISCONTINUED | OUTPATIENT
Start: 2017-06-28 | End: 2017-07-01 | Stop reason: HOSPADM

## 2017-06-28 RX ORDER — LIDOCAINE HYDROCHLORIDE 20 MG/ML
INJECTION, SOLUTION INFILTRATION; PERINEURAL PRN
Status: DISCONTINUED | OUTPATIENT
Start: 2017-06-28 | End: 2017-06-28

## 2017-06-28 RX ORDER — ONDANSETRON 4 MG/1
4 TABLET, ORALLY DISINTEGRATING ORAL EVERY 30 MIN PRN
Status: DISCONTINUED | OUTPATIENT
Start: 2017-06-28 | End: 2017-06-28 | Stop reason: HOSPADM

## 2017-06-28 RX ORDER — SODIUM CHLORIDE, SODIUM LACTATE, POTASSIUM CHLORIDE, CALCIUM CHLORIDE 600; 310; 30; 20 MG/100ML; MG/100ML; MG/100ML; MG/100ML
INJECTION, SOLUTION INTRAVENOUS CONTINUOUS
Status: DISCONTINUED | OUTPATIENT
Start: 2017-06-28 | End: 2017-06-28 | Stop reason: HOSPADM

## 2017-06-28 RX ORDER — SENNOSIDES 8.6 MG
1 TABLET ORAL 2 TIMES DAILY
Status: DISCONTINUED | OUTPATIENT
Start: 2017-06-28 | End: 2017-07-01 | Stop reason: HOSPADM

## 2017-06-28 RX ORDER — ACETAMINOPHEN 325 MG/1
650 TABLET ORAL EVERY 6 HOURS
Status: DISCONTINUED | OUTPATIENT
Start: 2017-06-28 | End: 2017-07-01 | Stop reason: HOSPADM

## 2017-06-28 RX ORDER — FENTANYL CITRATE 50 UG/ML
25-50 INJECTION, SOLUTION INTRAMUSCULAR; INTRAVENOUS
Status: DISCONTINUED | OUTPATIENT
Start: 2017-06-28 | End: 2017-06-28 | Stop reason: HOSPADM

## 2017-06-28 RX ORDER — DEXAMETHASONE SODIUM PHOSPHATE 4 MG/ML
INJECTION, SOLUTION INTRA-ARTICULAR; INTRALESIONAL; INTRAMUSCULAR; INTRAVENOUS; SOFT TISSUE PRN
Status: DISCONTINUED | OUTPATIENT
Start: 2017-06-28 | End: 2017-06-28

## 2017-06-28 RX ORDER — NALOXONE HYDROCHLORIDE 0.4 MG/ML
.1-.4 INJECTION, SOLUTION INTRAMUSCULAR; INTRAVENOUS; SUBCUTANEOUS
Status: DISCONTINUED | OUTPATIENT
Start: 2017-06-28 | End: 2017-07-01 | Stop reason: HOSPADM

## 2017-06-28 RX ORDER — SIMETHICONE 80 MG
80 TABLET,CHEWABLE ORAL 4 TIMES DAILY PRN
Status: DISCONTINUED | OUTPATIENT
Start: 2017-06-28 | End: 2017-07-01 | Stop reason: HOSPADM

## 2017-06-28 RX ORDER — PROPOFOL 10 MG/ML
INJECTION, EMULSION INTRAVENOUS PRN
Status: DISCONTINUED | OUTPATIENT
Start: 2017-06-28 | End: 2017-06-28

## 2017-06-28 RX ORDER — BISACODYL 10 MG
10 SUPPOSITORY, RECTAL RECTAL DAILY
Status: DISCONTINUED | OUTPATIENT
Start: 2017-06-28 | End: 2017-07-01 | Stop reason: HOSPADM

## 2017-06-28 RX ORDER — FENTANYL CITRATE 50 UG/ML
INJECTION, SOLUTION INTRAMUSCULAR; INTRAVENOUS PRN
Status: DISCONTINUED | OUTPATIENT
Start: 2017-06-28 | End: 2017-06-28

## 2017-06-28 RX ORDER — LIDOCAINE 40 MG/G
CREAM TOPICAL
Status: COMPLETED | OUTPATIENT
Start: 2017-06-28 | End: 2017-06-28

## 2017-06-28 RX ORDER — LOSARTAN POTASSIUM 50 MG/1
50 TABLET ORAL EVERY MORNING
Status: DISCONTINUED | OUTPATIENT
Start: 2017-06-29 | End: 2017-07-01 | Stop reason: HOSPADM

## 2017-06-28 RX ORDER — ESCITALOPRAM OXALATE 10 MG/1
10 TABLET ORAL DAILY
Status: DISCONTINUED | OUTPATIENT
Start: 2017-06-29 | End: 2017-07-01 | Stop reason: HOSPADM

## 2017-06-28 RX ORDER — HYDROCHLOROTHIAZIDE 12.5 MG/1
25 TABLET ORAL DAILY
Status: DISCONTINUED | OUTPATIENT
Start: 2017-06-29 | End: 2017-07-01 | Stop reason: HOSPADM

## 2017-06-28 RX ADMIN — PHENAZOPYRIDINE HYDROCHLORIDE 200 MG: 200 TABLET, FILM COATED ORAL at 10:49

## 2017-06-28 RX ADMIN — ROCURONIUM BROMIDE 20 MG: 10 INJECTION INTRAVENOUS at 13:36

## 2017-06-28 RX ADMIN — HYDROMORPHONE HYDROCHLORIDE 0.5 MG: 1 INJECTION, SOLUTION INTRAMUSCULAR; INTRAVENOUS; SUBCUTANEOUS at 16:11

## 2017-06-28 RX ADMIN — PHENYLEPHRINE HYDROCHLORIDE 100 MCG: 10 INJECTION, SOLUTION INTRAMUSCULAR; INTRAVENOUS; SUBCUTANEOUS at 14:42

## 2017-06-28 RX ADMIN — ROCURONIUM BROMIDE 50 MG: 10 INJECTION INTRAVENOUS at 12:56

## 2017-06-28 RX ADMIN — HYDROMORPHONE HYDROCHLORIDE 4 MG: 2 TABLET ORAL at 20:03

## 2017-06-28 RX ADMIN — FENTANYL CITRATE 100 MCG: 50 INJECTION, SOLUTION INTRAMUSCULAR; INTRAVENOUS at 12:56

## 2017-06-28 RX ADMIN — PHENYLEPHRINE HYDROCHLORIDE 100 MCG: 10 INJECTION, SOLUTION INTRAMUSCULAR; INTRAVENOUS; SUBCUTANEOUS at 14:56

## 2017-06-28 RX ADMIN — ROCURONIUM BROMIDE 30 MG: 10 INJECTION INTRAVENOUS at 13:28

## 2017-06-28 RX ADMIN — FENTANYL CITRATE 50 MCG: 50 INJECTION, SOLUTION INTRAMUSCULAR; INTRAVENOUS at 14:15

## 2017-06-28 RX ADMIN — SODIUM CHLORIDE, POTASSIUM CHLORIDE, SODIUM LACTATE AND CALCIUM CHLORIDE: 600; 310; 30; 20 INJECTION, SOLUTION INTRAVENOUS at 16:21

## 2017-06-28 RX ADMIN — MIDAZOLAM HYDROCHLORIDE 2 MG: 1 INJECTION, SOLUTION INTRAMUSCULAR; INTRAVENOUS at 12:44

## 2017-06-28 RX ADMIN — ACETAMINOPHEN 650 MG: 325 TABLET, FILM COATED ORAL at 20:03

## 2017-06-28 RX ADMIN — LIDOCAINE: 40 CREAM TOPICAL at 10:04

## 2017-06-28 RX ADMIN — HEPARIN SODIUM 5000 UNITS: 5000 INJECTION, SOLUTION INTRAVENOUS; SUBCUTANEOUS at 11:26

## 2017-06-28 RX ADMIN — ENOXAPARIN SODIUM 40 MG: 40 INJECTION SUBCUTANEOUS at 21:03

## 2017-06-28 RX ADMIN — PHENYLEPHRINE HYDROCHLORIDE 100 MCG: 10 INJECTION, SOLUTION INTRAMUSCULAR; INTRAVENOUS; SUBCUTANEOUS at 14:31

## 2017-06-28 RX ADMIN — PHENYLEPHRINE HYDROCHLORIDE 100 MCG: 10 INJECTION, SOLUTION INTRAMUSCULAR; INTRAVENOUS; SUBCUTANEOUS at 14:39

## 2017-06-28 RX ADMIN — PROPOFOL 150 MG: 10 INJECTION, EMULSION INTRAVENOUS at 12:56

## 2017-06-28 RX ADMIN — MAGNESIUM HYDROXIDE 15 ML: 400 SUSPENSION ORAL at 20:06

## 2017-06-28 RX ADMIN — FENTANYL CITRATE 50 MCG: 50 INJECTION, SOLUTION INTRAMUSCULAR; INTRAVENOUS at 15:54

## 2017-06-28 RX ADMIN — FENTANYL CITRATE 50 MCG: 50 INJECTION, SOLUTION INTRAMUSCULAR; INTRAVENOUS at 13:20

## 2017-06-28 RX ADMIN — ONDANSETRON 4 MG: 2 INJECTION INTRAMUSCULAR; INTRAVENOUS at 13:20

## 2017-06-28 RX ADMIN — DEXAMETHASONE SODIUM PHOSPHATE 8 MG: 4 INJECTION, SOLUTION INTRA-ARTICULAR; INTRALESIONAL; INTRAMUSCULAR; INTRAVENOUS; SOFT TISSUE at 13:20

## 2017-06-28 RX ADMIN — HYDROMORPHONE HYDROCHLORIDE 0.5 MG: 1 INJECTION, SOLUTION INTRAMUSCULAR; INTRAVENOUS; SUBCUTANEOUS at 17:09

## 2017-06-28 RX ADMIN — SUGAMMADEX 200 MG: 100 INJECTION, SOLUTION INTRAVENOUS at 15:12

## 2017-06-28 RX ADMIN — PHENYLEPHRINE HYDROCHLORIDE 100 MCG: 10 INJECTION, SOLUTION INTRAMUSCULAR; INTRAVENOUS; SUBCUTANEOUS at 13:37

## 2017-06-28 RX ADMIN — FENTANYL CITRATE 50 MCG: 50 INJECTION, SOLUTION INTRAMUSCULAR; INTRAVENOUS at 16:02

## 2017-06-28 RX ADMIN — POLYETHYLENE GLYCOL 3350 17 G: 17 POWDER, FOR SOLUTION ORAL at 20:09

## 2017-06-28 RX ADMIN — PHENYLEPHRINE HYDROCHLORIDE 100 MCG: 10 INJECTION, SOLUTION INTRAMUSCULAR; INTRAVENOUS; SUBCUTANEOUS at 14:35

## 2017-06-28 RX ADMIN — PHENYLEPHRINE HYDROCHLORIDE 100 MCG: 10 INJECTION, SOLUTION INTRAMUSCULAR; INTRAVENOUS; SUBCUTANEOUS at 13:45

## 2017-06-28 RX ADMIN — FENTANYL CITRATE 50 MCG: 50 INJECTION, SOLUTION INTRAMUSCULAR; INTRAVENOUS at 15:26

## 2017-06-28 RX ADMIN — CEFAZOLIN SODIUM 2 G: 2 INJECTION, SOLUTION INTRAVENOUS at 13:05

## 2017-06-28 RX ADMIN — PHENYLEPHRINE HYDROCHLORIDE 100 MCG: 10 INJECTION, SOLUTION INTRAMUSCULAR; INTRAVENOUS; SUBCUTANEOUS at 14:27

## 2017-06-28 RX ADMIN — PHENYLEPHRINE HYDROCHLORIDE 100 MCG: 10 INJECTION, SOLUTION INTRAMUSCULAR; INTRAVENOUS; SUBCUTANEOUS at 13:16

## 2017-06-28 RX ADMIN — PHENYLEPHRINE HYDROCHLORIDE 100 MCG: 10 INJECTION, SOLUTION INTRAMUSCULAR; INTRAVENOUS; SUBCUTANEOUS at 13:30

## 2017-06-28 RX ADMIN — PHENYLEPHRINE HYDROCHLORIDE 100 MCG: 10 INJECTION, SOLUTION INTRAMUSCULAR; INTRAVENOUS; SUBCUTANEOUS at 14:20

## 2017-06-28 RX ADMIN — LIDOCAINE HYDROCHLORIDE 80 MG: 20 INJECTION, SOLUTION INFILTRATION; PERINEURAL at 12:56

## 2017-06-28 RX ADMIN — PHENYLEPHRINE HYDROCHLORIDE 100 MCG: 10 INJECTION, SOLUTION INTRAMUSCULAR; INTRAVENOUS; SUBCUTANEOUS at 13:26

## 2017-06-28 RX ADMIN — PHENYLEPHRINE HYDROCHLORIDE 100 MCG: 10 INJECTION, SOLUTION INTRAMUSCULAR; INTRAVENOUS; SUBCUTANEOUS at 14:49

## 2017-06-28 RX ADMIN — PHENYLEPHRINE HYDROCHLORIDE 100 MCG: 10 INJECTION, SOLUTION INTRAMUSCULAR; INTRAVENOUS; SUBCUTANEOUS at 14:45

## 2017-06-28 RX ADMIN — PHENYLEPHRINE HYDROCHLORIDE 100 MCG: 10 INJECTION, SOLUTION INTRAMUSCULAR; INTRAVENOUS; SUBCUTANEOUS at 14:00

## 2017-06-28 RX ADMIN — HYDROMORPHONE HYDROCHLORIDE 0.5 MG: 1 INJECTION, SOLUTION INTRAMUSCULAR; INTRAVENOUS; SUBCUTANEOUS at 16:31

## 2017-06-28 RX ADMIN — PHENYLEPHRINE HYDROCHLORIDE 100 MCG: 10 INJECTION, SOLUTION INTRAMUSCULAR; INTRAVENOUS; SUBCUTANEOUS at 14:24

## 2017-06-28 RX ADMIN — SODIUM CHLORIDE, POTASSIUM CHLORIDE, SODIUM LACTATE AND CALCIUM CHLORIDE: 600; 310; 30; 20 INJECTION, SOLUTION INTRAVENOUS at 12:43

## 2017-06-28 RX ADMIN — SENNOSIDES 1 TABLET: 8.6 TABLET, FILM COATED ORAL at 20:03

## 2017-06-28 RX ADMIN — PHENYLEPHRINE HYDROCHLORIDE 200 MCG: 10 INJECTION, SOLUTION INTRAMUSCULAR; INTRAVENOUS; SUBCUTANEOUS at 13:02

## 2017-06-28 ASSESSMENT — PAIN DESCRIPTION - DESCRIPTORS
DESCRIPTORS: CONSTANT;THROBBING
DESCRIPTORS: SORE
DESCRIPTORS: CONSTANT;THROBBING

## 2017-06-28 NOTE — IP AVS SNAPSHOT
"` `           [address] [facility]: [phone]                                              INTERAGENCY TRANSFER FORM - NURSING   2017                    Hospital Admission Date: 2017  ZAHIDA DUFF   : 1945  Sex: Female        Attending Provider: Nessa Christina MD     Allergies:  Amoxicillin, Clarithromycin, Lisinopril, Penicillins    Infection:  None   Service:  GYN/ONC    Ht:  1.499 m (4' 11.02\")   Wt:  77.7 kg (171 lb 4.8 oz)   Admission Wt:  75.9 kg (167 lb 5.3 oz)    BMI:  34.58 kg/m 2   BSA:  1.8 m 2            Patient PCP Information     Provider PCP Type    Esther Back MD General      Current Code Status     Date Active Code Status Order ID Comments User Context       2017  6:34 PM Full Code [id number]  Henna Ramírez MD Inpatient       Code Status History     Date Active Date Inactive Code Status Order ID Comments User Context    2017 10:12 AM 2017  6:34 PM Full Code [id number]  Diomedes Jones MD Outpatient    2017  8:54 AM 2017 10:12 AM Full Code [id number]  Vikash Tyler MD Inpatient    2017  7:47 AM 2017  8:54 AM Full Code [id number]  Mitzi Rios MD Outpatient    2017  8:54 PM 2017  7:47 AM Full Code [id number]  Diomedes Jones MD Inpatient    2017 11:49 AM 2017  8:54 PM Full Code [id number]  Maeve Harding PA-C Outpatient    2017  7:58 PM 2017 11:49 AM Full Code [id number]  Tara Lucia PA-C ED      Advance Directives        Does patient have a scanned Advance Directive/ACP document in EPIC?           Yes        Hospital Problems as of 2017              Priority Class Noted POA    Ovarian cancer (H) Medium  2017 Yes      Non-Hospital Problems as of 2017              Priority Class Noted    Thyroid nodule Medium  10/7/2010    History of total knee replacement Medium  2014    Hypertension Medium  2016    Abdominal mass Medium  2017    " "Ovarian cancer, unspecified laterality (H) Medium  4/14/2017    Acute pulmonary embolism (H) Medium  4/14/2017    On anticoagulant therapy Medium  4/20/2017    Encounter for antineoplastic chemotherapy Medium  4/20/2017    Chemotherapy-induced neutropenia (H) Medium  5/31/2017    Cerebellar disorder Medium  6/7/2017    Ataxia Medium  6/8/2017      Immunizations     None         END      ASSESSMENT     Discharge Profile Flowsheet     DISCHARGE NEEDS ASSESSMENT     Patient's communication style  spoken language (English or Bilingual) 06/28/17 1009    Equipment Currently Used at Home  commode;raised toilet;shower chair;walker, rolling;grab bar 06/29/17 1604   SKIN      Transportation Available  family or friend will provide 06/29/17 1604   Inspection  Full 07/01/17 0901    GASTROINTESTINAL (ADULT,PEDIATRIC,OB)     Skin WDL  WDL 07/01/17 0901    GI WDL  ex 07/01/17 0901   Skin Moisture  dry 07/01/17 0901    Abdominal Appearance  obese 07/01/17 0901   Skin Integrity  incision(s);drain/device(s) 07/01/17 0901    Last Bowel Movement  06/30/17 07/01/17 0901   SAFETY      GI Signs/Symptoms  abdominal discomfort;abdominal pain;passing flatus 07/01/17 0901   Safety WDL  WDL 07/01/17 0901    COMMUNICATION ASSESSMENT                        Assessment WDL (Within Defined Limits) Definitions           Safety WDL     Effective: 09/28/15    Row Information: <b>WDL Definition:</b> Bed in low position, wheels locked; call light in reach; upper side rails up x 2; ID band on<br> <font color=\"gray\"><i>Item=AS safety wdl>>List=AS safety wdl>>Version=F14</i></font>      Skin WDL     Effective: 09/28/15    Row Information: <b>WDL Definition:</b> Warm; dry; intact; elastic; without discoloration; pressure points without redness<br> <font color=\"gray\"><i>Item=AS skin wdl>>List=AS skin wdl>>Version=F14</i></font>      Vitals     Vital Signs Flowsheet     VITAL SIGNS     Comfort  comfortably manageable 07/01/17 0639    Temp  96.5  F (35.8  C) " "07/01/17 0715   Change in Pain  about the same 07/01/17 0639    Temp src  Oral 07/01/17 0715   Pain Control  partially effective 07/01/17 0639    Resp  18 07/01/17 0715   Functioning  can do most things, but pain gets in the way of some 07/01/17 0639    Pulse  78 07/01/17 0715   Sleep  awake with occasional pain 07/01/17 0639    Heart Rate  83 06/30/17 1250   ANALGESIA SIDE EFFECTS MONITORING      Pulse/Heart Rate Source  Monitor 07/01/17 0715   Side Effects Monitoring: Respiratory Quality  R 06/30/17 2310    BP  120/66 07/01/17 0715   Side Effects Monitoring: Respiratory Depth  N 06/30/17 2310    BP Location  Left arm 07/01/17 0715   Side Effects Monitoring: Sedation Level  1 06/30/17 2310    Patient Position  Sitting 06/28/17 0929   HEIGHT AND WEIGHT      OXYGEN THERAPY     Height  1.499 m (4' 11.02\") 06/28/17 0929    SpO2  96 % 07/01/17 0715   Weight  77.7 kg (171 lb 4.8 oz) 06/29/17 1412    O2 Device  None (Room air) 07/01/17 0715   BSA (Calculated - sq m)  1.78 06/28/17 0929    Oxygen Delivery  2 LPM 06/29/17 1439   BMI (Calculated)  33.85 06/28/17 0929    RESPIRATORY MONITORING     POSITIONING      Respiratory Monitoring (EtCO2)  34 mmHg 06/29/17 1439   Body Position  independently positioning;supine, head elevated 07/01/17 0901    Integrated Pulmonary Index (IPI)  10 06/29/17 1439   Head of Bed (HOB)  HOB at 30-45 degrees 07/01/17 0901    PAIN/COMFORT     Chair  Upright in chair 06/30/17 1615    Patient Currently in Pain  denies 07/01/17 0854   Positioning/Transfer Devices  pillows 07/01/17 0901    Preferred Pain Scale  CAPA (Clinically Aligned Pain Assessment) ([facility], [facility] and [facility] Adults Only) 07/01/17 0639   DAILY CARE      Pain Location  Abdomen 07/01/17 0639   Activity Type  up in chair 07/01/17 1128    Pain Orientation  Right;Upper 07/01/17 0639   Activity Level of Assistance  assistance, 1 person 07/01/17 0901    Pain Descriptors  Aching 07/01/17 0639   ECG      Pain Management Interventions  " analgesia administered 07/01/17 0639   ECG Rhythm  Normal sinus rhythm 06/28/17 1531    Pain Intervention(s)  Medication (See eMAR) 07/01/17 0639   Ectopy  None 06/28/17 1531    Response to Interventions  Decrease in pain 07/01/17 0700   Lead Monitored  Lead II 06/28/17 1531    CLINICALLY ALIGNED PAIN ASSESSMENT (CAPA) ([facility], [facility] AND [facility] ADULTS ONLY)                   Patient Lines/Drains/Airways Status    Active LINES/DRAINS/AIRWAYS     Name: Placement date: Placement time: Site: Days: Last dressing change:    Incision/Surgical Site 06/28/17 Abdomen 06/28/17   1344    2     Incision/Surgical Site 06/28/17 Bilateral Vagina 06/28/17   1528    2             Patient Lines/Drains/Airways Status    Active PICC/CVC     Name: Placement date: Placement time: Site: Days: Additional Info Last dressing change:    Port A Cath Single 04/20/17 Chest wall 04/20/17   1010   Chest wall   72 Power Port: Yes               Intake/Output Detail Report     Date Intake       Output     Net    Shift P.O. I.V. IV Piggyback Blood Components Total Urine Other Blood Total       Day 06/30/17 0000 - 06/30/17 0659 120 -- -- -- 120 400 -- -- 400 -280    Maral 06/30/17 0700 - 06/30/17 1459 -- 20 -- 300 320 200 -- -- 200 120    Noc 06/30/17 1500 - 06/30/17 2359 -- 20 -- -- 20 125 -- -- 125 -105    Day 07/01/17 0000 - 07/01/17 0659 120 -- -- -- 120 750 -- -- 750 -630    Maral 07/01/17 0700 - 07/01/17 1459 -- 20 -- -- 20 450 -- -- 450 -430      Last Void/BM       Most Recent Value    Urine Occurrence 2 at 06/29/2017 1459    Stool Occurrence 1 [scant] at 06/30/2017 1300      Case Management/Discharge Planning     Case Management/Discharge Planning Flowsheet     LIVING ENVIRONMENT     Equipment Currently Used at Home  commode;raised toilet;shower chair;walker, rolling;grab bar 06/29/17 1604    Lives With  spouse 06/29/17 1604   ABUSE RISK SCREEN      Living Arrangements  house 06/29/17 1604   QUESTION TO PATIENT:  Has a member of your family or a  partner(now or in the past) intimidated, hurt, manipulated, or controlled you in any way?  no 06/28/17 0956    COPING/STRESS     QUESTION TO PATIENT: Do you feel safe going back to the place where you are living?  no (describe) 06/28/17 0956    Major Change/Loss/Stressor  surgery/procedure 06/28/17 1009   If no, describe the patient's reason  Pt feels like she will need rehab after surgery because she uses wheelchair and has stairs to climb 06/28/17 0956    DISCHARGE PLANNING     OBSERVATION: Is there reason to believe there has been maltreatment of a vulnerable adult (ie. Physical/Sexual/Emotional abuse, self neglect, lack of adequate food, shelter, medical care, or financial exploitation)?  no 06/28/17 0956    Transportation Available  family or friend will provide 06/29/17 1604   (R) MENTAL HEALTH SUICIDE RISK      FINAL RESOURCES     Are you depressed or being treated for depression?  Yes 06/28/17 2016

## 2017-06-28 NOTE — IP AVS SNAPSHOT
` `     [facility]: [phone]                 INTERAGENCY TRANSFER FORM - NOTES (H&P, Discharge Summary, Consults, Procedures, Therapies)   2017                    Hospital Admission Date: 2017  TOSIN NINA   : 1945  Sex: Female        Patient PCP Information     Provider PCP Type    Esther Back MD General         History & Physicals      H&P by Adelina Marmolejo APRN CNP at 2017  1:00 PM     Author:  Adelina Marmolejo APRN CNP Service:  (none) Author Type:  Nurse Practitioner    Filed:  2017  4:31 PM Encounter Date:  2017 Status:  Signed    :  Adelina Marmolejo APRN CNP (Nurse Practitioner)             Pre-Operative H & P     CC:  Preoperative exam to assess for increased cardiopulmonary risk while undergoing surgery and anesthesia.    Date of Encounter: 2017  Primary Care Physician:  Esther Back  Tosin Nina is a 71 year old female who presents for pre-operative H & P in preparation for a DaVinci Assisted Removal Of Uterus, Cervix, Both Ovaries And Fallopian Tubes, Cancer Debulking Procedure, Possible Bowel Resection, Possible Ostomy, Possible Intraperitoneal Port Placement with Dr. Kim on[AJ1.1] 17[AJ1.2] at [facility].     Tosin Nina is a 71 year old female with hypertension, history of PE,[AJ1.1] anemia, thrombocytopenia,[AJ1.3] obesity, paraneoplstic neuromyopathy and neuropathy, hypothyroidism, GERD, and depression that has ovarian cancer.  This was diagnosed in 2017 after being admitted to the hospital for multiple abnormal neurologic symptoms.  During that admission she was additionally diagnosed with a pulmonary embolism and paraneoplastic neuromyopathy and neuropathy.  She has undergone chemotherapy for treatment thus far and now the above listed procedure has been recommended for further treatment.    History is obtained  from the patient and her .     Past Medical History  Past Medical History:   Diagnosis Date     Anemia      Cervical spinal stenosis      Depression      GERD (gastroesophageal reflux disease)      Hypertension      Hypokalemia      Hypothyroid      Lumbar spinal stenosis      Obesity      Ovarian cancer (H)      Paraneoplastic neuromyopathy and neuropathy (H)      PE (pulmonary thromboembolism) (H)        Past Surgical History  Past Surgical History:   Procedure Laterality Date     AS TOTAL KNEE ARTHROPLASTY Left      BACK SURGERY  2009     CHOLECYSTECTOMY  01/01/2016     OPEN REDUCTION INTERNAL FIXATION WRIST Right 2009     THYROIDECTOMY         Hx of Blood transfusions/reactions: none    Hx of abnormal bleeding or anti-platelet use: none    Menstrual history: No LMP recorded. Patient is postmenopausal.    Steroid use in the last year: IV steroids in April for abnormal neurologic symptoms, but no long term steroids.      Personal or FH with difficulty with Anesthesia:  none    Prior to Admission Medications  Current Outpatient Prescriptions   Medication Sig Dispense Refill     DOCUSATE SODIUM PO Take 100 mg by mouth daily as needed for constipation       calcium carbonate (TUMS) 500 MG chewable tablet Take 1 chew tab by mouth daily as needed for heartburn       Omeprazole (PRILOSEC PO) Take 20 mg by mouth every morning       ESCITALOPRAM OXALATE PO Take 10 mg by mouth daily       sennosides (SENOKOT) 8.6 MG tablet Take 1 tablet by mouth daily       Potassium Chloride ER 20 MEQ TBCR Take 1 tablet (20 mEq) by mouth daily 30 tablet 3     losartan (COZAAR) 100 MG tablet Take 1 tablet (100 mg) by mouth daily (Patient taking differently: Take 100 mg by mouth every morning ) 30 tablet      hydrochlorothiazide (HYDRODIURIL) 25 MG tablet Take 2 tablets (50 mg) by mouth daily (Patient taking differently: Take 25 mg by mouth daily ) 30 tablet      levothyroxine (SYNTHROID) 125 MCG tablet Take 1 tablet (125 mcg) by  "mouth daily 30 tablet      Vitamin D, Cholecalciferol, 1000 UNITS TABS Take 2,000 Units by mouth daily (Patient taking differently: Take 2,000 Units by mouth daily LD 6/20/17, told to hold until after surgery) 60 tablet      enoxaparin (LOVENOX) 80 MG/0.8ML injection Inject 0.6 mLs (60 mg) Subcutaneous every 12 hours 60 Syringe 1       Allergies  Allergies   Allergen Reactions     Amoxicillin Hives     Clarithromycin Other (See Comments)     \"I felt dopey, sleepy and like a druggie\"     Lisinopril Cough     Penicillins Rash       Social History  Social History     Social History     Marital status:      Spouse name: Christiano Nina     Number of children: 1     Years of education: N/A     Occupational History     Current: Retired      Former:       Social History Main Topics     Smoking status: Never Smoker     Smokeless tobacco: Not on file     Alcohol use Yes      Comment: occasionally     Drug use: No     Sexual activity: Not on file     Other Topics Concern     Not on file     Social History Narrative       Family History  Family History   Problem Relation Age of Onset     Breast Cancer Mother      Heart Failure Mother      Breast Cancer Maternal Grandmother      Breast Cancer Maternal Aunt      Myocardial Infarction Father      Unknown/Adopted Brother      Unknown/Adopted Maternal Grandfather      Stomach Cancer Paternal Grandmother      Lung Cancer Paternal Grandfather      mustard gas in [place]           ROS/MED HX  The complete review of systems is negative other than noted in the HPI or here.     ENT/Pulmonary:     (+)KAILASH risk factors hypertension, , . .    Neurologic:     (+)neuropathy - hands and feet, other neuro paraneoplastic neuromyopathy and neuropathy    Cardiovascular:     (+) hypertension-range: unknown, ---. : . . RECINOS, .   METS/Exercise Tolerance:  1 - Eating, dressing   Hematologic:     (+) History of blood clots pt is anticoagulated, Anemia, -     (-) History of Transfusion " "  Musculoskeletal:   (+) , , other musculoskeletal- cervical and lumbar spine stenosis, generalized weakness      GI/Hepatic:     (+) GERD Asymptomatic on medication,       Renal/Genitourinary:  - ROS Renal section negative       Endo:     (+) thyroid problem hypothyroidism, Obesity, .      Psychiatric:     (+) psychiatric history depression      Infectious Disease:  - neg infectious disease ROS       Malignancy:   (+) Malignancy History of Other  Other CA ovarian Active status post Chemo         Other:    (+) No chance of pregnancy no H/O Chronic Pain,other significant disability Wheelchair bound                     Temp: 99.3  F (37.4  C) (After drinking coffee) Temp src: Oral BP: 91/64 Pulse: 100   Resp: 14 SpO2: 97 %         164 lbs 0 oz  4' 11\"   Body mass index is 33.12 kg/(m^2).       Physical Exam  Constitutional: Awake, alert, cooperative, no apparent distress, and appears stated age. obese  Eyes: Pupils equal, round and reactive to light, extra ocular muscles intact, sclera clear, conjunctiva normal.  HENT: Normocephalic, oral pharynx with moist mucus membranes, good dentition. No goiter appreciated.   Respiratory: Clear to auscultation bilaterally, no crackles or wheezing.  Cardiovascular: Regular rate and rhythm, normal S1 and S2, and no murmur noted.  Carotids +2, no bruits. BLE 1+ pitting edema. Palpable radial pulses.  Diminished pedal pulses.  GI: Normal bowel sounds, soft, non-distended, non-tender, no masses palpated, no hepatosplenomegaly.    Lymph/Hematologic: No cervical lymphadenopathy and no supraclavicular lymphadenopathy.  Genitourinary:  deferred  Skin: Warm and dry.  No rashes at anticipated surgical site.   Musculoskeletal: Full ROM of neck. There is no redness, warmth, or swelling of the exposed joints. Gross motor strength is slightly decreased throughout.  Required 2 person assist with gait belt for transfer.    Neurologic: Awake, alert, oriented to name, place and time. Cranial nerves " II-XII are grossly intact. Gait is not assessed.    Neuropsychiatric: Calm, cooperative. Normal affect.     Labs: (personally reviewed)[AJ1.1]   Component      Latest Ref Rng & Units 6/22/2017   WBC      4.0 - 11.0 10e9/L 5.0   RBC Count      3.8 - 5.2 10e12/L 2.45 (L)   Hemoglobin      11.7 - 15.7 g/dL 8.4 (L)   Hematocrit      35.0 - 47.0 % 24.8 (L)   MCV      78 - 100 fl 101 (H)   MCH      26.5 - 33.0 pg 34.3 (H)   MCHC      31.5 - 36.5 g/dL 33.9   RDW      10.0 - 15.0 % 24.1 (H)   Platelet Count      150 - 450 10e9/L 104 (L)   Potassium      3.4 - 5.3 mmol/L 3.0 (L)[AJ1.4]       Stress echo  2/7/2017  Interpretation Summary  This was a normal stress echocardiogram with no evidence of stress-induced  ischemia. Contrast was used without apparent complications.  The visual ejection fraction is estimated at >70%.    EKG  6/8/2017  Sinus rhythm  Cannot rule out Anterior infarct , age undetermined  Abnormal ECG  When compared with ECG of 11-APR-2017 16:39,  No significant change was found      Outside records reviewed from:  Care Everywhere      ASSESSMENT and PLAN[AJ1.1]  Tosin Nina is a 71 year old female scheduled for a DaVinci Assisted Removal Of Uterus, Cervix, Both Ovaries And Fallopian Tubes, Cancer Debulking Procedure, Possible Bowel Resection, Possible Ostomy, Possible Intraperitoneal Port Placement on 6/28/2017 by Dr. Kim in treatment of ovarian cancer.  PAC referral for risk assessment and optimization for anesthesia with comorbid conditions of: hypertension, anemia, thrombocytopenia, history of PE, obesity, paraneoplstic neuromyopathy and neuropathy, hypothyroidism, GERD, and depression.    Pre-operative considerations:  1.  Cardiac:  Functional status- METS 1.  The patient is fairly sedentary, sitting most of the day.  She reports she will get exertional dyspnea when she has to go up or down the stairs in her home, but prior to her cancer diagnosis and physical declined she was able to  exercise regularly with no exertional dyspnea.  She had a negative stress echo in Feb 2017.   Hypertension is managed with hctz and losartan, but recently the blood pressure readings have been lower than normal for her (91/64 today); possibly due to her anemia.  A call was placed today to her primary care office to see if her primary care provider would want to make an adjustment to her hypertension medication, but she won't be in the office tomorrow.  Message left with RN to call the patient with any new instructions.   Instructed to hold her losartan and hctz the DOS.  Intermediate risk surgery with 0.9% risk of major adverse cardiac event.  No further cardiac evaluation needed per 2014 ACC/AHA guidelines for non-cardiac surgery.  2.  Pulm:  Airway feasible.  KAILASH risk: intermediate.    3.  GI:  Risk of PONV score = 2.  If > 2, anti-emetic intervention recommended.  GERD is well controlled with prilosec.   4. Heme:  She was diagnosed with a PE in April 2017 which has since resolved.  She continues on BID lovenox injections, but has been instructed by heme/onc to hold the dose the night before surgery and the morning of surgery.  Her hemoglobin has gradually been decreasing and on 6/20/2017 it was 7.7. The hgb today (requested recheck per surgeon) is 8.4.  She has not been transfused or had any iron infusions initiated.  Per the surgeons note, they did discuss the likelihood of needing to do a blood transfusion during surgery.   DOS Hgb recheck ordered.  VTE risk:  4.5%.  5. Neuromuscular:  The patient is deconditioned and uses a wheelchair at most times.  The transfer from wheelchair to exam table required the assist of two with a gait belt.  Her  has to assist her with a gait belt at home, especially on the stairs.  Fall risk precautions should be considered.    6. FEN:  Her K+ level has consistently been running low. Her level on 6/20/17 was 3.3, but she reports she was given a potassium infusion that day  and has been taking oral Kcl as ordered.  Repeat K+ level today is lower again at 3.0.  Call placed to Dr. Ennis's RN care coordinator to notify of K+ level and to see if Dr. Ennis would consider ordering another K+ infusion prior to surgery and possibly increasing her oral K+ dosing.  RN to discuss with Dr. Ennis and determine plan.  K+ recheck ordered for DOS.          Patient is optimized and is acceptable candidate for the proposed procedure.  No further diagnostic evaluation is needed.     Patient also evaluated by Dr. Juarez. See recommendations below.     For further details of assessment, testing, and physical exam please see H and P completed on same date.          Adelina Marmolejo DNP, RN, APRN[AJ1.3]  Preoperative Assessment Center  [facility]  Phone: [phone]  Fax: [phone][AJ1.1]     Revision History        User Key Date/Time User Provider Type Action    > AJ1.3 6/22/2017  4:31 PM Adelina Marmolejo APRN CNP Nurse Practitioner Sign     AJ1.4 6/22/2017  4:16 PM Adelina Marmolejo APRN CNP Nurse Practitioner      AJ1.2 6/22/2017  2:03 PM Adelina Marmolejo APRN CNP Nurse Practitioner      AJ1.1 6/22/2017  1:59 PM Adelina Marmolejo APRN CNP Nurse Practitioner                   Discharge Summaries     No notes of this type exist for this encounter.      Consult Notes     No notes of this type exist for this encounter.         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 Had a BM yesterday after receiving a suppository. Denies passing flatus since but has been feeling her bowels \"make a lot of noise.\" Reports having a couple episodes of nausea and dry heaving, but no vomiting. Tolerating regular food otherwise. No cp, sob. Reports some increased abdominal aching at the incision sites, R>L.[BO1.1]     Objective:[LH1.1]   Vitals:    06/29/17 1438 06/29/17 2123 06/29/17 2220 06/30/17 0600   BP: 103/55 105/62 105/52 102/54   BP Location: Left arm Left arm Left arm Left arm   Pulse: 79   84   Resp: 10 18 18 18   Temp: 96.3  F (35.7  C) 96.8  F (36  C) 96.4  F (35.8  C) 96.8  F (36  C)   TempSrc: Oral Oral Oral Oral   SpO2: 96% 94% 95% 94%   Weight:       Height:[KB1.1]           Gen- alert, no distress, cooperative  CV- Regular rate and rhythm  Pulm- Normal - Clear to auscultation without rales, rhonchi, or wheezing. and bilateral air exchange present  Abd- soft,[LH1.1] +[BO1.1]BS, incisions clean and intact, tenderness to palpation in RLQ, periumbilical bruising[LH1.1]. 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  "     Active Problem list:    1. Postoperative State  2. Stage IVB high grade serous ovarian  3. Paraneoplastic neuromyopathy and neuropathy  4. History of pulmonary embolism, chronic anticoagulation  5. Acute on chronic anemia  6. Thrombocytopenia  7. Chronic hypertension  8. Constipation  9. Depression  10. Hypothyroidsim    Plan:    FEN: regular diet.  Continue home potassium.  Pain: scheduled Tylenol; PRN PO dilaudid  Heme: Hb 8.0 >  > 7.8>7.9>7.0.[LH1.1] AM HGB[LH1.2] 6.6 -- 1 u pRBC's --[KB1.2]7.0[BE1.1].[LH1.2]  Continue lovenox 60mg BID (home dosing)  CV: HTN; continue HCTZ, losartan  Pulm: Hx PE, on lovenox.  See Heme, above.  GI: See FEN.  Continue bowel regimen; home PPI ordered  : no issues  MSK/Neuro: Paraneoplastic neuromyopathy and neuropathy - now in wheelchair.  PT/OT ordered.  Psych: continue home escitalopram  Endo: continue home synthroid  PPX: SCDs, IS.  lovenox[LH1.1]      Triny Petit[KB1.3], MD  OBGYN Resident PGY[LH1.1]1[KB1.3]  Pager [phone][LH1.1]     I saw and evaluated the patient. I agree with the findings and the plan of care as documented in Dr. Petit 's note.   Hg dropped but I suspect this is equilibration as she has no signs of active bleeding. 1 U. WIll follow Hg. Cnt therapeutic lovenox given PTE, although if ongoing Hg drop, will stop lovenox.   Remainder of plan as above.     Jeanette Bhandari MD  Gynecologic Oncology  Pager [phone][BE1.1]          Revision History        User Key Date/Time User Provider Type Action    > BE1.1 6/30/2017  7:50 PM Jeanette Bhandari MD Physician Sign     [N/A] 6/30/2017  8:06 AM Triny Petit MD Resident Share     KB1.3 6/30/2017  8:06 AM Triny Petit MD Resident Share     KB1.1 6/30/2017  8:05 AM Triny Petit MD Resident      KB1.2 6/30/2017  8:04 AM Triny Petit MD Resident      [N/A] 6/30/2017  6:38 AM Vi Pepe MD Resident Share     LH1.2 6/30/2017  6:36 AM Vi Pepe MD Resident Share     BO1.1  6/30/2017  6:05 AM Ace Mccarty Medical Student Share     LH1.1 6/30/2017  5:40 AM Vi Pepe MD Resident Share            Progress Notes by Judit Castaneda MSW at 6/30/2017 10:16 AM     Author:  Judit Castaneda MSW Service:  Social Work Author Type:      Filed:  6/30/2017  1:58 PM Date of Service:  6/30/2017 10:16 AM Creation Time:  6/30/2017 10:16 AM    Status:  Signed :  Judit Castaneda MSW ()         Social Work Services Progress Note[AD1.1]    Hospital Day: 3[AD1.2]  Date of Initial Social Work Evaluation:  06/22/17  Collaborated with:  Pt, pt's , medical team, [facility] ([phone])    Data:  Pt post-op day 2 from robotic total laparoscopic hysterectomy, bilateral salpingo-oophorectomy, lysis of adhesions, omentectomy, optimal interval tumor debulking to no gross residual disease, cystoscopy. Pt will d/c to TCU.    Intervention:  SW received update from [facility] team that they would like to keep pt until tomorrow.    SW spoke with [facility] Admissions (Adelita) who stated that they can admit pt tomorrow.     SW updated pt and pt's . Pt's  able to transport pt to TCU tomorrow.    SW complete CTS handoff, provided pt with Medicare notice of rights, and[AD1.1] [id number][AD1.3]    Assessment:  Pt happy to be d/cing to [facility]    Plan:    Anticipated Disposition:    [facility]  [address] [place] [address]  Fax: [phone]  Main: [phone]  Admissions: [phone] or [phone]    Barriers to d/c plan:  Medical stability    Follow Up:  SW will continue to follow to assist w/ d/c plan.          CEASAR Magana, [address] Surgical Oncology Unit  Phone: [phone]  Pager: [phone][AD1.1]         Revision History        User Key Date/Time User Provider Type Action    > AD1.2 6/30/2017  1:58 PM Judit Castaneda MSW  Sign     AD1.1 6/30/2017  1:40 PM Leonel  CEASAR [place]       AD1.3 6/30/2017 12:03 PM Judit Castaneda MSW              Progress Notes by Nicolas Butler OT at 6/29/2017  4:34 PM     Author:  Nicolas Butler OT Service:  Acute IP Rehab Author Type:  Occupational Therapist    Filed:  6/29/2017  4:34 PM Date of Service:  6/29/2017  4:34 PM Creation Time:  6/29/2017  4:34 PM    Status:  Signed :  Nicolas Butler OT (Occupational Therapist)          06/29/17 1540   Quick Adds   Type of Visit Initial Occupational Therapy Evaluation   Living Environment   Lives With spouse   Living Arrangements house   Home Accessibility bed and bath are not on the first floor;stairs (2 railings present);stairs to enter home;stairs within home;bed and bath on same level   Number of Stairs to Enter Home 1   Number of Stairs Within Home 15   Transportation Available family or friend will provide   Living Environment Comment  is home to help during the day, DTR comes by 2/week for showering   Self-Care   Dominant Hand right   Usual Activity Tolerance moderate   Current Activity Tolerance poor   Regular Exercise no   Equipment Currently Used at Home commode;raised toilet;shower chair;walker, rolling;grab bar   Functional Level Prior   Ambulation 1-->assistive equipment   Transferring 0-->independent   Toileting 1-->assistive equipment   Bathing 1-->assistive equipment   Dressing 0-->independent   Eating 0-->independent   Communication 0-->understands/communicates without difficulty   Swallowing 0-->swallows foods/liquids without difficulty   Cognition 0 - no cognition issues reported   Fall history within last six months yes   Number of times patient has fallen within last six months 1   Which of the above functional risks had a recent onset or change? 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     Precautions/Limitations fall precautions   Cognitive Status Examination   Orientation orientation to person, place and time   Level of Consciousness alert   Visual Perception   Visual Perception Wears glasses   Occulomotor nystagmus with tracking to R and L   Visual Perception Comments Pt reports diffuse visual problems- blurry or jumpy at times   Sensory Examination   Sensory Comments numbness in hands and feet  (light touch intact)   Pain Assessment   Patient Currently in Pain No   Integumentary/Edema   Integumentary/Edema Comments tight shiny skin, 3+ pitting in ankle, 1+ in feet and legs   Range of Motion (ROM)   ROM Comment WNL   Strength   Strength Comments NT 2/2 abdominal precautions   Muscle Tone Assessment   Muscle Tone Comments WNL   Coordination   Upper Extremity Coordination No deficits were identified   Transfer Skill: Sit to Stand   Level of [place]: Sit/Stand minimum assist (75% patients effort)   Bathing   Level of [place] maximum assist (25% patients effort)   Upper Body Dressing   Level of [place]: Dress Upper Body minimum assist (75% patients effort)   Lower Body Dressing   Level of [place]: Dress Lower Body maximum assist (25% patients effort)   Toileting   Level of [place]: Toilet maximum assist (25% patients effort)   Grooming   Level of [place]: Grooming stand-by assist   Instrumental Activities of Daily Living (IADL)   Previous Responsibilities (was doing lADLs prior to March, none since then)   Activities of Daily Living Analysis   Impairments Contributing to  "Impaired Activities of Daily Living pain;post surgical precautions;sensory feedback impaired;strength decreased;balance impaired   General Therapy Interventions   Planned Therapy Interventions ADL retraining;progressive activity/exercise;home program guidelines   Clinical Impression   Criteria for Skilled Therapeutic Interventions Met yes, treatment indicated   OT Diagnosis decreased ADL I and tolerance   Influenced by the following impairments balance, pain, post surgical precautions   Assessment of Occupational Performance 5 or more Performance Deficits   Identified Performance Deficits ADLs, IADLs   Clinical Decision Making (Complexity) Low complexity   Therapy Frequency 5 times/wk   Predicted Duration of Therapy Intervention (days/wks) 7/6/17   Anticipated Equipment Needs at Discharge (TBD)   Anticipated Discharge Disposition Transitional Care Facility   Risks and Benefits of Treatment have been explained. Yes   Patient, Family & other staff in agreement with plan of care Yes   Clinical Impression Comments Pt would benefit from skilled OT to help increase ADL I and tolerance   [facility] TM \"6 Clicks\"   2016, Trustees of [facility], under license to [employer].  All rights reserved.   6 Clicks Short Forms Daily Activity Inpatient Short Form   [facility] AM-PAC  \"6 Clicks\" Daily Activity Inpatient Short Form   1. Putting on and taking off regular lower body clothing? 2 - A Lot   2. Bathing (including washing, rinsing, drying)? 2 - A Lot   3. Toileting, which includes using toilet, bedpan or urinal? 2 - A Lot   4. Putting on and taking off regular upper body clothing? 3 - A Little   5. Taking care of personal grooming such as brushing teeth? 4 - None   6. Eating meals? 4 - None   Daily Activity Raw Score (Score out of 24.Lower scores equate to lower levels of function) 17   Total Evaluation Time   Total Evaluation Time (Minutes) 10[CK1.1]        Revision History        User Key " Date/Time User Provider Type Action    > CK1.1 6/29/2017  4:34 PM Nicolas Butler OT Occupational Therapist Sign            Progress Notes by Jeanette Bhandari MD at 6/29/2017  4:42 AM     Author:  Jeanette Bhandari MD Service:  Gyn/Onc Author Type:  Physician    Filed:  6/29/2017  3:07 PM Date of Service:  6/29/2017  4:42 AM Creation Time:  6/28/2017  7:00 PM    Status:  Signed :  Jeanette Bhandari MD (Physician)         GYN ONC PROGRESS NOTE  06/29/17    POD#:1 s/p TLH, BSO, IRVING, omentectomy, optimal tumor debulking no no gross residual disease; cystoscopy    Disease: 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  Pain is[LH1.1] controlled.[BO1.1]  Drinking water[LH1.2] without nausea or vomiting[BO1.1].[LH1.1]  No[BO1.1] Flatus,[LH1.1] No[BO1.1] BM[LH1.1] since admission[BO1.1].  Voiding spontaneously[LH1.1] without burning or blood in urine.[BO1.1]     Objective:[LH1.1]   Vitals:    06/28/17 2205 06/29/17 0128 06/29/17 0356 06/29/17 0700   BP: 134/68 125/63 119/69 120/58   BP Location: Left arm Left arm Left arm Left arm   Pulse: 86      Resp: 10 16 16 16   Temp: 97.1  F (36.2  C)  96.7  F (35.9  C) 96.5  F (35.8  C)   TempSrc: Oral  Oral Oral   SpO2: 99%  98% 98%   Weight:       Height:[KB1.1]           Gen-[LH1.1] alert, no distress, cooperative[BO1.1]  CV-[LH1.1] Regular rate and rhythm, Normal (S1/S2)[BO1.1]  Pulm-[LH1.1] Normal - Clear to auscultation without rales, rhonchi, or wheezing. and bilateral air exchange present[BO1.1]  Abd-[LH1.1] soft, hypoactive BS, incisions clean and intact, tenderness to palpation in RLQ, periumbilical bruising[BO1.1]  Incision-[LH1.1] dry and intact[BO1.1]  Extr-[LH1.1] 1+ edema[BO1.1]  Lines/drains-[LH1.1] infusion port and peripheral IV intact[BO1.1]    I/Os  (Yesterday // Since Midnight)  PO[LH1.1] 50[BO1.1]cc  //[LH1.1] 0[BO1.1]cc  IVF[LH1.1] 2260.42[BO1.1]cc //[LH1.1] 100[KB1.2]0[BO1.1]cc  Urine[LH1.1] 240[BO1.1]cc //[LH1.1] 700[BO1.1]cc    New Labs/Imaging-[LH1.1]   Results for orders placed or performed during the hospital encounter of 06/28/17 (from the past 24 hour(s))   Hemoglobin   Result Value Ref Range    Hemoglobin 8.0 (L) 11.7 - 15.7 g/dL   Potassium   Result Value Ref Range    Potassium 4.3 3.4 - 5.3 mmol/L   VENOUS PANEL   Result Value Ref Range    Ph Venous 7.34 7.32 - 7.43 pH    PCO2 Venous 51 (H) 40 - 50 mm Hg    PO2 Venous 44 25 - 47 mm Hg    Bicarbonate Venous 27 21 - 28 mmol/L    Base Excess Venous 1.5 mmol/L    FIO2 60.0     Sodium 138 133 - 144 mmol/L    Potassium 3.5 3.4 - 5.3 mmol/L    Hemoglobin 7.8 (L) 11.7 - 15.7 g/dL    Glucose 160 (H) 70 - 99 mg/dL    Calcium Ionized Whole Blood 4.7 4.4 - 5.2 mg/dL   Hemoglobin   Result Value Ref Range    Hemoglobin 7.9 (L) 11.7 - 15.7 g/dL   Basic metabolic panel   Result Value Ref Range    Sodium 140 133 - 144 mmol/L    Potassium 4.4 3.4 - 5.3 mmol/L    Chloride 105 94 - 109 mmol/L    Carbon Dioxide 30 20 - 32 mmol/L    Anion Gap 5 3 - 14 mmol/L    Glucose 122 (H) 70 - 99 mg/dL    Urea Nitrogen 7 7 - 30 mg/dL    Creatinine 0.49 (L) 0.52 - 1.04 mg/dL    GFR Estimate >90  Non  GFR Calc   >60 mL/min/1.7m2    GFR Estimate If Black >90   GFR Calc   >60 mL/min/1.7m2    Calcium 8.4 (L) 8.5 - 10.1 mg/dL   CBC with platelets differential   Result Value Ref Range    WBC 6.2 4.0 - 11.0 10e9/L    RBC Count 2.16 (L) 3.8 - 5.2 10e12/L    Hemoglobin 7.0 (L) 11.7 - 15.7 g/dL    Hematocrit 22.5 (L) 35.0 - 47.0 %     (H) 78 - 100 fl    MCH 32.4 26.5 - 33.0 pg    MCHC 31.1 (L) 31.5 - 36.5 g/dL    RDW 25.3 (H) 10.0 - 15.0 %    Platelet Count 137 (L) 150 - 450 10e9/L    Diff Method Automated Method     % Neutrophils 78.4 %    % Lymphocytes 11.5 %    % Monocytes 9.7 %    % Eosinophils 0.0 %    % Basophils 0.2 %    % Immature Granulocytes 0.2  %    Nucleated RBCs 1 (H) 0 /100    Absolute Neutrophil 4.9 1.6 - 8.3 10e9/L    Absolute Lymphocytes 0.7 (L) 0.8 - 5.3 10e9/L    Absolute Monocytes 0.6 0.0 - 1.3 10e9/L    Absolute Eosinophils 0.0 0.0 - 0.7 10e9/L    Absolute Basophils 0.0 0.0 - 0.2 10e9/L    Abs Immature Granulocytes 0.0 0 - 0.4 10e9/L    Absolute Nucleated RBC 0.1    Magnesium   Result Value Ref Range    Magnesium 1.9 1.6 - 2.3 mg/dL   Glucose by meter   Result Value Ref Range    Glucose 132 (H) 70 - 99 mg/dL[KB1.3]       Assessment: 71 year old female with Stage IVB high grade serous ovarian cancer s/p neoadjuvant chemotherapy; now POD#1 s/p DA-TLH, BSO, IRVING, omentectomy, optimal tumor debulking no no gross residual disease; cystoscopy.      Active Problem list:    1. Postoperative State  2. Stage IVB high grade serous ovarian  3. Paraneoplastic neuromyopathy and neuropathy  4. History of pulmonary embolism, chronic anticoagulation  5. Acute on chronic anemia  6. Thrombocytopenia  7. Chronic hypertension  8. Constipation  9. Depression  10. Hypothyroidsim  11. Hypomagnesemia, s/p replacement    Plan:    FEN:[LH1.1] d/c IVF,[KB1.2] ADAT.  Continue home potassium.  Pain: scheduled Tylenol; PRN PO dilaudid; IV dilaudid available if unable to tolerate PO  Heme: Hb[LH1.1] 8.0[LH1.2] >  > 7.8[LH1.1]>7.9>7.0[BO1.1].[LH1.1] Gave lovenox 40mg yesterday.  Increase to 60mg BID (home dose) today.[LH1.2]  CV: HTN; continue HCTZ, losartan  Pulm: Hx PE, on lovenox.  See Heme, above.  Wean O2 as able  GI: See FEN.  Continue bowel regimen; home PPI ordered  :[LH1.1] no issues[LH1.2]  MSK/Neuro: Paraneoplastic neuromyopathy and neuropathy - now in wheelchair.  PT/OT ordered.  Psych: continue home escitalopram  Endo: continue home synthroid  PPX: SCDs, IS.[LH1.1]  lovenox[LH1.2]  Dispo: likely d/c back to TCU[LH1.1] today or[KB1.2] tomorrow[LH1.2]    Triny Petit[KB1.2], MD [facility] Resident PGY[LH1.1]1[KB1.2]  Pager [phone][LH1.1]     I saw and  evaluated the patient. I agree with the findings and the plan of care as documented in Dr. Petit 's note.   Some nausea and distention today, no bowel function.   Will start therapeutic anti-coagulation today  Will need to keep inpatient given bleeding risk and also as we await return to bowel funciton in this medically complex patient.     Jeanette Bhandari MD  Gynecologic Oncology  Pager [phone][BE1.1]     Revision History        User Key Date/Time User Provider Type Action    > BE1.1 6/29/2017  3:07 PM Jeanette Bhandari MD Physician Sign     KB1.1 6/29/2017  9:34 AM Triny Petit MD Resident Share     KB1.3 6/29/2017  9:31 AM Triny Petit MD Resident      KB1.2 6/29/2017  9:30 AM Triny Petit MD Resident      LH1.2 6/29/2017  6:31 AM Vi Pepe MD Resident Share     BO1.1 6/29/2017  6:02 AM Ace Mccarty Medical Student Share     [N/A] 6/29/2017  4:42 AM Vi Pepe MD Resident Share     LH1.1 6/28/2017  7:06 PM Vi Pepe MD Resident Share            Progress Notes by Juidt Castaneda MSW at 6/29/2017 11:55 AM     Author:  Judit Castaneda MSW Service:  Social Work Author Type:      Filed:  6/29/2017 12:11 PM Date of Service:  6/29/2017 11:55 AM Creation Time:  6/29/2017 11:55 AM    Status:  Signed :  Judit Castaneda MSW ()         Social Work Services Progress Note    Hospital Day: 2  Date of Initial Social Work Evaluation:  06/22/17  Collaborated with:  Pt, medical team (rounds),     Data:  Pt post-op day 1 from robotic total laparoscopic hysterectomy, bilateral salpingo-oophorectomy, lysis of adhesions, omentectomy, optimal interval tumor debulking to no gross residual disease, cystoscopy    Intervention:  SW met with pt. Pt stated that she went to [facility] in April and would like to return. Explained that pt has not been inpt for 3 days, so TCU would need to check with insurance.    SW referred to:   [facility]  [place]  [address] [place] [address]  Fax: [phone]  Main: [phone]  Admissions: [phone] or [phone]  -SW spoke with admissions who asked for a PT/OT eval. With referral. LUANN let Admissions know that pt has [employer] for seniors and hasn't been inpt 3 days.     LUANN updated medial team that pt will need PT/OT consult for TCU referral. MD stated that pt will be ready to d/c tomorrow.    Assessment:  Pt medically ready to d/c and would like to go to [facility] TCU. Pt will need PT/OT eval in order to qualify for TCU.    Plan:    Anticipated Disposition[facility]  [facility]    Barriers to d/c plan:  Medical stability    Follow Up:  LUANN will continue to follow to assist with d/c plan.    CEASAR Magana, [facility]  Phone: [phone]  Pager: [phone][AD1.1]           Revision History        User Key Date/Time User Provider Type Action    > AD1.1 6/29/2017 12:11 PM Judit Castaneda MSW  Sign            Progress Notes by Vi Pepe MD at 6/28/2017  6:50 PM     Author:  Vi Pepe MD Service:  Gyn/Onc Author Type:  Resident    Filed:  6/28/2017  6:57 PM Date of Service:  6/28/2017  6:50 PM Creation Time:  6/28/2017  6:50 PM    Status:  Signed :  Vi Pepe MD (Resident)         Gyn Oncology Post-Op Check    S: Patient seen upon arrival to the floor; she is overall feeling okay.  Having some pain, mostly on right side.  No nausea or vomiting.  Nieto out.    O:   Vitals:    06/28/17 1730 06/28/17 1745 06/28/17 1800 06/28/17 1830   BP: 119/77 119/77 118/77 137/69   BP Location:    Left arm   Pulse:    88   Resp: 14 14 16 9   Temp: 98.3  F (36.8  C)   97.1  F (36.2  C)   TempSrc: Oral   Oral   SpO2: 99% 99% 99% 94%   Weight:       Height:          Gen: resting comfortably, appears sleepy  CV: RRR  Pulm: CTAB  Abd: soft, appropriately tender.  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  Doing well in the immediate post-operative period    FEN: LR @ 125cc/hr.  ADAT.  Continue home potassium  Pain: scheduled Tylenol; PRN PO dilaudid; IV dilaudid available if unable to tolerate PO  Heme: Hb 9.2 >  > 8.0 > 7.8.  Recheck ordered at 2000; if stable will give lovenox 40mg tonight due to hx of PE.  Increase to home therapeutic dosing of 80mg BID tomorrow.  CV: HTN; continue HCTZ, losartan tomorrow AM  Pulm: Hx PE, on lovenox.  See Heme, above.  Wean O2 as able  GI: See FEN.  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  PT/OT ordered.  Psych: continue home escitalopram  Endo: continue home synthroid  PPX: SCDs, IS.  Start Lovenox tonight with stable Hgb.  Dispo: likely d/c back to TCU POD#1-2    Vi Pepe MD  OB/GYN PGY4[LH1.1]     Revision History        User Key Date/Time User Provider Type Action    > LH1.2 6/28/2017  6:57 PM Vi Pepe MD Resident Sign     LH1.1 6/28/2017  6:50 PM Vi Pepe MD Resident                   Procedure Notes     No notes of this type exist for this encounter.         Progress Notes - Therapies (Notes from 06/28/17 through 07/01/17)      Progress Notes by Nicolas Butler OT at 6/29/2017  4:34 PM     Author:  Nicolas Butler OT Service:  Acute IP Rehab Author Type:  Occupational Therapist    Filed:  6/29/2017  4:34 PM Date of Service:  6/29/2017  4:34 PM Creation Time:  6/29/2017  4:34 PM    Status:  Signed :  Nicolas Butler OT (Occupational Therapist)          06/29/17 1540   Quick Adds   Type of Visit Initial Occupational Therapy Evaluation   Living Environment   Lives With spouse   Living Arrangements  house   Home Accessibility bed and bath are not on the first floor;stairs (2 railings present);stairs to enter home;stairs within home;bed and bath on same level   Number of Stairs to Enter Home 1   Number of Stairs Within Home 15   Transportation Available family or friend will provide   Living Environment Comment  is home to help during the day, DTR comes by 2/week for showering   Self-Care   Dominant Hand right   Usual Activity Tolerance moderate   Current Activity Tolerance poor   Regular Exercise no   Equipment Currently Used at Home commode;raised toilet;shower chair;walker, rolling;grab bar   Functional Level Prior   Ambulation 1-->assistive equipment   Transferring 0-->independent   Toileting 1-->assistive equipment   Bathing 1-->assistive equipment   Dressing 0-->independent   Eating 0-->independent   Communication 0-->understands/communicates without difficulty   Swallowing 0-->swallows foods/liquids without difficulty   Cognition 0 - no cognition issues reported   Fall history within last six months yes   Number of times patient has fallen within last six months 1   Which of the above functional risks had a recent onset or change? 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     Precautions/Limitations fall precautions   Cognitive Status Examination   Orientation orientation to person, place and time   Level of Consciousness alert   Visual Perception   Visual  Perception Wears glasses   Occulomotor nystagmus with tracking to R and L   Visual Perception Comments Pt reports diffuse visual problems- blurry or jumpy at times   Sensory Examination   Sensory Comments numbness in hands and feet  (light touch intact)   Pain Assessment   Patient Currently in Pain No   Integumentary/Edema   Integumentary/Edema Comments tight shiny skin, 3+ pitting in ankle, 1+ in feet and legs   Range of Motion (ROM)   ROM Comment WNL   Strength   Strength Comments NT 2/2 abdominal precautions   Muscle Tone Assessment   Muscle Tone Comments WNL   Coordination   Upper Extremity Coordination No deficits were identified   Transfer Skill: Sit to Stand   Level of [place]: Sit/Stand minimum assist (75% patients effort)   Bathing   Level of [place] maximum assist (25% patients effort)   Upper Body Dressing   Level of [place]: Dress Upper Body minimum assist (75% patients effort)   Lower Body Dressing   Level of [place]: Dress Lower Body maximum assist (25% patients effort)   Toileting   Level of [place]: Toilet maximum assist (25% patients effort)   Grooming   Level of [place]: Grooming stand-by assist   Instrumental Activities of Daily Living (IADL)   Previous Responsibilities (was doing lADLs prior to March, none since then)   Activities of Daily Living Analysis   Impairments Contributing to Impaired Activities of Daily Living pain;post surgical precautions;sensory feedback impaired;strength decreased;balance impaired   General Therapy Interventions   Planned Therapy Interventions ADL retraining;progressive activity/exercise;home program guidelines   Clinical Impression   Criteria for Skilled Therapeutic Interventions Met yes, treatment indicated   OT Diagnosis decreased ADL I and tolerance   Influenced by the following impairments balance, pain, post surgical precautions   Assessment of Occupational Performance 5 or more Performance Deficits   Identified Performance  "Deficits ADLs, IADLs   Clinical Decision Making (Complexity) Low complexity   Therapy Frequency 5 times/wk   Predicted Duration of Therapy Intervention (days/wks) 7/6/17   Anticipated Equipment Needs at Discharge (TBD)   Anticipated Discharge Disposition Transitional Care Facility   Risks and Benefits of Treatment have been explained. Yes   Patient, Family & other staff in agreement with plan of care Yes   Clinical Impression Comments Pt would benefit from skilled OT to help increase ADL I and tolerance   [facility] AM-PAC TM \"6 Clicks\"   2016, Trustees of [facility], under license to [employer].  All rights reserved.   6 Clicks Short Forms Daily Activity Inpatient Short Form   [facility] AM-PAC  \"6 Clicks\" Daily Activity Inpatient Short Form   1. Putting on and taking off regular lower body clothing? 2 - A Lot   2. Bathing (including washing, rinsing, drying)? 2 - A Lot   3. Toileting, which includes using toilet, bedpan or urinal? 2 - A Lot   4. Putting on and taking off regular upper body clothing? 3 - A Little   5. Taking care of personal grooming such as brushing teeth? 4 - None   6. Eating meals? 4 - None   Daily Activity Raw Score (Score out of 24.Lower scores equate to lower levels of function) 17   Total Evaluation Time   Total Evaluation Time (Minutes) 10[CK1.1]        Revision History        User Key Date/Time User Provider Type Action    > CK1.1 6/29/2017  4:34 PM Nicolas Butler OT Occupational Therapist Sign            "

## 2017-06-28 NOTE — IP AVS SNAPSHOT
Tosin Nina #[id number] (CSN: [id number])  (71 year old F)  (Adm: 17)     [id number]               [address] [facility]: [phone]            Patient Demographics     Patient Name Sex          Age SSN Address Phone    Tosin Nina Female 1945 (71 year old) [id number] [facility] [phone] (Home)  [phone] (Mobile)      Emergency Contact(s)     Name Relation Home Work Mobile    Manoj Nina Spouse [phone]      Admission Information     Attending Provider Admitting Provider Admission Type Admission Date/Time    Nessa Christina MD Rivard Hunt, Colleen Lee, MD Elective 17  0908    Discharge Date Hospital Service Auth/Cert Status Service Area     Gyn/Onc Incomplete [facility]    Unit Room/Bed Admission Status       [facility] 7413/7413-02 Admission (Confirmed)       Admission     Complaint    Ovarian Cancer, Ovarian cancer (H), Ovarian cancer (H), Ovarian cancer (H)      Hospital Account     Name Acct ID Class Status Primary Coverage    Tosin Nina [id number] Inpatient Open UCARE - UCARE SENIORS NON FPA            Guarantor Account (for Hospital Account #[id number])     Name Relation to Pt Service Area Active? Acct Type    Tosin Nina  FCS Yes Personal/Family    Address Phone          [address] [place] [phone](H)              Coverage Information (for Hospital Account #[id number])     F/O Payor/Plan Precert #    UCARE/UCARE SENIORS NON FPA [id number]    Subscriber Subscriber #    Tosin Nina [id number]    Address Phone    [address]  [place] [address] [phone]                                                INTERAGENCY TRANSFER FORM - PHYSICIAN ORDERS   2017                       [address] [facility]: [phone]            Attending Provider: Nessa Christina MD     Allergies:  Amoxicillin,  "Clarithromycin, Lisinopril, Penicillins    Infection:  None   Service:  GYN/ONC    Ht:  1.499 m (4' 11.02\")   Wt:  77.7 kg (171 lb 4.8 oz)   Admission Wt:  75.9 kg (167 lb 5.3 oz)    BMI:  34.58 kg/m 2   BSA:  1.8 m 2            ED Clinical Impression     Diagnosis Description Comment Added By Time Added    Ovarian cancer, unspecified laterality (H) [C56.9] Ovarian cancer, unspecified laterality (H) [C56.9]  Jyoti Lopez, SALVADOR CNP 6/30/2017  9:00 AM      Hospital Problems as of 7/1/2017              Priority Class Noted POA    Ovarian cancer (H) Medium  6/28/2017 Yes      Non-Hospital Problems as of 7/1/2017              Priority Class Noted    Thyroid nodule Medium  10/7/2010    History of total knee replacement Medium  11/8/2014    Hypertension Medium  4/6/2016    Abdominal mass Medium  4/11/2017    Ovarian cancer, unspecified laterality (H) Medium  4/14/2017    Acute pulmonary embolism (H) Medium  4/14/2017    On anticoagulant therapy Medium  4/20/2017    Encounter for antineoplastic chemotherapy Medium  4/20/2017    Chemotherapy-induced neutropenia (H) Medium  5/31/2017    Cerebellar disorder Medium  6/7/2017    Ataxia Medium  6/8/2017      Code Status History     Date Active Date Inactive Code Status Order ID Comments User Context    6/12/2017 10:12 AM 6/28/2017  6:34 PM Full Code [id number]  Diomedes Jones MD Outpatient    6/8/2017  8:54 AM 6/12/2017 10:12 AM Full Code [id number]  Vikash Tyler MD Inpatient    4/16/2017  7:47 AM 6/8/2017  8:54 AM Full Code [id number]  Mitzi Rios MD Outpatient    4/11/2017  8:54 PM 4/16/2017  7:47 AM Full Code [id number]  Diomedes Jones MD Inpatient    2/7/2017 11:49 AM 4/11/2017  8:54 PM Full Code [id number]  Maeve Harding PA-C Outpatient    2/6/2017  7:58 PM 2/7/2017 11:49 AM Full Code [id number]  Tara Lucia PA-C ED      Current Code Status     Date Active Code Status Order ID Comments User Context       6/28/2017  6:34 PM Full Code " [id number]  Henna Ramírez MD Inpatient       Summary of Visit     Reason for your hospital stay       Surgery                Medication Review      START taking        Dose / Directions Comments    acetaminophen 325 MG tablet   Commonly known as:  TYLENOL   Used for:  Ovarian cancer, unspecified laterality (H)        Dose:  650 mg   Take 2 tablets (650 mg) by mouth every 6 hours as needed for mild pain   Quantity:  100 tablet   Refills:  0        HYDROmorphone 2 MG tablet   Commonly known as:  DILAUDID   Used for:  Ovarian cancer, unspecified laterality (H)        Dose:  2-4 mg   Take 1-2 tablets (2-4 mg) by mouth every 3 hours as needed for moderate to severe pain   Quantity:  40 tablet   Refills:  0        polyethylene glycol Packet   Commonly known as:  MIRALAX/GLYCOLAX   Used for:  Ovarian cancer, unspecified laterality (H)        Dose:  17 g   Take 17 g by mouth daily   Quantity:  7 packet   Refills:  0        senna-docusate 8.6-50 MG per tablet   Commonly known as:  SENOKOT-S;PERICOLACE   Used for:  Ovarian cancer, unspecified laterality (H)        Dose:  1-2 tablet   Take 1-2 tablets by mouth 2 times daily Hold for loose stools   Quantity:  60 tablet   Refills:  1          CONTINUE these medications which may have CHANGED, or have new prescriptions. If we are uncertain of the size of tablets/capsules you have at home, strength may be listed as something that might have changed.        Dose / Directions Comments    losartan 50 MG tablet   Commonly known as:  COZAAR   This may have changed:    - medication strength  - how much to take  - when to take this        Dose:  50 mg   Take 1 tablet (50 mg) by mouth every morning   Refills:  0        Vitamin D (Cholecalciferol) 1000 UNITS Tabs   This may have changed:  additional instructions   Used for:  Paresthesias        Dose:  2000 Units   Take 2,000 Units by mouth daily   Quantity:  60 tablet   Refills:  0          CONTINUE these medications which have NOT  CHANGED        Dose / Directions Comments    calcium carbonate 500 MG chewable tablet   Commonly known as:  TUMS        Dose:  1 chew tab   Take 1 chew tab by mouth daily as needed for heartburn   Refills:  0        enoxaparin 80 MG/0.8ML injection   Commonly known as:  LOVENOX   Used for:  Other pulmonary embolism without acute cor pulmonale, unspecified chronicity (H)        Dose:  60 mg   Inject 0.6 mLs (60 mg) Subcutaneous every 12 hours   Quantity:  60 Syringe   Refills:  1    Please note the dose is 60mg SQ BID due to her high anti-Xa level.       ESCITALOPRAM OXALATE PO        Dose:  10 mg   Take 10 mg by mouth daily   Refills:  0        hydrochlorothiazide 25 MG tablet   Commonly known as:  HYDRODIURIL   Used for:  Ovarian cancer, unspecified laterality (H)        Dose:  25 mg   Take 1 tablet (25 mg) by mouth daily   Refills:  0        levothyroxine 125 MCG tablet   Commonly known as:  SYNTHROID   Used for:  Other pulmonary embolism without acute cor pulmonale, unspecified chronicity (H)        Dose:  125 mcg   Take 1 tablet (125 mcg) by mouth daily   Quantity:  30 tablet   Refills:  0        Potassium Chloride ER 20 MEQ Tbcr   Used for:  Hypokalemia        Dose:  20 mEq   Take 1 tablet (20 mEq) by mouth daily   Quantity:  30 tablet   Refills:  3        PRILOSEC PO        Dose:  20 mg   Take 20 mg by mouth every morning   Refills:  0          STOP taking     DOCUSATE SODIUM PO           sennosides 8.6 MG tablet   Commonly known as:  SENOKOT                   After Care     Activity - Up with nursing assistance           Additional Discharge Instructions       GENERAL POST-OPERATIVE  PATIENT INSTRUCTIONS      FOLLOW-UP:    Call Surgeon if you have:  Temperature greater than 100.4  Persistent nausea and vomiting  Severe uncontrolled pain  Redness, tenderness, or signs of infection (pain, swelling, redness, odor or green/yellow discharge around the site)  Difficulty breathing, headache or visual  disturbances  Hives  Persistent dizziness or light-headedness  Extreme fatigue  Any other questions or concerns you may have after discharge    In an emergency, call 911 or go to an Emergency Department at a nearby hospital       WOUND CARE INSTRUCTIONS:  Keep a dry clean dressing on the wound if there is drainage. If you had a bandage initially, it may be removed after 24 hours.  Once the wound has quit draining you may leave it open to air.  If clothing rubs against the wound or causes irritation and the wound is not draining you may cover it with a dry dressing during the daytime.  Try to keep the wound dry and avoid ointments on the wound unless directed to do so.  If the wound becomes bright red and painful or starts to drain infected material that is not clear, please contact your physician immediately.    1.  You may shower 24 hrs after surgery   2.  No soaking in the tub for 4 weeks       DIET:  There are no dietary restrictions.  You may eat any foods that you can tolerate unless instructed otherwise.  It is a good idea to eat a high fiber diet and take in plenty of fluids to prevent constipation.  If you become constipated, please follow the instructions below.    ACTIVITY:  You are encouraged to cough, deep breath and use your incentive spirometer if you were given one, every 15-30 minutes when awake.  This will help prevent respiratory complications and low grade fevers post-operatively.  You may want to hug a pillow when coughing and sneezing to add additional support to the surgical area, if you had abdominal surgery, which will decrease pain during these times.      1.  No heavy lifting >20lbs or strenuous exercise for six-eight weeks.  No exercise in which you are using core muscles (yoga, pilates, swimming, weight lifting)  2.  You may walk as much as you wish.  You are encouraged to increase your activity each day after surgery.  Stairs are okay.   3.  Nothing per vagina for eight weeks.  No  tampons, no intercourse, no douching.  You can expect some light vaginal spotting and discharge for up to six weeks.  If bleeding becomes heavy, please contact the office.     MEDICATIONS:  Try to take narcotic medications and anti-inflammatory medications, such as tylenol, ibuprofen, naprosyn, etc., with food.  This will minimize stomach upset from the medication.  Should you develop nausea and vomiting from the pain medication, or develop a rash, please discontinue the medication and contact your physician.  You should not drive, make important decisions, or operate machinery when taking narcotic pain medication.    OTHER:  Patients are often constipated after general anesthesia and surgery.  The patient should continue to take stool softeners (for example, Senokot-S) for the next six weeks (unless diarrhea develops) and consume adequate amounts of water.  If the patient remains constipated or unable to pass stool, please try one or all of the following measures:  1.  Milk of Magnesia 30cc twice a day as needed by mouth  2.  Metamucil 2 tablespoons in 12 ounces of fluid  3.  Dulcolax oral or suppositories  4.  Prunes or prune juice  5.  Miralax daily      QUESTIONS:  Please feel free to call your physician or the hospital  if you have any questions, and they will be glad to assist you.       Advance Diet as Tolerated       Follow this diet upon discharge:       Regular Diet Adult       Encourage PO fluids           Fall precautions           General info for SNF       Length of Stay Estimate: Short Term Care: Estimated # of Days <30  Condition at Discharge: Stable  Level of care:skilled   Rehabilitation Potential: Fair  Admission H&P remains valid and up-to-date: Yes  Recent Chemotherapy: Date:                       Use Nursing Home Standing Orders: No       Intake and output       Every shift       Mantoux instructions       Give two-step Mantoux (PPD) Per Facility Policy Yes       Wound care (specify)     "   Site:   [facility] sites  Instructions:  Keep clean dry and intact             Referrals     Occupational Therapy Adult Consult       Evaluate and treat as clinically indicated.    Reason:  Paraneoplastic neuromyopathy       Physical Therapy Adult Consult       Evaluate and treat as clinically indicated.    Reason:  Paraneoplastic neuromyopathy             Follow-Up Appointment Instructions     Follow Up and recommended labs and tests       7/13/17 Post op visit at [facility]. Please call [phone] option 2 if you have questions or concerns prior to then             Your next 10 appointments already scheduled     Jul 05, 2017  9:00 AM CDT   New Visit with Kelsie Nguyen GC   Cancer Risk Management Program ([facility])    [facility]  [id number] [place] [address] [address]  [facility] [id number] [phone]            Jul 13, 2017  1:30 PM CDT   Return Visit with SALVADOR Gil CNP   [facility] ([facility])    [facility]  [address]  [facility] [id number] [phone]              Statement of Approval     Ordered          06/30/17 1018  I have reviewed and agree with all the recommendations and orders detailed in this document.  EFFECTIVE NOW     Approved and electronically signed by:  Jyoti Lopez APRN CNP                                                 INTERAGENCY TRANSFER FORM - NURSING   6/28/2017                       [address] [facility]: [phone]            Attending Provider: Nessa Christina MD     Allergies:  Amoxicillin, Clarithromycin, Lisinopril, Penicillins    Infection:  None   Service:  GYN/ONC    Ht:  1.499 m (4' 11.02\")   Wt:  77.7 kg (171 lb 4.8 oz)   Admission Wt:  75.9 kg (167 lb 5.3 oz)    BMI:  34.58 kg/m 2   BSA:  1.8 m 2            Advance Directives        Does patient have a scanned Advance Directive/ACP document in EPIC?           Yes        Immunizations     " "None      ASSESSMENT     Discharge Profile Flowsheet     DISCHARGE NEEDS ASSESSMENT     Patient's communication style  spoken language (English or Bilingual) 06/28/17 1009    Equipment Currently Used at Home  commode;raised toilet;shower chair;walker, rolling;grab bar 06/29/17 1604   SKIN      Transportation Available  family or friend will provide 06/29/17 1604   Inspection  Full 07/01/17 0901    GASTROINTESTINAL (ADULT,PEDIATRIC,OB)     Skin WDL  WDL 07/01/17 0901    GI WDL  ex 07/01/17 0901   Skin Moisture  dry 07/01/17 0901    Abdominal Appearance  obese 07/01/17 0901   Skin Integrity  incision(s);drain/device(s) 07/01/17 0901    Last Bowel Movement  06/30/17 07/01/17 0901   SAFETY      GI Signs/Symptoms  abdominal discomfort;abdominal pain;passing flatus 07/01/17 0901   Safety WDL  WDL 07/01/17 0901    COMMUNICATION ASSESSMENT                        Assessment WDL (Within Defined Limits) Definitions           Safety WDL     Effective: 09/28/15    Row Information: <b>WDL Definition:</b> Bed in low position, wheels locked; call light in reach; upper side rails up x 2; ID band on<br> <font color=\"gray\"><i>Item=AS safety wdl>>List=AS safety wdl>>Version=F14</i></font>      Skin WDL     Effective: 09/28/15    Row Information: <b>WDL Definition:</b> Warm; dry; intact; elastic; without discoloration; pressure points without redness<br> <font color=\"gray\"><i>Item=AS skin wdl>>List=AS skin wdl>>Version=F14</i></font>      Vitals     Vital Signs Flowsheet     VITAL SIGNS     Comfort  comfortably manageable 07/01/17 0639    Temp  96.5  F (35.8  C) 07/01/17 0715   Change in Pain  about the same 07/01/17 0639    Temp src  Oral 07/01/17 0715   Pain Control  partially effective 07/01/17 0639    Resp  18 07/01/17 0715   Functioning  can do most things, but pain gets in the way of some 07/01/17 0639    Pulse  78 07/01/17 0715   Sleep  awake with occasional pain 07/01/17 0639    Heart Rate  83 06/30/17 1250   ANALGESIA SIDE " "EFFECTS MONITORING      Pulse/Heart Rate Source  Monitor 07/01/17 0715   Side Effects Monitoring: Respiratory Quality  R 06/30/17 2310    BP  120/66 07/01/17 0715   Side Effects Monitoring: Respiratory Depth  N 06/30/17 2310    BP Location  Left arm 07/01/17 0715   Side Effects Monitoring: Sedation Level  1 06/30/17 2310    Patient Position  Sitting 06/28/17 0929   HEIGHT AND WEIGHT      OXYGEN THERAPY     Height  1.499 m (4' 11.02\") 06/28/17 0929    SpO2  96 % 07/01/17 0715   Weight  77.7 kg (171 lb 4.8 oz) 06/29/17 1412    O2 Device  None (Room air) 07/01/17 0715   BSA (Calculated - sq m)  1.78 06/28/17 0929    Oxygen Delivery  2 LPM 06/29/17 1439   BMI (Calculated)  33.85 06/28/17 0929    RESPIRATORY MONITORING     POSITIONING      Respiratory Monitoring (EtCO2)  34 mmHg 06/29/17 1439   Body Position  independently positioning;supine, head elevated 07/01/17 0901    Integrated Pulmonary Index (IPI)  10 06/29/17 1439   Head of Bed (HOB)  HOB at 30-45 degrees 07/01/17 0901    PAIN/COMFORT     Chair  Upright in chair 06/30/17 1615    Patient Currently in Pain  denies 07/01/17 0854   Positioning/Transfer Devices  pillows 07/01/17 0901    Preferred Pain Scale  CAPA (Clinically Aligned Pain Assessment) ([facility] Adults Only) 07/01/17 0639   DAILY CARE      Pain Location  Abdomen 07/01/17 0639   Activity Type  up in chair 07/01/17 1128    Pain Orientation  Right;Upper 07/01/17 0639   Activity Level of Assistance  assistance, 1 person 07/01/17 0901    Pain Descriptors  Aching 07/01/17 0639   ECG      Pain Management Interventions  analgesia administered 07/01/17 0639   ECG Rhythm  Normal sinus rhythm 06/28/17 1531    Pain Intervention(s)  Medication (See eMAR) 07/01/17 0639   Ectopy  None 06/28/17 1531    Response to Interventions  Decrease in pain 07/01/17 0700   Lead Monitored  Lead II 06/28/17 1531    CLINICALLY ALIGNED PAIN ASSESSMENT (CAPA) ([facility] ADULTS ONLY)                 "   Patient Lines/Drains/Airways Status    Active LINES/DRAINS/AIRWAYS     Name: Placement date: Placement time: Site: Days: Last dressing change:    Incision/Surgical Site 06/28/17 Abdomen 06/28/17   1344    2     Incision/Surgical Site 06/28/17 Bilateral Vagina 06/28/17   1528    2             Patient Lines/Drains/Airways Status    Active PICC/CVC     Name: Placement date: Placement time: Site: Days: Additional Info Last dressing change:    Port A Cath Single 04/20/17 Chest wall 04/20/17   1010   Chest wall   72 Power Port: Yes               Intake/Output Detail Report     Date Intake       Output     Net    Shift P.O. I.V. IV Piggyback Blood Components Total Urine Other Blood Total       Day 06/30/17 0000 - 06/30/17 0659 120 -- -- -- 120 400 -- -- 400 -280    Maral 06/30/17 0700 - 06/30/17 1459 -- 20 -- 300 320 200 -- -- 200 120    Noc 06/30/17 1500 - 06/30/17 2359 -- 20 -- -- 20 125 -- -- 125 -105    Day 07/01/17 0000 - 07/01/17 0659 120 -- -- -- 120 750 -- -- 750 -630    Maral 07/01/17 0700 - 07/01/17 1459 -- 20 -- -- 20 450 -- -- 450 -430      Last Void/BM       Most Recent Value    Urine Occurrence 2 at 06/29/2017 1459    Stool Occurrence 1 [scant] at 06/30/2017 1300      Case Management/Discharge Planning     Case Management/Discharge Planning Flowsheet     LIVING ENVIRONMENT     Equipment Currently Used at Home  commode;raised toilet;shower chair;walker, rolling;grab bar 06/29/17 1604    Lives With  spouse 06/29/17 1604   ABUSE RISK SCREEN      Living Arrangements  house 06/29/17 1604   QUESTION TO PATIENT:  Has a member of your family or a partner(now or in the past) intimidated, hurt, manipulated, or controlled you in any way?  no 06/28/17 0956    COPING/STRESS     QUESTION TO PATIENT: Do you feel safe going back to the place where you are living?  no (describe) 06/28/17 0956    Major Change/Loss/Stressor  surgery/procedure 06/28/17 1009   If no, describe the patient's reason  Pt feels like she will need rehab  after surgery because she uses wheelchair and has stairs to climb 06/28/17 0956    DISCHARGE PLANNING     OBSERVATION: Is there reason to believe there has been maltreatment of a vulnerable adult (ie. Physical/Sexual/Emotional abuse, self neglect, lack of adequate food, shelter, medical care, or financial exploitation)?  no 06/28/17 0956    Transportation Available  family or friend will provide 06/29/17 1604   (R) MENTAL HEALTH SUICIDE RISK      FINAL RESOURCES     Are you depressed or being treated for depression?  Yes 06/28/17 2016                  [address] [facility]: [phone]            Medication Administration Report for Tosin Nina as of 07/01/17 1159   Legend:    Given Hold Not Given Due Canceled Entry Other Actions    Time Time (Time) Time  Time-Action       Inactive    Active    Linked        Medications 06/25/17 06/26/17 06/27/17 06/28/17 06/29/17 06/30/17 07/01/17    acetaminophen (TYLENOL) tablet 650 mg  Dose: 650 mg Freq: EVERY 6 HOURS Route: PO  Start: 06/28/17 1845   Admin Instructions: Do NOT use if patient has an active opioid/acetaminophen analgesic order for pain  Maximum acetaminophen dose from all sources = 75 mg/kg/day not to exceed 4 grams/day.        2003 (650 mg)-Given        0120 (650 mg)-Given       0640 (650 mg)-Given       1235 (650 mg)-Given       1814 (650 mg)-Given        0037 (650 mg)-Given       0653 (650 mg)-Given       1258 (650 mg)-Given       1859 (650 mg)-Given        0048 (650 mg)-Given       0634 (650 mg)-Given       [ ] 1245       [ ] 1845           benzocaine-menthol (CHLORASEPTIC) 6-10 MG lozenge 1-2 lozenge  Dose: 1-2 lozenge Freq: EVERY 1 HOUR PRN Route: BU  PRN Reason: sore throat  PRN Comment: without fever  Start: 06/28/17 1834              bisacodyl (DULCOLAX) Suppository 10 mg  Dose: 10 mg Freq: DAILY Route: RE  Start: 06/28/17 1845   Admin Instructions: Start POD 1.  MUST HOLD for bowel resection or diarrhea. May discontinue if patient having  bowel movement.        (1941)-Not Given [C]        1727-Hold [C]       2036 (10 mg)-Given        1209 (10 mg)-Given        0850 (10 mg)-Given           diphenhydrAMINE (BENADRYL) solution 12.5 mg  Dose: 12.5 mg Freq: EVERY 6 HOURS PRN Route: PO  PRN Reason: itching  Start: 06/28/17 1834   Admin Instructions: Caution to be used when administering multiple CNS depressing meds within a short time frame.              Or  diphenhydrAMINE (BENADRYL) injection 12.5 mg  Dose: 12.5 mg Freq: EVERY 6 HOURS PRN Route: IV  PRN Reason: itching  PRN Comment: Only give if patient unable to take PO.  Start: 06/28/17 1834   Admin Instructions: Caution to be used when administering multiple CNS depressing meds within a short time frame.               enoxaparin (LOVENOX) injection 60 mg  Dose: 60 mg Freq: EVERY 12 HOURS Route: SC  Start: 06/29/17 0800        0751 (60 mg)-Given       1957 (60 mg)-Given        0835 (60 mg)-Given       2004 (60 mg)-Given        0818 (60 mg)-Given       [ ] 2000           escitalopram (LEXAPRO) tablet 10 mg  Dose: 10 mg Freq: DAILY Route: PO  Start: 06/29/17 0800        0746 (10 mg)-Given        0835 (10 mg)-Given        0816 (10 mg)-Given           heparin 100 UNIT/ML injection 5 mL  Dose: 5 mL Freq: EVERY 28 DAYS Route: IK  Start: 06/29/17 1100   Admin Instructions: ONLY to de-access each port in dual implanted port.  Flush with 10 mL NS followed by 5 mL heparin (100 units/mL) at discharge and at least every 28 days.  MAX: 5 mL per port         (1223)-Not Given             heparin lock flush 10 UNIT/ML injection 5-10 mL  Dose: 5-10 mL Freq: EVERY 1 HOUR PRN Route: IK  PRN Reason: other  PRN Comment: to lock each port in dual implanted port.  Start: 06/29/17 1045   Admin Instructions: MAX: 5 mL per port.          1507 (5 mL)-Given        0856 (5 mL)-Given           heparin lock flush 10 UNIT/ML injection 5-10 mL  Dose: 5-10 mL Freq: EVERY 24 HOURS Route: IK  Start: 06/29/17 1100   Admin Instructions: To  "lock each dormant port in dual implanted port.  Check PRN heparin flush order to see when last dose of PRN heparin was given before administering.   MAX: 5 mL per port.         1234 (5 mL)-Given        (1100)-Not Given        [ ] 1100           hydrochlorothiazide tablet 25 mg  Dose: 25 mg Freq: DAILY Route: PO  Start: 06/29/17 0800        0746 (25 mg)-Given        0834 (25 mg)-Given        0816 (25 mg)-Given           HYDROmorphone (DILAUDID) tablet 2-4 mg  Dose: 2-4 mg Freq: EVERY 3 HOURS PRN Route: PO  PRN Reason: moderate to severe pain  Start: 06/28/17 1834       2003 (4 mg)-Given        0120 (4 mg)-Given       0758 (4 mg)-Given       1358 (4 mg)-Given       1912 (2 mg)-Given        0037 (4 mg)-Given       0441 (4 mg)-Given       1132 (4 mg)-Given       1558 (4 mg)-Given        0048 (4 mg)-Given       0634 (4 mg)-Given           levothyroxine (SYNTHROID/LEVOTHROID) tablet 125 mcg  Dose: 125 mcg Freq: DAILY Route: PO  Start: 06/29/17 0800        0745 (125 mcg)-Given        0835 (125 mcg)-Given        0816 (125 mcg)-Given           lidocaine (LMX4) kit  Freq: EVERY 1 HOUR PRN Route: Top  PRN Reason: moderate pain  PRN Comment: with VAD insertion or accessing implanted port  Start: 06/29/17 1044   Admin Instructions: Do NOT give if patient has a history of allergy to any local anesthetic or any \"wolf\" product.   Apply 30 minutes prior to VAD insertion or port access.  MAX Dose:  2.5 g (  of 5 g tube)               lidocaine (LMX4) kit  Freq: EVERY 1 HOUR PRN Route: Top  PRN Reason: pain  PRN Comment: with VAD insertion or accessing implanted port.  Start: 06/28/17 1834   Admin Instructions: Do NOT give if patient has a history of allergy to any local anesthetic or any \"wolf\" product.   Apply 30 minutes prior to VAD insertion or port access.  MAX Dose:  2.5 g (  of 5 g tube)               lidocaine 1 % 1 mL  Dose: 1 mL Freq: EVERY 1 HOUR PRN Route: OTHER  PRN Comment: mild pain with VAD insertion or accessing " "implanted port  Start: 06/29/17 1044   Admin Instructions: Do NOT give if patient has a history of allergy to any local anesthetic or any \"wolf\" product. MAX dose 1 mL subcutaneous OR intradermal in divided doses.               losartan (COZAAR) tablet 50 mg  Dose: 50 mg Freq: EVERY MORNING Route: PO  Start: 06/29/17 0800        0745 (50 mg)-Given        0834 (50 mg)-Given        0816 (50 mg)-Given           naloxone (NARCAN) injection 0.1-0.4 mg  Dose: 0.1-0.4 mg Freq: EVERY 2 MIN PRN Route: IV  PRN Reason: opioid reversal  Start: 06/28/17 1834   Admin Instructions: For respiratory rate LESS than or EQUAL to 8.  Partial reversal dose:  0.1 mg titrated q 2 minutes for Analgesia Side Effects Monitoring Sedation Level of 3 (frequently drowsy, arousable, drifts to sleep during conversation).Full reversal dose:  0.4 mg bolus for Analgesia Side Effects Monitoring Sedation Level of 4 (somnolent, minimal or no response to stimulation).               omeprazole (priLOSEC) CR capsule 20 mg  Dose: 20 mg Freq: EVERY MORNING Route: PO  Start: 06/29/17 0800        0745 (20 mg)-Given        0834 (20 mg)-Given        0816 (20 mg)-Given           ondansetron (ZOFRAN-ODT) ODT tab 4 mg  Dose: 4 mg Freq: EVERY 8 HOURS PRN Route: PO  PRN Reasons: nausea,vomiting  Start: 06/29/17 1845   Admin Instructions: With dry hands, peel back foil backing and gently remove tablet; do not push oral disintegrating tablet through foil backing; administer immediately on tongue and oral disintegrating tablet dissolves in seconds; then swallow with saliva; liquid not required.               polyethylene glycol (MIRALAX/GLYCOLAX) Packet 17 g  Dose: 17 g Freq: DAILY Route: PO  Start: 06/28/17 1845   Admin Instructions: Mixed prescribed dose in 8 ounces of water, juice or soda. Hold for loose stools.  1 Packet = 17 grams. Mixed prescribed dose in 8 ounces of water. Follow with 8 oz. of water.        2009 (17 g)-Given        0745 (17 g)-Given        0835 " (17 g)-Given        0818 (17 g)-Given           potassium chloride SA (K-DUR/KLOR-CON M) CR tablet 20 mEq  Dose: 20 mEq Freq: DAILY Route: PO  Start: 06/29/17 0800   Admin Instructions: DO NOT CRUSH.         0746 (20 mEq)-Given        0835 (20 mEq)-Given        0816 (20 mEq)-Given           prochlorperazine (COMPAZINE) injection 5 mg  Dose: 5 mg Freq: EVERY 6 HOURS PRN Route: IV  PRN Reasons: nausea,vomiting  Start: 06/28/17 1834              sennosides (SENOKOT) tablet 1 tablet  Dose: 1 tablet Freq: 2 TIMES DAILY Route: PO  Start: 06/28/17 2000 2003 (1 tablet)-Given        0746 (1 tablet)-Given       1912 (1 tablet)-Given        0835 (1 tablet)-Given       2004 (1 tablet)-Given        0816 (1 tablet)-Given       [ ] 2000           simethicone (MYLICON) chewable tablet 80 mg  Dose: 80 mg Freq: 4 TIMES DAILY PRN Route: PO  PRN Reason: cramping  PRN Comment: gas  Start: 06/28/17 1834              sodium chloride (PF) 0.9% PF flush 10-20 mL  Dose: 10-20 mL Freq: EVERY 1 HOUR PRN Route: IK  PRN Reasons: line flush,post meds or blood draw  Start: 06/29/17 1045   Admin Instructions: And Daily PRN, to de-access each port in dual implanted port.  Flush with 10 mL NS followed by 5 mL heparin (100 units/mL) at discharge and at least every 28 days.          1506 (20 mL)-Given        0855 (20 mL)-Given           sodium chloride (PF) 0.9% PF flush 10-20 mL  Dose: 10-20 mL Freq: EVERY 1 HOUR PRN Route: IK  PRN Reasons: line flush,post meds or blood draw  PRN Comment: To flush each CVC Implanted port.  Start: 06/29/17 1045   Admin Instructions: 10 mL post IV meds;   20 mL post blood draw.          0739 (20 mL)-Given [C]            sodium chloride (PF) 0.9% PF flush 3 mL  Dose: 3 mL Freq: EVERY 8 HOURS Route: IK  Start: 06/28/17 1845   Admin Instructions: And Q1H PRN, to lock peripheral IV dormant line.        2009 (3 mL)-Given        0323 (3 mL)-Given       1235 (3 mL)-Given       1916 (3 mL)-Given        0308 (3  "mL)-Given       (1137)-Not Given       (1859)-Not Given        (0453)-Not Given [C]       [ ] 1045       [ ] 1845           sodium chloride (PF) 0.9% PF flush 3 mL  Dose: 3 mL Freq: EVERY 1 HOUR PRN Route: IK  PRN Reason: line flush  PRN Comment: for peripheral IV flush post IV meds  Start: 06/28/17 1834        0410 (20 mL)-Given [C]            Future Medications  Medications 06/25/17 06/26/17 06/27/17 06/28/17 06/29/17 06/30/17 07/01/17       lidocaine 1 % 1 mL  Dose: 1 mL Freq: EVERY 1 HOUR PRN Route: OTHER  PRN Comment: mild pain with VAD insertion or accessing implanted port  Start: 07/02/17 0000   Admin Instructions: Do NOT give if patient has a history of allergy to any local anesthetic or any \"wolf\" product. MAX dose 1 mL subcutaneous OR intradermal in divided doses.              Completed Medications  Medications 06/25/17 06/26/17 06/27/17 06/28/17 06/29/17 06/30/17 07/01/17         Dose: 2 g Freq: PRE-OP/PRE-PROCEDURE Route: IV  Indications of Use: SURGICAL PROPHYLAXIS  Start: 06/28/17 0929   End: 06/28/17 1305   Admin Instructions: Give first dose within 1 hour PRIOR to incision. If patient weight is greater than or equal to 120 kg increase dose to 3 g.        1305 (2 g)-Given                Dose: 40 mg Freq: ONCE Route: SC  Start: 06/28/17 2100   End: 06/28/17 2103 2103 (40 mg)-Given                Dose: 15 mL Freq: 2 TIMES DAILY Route: PO  Start: 06/28/17 1845   End: 06/29/17 0954   Admin Instructions: Start evening of surgery.        2006 (15 mL)-Given        0954 (15 mL)-Given            Discontinued Medications  Medications 06/25/17 06/26/17 06/27/17 06/28/17 06/29/17 06/30/17 07/01/17         Dose: 1 g Freq: SEE ADMIN INSTRUCTIONS Route: IV  Indications of Use: SURGICAL PROPHYLAXIS  Start: 06/28/17 0929   End: 06/28/17 1524   Admin Instructions: Intra-Op Dose.  Give every 2 hours while patient in surgery, starting 2 hours after pre-op dose.  DO NOT GIVE intra-op dose if CrCl less than 10 " mL/min (on dialysis).  If CrCL less than 50 mL/min, double the time interval between doses.        1524-Med Discontinued            Dose: 80 mg Freq: EVERY 12 HOURS Route: SC  Start: 06/29/17 0800   End: 06/28/17 2055 2055-Med Discontinued            Dose: 25-50 mcg Freq: EVERY 2 MIN PRN Route: IV  PRN Reason: other  PRN Comment: acute pain  Start: 06/28/17 1523   End: 06/28/17 1822   Admin Instructions: MAX cumulative dose = 250 mcg.    Use Fentanyl initially, as a short acting agent for acute pain control.  If insufficient, or a longer acting agent is needed, begin Morphine or Hydromorphone if ordered.        1554 (50 mcg)-Given       1602 (50 mcg)-Given       1822-Med Discontinued            Dose: 3 mL Freq: EVERY 1 HOUR PRN Route: IK  PRN Reason: line flush  Start: 06/29/17 1031   End: 06/29/17 1045        1045-Med Discontinued           Dose: 0.3-0.5 mg Freq: EVERY 5 MIN PRN Route: IV  PRN Reason: other  PRN Comment: acute pain.  May administer if Respiratory Rate is greater than 10  Start: 06/28/17 1523   End: 06/28/17 1822   Admin Instructions: If fentanyl is also ordered, use HYDROmorphone if pain control insufficient with fentanyl or a longer acting agent is needed.   Max cumulative dose = 2 mg        1611 (0.5 mg)-Given       1631 (0.5 mg)-Given       1709 (0.5 mg)-Given       1822-Med Discontinued            Dose: 0.3-0.5 mg Freq: EVERY 2 HOURS PRN Route: IV  PRN Reason: severe pain  PRN Comment: or if patient unable to take PO or is refractory to oral narcotics.  Start: 06/28/17 1834   End: 06/29/17 2310   Admin Instructions: IF patient requires greater than 3 doses in 6 hours, notify provider.  Hold while on PCA.         2310-Med Discontinued           Rate: 125 mL/hr Freq: CONTINUOUS Route: IV  Start: 06/28/17 1530   End: 06/29/17 0932       1621 ( )-New Bag       2019 ( )-Rate/Dose Verify        0006 ( )-New Bag       0743 ( )-New Bag       0932-Med Discontinued           Rate: 100 mL/hr Freq:  CONTINUOUS Route: IV  Start: 06/28/17 1530   End: 06/28/17 1822   Admin Instructions: Continue until IV catheter is weaned               1822-Med Discontinued            Rate: 25 mL/hr Freq: CONTINUOUS Route: IV  Start: 06/28/17 1015   End: 06/28/17 1524   Admin Instructions: IF patient NOT on dialysis.               1524-Med Discontinued            Dose: 4 mg Freq: EVERY 8 HOURS Route: PO  Start: 06/28/17 1834   End: 06/29/17 1844   Admin Instructions: With dry hands, peel back foil backing and gently remove tablet; do not push oral disintegrating tablet through foil backing; administer immediately on tongue and oral disintegrating tablet dissolves in seconds; then swallow with saliva; liquid not required.        (2002)-Not Given        (0323)-Not Given       (1049)-Not Given       1844-Med Discontinued           Dose: 4 mg Freq: EVERY 30 MIN PRN Route: PO  PRN Reason: nausea  Start: 06/28/17 1523   End: 06/28/17 1822   Admin Instructions: MAX total dose = 8 mg, including OR dosing. If not resolved in 15 minutes, then go to step 2 (Prochlorperazine if ordered).        1822-Med Discontinued         Or    Dose: 4 mg Freq: EVERY 30 MIN PRN Route: IV  PRN Reason: nausea  Start: 06/28/17 1523   End: 06/28/17 1822   Admin Instructions: MAX total dose = 8 mg, including OR dosing. If not resolved in 15 minutes, then go to step 2 (Prochlorperazine if ordered).  Irritant.        1822-Med Discontinued            Rate: 11.4-136.6 mL/hr Freq: CONTINUOUS Route: IV  Start: 06/28/17 1330   End: 06/28/17 1822   Admin Instructions: MD to titrate  Vesicant.               1822-Med Discontinued            Dose: 5 mg Freq: EVERY 6 HOURS PRN Route: IV  PRN Reasons: nausea,vomiting  Start: 06/28/17 1523   End: 06/28/17 1822   Admin Instructions: This is Step 2 of the nausea and vomiting protocol.   If nausea not resolved in 15 minutes, give Metoclopramide if ordered (step 3 of nausea and vomiting protocol)          1822-Med Discontinued             Dose: 10-20 mL Freq: EVERY 1 HOUR PRN Route: IV  PRN Reasons: line flush,post meds or blood draw  Start: 06/28/17 1002   End: 06/28/17 1524   Admin Instructions: To flush CVC Implanted port. 10 mL post IV meds; 20 mL post blood draw.        1524-Med Discontinued       Medications 06/25/17 06/26/17 06/27/17 06/28/17 06/29/17 06/30/17 07/01/17               INTERAGENCY TRANSFER FORM - NOTES (H&P, Discharge Summary, Consults, Procedures, Therapies)   6/28/2017                       [address] [facility] BANK: [phone]               History & Physicals      H&P by Adelina Marmolejo APRN CNP at 6/22/2017  1:00 PM     Author:  Adelina Marmolejo APRN CNP Service:  (none) Author Type:  Nurse Practitioner    Filed:  6/22/2017  4:31 PM Encounter Date:  6/22/2017 Status:  Signed    :  Adelina Marmolejo APRN CNP (Nurse Practitioner)             Pre-Operative H & P     CC:  Preoperative exam to assess for increased cardiopulmonary risk while undergoing surgery and anesthesia.    Date of Encounter: 6/22/2017  Primary Care Physician:  Esther Back  Tosin Nina is a 71 year old female who presents for pre-operative H & P in preparation for a DaVinci Assisted Removal Of Uterus, Cervix, Both Ovaries And Fallopian Tubes, Cancer Debulking Procedure, Possible Bowel Resection, Possible Ostomy, Possible Intraperitoneal Port Placement with Dr. Kim on[AJ1.1] 6/28/17[AJ1.2] at [facility].     Tosin Nina is a 71 year old female with hypertension, history of PE,[AJ1.1] anemia, thrombocytopenia,[AJ1.3] obesity, paraneoplstic neuromyopathy and neuropathy, hypothyroidism, GERD, and depression that has ovarian cancer.  This was diagnosed in April 2017 after being admitted to the hospital for multiple abnormal neurologic symptoms.  During that admission she was additionally diagnosed with a pulmonary embolism and paraneoplastic  neuromyopathy and neuropathy.  She has undergone chemotherapy for treatment thus far and now the above listed procedure has been recommended for further treatment.    History is obtained from the patient and her .     Past Medical History  Past Medical History:   Diagnosis Date     Anemia      Cervical spinal stenosis      Depression      GERD (gastroesophageal reflux disease)      Hypertension      Hypokalemia      Hypothyroid      Lumbar spinal stenosis      Obesity      Ovarian cancer (H)      Paraneoplastic neuromyopathy and neuropathy (H)      PE (pulmonary thromboembolism) (H)        Past Surgical History  Past Surgical History:   Procedure Laterality Date     AS TOTAL KNEE ARTHROPLASTY Left      BACK SURGERY  2009     CHOLECYSTECTOMY  01/01/2016     OPEN REDUCTION INTERNAL FIXATION WRIST Right 2009     THYROIDECTOMY         Hx of Blood transfusions/reactions: none    Hx of abnormal bleeding or anti-platelet use: none    Menstrual history: No LMP recorded. Patient is postmenopausal.    Steroid use in the last year: IV steroids in April for abnormal neurologic symptoms, but no long term steroids.      Personal or FH with difficulty with Anesthesia:  none    Prior to Admission Medications  Current Outpatient Prescriptions   Medication Sig Dispense Refill     DOCUSATE SODIUM PO Take 100 mg by mouth daily as needed for constipation       calcium carbonate (TUMS) 500 MG chewable tablet Take 1 chew tab by mouth daily as needed for heartburn       Omeprazole (PRILOSEC PO) Take 20 mg by mouth every morning       ESCITALOPRAM OXALATE PO Take 10 mg by mouth daily       sennosides (SENOKOT) 8.6 MG tablet Take 1 tablet by mouth daily       Potassium Chloride ER 20 MEQ TBCR Take 1 tablet (20 mEq) by mouth daily 30 tablet 3     losartan (COZAAR) 100 MG tablet Take 1 tablet (100 mg) by mouth daily (Patient taking differently: Take 100 mg by mouth every morning ) 30 tablet      hydrochlorothiazide (HYDRODIURIL) 25 MG  "tablet Take 2 tablets (50 mg) by mouth daily (Patient taking differently: Take 25 mg by mouth daily ) 30 tablet      levothyroxine (SYNTHROID) 125 MCG tablet Take 1 tablet (125 mcg) by mouth daily 30 tablet      Vitamin D, Cholecalciferol, 1000 UNITS TABS Take 2,000 Units by mouth daily (Patient taking differently: Take 2,000 Units by mouth daily LD 6/20/17, told to hold until after surgery) 60 tablet      enoxaparin (LOVENOX) 80 MG/0.8ML injection Inject 0.6 mLs (60 mg) Subcutaneous every 12 hours 60 Syringe 1       Allergies  Allergies   Allergen Reactions     Amoxicillin Hives     Clarithromycin Other (See Comments)     \"I felt dopey, sleepy and like a druggie\"     Lisinopril Cough     Penicillins Rash       Social History  Social History     Social History     Marital status:      Spouse name: Christiano Nina     Number of children: 1     Years of education: N/A     Occupational History     Current: Retired      Former:       Social History Main Topics     Smoking status: Never Smoker     Smokeless tobacco: Not on file     Alcohol use Yes      Comment: occasionally     Drug use: No     Sexual activity: Not on file     Other Topics Concern     Not on file     Social History Narrative       Family History  Family History   Problem Relation Age of Onset     Breast Cancer Mother      Heart Failure Mother      Breast Cancer Maternal Grandmother      Breast Cancer Maternal Aunt      Myocardial Infarction Father      Unknown/Adopted Brother      Unknown/Adopted Maternal Grandfather      Stomach Cancer Paternal Grandmother      Lung Cancer Paternal Grandfather      mustard gas in WW[place]           ROS/MED HX  The complete review of systems is negative other than noted in the HPI or here.     ENT/Pulmonary:     (+)KAILASH risk factors hypertension, , . .    Neurologic:     (+)neuropathy - hands and feet, other neuro paraneoplastic neuromyopathy and neuropathy    Cardiovascular:     (+) hypertension-range: " "unknown, ---. : . . RECINOS, .   METS/Exercise Tolerance:  1 - Eating, dressing   Hematologic:     (+) History of blood clots pt is anticoagulated, Anemia, -     (-) History of Transfusion   Musculoskeletal:   (+) , , other musculoskeletal- cervical and lumbar spine stenosis, generalized weakness      GI/Hepatic:     (+) GERD Asymptomatic on medication,       Renal/Genitourinary:  - ROS Renal section negative       Endo:     (+) thyroid problem hypothyroidism, Obesity, .      Psychiatric:     (+) psychiatric history depression      Infectious Disease:  - neg infectious disease ROS       Malignancy:   (+) Malignancy History of Other  Other CA ovarian Active status post Chemo         Other:    (+) No chance of pregnancy no H/O Chronic Pain,other significant disability Wheelchair bound                     Temp: 99.3  F (37.4  C) (After drinking coffee) Temp src: Oral BP: 91/64 Pulse: 100   Resp: 14 SpO2: 97 %         164 lbs 0 oz  4' 11\"   Body mass index is 33.12 kg/(m^2).       Physical Exam  Constitutional: Awake, alert, cooperative, no apparent distress, and appears stated age. obese  Eyes: Pupils equal, round and reactive to light, extra ocular muscles intact, sclera clear, conjunctiva normal.  HENT: Normocephalic, oral pharynx with moist mucus membranes, good dentition. No goiter appreciated.   Respiratory: Clear to auscultation bilaterally, no crackles or wheezing.  Cardiovascular: Regular rate and rhythm, normal S1 and S2, and no murmur noted.  Carotids +2, no bruits. BLE 1+ pitting edema. Palpable radial pulses.  Diminished pedal pulses.  GI: Normal bowel sounds, soft, non-distended, non-tender, no masses palpated, no hepatosplenomegaly.    Lymph/Hematologic: No cervical lymphadenopathy and no supraclavicular lymphadenopathy.  Genitourinary:  deferred  Skin: Warm and dry.  No rashes at anticipated surgical site.   Musculoskeletal: Full ROM of neck. There is no redness, warmth, or swelling of the exposed joints. " Gross motor strength is slightly decreased throughout.  Required 2 person assist with gait belt for transfer.    Neurologic: Awake, alert, oriented to name, place and time. Cranial nerves II-XII are grossly intact. Gait is not assessed.    Neuropsychiatric: Calm, cooperative. Normal affect.     Labs: (personally reviewed)[AJ1.1]   Component      Latest Ref Rng & Units 6/22/2017   WBC      4.0 - 11.0 10e9/L 5.0   RBC Count      3.8 - 5.2 10e12/L 2.45 (L)   Hemoglobin      11.7 - 15.7 g/dL 8.4 (L)   Hematocrit      35.0 - 47.0 % 24.8 (L)   MCV      78 - 100 fl 101 (H)   MCH      26.5 - 33.0 pg 34.3 (H)   MCHC      31.5 - 36.5 g/dL 33.9   RDW      10.0 - 15.0 % 24.1 (H)   Platelet Count      150 - 450 10e9/L 104 (L)   Potassium      3.4 - 5.3 mmol/L 3.0 (L)[AJ1.4]       Stress echo  2/7/2017  Interpretation Summary  This was a normal stress echocardiogram with no evidence of stress-induced  ischemia. Contrast was used without apparent complications.  The visual ejection fraction is estimated at >70%.    EKG  6/8/2017  Sinus rhythm  Cannot rule out Anterior infarct , age undetermined  Abnormal ECG  When compared with ECG of 11-APR-2017 16:39,  No significant change was found      Outside records reviewed from:  Care Everywhere      ASSESSMENT and PLAN[AJ1.1]  Tosin Nina is a 71 year old female scheduled for a DaVinci Assisted Removal Of Uterus, Cervix, Both Ovaries And Fallopian Tubes, Cancer Debulking Procedure, Possible Bowel Resection, Possible Ostomy, Possible Intraperitoneal Port Placement on 6/28/2017 by Dr. Kim in treatment of ovarian cancer.  PAC referral for risk assessment and optimization for anesthesia with comorbid conditions of: hypertension, anemia, thrombocytopenia, history of PE, obesity, paraneoplstic neuromyopathy and neuropathy, hypothyroidism, GERD, and depression.    Pre-operative considerations:  1.  Cardiac:  Functional status- METS 1.  The patient is fairly sedentary, sitting most  of the day.  She reports she will get exertional dyspnea when she has to go up or down the stairs in her home, but prior to her cancer diagnosis and physical declined she was able to exercise regularly with no exertional dyspnea.  She had a negative stress echo in Feb 2017.   Hypertension is managed with hctz and losartan, but recently the blood pressure readings have been lower than normal for her (91/64 today); possibly due to her anemia.  A call was placed today to her primary care office to see if her primary care provider would want to make an adjustment to her hypertension medication, but she won't be in the office tomorrow.  Message left with RN to call the patient with any new instructions.   Instructed to hold her losartan and hctz the DOS.  Intermediate risk surgery with 0.9% risk of major adverse cardiac event.  No further cardiac evaluation needed per 2014 ACC/AHA guidelines for non-cardiac surgery.  2.  Pulm:  Airway feasible.  KAILASH risk: intermediate.    3.  GI:  Risk of PONV score = 2.  If > 2, anti-emetic intervention recommended.  GERD is well controlled with prilosec.   4. Heme:  She was diagnosed with a PE in April 2017 which has since resolved.  She continues on BID lovenox injections, but has been instructed by heme/onc to hold the dose the night before surgery and the morning of surgery.  Her hemoglobin has gradually been decreasing and on 6/20/2017 it was 7.7. The hgb today (requested recheck per surgeon) is 8.4.  She has not been transfused or had any iron infusions initiated.  Per the surgeons note, they did discuss the likelihood of needing to do a blood transfusion during surgery.   DOS Hgb recheck ordered.  VTE risk:  4.5%.  5. Neuromuscular:  The patient is deconditioned and uses a wheelchair at most times.  The transfer from wheelchair to exam table required the assist of two with a gait belt.  Her  has to assist her with a gait belt at home, especially on the stairs.  Fall risk  precautions should be considered.    6. FEN:  Her K+ level has consistently been running low. Her level on 6/20/17 was 3.3, but she reports she was given a potassium infusion that day and has been taking oral Kcl as ordered.  Repeat K+ level today is lower again at 3.0.  Call placed to Dr. Ennis's RN care coordinator to notify of K+ level and to see if Dr. Ennis would consider ordering another K+ infusion prior to surgery and possibly increasing her oral K+ dosing.  RN to discuss with Dr. Ennis and determine plan.  K+ recheck ordered for DOS.          Patient is optimized and is acceptable candidate for the proposed procedure.  No further diagnostic evaluation is needed.     Patient also evaluated by Dr. Juarez. See recommendations below.     For further details of assessment, testing, and physical exam please see H and P completed on same date.          Adelina Marmolejo DNP, RN, APRN[AJ1.3]  Preoperative Assessment Center  [facility]  Phone: [phone]  Fax: [phone][AJ1.1]     Revision History        User Key Date/Time User Provider Type Action    > AJ1.3 6/22/2017  4:31 PM Adelina Marmolejo APRN CNP Nurse Practitioner Sign     AJ1.4 6/22/2017  4:16 PM Adelina Marmolejo APRN CNP Nurse Practitioner      AJ1.2 6/22/2017  2:03 PM Adelina Marmolejo APRN CNP Nurse Practitioner      AJ1.1 6/22/2017  1:59 PM Adelina Marmolejo APRN CNP Nurse Practitioner                   Discharge Summaries     No notes of this type exist for this encounter.      Consult Notes     No notes of this type exist for this encounter.         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 Had a BM yesterday after receiving a suppository. Denies passing flatus since but has been feeling her bowels \"make a lot of noise.\" Reports having a couple episodes of nausea and dry heaving, but no vomiting. Tolerating regular food otherwise. No cp, sob. Reports some increased abdominal aching at the incision sites, R>L.[BO1.1]     Objective:[LH1.1]   Vitals:    06/29/17 1438 06/29/17 2123 06/29/17 2220 06/30/17 0600   BP: 103/55 105/62 105/52 102/54   BP Location: Left arm Left arm Left arm Left arm   Pulse: 79   84   Resp: 10 18 18 18   Temp: 96.3  F (35.7  C) 96.8  F (36  C) 96.4  F (35.8  C) 96.8  F (36  C)   TempSrc: Oral Oral Oral Oral   SpO2: 96% 94% 95% 94%   Weight:       Height:[KB1.1]           Gen- alert, no distress, cooperative  CV- Regular rate and rhythm  Pulm- Normal - Clear to auscultation without rales, rhonchi, or wheezing. and bilateral air exchange present  Abd- soft,[LH1.1] +[BO1.1]BS, incisions clean and intact, tenderness to palpation in RLQ, periumbilical bruising[LH1.1]. Small, non-tender, superficial hematoma on LLQ.[LH1.2]  Incision- dry and intact  Extr-[LH1.1] 2[BO1.1]+ edema  Lines/drains- infusion port and peripheral IV intact    I/Os  (Yesterday // Since Midnight)  PO[LH1.1] 932.5[BO1.1]cc //[LH1.1] 0[BO1.1]cc  IVF[LH1.1] 1000[BO1.1]cc //[LH1.1] 0[BO1.1]cc  Urine[LH1.1] 850[BO1.1]cc //[LH1.1] 0[BO1.1]cc[LH1.1]  Urine/Stool 150cc // 0cc[BO1.1]    New Labs/Imaging-[LH1.1]   AM labs pending[LH1.2]    Assessment: 71 year old female with Stage IVB high grade " serous ovarian cancer s/p neoadjuvant chemotherapy; now POD#2 s/p DA-TLH, BSO, IRVING, omentectomy, optimal tumor debulking no no gross residual disease; cystoscopy.      Active Problem list:    1. Postoperative State  2. Stage IVB high grade serous ovarian  3. Paraneoplastic neuromyopathy and neuropathy  4. History of pulmonary embolism, chronic anticoagulation  5. Acute on chronic anemia  6. Thrombocytopenia  7. Chronic hypertension  8. Constipation  9. Depression  10. Hypothyroidsim    Plan:    FEN: regular diet.  Continue home potassium.  Pain: scheduled Tylenol; PRN PO dilaudid  Heme: Hb 8.0 >  > 7.8>7.9>7.0.[LH1.1] AM HGB[LH1.2] 6.6 -- 1 u pRBC's --[KB1.2]7.0[BE1.1].[LH1.2]  Continue lovenox 60mg BID (home dosing)  CV: HTN; continue HCTZ, losartan  Pulm: Hx PE, on lovenox.  See Heme, above.  GI: See FEN.  Continue bowel regimen; home PPI ordered  : no issues  MSK/Neuro: Paraneoplastic neuromyopathy and neuropathy - now in wheelchair.  PT/OT ordered.  Psych: continue home escitalopram  Endo: continue home synthroid  PPX: SCDs, IS.  lovenox[LH1.1]      Triny Petit[KB1.3], MD  OBGYN Resident PGY[LH1.1]1[KB1.3]  Pager [phone][LH1.1]     I saw and evaluated the patient. I agree with the findings and the plan of care as documented in Dr. Petit 's note.   Hg dropped but I suspect this is equilibration as she has no signs of active bleeding. 1 U. WIll follow Hg. Cnt therapeutic lovenox given PTE, although if ongoing Hg drop, will stop lovenox.   Remainder of plan as above.     Jeanette Bhandari MD  Gynecologic Oncology  Pager [phone][BE1.1]          Revision History        User Key Date/Time User Provider Type Action    > BE1.1 6/30/2017  7:50 PM Jeanette Bhandari MD Physician Sign     [N/A] 6/30/2017  8:06 AM Triny Petit MD Resident Share     KB1.3 6/30/2017  8:06 AM Triny Petit MD Resident Share     KB1.1 6/30/2017  8:05 AM Triny Petit MD Resident      KB1.2 6/30/2017  8:04 AM Damaso,  MD Triny Resident      [N/A] 6/30/2017  6:38 AM Vi Pepe MD Resident Share     LH1.2 6/30/2017  6:36 AM iV Pepe MD Resident Share     BO1.1 6/30/2017  6:05 AM Ace Mccarty Medical Student Share     LH1.1 6/30/2017  5:40 AM Vi Pepe MD Resident Share                  Procedure Notes     No notes of this type exist for this encounter.         Progress Notes - Therapies (Notes from 06/28/17 through 07/01/17)      Progress Notes by Nicolas Butler OT at 6/29/2017  4:34 PM     Author:  Nicolas Butler OT Service:  Acute IP Rehab Author Type:  Occupational Therapist    Filed:  6/29/2017  4:34 PM Date of Service:  6/29/2017  4:34 PM Creation Time:  6/29/2017  4:34 PM    Status:  Signed :  Nicolas Butler OT (Occupational Therapist)          06/29/17 7750   Quick Adds   Type of Visit Initial Occupational Therapy Evaluation   Living Environment   Lives With spouse   Living Arrangements house   Home Accessibility bed and bath are not on the first floor;stairs (2 railings present);stairs to enter home;stairs within home;bed and bath on same level   Number of Stairs to Enter Home 1   Number of Stairs Within Home 15   Transportation Available family or friend will provide   Living Environment Comment  is home to help during the day, DTR comes by 2/week for showering   Self-Care   Dominant Hand right   Usual Activity Tolerance moderate   Current Activity Tolerance poor   Regular Exercise no   Equipment Currently Used at Home commode;raised toilet;shower chair;walker, rolling;grab bar   Functional Level Prior   Ambulation 1-->assistive equipment   Transferring 0-->independent   Toileting 1-->assistive equipment   Bathing 1-->assistive equipment   Dressing 0-->independent   Eating 0-->independent   Communication 0-->understands/communicates without difficulty   Swallowing 0-->swallows foods/liquids without difficulty   Cognition 0 - no cognition issues reported   Fall history  within last six months yes   Number of times patient has fallen within last six months 1   Which of the above functional risks had a recent onset or change? 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     Precautions/Limitations fall precautions   Cognitive Status Examination   Orientation orientation to person, place and time   Level of Consciousness alert   Visual Perception   Visual Perception Wears glasses   Occulomotor nystagmus with tracking to R and L   Visual Perception Comments Pt reports diffuse visual problems- blurry or jumpy at times   Sensory Examination   Sensory Comments numbness in hands and feet  (light touch intact)   Pain Assessment   Patient Currently in Pain No   Integumentary/Edema   Integumentary/Edema Comments tight shiny skin, 3+ pitting in ankle, 1+ in feet and legs   Range of Motion (ROM)   ROM Comment WNL   Strength   Strength Comments NT 2/2 abdominal precautions   Muscle Tone Assessment   Muscle Tone Comments WNL   Coordination   Upper Extremity Coordination No deficits were identified   Transfer Skill: Sit to Stand   Level of [employer]: Sit/Stand minimum assist (75% patients effort)   Bathing   Level of [employer] maximum assist (25% patients effort)   Upper Body Dressing   Level of [employer]: Dress Upper Body minimum assist (75% patients effort)   Lower Body Dressing   Level of [employer]: Dress Lower Body maximum assist (25% patients effort)  "  Toileting   Level of [place]: Toilet maximum assist (25% patients effort)   Grooming   Level of [place]: Grooming stand-by assist   Instrumental Activities of Daily Living (IADL)   Previous Responsibilities (was doing lADLs prior to March, none since then)   Activities of Daily Living Analysis   Impairments Contributing to Impaired Activities of Daily Living pain;post surgical precautions;sensory feedback impaired;strength decreased;balance impaired   General Therapy Interventions   Planned Therapy Interventions ADL retraining;progressive activity/exercise;home program guidelines   Clinical Impression   Criteria for Skilled Therapeutic Interventions Met yes, treatment indicated   OT Diagnosis decreased ADL I and tolerance   Influenced by the following impairments balance, pain, post surgical precautions   Assessment of Occupational Performance 5 or more Performance Deficits   Identified Performance Deficits ADLs, IADLs   Clinical Decision Making (Complexity) Low complexity   Therapy Frequency 5 times/wk   Predicted Duration of Therapy Intervention (days/wks) 7/6/17   Anticipated Equipment Needs at Discharge (TBD)   Anticipated Discharge Disposition Transitional Care Facility   Risks and Benefits of Treatment have been explained. Yes   Patient, Family & other staff in agreement with plan of care Yes   Clinical Impression Comments Pt would benefit from skilled OT to help increase ADL I and tolerance   [facility] [employer]-PAC TM \"6 Clicks\"   2016, Trustees of [facility], under license to [employer].  All rights reserved.   6 Clicks Short Forms Daily Activity Inpatient Short Form   [facility] AM-PAC  \"6 Clicks\" Daily Activity Inpatient Short Form   1. Putting on and taking off regular lower body clothing? 2 - A Lot   2. Bathing (including washing, rinsing, drying)? 2 - A Lot   3. Toileting, which includes using toilet, bedpan or urinal? 2 - A Lot   4. Putting on and taking off regular upper " body clothing? 3 - A Little   5. Taking care of personal grooming such as brushing teeth? 4 - None   6. Eating meals? 4 - None   Daily Activity Raw Score (Score out of 24.Lower scores equate to lower levels of function) 17   Total Evaluation Time   Total Evaluation Time (Minutes) 10[CK1.1]        Revision History        User Key Date/Time User Provider Type Action    > CK1.1 6/29/2017  4:34 PM Nicolas Butler, OT Occupational Therapist Sign                                                      INTERAGENCY TRANSFER FORM - LAB / IMAGING / EKG / EMG RESULTS   6/28/2017                       [address] [facility] BANK: [phone]            Unresulted Labs     None         Lab Results - 3 Days      Hemoglobin [[id number]] (Abnormal)  Resulted: 07/01/17 0916, Result status: Final result    Ordering provider: Triny Petit MD  06/30/17 2300 Resulting lab: [facility]    Specimen Information    Type Source Collected On   Blood  07/01/17 0856          Components       Value Reference Range Flag Lab   Hemoglobin 8.4 11.7 - 15.7 g/dL L 51            Hemoglobin [[id number]] (Abnormal)  Resulted: 06/30/17 1518, Result status: Final result    Ordering provider: Nessa Christina MD  06/30/17 1035 Resulting lab: [facility]    Specimen Information    Type Source Collected On   Blood  06/30/17 1505          Components       Value Reference Range Flag Lab   Hemoglobin 8.2 11.7 - 15.7 g/dL L 51            Hemoglobin [[id number]] (Abnormal)  Resulted: 06/30/17 0753, Result status: Final result    Ordering provider: Vi Pepe MD  06/29/17 2310 Resulting lab: [facility]    Specimen Information    Type Source Collected On   Blood  06/30/17 0743          Components       Value Reference Range Flag Lab   Hemoglobin 6.6 11.7 - 15.7 g/dL LL 51   Comment:         This result has been called to JANNETTE PHILLIPS RN 7C by Alverto Manrique on 06  30 2017   at 0752, and has been read back.              Glucose by meter [[id number]] (Abnormal)  Resulted: 06/30/17 0655, Result status: Final result    Ordering provider: Nessa Christina MD  06/30/17 0651 Resulting lab: POINT OF CARE TEST, GLUCOSE    Specimen Information    Type Source Collected On     06/30/17 0651          Components       Value Reference Range Flag Lab   Glucose 110 70 - 99 mg/dL H 170   Comment:  Dr/RN Notified            Surgical pathology exam [[id number]]  Resulted: 06/29/17 1625, Result status: Final result    Ordering provider: Nessa Christina MD  06/28/17 1413 Resulting lab: [facility]    Specimen Information    Type Source Collected On   Organ Uterus with Bilateral Ovaries and Fallopian Tubes 06/28/17 1412   Comment:  Taken by [employer] for research study.   Tissue Omentum 06/28/17 1442   Tissue Bladder Peritoneum 06/28/17 1444   Tissue Pelvis 06/28/17 1445   Tissue Large Intestine, Sigmoid 06/28/17 1448          Components       Value Reference Range Flag Lab   Copath Report --      Result:         Patient Name: ZAHIDA DUFF  MR#: [id number]  Specimen #: [id number]  Collected: 6/28/2017  Received: 6/28/2017  Reported: 6/29/2017 16:24  Ordering Phy(s): NESSA BOUDREAUX  Additional Phy(s): DAYDAY PIERCE    For improved result formatting, select 'View Enhanced Report Format'  under Linked Documents section.    SPECIMEN(S):  A: Uterus, cervix, bilateral fallopian tubes and ovaries  B: Omentum  C: Bladder peritoneum  D: Left pelvic sidewall  E: Sigmoid epiploica    FINAL DIAGNOSIS:  A. UTERUS, CERVIX, BILATERAL FALLOPIAN TUBES AND OVARIES, HYSTERECTOMY  WITH BILATERAL SALPINGO-OOPHORECTOMY:  - Focal atypical endometrial hyperplasia on a background of atrophic  endometrium  - Adenomyosis  - Leiomyomas  - Uterine serosal adhesions with psammoma bodies  - Right ovary with benign cortical inclusion cysts and surface fibrous  adhesions featuring psammoma bodies  -  Left ovarian serous carcinoma, high grade, with neoadjuvant  chemotherapy effects  - Fallopian tubes with no  significant histologic abnormality    B. OMENTUM, OMENTECTOMY:  - Omental adipose tissue with histiocytic reaction, cholesterol clefts  and psammoma bodies, consistent with neoadjuvant chemotherapy effect  - No viable tumor cells seen    C. BLADDER PERITONEUM, BIOPSY:  - Fibroadipose tissue with histiocytic reaction, cholesterol clefts and  psammoma bodies, consistent with neoadjuvant chemotherapy effect  - No viable tumor cells seen    D. LEFT PELVIC SIDEWALL, BIOPSY:  - Fibroadipose tissue with histiocytic reaction, cholesterol clefts and  psammoma bodies, consistent with neoadjuvant chemotherapy effect  - No viable tumor cells seen    E. SIGMOID EPIPLOICA, BIOPSY:  - Adipose tissue with histiocytic reaction, cholesterol clefts and  psammoma bodies, consistent with neoadjuvant chemotherapy effect  - No viable tumor cells seen    Report Name: Ovary or Fallopian Tube  Status: Submitted    Part(s) Involved:  A: Uterus, cervix, bilateral fallopian tubes and ovaries    Synoptic Report:     CLINICAL    Clinical History:        - Pelvic mass    SPECIMEN    Procedure:        - Right salpingo-oophorectomy        - Hysterectomy        - Omentectomy        - Peritoneal  biopsies    Lymph Node Sampling:        - Not performed    Specimen Integrity:        - Right ovary        - Left ovary      Specimen Integrity of Right Ovary:          - Capsule intact      Specimen Integrity of Left Ovary:          - Capsule intact    TUMOR    Primary Tumor Site(s):        - Left ovary      Left Ovary        Tumor Size of Left Ovary: 1.2 cm    Histologic Type:        - Serous high-grade carcinoma      Histologic Type Comments: With neoadjuvant chemotherapy effects    Tumor Extent      Ovarian Surface Involvement:          - Present      Specimen(s)        Extent of Right Ovary:            - Not involved        Extent of Left  "Ovary:            - Involved        Extent of Omentum:            - Not involved        Extent of Uterus:            - Not involved        Extent of Peritoneum:             - Not involved      Peritoneal Ascitic Fluid:          - Not performed / unknown      Pleural Fluid:          - Not performed / unknown    Accessory Tumor Findings      Treatment Effect:          - Moderate response identified (CRS score 2)    LYMPH NODES    Pelvic Lymph Nodes: No pelvic nodes submitted or found    Para-Aortic Lymph Nodes: No para-aortic nodes submitted or found    STAGE (PTNM, AJCC 7TH ED.)    TNM Descriptors:        - y (post-treatment)    Primary Tumor (pT):        - Ovary      Ovarian Primary Tumor (pT):          - pT1c: Tumor limited to one or both ovaries with any of the  following:          capsule ruptured, tumor on ovarian surface, malignant cells in  ascites          or peritoneal washings    Regional Lymph Nodes (pN):        - pNX: Cannot be assessed    COMMENTS   Best blocks for additional testing are A11, A12    CAP [facility] July 2016 [employer] Release    I have personally reviewed all specimens and or slides, including the  listed special stains, and used them  with my medical judgement to  determine the final diagnosis.    Electronically signed out by:    Gutierrez Duke M.D., PhD, Physicians    CLINICAL HISTORY:  Ovarian cancer    GROSS:  A: The specimen is received fresh with proper patient identification,  labeled \"cervix, uterus, bilateral fallopian tubes and ovaries\".  The  specimen consists of a 138.3 g intact hysterectomy specimen with  attached cervix and bilateral adnexa.  The uterus is 9.7 cm from fundus  to ectocervix x 5.1 cm from cornu to cornu x 4.7 cm from anterior to  posterior.  The serosa is diffusely granular.  The cervical resection  margin is inked black.  The 2.4 x 2.1 x 0.7 cm cervix has a pink-tan,  smooth and focally hyperemic ectocervical mucosa surrounding a 0.7 cm  ovoid os.  Opening the " specimen reveals a 3.2 cm in length x 1.7 cm in  diameter endocervical canal with pink-tan, homogeneous, and herringbone  mucosa.  The 3.0 x 1.7 cm endometrial cavity has pink-tan, velvety and  homogeneous endometrium averagin g 0.2 cm in thickness.  No endometrial  lesion is identified.  Serial sectioning through the myometrium reveals  pink-tan and trabeculated myometrium, averaging 1.9 cm in thickness.  Multiple white-tan, whorled and firm a myometrial nodules are noted  ranging from 0.7-4.1 cm in greatest dimension.  Serial section through  the nodule reveals no areas of calcification, necrosis, or softening.  Two of the smaller myometrial nodules are remarkable for hemorrhage.    The left adnexa consists of a 6.1 cm in length x 0.3 cm in diameter  pink-tan fimbriated fallopian tube and a 2.2 x 1.3 x 1.0 cm cerebriform  ovary with a 1.2 x 1.1 x 0.7 cm area of adherent soft tissue.  Serial  sectioning of the fallopian tube reveals a pinpoint lumen with a 0.2 cm  in thickness unremarkable pink-tan fallopian tube wall.  The ovarian  parenchyma is unremarkable with corpora albicantia.    The right adnexa consists of a 7.4 cm in length x 0.3 cm in diameter  pink-tan fimbriated fallopian tube and a 2.5 x 1.1 x  0.7 cm cerebriform  ovary.  Serial sectioning of the fallopian tube reveals a pinpoint lumen  with a 0.2 cm in thickness unremarkable pink-tan fallopian tube wall.  The ovarian parenchyma is unremarkable with corpora albicantia.  Representative sections are submitted in cassettes A1-15.    Summary of Sections:  A1 - granular serosa, perpendicular sections  A2 - anterior cervix  A3 - posterior cervix  A4-A5 - anterior endomyometrium with hemorrhagic myometrial nodule, full  thickness sections  A6-A7 - posterior endomyometrium, full thickness sections  A8 - representative sections of smaller myometrial nodules  A9 - representative sections of largest myometrial nodule  A10 - left fallopian tube  A11-A12 - left  "ovary with adherent soft tissue  A13 - right fallopian tube  A14-A15 - right ovary    B: The specimen is received in formalin with proper patient  identification, labeled \"omentum\". The specimen consists of a 26.5 x  10.5 x 1.9 cm portion of yellow, lobulated sheetlike adipose tis markell  consistent with omentum.  The surface of the specimen is yellow,  lobulated and remarkable for two possible areas of puckering.  Serial  sectioning through the specimen reveals a 1.2 x 1.1 x 1.0 cm barnes  yellow, chalky suspicious area with abutting hemorrhage.  Additional  suspicious areas are noted throughout the specimen ranging from 0.3-0.5  cm in greatest dimension.  The uninvolved cut surface is yellow,  lobulated and homogeneous.  Representative sections are submitted in  cassettes B1-6.    Summary of Sections:  B1-B3 - representative sections of largest suspicious area  B4-B5 - smaller suspicious areas, representative sections  B6 - uninvolved    C: The specimen is received in formalin with proper patient  identification, labeled \"bladder peritoneum\".  The specimen consists of  a 0.8 x 0.4 x 0.3 cm pink-tan to yellow fragment of soft tissue.  The  fragment is wrapped and entirely submitted in cassette C1.    D: The specimen is received in formalin with proper patient  darrion ntification, labeled \"left pelvic sidewall\".  The specimen consists  of a 0.8 x 0.5 x 0.4 cm portion of soft tissue.  There is a 0.5 x 0.3 cm  portion of presumed nodular peritoneum.  The specimen is bisected and  entirely submitted in cassette D1.    E: The specimen is received in formalin with proper patient  identification, labeled \"sigmoid epiploica\".  The specimen consists of a  4.5 x 3.5 x 1.0 cm aggregate of yellow, lobulated adipose tissue admixed  with pink-tan soft tissue fragments.  No definitive mass or lesion  identified.  Two thirds of the specimen is submitted in cassettes E1-2.    (Dictated by: Paloma Drake [facility] 6/28/2017 05:35 " PM)    MICROSCOPIC:  Microscopic examination was performed.    CPT Codes:  A: 95200-ZH8  B: 27277-EJ2  C: 57674-PJ2  D: 37764-BG7  E: 06746-AC2    TESTING LAB LOCATION:  [facility], [address]   [address]  [phone]    COLLECTION SITE:  Client: [facility]  Location: [facility] (B)              Glucose by meter [[id number]] (Abnormal)  Resulted: 06/29/17 0816, Result status: Final result    Ordering provider: Nessa Christina MD  06/29/17 0603 Resulting lab: POINT OF CARE TEST, GLUCOSE    Specimen Information    Type Source Collected On     06/29/17 0603          Components       Value Reference Range Flag Lab   Glucose 132 70 - 99 mg/dL H 170            Basic metabolic panel [[id number]] (Abnormal)  Resulted: 06/29/17 0538, Result status: Final result    Ordering provider: Vi Pepe MD  06/28/17 8403 Resulting lab: [facility]    Specimen Information    Type Source Collected On   Blood  06/29/17 0350          Components       Value Reference Range Flag Lab   Sodium 140 133 - 144 mmol/L  51   Potassium 4.4 3.4 - 5.3 mmol/L  51   Chloride 105 94 - 109 mmol/L  51   Carbon Dioxide 30 20 - 32 mmol/L  51   Anion Gap 5 3 - 14 mmol/L  51   Glucose 122 70 - 99 mg/dL H 51   Urea Nitrogen 7 7 - 30 mg/dL  51   Creatinine 0.49 0.52 - 1.04 mg/dL L 51   GFR Estimate -- >60 mL/min/1.7m2  51   Result:         >90  Non  GFR Calc     GFR Estimate If Black -- >60 mL/min/1.7m2  51   Result:         >90   GFR Calc     Calcium 8.4 8.5 - 10.1 mg/dL L 51   Result:              Magnesium [[id number]]  Resulted: 06/29/17 0538, Result status: Final result    Ordering provider: Vi Pepe MD  06/28/17 8161 Resulting lab: [facility]    Specimen Information    Type Source Collected On   Blood  06/29/17 2094           Components       Value Reference Range Flag Lab   Magnesium 1.9 1.6 - 2.3 mg/dL  51            CBC with platelets differential [[id number]] (Abnormal)  Resulted: 06/29/17 0514, Result status: Final result    Ordering provider: Vi Pepe MD  06/28/17 2300 Resulting lab: [facility]    Specimen Information    Type Source Collected On   Blood  06/29/17 0413          Components       Value Reference Range Flag Lab   WBC 6.2 4.0 - 11.0 10e9/L  51   RBC Count 2.16 3.8 - 5.2 10e12/L L 51   Hemoglobin 7.0 11.7 - 15.7 g/dL L 51   Hematocrit 22.5 35.0 - 47.0 % L 51    78 - 100 fl H 51   MCH 32.4 26.5 - 33.0 pg  51   MCHC 31.1 31.5 - 36.5 g/dL L 51   RDW 25.3 10.0 - 15.0 % H 51   Platelet Count 137 150 - 450 10e9/L L 51   Diff Method Automated Method   51   % Neutrophils 78.4 %  51   % Lymphocytes 11.5 %  51   % Monocytes 9.7 %  51   % Eosinophils 0.0 %  51   % Basophils 0.2 %  51   % Immature Granulocytes 0.2 %  51   Nucleated RBCs 1 0 /100 H 51   Absolute Neutrophil 4.9 1.6 - 8.3 10e9/L  51   Absolute Lymphocytes 0.7 0.8 - 5.3 10e9/L L 51   Absolute Monocytes 0.6 0.0 - 1.3 10e9/L  51   Absolute Eosinophils 0.0 0.0 - 0.7 10e9/L  51   Absolute Basophils 0.0 0.0 - 0.2 10e9/L  51   Abs Immature Granulocytes 0.0 0 - 0.4 10e9/L  51   Absolute Nucleated RBC 0.1   51            Hemoglobin [[id number]] (Abnormal)  Resulted: 06/28/17 2034, Result status: Final result    Ordering provider: Henna Ramírez MD  06/28/17 1835 Resulting lab: [facility]    Specimen Information    Type Source Collected On   Blood  06/28/17 2002          Components       Value Reference Range Flag Lab   Hemoglobin 7.9 11.7 - 15.7 g/dL L 51            VENOUS PANEL [[id number]] (Abnormal)  Resulted: 06/28/17 1445, Result status: Final result    Ordering provider: Nessa Christina MD  06/28/17 1436 Resulting lab: POINT OF CARE TEST, BLOOD GAS/WHOLE BLOOD     Specimen Information    Type Source Collected On     06/28/17 1439          Components       Value Reference Range Flag Lab   Ph Venous 7.34 7.32 - 7.43 pH  172   PCO2 Venous 51 40 - 50 mm Hg H 172   PO2 Venous 44 25 - 47 mm Hg  172   Bicarbonate Venous 27 21 - 28 mmol/L  172   Base Excess Venous 1.5 mmol/L  172   Comment:  Reference range:  -7.7 to 1.9   FIO2 60.0   172   Sodium 138 133 - 144 mmol/L  172   Potassium 3.5 3.4 - 5.3 mmol/L  172   Hemoglobin 7.8 11.7 - 15.7 g/dL L 172   Glucose 160 70 - 99 mg/dL H 172   Calcium Ionized Whole Blood 4.7 4.4 - 5.2 mg/dL  172            Potassium [[id number]]  Resulted: 06/28/17 1101, Result status: Final result    Ordering provider: Adelina Marmolejo APRN CNP  06/28/17 0929 Resulting lab: [facility]    Specimen Information    Type Source Collected On   Blood  06/28/17 1035          Components       Value Reference Range Flag Lab   Potassium 4.3 3.4 - 5.3 mmol/L  51            Hemoglobin [[id number]] (Abnormal)  Resulted: 06/28/17 1047, Result status: Final result    Ordering provider: Adelina Marmolejo APRN CNP  06/28/17 0929 Resulting lab: [facility]    Specimen Information    Type Source Collected On   Blood  06/28/17 1035          Components       Value Reference Range Flag Lab   Hemoglobin 8.0 11.7 - 15.7 g/dL L 51            Testing Performed By     Lab - Abbreviation Name Director Address Valid Date Range    51 - Unknown [facility] Unknown [address] [facility] [id number] 12/31/14 1010 - Present    88 - Unknown [facility] Unknown Unknown 10/30/02 0000 - Present    170 - Unknown POINT OF CARE TEST, GLUCOSE Unknown Unknown 10/31/11 1114 - Present    172 - Unknown POINT OF CARE TEST, BLOOD GAS/WHOLE BLOOD Unknown Unknown 10/31/11 1112 - Present            Encounter-Level Documents:     There are no encounter-level documents.      Order-Level Documents:     There  are no order-level documents.

## 2017-06-28 NOTE — IP AVS SNAPSHOT
"          UNIT 7C [facility]: [phone]                                              INTERAGENCY TRANSFER FORM - LAB / IMAGING / EKG / EMG RESULTS   2017                    Hospital Admission Date: 2017  ZAHIDA DUFF   : 1945  Sex: Female        Attending Provider: Nessa Christina MD     Allergies:  Amoxicillin, Clarithromycin, Lisinopril, Penicillins    Infection:  None   Service:  GYN/ONC    Ht:  1.499 m (4' 11.02\")   Wt:  77.7 kg (171 lb 4.8 oz)   Admission Wt:  75.9 kg (167 lb 5.3 oz)    BMI:  34.58 kg/m 2   BSA:  1.8 m 2            Patient PCP Information     Provider PCP Type    Esther Back MD General         Lab Results - 3 Days      Hemoglobin [[id number]] (Abnormal)  Resulted: 17 0916, Result status: Final result    Ordering provider: Triny Petit MD  17 2300 Resulting lab: [facility]    Specimen Information    Type Source Collected On   Blood  17 0856          Components       Value Reference Range Flag Lab   Hemoglobin 8.4 11.7 - 15.7 g/dL L 51            Hemoglobin [[id number]] (Abnormal)  Resulted: 17 1518, Result status: Final result    Ordering provider: Nessa Christina MD  17 1035 Resulting lab: [facility]    Specimen Information    Type Source Collected On   Blood  17 1505          Components       Value Reference Range Flag Lab   Hemoglobin 8.2 11.7 - 15.7 g/dL L 51            Hemoglobin [[id number]] (Abnormal)  Resulted: 17 0753, Result status: Final result    Ordering provider: Vi Pepe MD  17 2310 Resulting lab: [facility]    Specimen Information    Type Source Collected On   Blood  17 0743          Components       Value Reference Range Flag Lab   Hemoglobin 6.6 11.7 - 15.7 g/dL LL 51   Comment:         This result has been called to JANNETTE PHILLIPS RN 7C by Alverto Manrique on 06 " 30 2017   at 0752, and has been read back.              Glucose by meter [[id number]] (Abnormal)  Resulted: 06/30/17 0655, Result status: Final result    Ordering provider: Nessa Christina MD  06/30/17 0651 Resulting lab: POINT OF CARE TEST, GLUCOSE    Specimen Information    Type Source Collected On     06/30/17 0651          Components       Value Reference Range Flag Lab   Glucose 110 70 - 99 mg/dL H 170   Comment:  Dr/RN Notified            Surgical pathology exam [[id number]]  Resulted: 06/29/17 1625, Result status: Final result    Ordering provider: Nessa Christina MD  06/28/17 1413 Resulting lab: [facility]    Specimen Information    Type Source Collected On   Organ Uterus with Bilateral Ovaries and Fallopian Tubes 06/28/17 1412   Comment:  Taken by [facility] for research study.   Tissue Omentum 06/28/17 1442   Tissue Bladder Peritoneum 06/28/17 1444   Tissue Pelvis 06/28/17 1445   Tissue Large Intestine, Sigmoid 06/28/17 1448          Components       Value Reference Range Flag Lab   Copath Report --      Result:         Patient Name: ZAHIDA DUFF  MR#: [id number]  Specimen #: [id number]  Collected: 6/28/2017  Received: 6/28/2017  Reported: 6/29/2017 16:24  Ordering Phy(s): NESSA BOUDREAUX  Additional Phy(s): DAYDAY PIERCE    For improved result formatting, select 'View Enhanced Report Format'  under Linked Documents section.    SPECIMEN(S):  A: Uterus, cervix, bilateral fallopian tubes and ovaries  B: Omentum  C: Bladder peritoneum  D: Left pelvic sidewall  E: Sigmoid epiploica    FINAL DIAGNOSIS:  A. UTERUS, CERVIX, BILATERAL FALLOPIAN TUBES AND OVARIES, HYSTERECTOMY  WITH BILATERAL SALPINGO-OOPHORECTOMY:  - Focal atypical endometrial hyperplasia on a background of atrophic  endometrium  - Adenomyosis  - Leiomyomas  - Uterine serosal adhesions with psammoma bodies  - Right ovary with benign cortical inclusion cysts and surface fibrous  adhesions featuring psammoma bodies  -  Left ovarian serous carcinoma, high grade, with neoadjuvant  chemotherapy effects  - Fallopian tubes with no  significant histologic abnormality    B. OMENTUM, OMENTECTOMY:  - Omental adipose tissue with histiocytic reaction, cholesterol clefts  and psammoma bodies, consistent with neoadjuvant chemotherapy effect  - No viable tumor cells seen    C. BLADDER PERITONEUM, BIOPSY:  - Fibroadipose tissue with histiocytic reaction, cholesterol clefts and  psammoma bodies, consistent with neoadjuvant chemotherapy effect  - No viable tumor cells seen    D. LEFT PELVIC SIDEWALL, BIOPSY:  - Fibroadipose tissue with histiocytic reaction, cholesterol clefts and  psammoma bodies, consistent with neoadjuvant chemotherapy effect  - No viable tumor cells seen    E. SIGMOID EPIPLOICA, BIOPSY:  - Adipose tissue with histiocytic reaction, cholesterol clefts and  psammoma bodies, consistent with neoadjuvant chemotherapy effect  - No viable tumor cells seen    Report Name: Ovary or Fallopian Tube  Status: Submitted    Part(s) Involved:  A: Uterus, cervix, bilateral fallopian tubes and ovaries    Synoptic Report:     CLINICAL    Clinical History:        - Pelvic mass    SPECIMEN    Procedure:        - Right salpingo-oophorectomy        - Hysterectomy        - Omentectomy        - Peritoneal  biopsies    Lymph Node Sampling:        - Not performed    Specimen Integrity:        - Right ovary        - Left ovary      Specimen Integrity of Right Ovary:          - Capsule intact      Specimen Integrity of Left Ovary:          - Capsule intact    TUMOR    Primary Tumor Site(s):        - Left ovary      Left Ovary        Tumor Size of Left Ovary: 1.2 cm    Histologic Type:        - Serous high-grade carcinoma      Histologic Type Comments: With neoadjuvant chemotherapy effects    Tumor Extent      Ovarian Surface Involvement:          - Present      Specimen(s)        Extent of Right Ovary:            - Not involved        Extent of Left  "Ovary:            - Involved        Extent of Omentum:            - Not involved        Extent of Uterus:            - Not involved        Extent of Peritoneum:             - Not involved      Peritoneal Ascitic Fluid:          - Not performed / unknown      Pleural Fluid:          - Not performed / unknown    Accessory Tumor Findings      Treatment Effect:          - Moderate response identified (CRS score 2)    LYMPH NODES    Pelvic Lymph Nodes: No pelvic nodes submitted or found    Para-Aortic Lymph Nodes: No para-aortic nodes submitted or found    STAGE (PTNM, AJCC 7TH ED.)    TNM Descriptors:        - y (post-treatment)    Primary Tumor (pT):        - Ovary      Ovarian Primary Tumor (pT):          - pT1c: Tumor limited to one or both ovaries with any of the  following:          capsule ruptured, tumor on ovarian surface, malignant cells in  ascites          or peritoneal washings    Regional Lymph Nodes (pN):        - pNX: Cannot be assessed    COMMENTS   Best blocks for additional testing are A11, A12    CAP [facility] July 2016 Agile Release    I have personally reviewed all specimens and or slides, including the  listed special stains, and used them  with my medical judgement to  determine the final diagnosis.    Electronically signed out by:    Gutierrez Duke M.D., PhD, Physicians    CLINICAL HISTORY:  Ovarian cancer    GROSS:  A: The specimen is received fresh with proper patient identification,  labeled \"cervix, uterus, bilateral fallopian tubes and ovaries\".  The  specimen consists of a 138.3 g intact hysterectomy specimen with  attached cervix and bilateral adnexa.  The uterus is 9.7 cm from fundus  to ectocervix x 5.1 cm from cornu to cornu x 4.7 cm from anterior to  posterior.  The serosa is diffusely granular.  The cervical resection  margin is inked black.  The 2.4 x 2.1 x 0.7 cm cervix has a pink-tan,  smooth and focally hyperemic ectocervical mucosa surrounding a 0.7 cm  ovoid os.  Opening the " specimen reveals a 3.2 cm in length x 1.7 cm in  diameter endocervical canal with pink-tan, homogeneous, and herringbone  mucosa.  The 3.0 x 1.7 cm endometrial cavity has pink-tan, velvety and  homogeneous endometrium averagin g 0.2 cm in thickness.  No endometrial  lesion is identified.  Serial sectioning through the myometrium reveals  pink-tan and trabeculated myometrium, averaging 1.9 cm in thickness.  Multiple white-tan, whorled and firm a myometrial nodules are noted  ranging from 0.7-4.1 cm in greatest dimension.  Serial section through  the nodule reveals no areas of calcification, necrosis, or softening.  Two of the smaller myometrial nodules are remarkable for hemorrhage.    The left adnexa consists of a 6.1 cm in length x 0.3 cm in diameter  pink-tan fimbriated fallopian tube and a 2.2 x 1.3 x 1.0 cm cerebriform  ovary with a 1.2 x 1.1 x 0.7 cm area of adherent soft tissue.  Serial  sectioning of the fallopian tube reveals a pinpoint lumen with a 0.2 cm  in thickness unremarkable pink-tan fallopian tube wall.  The ovarian  parenchyma is unremarkable with corpora albicantia.    The right adnexa consists of a 7.4 cm in length x 0.3 cm in diameter  pink-tan fimbriated fallopian tube and a 2.5 x 1.1 x  0.7 cm cerebriform  ovary.  Serial sectioning of the fallopian tube reveals a pinpoint lumen  with a 0.2 cm in thickness unremarkable pink-tan fallopian tube wall.  The ovarian parenchyma is unremarkable with corpora albicantia.  Representative sections are submitted in cassettes A1-15.    Summary of Sections:  A1 - granular serosa, perpendicular sections  A2 - anterior cervix  A3 - posterior cervix  A4-A5 - anterior endomyometrium with hemorrhagic myometrial nodule, full  thickness sections  A6-A7 - posterior endomyometrium, full thickness sections  A8 - representative sections of smaller myometrial nodules  A9 - representative sections of largest myometrial nodule  A10 - left fallopian tube  A11-A12 - left  "ovary with adherent soft tissue  A13 - right fallopian tube  A14-A15 - right ovary    B: The specimen is received in formalin with proper patient  identification, labeled \"omentum\". The specimen consists of a 26.5 x  10.5 x 1.9 cm portion of yellow, lobulated sheetlike adipose tis markell  consistent with omentum.  The surface of the specimen is yellow,  lobulated and remarkable for two possible areas of puckering.  Serial  sectioning through the specimen reveals a 1.2 x 1.1 x 1.0 cm barnes  yellow, chalky suspicious area with abutting hemorrhage.  Additional  suspicious areas are noted throughout the specimen ranging from 0.3-0.5  cm in greatest dimension.  The uninvolved cut surface is yellow,  lobulated and homogeneous.  Representative sections are submitted in  cassettes B1-6.    Summary of Sections:  B1-B3 - representative sections of largest suspicious area  B4-B5 - smaller suspicious areas, representative sections  B6 - uninvolved    C: The specimen is received in formalin with proper patient  identification, labeled \"bladder peritoneum\".  The specimen consists of  a 0.8 x 0.4 x 0.3 cm pink-tan to yellow fragment of soft tissue.  The  fragment is wrapped and entirely submitted in cassette C1.    D: The specimen is received in formalin with proper patient  darrion ntification, labeled \"left pelvic sidewall\".  The specimen consists  of a 0.8 x 0.5 x 0.4 cm portion of soft tissue.  There is a 0.5 x 0.3 cm  portion of presumed nodular peritoneum.  The specimen is bisected and  entirely submitted in cassette D1.    E: The specimen is received in formalin with proper patient  identification, labeled \"sigmoid epiploica\".  The specimen consists of a  4.5 x 3.5 x 1.0 cm aggregate of yellow, lobulated adipose tissue admixed  with pink-tan soft tissue fragments.  No definitive mass or lesion  identified.  Two thirds of the specimen is submitted in cassettes E1-2.    (Dictated by: Paloma Drake [facility] 6/28/2017 05:35 " PM)    MICROSCOPIC:  Microscopic examination was performed.    CPT Codes:  A: 79521-KR8  B: 73305-RA3  C: 97823-SF1  D: 98355-ZV9  E: 23543-VV0    TESTING LAB LOCATION:  [facility], [address]   [address]  [phone]    COLLECTION SITE:  Client: [facility]  Location: [facility] (B)              Glucose by meter [[id number]] (Abnormal)  Resulted: 06/29/17 0816, Result status: Final result    Ordering provider: Nessa Chirstina MD  06/29/17 0603 Resulting lab: POINT OF CARE TEST, GLUCOSE    Specimen Information    Type Source Collected On     06/29/17 0603          Components       Value Reference Range Flag Lab   Glucose 132 70 - 99 mg/dL H 170            Basic metabolic panel [[id number]] (Abnormal)  Resulted: 06/29/17 0538, Result status: Final result    Ordering provider: Vi Pepe MD  06/28/17 0909 Resulting lab: [facility]    Specimen Information    Type Source Collected On   Blood  06/29/17 8346          Components       Value Reference Range Flag Lab   Sodium 140 133 - 144 mmol/L  51   Potassium 4.4 3.4 - 5.3 mmol/L  51   Chloride 105 94 - 109 mmol/L  51   Carbon Dioxide 30 20 - 32 mmol/L  51   Anion Gap 5 3 - 14 mmol/L  51   Glucose 122 70 - 99 mg/dL H 51   Urea Nitrogen 7 7 - 30 mg/dL  51   Creatinine 0.49 0.52 - 1.04 mg/dL L 51   GFR Estimate -- >60 mL/min/1.7m2  51   Result:         >90  Non  GFR Calc     GFR Estimate If Black -- >60 mL/min/1.7m2  51   Result:         >90   GFR Calc     Calcium 8.4 8.5 - 10.1 mg/dL L 51   Result:              Magnesium [[id number]]  Resulted: 06/29/17 0538, Result status: Final result    Ordering provider: Vi Pepe MD  06/28/17 2400 Resulting lab: [facility]    Specimen Information    Type Source Collected On   Blood  06/29/17 8114           Components       Value Reference Range Flag Lab   Magnesium 1.9 1.6 - 2.3 mg/dL  51            CBC with platelets differential [[id number]] (Abnormal)  Resulted: 06/29/17 0514, Result status: Final result    Ordering provider: Vi Pepe MD  06/28/17 2300 Resulting lab: [facility]    Specimen Information    Type Source Collected On   Blood  06/29/17 0413          Components       Value Reference Range Flag Lab   WBC 6.2 4.0 - 11.0 10e9/L  51   RBC Count 2.16 3.8 - 5.2 10e12/L L 51   Hemoglobin 7.0 11.7 - 15.7 g/dL L 51   Hematocrit 22.5 35.0 - 47.0 % L 51    78 - 100 fl H 51   MCH 32.4 26.5 - 33.0 pg  51   MCHC 31.1 31.5 - 36.5 g/dL L 51   RDW 25.3 10.0 - 15.0 % H 51   Platelet Count 137 150 - 450 10e9/L L 51   Diff Method Automated Method   51   % Neutrophils 78.4 %  51   % Lymphocytes 11.5 %  51   % Monocytes 9.7 %  51   % Eosinophils 0.0 %  51   % Basophils 0.2 %  51   % Immature Granulocytes 0.2 %  51   Nucleated RBCs 1 0 /100 H 51   Absolute Neutrophil 4.9 1.6 - 8.3 10e9/L  51   Absolute Lymphocytes 0.7 0.8 - 5.3 10e9/L L 51   Absolute Monocytes 0.6 0.0 - 1.3 10e9/L  51   Absolute Eosinophils 0.0 0.0 - 0.7 10e9/L  51   Absolute Basophils 0.0 0.0 - 0.2 10e9/L  51   Abs Immature Granulocytes 0.0 0 - 0.4 10e9/L  51   Absolute Nucleated RBC 0.1   51            Hemoglobin [[id number]] (Abnormal)  Resulted: 06/28/17 2034, Result status: Final result    Ordering provider: Henna Ramírez MD  06/28/17 1835 Resulting lab: [facility]    Specimen Information    Type Source Collected On   Blood  06/28/17 2002          Components       Value Reference Range Flag Lab   Hemoglobin 7.9 11.7 - 15.7 g/dL L 51            VENOUS PANEL [[id number]] (Abnormal)  Resulted: 06/28/17 1445, Result status: Final result    Ordering provider: Nessa Christina MD  06/28/17 1430 Resulting lab: POINT OF CARE TEST, BLOOD GAS/WHOLE BLOOD     Specimen Information    Type Source Collected On     06/28/17 1439          Components       Value Reference Range Flag Lab   Ph Venous 7.34 7.32 - 7.43 pH  172   PCO2 Venous 51 40 - 50 mm Hg H 172   PO2 Venous 44 25 - 47 mm Hg  172   Bicarbonate Venous 27 21 - 28 mmol/L  172   Base Excess Venous 1.5 mmol/L  172   Comment:  Reference range:  -7.7 to 1.9   FIO2 60.0   172   Sodium 138 133 - 144 mmol/L  172   Potassium 3.5 3.4 - 5.3 mmol/L  172   Hemoglobin 7.8 11.7 - 15.7 g/dL L 172   Glucose 160 70 - 99 mg/dL H 172   Calcium Ionized Whole Blood 4.7 4.4 - 5.2 mg/dL  172            Potassium [[id number]]  Resulted: 06/28/17 1101, Result status: Final result    Ordering provider: Adelina Marmolejo APRN CNP  06/28/17 0929 Resulting lab: [facility]    Specimen Information    Type Source Collected On   Blood  06/28/17 1035          Components       Value Reference Range Flag Lab   Potassium 4.3 3.4 - 5.3 mmol/L  51            Hemoglobin [[id number]] (Abnormal)  Resulted: 06/28/17 1047, Result status: Final result    Ordering provider: Adelina Marmolejo APRN CNP  06/28/17 0929 Resulting lab: [facility]    Specimen Information    Type Source Collected On   Blood  06/28/17 1035          Components       Value Reference Range Flag Lab   Hemoglobin 8.0 11.7 - 15.7 g/dL L 51            Testing Performed By     Lab - Abbreviation Name Director Address Valid Date Range    51 - Unknown [facility] Unknown [address] [facility] [address] 12/31/14 1010 - Present    88 - Unknown [facility] Unknown Unknown 10/30/02 0000 - Present    170 - Unknown POINT OF CARE TEST, GLUCOSE Unknown Unknown 10/31/11 1114 - Present    172 - Unknown POINT OF CARE TEST, BLOOD GAS/WHOLE BLOOD Unknown Unknown 10/31/11 1112 - Present            Unresulted Labs     None      Encounter-Level Documents:     There are no encounter-level documents.       Order-Level Documents:     There are no order-level documents.

## 2017-06-28 NOTE — PROGRESS NOTES
Gyn Oncology Post-Op Check    S: Patient seen upon arrival to the floor; she is overall feeling okay.  Having some pain, mostly on right side.  No nausea or vomiting.  Nieto out.    O:   Vitals:    06/28/17 1730 06/28/17 1745 06/28/17 1800 06/28/17 1830   BP: 119/77 119/77 118/77 137/69   BP Location:    Left arm   Pulse:    88   Resp: 14 14 16 9   Temp: 98.3  F (36.8  C)   97.1  F (36.2  C)   TempSrc: Oral   Oral   SpO2: 99% 99% 99% 94%   Weight:       Height:          Gen: resting comfortably, appears sleepy  CV: RRR  Pulm: CTAB  Abd: soft, appropriately tender.  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  Doing well in the immediate post-operative period    FEN: LR @ 125cc/hr.  ADAT.  Continue home potassium  Pain: scheduled Tylenol; PRN PO dilaudid; IV dilaudid available if unable to tolerate PO  Heme: Hb 9.2 >  > 8.0 > 7.8.  Recheck ordered at 2000; if stable will give lovenox 40mg tonight due to hx of PE.  Increase to home therapeutic dosing of 80mg BID tomorrow.  CV: HTN; continue HCTZ, losartan tomorrow AM  Pulm: Hx PE, on lovenox.  See Heme, above.  Wean O2 as able  GI: See FEN.  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  PT/OT ordered.  Psych: continue home escitalopram  Endo: continue home synthroid  PPX: SCDs, IS.  Start Lovenox tonight with stable Hgb.  Dispo: likely d/c back to TCU POD#1-2    Vi Pepe MD  OB/GYN PGY4

## 2017-06-28 NOTE — IP AVS SNAPSHOT
` ` Patient Information     Patient Name Sex     Zahida Nina ([id number]) Female 1945       Room Bed    7413 7413-02      Patient Demographics     Address Phone E-mail Address    [address]  [facility] [address] [phone] (Home)  [phone] (Mobile) [email or web address]      Patient Ethnicity & Race     Ethnic Group Patient Race    American White      Emergency Contact(s)     Name Relation Home Work Mobile    Manoj Nina Spouse [phone]      Documents on File        Status Date Received Description       Documents for the Patient    Insurance Card  12/15/08     Consent Form  08     Consent for Services - Hospital/Clinic  ()      Consent for EHR Access Received 13     Privacy Notice - [place] Received 13     External Medication Information Consent       Patient ID Received 13     [facility] Specified Other       Insurance Card Received 13     Consent for Services - Hospital/Clinic Received () 13 CONSENT FOR SERVICE    HIM ROBIN Authorization - File Only  14 ZAHIDA NINA 2014    Insurance Card Received 14     Consent for Services/Privacy Notice - Hospital/Clinic Received 17     Insurance Card Received 17 [facility]    Patient ID Received 17     HIM ROBIN Authorization - File Only  17 [facility]    HIM ROBIN Authorization - File Only  17 [facility] OF NEUROLOGY    Consent for Services - [facility]       Business/Insurance/Care Coordination/Health Form - Patient  17 ARE [employer] APPROVAL LETTER 17-17    Advance Directives and Living Will Received 06/15/17 Health Care Directive 2012    Advance Directives and Living Will Not Received  Validation of AD 2012    Business/Insurance/Care Coordination/Health Form - Patient  17 ARE APPROVAL LETTER [employer] SYRINGE 17-17    Consent for Services - Hospital/Clinic  (Deleted)       Patient Photo   Photo of Patient       Documents for the Encounter    CMS IM for Patient Signature Received 06/28/17       Admission Information     Attending Provider Admitting Provider Admission Type Admission Date/Time    Nessa Christina MD Rivard Hunt, Colleen Lee, MD Elective 06/28/17  0908    Discharge Date Hospital Service Auth/Cert Status Service Area     Gyn/Onc Incomplete [facility]    Unit Room/Bed Admission Status       UU 7 7413/7413-02 Admission (Confirmed)       Admission     Complaint    Ovarian Cancer, Ovarian cancer (H), Ovarian cancer (H), Ovarian cancer (H)      Hospital Account     Name Acct ID Class Status Primary Coverage    Tosin Nina [id number] Inpatient Open UCARE - UCARE SENIORS NON FPA            Guarantor Account (for Hospital Account #[id number])     Name Relation to Pt Service Area Active? Acct Type    Tosin Nina  FCS Yes Personal/Family    Address Phone          [address] [place] [address] [phone](H)              Coverage Information (for Hospital Account #[id number])     F/O Payor/Plan Precert #    UCARE/UCARE SENIORS NON FPA [id number]    Subscriber Subscriber #    Tosin Nina [id number]    Address Phone    [address]  [place] [address] [phone]

## 2017-06-28 NOTE — IP AVS SNAPSHOT
MRN:[id number]                      After Visit Summary   6/28/2017    Tosin Nina    MRN: [id number]           Thank you!     Thank you for choosing [facility] for your care. Our goal is always to provide you with excellent care. Hearing back from our patients is one way we can continue to improve our services. Please take a few minutes to complete the written survey that you may receive in the mail after you visit with us. Thank you!        Patient Information     Date Of Birth          1945        Designated Caregiver       Most Recent Value    Caregiver    Will someone help with your care after discharge? yes    Name of designated caregiver : Christiano Nina Daughter: Cece Wood    Phone number of caregiver [phone]    Caregiver address [address]      About your hospital stay     You were admitted on:  June 28, 2017 You last received care in the:  [address] [facility]    You were discharged on:  July 1, 2017        Reason for your hospital stay       Surgery                  Who to Call     For medical emergencies, please call 911.  For non-urgent questions about your medical care, please call your primary care provider or clinic, [phone]  For questions related to your surgery, please call your surgery clinic        Attending Provider     Provider Specialty    Nessa Christina MD Gyn-Onc       Primary Care Provider Office Phone # Fax #    Esther Back [phone]      After Care Instructions     Activity - Up with nursing assistance           Additional Discharge Instructions       GENERAL POST-OPERATIVE  PATIENT INSTRUCTIONS      FOLLOW-UP:    Call Surgeon if you have:  Temperature greater than 100.4  Persistent nausea and vomiting  Severe uncontrolled pain  Redness, tenderness, or signs of infection (pain, swelling, redness, odor or green/yellow discharge around the site)  Difficulty breathing, headache or visual  disturbances  Hives  Persistent dizziness or light-headedness  Extreme fatigue  Any other questions or concerns you may have after discharge    In an emergency, call 911 or go to an Emergency Department at a nearby hospital       WOUND CARE INSTRUCTIONS:  Keep a dry clean dressing on the wound if there is drainage. If you had a bandage initially, it may be removed after 24 hours.  Once the wound has quit draining you may leave it open to air.  If clothing rubs against the wound or causes irritation and the wound is not draining you may cover it with a dry dressing during the daytime.  Try to keep the wound dry and avoid ointments on the wound unless directed to do so.  If the wound becomes bright red and painful or starts to drain infected material that is not clear, please contact your physician immediately.    1.  You may shower 24 hrs after surgery   2.  No soaking in the tub for 4 weeks       DIET:  There are no dietary restrictions.  You may eat any foods that you can tolerate unless instructed otherwise.  It is a good idea to eat a high fiber diet and take in plenty of fluids to prevent constipation.  If you become constipated, please follow the instructions below.    ACTIVITY:  You are encouraged to cough, deep breath and use your incentive spirometer if you were given one, every 15-30 minutes when awake.  This will help prevent respiratory complications and low grade fevers post-operatively.  You may want to hug a pillow when coughing and sneezing to add additional support to the surgical area, if you had abdominal surgery, which will decrease pain during these times.      1.  No heavy lifting >20lbs or strenuous exercise for six-eight weeks.  No exercise in which you are using core muscles (yoga, pilates, swimming, weight lifting)  2.  You may walk as much as you wish.  You are encouraged to increase your activity each day after surgery.  Stairs are okay.   3.  Nothing per vagina for eight weeks.  No  tampons, no intercourse, no douching.  You can expect some light vaginal spotting and discharge for up to six weeks.  If bleeding becomes heavy, please contact the office.     MEDICATIONS:  Try to take narcotic medications and anti-inflammatory medications, such as tylenol, ibuprofen, naprosyn, etc., with food.  This will minimize stomach upset from the medication.  Should you develop nausea and vomiting from the pain medication, or develop a rash, please discontinue the medication and contact your physician.  You should not drive, make important decisions, or operate machinery when taking narcotic pain medication.    OTHER:  Patients are often constipated after general anesthesia and surgery.  The patient should continue to take stool softeners (for example, Senokot-S) for the next six weeks (unless diarrhea develops) and consume adequate amounts of water.  If the patient remains constipated or unable to pass stool, please try one or all of the following measures:  1.  Milk of Magnesia 30cc twice a day as needed by mouth  2.  Metamucil 2 tablespoons in 12 ounces of fluid  3.  Dulcolax oral or suppositories  4.  Prunes or prune juice  5.  Miralax daily      QUESTIONS:  Please feel free to call your physician or the hospital  if you have any questions, and they will be glad to assist you.            Advance Diet as Tolerated       Follow this diet upon discharge:       Regular Diet Adult            Encourage PO fluids           Fall precautions           General info for SNF       Length of Stay Estimate: Short Term Care: Estimated # of Days <30  Condition at Discharge: Stable  Level of care:skilled   Rehabilitation Potential: Fair  Admission H&P remains valid and up-to-date: Yes  Recent Chemotherapy: Date:                       Use Nursing Home Standing Orders: No            Intake and output       Every shift            Mantoux instructions       Give two-step Mantoux (PPD) Per Facility Policy Yes          "   Wound care (specify)       Site:   Lap sites  Instructions:  Keep clean dry and intact                  Follow-up Appointments     Follow Up and recommended labs and tests       7/13/17 Post op visit at [facility]. Please call [phone] option 2 if you have questions or concerns prior to then                  Your next 10 appointments already scheduled     Jul 05, 2017  9:00 AM CDT   New Visit with Kelsie Nguyen GC   Cancer Risk Management Program ([facility])    [facility] [place]  [address]  [facility] [id number] [phone]            Jul 13, 2017  1:30 PM CDT   Return Visit with SALVADOR Gil CNP   [facility] ([facility])    [facility]  [address]  [facility] [id number] [phone]              Additional Services     Occupational Therapy Adult Consult       Evaluate and treat as clinically indicated.    Reason:  Paraneoplastic neuromyopathy            Physical Therapy Adult Consult       Evaluate and treat as clinically indicated.    Reason:  Paraneoplastic neuromyopathy                  Pending Results     No orders found from 6/26/2017 to 6/29/2017.            Statement of Approval     Ordered          06/30/17 1018  I have reviewed and agree with all the recommendations and orders detailed in this document.  EFFECTIVE NOW     Approved and electronically signed by:  Jyoti Lopez APRN CNP             Admission Information     Date & Time Provider Department Dept. Phone    6/28/2017 Nessa Christina MD [address] [facility] [phone]      Your Vitals Were     Blood Pressure Pulse Temperature Respirations Height Weight    120/66 (BP Location: Left arm) 78 96.5  F (35.8  C) (Oral) 18 1.499 m (4' 11.02\") 77.7 kg (171 lb 4.8 oz)    Pulse Oximetry BMI (Body Mass Index)                96% 34.58 kg/m2          MyChart Information     [employer] lets you send " "messages to your doctor, view your test results, renew your prescriptions, schedule appointments and more. To sign up, go to [email or web address] . Click on \"Log in\" on the left side of the screen, which will take you to the Welcome page. Then click on \"Sign up Now\" on the right side of the page.     You will be asked to enter the access code listed below, as well as some personal information. Please follow the directions to create your username and password.     Your access code is: [email or web address]  Expires: 8/10/2017 11:53 AM     Your access code will  in 90 days. If you need help or a new code, please call your [facility] clinic or [phone].        Care EveryWhere ID     This is your Care EveryWhere ID. This could be used by other organizations to access your [facility] medical records  [id number]        Equal Access to Services     CITLALLI CUELLO : Zechariah Alves, wayanna martinez, raj kaalmada monica, hetal regan . So [facility] [phone].    ATENCIÓN: Si habla español, tiene a alarcon disposición servicios gratuitos de asistencia lingüística. Ronnie al [phone].    We comply with applicable federal civil rights and [place] laws. We do not discriminate on the basis of race, color, national origin, age, disability, sex, sexual orientation or gender identity.                             Review of your medicines      START taking        Dose / Directions    acetaminophen 325 MG tablet   Commonly known as:  TYLENOL   Used for:  Ovarian cancer, unspecified laterality (H)        Dose:  650 mg   Take 2 tablets (650 mg) by mouth every 6 hours as needed for mild pain   Quantity:  100 tablet   Refills:  0       HYDROmorphone 2 MG tablet   Commonly known as:  DILAUDID   Used for:  Ovarian cancer, unspecified laterality (H)        Dose:  2-4 mg   Take 1-2 tablets (2-4 mg) by mouth every 3 hours as needed for moderate to severe pain   Quantity:  40 tablet   Refills:  0    "    polyethylene glycol Packet   Commonly known as:  MIRALAX/GLYCOLAX   Used for:  Ovarian cancer, unspecified laterality (H)        Dose:  17 g   Take 17 g by mouth daily   Quantity:  7 packet   Refills:  0       senna-docusate 8.6-50 MG per tablet   Commonly known as:  SENOKOT-S;PERICOLACE   Used for:  Ovarian cancer, unspecified laterality (H)        Dose:  1-2 tablet   Take 1-2 tablets by mouth 2 times daily Hold for loose stools   Quantity:  60 tablet   Refills:  1         CONTINUE these medicines which may have CHANGED, or have new prescriptions. If we are uncertain of the size of tablets/capsules you have at home, strength may be listed as something that might have changed.        Dose / Directions    losartan 50 MG tablet   Commonly known as:  COZAAR   This may have changed:    - medication strength  - how much to take  - when to take this        Dose:  50 mg   Take 1 tablet (50 mg) by mouth every morning   Refills:  0       Vitamin D (Cholecalciferol) 1000 UNITS Tabs   This may have changed:  additional instructions   Used for:  Paresthesias        Dose:  2000 Units   Take 2,000 Units by mouth daily   Quantity:  60 tablet   Refills:  0         CONTINUE these medicines which have NOT CHANGED        Dose / Directions    calcium carbonate 500 MG chewable tablet   Commonly known as:  TUMS        Dose:  1 chew tab   Take 1 chew tab by mouth daily as needed for heartburn   Refills:  0       enoxaparin 80 MG/0.8ML injection   Commonly known as:  LOVENOX   Used for:  Other pulmonary embolism without acute cor pulmonale, unspecified chronicity (H)        Dose:  60 mg   Inject 0.6 mLs (60 mg) Subcutaneous every 12 hours   Quantity:  60 Syringe   Refills:  1       ESCITALOPRAM OXALATE PO        Dose:  10 mg   Take 10 mg by mouth daily   Refills:  0       hydrochlorothiazide 25 MG tablet   Commonly known as:  HYDRODIURIL   Used for:  Ovarian cancer, unspecified laterality (H)        Dose:  25 mg   Take 1 tablet (25 mg)  by mouth daily   Refills:  0       levothyroxine 125 MCG tablet   Commonly known as:  SYNTHROID   Used for:  Other pulmonary embolism without acute cor pulmonale, unspecified chronicity (H)        Dose:  125 mcg   Take 1 tablet (125 mcg) by mouth daily   Quantity:  30 tablet   Refills:  0       Potassium Chloride ER 20 MEQ Tbcr   Used for:  Hypokalemia        Dose:  20 mEq   Take 1 tablet (20 mEq) by mouth daily   Quantity:  30 tablet   Refills:  3       PRILOSEC PO        Dose:  20 mg   Take 20 mg by mouth every morning   Refills:  0         STOP taking     DOCUSATE SODIUM PO           sennosides 8.6 MG tablet   Commonly known as:  SENOKOT                Where to get your medicines      Some of these will need a paper prescription and others can be bought over the counter. Ask your nurse if you have questions.     Bring a paper prescription for each of these medications     HYDROmorphone 2 MG tablet       You don't need a prescription for these medications     acetaminophen 325 MG tablet    polyethylene glycol Packet    senna-docusate 8.6-50 MG per tablet                Protect others around you: Learn how to safely use, store and throw away your medicines at www.disposemymeds.org.             Medication List: This is a list of all your medications and when to take them. Check marks below indicate your daily home schedule. Keep this list as a reference.      Medications           Morning Afternoon Evening Bedtime As Needed    acetaminophen 325 MG tablet   Commonly known as:  TYLENOL   Take 2 tablets (650 mg) by mouth every 6 hours as needed for mild pain   Last time this was given:  650 mg on 7/1/2017  6:34 AM                                calcium carbonate 500 MG chewable tablet   Commonly known as:  TUMS   Take 1 chew tab by mouth daily as needed for heartburn                                enoxaparin 80 MG/0.8ML injection   Commonly known as:  LOVENOX   Inject 0.6 mLs (60 mg) Subcutaneous every 12 hours   Last  time this was given:  60 mg on 7/1/2017  8:18 AM                                ESCITALOPRAM OXALATE PO   Take 10 mg by mouth daily   Last time this was given:  10 mg on 7/1/2017  8:16 AM                                hydrochlorothiazide 25 MG tablet   Commonly known as:  HYDRODIURIL   Take 1 tablet (25 mg) by mouth daily   Last time this was given:  25 mg on 7/1/2017  8:16 AM                                HYDROmorphone 2 MG tablet   Commonly known as:  DILAUDID   Take 1-2 tablets (2-4 mg) by mouth every 3 hours as needed for moderate to severe pain   Last time this was given:  4 mg on 7/1/2017  6:34 AM                                levothyroxine 125 MCG tablet   Commonly known as:  SYNTHROID   Take 1 tablet (125 mcg) by mouth daily   Last time this was given:  125 mcg on 7/1/2017  8:16 AM                                losartan 50 MG tablet   Commonly known as:  COZAAR   Take 1 tablet (50 mg) by mouth every morning   Last time this was given:  50 mg on 7/1/2017  8:16 AM                                polyethylene glycol Packet   Commonly known as:  MIRALAX/GLYCOLAX   Take 17 g by mouth daily   Last time this was given:  17 g on 7/1/2017  8:18 AM                                Potassium Chloride ER 20 MEQ Tbcr   Take 1 tablet (20 mEq) by mouth daily                                PRILOSEC PO   Take 20 mg by mouth every morning   Last time this was given:  20 mg on 7/1/2017  8:16 AM                                senna-docusate 8.6-50 MG per tablet   Commonly known as:  SENOKOT-S;PERICOLACE   Take 1-2 tablets by mouth 2 times daily Hold for loose stools                                Vitamin D (Cholecalciferol) 1000 UNITS Tabs   Take 2,000 Units by mouth daily

## 2017-06-28 NOTE — IP AVS SNAPSHOT
[address] [address]    Phone:  [phone]                                       After Visit Summary   6/28/2017    Tosin Nina    MRN: [id number]           After Visit Summary Signature Page     I have received my discharge instructions, and my questions have been answered. I have discussed any challenges I see with this plan with the nurse or doctor.    ..........................................................................................................................................  Patient/Patient Representative Signature      ..........................................................................................................................................  Patient Representative Print Name and Relationship to Patient    ..................................................               ................................................  Date                                            Time    ..........................................................................................................................................  Reviewed by Signature/Title    ...................................................              ..............................................  Date                                                            Time

## 2017-06-28 NOTE — IP AVS SNAPSHOT
MRN:[id number]                      After Visit Summary   6/28/2017    Tosin Nina    MRN: [id number]           Thank you!     Thank you for choosing [facility] for your care. Our goal is always to provide you with excellent care. Hearing back from our patients is one way we can continue to improve our services. Please take a few minutes to complete the written survey that you may receive in the mail after you visit with us. Thank you!        Patient Information     Date Of Birth          1945        Designated Caregiver       Most Recent Value    Caregiver    Will someone help with your care after discharge? yes    Name of designated caregiver : Christiano Nina Daughter: Cece Wood    Phone number of caregiver [phone]    Caregiver address [address]      About your hospital stay     You were admitted on:  June 28, 2017 You last received care in the:  [address] [facility]    You were discharged on:  July 1, 2017        Reason for your hospital stay       Surgery                  Who to Call     For medical emergencies, please call 911.  For non-urgent questions about your medical care, please call your primary care provider or clinic, [phone]  For questions related to your surgery, please call your surgery clinic        Attending Provider     Provider Specialty    Nessa Christina MD Gyn-Onc       Primary Care Provider Office Phone # Fax #    Esther Back [phone]      After Care Instructions     Activity - Up with nursing assistance           Additional Discharge Instructions       GENERAL POST-OPERATIVE  PATIENT INSTRUCTIONS      FOLLOW-UP:    Call Surgeon if you have:  Temperature greater than 100.4  Persistent nausea and vomiting  Severe uncontrolled pain  Redness, tenderness, or signs of infection (pain, swelling, redness, odor or green/yellow discharge around the site)  Difficulty breathing, headache or visual  disturbances  Hives  Persistent dizziness or light-headedness  Extreme fatigue  Any other questions or concerns you may have after discharge    In an emergency, call 911 or go to an Emergency Department at a nearby hospital       WOUND CARE INSTRUCTIONS:  Keep a dry clean dressing on the wound if there is drainage. If you had a bandage initially, it may be removed after 24 hours.  Once the wound has quit draining you may leave it open to air.  If clothing rubs against the wound or causes irritation and the wound is not draining you may cover it with a dry dressing during the daytime.  Try to keep the wound dry and avoid ointments on the wound unless directed to do so.  If the wound becomes bright red and painful or starts to drain infected material that is not clear, please contact your physician immediately.    1.  You may shower 24 hrs after surgery   2.  No soaking in the tub for 4 weeks       DIET:  There are no dietary restrictions.  You may eat any foods that you can tolerate unless instructed otherwise.  It is a good idea to eat a high fiber diet and take in plenty of fluids to prevent constipation.  If you become constipated, please follow the instructions below.    ACTIVITY:  You are encouraged to cough, deep breath and use your incentive spirometer if you were given one, every 15-30 minutes when awake.  This will help prevent respiratory complications and low grade fevers post-operatively.  You may want to hug a pillow when coughing and sneezing to add additional support to the surgical area, if you had abdominal surgery, which will decrease pain during these times.      1.  No heavy lifting >20lbs or strenuous exercise for six-eight weeks.  No exercise in which you are using core muscles (yoga, pilates, swimming, weight lifting)  2.  You may walk as much as you wish.  You are encouraged to increase your activity each day after surgery.  Stairs are okay.   3.  Nothing per vagina for eight weeks.  No  tampons, no intercourse, no douching.  You can expect some light vaginal spotting and discharge for up to six weeks.  If bleeding becomes heavy, please contact the office.     MEDICATIONS:  Try to take narcotic medications and anti-inflammatory medications, such as tylenol, ibuprofen, naprosyn, etc., with food.  This will minimize stomach upset from the medication.  Should you develop nausea and vomiting from the pain medication, or develop a rash, please discontinue the medication and contact your physician.  You should not drive, make important decisions, or operate machinery when taking narcotic pain medication.    OTHER:  Patients are often constipated after general anesthesia and surgery.  The patient should continue to take stool softeners (for example, Senokot-S) for the next six weeks (unless diarrhea develops) and consume adequate amounts of water.  If the patient remains constipated or unable to pass stool, please try one or all of the following measures:  1.  Milk of Magnesia 30cc twice a day as needed by mouth  2.  Metamucil 2 tablespoons in 12 ounces of fluid  3.  Dulcolax oral or suppositories  4.  Prunes or prune juice  5.  Miralax daily      QUESTIONS:  Please feel free to call your physician or the hospital  if you have any questions, and they will be glad to assist you.            Advance Diet as Tolerated       Follow this diet upon discharge:       Regular Diet Adult            Encourage PO fluids           Fall precautions           General info for SNF       Length of Stay Estimate: Short Term Care: Estimated # of Days <30  Condition at Discharge: Stable  Level of care:skilled   Rehabilitation Potential: Fair  Admission H&P remains valid and up-to-date: Yes  Recent Chemotherapy: Date:                       Use Nursing Home Standing Orders: No            Intake and output       Every shift            Mantoux instructions       Give two-step Mantoux (PPD) Per Facility Policy Yes          "   Wound care (specify)       Site:   Lap sites  Instructions:  Keep clean dry and intact                  Follow-up Appointments     Follow Up and recommended labs and tests       7/13/17 Post op visit at [facility]. Please call [phone] option 2 if you have questions or concerns prior to then                  Your next 10 appointments already scheduled     Jul 05, 2017  9:00 AM CDT   New Visit with Kelsie Nguyen GC   Cancer Risk Management Program ([facility])    [facility]  [address]  [facility] [id number] [phone]            Jul 13, 2017  1:30 PM CDT   Return Visit with SALVADOR Gil CNP   [facility] ([facility])    [facility]  [address]  [facility] [id number] [phone]              Additional Services     Occupational Therapy Adult Consult       Evaluate and treat as clinically indicated.    Reason:  Paraneoplastic neuromyopathy            Physical Therapy Adult Consult       Evaluate and treat as clinically indicated.    Reason:  Paraneoplastic neuromyopathy                  Pending Results     No orders found from 6/26/2017 to 6/29/2017.            Statement of Approval     Ordered          06/30/17 1018  I have reviewed and agree with all the recommendations and orders detailed in this document.  EFFECTIVE NOW     Approved and electronically signed by:  Jyoti Lopez APRN CNP             Admission Information     Date & Time Provider Department Dept. Phone    6/28/2017 Nessa Christina MD [address] [facility] [phone]      Your Vitals Were     Blood Pressure Pulse Temperature Respirations Height Weight    120/66 (BP Location: Left arm) 78 96.5  F (35.8  C) (Oral) 18 1.499 m (4' 11.02\") 77.7 kg (171 lb 4.8 oz)    Pulse Oximetry BMI (Body Mass Index)                96% 34.58 kg/m2          MyChart Information     [facility] lets you send " "messages to your doctor, view your test results, renew your prescriptions, schedule appointments and more. To sign up, go to [email or web address] . Click on \"Log in\" on the left side of the screen, which will take you to the Welcome page. Then click on \"Sign up Now\" on the right side of the page.     You will be asked to enter the access code listed below, as well as some personal information. Please follow the directions to create your username and password.     Your access code is: [email or web address]  Expires: 8/10/2017 11:53 AM     Your access code will  in 90 days. If you need help or a new code, please call your [facility] clinic or [phone].        Care EveryWhere ID     This is your Care EveryWhere ID. This could be used by other organizations to access your [facility] medical records  [id number]        Equal Access to Services     CITLALLI CUELLO : Zechariah Alves, wayanna martinez, raj kaalmada monica, hetal regan . So [facility] [phone].    ATENCIÓN: Si habla español, tiene a alarcon disposición servicios gratuitos de asistencia lingüística. Ronnie al [phone].    We comply with applicable federal civil rights laws and [place] laws. We do not discriminate on the basis of race, color, national origin, age, disability sex, sexual orientation or gender identity.               Review of your medicines      START taking        Dose / Directions    acetaminophen 325 MG tablet   Commonly known as:  TYLENOL   Used for:  Ovarian cancer, unspecified laterality (H)        Dose:  650 mg   Take 2 tablets (650 mg) by mouth every 6 hours as needed for mild pain   Quantity:  100 tablet   Refills:  0       HYDROmorphone 2 MG tablet   Commonly known as:  DILAUDID   Used for:  Ovarian cancer, unspecified laterality (H)        Dose:  2-4 mg   Take 1-2 tablets (2-4 mg) by mouth every 3 hours as needed for moderate to severe pain   Quantity:  40 tablet   Refills:  0       " polyethylene glycol Packet   Commonly known as:  MIRALAX/GLYCOLAX   Used for:  Ovarian cancer, unspecified laterality (H)        Dose:  17 g   Take 17 g by mouth daily   Quantity:  7 packet   Refills:  0       senna-docusate 8.6-50 MG per tablet   Commonly known as:  SENOKOT-S;PERICOLACE   Used for:  Ovarian cancer, unspecified laterality (H)        Dose:  1-2 tablet   Take 1-2 tablets by mouth 2 times daily Hold for loose stools   Quantity:  60 tablet   Refills:  1         CONTINUE these medicines which may have CHANGED, or have new prescriptions. If we are uncertain of the size of tablets/capsules you have at home, strength may be listed as something that might have changed.        Dose / Directions    losartan 50 MG tablet   Commonly known as:  COZAAR   This may have changed:    - medication strength  - how much to take  - when to take this        Dose:  50 mg   Take 1 tablet (50 mg) by mouth every morning   Refills:  0       Vitamin D (Cholecalciferol) 1000 UNITS Tabs   This may have changed:  additional instructions   Used for:  Paresthesias        Dose:  2000 Units   Take 2,000 Units by mouth daily   Quantity:  60 tablet   Refills:  0         CONTINUE these medicines which have NOT CHANGED        Dose / Directions    calcium carbonate 500 MG chewable tablet   Commonly known as:  TUMS        Dose:  1 chew tab   Take 1 chew tab by mouth daily as needed for heartburn   Refills:  0       enoxaparin 80 MG/0.8ML injection   Commonly known as:  LOVENOX   Used for:  Other pulmonary embolism without acute cor pulmonale, unspecified chronicity (H)        Dose:  60 mg   Inject 0.6 mLs (60 mg) Subcutaneous every 12 hours   Quantity:  60 Syringe   Refills:  1       ESCITALOPRAM OXALATE PO        Dose:  10 mg   Take 10 mg by mouth daily   Refills:  0       hydrochlorothiazide 25 MG tablet   Commonly known as:  HYDRODIURIL   Used for:  Ovarian cancer, unspecified laterality (H)        Dose:  25 mg   Take 1 tablet (25 mg) by  mouth daily   Refills:  0       levothyroxine 125 MCG tablet   Commonly known as:  SYNTHROID   Used for:  Other pulmonary embolism without acute cor pulmonale, unspecified chronicity (H)        Dose:  125 mcg   Take 1 tablet (125 mcg) by mouth daily   Quantity:  30 tablet   Refills:  0       Potassium Chloride ER 20 MEQ Tbcr   Used for:  Hypokalemia        Dose:  20 mEq   Take 1 tablet (20 mEq) by mouth daily   Quantity:  30 tablet   Refills:  3       PRILOSEC PO        Dose:  20 mg   Take 20 mg by mouth every morning   Refills:  0         STOP taking     DOCUSATE SODIUM PO           sennosides 8.6 MG tablet   Commonly known as:  SENOKOT                Where to get your medicines      Some of these will need a paper prescription and others can be bought over the counter. Ask your nurse if you have questions.     Bring a paper prescription for each of these medications     HYDROmorphone 2 MG tablet       You don't need a prescription for these medications     acetaminophen 325 MG tablet    polyethylene glycol Packet    senna-docusate 8.6-50 MG per tablet                Protect others around you: Learn how to safely use, store and throw away your medicines at www.disposemymeds.org.             Medication List: This is a list of all your medications and when to take them. Check marks below indicate your daily home schedule. Keep this list as a reference.      Medications           Morning Afternoon Evening Bedtime As Needed    acetaminophen 325 MG tablet   Commonly known as:  TYLENOL   Take 2 tablets (650 mg) by mouth every 6 hours as needed for mild pain   Last time this was given:  650 mg on 7/1/2017  6:34 AM                                calcium carbonate 500 MG chewable tablet   Commonly known as:  TUMS   Take 1 chew tab by mouth daily as needed for heartburn                                enoxaparin 80 MG/0.8ML injection   Commonly known as:  LOVENOX   Inject 0.6 mLs (60 mg) Subcutaneous every 12 hours   Last  time this was given:  60 mg on 7/1/2017  8:18 AM                                ESCITALOPRAM OXALATE PO   Take 10 mg by mouth daily   Last time this was given:  10 mg on 7/1/2017  8:16 AM                                hydrochlorothiazide 25 MG tablet   Commonly known as:  HYDRODIURIL   Take 1 tablet (25 mg) by mouth daily   Last time this was given:  25 mg on 7/1/2017  8:16 AM                                HYDROmorphone 2 MG tablet   Commonly known as:  DILAUDID   Take 1-2 tablets (2-4 mg) by mouth every 3 hours as needed for moderate to severe pain   Last time this was given:  4 mg on 7/1/2017  6:34 AM                                levothyroxine 125 MCG tablet   Commonly known as:  SYNTHROID   Take 1 tablet (125 mcg) by mouth daily   Last time this was given:  125 mcg on 7/1/2017  8:16 AM                                losartan 50 MG tablet   Commonly known as:  COZAAR   Take 1 tablet (50 mg) by mouth every morning   Last time this was given:  50 mg on 7/1/2017  8:16 AM                                polyethylene glycol Packet   Commonly known as:  MIRALAX/GLYCOLAX   Take 17 g by mouth daily   Last time this was given:  17 g on 7/1/2017  8:18 AM                                Potassium Chloride ER 20 MEQ Tbcr   Take 1 tablet (20 mEq) by mouth daily                                PRILOSEC PO   Take 20 mg by mouth every morning   Last time this was given:  20 mg on 7/1/2017  8:16 AM                                senna-docusate 8.6-50 MG per tablet   Commonly known as:  SENOKOT-S;PERICOLACE   Take 1-2 tablets by mouth 2 times daily Hold for loose stools                                Vitamin D (Cholecalciferol) 1000 UNITS Tabs   Take 2,000 Units by mouth daily

## 2017-06-28 NOTE — IP AVS SNAPSHOT
"          [address] [facility]: [phone]                                              INTERAGENCY TRANSFER FORM - PHYSICIAN ORDERS   2017                    Hospital Admission Date: 2017  ZAHIDA DUFF   : 1945  Sex: Female        Attending Provider: Nessa Christina MD     Allergies:  Amoxicillin, Clarithromycin, Lisinopril, Penicillins    Infection:  None   Service:  GYN/ONC    Ht:  1.499 m (4' 11.02\")   Wt:  77.7 kg (171 lb 4.8 oz)   Admission Wt:  75.9 kg (167 lb 5.3 oz)    BMI:  34.58 kg/m 2   BSA:  1.8 m 2            Patient PCP Information     Provider PCP Type    Esther Back MD General      ED Clinical Impression     Diagnosis Description Comment Added By Time Added    Ovarian cancer, unspecified laterality (H) [C56.9] Ovarian cancer, unspecified laterality (H) [C56.9]  Jyoti Lopez, SALVADOR CNP 2017  9:00 AM      Hospital Problems as of 2017              Priority Class Noted POA    Ovarian cancer (H) Medium  2017 Yes      Non-Hospital Problems as of 2017              Priority Class Noted    Thyroid nodule Medium  10/7/2010    History of total knee replacement Medium  2014    Hypertension Medium  2016    Abdominal mass Medium  2017    Ovarian cancer, unspecified laterality (H) Medium  2017    Acute pulmonary embolism (H) Medium  2017    On anticoagulant therapy Medium  2017    Encounter for antineoplastic chemotherapy Medium  2017    Chemotherapy-induced neutropenia (H) Medium  2017    Cerebellar disorder Medium  2017    Ataxia Medium  2017      Code Status History     Date Active Date Inactive Code Status Order ID Comments User Context    2017 10:12 AM 2017  6:34 PM Full Code [id number]  Diomedes Jones MD Outpatient    2017  8:54 AM 2017 10:12 AM Full Code [id number]  Vikash Tyler MD Inpatient    2017  7:47 AM 2017  8:54 AM Full Code [id number]  Mitzi Rios " MD [facility]    4/11/2017  8:54 PM 4/16/2017  7:47 AM Full Code [id number]  Diomedes Jones MD Inpatient    2/7/2017 11:49 AM 4/11/2017  8:54 PM Full Code [id number]  Maeve Harding PA-C Outpatient    2/6/2017  7:58 PM 2/7/2017 11:49 AM Full Code [id number]  Tara Lucia PA-C ED         Medication Review      START taking        Dose / Directions Comments    acetaminophen 325 MG tablet   Commonly known as:  TYLENOL   Used for:  Ovarian cancer, unspecified laterality (H)        Dose:  650 mg   Take 2 tablets (650 mg) by mouth every 6 hours as needed for mild pain   Quantity:  100 tablet   Refills:  0        HYDROmorphone 2 MG tablet   Commonly known as:  DILAUDID   Used for:  Ovarian cancer, unspecified laterality (H)        Dose:  2-4 mg   Take 1-2 tablets (2-4 mg) by mouth every 3 hours as needed for moderate to severe pain   Quantity:  40 tablet   Refills:  0        polyethylene glycol Packet   Commonly known as:  MIRALAX/GLYCOLAX   Used for:  Ovarian cancer, unspecified laterality (H)        Dose:  17 g   Take 17 g by mouth daily   Quantity:  7 packet   Refills:  0        senna-docusate 8.6-50 MG per tablet   Commonly known as:  SENOKOT-S;PERICOLACE   Used for:  Ovarian cancer, unspecified laterality (H)        Dose:  1-2 tablet   Take 1-2 tablets by mouth 2 times daily Hold for loose stools   Quantity:  60 tablet   Refills:  1          CONTINUE these medications which may have CHANGED, or have new prescriptions. If we are uncertain of the size of tablets/capsules you have at home, strength may be listed as something that might have changed.        Dose / Directions Comments    losartan 50 MG tablet   Commonly known as:  COZAAR   This may have changed:    - medication strength  - how much to take  - when to take this        Dose:  50 mg   Take 1 tablet (50 mg) by mouth every morning   Refills:  0        Vitamin D (Cholecalciferol) 1000 UNITS Tabs   This may have changed:  additional  instructions   Used for:  Paresthesias        Dose:  2000 Units   Take 2,000 Units by mouth daily   Quantity:  60 tablet   Refills:  0          CONTINUE these medications which have NOT CHANGED        Dose / Directions Comments    calcium carbonate 500 MG chewable tablet   Commonly known as:  TUMS        Dose:  1 chew tab   Take 1 chew tab by mouth daily as needed for heartburn   Refills:  0        enoxaparin 80 MG/0.8ML injection   Commonly known as:  LOVENOX   Used for:  Other pulmonary embolism without acute cor pulmonale, unspecified chronicity (H)        Dose:  60 mg   Inject 0.6 mLs (60 mg) Subcutaneous every 12 hours   Quantity:  60 Syringe   Refills:  1    Please note the dose is 60mg SQ BID due to her high anti-Xa level.       ESCITALOPRAM OXALATE PO        Dose:  10 mg   Take 10 mg by mouth daily   Refills:  0        hydrochlorothiazide 25 MG tablet   Commonly known as:  HYDRODIURIL   Used for:  Ovarian cancer, unspecified laterality (H)        Dose:  25 mg   Take 1 tablet (25 mg) by mouth daily   Refills:  0        levothyroxine 125 MCG tablet   Commonly known as:  SYNTHROID   Used for:  Other pulmonary embolism without acute cor pulmonale, unspecified chronicity (H)        Dose:  125 mcg   Take 1 tablet (125 mcg) by mouth daily   Quantity:  30 tablet   Refills:  0        Potassium Chloride ER 20 MEQ Tbcr   Used for:  Hypokalemia        Dose:  20 mEq   Take 1 tablet (20 mEq) by mouth daily   Quantity:  30 tablet   Refills:  3        PRILOSEC PO        Dose:  20 mg   Take 20 mg by mouth every morning   Refills:  0          STOP taking     DOCUSATE SODIUM PO           sennosides 8.6 MG tablet   Commonly known as:  SENOKOT                   Summary of Visit     Reason for your hospital stay       Surgery             After Care     Activity - Up with nursing assistance           Additional Discharge Instructions       GENERAL POST-OPERATIVE  PATIENT INSTRUCTIONS      FOLLOW-UP:    Call Surgeon if you  have:  Temperature greater than 100.4  Persistent nausea and vomiting  Severe uncontrolled pain  Redness, tenderness, or signs of infection (pain, swelling, redness, odor or green/yellow discharge around the site)  Difficulty breathing, headache or visual disturbances  Hives  Persistent dizziness or light-headedness  Extreme fatigue  Any other questions or concerns you may have after discharge    In an emergency, call 911 or go to an Emergency Department at a nearby hospital       WOUND CARE INSTRUCTIONS:  Keep a dry clean dressing on the wound if there is drainage. If you had a bandage initially, it may be removed after 24 hours.  Once the wound has quit draining you may leave it open to air.  If clothing rubs against the wound or causes irritation and the wound is not draining you may cover it with a dry dressing during the daytime.  Try to keep the wound dry and avoid ointments on the wound unless directed to do so.  If the wound becomes bright red and painful or starts to drain infected material that is not clear, please contact your physician immediately.    1.  You may shower 24 hrs after surgery   2.  No soaking in the tub for 4 weeks       DIET:  There are no dietary restrictions.  You may eat any foods that you can tolerate unless instructed otherwise.  It is a good idea to eat a high fiber diet and take in plenty of fluids to prevent constipation.  If you become constipated, please follow the instructions below.    ACTIVITY:  You are encouraged to cough, deep breath and use your incentive spirometer if you were given one, every 15-30 minutes when awake.  This will help prevent respiratory complications and low grade fevers post-operatively.  You may want to hug a pillow when coughing and sneezing to add additional support to the surgical area, if you had abdominal surgery, which will decrease pain during these times.      1.  No heavy lifting >20lbs or strenuous exercise for six-eight weeks.  No exercise  in which you are using core muscles (yoga, pilates, swimming, weight lifting)  2.  You may walk as much as you wish.  You are encouraged to increase your activity each day after surgery.  Stairs are okay.   3.  Nothing per vagina for eight weeks.  No tampons, no intercourse, no douching.  You can expect some light vaginal spotting and discharge for up to six weeks.  If bleeding becomes heavy, please contact the office.     MEDICATIONS:  Try to take narcotic medications and anti-inflammatory medications, such as tylenol, ibuprofen, naprosyn, etc., with food.  This will minimize stomach upset from the medication.  Should you develop nausea and vomiting from the pain medication, or develop a rash, please discontinue the medication and contact your physician.  You should not drive, make important decisions, or operate machinery when taking narcotic pain medication.    OTHER:  Patients are often constipated after general anesthesia and surgery.  The patient should continue to take stool softeners (for example, Senokot-S) for the next six weeks (unless diarrhea develops) and consume adequate amounts of water.  If the patient remains constipated or unable to pass stool, please try one or all of the following measures:  1.  Milk of Magnesia 30cc twice a day as needed by mouth  2.  Metamucil 2 tablespoons in 12 ounces of fluid  3.  Dulcolax oral or suppositories  4.  Prunes or prune juice  5.  Miralax daily      QUESTIONS:  Please feel free to call your physician or the hospital  if you have any questions, and they will be glad to assist you.       Advance Diet as Tolerated       Follow this diet upon discharge:       Regular Diet Adult       Encourage PO fluids           Fall precautions           General info for SNF       Length of Stay Estimate: Short Term Care: Estimated # of Days <30  Condition at Discharge: Stable  Level of care:skilled   Rehabilitation Potential: Fair  Admission H&P remains valid and  up-to-date: Yes  Recent Chemotherapy: Date:                       Use Nursing Home Standing Orders: No       Intake and output       Every shift       Mantoux instructions       Give two-step Mantoux (PPD) Per Facility Policy Yes       Wound care (specify)       Site:   Lap sites  Instructions:  Keep clean dry and intact             Referrals     Occupational Therapy Adult Consult       Evaluate and treat as clinically indicated.    Reason:  Paraneoplastic neuromyopathy       Physical Therapy Adult Consult       Evaluate and treat as clinically indicated.    Reason:  Paraneoplastic neuromyopathy             Your next 10 appointments already scheduled     Jul 05, 2017  9:00 AM CDT   New Visit with Kelsie Nguyen GC   [facility] Program ([facility])    [facility]  [address] [place] [address] [address]  [facility] [id number] [phone]            Jul 13, 2017  1:30 PM CDT   Return Visit with SALVADOR Gil CNP   [facility] ([facility])    [facility]  [address]  [facility] [id number] [phone]              Follow-Up Appointment Instructions     Future Labs/Procedures    Follow Up and recommended labs and tests     Comments:    7/13/17 Post op visit at [facility]. Please call [phone] option 2 if you have questions or concerns prior to then      Follow-Up Appointment Instructions     Follow Up and recommended labs and tests       7/13/17 Post op visit at [facility]. Please call [phone] option 2 if you have questions or concerns prior to then             Statement of Approval     Ordered          06/30/17 1018  I have reviewed and agree with all the recommendations and orders detailed in this document.  EFFECTIVE NOW     Approved and electronically signed by:  Jyoti Lopez APRN CNP

## 2017-06-28 NOTE — OP NOTE
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     ANESTHESIA: General endotracheal     ESTIMATED BLOOD LOSS: 100 cc     IV FLUIDS: 1400 cc crystalloid    URINE OUTPUT: 140 cc clear urine     INDICATIONS: Tosin Nina is a 71 year old who initially presented with neurologic symptoms and PE and was found to have stage IVB ovarian cancer and paraneoplastic syndrome.  She underwent 3 cycles of dose dense carboplatin and paclitaxel with excellent response.  She had normalization of her  and her CT showed almost complete resolution of her disease with only some residual disease remaining in her omentum.  She did undergo an admission with IVIG in an attempt to improve her neurologic symptoms from her paraneoplastic syndrome, however this was not successful and she continues to have her same neurologic symptoms.  She also had resolution of her PE.  Following a through discussion of the risks, benefits, indications and alternatives were discussed with the patient and she signed consents for the above procedure.    FINDINGS: On EUA the patient had normal external genitalia and an enlarged but mobile uterus.  On laparoscopy there was an enlarged uterus with multiple leiomyoma.  There right adnexa was grossly normal in appearance.  The left ovary had evidence of treatment effect with a 2 cm nodule of treated tumor and the adnexa was adherent to the pelvic sidewall.  There  were two areas on the bladder peritoneum where there was evidence of treatment effect and the bladder was somewhat adherent to the anterior uterus.  The sigmoid colon was adherent to the left pelvic sidewall along the entire left pelvic sidewall with evidence of previous tumor but no active disease remaining.  The cecum was also adherent to the right pelvic sidewall and had evidence of previous tumor, however no active disease was noted.  Her omentum was somewhat adherent in the left upper quadrant and there were two areas that appeared to have treated tumor in them, however grossly there was no viable tumor remaining.  There was some adhesions from the omentum to the sigmoid mesentery.  The bilateral diaphragms were smooth and without evidence of disease.  She had a large umbilical hernia.  There were several areas along the peritoneal surfaces and very small <1 mm areas along the bowel with evidence of treated tumor.  At the completion of the procedure she had undergone optimal interval tumor debulking to no gross residual disease.  On cystoscopy there was no evidence of bladder injury or foreign body and there was brisk efflux noted from the bilateral ureteral orifices.    SPECIMENS:   1.  Uterus, cervix, bilateral ovaries and fallopian tubes  2.  Omentum  3.  Bladder peritoneum  4.  Left pelvic sidewall  5.  Sigmoid epiploica    COMPLICATIONS: None.     CONDITION: Stable to PACU.     PROCEDURE:   Consent was reviewed with the patient in the preoperative setting and confirmed. She received prophylactic antibiotics. In addition, she received heparin for venous thrombosis prevention. The patient was transferred to the operating room and placed in dorsal supine position. General anesthetic was obtained in the usual manner without noted difficulties. The patient was then positioned onto Jorge stirrups and an exam under anesthesia was performed with findings as described above.  The patient was prepped and draped for  the above-mentioned procedure and Nieto catheter was then placed under sterile techniques.  Timeout was called at which point the patient's name, procedure and operative site was confirmed by the operative team. Initially, the instruments for the vaginal manipulator were positioned, a speculum was placed in the vagina. The anterior lip of the cervix was grasped, the uterus sounded to 8 cm and cervix was serially dilated. The VCare vaginal manipulator was then inserted and the vaginal balloon was then inserted to obtain intraabdominal pressure.     Attention was then turned to the upper abdomen. Initially, an incision was made approximately 3 cm above the umbilicus and the Veress needle introduced through this stab incision. The abdomen was insufflated with an opening pressure of 3 mmHg. The Veress needle was removed. Sites for the da Paty laparoscopic ports were demarcated, total of 5, initiating with this midline incision above the umbilicus and positioned in a semicircular fashion around the upper abdomen. The initial midline 8 mm port was inserted without difficulties, the left 2 lateral 8 mm da Paty ports and the right 12 mm and an 8 mm all under direct visualization without any injury noted to underlying structures. At this point, the patient was placed into steep Trendelenburg. The pelvis was inspected as well as the upper abdomen with findings as noted above.  Bowels were packed up into the upper abdomen with gentle traction. Given careful inspection of all peritoneal surfaces and areas previously found to have tumor showed marked response with almost no visible active tumor remaining the decision was made that we could achieve optimal tumor debulking robotically.  At this point, the da Paty was docked onto the ports, all appropriate instruments were inserted.     Attention was then turned to the pelvis. The procedure was initiated with some lysis of adhesions in the pelvis in order to free the colon from  its adhesions to the bilateral pelvic sidewalls where it appeared there had been a large tumor burden, however there was no further visible tumor, only adhesive disease remaining.  We then had to perform further lysis of adhesions to release the left adnexa from its attachments at the left pelvic sidewall where it was densely adherent.  Again there was no remaining tumor, only adhesions.  Once normal anatomy had been restored in the pelvis we initiated the procedure with they hysterectomy.  The round ligaments were identified bilaterally. They were divided using cautery and the retroperitoneal space was entered by dissecting the peritoneum lateral and cephalad to the IP ligaments.  On the left we had to perform lysis of adhesions again to identify and isolate the IP ligament and ureter. The ureters were identified and a defect was made underneath the IP ligament and above the ureter. The IP ligament was serially cauterized and then divided sharply. The rest of the peritoneum was skeletonized down to the level of the uterine arteries which were then cauterized and divided.  The bladder flap was created which again required some lysis of adhesions as it was somewhat adherent to the anterior uterus with changes consistent with previous tumor and treatment effect using cautery down to just below the level of the uterine manipulator cup. The rest of the parametrial tissue was dissected off of the uterus serially cauterized and divided sharply. A colpotomy was made on the anterior side and carried around to the posterior side of the uterine manipulator cup using the electrocautery. The uterus was removed through the vagina and sent for pathology.    Attention was then turned to the omentectomy.  There were several areas where there were adhesions especially to the left upper quadrant and to the transverse colon mesentery and these were carefully dissected.  There were areas where there were areas which had obviously  contained tumor, however it was impossible to discern if any viable tumor remained.  Once the omentum was adequately freed it was brought down into the pelvis.  We carefully identified the transverse colon and stomach.  A subtotal omentectomy was performed using cautery and adequate hemostasis was achieved.  The omentum was then removed through the vagina and sent for pathology.  There were several areas along the peritoneum with nodules consistent with treated tumor and those >1cm were removed and sent for pathology.      The vaginal cuff was then closed using a V-Lock suture with excellent reapproximation and hemostasis.   An optimal interval tumor debulking had been achieved with no visible active tumor remaining.  Next, we performed cystoscopy using 30-degree angled scope and noted normal bladder mucosa, no evidence of foreign body and brisk efflux from the bilateral ureteral orifices. At this point, the vaginal balloon was removed from the vagina. We closed the fascia on the 12 mm incision using the Jason-Leary device. All laparoscopic instruments and ports were now removed and CO2 allowed to escape from the abdomen. Skin was reapproximated with 4-0 Vicryl sutures and Dermabond applied. The patient tolerated the procedure well and was taken to the recovery room in stable condition.    Sponge, lap and needle counts were reported as correct x2 and instrument count was correct x1.     Nessa Ennis MD  Gynecologic Oncology  [facility]

## 2017-06-28 NOTE — ANESTHESIA POSTPROCEDURE EVALUATION
Patient: Tosin Nina    Procedure(s):  Robotic total laparoscopic hysterectomy, bilateral salpingo-oophorectomy, omentectomy, lysis of adhesions, tumor debulking, cystoscopy - Wound Class: II-Clean Contaminated   - Wound Class: II-Clean Contaminated    Diagnosis:Ovarian Cancer  Diagnosis Additional Information: No value filed.    Anesthesia Type:  General, ETT    Note:  Anesthesia Post Evaluation    Patient location during evaluation: PACU  Patient participation: Able to fully participate in evaluation  Level of consciousness: awake and alert  Pain management: adequate  Airway patency: patent  Cardiovascular status: hemodynamically stable  Respiratory status: acceptable and spontaneous ventilation  Hydration status: acceptable  PONV: none     Anesthetic complications: None          Last vitals:  Vitals:    06/28/17 1630 06/28/17 1645 06/28/17 1700   BP: 126/72 117/71 128/75   Resp: 14 16 16   Temp:      SpO2: 98% 98% 99%         Electronically Signed By: Khalif Gunn MD  June 28, 2017  5:13 PM

## 2017-06-28 NOTE — BRIEF OP NOTE
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 Nessa Ennis  Assistant(s):  MD Henna Rendon, PGY-4    Anesthesia: General Endotracheal Anesthesia  EBL: 100 mL  UOP: 140 mL  IVF: 1400 mL    Complications: None apparent   Findings: On EUA, cervix with anterior enlargement but otherwise normal feeling. Uterus palpated and feels smooth and anteverted. No large masses palpated. On laparoscopy, entry point free of injury. Bowel adhesions apparent most likely due to treatment with louie-adjuvant chemotherapy. Only residual disease seen on omental nodularities. No evidence of disease at end of case.     Specimens:   Uterus, cervix, bilateral ovaries, bilateral fallopian tubes, omentum, bladder peritoneum, left pelvic side wall, Sigmoid epiploica    Attending was scrubbed and present for the entire procedure.    Henna Ramírez MD   OBGYN, PGY-4  6/28/2017 3:11 PM

## 2017-06-28 NOTE — IP AVS SNAPSHOT
` `     [address] [facility] BANK: [phone]            Medication Administration Report for Tosin Nina as of 07/01/17 1159   Legend:    Given Hold Not Given Due Canceled Entry Other Actions    Time Time (Time) Time  Time-Action       Inactive    Active    Linked        Medications 06/25/17 06/26/17 06/27/17 06/28/17 06/29/17 06/30/17 07/01/17    acetaminophen (TYLENOL) tablet 650 mg  Dose: 650 mg Freq: EVERY 6 HOURS Route: PO  Start: 06/28/17 1845   Admin Instructions: Do NOT use if patient has an active opioid/acetaminophen analgesic order for pain  Maximum acetaminophen dose from all sources = 75 mg/kg/day not to exceed 4 grams/day.        2003 (650 mg)-Given        0120 (650 mg)-Given       0640 (650 mg)-Given       1235 (650 mg)-Given       1814 (650 mg)-Given        0037 (650 mg)-Given       0653 (650 mg)-Given       1258 (650 mg)-Given       1859 (650 mg)-Given        0048 (650 mg)-Given       0634 (650 mg)-Given       [ ] 1245       [ ] 1845           benzocaine-menthol (CHLORASEPTIC) 6-10 MG lozenge 1-2 lozenge  Dose: 1-2 lozenge Freq: EVERY 1 HOUR PRN Route: BU  PRN Reason: sore throat  PRN Comment: without fever  Start: 06/28/17 1834              bisacodyl (DULCOLAX) Suppository 10 mg  Dose: 10 mg Freq: DAILY Route: RE  Start: 06/28/17 1845   Admin Instructions: Start POD 1.  MUST HOLD for bowel resection or diarrhea. May discontinue if patient having bowel movement.        (1941)-Not Given [C]        1727-Hold [C]       2036 (10 mg)-Given        1209 (10 mg)-Given        0850 (10 mg)-Given           diphenhydrAMINE (BENADRYL) solution 12.5 mg  Dose: 12.5 mg Freq: EVERY 6 HOURS PRN Route: PO  PRN Reason: itching  Start: 06/28/17 1834   Admin Instructions: Caution to be used when administering multiple CNS depressing meds within a short time frame.              Or  diphenhydrAMINE (BENADRYL) injection 12.5 mg  Dose: 12.5 mg Freq: EVERY 6 HOURS PRN Route: IV  PRN Reason: itching  PRN Comment: Only  give if patient unable to take PO.  Start: 06/28/17 1834   Admin Instructions: Caution to be used when administering multiple CNS depressing meds within a short time frame.               enoxaparin (LOVENOX) injection 60 mg  Dose: 60 mg Freq: EVERY 12 HOURS Route: SC  Start: 06/29/17 0800        0751 (60 mg)-Given       1957 (60 mg)-Given        0835 (60 mg)-Given       2004 (60 mg)-Given        0818 (60 mg)-Given       [ ] 2000           escitalopram (LEXAPRO) tablet 10 mg  Dose: 10 mg Freq: DAILY Route: PO  Start: 06/29/17 0800        0746 (10 mg)-Given        0835 (10 mg)-Given        0816 (10 mg)-Given           heparin 100 UNIT/ML injection 5 mL  Dose: 5 mL Freq: EVERY 28 DAYS Route: IK  Start: 06/29/17 1100   Admin Instructions: ONLY to de-access each port in dual implanted port.  Flush with 10 mL NS followed by 5 mL heparin (100 units/mL) at discharge and at least every 28 days.  MAX: 5 mL per port         (1223)-Not Given             heparin lock flush 10 UNIT/ML injection 5-10 mL  Dose: 5-10 mL Freq: EVERY 1 HOUR PRN Route: IK  PRN Reason: other  PRN Comment: to lock each port in dual implanted port.  Start: 06/29/17 1045   Admin Instructions: MAX: 5 mL per port.          1507 (5 mL)-Given        0856 (5 mL)-Given           heparin lock flush 10 UNIT/ML injection 5-10 mL  Dose: 5-10 mL Freq: EVERY 24 HOURS Route: IK  Start: 06/29/17 1100   Admin Instructions: To lock each dormant port in dual implanted port.  Check PRN heparin flush order to see when last dose of PRN heparin was given before administering.   MAX: 5 mL per port.         1234 (5 mL)-Given        (1100)-Not Given        [ ] 1100           hydrochlorothiazide tablet 25 mg  Dose: 25 mg Freq: DAILY Route: PO  Start: 06/29/17 0800        0746 (25 mg)-Given        0834 (25 mg)-Given        0816 (25 mg)-Given           HYDROmorphone (DILAUDID) tablet 2-4 mg  Dose: 2-4 mg Freq: EVERY 3 HOURS PRN Route: PO  PRN Reason: moderate to severe pain  Start:  "06/28/17 1834       2003 (4 mg)-Given        0120 (4 mg)-Given       0758 (4 mg)-Given       1358 (4 mg)-Given       1912 (2 mg)-Given        0037 (4 mg)-Given       0441 (4 mg)-Given       1132 (4 mg)-Given       1558 (4 mg)-Given        0048 (4 mg)-Given       0634 (4 mg)-Given           levothyroxine (SYNTHROID/LEVOTHROID) tablet 125 mcg  Dose: 125 mcg Freq: DAILY Route: PO  Start: 06/29/17 0800        0745 (125 mcg)-Given        0835 (125 mcg)-Given        0816 (125 mcg)-Given           lidocaine (LMX4) kit  Freq: EVERY 1 HOUR PRN Route: Top  PRN Reason: moderate pain  PRN Comment: with VAD insertion or accessing implanted port  Start: 06/29/17 1044   Admin Instructions: Do NOT give if patient has a history of allergy to any local anesthetic or any \"wolf\" product.   Apply 30 minutes prior to VAD insertion or port access.  MAX Dose:  2.5 g (  of 5 g tube)               lidocaine (LMX4) kit  Freq: EVERY 1 HOUR PRN Route: Top  PRN Reason: pain  PRN Comment: with VAD insertion or accessing implanted port.  Start: 06/28/17 1834   Admin Instructions: Do NOT give if patient has a history of allergy to any local anesthetic or any \"wolf\" product.   Apply 30 minutes prior to VAD insertion or port access.  MAX Dose:  2.5 g (  of 5 g tube)               lidocaine 1 % 1 mL  Dose: 1 mL Freq: EVERY 1 HOUR PRN Route: OTHER  PRN Comment: mild pain with VAD insertion or accessing implanted port  Start: 06/29/17 1044   Admin Instructions: Do NOT give if patient has a history of allergy to any local anesthetic or any \"wolf\" product. MAX dose 1 mL subcutaneous OR intradermal in divided doses.               losartan (COZAAR) tablet 50 mg  Dose: 50 mg Freq: EVERY MORNING Route: PO  Start: 06/29/17 0800        0745 (50 mg)-Given        0834 (50 mg)-Given        0816 (50 mg)-Given           naloxone (NARCAN) injection 0.1-0.4 mg  Dose: 0.1-0.4 mg Freq: EVERY 2 MIN PRN Route: IV  PRN Reason: opioid reversal  Start: 06/28/17 1834 "   Admin Instructions: For respiratory rate LESS than or EQUAL to 8.  Partial reversal dose:  0.1 mg titrated q 2 minutes for Analgesia Side Effects Monitoring Sedation Level of 3 (frequently drowsy, arousable, drifts to sleep during conversation).Full reversal dose:  0.4 mg bolus for Analgesia Side Effects Monitoring Sedation Level of 4 (somnolent, minimal or no response to stimulation).               omeprazole (priLOSEC) CR capsule 20 mg  Dose: 20 mg Freq: EVERY MORNING Route: PO  Start: 06/29/17 0800        0745 (20 mg)-Given        0834 (20 mg)-Given        0816 (20 mg)-Given           ondansetron (ZOFRAN-ODT) ODT tab 4 mg  Dose: 4 mg Freq: EVERY 8 HOURS PRN Route: PO  PRN Reasons: nausea,vomiting  Start: 06/29/17 1845   Admin Instructions: With dry hands, peel back foil backing and gently remove tablet; do not push oral disintegrating tablet through foil backing; administer immediately on tongue and oral disintegrating tablet dissolves in seconds; then swallow with saliva; liquid not required.               polyethylene glycol (MIRALAX/GLYCOLAX) Packet 17 g  Dose: 17 g Freq: DAILY Route: PO  Start: 06/28/17 1845   Admin Instructions: Mixed prescribed dose in 8 ounces of water, juice or soda. Hold for loose stools.  1 Packet = 17 grams. Mixed prescribed dose in 8 ounces of water. Follow with 8 oz. of water.        2009 (17 g)-Given        0745 (17 g)-Given        0835 (17 g)-Given        0818 (17 g)-Given           potassium chloride SA (K-DUR/KLOR-CON M) CR tablet 20 mEq  Dose: 20 mEq Freq: DAILY Route: PO  Start: 06/29/17 0800   Admin Instructions: DO NOT CRUSH.         0746 (20 mEq)-Given        0835 (20 mEq)-Given        0816 (20 mEq)-Given           prochlorperazine (COMPAZINE) injection 5 mg  Dose: 5 mg Freq: EVERY 6 HOURS PRN Route: IV  PRN Reasons: nausea,vomiting  Start: 06/28/17 1834              sennosides (SENOKOT) tablet 1 tablet  Dose: 1 tablet Freq: 2 TIMES DAILY Route: PO  Start: 06/28/17 2000        2003 (1 tablet)-Given        0746 (1 tablet)-Given       1912 (1 tablet)-Given        0835 (1 tablet)-Given       2004 (1 tablet)-Given        0816 (1 tablet)-Given       [ ] 2000           simethicone (MYLICON) chewable tablet 80 mg  Dose: 80 mg Freq: 4 TIMES DAILY PRN Route: PO  PRN Reason: cramping  PRN Comment: gas  Start: 06/28/17 1834              sodium chloride (PF) 0.9% PF flush 10-20 mL  Dose: 10-20 mL Freq: EVERY 1 HOUR PRN Route: IK  PRN Reasons: line flush,post meds or blood draw  Start: 06/29/17 1045   Admin Instructions: And Daily PRN, to de-access each port in dual implanted port.  Flush with 10 mL NS followed by 5 mL heparin (100 units/mL) at discharge and at least every 28 days.          1506 (20 mL)-Given        0855 (20 mL)-Given           sodium chloride (PF) 0.9% PF flush 10-20 mL  Dose: 10-20 mL Freq: EVERY 1 HOUR PRN Route: IK  PRN Reasons: line flush,post meds or blood draw  PRN Comment: To flush each CVC Implanted port.  Start: 06/29/17 1045   Admin Instructions: 10 mL post IV meds;   20 mL post blood draw.          0739 (20 mL)-Given [C]            sodium chloride (PF) 0.9% PF flush 3 mL  Dose: 3 mL Freq: EVERY 8 HOURS Route: IK  Start: 06/28/17 1845   Admin Instructions: And Q1H PRN, to lock peripheral IV dormant line.        2009 (3 mL)-Given        0323 (3 mL)-Given       1235 (3 mL)-Given       1916 (3 mL)-Given        0308 (3 mL)-Given       (1137)-Not Given       (1859)-Not Given        (0453)-Not Given [C]       [ ] 1045       [ ] 1845           sodium chloride (PF) 0.9% PF flush 3 mL  Dose: 3 mL Freq: EVERY 1 HOUR PRN Route: IK  PRN Reason: line flush  PRN Comment: for peripheral IV flush post IV meds  Start: 06/28/17 1834        0410 (20 mL)-Given [C]            Future Medications  Medications 06/25/17 06/26/17 06/27/17 06/28/17 06/29/17 06/30/17 07/01/17       lidocaine 1 % 1 mL  Dose: 1 mL Freq: EVERY 1 HOUR PRN Route: OTHER  PRN Comment: mild pain with VAD insertion or  "accessing implanted port  Start: 07/02/17 0000   Admin Instructions: Do NOT give if patient has a history of allergy to any local anesthetic or any \"wolf\" product. MAX dose 1 mL subcutaneous OR intradermal in divided doses.              Completed Medications  Medications 06/25/17 06/26/17 06/27/17 06/28/17 06/29/17 06/30/17 07/01/17         Dose: 2 g Freq: PRE-OP/PRE-PROCEDURE Route: IV  Indications of Use: SURGICAL PROPHYLAXIS  Start: 06/28/17 0929   End: 06/28/17 1305   Admin Instructions: Give first dose within 1 hour PRIOR to incision. If patient weight is greater than or equal to 120 kg increase dose to 3 g.        1305 (2 g)-Given                Dose: 40 mg Freq: ONCE Route: SC  Start: 06/28/17 2100   End: 06/28/17 2103       2103 (40 mg)-Given                Dose: 15 mL Freq: 2 TIMES DAILY Route: PO  Start: 06/28/17 1845   End: 06/29/17 0954   Admin Instructions: Start evening of surgery.        2006 (15 mL)-Given        0954 (15 mL)-Given            Discontinued Medications  Medications 06/25/17 06/26/17 06/27/17 06/28/17 06/29/17 06/30/17 07/01/17         Dose: 1 g Freq: SEE ADMIN INSTRUCTIONS Route: IV  Indications of Use: SURGICAL PROPHYLAXIS  Start: 06/28/17 0929   End: 06/28/17 1524   Admin Instructions: Intra-Op Dose.  Give every 2 hours while patient in surgery, starting 2 hours after pre-op dose.  DO NOT GIVE intra-op dose if CrCl less than 10 mL/min (on dialysis).  If CrCL less than 50 mL/min, double the time interval between doses.        1524-Med Discontinued            Dose: 80 mg Freq: EVERY 12 HOURS Route: SC  Start: 06/29/17 0800   End: 06/28/17 2055 2055-Med Discontinued            Dose: 25-50 mcg Freq: EVERY 2 MIN PRN Route: IV  PRN Reason: other  PRN Comment: acute pain  Start: 06/28/17 1523   End: 06/28/17 1822   Admin Instructions: MAX cumulative dose = 250 mcg.    Use Fentanyl initially, as a short acting agent for acute pain control.  If insufficient, or a longer acting agent is " needed, begin Morphine or Hydromorphone if ordered.        1554 (50 mcg)-Given       1602 (50 mcg)-Given       1822-Med Discontinued            Dose: 3 mL Freq: EVERY 1 HOUR PRN Route: IK  PRN Reason: line flush  Start: 06/29/17 1031   End: 06/29/17 1045        1045-Med Discontinued           Dose: 0.3-0.5 mg Freq: EVERY 5 MIN PRN Route: IV  PRN Reason: other  PRN Comment: acute pain.  May administer if Respiratory Rate is greater than 10  Start: 06/28/17 1523   End: 06/28/17 1822   Admin Instructions: If fentanyl is also ordered, use HYDROmorphone if pain control insufficient with fentanyl or a longer acting agent is needed.   Max cumulative dose = 2 mg        1611 (0.5 mg)-Given       1631 (0.5 mg)-Given       1709 (0.5 mg)-Given       1822-Med Discontinued            Dose: 0.3-0.5 mg Freq: EVERY 2 HOURS PRN Route: IV  PRN Reason: severe pain  PRN Comment: or if patient unable to take PO or is refractory to oral narcotics.  Start: 06/28/17 1834   End: 06/29/17 2310   Admin Instructions: IF patient requires greater than 3 doses in 6 hours, notify provider.  Hold while on PCA.         2310-Med Discontinued           Rate: 125 mL/hr Freq: CONTINUOUS Route: IV  Start: 06/28/17 1530   End: 06/29/17 0932       1621 ( )-New Bag       2019 ( )-Rate/Dose Verify        0006 ( )-New Bag       0743 ( )-New Bag       0932-Med Discontinued           Rate: 100 mL/hr Freq: CONTINUOUS Route: IV  Start: 06/28/17 1530   End: 06/28/17 1822   Admin Instructions: Continue until IV catheter is weaned               1822-Med Discontinued            Rate: 25 mL/hr Freq: CONTINUOUS Route: IV  Start: 06/28/17 1015   End: 06/28/17 1524   Admin Instructions: IF patient NOT on dialysis.               1524-Med Discontinued            Dose: 4 mg Freq: EVERY 8 HOURS Route: PO  Start: 06/28/17 1834   End: 06/29/17 1844   Admin Instructions: With dry hands, peel back foil backing and gently remove tablet; do not push oral disintegrating tablet  through foil backing; administer immediately on tongue and oral disintegrating tablet dissolves in seconds; then swallow with saliva; liquid not required.        (2002)-Not Given        (0323)-Not Given       (1049)-Not Given       1844-Med Discontinued           Dose: 4 mg Freq: EVERY 30 MIN PRN Route: PO  PRN Reason: nausea  Start: 06/28/17 1523   End: 06/28/17 1822   Admin Instructions: MAX total dose = 8 mg, including OR dosing. If not resolved in 15 minutes, then go to step 2 (Prochlorperazine if ordered).        1822-Med Discontinued         Or    Dose: 4 mg Freq: EVERY 30 MIN PRN Route: IV  PRN Reason: nausea  Start: 06/28/17 1523   End: 06/28/17 1822   Admin Instructions: MAX total dose = 8 mg, including OR dosing. If not resolved in 15 minutes, then go to step 2 (Prochlorperazine if ordered).  Irritant.        1822-Med Discontinued            Rate: 11.4-136.6 mL/hr Freq: CONTINUOUS Route: IV  Start: 06/28/17 1330   End: 06/28/17 1822   Admin Instructions: MD to titrate  Vesicant.               1822-Med Discontinued            Dose: 5 mg Freq: EVERY 6 HOURS PRN Route: IV  PRN Reasons: nausea,vomiting  Start: 06/28/17 1523   End: 06/28/17 1822   Admin Instructions: This is Step 2 of the nausea and vomiting protocol.   If nausea not resolved in 15 minutes, give Metoclopramide if ordered (step 3 of nausea and vomiting protocol)          1822-Med Discontinued            Dose: 10-20 mL Freq: EVERY 1 HOUR PRN Route: IV  PRN Reasons: line flush,post meds or blood draw  Start: 06/28/17 1002   End: 06/28/17 1524   Admin Instructions: To flush CVC Implanted port. 10 mL post IV meds; 20 mL post blood draw.        1524-Med Discontinued       Medications 06/25/17 06/26/17 06/27/17 06/28/17 06/29/17 06/30/17 07/01/17

## 2017-06-28 NOTE — DISCHARGE SUMMARY
Gynecologic Oncology Discharge Summary    Tosin Nina  [id number]    Admit Date: 6/28/2017  Discharge Date: 6/3/017  Admitting Provider: Dr. Nessa Ennis  Discharge Provider: Dr. 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  Pulmonary emboli on anticoagulation  Hypothyroidism  Hypertension  Anemia  Para-neoplastic neuromyopathy and neuropathy  GERD  Depression  Thrombocytopenia    Patient Active Problem List   Diagnosis     Abdominal mass     Ovarian cancer, unspecified laterality (H)     Acute pulmonary embolism (H)     Hypertension     History of total knee replacement     Thyroid nodule     On anticoagulant therapy     Encounter for antineoplastic chemotherapy     Chemotherapy-induced neutropenia (H)     Cerebellar disorder     Ataxia       Procedures:   Robot assisted total laparoscopic hysterectomy, bilateral salpingo-oophorectomy, omentectomy, biopsies, lysis of adhesions, tumor debulking, cystoscopy    Transfusion 1 u pRBC's    Prior to Admission Medications:  Prescriptions Prior to Admission   Medication Sig Dispense Refill Last Dose     Omeprazole (PRILOSEC PO) Take 20 mg by mouth every morning   6/27/2017 at Unknown time     ESCITALOPRAM OXALATE PO Take 10 mg by mouth daily   6/27/2017 at Unknown time     Potassium Chloride ER 20 MEQ TBCR Take 1 tablet (20 mEq) by mouth daily 30 tablet 3 6/28/2017 at Unknown time     levothyroxine (SYNTHROID) 125 MCG tablet Take 1 tablet (125 mcg) by mouth daily 30 tablet  6/28/2017 at Unknown time     enoxaparin (LOVENOX) 80 MG/0.8ML injection Inject 0.6 mLs (60 mg) Subcutaneous every 12 hours 60 Syringe 1 6/27/2017 at Unknown time     calcium carbonate (TUMS) 500 MG chewable tablet Take 1 chew tab by mouth daily as  needed for heartburn   More than a month at Unknown time     [DISCONTINUED] DOCUSATE SODIUM PO Take 100 mg by mouth daily as needed for constipation   6/27/2017 at Unknown time     [DISCONTINUED] sennosides (SENOKOT) 8.6 MG tablet Take 1 tablet by mouth daily   6/27/2017 at Unknown time     Vitamin D, Cholecalciferol, 1000 UNITS TABS Take 2,000 Units by mouth daily (Patient taking differently: Take 2,000 Units by mouth daily LD 6/20/17, told to hold until after surgery) 60 tablet  6/20/17     [DISCONTINUED] losartan (COZAAR) 100 MG tablet Take 1 tablet (100 mg) by mouth daily (Patient taking differently: Take 50 mg by mouth every morning ) 30 tablet  6/27/2017 at Unknown time     [DISCONTINUED] hydrochlorothiazide (HYDRODIURIL) 25 MG tablet Take 2 tablets (50 mg) by mouth daily (Patient taking differently: Take 25 mg by mouth daily ) 30 tablet  6/27/2017 at Unknown time       Discharge Medications:   Tosin Nina   Home Medication Instructions JOLIE:[id number]    Printed on:06/30/17 1051   Medication Information                      acetaminophen (TYLENOL) 325 MG tablet  Take 2 tablets (650 mg) by mouth every 6 hours as needed for mild pain             calcium carbonate (TUMS) 500 MG chewable tablet  Take 1 chew tab by mouth daily as needed for heartburn             enoxaparin (LOVENOX) 80 MG/0.8ML injection  Inject 0.6 mLs (60 mg) Subcutaneous every 12 hours             ESCITALOPRAM OXALATE PO  Take 10 mg by mouth daily             hydrochlorothiazide (HYDRODIURIL) 25 MG tablet  Take 1 tablet (25 mg) by mouth daily             HYDROmorphone (DILAUDID) 2 MG tablet  Take 1-2 tablets (2-4 mg) by mouth every 3 hours as needed for moderate to severe pain             levothyroxine (SYNTHROID) 125 MCG tablet  Take 1 tablet (125 mcg) by mouth daily             losartan (COZAAR) 50 MG tablet  Take 1 tablet (50 mg) by mouth every morning             Omeprazole (PRILOSEC PO)  Take 20 mg by mouth every morning              polyethylene glycol (MIRALAX/GLYCOLAX) Packet  Take 17 g by mouth daily             Potassium Chloride ER 20 MEQ TBCR  Take 1 tablet (20 mEq) by mouth daily             senna-docusate (SENOKOT-S;PERICOLACE) 8.6-50 MG per tablet  Take 1-2 tablets by mouth 2 times daily Hold for loose stools             Vitamin D, Cholecalciferol, 1000 UNITS TABS  Take 2,000 Units by mouth daily                  Consultations:   Physical therapy   Occupational therapy   Social work    Brief History of Illness:  Tosin Nina is a 71 year old who initially presented to the ED with neurologic symptoms and was found to have stage IVB ovarian cancer on omental biopsy 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 She was also diagnosed with pulmonary embolism and placed on anti-coagulation therapy at that time. Given extension of disease, she underwent three cycles of louie-adjuvant chemotherapy with carboplatin and taxol. Repeat imaging showed marked improvement in carcinomatosis and omental metastasis. She presents for surgical management of the below procedure. Risks and benefits of the procedure discussed with patient including risk of blood loss, infection, and damage to surrounding structures. Patient had all of her questions answered. Written informed consent obtained in clinic.     Hospital Course:  Dz:   - Preoperative diagnosis was stage IVB high grade serous ovarian cancer s/p neoadjuvant chemotherapy x3.  She will follow-up postoperatively for a care plan.  FEN:   - She was maintained on IVF until POD#1, when her diet was slowly advanced. She received magnesium replacement and was restarted on her potassium on POD #0.  By discharge, she was tolerating a regular diet without nausea and vomiting and able to maintain her hydration without IVF supplementation.  Pain:   - Her pain was initially controlled on tylenol, oral dilaudid, and dilaudid bumps. Once tolerating PO pain meds, she was transitioned to a PO pain  regimen.  Her pain was well controlled on this and she was discharged home with these medications.  CV:   - She has a history of hypertension. Her home medications of losartan and HCTZ were restarted on POD #1.  Her vital signs were stable while in house and she had no acute CV issues.  PULM:   - She has a history of pulmonary embolism. She was on therapeutic lovenox prior to admission, and was titrated to this dose on POD #1.  She was initially given O2 supplementation in to maintain her O2 sats in the immediate postop period and was transitioned off of this without difficulty.  By discharge, her O2 sats were greater than 94% on RA.  She was encouraged to use her bedside IS while in house.  She had no acute pulmonary issues while in house.  HEME:   - Her preoperative Hgb was 9.2.  Her hgb dropped to 7.9 postoperatively. She was given a ppx dose of lovenox on the evening of POD #0.  She was restarted on her therapeutic lovenox on POD #1. Her Hgb was 6.6 on POD#2 and she was transfused 1 unit of pRBC's. Post-transfusion Hgb 8.2. Her Hgb on the AM of discharge was stable on 8.4.   GI:   - She was made NPO prior to the procedure.  On POD#0, her diet was advanced to clear liquids and then advanced slowly as tolerated.  At the time of discharge, she was tolerating a regular diet without nausea and vomiting.  She has a history of constipation and will be discharged with a bowel regimen.  She had no acute GI issues while in house.  :    - A cardenas catheter was placed at the time of the surgery and removed on POD#0.  Prior to discharge, the patient was voiding spontaneously without difficulty.  She had no acute  issues while in house.  ID:   -The patient was AF during her hospitalization.  She received standard preoperative antibiotics without incident.    ENDO:   -She has a history of hypothyroidism and she was given her home synthroid.  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 She will be discharged to a rehab facility. She has a history of depression and was given her home escitalopram.  PPX:    -She was given SCDs, IS, PPI and lovenox during her hospital course.  She tolerated these prophylactic interventions without incident.  She will be discharged on her home dose of lovenox.      Discharge Instructions and Follow up:  Ms. Tosin Nina was discharged from the hospital with follow up with Jyoti Lopez for chemotherapy and post-operative check.    Discharge Diet: Regular  Discharge Activity: Activity as tolerated  Discharge Follow up: Follow up with Jyoti Lopez for chemotherapy and post-operative check.     Discharge Disposition:  Discharged to rehabilitation facility    Discharge Staff: Dr. Thony Sandoval MD PhD  Ob/Gyn PGY-2  7/1/2017 3:12 PM

## 2017-06-28 NOTE — LETTER
Transition Communication Hand-off for Care Transitions to Next Level of Care Provider    Name: Tosin Nina  MRN #: [id number]  Primary Care Provider: Esther Back     Primary Clinic: [facility] [address] [address]     Reason for Hospitalization:  Ovarian Cancer  Ovarian cancer (H)  Ovarian cancer (H)  Ovarian cancer (H)  Admit Date/Time: 6/28/2017  9:08 AM  Discharge Date: 06/30/17  Payor Source: Payor: UCARE / Plan: UCARE SENIORS NON FPA / Product Type: HMO /     Readmission Assessment Measure (JULIANA) Risk Score/category: High    Plan of Care Goals/Milestone Events:   Patient Concerns: Pt wanting to d/c to [facility]   Patient Goals:   Short-term: D/c to [facility] for PT/OT   Long-term: Continue treatment    Medical Goals   Short-term: D/c to [facility] for PT/OT   Long-term: Continue treatment          Reason for Communication Hand-off Referral: Multiple providers/specialties    Discharge Plan:       Concern for non-adherence with plan of care:   Y/N N  Discharge Needs Assessment:  Needs       Most Recent Value    Equipment Currently Used at Home commode, raised toilet, shower chair, walker, rolling, grab bar    Transportation Available family or friend will provide          Already enrolled in Tele-monitoring program and name of program:  NA  Follow-up specialty is recommended: Yes    Follow-up plan:  Future Appointments  Date Time Provider Department Center   7/5/2017 9:00 AM Kelsie Nguyen GC [facility]   7/13/2017 1:30 PM [facility]       Any outstanding tests or procedures:        Referrals     Future Labs/Procedures    Occupational Therapy Adult Consult     Comments:    Evaluate and treat as clinically indicated.    Reason:  Paraneoplastic neuromyopathy    Physical Therapy Adult Consult     Comments:    Evaluate and treat as clinically indicated.    Reason:  Paraneoplastic neuromyopathy            Key  Recommendations:      Judit Castaneda    AVS/Discharge Summary is the source of truth; this is a helpful guide for improved communication of patient story

## 2017-06-29 ENCOUNTER — APPOINTMENT (OUTPATIENT)
Dept: OCCUPATIONAL THERAPY | Facility: CLINIC | Age: 72
DRG: 737 | End: 2017-06-29
Attending: OBSTETRICS & GYNECOLOGY
Payer: COMMERCIAL

## 2017-06-29 LAB
ANION GAP SERPL CALCULATED.3IONS-SCNC: 5 MMOL/L (ref 3–14)
BASOPHILS # BLD AUTO: 0 10E9/L (ref 0–0.2)
BASOPHILS NFR BLD AUTO: 0.2 %
BUN SERPL-MCNC: 7 MG/DL (ref 7–30)
CALCIUM SERPL-MCNC: 8.4 MG/DL (ref 8.5–10.1)
CHLORIDE SERPL-SCNC: 105 MMOL/L (ref 94–109)
CO2 SERPL-SCNC: 30 MMOL/L (ref 20–32)
COPATH REPORT: NORMAL
CREAT SERPL-MCNC: 0.49 MG/DL (ref 0.52–1.04)
DIFFERENTIAL METHOD BLD: ABNORMAL
EOSINOPHIL # BLD AUTO: 0 10E9/L (ref 0–0.7)
EOSINOPHIL NFR BLD AUTO: 0 %
ERYTHROCYTE [DISTWIDTH] IN BLOOD BY AUTOMATED COUNT: 25.3 % (ref 10–15)
GFR SERPL CREATININE-BSD FRML MDRD: ABNORMAL ML/MIN/1.7M2
GLUCOSE BLDC GLUCOMTR-MCNC: 132 MG/DL (ref 70–99)
GLUCOSE SERPL-MCNC: 122 MG/DL (ref 70–99)
HCT VFR BLD AUTO: 22.5 % (ref 35–47)
HGB BLD-MCNC: 7 G/DL (ref 11.7–15.7)
IMM GRANULOCYTES # BLD: 0 10E9/L (ref 0–0.4)
IMM GRANULOCYTES NFR BLD: 0.2 %
LYMPHOCYTES # BLD AUTO: 0.7 10E9/L (ref 0.8–5.3)
LYMPHOCYTES NFR BLD AUTO: 11.5 %
MAGNESIUM SERPL-MCNC: 1.9 MG/DL (ref 1.6–2.3)
MCH RBC QN AUTO: 32.4 PG (ref 26.5–33)
MCHC RBC AUTO-ENTMCNC: 31.1 G/DL (ref 31.5–36.5)
MCV RBC AUTO: 104 FL (ref 78–100)
MONOCYTES # BLD AUTO: 0.6 10E9/L (ref 0–1.3)
MONOCYTES NFR BLD AUTO: 9.7 %
NEUTROPHILS # BLD AUTO: 4.9 10E9/L (ref 1.6–8.3)
NEUTROPHILS NFR BLD AUTO: 78.4 %
NRBC # BLD AUTO: 0.1 10*3/UL
NRBC BLD AUTO-RTO: 1 /100
PLATELET # BLD AUTO: 137 10E9/L (ref 150–450)
POTASSIUM SERPL-SCNC: 4.4 MMOL/L (ref 3.4–5.3)
RBC # BLD AUTO: 2.16 10E12/L (ref 3.8–5.2)
SODIUM SERPL-SCNC: 140 MMOL/L (ref 133–144)
WBC # BLD AUTO: 6.2 10E9/L (ref 4–11)

## 2017-06-29 PROCEDURE — 25000125 ZZHC RX 250: Performed by: STUDENT IN AN ORGANIZED HEALTH CARE EDUCATION/TRAINING PROGRAM

## 2017-06-29 PROCEDURE — 85025 COMPLETE CBC W/AUTO DIFF WBC: CPT | Performed by: OBSTETRICS & GYNECOLOGY

## 2017-06-29 PROCEDURE — 40000133 ZZH STATISTIC OT WARD VISIT: Performed by: OCCUPATIONAL THERAPIST

## 2017-06-29 PROCEDURE — 83735 ASSAY OF MAGNESIUM: CPT | Performed by: OBSTETRICS & GYNECOLOGY

## 2017-06-29 PROCEDURE — 00000146 ZZHCL STATISTIC GLUCOSE BY METER IP

## 2017-06-29 PROCEDURE — 97165 OT EVAL LOW COMPLEX 30 MIN: CPT | Mod: GO | Performed by: OCCUPATIONAL THERAPIST

## 2017-06-29 PROCEDURE — 36592 COLLECT BLOOD FROM PICC: CPT | Performed by: OBSTETRICS & GYNECOLOGY

## 2017-06-29 PROCEDURE — 80048 BASIC METABOLIC PNL TOTAL CA: CPT | Performed by: OBSTETRICS & GYNECOLOGY

## 2017-06-29 PROCEDURE — 25000132 ZZH RX MED GY IP 250 OP 250 PS 637: Performed by: OBSTETRICS & GYNECOLOGY

## 2017-06-29 PROCEDURE — 99024 POSTOP FOLLOW-UP VISIT: CPT | Performed by: OBSTETRICS & GYNECOLOGY

## 2017-06-29 PROCEDURE — 97535 SELF CARE MNGMENT TRAINING: CPT | Mod: GO | Performed by: OCCUPATIONAL THERAPIST

## 2017-06-29 PROCEDURE — 25000128 H RX IP 250 OP 636: Performed by: OBSTETRICS & GYNECOLOGY

## 2017-06-29 PROCEDURE — 12000001 ZZH R&B MED SURG/OB UMMC

## 2017-06-29 RX ORDER — HEPARIN SODIUM,PORCINE 10 UNIT/ML
3 VIAL (ML) INTRAVENOUS
Status: DISCONTINUED | OUTPATIENT
Start: 2017-06-29 | End: 2017-06-29

## 2017-06-29 RX ORDER — HEPARIN SODIUM,PORCINE 10 UNIT/ML
5-10 VIAL (ML) INTRAVENOUS
Status: DISCONTINUED | OUTPATIENT
Start: 2017-06-29 | End: 2017-07-01 | Stop reason: HOSPADM

## 2017-06-29 RX ORDER — HEPARIN SODIUM (PORCINE) LOCK FLUSH IV SOLN 100 UNIT/ML 100 UNIT/ML
5 SOLUTION INTRAVENOUS
Status: DISCONTINUED | OUTPATIENT
Start: 2017-06-29 | End: 2017-07-01 | Stop reason: HOSPADM

## 2017-06-29 RX ORDER — LIDOCAINE 40 MG/G
CREAM TOPICAL
Status: DISCONTINUED | OUTPATIENT
Start: 2017-06-29 | End: 2017-07-01 | Stop reason: HOSPADM

## 2017-06-29 RX ORDER — HEPARIN SODIUM,PORCINE 10 UNIT/ML
5-10 VIAL (ML) INTRAVENOUS EVERY 24 HOURS
Status: DISCONTINUED | OUTPATIENT
Start: 2017-06-29 | End: 2017-07-01 | Stop reason: HOSPADM

## 2017-06-29 RX ADMIN — LOSARTAN POTASSIUM 50 MG: 50 TABLET, FILM COATED ORAL at 07:45

## 2017-06-29 RX ADMIN — MAGNESIUM HYDROXIDE 15 ML: 400 SUSPENSION ORAL at 09:54

## 2017-06-29 RX ADMIN — ENOXAPARIN SODIUM 60 MG: 60 INJECTION SUBCUTANEOUS at 07:51

## 2017-06-29 RX ADMIN — ACETAMINOPHEN 650 MG: 325 TABLET, FILM COATED ORAL at 18:14

## 2017-06-29 RX ADMIN — ACETAMINOPHEN 650 MG: 325 TABLET, FILM COATED ORAL at 06:40

## 2017-06-29 RX ADMIN — SODIUM CHLORIDE, PRESERVATIVE FREE 5 ML: 5 INJECTION INTRAVENOUS at 12:34

## 2017-06-29 RX ADMIN — ACETAMINOPHEN 650 MG: 325 TABLET, FILM COATED ORAL at 01:20

## 2017-06-29 RX ADMIN — ESCITALOPRAM OXALATE 10 MG: 10 TABLET ORAL at 07:46

## 2017-06-29 RX ADMIN — OMEPRAZOLE 20 MG: 20 CAPSULE, DELAYED RELEASE ORAL at 07:45

## 2017-06-29 RX ADMIN — HYDROCHLOROTHIAZIDE 25 MG: 12.5 TABLET ORAL at 07:46

## 2017-06-29 RX ADMIN — ACETAMINOPHEN 650 MG: 325 TABLET, FILM COATED ORAL at 12:35

## 2017-06-29 RX ADMIN — HYDROMORPHONE HYDROCHLORIDE 4 MG: 2 TABLET ORAL at 13:58

## 2017-06-29 RX ADMIN — HYDROMORPHONE HYDROCHLORIDE 2 MG: 2 TABLET ORAL at 19:12

## 2017-06-29 RX ADMIN — HYDROMORPHONE HYDROCHLORIDE 4 MG: 2 TABLET ORAL at 07:58

## 2017-06-29 RX ADMIN — SENNOSIDES 1 TABLET: 8.6 TABLET, FILM COATED ORAL at 19:12

## 2017-06-29 RX ADMIN — POTASSIUM CHLORIDE 20 MEQ: 750 TABLET, EXTENDED RELEASE ORAL at 07:46

## 2017-06-29 RX ADMIN — SODIUM CHLORIDE, POTASSIUM CHLORIDE, SODIUM LACTATE AND CALCIUM CHLORIDE: 600; 310; 30; 20 INJECTION, SOLUTION INTRAVENOUS at 00:06

## 2017-06-29 RX ADMIN — LEVOTHYROXINE SODIUM 125 MCG: 125 TABLET ORAL at 07:45

## 2017-06-29 RX ADMIN — HYDROMORPHONE HYDROCHLORIDE 4 MG: 2 TABLET ORAL at 01:20

## 2017-06-29 RX ADMIN — ENOXAPARIN SODIUM 60 MG: 60 INJECTION SUBCUTANEOUS at 19:57

## 2017-06-29 RX ADMIN — BISACODYL 10 MG: 10 SUPPOSITORY RECTAL at 20:36

## 2017-06-29 RX ADMIN — SODIUM CHLORIDE, POTASSIUM CHLORIDE, SODIUM LACTATE AND CALCIUM CHLORIDE: 600; 310; 30; 20 INJECTION, SOLUTION INTRAVENOUS at 07:43

## 2017-06-29 RX ADMIN — SENNOSIDES 1 TABLET: 8.6 TABLET, FILM COATED ORAL at 07:46

## 2017-06-29 RX ADMIN — POLYETHYLENE GLYCOL 3350 17 G: 17 POWDER, FOR SOLUTION ORAL at 07:45

## 2017-06-29 ASSESSMENT — PAIN DESCRIPTION - DESCRIPTORS
DESCRIPTORS: DISCOMFORT
DESCRIPTORS: ACHING;SORE
DESCRIPTORS: ACHING;SORE
DESCRIPTORS: DULL;ACHING;SORE

## 2017-06-29 NOTE — PLAN OF CARE
Problem: Goal Outcome Summary  Goal: Goal Outcome Summary  Outcome: Improving  VSS. Sats >92% on room air. Pain controlled w/ PO dilaudid x1 and scheduled tyelonol. Denies nausea. Denies gas or BM, burping frequently. Abdomen rounded, lap sites CDI. R Port heparin locked. Capno to be DC'd at 1830. Up w/ assist of 2 to transfer to chair. Per Pt baseline does not ambulate more than a couple of feet d/t numbness in extremities. Up in chair majority of day. Tolerating regular diet. Plan for DC tomorrow to TCU if labs remain stable.

## 2017-06-29 NOTE — PROGRESS NOTES
06/29/17 1540   Quick Adds   Type of Visit Initial Occupational Therapy Evaluation   Living Environment   Lives With spouse   Living Arrangements house   Home Accessibility bed and bath are not on the first floor;stairs (2 railings present);stairs to enter home;stairs within home;bed and bath on same level   Number of Stairs to Enter Home 1   Number of Stairs Within Home 15   Transportation Available family or friend will provide   Living Environment Comment  is home to help during the day, DTR comes by 2/week for showering   Self-Care   Dominant Hand right   Usual Activity Tolerance moderate   Current Activity Tolerance poor   Regular Exercise no   Equipment Currently Used at Home commode;raised toilet;shower chair;walker, rolling;grab bar   Functional Level Prior   Ambulation 1-->assistive equipment   Transferring 0-->independent   Toileting 1-->assistive equipment   Bathing 1-->assistive equipment   Dressing 0-->independent   Eating 0-->independent   Communication 0-->understands/communicates without difficulty   Swallowing 0-->swallows foods/liquids without difficulty   Cognition 0 - no cognition issues reported   Fall history within last six months yes   Number of times patient has fallen within last six months 1   Which of the above functional risks had a recent onset or change? 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     Precautions/Limitations fall precautions    Cognitive Status Examination   Orientation orientation to person, place and time   Level of Consciousness alert   Visual Perception   Visual Perception Wears glasses   Occulomotor nystagmus with tracking to R and L   Visual Perception Comments Pt reports diffuse visual problems- blurry or jumpy at times   Sensory Examination   Sensory Comments numbness in hands and feet  (light touch intact)   Pain Assessment   Patient Currently in Pain No   Integumentary/Edema   Integumentary/Edema Comments tight shiny skin, 3+ pitting in ankle, 1+ in feet and legs   Range of Motion (ROM)   ROM Comment WNL   Strength   Strength Comments NT 2/2 abdominal precautions   Muscle Tone Assessment   Muscle Tone Comments WNL   Coordination   Upper Extremity Coordination No deficits were identified   Transfer Skill: Sit to Stand   Level of [place]: Sit/Stand minimum assist (75% patients effort)   Bathing   Level of [place] maximum assist (25% patients effort)   Upper Body Dressing   Level of [place]: Dress Upper Body minimum assist (75% patients effort)   Lower Body Dressing   Level of [place]: Dress Lower Body maximum assist (25% patients effort)   Toileting   Level of [place]: Toilet maximum assist (25% patients effort)   Grooming   Level of [place]: Grooming stand-by assist   Instrumental Activities of Daily Living (IADL)   Previous Responsibilities (was doing lADLs prior to March, none since then)   Activities of Daily Living Analysis   Impairments Contributing to Impaired Activities of Daily Living pain;post surgical precautions;sensory feedback impaired;strength decreased;balance impaired   General Therapy Interventions   Planned Therapy Interventions ADL retraining;progressive activity/exercise;home program guidelines   Clinical Impression   Criteria for Skilled Therapeutic Interventions Met yes, treatment indicated   OT Diagnosis decreased ADL I and tolerance   Influenced by the following impairments  "balance, pain, post surgical precautions   Assessment of Occupational Performance 5 or more Performance Deficits   Identified Performance Deficits ADLs, IADLs   Clinical Decision Making (Complexity) Low complexity   Therapy Frequency 5 times/wk   Predicted Duration of Therapy Intervention (days/wks) 7/6/17   Anticipated Equipment Needs at Discharge (TBD)   Anticipated Discharge Disposition Transitional Care Facility   Risks and Benefits of Treatment have been explained. Yes   Patient, Family & other staff in agreement with plan of care Yes   Clinical Impression Comments Pt would benefit from skilled OT to help increase ADL I and tolerance   [facility] AM-PAC TM \"6 Clicks\"   2016, Trustees of [facility], under license to [employer].  All rights reserved.   6 Clicks Short Forms Daily Activity Inpatient Short Form   [facility] AM-PAC  \"6 Clicks\" Daily Activity Inpatient Short Form   1. Putting on and taking off regular lower body clothing? 2 - A Lot   2. Bathing (including washing, rinsing, drying)? 2 - A Lot   3. Toileting, which includes using toilet, bedpan or urinal? 2 - A Lot   4. Putting on and taking off regular upper body clothing? 3 - A Little   5. Taking care of personal grooming such as brushing teeth? 4 - None   6. Eating meals? 4 - None   Daily Activity Raw Score (Score out of 24.Lower scores equate to lower levels of function) 17   Total Evaluation Time   Total Evaluation Time (Minutes) 10     "

## 2017-06-29 NOTE — PROGRESS NOTES
Social Work Services Progress Note    Hospital Day: 2  Date of Initial Social Work Evaluation:  06/22/17  Collaborated with:  Pt, medical team (rounds),     Data:  Pt post-op day 1 from robotic total laparoscopic hysterectomy, bilateral salpingo-oophorectomy, lysis of adhesions, omentectomy, optimal interval tumor debulking to no gross residual disease, cystoscopy    Intervention:  SW met with pt. Pt stated that she went to [facility] in April and would like to return. Explained that pt has not been inpt for 3 days, so TCU would need to check with insurance.    SW [facility]   [facility]  [address] [place] [address]  Fax: [phone]  Main: [phone]  Admissions: [phone] or [phone]  -SW spoke with admissions who asked for a PT/OT eval. With referral. SW let Admissions know that pt has UCARE for seniors and hasn't been inpt 3 days.     LUANN updated medial team that pt will need PT/OT consult for TCU referral. MD stated that pt will be ready to d/c tomorrow.    Assessment:  [facility] medically ready to d[facility]c [facility] Pt will need PT/OT eval in order to qualify for TCU.    Plan:    Anticipated Disposition[facility]  [facility]    Barriers to d/c plan:  Medical stability    Follow Up:  SW will continue to follow to assist with d/c plan.    CEASAR Magana, LGSW  7[facility] Surgical Oncology Unit  Phone: [phone]  Pager: [phone]

## 2017-06-29 NOTE — PLAN OF CARE
Problem: Goal Outcome Summary  Goal: Goal Outcome Summary  Outcome: Improving  Patient arrived back from PACU at appx 1830 from a TAHBSO, tumor debulking, cystoscopy.      VSS -requiring 2 LPM per nasal cannula to remain above 92%. Port infusing, PIV saline locked. Dangled at bedside with 2 person assist, uses significant assistance to ambulate per baseline, plan to discharge to TCU from here, PT/ OT ordered. Lovenox started at smaller dose than at home, history of PE. Abdominal incisions open to air, no drainage, dermabond. Did not arrive with cardenas placed, used bedpan successfully with urine output, commode ordered. Scant vaginal drainage. Bowel promotion meds per protocol given, reports using stool softeners at home.      Continue regular postoperative routines.

## 2017-06-29 NOTE — PROGRESS NOTES
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  Pain is controlled.  Drinking water without nausea or vomiting.  No Flatus, No BM since admission.  Voiding spontaneously without burning or blood in urine.     Objective:   Vitals:    06/28/17 2205 06/29/17 0128 06/29/17 0356 06/29/17 0700   BP: 134/68 125/63 119/69 120/58   BP Location: Left arm Left arm Left arm Left arm   Pulse: 86      Resp: 10 16 16 16   Temp: 97.1  F (36.2  C)  96.7  F (35.9  C) 96.5  F (35.8  C)   TempSrc: Oral  Oral Oral   SpO2: 99%  98% 98%   Weight:       Height:           Gen- alert, no distress, cooperative  CV- Regular rate and rhythm, Normal (S1/S2)  Pulm- Normal - Clear to auscultation without rales, rhonchi, or wheezing. and bilateral air exchange present  Abd- soft, hypoactive BS, incisions clean and intact, tenderness to palpation in RLQ, periumbilical bruising  Incision- dry and intact  Extr- 1+ edema  Lines/drains- infusion port and peripheral IV intact    I/Os  (Yesterday // Since Midnight)  PO 50cc // 0cc  IVF 2260.42cc // 1000cc  Urine 240cc // 700cc    New Labs/Imaging-   Results for orders placed or performed during the hospital encounter of 06/28/17 (from the past 24 hour(s))   Hemoglobin   Result Value Ref Range    Hemoglobin 8.0 (L) 11.7 - 15.7 g/dL   Potassium   Result Value Ref Range    Potassium 4.3 3.4 - 5.3 mmol/L   VENOUS PANEL   Result Value Ref Range    Ph Venous 7.34 7.32 - 7.43 pH    PCO2 Venous 51 (H) 40 - 50 mm Hg    PO2 Venous 44 25 - 47 mm Hg    Bicarbonate Venous 27 21 - 28 mmol/L    Base Excess Venous 1.5 mmol/L    FIO2 60.0     Sodium 138 133 - 144 mmol/L    Potassium 3.5 3.4 - 5.3 mmol/L    Hemoglobin 7.8 (L) 11.7 - 15.7 g/dL    Glucose 160  (H) 70 - 99 mg/dL    Calcium Ionized Whole Blood 4.7 4.4 - 5.2 mg/dL   Hemoglobin   Result Value Ref Range    Hemoglobin 7.9 (L) 11.7 - 15.7 g/dL   Basic metabolic panel   Result Value Ref Range    Sodium 140 133 - 144 mmol/L    Potassium 4.4 3.4 - 5.3 mmol/L    Chloride 105 94 - 109 mmol/L    Carbon Dioxide 30 20 - 32 mmol/L    Anion Gap 5 3 - 14 mmol/L    Glucose 122 (H) 70 - 99 mg/dL    Urea Nitrogen 7 7 - 30 mg/dL    Creatinine 0.49 (L) 0.52 - 1.04 mg/dL    GFR Estimate >90  Non  GFR Calc   >60 mL/min/1.7m2    GFR Estimate If Black >90   GFR Calc   >60 mL/min/1.7m2    Calcium 8.4 (L) 8.5 - 10.1 mg/dL   CBC with platelets differential   Result Value Ref Range    WBC 6.2 4.0 - 11.0 10e9/L    RBC Count 2.16 (L) 3.8 - 5.2 10e12/L    Hemoglobin 7.0 (L) 11.7 - 15.7 g/dL    Hematocrit 22.5 (L) 35.0 - 47.0 %     (H) 78 - 100 fl    MCH 32.4 26.5 - 33.0 pg    MCHC 31.1 (L) 31.5 - 36.5 g/dL    RDW 25.3 (H) 10.0 - 15.0 %    Platelet Count 137 (L) 150 - 450 10e9/L    Diff Method Automated Method     % Neutrophils 78.4 %    % Lymphocytes 11.5 %    % Monocytes 9.7 %    % Eosinophils 0.0 %    % Basophils 0.2 %    % Immature Granulocytes 0.2 %    Nucleated RBCs 1 (H) 0 /100    Absolute Neutrophil 4.9 1.6 - 8.3 10e9/L    Absolute Lymphocytes 0.7 (L) 0.8 - 5.3 10e9/L    Absolute Monocytes 0.6 0.0 - 1.3 10e9/L    Absolute Eosinophils 0.0 0.0 - 0.7 10e9/L    Absolute Basophils 0.0 0.0 - 0.2 10e9/L    Abs Immature Granulocytes 0.0 0 - 0.4 10e9/L    Absolute Nucleated RBC 0.1    Magnesium   Result Value Ref Range    Magnesium 1.9 1.6 - 2.3 mg/dL   Glucose by meter   Result Value Ref Range    Glucose 132 (H) 70 - 99 mg/dL       Assessment: 71 year old female with Stage IVB high grade serous ovarian cancer s/p neoadjuvant chemotherapy; now POD#1 s/p DA-TLH, BSO, IRVING, omentectomy, optimal tumor debulking no no gross residual disease; cystoscopy.      Active Problem list:    1. Postoperative  State  2. Stage IVB high grade serous ovarian  3. Paraneoplastic neuromyopathy and neuropathy  4. History of pulmonary embolism, chronic anticoagulation  5. Acute on chronic anemia  6. Thrombocytopenia  7. Chronic hypertension  8. Constipation  9. Depression  10. Hypothyroidsim  11. Hypomagnesemia, s/p replacement    Plan:    FEN: d/c IVF, ADAT.  Continue home potassium.  Pain: scheduled Tylenol; PRN PO dilaudid; IV dilaudid available if unable to tolerate PO  Heme: Hb 8.0 >  > 7.8>7.9>7.0. Gave lovenox 40mg yesterday.  Increase to 60mg BID (home dose) today.  CV: HTN; continue HCTZ, losartan  Pulm: Hx PE, on lovenox.  See Heme, above.  Wean O2 as able  GI: See FEN.  Continue bowel regimen; home PPI ordered  : no issues  MSK/Neuro: Paraneoplastic neuromyopathy and neuropathy - now in wheelchair.  PT/OT ordered.  Psych: continue home escitalopram  Endo: continue home synthroid  PPX: SCDs, IS.  lovenox  Dispo: likely d/c back to TCU today or tomorrow    Triny Petit MD  OBGYN Resident PGY1  Pager [phone]     I saw and evaluated the patient. I agree with the findings and the plan of care as documented in Dr. Petit 's note.   Some nausea and distention today, no bowel function.   Will start therapeutic anti-coagulation today  Will need to keep inpatient given bleeding risk and also as we await return to bowel funciton in this medically complex patient.     Jeanette Bhandari MD  Gynecologic Oncology  Pager [phone]

## 2017-06-29 NOTE — PLAN OF CARE
Problem: Goal Outcome Summary  Goal: Goal Outcome Summary  OT7C: Recommend pt discharge to TCU. Pt limited by edema, vision (nystagmus noted), weakness, post surgical precautions and balance. Pt required Min A to stand, total A for LE dressing and set up for oral/facial hygiene. VSS on RA.

## 2017-06-29 NOTE — PLAN OF CARE
Problem: Goal Outcome Summary  Goal: Goal Outcome Summary  Outcome: No Change  Alert and oriented x 4. Complained of pain in the abdomen. Denies nausea. Oxycodone and tylenol given. Lap sites intact. No flatulence. Voided, up with 1 assist to BSC. Has baseline numbness in all extremities. Capnography IPI 8 to 10 and EtCO2 38. Oxygen saturation 98 % on 2 liters.

## 2017-06-30 LAB
ABO + RH BLD: NORMAL
ABO + RH BLD: NORMAL
BLD GP AB SCN SERPL QL: NORMAL
BLD PROD TYP BPU: NORMAL
BLD PROD TYP BPU: NORMAL
BLD UNIT ID BPU: 0
BLOOD BANK CMNT PATIENT-IMP: NORMAL
BLOOD BANK CMNT PATIENT-IMP: NORMAL
BLOOD PRODUCT CODE: NORMAL
BPU ID: NORMAL
GLUCOSE BLDC GLUCOMTR-MCNC: 110 MG/DL (ref 70–99)
HGB BLD-MCNC: 6.6 G/DL (ref 11.7–15.7)
HGB BLD-MCNC: 8.2 G/DL (ref 11.7–15.7)
NUM BPU REQUESTED: 1
SPECIMEN EXP DATE BLD: NORMAL
TRANSFUSION STATUS PATIENT QL: NORMAL
TRANSFUSION STATUS PATIENT QL: NORMAL

## 2017-06-30 PROCEDURE — 36592 COLLECT BLOOD FROM PICC: CPT | Performed by: OBSTETRICS & GYNECOLOGY

## 2017-06-30 PROCEDURE — 99024 POSTOP FOLLOW-UP VISIT: CPT | Performed by: OBSTETRICS & GYNECOLOGY

## 2017-06-30 PROCEDURE — P9016 RBC LEUKOCYTES REDUCED: HCPCS | Performed by: NURSE PRACTITIONER

## 2017-06-30 PROCEDURE — 85018 HEMOGLOBIN: CPT | Performed by: OBSTETRICS & GYNECOLOGY

## 2017-06-30 PROCEDURE — 12000001 ZZH R&B MED SURG/OB UMMC

## 2017-06-30 PROCEDURE — 25000125 ZZHC RX 250: Performed by: STUDENT IN AN ORGANIZED HEALTH CARE EDUCATION/TRAINING PROGRAM

## 2017-06-30 PROCEDURE — 00000146 ZZHCL STATISTIC GLUCOSE BY METER IP

## 2017-06-30 PROCEDURE — 25000132 ZZH RX MED GY IP 250 OP 250 PS 637: Performed by: OBSTETRICS & GYNECOLOGY

## 2017-06-30 PROCEDURE — 25000128 H RX IP 250 OP 636: Performed by: OBSTETRICS & GYNECOLOGY

## 2017-06-30 PROCEDURE — 40000893 ZZH STATISTIC PT IP EVAL DEFER

## 2017-06-30 RX ORDER — HYDROCHLOROTHIAZIDE 25 MG/1
25 TABLET ORAL DAILY
Refills: 0 | COMMUNITY
Start: 2017-06-30

## 2017-06-30 RX ORDER — AMOXICILLIN 250 MG
1-2 CAPSULE ORAL 2 TIMES DAILY
Qty: 60 TABLET | Refills: 1 | DISCHARGE
Start: 2017-06-30 | End: 2018-04-10

## 2017-06-30 RX ORDER — POLYETHYLENE GLYCOL 3350 17 G/17G
17 POWDER, FOR SOLUTION ORAL DAILY
Qty: 7 PACKET | DISCHARGE
Start: 2017-06-30 | End: 2017-10-12

## 2017-06-30 RX ORDER — LOSARTAN POTASSIUM 50 MG/1
25 TABLET ORAL EVERY MORNING
COMMUNITY
Start: 2017-06-30 | End: 2017-07-27

## 2017-06-30 RX ORDER — HYDROMORPHONE HYDROCHLORIDE 2 MG/1
2-4 TABLET ORAL
Qty: 40 TABLET | Refills: 0 | Status: SHIPPED | OUTPATIENT
Start: 2017-06-30 | End: 2017-07-13

## 2017-06-30 RX ORDER — ACETAMINOPHEN 325 MG/1
650 TABLET ORAL EVERY 6 HOURS PRN
Qty: 100 TABLET | DISCHARGE
Start: 2017-06-30 | End: 2017-07-13

## 2017-06-30 RX ADMIN — ESCITALOPRAM OXALATE 10 MG: 10 TABLET ORAL at 08:35

## 2017-06-30 RX ADMIN — HYDROCHLOROTHIAZIDE 25 MG: 12.5 TABLET ORAL at 08:34

## 2017-06-30 RX ADMIN — LEVOTHYROXINE SODIUM 125 MCG: 125 TABLET ORAL at 08:35

## 2017-06-30 RX ADMIN — ACETAMINOPHEN 650 MG: 325 TABLET, FILM COATED ORAL at 12:58

## 2017-06-30 RX ADMIN — LOSARTAN POTASSIUM 50 MG: 50 TABLET, FILM COATED ORAL at 08:34

## 2017-06-30 RX ADMIN — ENOXAPARIN SODIUM 60 MG: 60 INJECTION SUBCUTANEOUS at 08:35

## 2017-06-30 RX ADMIN — HYDROMORPHONE HYDROCHLORIDE 4 MG: 2 TABLET ORAL at 04:41

## 2017-06-30 RX ADMIN — SENNOSIDES 1 TABLET: 8.6 TABLET, FILM COATED ORAL at 20:04

## 2017-06-30 RX ADMIN — HYDROMORPHONE HYDROCHLORIDE 4 MG: 2 TABLET ORAL at 15:58

## 2017-06-30 RX ADMIN — POTASSIUM CHLORIDE 20 MEQ: 750 TABLET, EXTENDED RELEASE ORAL at 08:35

## 2017-06-30 RX ADMIN — ACETAMINOPHEN 650 MG: 325 TABLET, FILM COATED ORAL at 18:59

## 2017-06-30 RX ADMIN — ACETAMINOPHEN 650 MG: 325 TABLET, FILM COATED ORAL at 00:37

## 2017-06-30 RX ADMIN — SODIUM CHLORIDE, PRESERVATIVE FREE 5 ML: 5 INJECTION INTRAVENOUS at 15:07

## 2017-06-30 RX ADMIN — SENNOSIDES 1 TABLET: 8.6 TABLET, FILM COATED ORAL at 08:35

## 2017-06-30 RX ADMIN — OMEPRAZOLE 20 MG: 20 CAPSULE, DELAYED RELEASE ORAL at 08:34

## 2017-06-30 RX ADMIN — ENOXAPARIN SODIUM 60 MG: 60 INJECTION SUBCUTANEOUS at 20:04

## 2017-06-30 RX ADMIN — BISACODYL 10 MG: 10 SUPPOSITORY RECTAL at 12:09

## 2017-06-30 RX ADMIN — HYDROMORPHONE HYDROCHLORIDE 4 MG: 2 TABLET ORAL at 00:37

## 2017-06-30 RX ADMIN — ACETAMINOPHEN 650 MG: 325 TABLET, FILM COATED ORAL at 06:53

## 2017-06-30 RX ADMIN — POLYETHYLENE GLYCOL 3350 17 G: 17 POWDER, FOR SOLUTION ORAL at 08:35

## 2017-06-30 RX ADMIN — HYDROMORPHONE HYDROCHLORIDE 4 MG: 2 TABLET ORAL at 11:32

## 2017-06-30 NOTE — PROGRESS NOTES
Social Work Services Progress Note    Hospital Day: 3  Date of Initial Social Work Evaluation:  06/22/17  Collaborated with:  Pt, pt's , medical team, [facility] ([phone][facility]    Data:  Pt post-op day 2 from robotic total laparoscopic hysterectomy, bilateral salpingo-oophorectomy, lysis of adhesions, omentectomy, optimal interval tumor debulking to no gross residual disease, cystoscopy. Pt will d/c to TCU.    Intervention:  SW received update from [facility] team that they would like to keep pt until tomorrow.    SW spoke with [facility] Admissions (Adelita) who stated that they can admit pt tomorrow.     SW updated pt and pt's . Pt's  able to transport pt to TCU tomorrow.    SW complete CTS handoff, provided pt with Medicare notice of rights, and [id number]    Assessment:  Pt happy to be d/cing to [facility]    Plan:    Anticipated Disposition:    [facility]  [address] [address]  Fax: [phone]  Main: [phone]  Admissions: [phone] or [phone]    Barriers to d/c plan:  Medical stability    Follow Up:  SW will continue to follow to assist w/ d/c plan.          CEASAR Magana, LGSW  7C Surgical Oncology Unit  Phone: [phone]  Pager: [phone]

## 2017-06-30 NOTE — PLAN OF CARE
Problem: Goal Outcome Summary  Goal: Goal Outcome Summary  PT 7C: PT orders received. Per chart review and discussion with interdisciplinary team, patient is requiring 1-2 assist for mobility and will be discharging to TCU this afternoon, therefore no acute PT needs. Will defer PT eval and complete orders.

## 2017-06-30 NOTE — PLAN OF CARE
Problem: Goal Outcome Summary  Goal: Goal Outcome Summary  Outcome: Improving  Patient transfused with 1 unit of RBC fo a hemoglobin of 6.6, recheck scheduled for 2pm. Patient tolerated infusion without problems. Gets up to BSC with heavy assist of 1, voiding, had 1 small BM after suppository. Pain managed with dilaudid po and tylenol.

## 2017-06-30 NOTE — PLAN OF CARE
Problem: Goal Outcome Summary  Goal: Goal Outcome Summary  Outcome: No Change  Alert and oriented x 4. No nausea. Given dilaudid 4 mg x 2 as well as scheduled tylenol. Lap sites intact. Voided clear urine in the BSC. All extremities edematous, and numbness in all extremities which is baseline. Up with 1 assist and gait belt.

## 2017-06-30 NOTE — PLAN OF CARE
Problem: Goal Outcome Summary  Goal: Goal Outcome Summary  Outcome: Improving  POD 1. VSS, not requiring supplemental oxygen, capnography discontinued per protocol. Up with 2 person assist, gait belt, walker. Tolerating regular with good appetite, intermittent nausea relieved with no further interventions, resolved on own. Pt no longer receiving IV fluids and needing encouragement to take in fluids for hydration. Port heparin locked. Pt reports adequate pain control with oral dilaudid prn, 1 tablet, the most pain notable with movement, otherwise relieves pain completely. Up to commode, voiding in adequate amounts. After receiving suppository able to have a full BM. OT worked with patient and will see PT tomorrow prior to discharge to TCU.      Plan to discharge to TCU pending stability of Hgb and PT eval.

## 2017-06-30 NOTE — PLAN OF CARE
Problem: Goal Outcome Summary  Goal: Goal Outcome Summary  OT 7C: cancel, per discussion with RN, pt planning to d/c to TCU today. Pt is now staying an extra night, plan to d/c to TCU tomorrow. Will reschedule.

## 2017-06-30 NOTE — PLAN OF CARE
Problem: Goal Outcome Summary  Goal: Goal Outcome Summary  Pt female and 71 post-op day 1. Vitals stable and WDL. Capnography discontinued during shift. Suppository administered and patient able to pass gas and have a bowel movement. Prompting patient to continue fluid intake since IV fluids were discontinued. Pain is controlled with PRN dilaudid. Recommendation is to continue promoting patient's fluid intake, and increased movement.

## 2017-06-30 NOTE — PROGRESS NOTES
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 Had a BM yesterday after receiving a suppository. Denies passing flatus since but has been feeling her bowels \"make a lot of noise.\" Reports having a couple episodes of nausea and dry heaving, but no vomiting. Tolerating regular food otherwise. No cp, sob. Reports some increased abdominal aching at the incision sites, R>L.     Objective:   Vitals:    06/29/17 1438 06/29/17 2123 06/29/17 2220 06/30/17 0600   BP: 103/55 105/62 105/52 102/54   BP Location: Left arm Left arm Left arm Left arm   Pulse: 79   84   Resp: 10 18 18 18   Temp: 96.3  F (35.7  C) 96.8  F (36  C) 96.4  F (35.8  C) 96.8  F (36  C)   TempSrc: Oral Oral Oral Oral   SpO2: 96% 94% 95% 94%   Weight:       Height:           Gen- alert, no distress, cooperative  CV- Regular rate and rhythm  Pulm- Normal - Clear to auscultation without rales, rhonchi, or wheezing. and bilateral air exchange present  Abd- soft, +BS, incisions clean and intact, tenderness to palpation in RLQ, periumbilical bruising. Small, non-tender, superficial hematoma on LLQ.  Incision- dry and intact  Extr- 2+ edema  Lines/drains- infusion port and peripheral IV intact    I/Os  (Yesterday // Since Midnight)  .5cc // 0cc  IVF 1000cc // 0cc  Urine 850cc // 0cc  Urine/Stool 150cc // 0cc    New Labs/Imaging-   AM labs pending    Assessment: 71 year old female with Stage IVB high grade serous ovarian cancer s/p neoadjuvant chemotherapy; now POD#2 s/p DA-TLH, BSO, IRVING, omentectomy, optimal tumor debulking no no gross residual disease; cystoscopy.      Active Problem list:    1. Postoperative State  2. Stage " IVB high grade serous ovarian  3. Paraneoplastic neuromyopathy and neuropathy  4. History of pulmonary embolism, chronic anticoagulation  5. Acute on chronic anemia  6. Thrombocytopenia  7. Chronic hypertension  8. Constipation  9. Depression  10. Hypothyroidsim    Plan:    FEN: regular diet.  Continue home potassium.  Pain: scheduled Tylenol; PRN PO dilaudid  Heme: Hb 8.0 >  > 7.8>7.9>7.0. AM HGB 6.6 -- 1 u pRBC's --7.0.  Continue lovenox 60mg BID (home dosing)  CV: HTN; continue HCTZ, losartan  Pulm: Hx PE, on lovenox.  See Heme, above.  GI: See FEN.  Continue bowel regimen; home PPI ordered  : no issues  MSK/Neuro: Paraneoplastic neuromyopathy and neuropathy - now in wheelchair.  PT/OT ordered.  Psych: continue home escitalopram  Endo: continue home synthroid  PPX: SCDs, IS.  lovenox      Triny Petit MD  OBGYN Resident PGY1  Pager [phone]     I saw and evaluated the patient. I agree with the findings and the plan of care as documented in Dr. Petit 's note.   Hg dropped but I suspect this is equilibration as she has no signs of active bleeding. 1 U. WIll follow Hg. Cnt therapeutic lovenox given PTE, although if ongoing Hg drop, will stop lovenox.   Remainder of plan as above.     Jeanette Bhandari MD  Gynecologic Oncology  Pager [phone]

## 2017-07-01 VITALS
DIASTOLIC BLOOD PRESSURE: 66 MMHG | OXYGEN SATURATION: 96 % | TEMPERATURE: 96.5 F | RESPIRATION RATE: 18 BRPM | SYSTOLIC BLOOD PRESSURE: 120 MMHG | HEIGHT: 59 IN | WEIGHT: 171.3 LBS | HEART RATE: 78 BPM | BODY MASS INDEX: 34.53 KG/M2

## 2017-07-01 LAB — HGB BLD-MCNC: 8.4 G/DL (ref 11.7–15.7)

## 2017-07-01 PROCEDURE — 25000128 H RX IP 250 OP 636: Performed by: STUDENT IN AN ORGANIZED HEALTH CARE EDUCATION/TRAINING PROGRAM

## 2017-07-01 PROCEDURE — 36415 COLL VENOUS BLD VENIPUNCTURE: CPT | Performed by: STUDENT IN AN ORGANIZED HEALTH CARE EDUCATION/TRAINING PROGRAM

## 2017-07-01 PROCEDURE — 25000128 H RX IP 250 OP 636: Performed by: OBSTETRICS & GYNECOLOGY

## 2017-07-01 PROCEDURE — 99024 POSTOP FOLLOW-UP VISIT: CPT | Performed by: OBSTETRICS & GYNECOLOGY

## 2017-07-01 PROCEDURE — 25000132 ZZH RX MED GY IP 250 OP 250 PS 637: Performed by: OBSTETRICS & GYNECOLOGY

## 2017-07-01 PROCEDURE — 85018 HEMOGLOBIN: CPT | Performed by: STUDENT IN AN ORGANIZED HEALTH CARE EDUCATION/TRAINING PROGRAM

## 2017-07-01 PROCEDURE — 25000125 ZZHC RX 250: Performed by: STUDENT IN AN ORGANIZED HEALTH CARE EDUCATION/TRAINING PROGRAM

## 2017-07-01 RX ADMIN — ESCITALOPRAM OXALATE 10 MG: 10 TABLET ORAL at 08:16

## 2017-07-01 RX ADMIN — POTASSIUM CHLORIDE 20 MEQ: 750 TABLET, EXTENDED RELEASE ORAL at 08:16

## 2017-07-01 RX ADMIN — HYDROMORPHONE HYDROCHLORIDE 4 MG: 2 TABLET ORAL at 06:34

## 2017-07-01 RX ADMIN — HYDROMORPHONE HYDROCHLORIDE 4 MG: 2 TABLET ORAL at 13:10

## 2017-07-01 RX ADMIN — HYDROMORPHONE HYDROCHLORIDE 4 MG: 2 TABLET ORAL at 00:48

## 2017-07-01 RX ADMIN — BISACODYL 10 MG: 10 SUPPOSITORY RECTAL at 08:50

## 2017-07-01 RX ADMIN — SODIUM CHLORIDE, PRESERVATIVE FREE 5 ML: 5 INJECTION INTRAVENOUS at 12:37

## 2017-07-01 RX ADMIN — ENOXAPARIN SODIUM 60 MG: 60 INJECTION SUBCUTANEOUS at 08:18

## 2017-07-01 RX ADMIN — LEVOTHYROXINE SODIUM 125 MCG: 125 TABLET ORAL at 08:16

## 2017-07-01 RX ADMIN — ACETAMINOPHEN 650 MG: 325 TABLET, FILM COATED ORAL at 06:34

## 2017-07-01 RX ADMIN — ACETAMINOPHEN 650 MG: 325 TABLET, FILM COATED ORAL at 12:37

## 2017-07-01 RX ADMIN — LOSARTAN POTASSIUM 50 MG: 50 TABLET, FILM COATED ORAL at 08:16

## 2017-07-01 RX ADMIN — SODIUM CHLORIDE, PRESERVATIVE FREE 5 ML: 5 INJECTION INTRAVENOUS at 08:56

## 2017-07-01 RX ADMIN — SENNOSIDES 1 TABLET: 8.6 TABLET, FILM COATED ORAL at 08:16

## 2017-07-01 RX ADMIN — OMEPRAZOLE 20 MG: 20 CAPSULE, DELAYED RELEASE ORAL at 08:16

## 2017-07-01 RX ADMIN — ACETAMINOPHEN 650 MG: 325 TABLET, FILM COATED ORAL at 00:48

## 2017-07-01 RX ADMIN — HYDROCHLOROTHIAZIDE 25 MG: 12.5 TABLET ORAL at 08:16

## 2017-07-01 RX ADMIN — POLYETHYLENE GLYCOL 3350 17 G: 17 POWDER, FOR SOLUTION ORAL at 08:18

## 2017-07-01 ASSESSMENT — PAIN DESCRIPTION - DESCRIPTORS
DESCRIPTORS: ACHING
DESCRIPTORS: ACHING

## 2017-07-01 NOTE — PLAN OF CARE
Problem: Goal Outcome Summary  Goal: Goal Outcome Summary  Outcome: No Change  VSS. Pain somewhat controlled using PRN and scheduled analgesics. Pt tolerating regular diet well; denies N/V. UOP adequate. Port HL. 2 assist for pivot transfer to commode. + gas, no BM. HGB increased to 8.2 after blood. PLAN: continue POC and D/C to PCU once placement found.

## 2017-07-01 NOTE — PROGRESS NOTES
GYN ONC PROGRESS NOTE  06/30/17    POD#:3 s/p TLH, BSO, IRVING, omentectomy, optimal tumor debulking no no gross residual disease; cystoscopy    Disease: Stage IVB high grade serous ovarian cancer    24 hour events:   No significant overnight events    Subjective: Patient states she is doing well this morning.  Her pain is well-controlled.  She had a normal BM yesterday and a second small BM.  She denies nausea or vomiting.    Objective:   Vitals:    06/30/17 1249 06/30/17 1407 06/30/17 2331 07/01/17 0715   BP: 117/56 109/54 106/55 120/66   BP Location: Left arm Left arm Left arm Left arm   Pulse:  82 82 78   Resp: 18 18 18 18   Temp: 97  F (36.1  C) 96.4  F (35.8  C) 95.6  F (35.3  C) 96.5  F (35.8  C)   TempSrc: Oral Oral Oral Oral   SpO2: 94% 94% 97% 96%   Weight:       Height:           Gen- NAD, resting comfortably in bed  CV- RRR, no m/r/g  Pulm- CTAB  Abd- soft, +BS, minimally tender, periumbilical bruising.   Incision- clean, dry and intact  Extr- 2+ edema  Lines/drains- infusion port and peripheral IV intact    I/Os    Intake/Output Summary (Last 24 hours) at 07/01/17 0837  Last data filed at 07/01/17 0600   Gross per 24 hour   Intake              440 ml   Output             1075 ml   Net             -635 ml     Assessment: 71 year old female with Stage IVB high grade serous ovarian cancer s/p neoadjuvant chemotherapy; now POD#2 s/p DA-TLH, BSO, IRVING, omentectomy, optimal tumor debulking no no gross residual disease; cystoscopy.      Active Problem list:    1. Postoperative State  2. Stage IVB high grade serous ovarian  3. Paraneoplastic neuromyopathy and neuropathy  4. History of pulmonary embolism, chronic anticoagulation  5. Acute on chronic anemia  6. Thrombocytopenia  7. Chronic hypertension  8. Constipation  9. Depression  10. Hypothyroidsim    Plan:    FEN: regular diet.  Continue home potassium.  Pain: scheduled Tylenol; PRN PO dilaudid  Heme: Hb 8.0 >  > 7.8>7.9>7.0> 6.6>1U PRBCs>8.2> AM Hgb  pending .  Continue lovenox 60mg BID (home dosing)  CV: HTN; continue HCTZ, losartan  Pulm: Hx PE, on lovenox.  See Heme, above.  GI: See FEN.  Continue bowel regimen; home PPI ordered  : no issues  MSK/Neuro: Paraneoplastic neuromyopathy and neuropathy - now in wheelchair.  PT/OT ordered.  Psych: continue home escitalopram  Endo: continue home synthroid  PPX: SCDs, IS, Lovenox    Nelia Blunt MD  OBGYN Resident PGY2  Pager [phone]       Attestation:  Physician Attestation   I, Jeanette Bhandari, saw this patient with the resident and agree with the resident s findings and plan of care as documented in the resident s note.      I personally reviewed vital signs and labs.    Key findings: stable Hg. Meeting postop goal. OK for home    Jeanette Bhandari MD  Gynecologic Oncology  Pager [phone]    Date of Service (when I saw the patient): 07/01/17

## 2017-07-01 NOTE — PLAN OF CARE
Problem: Goal Outcome Summary  Goal: Goal Outcome Summary  Outcome: No Change  Pain well controlled with Tylenol and Dilaudid, denies nausea, tolerating regular diet, port in place, up to commode, pt is WC bound, SBA of 2 and belt. Patient is voiding spontaneously in commode, reports positive flatus, no BM, pt in chair most the shift, PCDs in place, IS at bedside, call light in reach.

## 2017-07-01 NOTE — PLAN OF CARE
Problem: Goal Outcome Summary  Goal: Goal Outcome Summary  Outcome: Adequate for Discharge Date Met:  07/01/17  Patient will discharge to TCU,  will transport her. Pain managed with dilaudid and tylenol. Patient had a medium loose stool after suppository. Tolerating diet, voiding, needs assist of 2 to commode and chair, uses wheelchair at home. Recheck hemoglobin is stable.

## 2017-07-01 NOTE — PLAN OF CARE
Problem: Goal Outcome Summary  Goal: Goal Outcome Summary  Occupational Therapy Discharge Summary     Reason for therapy discharge:    Discharged to transitional care facility.     Progress towards therapy goal(s). See goals on Care Plan in [facility] electronic health record for goal details.  Goals not met.  Barriers to achieving goals:   discharge from facility.     Therapy recommendation(s):    Continued therapy is recommended.  Rationale/Recommendations:  continued OT at TCU to increase pt's (I) with ADL/IADLs.

## 2017-07-03 ENCOUNTER — TELEPHONE (OUTPATIENT)
Dept: ONCOLOGY | Facility: CLINIC | Age: 72
End: 2017-07-03

## 2017-07-03 NOTE — TELEPHONE ENCOUNTER
Called patient who is presently residing in the TCU s/p Robotic total laparoscopic hysterectomy, bilateral salpingo-oophorectomy, lysis of adhesions, omentectomy, optimal interval tumor debulking to no gross residual disease, cystoscopy.  Patient reports doing well and feels TCU is allowing her time to regain some strength and heal.  Denies problems with n/v, is eating and drinking.  Pain is controlled with Dilaudid, but patient is attempting to wean herself.  Writer discussed alternating meds and taking it slowly, tarsha in light of the fact that she is rehabing which can be exhausting and increase discomfort~ regaining strength endurance.  Patient verbalized understanding.  Confirmed f/u appt and will resched Kelsie Nguyen appt for sept.  Patient prefers to wait until finished with rehab to have appts.  Scheduling will contact patient with new appt.  Patient to contact clinic with any concerns or questions.  Henrik Martins, RN, BSN, OCN  [facility] [place]  Patient Care Coordinator

## 2017-07-03 NOTE — TELEPHONE ENCOUNTER
Tamiko, RN from [facility], called today to ask if patient's portacath is accesssible for blood draws?  If so, they would like portacath flush orders faxed to [phone].  Told Tamiko that Dr. Ennis is out of the office today, but will talk with Jyoti Lopez, who is covering for Dr. Ennis, when she is here on Thursday (4/6/17).  If ok, we will fax the order to them at that time. Tamiko agreed with plan.  No further questions or concerns at this time.

## 2017-07-06 NOTE — TELEPHONE ENCOUNTER
Talked with MYRA Morrison, at [facility].  Tamiko states that pt will be going home on 7/8/2017.  Pt already had her blood drawn today, and will have one more blood draw done on 7/8 before she goes home.  Per Jyoti Lopez NP, [facility] should not use the port since there is only one blood draw left.  Just do a peripheral blood draw for her labs on 7/8/2017.  Tamiko has been notified with this information.  MYRA Morrison, also states that pt started having a small amount of vaginal spotting yesterday.  Bleeding is bright red blood, but it is very light.  Pt is not saturating any pads.  No pain.  Pt's hemoglobin today is 8.4. Blood pressure is 99/63.  Pulse = 70.  Tamiko states that she has not given pt's lovenox yet today.  Lovenox is given twice daily, but Tamiko did not want to give the lovenox without checking with provider first.  Per Jyoti Lopez NP, OK to continue with lovenox as ordered.  If pt develops heavier bleeding and is saturating pads, notify the clinic right away.  Tamiko has also been notified with this information.    MYRA Morrison, verbalized understanding and agreement with plan.  MYRA Morrison, was instructed to call the clinic with any questions, concerns, or worsening symptoms.     Anna Goodwin RN BSN

## 2017-07-10 ENCOUNTER — TELEPHONE (OUTPATIENT)
Dept: ONCOLOGY | Facility: CLINIC | Age: 72
End: 2017-07-10

## 2017-07-10 NOTE — TELEPHONE ENCOUNTER
RN from [facility] called to report patient had [passed 2 marble sized clots and was having some bright red spotting.  Very small amt, but wanted some direction for Lovenox.  Contacted Jyoti TRIANA NP for instructions who requested that patient have a CBC/plt drawn tonight and based on results would determine Lovenox injection.  Called RN back who reported she had already left the home and was not going to return to the home.  Writer called patient to assess.  Patient states she is having some spotting, but very little.  Instructed her to take Lovenox tonight, but will have patient come to clinic for lab draw in the am and report to Jyoti.  Patient verbalized understanding.  Patient verbalized understanding that if bleeding increases, or passing of clots, she is to go the ED immediately at the [facility].  Patient scheduled for port draw in am and verbalized understanding of all instructions.  Henrik Martins, RN, BSN, OCN  [facility]  Patient Care Coordinator

## 2017-07-11 ENCOUNTER — HOSPITAL ENCOUNTER (OUTPATIENT)
Facility: CLINIC | Age: 72
Setting detail: SPECIMEN
Discharge: HOME | End: 2017-07-11
Attending: OBSTETRICS & GYNECOLOGY | Admitting: OBSTETRICS & GYNECOLOGY
Payer: COMMERCIAL

## 2017-07-11 ENCOUNTER — INFUSION THERAPY VISIT (OUTPATIENT)
Dept: INFUSION THERAPY | Facility: CLINIC | Age: 72
End: 2017-07-11
Attending: OBSTETRICS & GYNECOLOGY
Payer: COMMERCIAL

## 2017-07-11 ENCOUNTER — CARE COORDINATION (OUTPATIENT)
Dept: ONCOLOGY | Facility: CLINIC | Age: 72
End: 2017-07-11

## 2017-07-11 DIAGNOSIS — Z85.43 PERSONAL HISTORY OF OVARIAN CANCER: Primary | ICD-10-CM

## 2017-07-11 LAB
BASOPHILS # BLD AUTO: 0.1 10E9/L (ref 0–0.2)
BASOPHILS NFR BLD AUTO: 0.9 %
DIFFERENTIAL METHOD BLD: ABNORMAL
EOSINOPHIL # BLD AUTO: 0.2 10E9/L (ref 0–0.7)
EOSINOPHIL NFR BLD AUTO: 2 %
ERYTHROCYTE [DISTWIDTH] IN BLOOD BY AUTOMATED COUNT: ABNORMAL % (ref 10–15)
HCT VFR BLD AUTO: 32.2 % (ref 35–47)
HGB BLD-MCNC: 10.4 G/DL (ref 11.7–15.7)
IMM GRANULOCYTES # BLD: 0 10E9/L (ref 0–0.4)
IMM GRANULOCYTES NFR BLD: 0.4 %
LYMPHOCYTES # BLD AUTO: 1 10E9/L (ref 0.8–5.3)
LYMPHOCYTES NFR BLD AUTO: 11.7 %
MCH RBC QN AUTO: 33.5 PG (ref 26.5–33)
MCHC RBC AUTO-ENTMCNC: 32.3 G/DL (ref 31.5–36.5)
MCV RBC AUTO: 104 FL (ref 78–100)
MONOCYTES # BLD AUTO: 0.4 10E9/L (ref 0–1.3)
MONOCYTES NFR BLD AUTO: 5 %
NEUTROPHILS # BLD AUTO: 6.6 10E9/L (ref 1.6–8.3)
NEUTROPHILS NFR BLD AUTO: 80 %
NRBC # BLD AUTO: 0 10*3/UL
NRBC BLD AUTO-RTO: 0 /100
PLATELET # BLD AUTO: 261 10E9/L (ref 150–450)
RBC # BLD AUTO: 3.1 10E12/L (ref 3.8–5.2)
WBC # BLD AUTO: 8.2 10E9/L (ref 4–11)

## 2017-07-11 PROCEDURE — 36591 DRAW BLOOD OFF VENOUS DEVICE: CPT

## 2017-07-11 PROCEDURE — 85025 COMPLETE CBC W/AUTO DIFF WBC: CPT | Performed by: OBSTETRICS & GYNECOLOGY

## 2017-07-11 NOTE — PROGRESS NOTES
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  She is accompanied today by her . She is feeling fairly well recovering from surgery. She left rehab on  and returned home. She has been having some vaginal spotting off and on since surgery. Hgb has been check and is stable. She is on therapeutic lovenox for hx of PE. She held her lovenox this am and has not had any vaginal bleeding since. She has a decrease appetite and some nausea that started on Thursday when the bleeding started. Her  thinks the nausea/decrease appetite maybe related to anxiety. She endorses anxiety. Denies vomiting. Constipation controlled with laxatives. Notes waldo LE edema that has improved significantly. She reports her left LLE has always been more than her right. Continues to have neurologic changes, weakness, loss of balance and is wheel chair bound. She did not see much improvement following her admission for IV IG and steroid course she complete prior to surgery.      At this time Manoj is the primary caregiver of Virginia. Manoj reports he is managing ok at home. Virginia's daughter comes to help care for her so he can get some respite. Once she is a little more healed from surgery he is going to ask some of their friends if they could come and stay with her to give him some extra help.       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  She was discharged to a TCU where she is still residing with some but minimal improvement in her neurologic symptoms.  She still has quite a difficult time seeing especially with her right eye.  She also notes weakness mostly on her left side.  Both of these make it very difficult for her to walk and  thus she is having to use a wheelchair for most of her mobility.  She tolerated her first cycle quite well with her biggest side effect being nausea which improved drastically with a change in anti-emetics.       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 The right pulmonary artery is enlarged, similar to prior exams. No CT evidence of right heart strain. No suspicious mediastinal or perihilar lymphadenopathy. Bovine arch anatomy. No suspicious pulmonary nodules, evidence of infarction, or infection. Abdomen and pelvis: There are soft tissue nodules identified along the liver capsule measuring up to 3 cm inferiorly. There is a large amount of omental caking. Enlarged iliac and retroperitoneal lymph nodes for example a 2.6 cm right external iliac lymph node or group of lymph nodes. Heterogeneous enhancement of the uterine fundus could be due to fibroids or a mass. The overall size of the uterus does not appear significantly different than in 2014. The left ovary does appear slightly larger and lobular in appearance compared to prior exam. There is also a nodular area along the round ligament. It was not present on prior exam. There is an irregular lobulated mass involving the sigmoid colon. Abnormal, enlarged inguinal lymph nodes. Soft tissue implant in the posterior right retroperitoneum adjacent to the psoas was not present on prior exam. 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 Spondylolisthesis at L3-4. Small periumbilical hernia containing some of the abnormal omentum.     4/11/17:   268, CEA .6     4/12/17:  IR omental biopsy                           Pathology:  High grade serous  carcinoma     4/14/17: Cycle #1 carboplatin AUC 6 and weekly Taxol 80mg/m2.  = 2648     5/5/17:  Cycle #2 carboplatin AUC 6 and weekly Taxol 80mg/m2.  = 370     5/26/17:  Cycle #3 carboplatin AUC 6 and weekly Taxol 80mg/m2.  = 63     6/16/17:  CT C/A/P:  Chest:  Resolution of previous right-sided pulmonary emboli since April. No mediastinal, hilar or axillary adenopathy. No pleural fluid.  Port-A-Cath right superior chest with tip in the SVC.  Short linear fibrosis or discoid atelectasis anterior medial right upper lobe. Lungs otherwise clear. Abdomen and Pelvis: Marked improvement in carcinomatosis and omental metastasis since 4/11/2017. In the anterior left pelvis there is a 1.2 cm nodule with adjacent linear opacity approximately 4 cm in length in an area of previous dense omental caking and metastatic disease. There is resolution of the previous anterior right-sided omental caking with subcentimeter trace of residual nodularity in this location. There are multiple new indeterminate nodular densities in the anterior abdominal wall subcutaneous fat as well as small collections of subcutaneous air, suggesting that these are medication injection sites.  Previous subserosal implants along the inferior liver have resolved. No focal liver lesions. Normal-appearing pancreas, adrenal glands and kidneys. Tiny hypodense spleen lesion is too small to characterize, not previously seen. Spleen otherwise appears normal. No periaortic or pelvic adenopathy with resolution of previous adenopathy. Lobulated enlarged uterus redemonstrated consistent with multiple fibroids. No free fluid. No acute bowel abnormality. Resolution of mesenteric right lower quadrant nodule is compatible with metastasis. On coronal images the terminal ileum takes an unusual circular course posterior to the right colon, but there is no evidence for obstruction. No aggressive bone lesions.   6/16/17:   = 27. Treatment planning with   Raymon: CT results were reviewed.  Given her excellent response and minimal residual disease, I believe she is an excellent candidate for robotic interval debulking.  We discussed the need for 3 more cycles of chemotherapy following surgery with the hope of starting within 3-4 weeks of surgery.    6/28/17: Robotic total laparoscopic hysterectomy, bilateral salpingo-oophorectomy, lysis of adhesions, omentectomy, optimal interval tumor debulking to no gross residual disease, cystoscopy  Pathology: FINAL DIAGNOSIS:   A. UTERUS, CERVIX, BILATERAL FALLOPIAN TUBES AND OVARIES, HYSTERECTOMY   WITH BILATERAL SALPINGO-OOPHORECTOMY:   - Focal atypical endometrial hyperplasia on a background of atrophic   endometrium   - Adenomyosis   - Leiomyomas   - Uterine serosal adhesions with psammoma bodies   - Right ovary with benign cortical inclusion cysts and surface fibrous   adhesions featuring psammoma bodies   - Left ovarian serous carcinoma, high grade, with neoadjuvant   chemotherapy effects   - Fallopian tubes with no significant histologic abnormality   B. OMENTUM, OMENTECTOMY:   - Omental adipose tissue with histiocytic reaction, cholesterol clefts   and psammoma bodies, consistent with neoadjuvant chemotherapy effect   - No viable tumor cells seen   C. BLADDER PERITONEUM, BIOPSY:   - Fibroadipose tissue with histiocytic reaction, cholesterol clefts and   psammoma bodies, consistent with neoadjuvant chemotherapy effect   - No viable tumor cells seen   D. LEFT PELVIC SIDEWALL, BIOPSY:   - Fibroadipose tissue with histiocytic reaction, cholesterol clefts and   psammoma bodies, consistent with neoadjuvant chemotherapy effect   - No viable tumor cells seen   E. SIGMOID EPIPLOICA, BIOPSY:   - Adipose tissue with histiocytic reaction, cholesterol clefts and   psammoma bodies, consistent with neoadjuvant chemotherapy effect   - No viable tumor cells seen     Review of Systems:  Systemic                            no weight gain;  no fatigue; no fever; no chills; no night sweats; + decrease appetite changes  Skin                            no rashes, or lesions; + hair loss  Eye                            no irritation; no changes in vision; + visual difficulty  Ward-Laryngeal                            no dysphagia; no hoarseness   Pulmonary                            no cough; no shortness of breath  Cardiovascular                            no chest pain; no palpitations  Gastrointestinal                            no diarrhea; + constipation; no abdominal pain; no changes in bowel  habits; no blood in stool; + nausea  Genitourinary                           no urinary frequency; no urinary urgency; no dysuria; no pain; no abnormal vaginal discharge; + vaginal bleeding - with clots, denies heavy bleeding or soaking pads  Musculoskeletal                            no myalgias; no arthralgias; no back pain  Psychiatric                            no depressed mood; + anxiety    Hematologic                            no tender lymph nodes; no noticeable swellings or lumps   Endocrine                            no hot flashes; no heat/cold intolerance         Neurological                           no tremor; + numbness and tingling; + loss of balance; + difficulty walking; no headaches; no difficulty sleeping     Obstetrics and Gynecology History:  ,   Menopause at age 52, took HRT for a short while, maybe 1 year    Past Medical History:    Past Medical History:   Diagnosis Date     Anemia      Cervical spinal stenosis      Depression      GERD (gastroesophageal reflux disease)      Hypertension      Hypokalemia      Hypothyroid      Lumbar spinal stenosis      Obesity      Ovarian cancer (H)      Paraneoplastic neuromyopathy and neuropathy (H)      PE (pulmonary thromboembolism) (H)      Thrombocytopenia (H)          Past Surgical History:    Past Surgical History:   Procedure Laterality Date     AS TOTAL KNEE ARTHROPLASTY Left       BACK SURGERY  2009     CHOLECYSTECTOMY  01/01/2016     CYSTOSCOPY N/A 6/28/2017    Procedure: CYSTOSCOPY;;  Surgeon: Nessa Christina MD;  Location: [facility] OR     DAVINCI HYSTERECTOMY TOTAL, BILATERAL SALPINGO-OOPHORECTMY, NODE DISSECTION, TUMOR STAGING, COMBINED N/A 6/28/2017    Procedure: COMBINED DAVINCI HYSTERECTOMY TOTAL, SALPINGO-OOPHORECTOMY, NODE DISSECTION, TUMOR STAGING;  Robotic total laparoscopic hysterectomy, bilateral salpingo-oophorectomy, omentectomy, lysis of adhesions, tumor debulking, cystoscopy;  Surgeon: Nessa Christina MD;  Location: [facility] OR     OPEN REDUCTION INTERNAL FIXATION WRIST Right 2009     THYROIDECTOMY           Health Maintenance Due   Topic Date Due     TETANUS IMMUNIZATION (SYSTEM ASSIGNED)  10/15/1965     ADVANCE DIRECTIVE PLANNING Q5 YRS  10/15/1965     HEPATITIS C SCREENING  10/15/1965     COLON CANCER SCREEN (SYSTEM ASSIGNED)  10/15/1997     FALL RISK ASSESSMENT  10/15/2012     DEXA SCAN SCREENING (SYSTEM ASSIGNED)  10/15/2012     PNEUMOCOCCAL (1 of 2 - PCV13) 10/15/2012     MAMMO SCREEN Q2 YR (SYSTEM ASSIGNED)  11/11/2015     Health Maintenance:  Last Pap Smear:             5 years              Result: normal  She has not had a history of abnormal Pap smears.     Last Mammogram:                       10/19/16              Result: normal                                           She has not had a history of abnormal mammograms.     Last Colonoscopy:                        2007              Result: normal  Current Medications:     Current Outpatient Prescriptions   Medication Sig Dispense Refill     losartan (COZAAR) 50 MG tablet Take 1 tablet (50 mg) by mouth every morning       hydrochlorothiazide (HYDRODIURIL) 25 MG tablet Take 25 mg by mouth daily   0     polyethylene glycol (MIRALAX/GLYCOLAX) Packet Take 17 g by mouth daily 7 packet      senna-docusate (SENOKOT-S;PERICOLACE) 8.6-50 MG per tablet Take 1-2 tablets by mouth 2 times daily Hold for loose stools 60  "tablet 1     calcium carbonate (TUMS) 500 MG chewable tablet Take 1 chew tab by mouth daily as needed for heartburn       Omeprazole (PRILOSEC PO) Take 20 mg by mouth every morning       ESCITALOPRAM OXALATE PO Take 10 mg by mouth daily       Potassium Chloride ER 20 MEQ TBCR Take 1 tablet (20 mEq) by mouth daily 30 tablet 3     levothyroxine (SYNTHROID) 125 MCG tablet Take 1 tablet (125 mcg) by mouth daily 30 tablet      Vitamin D, Cholecalciferol, 1000 UNITS TABS Take 2,000 Units by mouth daily (Patient taking differently: Take 2,000 Units by mouth daily LD 6/20/17, told to hold until after surgery) 60 tablet      enoxaparin (LOVENOX) 80 MG/0.8ML injection Inject 0.6 mLs (60 mg) Subcutaneous every 12 hours 60 Syringe 1         Allergies:        Allergies   Allergen Reactions     Amoxicillin Hives     Clarithromycin Other (See Comments)     \"I felt dopey, sleepy and like a druggie\"     Lisinopril Cough     Penicillins Rash        Social History:     Social History   Substance Use Topics     Smoking status: Never Smoker     Smokeless tobacco: Not on file     Alcohol use Yes      Comment: occasionally       History   Drug Use No       Lives with , feels safe at home.  Retired .  Enjoys playing golf, gardening.  Does have an advanced directive on file and would like her , Christiano to be her POA.  DNR/DNI  Family History:     The patient's family history is notable for     Family History   Problem Relation Age of Onset     Breast Cancer Mother      Heart Failure Mother      Breast Cancer Maternal Grandmother      Breast Cancer Maternal Aunt      Myocardial Infarction Father      Unknown/Adopted Brother      Unknown/Adopted Maternal Grandfather      Stomach Cancer Paternal Grandmother      Lung Cancer Paternal Grandfather      mustard gas in WW[place]         Physical Exam:     /70 (BP Location: Left arm, Patient Position: Chair, Cuff Size: Adult Regular)  Temp 98.1  F (36.7  C) (Tympanic)  Resp " "16  Ht 1.499 m (4' 11\")  There is no height or weight on file to calculate BMI. HR regular not tachy    General Appearance: healthy and alert, no distress     HEENT:  no palpable nodules or masses        Cardiovascular: regular rate and rhythm, no gallops, rubs or murmurs     Respiratory: lungs clear, no rales, rhonchi or wheezes    Musculoskeletal: extremities non tender and with trace to +1 edema    Skin: no lesions or rashes     Neurological: significant defects to LE, uses wheel chair, lift to chair     Psychiatric: appropriate mood and affect                               Hematological: normal cervical, supraclavicular and inguinal lymph nodes     Gastrointestinal:       abdomen soft, non-tender, non-distended, no organomegaly or masses    Genitourinary: External genitalia and urethral meatus appears normal.  Vagina is smooth without nodularity or masses.  Cervix is surgically absent. Vaginal cuff is intact. No blood in the vaginal vault. Small area to the vaginal cuff that may be the site she was bleeding from. Silver nitrate applied and small amount of monsels paste placed to the vaginal cuff.      Assessment:     Tosin Nina is a 71 year old woman with a diagnosis of Stage IVB high grade serous ovarian cancer.      Plan:      1.)   Stage IVB high grade serous ovarian cancer. Patient is recovering from surgery. Vaginal bleeding/clots secondary to surgery and therapeutic lovenox. No bleeding noted on todays exam and placed silver nitrate/monsels to the vaginal cuff to hopefully stop the bleeding. Her hgb has been stable despite the bleeding and I feel that she should continue the lovenox for hx of PE. Discussed there is a risk for her to bleed again with resumption of lovenox and if she has heavy bleeding ie soaking pads she is to call the clinic or go to the ED. Will repeat labs at todays visit. Discussed the importance of increasing PO intake small frequent meals and drink plenty of fluids. Drink " fluids with calories ie nutrition shakes, gatorade, juice, milk. Per Dr Poole plan 3 more cycles of dose dense carbo/taxol. Patient work on improving nutrition and return in 1 week for      2.)   Deconditioning and wheelchair bound state/caregiver role strain (). Patient returned home with home health care. They report they are coping ok at this time and are looking in to getting some more help from friends. Discussed concern with Manoj that [facility] mobility is unlikely to improve. He understands and reports they have longterm care insurance if in the future they need to use it. Stress the importance of self care and asking for help when he needs it. He will let us know.     3.)                     Chemotherapy side effects:  Myelosuppression with thrombocytopenia and anemia.      4.)                     Para-neoplastic syndrome.  She did not show improvement with her recent admission and IVIG/steroids.  This is likely to be a long term issue.  She should have follow up with neurology in 1-2 months     5.)                     PE.  On therapuetic lovenox.  Per Dr Poole plan: Given her ovarian cancer, we will plan to continue her lovenox therapy until she has completed all her therapy at which time we will consult with hematology if it is reasonable for her to stop anti-coagulation. Discussed with patient and her  that her hgb remains stable despite the bleeding, recommend continuing lovenox. Call for heavy bleeding or go to the ED.      6.)                     Genetic risk factors were assessed and the patient does meet the qualifications for a referral and she has a visit scheduled in sept     7.)                     Advanced malignancy and paraneoplastic symptoms. Follow up with palliative care as needed.     8.)                     Labs and/or tests ordered include: CBC, CMP, Mag      9.)                     Health maintenance issues addressed today include pt due for colonoscopy which can be  addressed following her upfront treatment for ovarian cancer.  Jyoti Lopez, MILEY  7/14/2017 6:02 PM           CC  Patient Care Team:  Esther Back MD as PCP - General (Family Practice)  Henrik Martins, RN as Clinic Care Coordinator  Heather Hassan, RN as Nurse Coordinator (Neurology)  Anastasia Almodovar, RN as Nurse Coordinator (Neurology)

## 2017-07-11 NOTE — PROGRESS NOTES
Patient's Hgb today is 10.4.  Jyoti Lopez NP notified.  Patient was seen in infusion and per MYRA Hawkins, patient doing well and feeling good.  Patient is ~ 12 weeks post op and was released from TCU rehab a day ago.  Has reported occ clots ~ size of marbles+.  Reports passing 2 clots today which were a bit larger than marbles, gelatinous in consistency, similar to pudding.  Patient continues on her Lovenox 60mg BID.  Will see NP on 12 July 2017.  Patient knows to go to the ED with any increase in bleeding, weakness, or temp.    Henrik Martins RN, BSN, OCN  [facility]  Patient Care Coordinator

## 2017-07-11 NOTE — PROGRESS NOTES
Infusion Nursing Note:  Tosin Nina presents today for port draw.    Patient seen by provider today: No   present during visit today: Not Applicable.    Note: Henrik RENTERIA made aware of labs and blood clots today. She will follow up with Jyoti Lopez.    Intravenous Access:  Labs drawn without difficulty.  Implanted Port.    Treatment Conditions:  Lab Results   Component Value Date    HGB 10.4 07/11/2017     Lab Results   Component Value Date    WBC 8.2 07/11/2017      Lab Results   Component Value Date    ANEU 6.6 07/11/2017     Lab Results   Component Value Date     07/11/2017          Post Infusion Assessment:  Site patent and intact, free from redness, edema or discomfort.  No evidence of extravasations.  Access discontinued per protocol.    Discharge Plan:   Patient declined prescription refills.  Discharge instructions reviewed with: Patient.  Patient and/or family verbalized understanding of discharge instructions and all questions answered.  Copy of AVS reviewed with patient and/or family.  Patient will return 7/20/17 for next appointment.  Patient discharged in stable condition accompanied by: .  Departure Mode: Wheelchair.    Shruthi Crockett RN

## 2017-07-11 NOTE — MR AVS SNAPSHOT
After Visit Summary   7/11/2017    Tosin Nina    MRN: [id number]           Patient Information     Date Of Birth          1945        Visit Information        Provider Department      7/11/2017 11:00 AM RH INFUSION CHAIR 8 [facility]        Today's Diagnoses     Personal history of ovarian cancer    -  1       Follow-ups after your visit        Your next 10 appointments already scheduled     Jul 13, 2017  1:30 PM CDT   Return Visit with SALVADOR Gil CNP   [facility] ([facility])    [facility] [place] [address]  [facility] [address]   [phone]            Jul 20, 2017 11:30 AM CDT   Level 3 with RH INFUSION CHAIR 7   [facility] ([facility])    [facility] [place] [address]  [facility] [address]   [phone]            Aug 10, 2017 11:00 AM CDT   Level 3 with RH INFUSION CHAIR 11   [facility] ([facility])    [facility] [place] [address]  [place] [address]   [phone]            Aug 10, 2017 11:30 AM CDT   Return Visit with SALVADOR Gil CNP   [facility] ([facility])    [facility] [place] [address]  [facility] [address]   [phone]            Aug 31, 2017 11:30 AM CDT   Level 3 with RH INFUSION CHAIR 7   [facility] ([facility])    [facility] [place] [address]  [place] [address]   [phone]            Sep 26, 2017  9:00 AM CDT   New Visit with Kelsie Nguyen, GC   Cancer Risk Management Program ([facility])    [facility]  [address]  [place] [address]   [phone]          "     Who to contact     If you have questions or need follow up information about today's clinic visit or your schedule please contact [facility] INFUSION SERVICES directly at [phone].  Normal or non-critical lab and imaging results will be communicated to you by MyChart, letter or phone within 4 business days after the clinic has received the results. If you do not hear from us within 7 days, please contact the clinic through MyChart or phone. If you have a critical or abnormal lab result, we will notify you by phone as soon as possible.  Submit refill requests through [employer] or call your pharmacy and they will forward the refill request to us. Please allow 3 business days for your refill to be completed.          Additional Information About Your Visit        [employer] [employer] Information     [employer] lets you send messages to your doctor, view your test results, renew your prescriptions, schedule appointments and more. To sign up, go to [email or web address] . Click on \"Log in\" on the left side of the screen, which will take you to the Welcome page. Then click on \"Sign up Now\" on the right side of the page.     You will be asked to enter the access code listed below, as well as some personal information. Please follow the directions to create your username and password.     Your access code is: [email or web address]  Expires: 8/10/2017 11:53 AM     Your access code will  in 90 days. If you need help or a new code, please call your [place] clinic or [phone].        Care EveryWhere ID     This is your Care EveryWhere ID. This could be used by other organizations to access your [place] medical records  [id number]         Blood Pressure from Last 3 Encounters:   17 120/66   17 103/65   17 91/64    Weight from Last 3 Encounters:   17 77.7 kg (171 lb 4.8 oz)   17 74.4 kg (164 lb)   17 75.3 kg (165 lb 14.4 oz)              We Performed the Following     CBC with " platelets differential          Today's Medication Changes          These changes are accurate as of: 7/11/17 12:59 PM.  If you have any questions, ask your nurse or doctor.               These medicines have changed or have updated prescriptions.        Dose/Directions    Vitamin D (Cholecalciferol) 1000 UNITS Tabs   This may have changed:  additional instructions   Used for:  Paresthesias        Dose:  2000 Units   Take 2,000 Units by mouth daily   Quantity:  60 tablet   Refills:  0                Primary Care Provider Office Phone # Fax #    Esther Back [phone]       [facility] [address] [address]        Equal Access to Services     [facility]: Hadii zita fields hadasho Sofely, waaxda luqadaha, qaybta kaalmada adewendyyada, hetal regan . So [facility] [phone].    ATENCIÓN: Si habla español, tiene a alarcon disposición servicios gratuitos de asistencia lingüística. [facility] [phone].    We comply with applicable federal civil rights laws and [place] laws. We do not discriminate on the basis of race, color, national origin, age, disability sex, sexual orientation or gender identity.            Thank you!     Thank you for choosing [facility] INF[facility]  for your care. Our goal is always to provide you with excellent care. Hearing back from our patients is one way we can continue to improve our services. Please take a few minutes to complete the written survey that you may receive in the mail after your visit with us. Thank you!             Your Updated Medication List - Protect others around you: Learn how to safely use, store and throw away your medicines at www.disposemymeds.org.          This list is accurate as of: 7/11/17 12:59 PM.  Always use your most recent med list.                   Brand Name Dispense Instructions for use Diagnosis    acetaminophen 325 MG tablet    TYLENOL    100 tablet    Take 2 tablets  (650 mg) by mouth every 6 hours as needed for mild pain    Ovarian cancer, unspecified laterality (H)       calcium carbonate 500 MG chewable tablet    TUMS     Take 1 chew tab by mouth daily as needed for heartburn        enoxaparin 80 MG/0.8ML injection    LOVENOX    60 Syringe    Inject 0.6 mLs (60 mg) Subcutaneous every 12 hours    Other pulmonary embolism without acute cor pulmonale, unspecified chronicity (H)       ESCITALOPRAM OXALATE PO      Take 10 mg by mouth daily        hydrochlorothiazide 25 MG tablet    HYDRODIURIL     Take 1 tablet (25 mg) by mouth daily    Ovarian cancer, unspecified laterality (H)       HYDROmorphone 2 MG tablet    DILAUDID    40 tablet    Take 1-2 tablets (2-4 mg) by mouth every 3 hours as needed for moderate to severe pain    Ovarian cancer, unspecified laterality (H)       levothyroxine 125 MCG tablet    SYNTHROID    30 tablet    Take 1 tablet (125 mcg) by mouth daily    Other pulmonary embolism without acute cor pulmonale, unspecified chronicity (H)       losartan 50 MG tablet    COZAAR     Take 1 tablet (50 mg) by mouth every morning        polyethylene glycol Packet    MIRALAX/GLYCOLAX    7 packet    Take 17 g by mouth daily    Ovarian cancer, unspecified laterality (H)       Potassium Chloride ER 20 MEQ Tbcr     30 tablet    Take 1 tablet (20 mEq) by mouth daily    Hypokalemia       PRILOSEC PO      Take 20 mg by mouth every morning        senna-docusate 8.6-50 MG per tablet    SENOKOT-S;PERICOLACE    60 tablet    Take 1-2 tablets by mouth 2 times daily Hold for loose stools    Ovarian cancer, unspecified laterality (H)       Vitamin D (Cholecalciferol) 1000 UNITS Tabs     60 tablet    Take 2,000 Units by mouth daily    Paresthesias

## 2017-07-13 ENCOUNTER — ONCOLOGY VISIT (OUTPATIENT)
Dept: ONCOLOGY | Facility: CLINIC | Age: 72
End: 2017-07-13
Attending: NURSE PRACTITIONER
Payer: COMMERCIAL

## 2017-07-13 ENCOUNTER — HOSPITAL ENCOUNTER (OUTPATIENT)
Facility: CLINIC | Age: 72
Setting detail: SPECIMEN
Discharge: HOME | End: 2017-07-13
Attending: NURSE PRACTITIONER | Admitting: NURSE PRACTITIONER
Payer: COMMERCIAL

## 2017-07-13 ENCOUNTER — INFUSION THERAPY VISIT (OUTPATIENT)
Dept: INFUSION THERAPY | Facility: CLINIC | Age: 72
End: 2017-07-13
Attending: NURSE PRACTITIONER
Payer: COMMERCIAL

## 2017-07-13 VITALS
HEIGHT: 59 IN | RESPIRATION RATE: 16 BRPM | DIASTOLIC BLOOD PRESSURE: 70 MMHG | SYSTOLIC BLOOD PRESSURE: 103 MMHG | TEMPERATURE: 98.1 F

## 2017-07-13 DIAGNOSIS — I26.99 OTHER ACUTE PULMONARY EMBOLISM WITHOUT ACUTE COR PULMONALE (H): ICD-10-CM

## 2017-07-13 DIAGNOSIS — Z79.01 ON ANTICOAGULANT THERAPY: ICD-10-CM

## 2017-07-13 DIAGNOSIS — C56.9 OVARIAN CANCER, UNSPECIFIED LATERALITY (H): ICD-10-CM

## 2017-07-13 DIAGNOSIS — C56.9 OVARIAN CANCER, UNSPECIFIED LATERALITY (H): Primary | ICD-10-CM

## 2017-07-13 LAB
ALBUMIN SERPL-MCNC: 3.2 G/DL (ref 3.4–5)
ALP SERPL-CCNC: 97 U/L (ref 40–150)
ALT SERPL W P-5'-P-CCNC: 16 U/L (ref 0–50)
ANION GAP SERPL CALCULATED.3IONS-SCNC: 10 MMOL/L (ref 3–14)
AST SERPL W P-5'-P-CCNC: 15 U/L (ref 0–45)
BASOPHILS # BLD AUTO: 0.1 10E9/L (ref 0–0.2)
BASOPHILS NFR BLD AUTO: 0.7 %
BILIRUB SERPL-MCNC: 0.3 MG/DL (ref 0.2–1.3)
BUN SERPL-MCNC: 10 MG/DL (ref 7–30)
CALCIUM SERPL-MCNC: 8.9 MG/DL (ref 8.5–10.1)
CHLORIDE SERPL-SCNC: 101 MMOL/L (ref 94–109)
CO2 SERPL-SCNC: 26 MMOL/L (ref 20–32)
CREAT SERPL-MCNC: 0.59 MG/DL (ref 0.52–1.04)
DIFFERENTIAL METHOD BLD: ABNORMAL
EOSINOPHIL # BLD AUTO: 0.1 10E9/L (ref 0–0.7)
EOSINOPHIL NFR BLD AUTO: 1.2 %
ERYTHROCYTE [DISTWIDTH] IN BLOOD BY AUTOMATED COUNT: 20.8 % (ref 10–15)
GFR SERPL CREATININE-BSD FRML MDRD: ABNORMAL ML/MIN/1.7M2
GLUCOSE SERPL-MCNC: 120 MG/DL (ref 70–99)
HCT VFR BLD AUTO: 33.1 % (ref 35–47)
HGB BLD-MCNC: 10.6 G/DL (ref 11.7–15.7)
IMM GRANULOCYTES # BLD: 0 10E9/L (ref 0–0.4)
IMM GRANULOCYTES NFR BLD: 0.3 %
LYMPHOCYTES # BLD AUTO: 1.2 10E9/L (ref 0.8–5.3)
LYMPHOCYTES NFR BLD AUTO: 12.7 %
MAGNESIUM SERPL-MCNC: 1.8 MG/DL (ref 1.6–2.3)
MCH RBC QN AUTO: 33.2 PG (ref 26.5–33)
MCHC RBC AUTO-ENTMCNC: 32 G/DL (ref 31.5–36.5)
MCV RBC AUTO: 104 FL (ref 78–100)
MONOCYTES # BLD AUTO: 0.5 10E9/L (ref 0–1.3)
MONOCYTES NFR BLD AUTO: 5.8 %
NEUTROPHILS # BLD AUTO: 7.4 10E9/L (ref 1.6–8.3)
NEUTROPHILS NFR BLD AUTO: 79.3 %
NRBC # BLD AUTO: 0 10*3/UL
NRBC BLD AUTO-RTO: 0 /100
PLATELET # BLD AUTO: 307 10E9/L (ref 150–450)
POTASSIUM SERPL-SCNC: 3.1 MMOL/L (ref 3.4–5.3)
PROT SERPL-MCNC: 7.5 G/DL (ref 6.8–8.8)
RBC # BLD AUTO: 3.19 10E12/L (ref 3.8–5.2)
SODIUM SERPL-SCNC: 137 MMOL/L (ref 133–144)
WBC # BLD AUTO: 9.3 10E9/L (ref 4–11)

## 2017-07-13 PROCEDURE — 99214 OFFICE O/P EST MOD 30 MIN: CPT | Performed by: NURSE PRACTITIONER

## 2017-07-13 PROCEDURE — 85025 COMPLETE CBC W/AUTO DIFF WBC: CPT | Performed by: NURSE PRACTITIONER

## 2017-07-13 PROCEDURE — 80053 COMPREHEN METABOLIC PANEL: CPT | Performed by: NURSE PRACTITIONER

## 2017-07-13 PROCEDURE — 83735 ASSAY OF MAGNESIUM: CPT | Performed by: NURSE PRACTITIONER

## 2017-07-13 PROCEDURE — 99211 OFF/OP EST MAY X REQ PHY/QHP: CPT

## 2017-07-13 PROCEDURE — 36591 DRAW BLOOD OFF VENOUS DEVICE: CPT

## 2017-07-13 ASSESSMENT — PAIN SCALES - GENERAL: PAINLEVEL: NO PAIN (0)

## 2017-07-13 NOTE — MR AVS SNAPSHOT
After Visit Summary   7/13/2017    Tosin Nina    MRN: [id number]           Patient Information     Date Of Birth          1945        Visit Information        Provider Department      7/13/2017 3:00 PM RH INFUSION CHAIR 10 [facility]        Today's Diagnoses     Ovarian cancer, unspecified laterality (H)           Follow-ups after your visit        Your next 10 appointments already scheduled     Jul 20, 2017 11:30 AM CDT   Level 3 with RH INFUSION CHAIR 7   [facility] ([facility])    [place] [address]  [facility] [address]   [phone]            Aug 10, 2017 11:00 AM CDT   Level 3 with RH INFUSION CHAIR 11   [facility] ([facility])    [facility]  [address]  [place] [address]   [phone]            Aug 10, 2017 11:30 AM CDT   Return Visit with SALVADOR Gil [facility] ([facility])    [facility]  [address]  [facility] [address]   [phone]            Aug 31, 2017 11:30 AM CDT   Level 3 with RH INFUSION CHAIR 7   [facility] ([facility])    [facility]  [address]  [facility] [address]   [phone]            Sep 26, 2017  9:00 AM CDT   New Visit with Kelsie Nguyen GC   Cancer Risk Management Program ([facility])    [place] [address]  [facility] [address]   [phone]              Who to contact     If you have questions or need follow up information about today's clinic visit or your schedule please contact [facility] INFUSION SERVICES directly at [phone].  Normal or non-critical lab and imaging  "results will be communicated to you by MyChart, letter or phone within 4 business days after the clinic has received the results. If you do not hear from us within 7 days, please contact the clinic through [employer] or phone. If you have a critical or abnormal lab result, we will notify you by phone as soon as possible.  Submit refill requests through [employer] or call your pharmacy and they will forward the refill request to us. Please allow 3 business days for your refill to be completed.          Additional Information About Your Visit        [employer] Information     [employer] lets you send messages to your doctor, view your test results, renew your prescriptions, schedule appointments and more. To sign up, go to [email or web address] . Click on \"Log in\" on the left side of the screen, which will take you to the Welcome page. Then click on \"Sign up Now\" on the right side of the page.     You will be asked to enter the access code listed below, as well as some personal information. Please follow the directions to create your username and password.     Your access code is: [email or web address]  Expires: 8/10/2017 11:53 AM     Your access code will  in 90 days. If you need help or a new code, please call your [place] clinic or [phone].        Care EveryWhere ID     This is your Care EveryWhere ID. This could be used by other organizations to access your [place] medical records  [id number]         Blood Pressure from Last 3 Encounters:   17 103/70   17 120/66   17 103/65    Weight from Last 3 Encounters:   17 77.7 kg (171 lb 4.8 oz)   17 74.4 kg (164 lb)   17 75.3 kg (165 lb 14.4 oz)              We Performed the Following     CBC with platelets differential     CMP - Comprehensive Metabolic Panel     Magnesium          Today's Medication Changes          These changes are accurate as of: 17  3:08 PM.  If you have any questions, ask your nurse or doctor.               These " medicines have changed or have updated prescriptions.        Dose/Directions    Vitamin D (Cholecalciferol) 1000 UNITS Tabs   This may have changed:  additional instructions   Used for:  Paresthesias        Dose:  2000 Units   Take 2,000 Units by mouth daily   Quantity:  60 tablet   Refills:  0                Primary Care Provider Office Phone # Fax #    Esther Back [phone]       [facility] [address] [address]        Equal Access to Services     [facility]: Hadii aad ku hadasho Soomaali, waaxda luqadaha, qaybta kaalmada adeegyada, waxay idiin hayaan adeeg kharash la'flynnn . So [facility] [phone].    ATENCIÓN: Si habla español, tiene a alarcon disposición servicios gratuitos de asistencia lingüística. [facility] [phone].    We comply with applicable federal civil rights laws and [place] laws. We do not discriminate on the basis of race, color, national origin, age, disability sex, sexual orientation or gender identity.            Thank you!     Thank you for choosing [facility] INFUSION SERVICES  for your care. Our goal is always to provide you with excellent care. Hearing back from our patients is one way we can continue to improve our services. Please take a few minutes to complete the written survey that you may receive in the mail after your visit with us. Thank you!             Your Updated Medication List - Protect others around you: Learn how to safely use, store and throw away your medicines at www.disposemymeds.org.          This list is accurate as of: 7/13/17  3:08 PM.  Always use your most recent med list.                   Brand Name Dispense Instructions for use Diagnosis    calcium carbonate 500 MG chewable tablet    TUMS     Take 1 chew tab by mouth daily as needed for heartburn        enoxaparin 80 MG/0.8ML injection    LOVENOX    60 Syringe    Inject 0.6 mLs (60 mg) Subcutaneous every 12 hours    Other pulmonary embolism without  acute cor pulmonale, unspecified chronicity (H)       ESCITALOPRAM OXALATE PO      Take 10 mg by mouth daily        hydrochlorothiazide 25 MG tablet    HYDRODIURIL     Take 25 mg by mouth daily    Ovarian cancer, unspecified laterality (H)       levothyroxine 125 MCG tablet    SYNTHROID    30 tablet    Take 1 tablet (125 mcg) by mouth daily    Other pulmonary embolism without acute cor pulmonale, unspecified chronicity (H)       losartan 50 MG tablet    COZAAR     Take 1 tablet (50 mg) by mouth every morning        polyethylene glycol Packet    MIRALAX/GLYCOLAX    7 packet    Take 17 g by mouth daily    Ovarian cancer, unspecified laterality (H)       Potassium Chloride ER 20 MEQ Tbcr     30 tablet    Take 1 tablet (20 mEq) by mouth daily    Hypokalemia       PRILOSEC PO      Take 20 mg by mouth every morning        senna-docusate 8.6-50 MG per tablet    SENOKOT-S;PERICOLACE    60 tablet    Take 1-2 tablets by mouth 2 times daily Hold for loose stools    Ovarian cancer, unspecified laterality (H)       Vitamin D (Cholecalciferol) 1000 UNITS Tabs     60 tablet    Take 2,000 Units by mouth daily    Paresthesias

## 2017-07-13 NOTE — NURSING NOTE
"Oncology Rooming Note    July 13, 2017 1:29 PM   Tosin Nina is a 71 year old female who presents for:    Chief Complaint   Patient presents with     Oncology Clinic Visit     Follow-up     Initial Vitals: /70 (BP Location: Left arm, Patient Position: Chair, Cuff Size: Adult Regular)  Temp 98.1  F (36.7  C) (Tympanic)  Resp 16  Ht 1.499 m (4' 11\") Estimated body mass index is 34.58 kg/(m^2) as calculated from the following:    Height as of 6/28/17: 1.499 m (4' 11.02\").    Weight as of 6/29/17: 77.7 kg (171 lb 4.8 oz). There is no height or weight on file to calculate BSA.  No Pain (0) Comment: Data Unavailable   No LMP recorded. Patient is postmenopausal.  Allergies reviewed: Yes  Medications reviewed: Yes    Medications: Medication refills not needed today.  Pharm[facility] into [employer] [address] [place] [address] [address] AT [facility] [address]    Clinical concerns: Follow-up     8 minutes for nursing intake (face to face time)     Teresa Sanches  DISCHARGE PLAN:  Next appointments: See patient instruction section  Departure Mode: Ambulatory  Accompanied by: self  0 minutes for nursing discharge (face to face time)   Teresa Sanches                "

## 2017-07-13 NOTE — MR AVS SNAPSHOT
After Visit Summary   7/13/2017    Tosin Nina    MRN: [id number]           Patient Information     Date Of Birth          1945        Visit Information        Provider Department      7/13/2017 1:30 PM Jyoti Lopez APRN CNP [facility]        Today's Diagnoses     Ovarian cancer, unspecified laterality (H)    -  1    Other acute pulmonary embolism without acute cor pulmonale (H)        On anticoagulant therapy          Care Instructions    Labs and port flush today.   Follow up for chemo next week Scheduled  Carmen MARTIN given to patient            Follow-ups after your visit        Your next 10 appointments already scheduled     Jul 20, 2017 11:30 AM CDT   Level 3 with RH INFUSION CHAIR 7   [facility] ([facility])    [facility] [place] [address]  [place] [address]   [phone]            Aug 10, 2017 11:00 AM CDT   Level 3 with RH INFUSION CHAIR 11   [facility] ([facility])    [facility] [place] [address]  [place] [address]   [phone]            Aug 10, 2017 11:30 AM CDT   Return Visit with SALVADOR Gil CNP   [facility] ([facility])    [facility] [place] [address]  [place] [address]   [phone]            Aug 31, 2017 11:30 AM CDT   Level 3 with RH INFUSION CHAIR 7   [facility] ([facility])    [facility] [place] [address]  [place] [address]   [phone]            Sep 26, 2017  9:00 AM CDT   New Visit with Kelsie Nguyen GC   Cancer Risk Management Program ([facility])    [facility]  [address]  [place] [address] [phone]              Who to  "contact     If you have questions or need follow up information about today's clinic visit or your schedule please contact [facility] CANCER CARE directly at [phone].  Normal or non-critical lab and imaging results will be communicated to you by MyChart, letter or phone within 4 business days after the clinic has received the results. If you do not hear from us within 7 days, please contact the clinic through MyChart or phone. If you have a critical or abnormal lab result, we will notify you by phone as soon as possible.  Submit refill requests through [email or web address] or call your pharmacy and they will forward the refill request to us. Please allow 3 business days for your refill to be completed.          Additional Information About Your Visit        [employer] [facility] Information     [email or web address] lets you send messages to your doctor, view your test results, renew your prescriptions, schedule appointments and more. To sign up, go to [email or web address] . Click on \"Log in\" on the left side of the screen, which will take you to the Welcome page. Then click on \"Sign up Now\" on the right side of the page.     You will be asked to enter the access code listed below, as well as some personal information. Please follow the directions to create your username and password.     Your access code is: [email or web address]  Expires: 8/10/2017 11:53 AM     Your access code will  in 90 days. If you need help or a new code, please call your [place] clinic or [phone].        Care EveryWhere ID     This is your Care EveryWhere ID. This could be used by other organizations to access your [place] medical records  [id number]        Your Vitals Were     Temperature Respirations Height             98.1  F (36.7  C) (Tympanic) 16 1.499 m (4' 11\")          Blood Pressure from Last 3 Encounters:   17 103/70   17 120/66   17 103/65    Weight from Last 3 Encounters:   17 77.7 kg (171 lb 4.8 oz)   17 74.4 kg " (164 lb)   06/20/17 75.3 kg (165 lb 14.4 oz)                 Today's Medication Changes          These changes are accurate as of: 7/13/17  3:24 PM.  If you have any questions, ask your nurse or doctor.               These medicines have changed or have updated prescriptions.        Dose/Directions    Vitamin D (Cholecalciferol) 1000 UNITS Tabs   This may have changed:  additional instructions   Used for:  Paresthesias        Dose:  2000 Units   Take 2,000 Units by mouth daily   Quantity:  60 tablet   Refills:  0                Primary Care Provider Office Phone # Fax #    Esther Back [phone]       [facility] [address] [address]        Equal Access to Services     LESLEY CUELLO : Zechariah Alves, jesus martinez, qamihir kaalmamarian anderson, hetal regan . So [facility] [phone].    ATENCIÓN: Si habla español, tiene a alarcon disposición servicios gratuitos de asistencia lingüística. Llame al [phone].    We comply with applicable federal civil rights laws and [place] laws. We do not discriminate on the basis of race, color, national origin, age, disability sex, sexual orientation or gender identity.            Thank you!     Thank you for choosing [facility]CER [facility]  for your care. Our goal is always to provide you with excellent care. Hearing back from our patients is one way we can continue to improve our services. Please take a few minutes to complete the written survey that you may receive in the mail after your visit with us. Thank you!             Your Updated Medication List - Protect others around you: Learn how to safely use, store and throw away your medicines at www.disposemymeds.org.          This list is accurate as of: 7/13/17  3:24 PM.  Always use your most recent med list.                   Brand Name Dispense Instructions for use Diagnosis    calcium carbonate 500 MG chewable tablet    TUMS      Take 1 chew tab by mouth daily as needed for heartburn        enoxaparin 80 MG/0.8ML injection    LOVENOX    60 Syringe    Inject 0.6 mLs (60 mg) Subcutaneous every 12 hours    Other pulmonary embolism without acute cor pulmonale, unspecified chronicity (H)       ESCITALOPRAM OXALATE PO      Take 10 mg by mouth daily        hydrochlorothiazide 25 MG tablet    HYDRODIURIL     Take 25 mg by mouth daily    Ovarian cancer, unspecified laterality (H)       levothyroxine 125 MCG tablet    SYNTHROID    30 tablet    Take 1 tablet (125 mcg) by mouth daily    Other pulmonary embolism without acute cor pulmonale, unspecified chronicity (H)       losartan 50 MG tablet    COZAAR     Take 1 tablet (50 mg) by mouth every morning        polyethylene glycol Packet    MIRALAX/GLYCOLAX    7 packet    Take 17 g by mouth daily    Ovarian cancer, unspecified laterality (H)       Potassium Chloride ER 20 MEQ Tbcr     30 tablet    Take 1 tablet (20 mEq) by mouth daily    Hypokalemia       PRILOSEC PO      Take 20 mg by mouth every morning        senna-docusate 8.6-50 MG per tablet    SENOKOT-S;PERICOLACE    60 tablet    Take 1-2 tablets by mouth 2 times daily Hold for loose stools    Ovarian cancer, unspecified laterality (H)       Vitamin D (Cholecalciferol) 1000 UNITS Tabs     60 tablet    Take 2,000 Units by mouth daily    Paresthesias

## 2017-07-13 NOTE — PATIENT INSTRUCTIONS
Labs and port flush today.   Follow up for chemo next week Scheduled  Carmen MARTIN given to patient

## 2017-07-13 NOTE — PROGRESS NOTES
Infusion Nursing Note:  Tosin Nina presents today for port labs.    Patient seen by provider today: No   present during visit today: Not Applicable.    Note: N/A.    Intravenous Access:  Labs drawn without difficulty.  Implanted Port.    Treatment Conditions:  Not Applicable.      Post Infusion Assessment:  Blood return noted pre and post infusion.  Site patent and intact, free from redness, edema or discomfort.  No evidence of extravasations.  Access discontinued per protocol.    Discharge Plan:   Discharge instructions reviewed with: Patient.  Patient and/or family verbalized understanding of discharge instructions and all questions answered.  Patient discharged in stable condition accompanied by: .  Departure Mode: Wheelchair.    Olivia Golden RN

## 2017-07-20 ENCOUNTER — INFUSION THERAPY VISIT (OUTPATIENT)
Dept: INFUSION THERAPY | Facility: CLINIC | Age: 72
End: 2017-07-20
Attending: OBSTETRICS & GYNECOLOGY
Payer: COMMERCIAL

## 2017-07-20 ENCOUNTER — HOSPITAL ENCOUNTER (OUTPATIENT)
Facility: CLINIC | Age: 72
Setting detail: SPECIMEN
Discharge: HOME | End: 2017-07-20
Attending: OBSTETRICS & GYNECOLOGY | Admitting: OBSTETRICS & GYNECOLOGY
Payer: COMMERCIAL

## 2017-07-20 ENCOUNTER — CARE COORDINATION (OUTPATIENT)
Dept: ONCOLOGY | Facility: CLINIC | Age: 72
End: 2017-07-20

## 2017-07-20 VITALS
HEART RATE: 72 BPM | RESPIRATION RATE: 20 BRPM | TEMPERATURE: 98.2 F | SYSTOLIC BLOOD PRESSURE: 120 MMHG | DIASTOLIC BLOOD PRESSURE: 48 MMHG

## 2017-07-20 DIAGNOSIS — T45.1X5A CHEMOTHERAPY-INDUCED NEUTROPENIA (H): ICD-10-CM

## 2017-07-20 DIAGNOSIS — Z41.8 ENCOUNTER FOR OTHER PROCEDURES FOR PURPOSES OTHER THAN REMEDYING HEALTH STATE: ICD-10-CM

## 2017-07-20 DIAGNOSIS — C56.9 OVARIAN CANCER, UNSPECIFIED LATERALITY (H): ICD-10-CM

## 2017-07-20 DIAGNOSIS — D70.1 CHEMOTHERAPY-INDUCED NEUTROPENIA (H): ICD-10-CM

## 2017-07-20 DIAGNOSIS — Z85.43 HISTORY OF OVARIAN CANCER: Primary | ICD-10-CM

## 2017-07-20 LAB
ALBUMIN SERPL-MCNC: 2.9 G/DL (ref 3.4–5)
ALP SERPL-CCNC: 92 U/L (ref 40–150)
ALT SERPL W P-5'-P-CCNC: 18 U/L (ref 0–50)
ANION GAP SERPL CALCULATED.3IONS-SCNC: 8 MMOL/L (ref 3–14)
AST SERPL W P-5'-P-CCNC: 16 U/L (ref 0–45)
BASOPHILS # BLD AUTO: 0.1 10E9/L (ref 0–0.2)
BASOPHILS NFR BLD AUTO: 0.6 %
BILIRUB SERPL-MCNC: 0.3 MG/DL (ref 0.2–1.3)
BUN SERPL-MCNC: 7 MG/DL (ref 7–30)
CALCIUM SERPL-MCNC: 8.8 MG/DL (ref 8.5–10.1)
CANCER AG125 SERPL-ACNC: 18 U/ML (ref 0–30)
CHLORIDE SERPL-SCNC: 105 MMOL/L (ref 94–109)
CO2 SERPL-SCNC: 27 MMOL/L (ref 20–32)
CREAT SERPL-MCNC: 0.55 MG/DL (ref 0.52–1.04)
DIFFERENTIAL METHOD BLD: ABNORMAL
EOSINOPHIL # BLD AUTO: 0.4 10E9/L (ref 0–0.7)
EOSINOPHIL NFR BLD AUTO: 4.3 %
ERYTHROCYTE [DISTWIDTH] IN BLOOD BY AUTOMATED COUNT: 18.4 % (ref 10–15)
GFR SERPL CREATININE-BSD FRML MDRD: ABNORMAL ML/MIN/1.7M2
GLUCOSE SERPL-MCNC: 120 MG/DL (ref 70–99)
HCT VFR BLD AUTO: 33.3 % (ref 35–47)
HGB BLD-MCNC: 10.5 G/DL (ref 11.7–15.7)
IMM GRANULOCYTES # BLD: 0 10E9/L (ref 0–0.4)
IMM GRANULOCYTES NFR BLD: 0.5 %
LYMPHOCYTES # BLD AUTO: 1.2 10E9/L (ref 0.8–5.3)
LYMPHOCYTES NFR BLD AUTO: 14.3 %
MAGNESIUM SERPL-MCNC: 1.7 MG/DL (ref 1.6–2.3)
MCH RBC QN AUTO: 33.1 PG (ref 26.5–33)
MCHC RBC AUTO-ENTMCNC: 31.5 G/DL (ref 31.5–36.5)
MCV RBC AUTO: 105 FL (ref 78–100)
MONOCYTES # BLD AUTO: 0.4 10E9/L (ref 0–1.3)
MONOCYTES NFR BLD AUTO: 4.3 %
NEUTROPHILS # BLD AUTO: 6.1 10E9/L (ref 1.6–8.3)
NEUTROPHILS NFR BLD AUTO: 76 %
NRBC # BLD AUTO: 0 10*3/UL
NRBC BLD AUTO-RTO: 0 /100
PLATELET # BLD AUTO: 262 10E9/L (ref 150–450)
POTASSIUM SERPL-SCNC: 3.5 MMOL/L (ref 3.4–5.3)
PROT SERPL-MCNC: 6.9 G/DL (ref 6.8–8.8)
RBC # BLD AUTO: 3.17 10E12/L (ref 3.8–5.2)
SODIUM SERPL-SCNC: 140 MMOL/L (ref 133–144)
WBC # BLD AUTO: 8.1 10E9/L (ref 4–11)

## 2017-07-20 PROCEDURE — 80053 COMPREHEN METABOLIC PANEL: CPT | Performed by: OBSTETRICS & GYNECOLOGY

## 2017-07-20 PROCEDURE — S0028 INJECTION, FAMOTIDINE, 20 MG: HCPCS | Performed by: NURSE PRACTITIONER

## 2017-07-20 PROCEDURE — 96367 TX/PROPH/DG ADDL SEQ IV INF: CPT

## 2017-07-20 PROCEDURE — 86304 IMMUNOASSAY TUMOR CA 125: CPT | Performed by: OBSTETRICS & GYNECOLOGY

## 2017-07-20 PROCEDURE — 83735 ASSAY OF MAGNESIUM: CPT | Performed by: OBSTETRICS & GYNECOLOGY

## 2017-07-20 PROCEDURE — 96413 CHEMO IV INFUSION 1 HR: CPT

## 2017-07-20 PROCEDURE — 25000125 ZZHC RX 250: Performed by: NURSE PRACTITIONER

## 2017-07-20 PROCEDURE — 96417 CHEMO IV INFUS EACH ADDL SEQ: CPT

## 2017-07-20 PROCEDURE — 85025 COMPLETE CBC W/AUTO DIFF WBC: CPT | Performed by: OBSTETRICS & GYNECOLOGY

## 2017-07-20 PROCEDURE — 25000128 H RX IP 250 OP 636: Performed by: NURSE PRACTITIONER

## 2017-07-20 PROCEDURE — 96375 TX/PRO/DX INJ NEW DRUG ADDON: CPT

## 2017-07-20 RX ORDER — ALBUTEROL SULFATE 90 UG/1
1-2 AEROSOL, METERED RESPIRATORY (INHALATION)
Status: CANCELLED
Start: 2017-07-20

## 2017-07-20 RX ORDER — ALBUTEROL SULFATE 90 UG/1
1-2 AEROSOL, METERED RESPIRATORY (INHALATION)
Status: CANCELLED
Start: 2017-07-27

## 2017-07-20 RX ORDER — SODIUM CHLORIDE 9 MG/ML
1000 INJECTION, SOLUTION INTRAVENOUS CONTINUOUS PRN
Status: CANCELLED
Start: 2017-08-03

## 2017-07-20 RX ORDER — HEPARIN SODIUM (PORCINE) LOCK FLUSH IV SOLN 100 UNIT/ML 100 UNIT/ML
500 SOLUTION INTRAVENOUS EVERY 8 HOURS
Status: DISCONTINUED | OUTPATIENT
Start: 2017-07-20 | End: 2017-07-20 | Stop reason: HOSPADM

## 2017-07-20 RX ORDER — MEPERIDINE HYDROCHLORIDE 25 MG/ML
25 INJECTION INTRAMUSCULAR; INTRAVENOUS; SUBCUTANEOUS EVERY 30 MIN PRN
Status: CANCELLED | OUTPATIENT
Start: 2017-07-27

## 2017-07-20 RX ORDER — METHYLPREDNISOLONE SODIUM SUCCINATE 125 MG/2ML
125 INJECTION, POWDER, LYOPHILIZED, FOR SOLUTION INTRAMUSCULAR; INTRAVENOUS
Status: CANCELLED
Start: 2017-08-03

## 2017-07-20 RX ORDER — ALBUTEROL SULFATE 0.83 MG/ML
2.5 SOLUTION RESPIRATORY (INHALATION)
Status: CANCELLED | OUTPATIENT
Start: 2017-07-20

## 2017-07-20 RX ORDER — EPINEPHRINE 0.3 MG/.3ML
0.3 INJECTION SUBCUTANEOUS EVERY 5 MIN PRN
Status: CANCELLED | OUTPATIENT
Start: 2017-07-20

## 2017-07-20 RX ORDER — DIPHENHYDRAMINE HYDROCHLORIDE 50 MG/ML
50 INJECTION INTRAMUSCULAR; INTRAVENOUS
Status: CANCELLED
Start: 2017-07-20

## 2017-07-20 RX ORDER — EPINEPHRINE 1 MG/ML
0.3 INJECTION INTRAMUSCULAR; INTRAVENOUS; SUBCUTANEOUS EVERY 5 MIN PRN
Status: CANCELLED | OUTPATIENT
Start: 2017-07-20

## 2017-07-20 RX ORDER — DIPHENHYDRAMINE HYDROCHLORIDE 50 MG/ML
50 INJECTION INTRAMUSCULAR; INTRAVENOUS
Status: CANCELLED
Start: 2017-08-03

## 2017-07-20 RX ORDER — METHYLPREDNISOLONE SODIUM SUCCINATE 125 MG/2ML
125 INJECTION, POWDER, LYOPHILIZED, FOR SOLUTION INTRAMUSCULAR; INTRAVENOUS
Status: CANCELLED
Start: 2017-07-27

## 2017-07-20 RX ORDER — LORAZEPAM 2 MG/ML
0.5 INJECTION INTRAMUSCULAR EVERY 6 HOURS PRN
Status: CANCELLED
Start: 2017-08-03

## 2017-07-20 RX ORDER — DIPHENHYDRAMINE HCL 25 MG
25 CAPSULE ORAL ONCE
Status: CANCELLED
Start: 2017-07-20

## 2017-07-20 RX ORDER — MEPERIDINE HYDROCHLORIDE 25 MG/ML
25 INJECTION INTRAMUSCULAR; INTRAVENOUS; SUBCUTANEOUS EVERY 30 MIN PRN
Status: CANCELLED | OUTPATIENT
Start: 2017-07-20

## 2017-07-20 RX ORDER — ALBUTEROL SULFATE 90 UG/1
1-2 AEROSOL, METERED RESPIRATORY (INHALATION)
Status: CANCELLED
Start: 2017-08-03

## 2017-07-20 RX ORDER — MEPERIDINE HYDROCHLORIDE 25 MG/ML
25 INJECTION INTRAMUSCULAR; INTRAVENOUS; SUBCUTANEOUS EVERY 30 MIN PRN
Status: CANCELLED | OUTPATIENT
Start: 2017-08-03

## 2017-07-20 RX ORDER — DIPHENHYDRAMINE HYDROCHLORIDE 50 MG/ML
50 INJECTION INTRAMUSCULAR; INTRAVENOUS
Status: CANCELLED
Start: 2017-07-27

## 2017-07-20 RX ORDER — DIPHENHYDRAMINE HCL 25 MG
25 CAPSULE ORAL ONCE
Status: CANCELLED
Start: 2017-07-27

## 2017-07-20 RX ORDER — EPINEPHRINE 0.3 MG/.3ML
0.3 INJECTION SUBCUTANEOUS EVERY 5 MIN PRN
Status: CANCELLED | OUTPATIENT
Start: 2017-08-03

## 2017-07-20 RX ORDER — PALONOSETRON 0.05 MG/ML
0.25 INJECTION, SOLUTION INTRAVENOUS ONCE
Status: COMPLETED | OUTPATIENT
Start: 2017-07-20 | End: 2017-07-20

## 2017-07-20 RX ORDER — LORAZEPAM 2 MG/ML
0.5 INJECTION INTRAMUSCULAR EVERY 6 HOURS PRN
Status: CANCELLED
Start: 2017-07-27

## 2017-07-20 RX ORDER — SODIUM CHLORIDE 9 MG/ML
1000 INJECTION, SOLUTION INTRAVENOUS CONTINUOUS PRN
Status: CANCELLED
Start: 2017-07-27

## 2017-07-20 RX ORDER — SODIUM CHLORIDE 9 MG/ML
1000 INJECTION, SOLUTION INTRAVENOUS CONTINUOUS PRN
Status: CANCELLED
Start: 2017-07-20

## 2017-07-20 RX ORDER — ALBUTEROL SULFATE 0.83 MG/ML
2.5 SOLUTION RESPIRATORY (INHALATION)
Status: CANCELLED | OUTPATIENT
Start: 2017-08-03

## 2017-07-20 RX ORDER — ALBUTEROL SULFATE 0.83 MG/ML
2.5 SOLUTION RESPIRATORY (INHALATION)
Status: CANCELLED | OUTPATIENT
Start: 2017-07-27

## 2017-07-20 RX ORDER — EPINEPHRINE 1 MG/ML
0.3 INJECTION INTRAMUSCULAR; INTRAVENOUS; SUBCUTANEOUS EVERY 5 MIN PRN
Status: CANCELLED | OUTPATIENT
Start: 2017-07-27

## 2017-07-20 RX ORDER — HEPARIN SODIUM (PORCINE) LOCK FLUSH IV SOLN 100 UNIT/ML 100 UNIT/ML
500 SOLUTION INTRAVENOUS EVERY 8 HOURS
Status: CANCELLED
Start: 2017-07-27

## 2017-07-20 RX ORDER — EPINEPHRINE 1 MG/ML
0.3 INJECTION INTRAMUSCULAR; INTRAVENOUS; SUBCUTANEOUS EVERY 5 MIN PRN
Status: CANCELLED | OUTPATIENT
Start: 2017-08-03

## 2017-07-20 RX ORDER — EPINEPHRINE 0.3 MG/.3ML
0.3 INJECTION SUBCUTANEOUS EVERY 5 MIN PRN
Status: CANCELLED | OUTPATIENT
Start: 2017-07-27

## 2017-07-20 RX ORDER — METHYLPREDNISOLONE SODIUM SUCCINATE 125 MG/2ML
125 INJECTION, POWDER, LYOPHILIZED, FOR SOLUTION INTRAMUSCULAR; INTRAVENOUS
Status: CANCELLED
Start: 2017-07-20

## 2017-07-20 RX ORDER — DIPHENHYDRAMINE HCL 25 MG
25 CAPSULE ORAL ONCE
Status: CANCELLED
Start: 2017-08-03

## 2017-07-20 RX ORDER — LORAZEPAM 2 MG/ML
0.5 INJECTION INTRAMUSCULAR EVERY 6 HOURS PRN
Status: CANCELLED
Start: 2017-07-20

## 2017-07-20 RX ORDER — HEPARIN SODIUM (PORCINE) LOCK FLUSH IV SOLN 100 UNIT/ML 100 UNIT/ML
500 SOLUTION INTRAVENOUS EVERY 8 HOURS
Status: CANCELLED
Start: 2017-08-03

## 2017-07-20 RX ADMIN — PACLITAXEL 135 MG: 6 INJECTION, SOLUTION INTRAVENOUS at 13:22

## 2017-07-20 RX ADMIN — DEXAMETHASONE SODIUM PHOSPHATE 12 MG: 10 INJECTION, SOLUTION INTRAMUSCULAR; INTRAVENOUS at 12:36

## 2017-07-20 RX ADMIN — PALONOSETRON HYDROCHLORIDE 0.25 MG: 0.25 INJECTION INTRAVENOUS at 12:22

## 2017-07-20 RX ADMIN — DIPHENHYDRAMINE HYDROCHLORIDE 25 MG: 50 INJECTION, SOLUTION INTRAMUSCULAR; INTRAVENOUS at 13:06

## 2017-07-20 RX ADMIN — CARBOPLATIN 650 MG: 10 INJECTION, SOLUTION INTRAVENOUS at 14:21

## 2017-07-20 RX ADMIN — SODIUM CHLORIDE, PRESERVATIVE FREE 500 UNITS: 5 INJECTION INTRAVENOUS at 15:10

## 2017-07-20 RX ADMIN — SODIUM CHLORIDE 250 ML: 9 INJECTION, SOLUTION INTRAVENOUS at 12:25

## 2017-07-20 RX ADMIN — FAMOTIDINE 40 MG: 10 INJECTION INTRAVENOUS at 12:51

## 2017-07-20 NOTE — MR AVS SNAPSHOT
After Visit Summary   7/20/2017    Tosin Nina    MRN: [id number]           Patient Information     Date Of Birth          1945        Visit Information        Provider Department      7/20/2017 11:30 AM RH INFUSION CHAIR 7 [facility]        Today's Diagnoses     History of ovarian cancer    -  1    Chemotherapy-induced neutropenia (H)        Ovarian cancer, unspecified laterality (H)          Care Instructions    Please take Neupogen as instructed. Has been scheduled Danay Samson            Follow-ups after your visit        Your next 10 appointments already scheduled     Jul 22, 2017 10:00 AM CDT   IV Infusion with RH OP PROCEDURE RN#2   [facility] Outpatient Procedures ([facility])    [address]  [facility] [address]   [phone]            Jul 23, 2017 10:00 AM CDT   IV Infusion with RH OP PROCEDURE RN#2   [facility] Outpatient Procedures ([facility])    [address]  [facility] [address]   [phone]            Jul 24, 2017  1:00 PM CDT   Level 1 with RH INFUSION CHAIR 4   [facility] ([facility])    [facility]  [address]  [facility] [address]   [phone]            Aug 10, 2017 11:00 AM CDT   Level 3 with RH INFUSION CHAIR 11   [facility] ([facility])    [facility]  [address]  [facility] [address]   [phone]            Aug 10, 2017 11:30 AM CDT   Return Visit with SALVADOR Gil [facility] ([facility])    [facility]  [address]  [facility] [address]   [phone]            Aug 31, 2017 11:30 AM CDT   Level 3 with RH INFUSION CHAIR 7   [facility] ([facility]  "Hospital)    [facility]  [address]  [place] [address]   [phone]            Sep 26, 2017  9:00 AM CDT   New Visit with Kelsie Nguyen    Cancer Risk Management Program ([facility])    [facility]  [address]  [place] [address]   [phone]              Who to contact     If you have questions or need follow up information about today's clinic visit or your schedule please contact [facility] directly at [phone].  Normal or non-critical lab and imaging results will be communicated to you by RocketOzhart, letter or phone within 4 business days after the clinic has received the results. If you do not hear from us within 7 days, please contact the clinic through RocketOzhart or phone. If you have a critical or abnormal lab result, we will notify you by phone as soon as possible.  Submit refill requests through Red Karaoke or call your pharmacy and they will forward the refill request to us. Please allow 3 business days for your refill to be completed.          Additional Information About Your Visit        MyCharDINKlife Information     Red Karaoke lets you send messages to your doctor, view your test results, renew your prescriptions, schedule appointments and more. To sign up, go to [email or web address] . Click on \"Log in\" on the left side of the screen, which will take you to the Welcome page. Then click on \"Sign up Now\" on the right side of the page.     You will be asked to enter the access code listed below, as well as some personal information. Please follow the directions to create your username and password.     Your access code is: [email or web address]  Expires: 8/10/2017 11:53 AM     Your access code will  in 90 days. If you need help or a new code, please call your [place] clinic or [phone].        Care EveryWhere ID     This is your Care EveryWhere ID. This could be used by other " organizations to access your [place] medical records  [id number]        Your Vitals Were     Pulse Temperature Respirations             72 98.2  F (36.8  C) (Tympanic) 20          Blood Pressure from Last 3 Encounters:   07/20/17 120/48   07/13/17 103/70   07/01/17 120/66    Weight from Last 3 Encounters:   06/29/17 77.7 kg (171 lb 4.8 oz)   06/22/17 74.4 kg (164 lb)   06/20/17 75.3 kg (165 lb 14.4 oz)              We Performed the Following          CBC with platelets differential     Comprehensive metabolic panel     Magnesium          Today's Medication Changes          These changes are accurate as of: 7/20/17  3:34 PM.  If you have any questions, ask your nurse or doctor.               These medicines have changed or have updated prescriptions.        Dose/Directions    Vitamin D (Cholecalciferol) 1000 UNITS Tabs   This may have changed:  additional instructions   Used for:  Paresthesias        Dose:  2000 Units   Take 2,000 Units by mouth daily   Quantity:  60 tablet   Refills:  0                Primary Care Provider Office Phone # Fax #    Esther Back [phone]       [facility] [address] [place] [address]        Equal Access to Services     LESLEY CUELLO AH: Hadlidia Alves, waharikada juan, qaybta kaalmada adeeboni, hetal long. So [facility] [phone].    ATENCIÓN: Si habla español, tiene a alarcon disposición servicios gratuitos de asistencia lingüística. Llame al [phone].    We comply with applicable federal civil rights laws and [place] laws. We do not discriminate on the basis of race, color, national origin, age, disability sex, sexual orientation or gender identity.            Thank you!     Thank you for choosing [facility]  for your care. Our goal is always to provide you with excellent care. Hearing back from our patients is one way we can continue to improve our services.  Please take a few minutes to complete the written survey that you may receive in the mail after your visit with us. Thank you!             Your Updated Medication List - Protect others around you: Learn how to safely use, store and throw away your medicines at www.disposemymeds.org.          This list is accurate as of: 7/20/17  3:34 PM.  Always use your most recent med list.                   Brand Name Dispense Instructions for use Diagnosis    calcium carbonate 500 MG chewable tablet    TUMS     Take 1 chew tab by mouth daily as needed for heartburn        enoxaparin 80 MG/0.8ML injection    LOVENOX    60 Syringe    Inject 0.6 mLs (60 mg) Subcutaneous every 12 hours    Other pulmonary embolism without acute cor pulmonale, unspecified chronicity (H)       ESCITALOPRAM OXALATE PO      Take 10 mg by mouth daily        hydrochlorothiazide 25 MG tablet    HYDRODIURIL     Take 25 mg by mouth daily    Ovarian cancer, unspecified laterality (H)       levothyroxine 125 MCG tablet    SYNTHROID    30 tablet    Take 1 tablet (125 mcg) by mouth daily    Other pulmonary embolism without acute cor pulmonale, unspecified chronicity (H)       losartan 50 MG tablet    COZAAR     Take 25 mg by mouth every morning        polyethylene glycol Packet    MIRALAX/GLYCOLAX    7 packet    Take 17 g by mouth daily    Ovarian cancer, unspecified laterality (H)       Potassium Chloride ER 20 MEQ Tbcr     30 tablet    Take 1 tablet (20 mEq) by mouth daily    Hypokalemia       PRILOSEC PO      Take 20 mg by mouth every morning        senna-docusate 8.6-50 MG per tablet    SENOKOT-S;PERICOLACE    60 tablet    Take 1-2 tablets by mouth 2 times daily Hold for loose stools    Ovarian cancer, unspecified laterality (H)       Vitamin D (Cholecalciferol) 1000 UNITS Tabs     60 tablet    Take 2,000 Units by mouth daily    Paresthesias

## 2017-07-20 NOTE — PROGRESS NOTES
Gave patient Cidra zoraida Treviño completed and signed paperwork to [place].  Copy made and sent for scanning.  Henrik Martins, RN, BSN, OCN  [facility] [place]  Patient Care Coordinator

## 2017-07-20 NOTE — PROGRESS NOTES
Infusion Nursing Note:  Tosin Nina presents today for taxol/carbo.    Patient seen by provider today: Yes: Jyoti stepped into infusion to check pt abdomen   present during visit today: Not Applicable.    Note: in past two days, trista umbilical area has become tender.  Not red, not warm, no warmness or swelling.  No temp.  Holds arm against upper belly and it feels better.  Jyoti ordered Neupogen SQ the next 3 days, though the current orders indicate 4 days.    Intravenous Access:  Lab draw site R chest, Needle type schmitt, Gauge 20.  Labs drawn without difficulty.  Implanted Port.    Treatment Conditions:  Lab Results   Component Value Date    HGB 10.5 07/20/2017     Lab Results   Component Value Date    WBC 8.1 07/20/2017      Lab Results   Component Value Date    ANEU 6.1 07/20/2017     Lab Results   Component Value Date     07/20/2017      Lab Results   Component Value Date     07/20/2017                   Lab Results   Component Value Date    POTASSIUM 3.5 07/20/2017           Lab Results   Component Value Date    MAG 1.7 07/20/2017            Lab Results   Component Value Date    CR 0.55 07/20/2017                   Lab Results   Component Value Date    PATTI 8.8 07/20/2017                Lab Results   Component Value Date    BILITOTAL 0.3 07/20/2017           Lab Results   Component Value Date    ALBUMIN 2.9 07/20/2017                    Lab Results   Component Value Date    ALT 18 07/20/2017           Lab Results   Component Value Date    AST 16 07/20/2017     Results reviewed, labs MET treatment parameters, ok to proceed with treatment.          Post Infusion Assessment:  Patient tolerated infusion without incident.  Blood return noted pre and post infusion.  Site patent and intact, free from redness, edema or discomfort.  No evidence of extravasations.  Access discontinued per protocol.    Discharge Plan:   Patient and/or family verbalized understanding of discharge instructions  and all questions answered.  Copy of AVS reviewed with patient and/or family.  Patient will return 8/10 for next appointment.  Patient discharged in stable condition accompanied by: self and .  Departure Mode: Wheelchair.    Toshia Morrow RN

## 2017-07-21 ENCOUNTER — INFUSION THERAPY VISIT (OUTPATIENT)
Dept: INFUSION THERAPY | Facility: CLINIC | Age: 72
End: 2017-07-21
Attending: OBSTETRICS & GYNECOLOGY
Payer: COMMERCIAL

## 2017-07-21 VITALS — SYSTOLIC BLOOD PRESSURE: 107 MMHG | DIASTOLIC BLOOD PRESSURE: 67 MMHG | HEART RATE: 86 BPM | RESPIRATION RATE: 18 BRPM

## 2017-07-21 DIAGNOSIS — C56.9 OVARIAN CANCER, UNSPECIFIED LATERALITY (H): ICD-10-CM

## 2017-07-21 DIAGNOSIS — Z41.8 ENCOUNTER FOR OTHER PROCEDURES FOR PURPOSES OTHER THAN REMEDYING HEALTH STATE: Primary | ICD-10-CM

## 2017-07-21 PROCEDURE — 96372 THER/PROPH/DIAG INJ SC/IM: CPT

## 2017-07-21 PROCEDURE — 25000128 H RX IP 250 OP 636: Performed by: NURSE PRACTITIONER

## 2017-07-21 RX ADMIN — FILGRASTIM 300 MCG: 300 INJECTION, SOLUTION INTRAVENOUS; SUBCUTANEOUS at 15:20

## 2017-07-21 NOTE — MR AVS SNAPSHOT
After Visit Summary   7/21/2017    Tosin Nina    MRN: [id number]           Patient Information     Date Of Birth          1945        Visit Information        Provider Department      7/21/2017 3:00 PM RH INFUSION CHAIR 7 [facility]        Today's Diagnoses     Encounter for other procedures for purposes other than remedying health state    -  1    Ovarian cancer, unspecified laterality (H)           Follow-ups after your visit        Your next 10 appointments already scheduled     Jul 22, 2017  9:00 AM CDT   IV Infusion with RH OP PROCEDURE RN#2   [facility] Outpatient Procedures ([facility])    [address]  [facility] [address]   [phone]            Jul 23, 2017  9:00 AM CDT   IV Infusion with RH OP PROCEDURE RN#2   [facility] Outpatient Procedures ([facility])    [address]  [facility] [address]   [phone]            Jul 27, 2017 11:00 AM CDT   Level 3 with RH INFUSION CHAIR 4   [facility] ([facility])    [facility] [place]  [address]  [facility] [address]   [phone]            Aug 03, 2017  9:00 AM CDT   Level 3 with RH INFUSION CHAIR 11   [facility] ([facility])    [facility]  [address]  [facility] [address]   [phone]            Aug 10, 2017 11:00 AM CDT   Level 4 with RH INFUSION CHAIR 11   [facility] ([facility])    [facility]  [address]  [facility] [address]   [phone]            Aug 10, 2017 11:30 AM CDT   Return Visit with SALVADOR Gil CNP   [facility] ([facility])    [facility]  [address]  [facility] [address]  "  [phone]            Sep 26, 2017  9:00 AM CDT   New Visit with Kelsie Nguyen GC   Cancer Risk Management Program ([facility])    [facility]  [address] [place] [address] [place] [facility] [address] [phone]              Who to contact     If you have questions or need follow up information about today's clinic visit or your schedule please contact [facility] directly at [phone].  Normal or non-critical lab and imaging results will be communicated to you by MyChart, letter or phone within 4 business days after the clinic has received the results. If you do not hear from us within 7 days, please contact the clinic through Wireless Safetyhart or phone. If you have a critical or abnormal lab result, we will notify you by phone as soon as possible.  Submit refill requests through [employer] or call your pharmacy and they will forward the refill request to us. Please allow 3 business days for your refill to be completed.          Additional Information About Your Visit        MyCharNuve Information     [employer] lets you send messages to your doctor, view your test results, renew your prescriptions, schedule appointments and more. To sign up, go to [email or web address] . Click on \"Log in\" on the left side of the screen, which will take you to the Welcome page. Then click on \"Sign up Now\" on the right side of the page.     You will be asked to enter the access code listed below, as well as some personal information. Please follow the directions to create your username and password.     Your access code is: [email or web address]  Expires: 8/10/2017 11:53 AM     Your access code will  in 90 days. If you need help or a new code, please call your [place] clinic or [phone].        Care EveryWhere ID     This is your Care EveryWhere ID. This could be used by other organizations to access your [place] medical records  [id number]        Your Vitals Were     " Pulse Respirations                86 18           Blood Pressure from Last 3 Encounters:   07/21/17 107/67   07/20/17 120/48   07/13/17 103/70    Weight from Last 3 Encounters:   06/29/17 77.7 kg (171 lb 4.8 oz)   06/22/17 74.4 kg (164 lb)   06/20/17 75.3 kg (165 lb 14.4 oz)              Today, you had the following     No orders found for display         Today's Medication Changes          These changes are accurate as of: 7/21/17  3:25 PM.  If you have any questions, ask your nurse or doctor.               These medicines have changed or have updated prescriptions.        Dose/Directions    Vitamin D (Cholecalciferol) 1000 UNITS Tabs   This may have changed:  additional instructions   Used for:  Paresthesias        Dose:  2000 Units   Take 2,000 Units by mouth daily   Quantity:  60 tablet   Refills:  0                Primary Care Provider Office Phone # Fax #    Esther Back [phone]       [place] [address] [address] [address]        Equal Access to Services     [facility] AH: Hadii zita fields hadasho Soomaali, waaxda luqadaha, qaybta kaalmada adeegyamarian, hetal regan . So [facility] [phone].    ATENCIÓN: Si habla español, tiene a alarcon disposición servicios gratuitos de asistencia lingüística. [facility] [phone].    We comply with applicable federal civil rights laws and [place] laws. We do not discriminate on the basis of race, color, national origin, age, disability sex, sexual orientation or gender identity.            Thank you!     Thank you for choosing [facility]  for your care. Our goal is always to provide you with excellent care. Hearing back from our patients is one way we can continue to improve our services. Please take a few minutes to complete the written survey that you may receive in the mail after your visit with us. Thank you!             Your Updated Medication List - Protect others around  you: Learn how to safely use, store and throw away your medicines at www.disposemymeds.org.          This list is accurate as of: 7/21/17  3:25 PM.  Always use your most recent med list.                   Brand Name Dispense Instructions for use Diagnosis    calcium carbonate 500 MG chewable tablet    TUMS     Take 1 chew tab by mouth daily as needed for heartburn        enoxaparin 80 MG/0.8ML injection    LOVENOX    60 Syringe    Inject 0.6 mLs (60 mg) Subcutaneous every 12 hours    Other pulmonary embolism without acute cor pulmonale, unspecified chronicity (H)       ESCITALOPRAM OXALATE PO      Take 10 mg by mouth daily        hydrochlorothiazide 25 MG tablet    HYDRODIURIL     Take 25 mg by mouth daily    Ovarian cancer, unspecified laterality (H)       levothyroxine 125 MCG tablet    SYNTHROID    30 tablet    Take 1 tablet (125 mcg) by mouth daily    Other pulmonary embolism without acute cor pulmonale, unspecified chronicity (H)       losartan 50 MG tablet    COZAAR     Take 25 mg by mouth every morning        polyethylene glycol Packet    MIRALAX/GLYCOLAX    7 packet    Take 17 g by mouth daily    Ovarian cancer, unspecified laterality (H)       Potassium Chloride ER 20 MEQ Tbcr     30 tablet    Take 1 tablet (20 mEq) by mouth daily    Hypokalemia       PRILOSEC PO      Take 20 mg by mouth every morning        senna-docusate 8.6-50 MG per tablet    SENOKOT-S;PERICOLACE    60 tablet    Take 1-2 tablets by mouth 2 times daily Hold for loose stools    Ovarian cancer, unspecified laterality (H)       Vitamin D (Cholecalciferol) 1000 UNITS Tabs     60 tablet    Take 2,000 Units by mouth daily    Paresthesias

## 2017-07-21 NOTE — PROGRESS NOTES
Infusion Nursing Note:  Tosin Barbernidia presents today for Neupogen.    Patient seen by provider today: No   present during visit today: Not Applicable.    Note: Talked with Jyoti Lopez regarding patient's concerns about getting Neupogen this week.  Miscommunication surrounding chemo schedule.  Patient receives Carbo/Taxol day 1, Taxol day 8, and Taxol day 15.  This is why patient is needing neupogen at this time.  Patient and  verbalize understanding.  Also talked with Jyoti Lopez regarding patient's bleeding and clots.  Instructed patient if filling one pad every hour x 3 then advised to go to the ER.  Patient and  verbalized understanding.    Intravenous Access:  No Intravenous access/labs at this visit.    Treatment Conditions:  Not Applicable.      Post Infusion Assessment:  Patient tolerated injection without incident.    Discharge Plan:   Discharge instructions reviewed with: Patient and Family.  Patient and/or family verbalized understanding of discharge instructions and all questions answered.  AVS to patient via [employer].  Patient will return 7/27/17 for next appointment.   Patient discharged in stable condition accompanied by: .  Departure Mode: Wheelchair.    Olivia Golden RN

## 2017-07-22 ENCOUNTER — HOSPITAL ENCOUNTER (OUTPATIENT)
Dept: OUTPATIENT PROCEDURES | Facility: CLINIC | Age: 72
Discharge: HOME | End: 2017-07-22
Attending: NURSE PRACTITIONER | Admitting: NURSE PRACTITIONER
Payer: COMMERCIAL

## 2017-07-22 VITALS
TEMPERATURE: 98 F | DIASTOLIC BLOOD PRESSURE: 65 MMHG | SYSTOLIC BLOOD PRESSURE: 107 MMHG | RESPIRATION RATE: 18 BRPM | HEART RATE: 93 BPM

## 2017-07-22 DIAGNOSIS — C56.9 OVARIAN CANCER, UNSPECIFIED LATERALITY (H): ICD-10-CM

## 2017-07-22 DIAGNOSIS — T45.1X5A CHEMOTHERAPY-INDUCED NEUTROPENIA (H): ICD-10-CM

## 2017-07-22 DIAGNOSIS — D70.1 CHEMOTHERAPY-INDUCED NEUTROPENIA (H): ICD-10-CM

## 2017-07-22 DIAGNOSIS — Z41.8 ENCOUNTER FOR OTHER PROCEDURES FOR PURPOSES OTHER THAN REMEDYING HEALTH STATE: ICD-10-CM

## 2017-07-22 PROCEDURE — 96372 THER/PROPH/DIAG INJ SC/IM: CPT

## 2017-07-22 PROCEDURE — 25000128 H RX IP 250 OP 636: Performed by: NURSE PRACTITIONER

## 2017-07-22 RX ADMIN — FILGRASTIM 300 MCG: 300 INJECTION, SOLUTION INTRAVENOUS; SUBCUTANEOUS at 09:05

## 2017-07-23 ENCOUNTER — HOSPITAL ENCOUNTER (OUTPATIENT)
Dept: OUTPATIENT PROCEDURES | Facility: CLINIC | Age: 72
Discharge: HOME | End: 2017-07-23
Attending: OBSTETRICS & GYNECOLOGY | Admitting: OBSTETRICS & GYNECOLOGY
Payer: COMMERCIAL

## 2017-07-23 VITALS
SYSTOLIC BLOOD PRESSURE: 101 MMHG | RESPIRATION RATE: 18 BRPM | TEMPERATURE: 98.6 F | DIASTOLIC BLOOD PRESSURE: 63 MMHG | HEART RATE: 84 BPM

## 2017-07-23 DIAGNOSIS — T45.1X5A CHEMOTHERAPY-INDUCED NEUTROPENIA (H): ICD-10-CM

## 2017-07-23 DIAGNOSIS — C56.9 OVARIAN CANCER, UNSPECIFIED LATERALITY (H): ICD-10-CM

## 2017-07-23 DIAGNOSIS — D70.1 CHEMOTHERAPY-INDUCED NEUTROPENIA (H): ICD-10-CM

## 2017-07-23 DIAGNOSIS — Z41.8 ENCOUNTER FOR OTHER PROCEDURES FOR PURPOSES OTHER THAN REMEDYING HEALTH STATE: ICD-10-CM

## 2017-07-23 PROCEDURE — 25000128 H RX IP 250 OP 636: Performed by: NURSE PRACTITIONER

## 2017-07-23 PROCEDURE — 96372 THER/PROPH/DIAG INJ SC/IM: CPT

## 2017-07-23 RX ADMIN — FILGRASTIM 300 MCG: 300 INJECTION, SOLUTION INTRAVENOUS; SUBCUTANEOUS at 08:58

## 2017-07-23 NOTE — ADDENDUM NOTE
Encounter addended by: Bouchra Jeong RN on: 7/23/2017  9:42 AM<BR>     Actions taken: Sign clinical note

## 2017-07-23 NOTE — PROGRESS NOTES
Infusion Nursing Note:  Tosin Nina presents today for neupogen injection.    Patient seen by provider today: No       Tolerated injection.       Discharge Plan:   Discharge instructions reviewed with: Patient and Family.  Patient and/or family verbalized understanding of discharge instructions and all questions answered.     Patient discharged in stable condition accompanied by:   Departure Mode: Ambulatory.    Bouchra Jeong RN

## 2017-07-27 ENCOUNTER — INFUSION THERAPY VISIT (OUTPATIENT)
Dept: INFUSION THERAPY | Facility: CLINIC | Age: 72
End: 2017-07-27
Attending: OBSTETRICS & GYNECOLOGY
Payer: COMMERCIAL

## 2017-07-27 ENCOUNTER — HOSPITAL ENCOUNTER (OUTPATIENT)
Facility: CLINIC | Age: 72
Setting detail: SPECIMEN
Discharge: HOME | End: 2017-07-27
Attending: NURSE PRACTITIONER | Admitting: NURSE PRACTITIONER
Payer: COMMERCIAL

## 2017-07-27 VITALS
RESPIRATION RATE: 18 BRPM | DIASTOLIC BLOOD PRESSURE: 75 MMHG | HEART RATE: 79 BPM | TEMPERATURE: 96.8 F | SYSTOLIC BLOOD PRESSURE: 140 MMHG

## 2017-07-27 DIAGNOSIS — C56.9 OVARIAN CANCER, UNSPECIFIED LATERALITY (H): ICD-10-CM

## 2017-07-27 DIAGNOSIS — T45.1X5A CHEMOTHERAPY-INDUCED NEUTROPENIA (H): Primary | ICD-10-CM

## 2017-07-27 DIAGNOSIS — D70.1 CHEMOTHERAPY-INDUCED NEUTROPENIA (H): Primary | ICD-10-CM

## 2017-07-27 LAB
BASOPHILS # BLD AUTO: 0 10E9/L (ref 0–0.2)
BASOPHILS NFR BLD AUTO: 0.8 %
DIFFERENTIAL METHOD BLD: ABNORMAL
EOSINOPHIL # BLD AUTO: 0 10E9/L (ref 0–0.7)
EOSINOPHIL NFR BLD AUTO: 1.1 %
ERYTHROCYTE [DISTWIDTH] IN BLOOD BY AUTOMATED COUNT: 15.9 % (ref 10–15)
HCT VFR BLD AUTO: 31.4 % (ref 35–47)
HGB BLD-MCNC: 10.4 G/DL (ref 11.7–15.7)
IMM GRANULOCYTES # BLD: 0 10E9/L (ref 0–0.4)
IMM GRANULOCYTES NFR BLD: 0.4 %
LYMPHOCYTES # BLD AUTO: 1 10E9/L (ref 0.8–5.3)
LYMPHOCYTES NFR BLD AUTO: 37.4 %
MAGNESIUM SERPL-MCNC: 1.6 MG/DL (ref 1.6–2.3)
MCH RBC QN AUTO: 33.8 PG (ref 26.5–33)
MCHC RBC AUTO-ENTMCNC: 33.1 G/DL (ref 31.5–36.5)
MCV RBC AUTO: 102 FL (ref 78–100)
MONOCYTES # BLD AUTO: 0.2 10E9/L (ref 0–1.3)
MONOCYTES NFR BLD AUTO: 7.6 %
NEUTROPHILS # BLD AUTO: 1.4 10E9/L (ref 1.6–8.3)
NEUTROPHILS NFR BLD AUTO: 52.7 %
NRBC # BLD AUTO: 0 10*3/UL
NRBC BLD AUTO-RTO: 0 /100
PLATELET # BLD AUTO: 184 10E9/L (ref 150–450)
POTASSIUM SERPL-SCNC: 3.2 MMOL/L (ref 3.4–5.3)
RBC # BLD AUTO: 3.08 10E12/L (ref 3.8–5.2)
WBC # BLD AUTO: 2.6 10E9/L (ref 4–11)

## 2017-07-27 PROCEDURE — 25000132 ZZH RX MED GY IP 250 OP 250 PS 637: Performed by: OBSTETRICS & GYNECOLOGY

## 2017-07-27 PROCEDURE — 96367 TX/PROPH/DG ADDL SEQ IV INF: CPT

## 2017-07-27 PROCEDURE — 85025 COMPLETE CBC W/AUTO DIFF WBC: CPT | Performed by: NURSE PRACTITIONER

## 2017-07-27 PROCEDURE — 25000125 ZZHC RX 250: Performed by: NURSE PRACTITIONER

## 2017-07-27 PROCEDURE — 83735 ASSAY OF MAGNESIUM: CPT | Performed by: NURSE PRACTITIONER

## 2017-07-27 PROCEDURE — 96409 CHEMO IV PUSH SNGL DRUG: CPT

## 2017-07-27 PROCEDURE — 96375 TX/PRO/DX INJ NEW DRUG ADDON: CPT

## 2017-07-27 PROCEDURE — 84132 ASSAY OF SERUM POTASSIUM: CPT | Performed by: NURSE PRACTITIONER

## 2017-07-27 PROCEDURE — S0028 INJECTION, FAMOTIDINE, 20 MG: HCPCS | Performed by: NURSE PRACTITIONER

## 2017-07-27 PROCEDURE — 25000128 H RX IP 250 OP 636: Performed by: NURSE PRACTITIONER

## 2017-07-27 RX ORDER — HEPARIN SODIUM (PORCINE) LOCK FLUSH IV SOLN 100 UNIT/ML 100 UNIT/ML
500 SOLUTION INTRAVENOUS EVERY 8 HOURS
Status: DISCONTINUED | OUTPATIENT
Start: 2017-07-27 | End: 2017-07-27 | Stop reason: HOSPADM

## 2017-07-27 RX ORDER — POTASSIUM CHLORIDE 1500 MG/1
40 TABLET, EXTENDED RELEASE ORAL
Status: DISCONTINUED | OUTPATIENT
Start: 2017-07-27 | End: 2017-07-27 | Stop reason: HOSPADM

## 2017-07-27 RX ADMIN — POTASSIUM CHLORIDE 40 MEQ: 1500 TABLET, EXTENDED RELEASE ORAL at 13:02

## 2017-07-27 RX ADMIN — SODIUM CHLORIDE 250 ML: 9 INJECTION, SOLUTION INTRAVENOUS at 12:23

## 2017-07-27 RX ADMIN — DIPHENHYDRAMINE HYDROCHLORIDE 25 MG: 50 INJECTION, SOLUTION INTRAMUSCULAR; INTRAVENOUS at 12:50

## 2017-07-27 RX ADMIN — SODIUM CHLORIDE, PRESERVATIVE FREE 500 UNITS: 5 INJECTION INTRAVENOUS at 13:30

## 2017-07-27 RX ADMIN — FAMOTIDINE 40 MG: 10 INJECTION INTRAVENOUS at 12:36

## 2017-07-27 RX ADMIN — DEXAMETHASONE SODIUM PHOSPHATE 12 MG: 10 INJECTION, SOLUTION INTRAMUSCULAR; INTRAVENOUS at 12:24

## 2017-07-27 RX ADMIN — PACLITAXEL 135 MG: 6 INJECTION, SOLUTION INTRAVENOUS at 13:21

## 2017-07-27 NOTE — MR AVS SNAPSHOT
After Visit Summary   7/27/2017    Tosin Nina    MRN: [id number]           Patient Information     Date Of Birth          1945        Visit Information        Provider Department      7/27/2017 11:00 AM RH INFUSION CHAIR 4 [facility]        Today's Diagnoses     Chemotherapy-induced neutropenia (H)    -  1    Ovarian cancer, unspecified laterality (H)          Care Instructions    Please schedule neupogen          Follow-ups after your visit        Your next 10 appointments already scheduled     Jul 28, 2017  2:30 PM CDT   Level 1 with RH INFUSION CHAIR 4   [facility] ([facility])    [facility]  [address]  [facility] [address]   [phone]            Jul 29, 2017 10:00 AM CDT   IV Infusion with RH OP PROCEDURE RN#2   [facility] Outpatient Procedures ([facility])    [address]  [facility] [address]   [phone]            Jul 30, 2017 10:00 AM CDT   IV Infusion with RH OP PROCEDURE RN#2   [facility]patient [facility] ([facility])    [address]  [facility] [address]   [phone]            Jul 31, 2017  2:00 PM CDT   Level 1 with RH INFUSION CHAIR 8   [facility] ([facility])    [facility] [place]  [address]  [place] [address]   [phone]            Aug 03, 2017  9:00 AM CDT   Level 3 with RH INFUSION CHAIR 11   [facility] ([facility])    [facility] [address]  [place] [address]   [phone]            Aug 10, 2017 11:00 AM CDT   Level 4 with RH INFUSION CHAIR 12   [facility] ([facility])    [facility] [address]  [place]  "[place] [address]   [phone]            Aug 10, 2017 11:30 AM CDT   Return Visit with SALVADOR Gil CNP   [facility] ([facility])    [facility]  [address]  [place] [address]   [phone]            Sep 26, 2017  9:00 AM CDT   New Visit with Kelsie Nguyen GC   Cancer Risk Management Program ([facility])    [facility]  [address]  [place] [address]   [phone]              Who to contact     If you have questions or need follow up information about today's clinic visit or your schedule please contact [facility] directly at [phone].  Normal or non-critical lab and imaging results will be communicated to you by AgSquaredhart, letter or phone within 4 business days after the clinic has received the results. If you do not hear from us within 7 days, please contact the clinic through AgSquaredhart or phone. If you have a critical or abnormal lab result, we will notify you by phone as soon as possible.  Submit refill requests through UseTogether or call your pharmacy and they will forward the refill request to us. Please allow 3 business days for your refill to be completed.          Additional Information About Your Visit        AgSquaredhart Information     UseTogether lets you send messages to your doctor, view your test results, renew your prescriptions, schedule appointments and more. To sign up, go to [email or web address] . Click on \"Log in\" on the left side of the screen, which will take you to the Welcome page. Then click on \"Sign up Now\" on the right side of the page.     You will be asked to enter the access code listed below, as well as some personal information. Please follow the directions to create your username and password.     Your access code is: [email or web address]  Expires: 8/10/2017 11:53 AM     Your access code will  in 90 days. If you " need help or a new code, please call your [place] clinic or [phone].        Care EveryWhere ID     This is your Care EveryWhere ID. This could be used by other organizations to access your [place] medical records  [id number]        Your Vitals Were     Pulse Temperature Respirations             79 96.8  F (36  C) (Tympanic) 18          Blood Pressure from Last 3 Encounters:   07/27/17 140/75   07/23/17 101/63   07/22/17 107/65    Weight from Last 3 Encounters:   06/29/17 77.7 kg (171 lb 4.8 oz)   06/22/17 74.4 kg (164 lb)   06/20/17 75.3 kg (165 lb 14.4 oz)              We Performed the Following     CBC with platelets differential     Magnesium     Potassium          Today's Medication Changes          These changes are accurate as of: 7/27/17  2:27 PM.  If you have any questions, ask your nurse or doctor.               These medicines have changed or have updated prescriptions.        Dose/Directions    Vitamin D (Cholecalciferol) 1000 UNITS Tabs   This may have changed:  additional instructions   Used for:  Paresthesias        Dose:  2000 Units   Take 2,000 Units by mouth daily   Quantity:  60 tablet   Refills:  0                Primary Care Provider Office Phone # Fax #    Esther Back [phone]       [facility] [address] [place] [address]        Equal Access to Services     [facility]: Hadii zita fields hadasho Soomaali, waaxda luqadaha, qaybta kaalmada monica, hetal long. So [facility] [phone].    ATENCIÓN: Si habla español, tiene a alarcon disposición servicios gratuitos de asistencia lingüística. Llame al [phone].    We comply with applicable federal civil rights laws and [place] laws. We do not discriminate on the basis of race, color, national origin, age, disability sex, sexual orientation or gender identity.            Thank you!     Thank you for choosing [facility]  for your care. Our  goal is always to provide you with excellent care. Hearing back from our patients is one way we can continue to improve our services. Please take a few minutes to complete the written survey that you may receive in the mail after your visit with us. Thank you!             Your Updated Medication List - Protect others around you: Learn how to safely use, store and throw away your medicines at www.disposemymeds.org.          This list is accurate as of: 7/27/17  2:27 PM.  Always use your most recent med list.                   Brand Name Dispense Instructions for use Diagnosis    calcium carbonate 500 MG chewable tablet    TUMS     Take 1 chew tab by mouth daily as needed for heartburn        enoxaparin 80 MG/0.8ML injection    LOVENOX    60 Syringe    Inject 0.6 mLs (60 mg) Subcutaneous every 12 hours    Other pulmonary embolism without acute cor pulmonale, unspecified chronicity (H)       ESCITALOPRAM OXALATE PO      Take 10 mg by mouth daily        hydrochlorothiazide 25 MG tablet    HYDRODIURIL     Take 25 mg by mouth daily    Ovarian cancer, unspecified laterality (H)       levothyroxine 125 MCG tablet    SYNTHROID    30 tablet    Take 1 tablet (125 mcg) by mouth daily    Other pulmonary embolism without acute cor pulmonale, unspecified chronicity (H)       polyethylene glycol Packet    MIRALAX/GLYCOLAX    7 packet    Take 17 g by mouth daily    Ovarian cancer, unspecified laterality (H)       Potassium Chloride ER 20 MEQ Tbcr     30 tablet    Take 1 tablet (20 mEq) by mouth daily    Hypokalemia       PRILOSEC PO      Take 20 mg by mouth every morning        senna-docusate 8.6-50 MG per tablet    SENOKOT-S;PERICOLACE    60 tablet    Take 1-2 tablets by mouth 2 times daily Hold for loose stools    Ovarian cancer, unspecified laterality (H)       Vitamin D (Cholecalciferol) 1000 UNITS Tabs     60 tablet    Take 2,000 Units by mouth daily    Paresthesias

## 2017-07-27 NOTE — PROGRESS NOTES
Infusion Nursing Note:  Tosin Nina presents today for C4D8 Taxol.    Patient seen by provider today: No   present during visit today: Not Applicable.    Note: Pt had no complaintsPotassium received oral for replacement.today.-pt states she is taking potassium at home.  Neupogen 4 days this week(had only 3 days last week)    Intravenous Access:  Labs drawn without difficulty.  Implanted Port.    Treatment Conditions:  Lab Results   Component Value Date    HGB 10.4 07/27/2017     Lab Results   Component Value Date    WBC 2.6 07/27/2017      Lab Results   Component Value Date    ANEU 1.4 07/27/2017     Lab Results   Component Value Date     07/27/2017      Lab Results   Component Value Date     07/20/2017                   Lab Results   Component Value Date    POTASSIUM 3.2 07/27/2017           Lab Results   Component Value Date    MAG 1.6 07/27/2017            Lab Results   Component Value Date    CR 0.55 07/20/2017                   Lab Results   Component Value Date    PATTI 8.8 07/20/2017                Lab Results   Component Value Date    BILITOTAL 0.3 07/20/2017           Lab Results   Component Value Date    ALBUMIN 2.9 07/20/2017                    Lab Results   Component Value Date    ALT 18 07/20/2017           Lab Results   Component Value Date    AST 16 07/20/2017     Results reviewed, labs did NOT meet treatment parameters: but ok'd by Dr Epps to get treatment.-was < 1.5 ANC  Results reviewed, labs did NOT meet treatment parameters: Potassium replacement needed..  Post Infusion Assessment:  Patient tolerated infusion without incident.  Blood return noted pre and post infusion.  Site patent and intact, free from redness, edema or discomfort.  No evidence of extravasations.  Access discontinued per protocol.    Discharge Plan:   Discharge instructions reviewed with: Patient and Family.  Patient and/or family verbalized understanding of discharge instructions and all  questions answered.  Copy of AVS reviewed with patient and/or family.  Patient will return [facility].  Patient discharged in stable condition accompanied by: self and .  Departure Mode: Wheelchair.    Maru More RN

## 2017-07-28 ENCOUNTER — INFUSION THERAPY VISIT (OUTPATIENT)
Dept: INFUSION THERAPY | Facility: CLINIC | Age: 72
End: 2017-07-28
Attending: NURSE PRACTITIONER
Payer: COMMERCIAL

## 2017-07-28 VITALS
TEMPERATURE: 97.5 F | OXYGEN SATURATION: 97 % | SYSTOLIC BLOOD PRESSURE: 126 MMHG | DIASTOLIC BLOOD PRESSURE: 65 MMHG | HEART RATE: 65 BPM | RESPIRATION RATE: 18 BRPM

## 2017-07-28 DIAGNOSIS — D70.1 CHEMOTHERAPY-INDUCED NEUTROPENIA (H): Primary | ICD-10-CM

## 2017-07-28 DIAGNOSIS — T45.1X5A CHEMOTHERAPY-INDUCED NEUTROPENIA (H): Primary | ICD-10-CM

## 2017-07-28 DIAGNOSIS — C56.9 OVARIAN CANCER, UNSPECIFIED LATERALITY (H): ICD-10-CM

## 2017-07-28 PROCEDURE — 96372 THER/PROPH/DIAG INJ SC/IM: CPT

## 2017-07-28 PROCEDURE — 25000128 H RX IP 250 OP 636: Performed by: NURSE PRACTITIONER

## 2017-07-28 RX ADMIN — FILGRASTIM 300 MCG: 300 INJECTION, SOLUTION INTRAVENOUS; SUBCUTANEOUS at 13:53

## 2017-07-28 NOTE — PROGRESS NOTES
Infusion Nursing Note:  Tosin Nina presents today for Neupogen.    Patient seen by provider today: No   present during visit today: Not Applicable.    Note: N/A.    Intravenous Access:  No Intravenous access/labs at this visit.    Treatment Conditions:  Not Applicable.      Post Infusion Assessment:  Patient tolerated injection without incident.    Discharge Plan:   Discharge instructions reviewed with: Patient and Family.  Patient and/or family verbalized understanding of discharge instructions and all questions answered.  AVS to patient via BigSwerveT.  Patient will return 7/29/2017 for next appointment.   Patient discharged in stable condition accompanied by: son.  Departure Mode: Wheelchair.    Chelsea Garcia RN

## 2017-07-28 NOTE — MR AVS SNAPSHOT
After Visit Summary   7/28/2017    Tosin Nina    MRN: [id number]           Patient Information     Date Of Birth          1945        Visit Information        Provider Department      7/28/2017 2:30 PM RH INFUSION CHAIR 4 [facility]        Today's Diagnoses     Chemotherapy-induced neutropenia (H)    -  1    Ovarian cancer, unspecified laterality (H)           Follow-ups after your visit        Your next 10 appointments already scheduled     Jul 28, 2017  2:30 PM CDT   Level 1 with RH INFUSION CHAIR 4   [facility] ([facility])    [facility] [place] [address]  [facility] [address]   [phone]            Jul 29, 2017 10:00 AM CDT   IV Infusion with RH OP PROCEDURE RN#2   [facility] Outpatient Procedures ([facility])    [address]  [facility] [address]   [phone]            Jul 30, 2017 10:00 AM CDT   IV Infusion with RH OP PROCEDURE RN#2   [facility] Outpatient Procedures ([facility])    [address]  [facility] [address]   [phone]            Jul 31, 2017  2:00 PM CDT   Level 1 with RH INFUSION CHAIR 8   [facility] ([facility])    [facility]  [address]  [facility] [address]   [phone]            Aug 03, 2017  9:00 AM CDT   Level 3 with RH INFUSION CHAIR 11   [facility] ([facility])    [facility] [address]  [place] [address]   [phone]            Aug 10, 2017 11:00 AM CDT   Level 4 with RH INFUSION CHAIR 12   [facility] ([facility])    [facility] [address]  [place] [address]   [phone]            Aug 10, 2017  "11:30 AM CDT   Return Visit with SALVADOR Gil CNP   [facility] ([facility])    [facility]  [address]  [facility] [address] [phone]            Sep 26, 2017  9:00 AM CDT   New Visit with Kelsie Nguyen GC   Cancer Risk Management Program ([facility])    [facility]  [address]  [facility] [address] [phone]              Who to contact     If you have questions or need follow up information about today's clinic visit or your schedule please contact  INFUSION SERVICES directly at [phone].  Normal or non-critical lab and imaging results will be communicated to you by Wildfire Koreahart, letter or phone within 4 business days after the clinic has received the results. If you do not hear from us within 7 days, please contact the clinic through Wildfire Koreahart or phone. If you have a critical or abnormal lab result, we will notify you by phone as soon as possible.  Submit refill requests through Loudeye or call your pharmacy and they will forward the refill request to us. Please allow 3 business days for your refill to be completed.          Additional Information About Your Visit        Wildfire Koreaharkaleo Information     Loudeye lets you send messages to your doctor, view your test results, renew your prescriptions, schedule appointments and more. To sign up, go to [email or web address] . Click on \"Log in\" on the left side of the screen, which will take you to the Welcome page. Then click on \"Sign up Now\" on the right side of the page.     You will be asked to enter the access code listed below, as well as some personal information. Please follow the directions to create your username and password.     Your access code is: [email or web address]  Expires: 8/10/2017 11:53 AM     Your access code will  in 90 days. If you need help or a new code, please call your [place] " clinic or [phone].        Care EveryWhere ID     This is your Care EveryWhere ID. This could be used by other organizations to access your [place] medical records  [id number]        Your Vitals Were     Pulse Temperature Respirations Pulse Oximetry          65 97.5  F (36.4  C) (Tympanic) 18 97%         Blood Pressure from Last 3 Encounters:   07/28/17 126/65   07/27/17 140/75   07/23/17 101/63    Weight from Last 3 Encounters:   06/29/17 77.7 kg (171 lb 4.8 oz)   06/22/17 74.4 kg (164 lb)   06/20/17 75.3 kg (165 lb 14.4 oz)              Today, you had the following     No orders found for display         Today's Medication Changes          These changes are accurate as of: 7/28/17  2:17 PM.  If you have any questions, ask your nurse or doctor.               These medicines have changed or have updated prescriptions.        Dose/Directions    Vitamin D (Cholecalciferol) 1000 UNITS Tabs   This may have changed:  additional instructions   Used for:  Paresthesias        Dose:  2000 Units   Take 2,000 Units by mouth daily   Quantity:  60 tablet   Refills:  0                Primary Care Provider Office Phone # Fax #    Esther Back [phone]       [facility] [address] [place] [address]        Equal Access to Services     [facility] AH: Hadii zita fields hadasho Sofely, waaxda luqadaha, qaybta kaalmada adeegyada, hetal regan . So [facility] [phone].    ATENCIÓN: Si habla español, tiene a alarcon disposición servicios gratuitos de asistencia lingüística. Llame al [phone].    We comply with applicable federal civil rights laws and [place] laws. We do not discriminate on the basis of race, color, national origin, age, disability sex, sexual orientation or gender identity.            Thank you!     Thank you for choosing [facility]  for your care. Our goal is always to provide you with excellent care. Hearing back  from our patients is one way we can continue to improve our services. Please take a few minutes to complete the written survey that you may receive in the mail after your visit with us. Thank you!             Your Updated Medication List - Protect others around you: Learn how to safely use, store and throw away your medicines at www.disposemymeds.org.          This list is accurate as of: 7/28/17  2:17 PM.  Always use your most recent med list.                   Brand Name Dispense Instructions for use Diagnosis    calcium carbonate 500 MG chewable tablet    TUMS     Take 1 chew tab by mouth daily as needed for heartburn        enoxaparin 80 MG/0.8ML injection    LOVENOX    60 Syringe    Inject 0.6 mLs (60 mg) Subcutaneous every 12 hours    Other pulmonary embolism without acute cor pulmonale, unspecified chronicity (H)       ESCITALOPRAM OXALATE PO      Take 10 mg by mouth daily        hydrochlorothiazide 25 MG tablet    HYDRODIURIL     Take 25 mg by mouth daily    Ovarian cancer, unspecified laterality (H)       levothyroxine 125 MCG tablet    SYNTHROID    30 tablet    Take 1 tablet (125 mcg) by mouth daily    Other pulmonary embolism without acute cor pulmonale, unspecified chronicity (H)       polyethylene glycol Packet    MIRALAX/GLYCOLAX    7 packet    Take 17 g by mouth daily    Ovarian cancer, unspecified laterality (H)       Potassium Chloride ER 20 MEQ Tbcr     30 tablet    Take 1 tablet (20 mEq) by mouth daily    Hypokalemia       PRILOSEC PO      Take 20 mg by mouth every morning        senna-docusate 8.6-50 MG per tablet    SENOKOT-S;PERICOLACE    60 tablet    Take 1-2 tablets by mouth 2 times daily Hold for loose stools    Ovarian cancer, unspecified laterality (H)       Vitamin D (Cholecalciferol) 1000 UNITS Tabs     60 tablet    Take 2,000 Units by mouth daily    Paresthesias

## 2017-07-29 ENCOUNTER — HOSPITAL ENCOUNTER (OUTPATIENT)
Dept: OUTPATIENT PROCEDURES | Facility: CLINIC | Age: 72
Discharge: HOME | End: 2017-07-29
Attending: NURSE PRACTITIONER | Admitting: NURSE PRACTITIONER
Payer: COMMERCIAL

## 2017-07-29 VITALS
SYSTOLIC BLOOD PRESSURE: 115 MMHG | TEMPERATURE: 97.8 F | OXYGEN SATURATION: 96 % | DIASTOLIC BLOOD PRESSURE: 68 MMHG | RESPIRATION RATE: 18 BRPM

## 2017-07-29 DIAGNOSIS — C56.9 OVARIAN CANCER, UNSPECIFIED LATERALITY (H): ICD-10-CM

## 2017-07-29 DIAGNOSIS — D70.1 CHEMOTHERAPY-INDUCED NEUTROPENIA (H): ICD-10-CM

## 2017-07-29 DIAGNOSIS — T45.1X5A CHEMOTHERAPY-INDUCED NEUTROPENIA (H): ICD-10-CM

## 2017-07-29 DIAGNOSIS — Z41.8 ENCOUNTER FOR OTHER PROCEDURES FOR PURPOSES OTHER THAN REMEDYING HEALTH STATE: ICD-10-CM

## 2017-07-29 PROCEDURE — 96372 THER/PROPH/DIAG INJ SC/IM: CPT

## 2017-07-29 PROCEDURE — 25000128 H RX IP 250 OP 636: Performed by: NURSE PRACTITIONER

## 2017-07-29 RX ADMIN — FILGRASTIM 300 MCG: 300 INJECTION, SOLUTION INTRAVENOUS; SUBCUTANEOUS at 10:29

## 2017-07-29 NOTE — PROGRESS NOTES
Injection Nursing Note:  Tosin Nina presents today for her neupogen injection.  VSS prior to injection.     Injection given in left arm, subq.      Post Infusion Assessment:  Patient tolerated infusion without incident.  Site free from redness, edema or discomfort.    Discharge Plan:   Patient discharged in stable condition accompanied by: self.  Departure Mode: Ambulatory.     Dedra Nieto LPN

## 2017-07-30 ENCOUNTER — HOSPITAL ENCOUNTER (OUTPATIENT)
Dept: OUTPATIENT PROCEDURES | Facility: CLINIC | Age: 72
Discharge: HOME | End: 2017-07-30
Attending: NURSE PRACTITIONER | Admitting: NURSE PRACTITIONER
Payer: COMMERCIAL

## 2017-07-30 VITALS
RESPIRATION RATE: 18 BRPM | OXYGEN SATURATION: 100 % | TEMPERATURE: 99 F | DIASTOLIC BLOOD PRESSURE: 66 MMHG | SYSTOLIC BLOOD PRESSURE: 137 MMHG

## 2017-07-30 DIAGNOSIS — C56.9 OVARIAN CANCER, UNSPECIFIED LATERALITY (H): ICD-10-CM

## 2017-07-30 DIAGNOSIS — D70.1 CHEMOTHERAPY-INDUCED NEUTROPENIA (H): ICD-10-CM

## 2017-07-30 DIAGNOSIS — T45.1X5A CHEMOTHERAPY-INDUCED NEUTROPENIA (H): ICD-10-CM

## 2017-07-30 PROCEDURE — 96372 THER/PROPH/DIAG INJ SC/IM: CPT

## 2017-07-30 PROCEDURE — 25000128 H RX IP 250 OP 636: Performed by: NURSE PRACTITIONER

## 2017-07-30 RX ADMIN — FILGRASTIM 300 MCG: 300 INJECTION, SOLUTION INTRAVENOUS; SUBCUTANEOUS at 10:38

## 2017-07-30 NOTE — PROGRESS NOTES
Injection Nursing Note:  Tosin Nina presents today for her neupogen injection.     Injection given in left arm, subq.     Post Infusion Assessment:  Patient tolerated infusion without incident.  Site free from redness, edema or discomfort.       Discharge Plan:   Patient discharged in stable condition accompanied by:  in wheelchair.      Dedra Nieto LPN

## 2017-07-31 ENCOUNTER — INFUSION THERAPY VISIT (OUTPATIENT)
Dept: INFUSION THERAPY | Facility: CLINIC | Age: 72
End: 2017-07-31
Attending: NURSE PRACTITIONER
Payer: COMMERCIAL

## 2017-07-31 VITALS — DIASTOLIC BLOOD PRESSURE: 71 MMHG | TEMPERATURE: 98.2 F | HEART RATE: 94 BPM | SYSTOLIC BLOOD PRESSURE: 123 MMHG

## 2017-07-31 DIAGNOSIS — C56.9 OVARIAN CANCER, UNSPECIFIED LATERALITY (H): ICD-10-CM

## 2017-07-31 DIAGNOSIS — T45.1X5A CHEMOTHERAPY-INDUCED NEUTROPENIA (H): Primary | ICD-10-CM

## 2017-07-31 DIAGNOSIS — D70.1 CHEMOTHERAPY-INDUCED NEUTROPENIA (H): Primary | ICD-10-CM

## 2017-07-31 PROCEDURE — 96372 THER/PROPH/DIAG INJ SC/IM: CPT

## 2017-07-31 PROCEDURE — 25000128 H RX IP 250 OP 636: Performed by: NURSE PRACTITIONER

## 2017-07-31 RX ADMIN — FILGRASTIM 300 MCG: 300 INJECTION, SOLUTION INTRAVENOUS; SUBCUTANEOUS at 13:57

## 2017-07-31 NOTE — MR AVS SNAPSHOT
After Visit Summary   7/31/2017    Tosin Nina    MRN: [id number]           Patient Information     Date Of Birth          1945        Visit Information        Provider Department      7/31/2017 2:00 PM RH INFUSION CHAIR 8 [facility]fusion [facility]        Today's Diagnoses     Chemotherapy-induced neutropenia (H)    -  1    Ovarian cancer, unspecified laterality (H)           Follow-ups after your visit        Your next 10 appointments already scheduled     Aug 03, 2017  9:00 AM CDT   Level 3 with RH INFUSION CHAIR 11   [facility]fusion [facility] ([facility])    [place] [place] [address]  [place] [address]   [phone]            Aug 10, 2017 11:00 AM CDT   Level 4 with RH INFUSION CHAIR 12   [facility] Infusion [facility] ([facility])    [address]  [place] [address]   [phone]            Aug 10, 2017 11:30 AM CDT   Return Visit with SALVADOR Gil [facility] ([facility])    [address] [place] [address]  [place] [address]   [phone]            Sep 26, 2017  9:00 AM CDT   New Visit with Kelsie Nguyen    Cancer Risk Management Program ([facility])    [facility]  [address]  [place] [address]   [phone]              Who to contact     If you have questions or need follow up information about today's clinic visit or your schedule please contact [facility] INFUSION SERVICES directly at [phone].  Normal or non-critical lab and imaging results will be communicated to you by MyChart, letter or phone within 4 business days after the clinic has received the results. If you do not hear from us within 7 days, please contact the clinic  "through [employer] or phone. If you have a critical or abnormal lab result, we will notify you by phone as soon as possible.  Submit refill requests through [employer] or call your pharmacy and they will forward the refill request to us. Please allow 3 business days for your refill to be completed.          Additional Information About Your Visit        [employer] Information     [employer] lets you send messages to your doctor, view your test results, renew your prescriptions, schedule appointments and more. To sign up, go to [email or web address] . Click on \"Log in\" on the left side of the screen, which will take you to the Welcome page. Then click on \"Sign up Now\" on the right side of the page.     You will be asked to enter the access code listed below, as well as some personal information. Please follow the directions to create your username and password.     Your access code is: [email or web address]  Expires: 8/10/2017 11:53 AM     Your access code will  in 90 days. If you need help or a new code, please call your [facility] clinic or [phone].        Care EveryWhere ID     This is your Care EveryWhere ID. This could be used by other organizations to access your [facility] medical records  [id number]        Your Vitals Were     Pulse Temperature                94 98.2  F (36.8  C) (Tympanic)           Blood Pressure from Last 3 Encounters:   17 123/71   17 137/66   17 115/68    Weight from Last 3 Encounters:   17 77.7 kg (171 lb 4.8 oz)   17 74.4 kg (164 lb)   17 75.3 kg (165 lb 14.4 oz)              Today, you had the following     No orders found for display         Today's Medication Changes          These changes are accurate as of: 17  2:10 PM.  If you have any questions, ask your nurse or doctor.               These medicines have changed or have updated prescriptions.        Dose/Directions    Vitamin D (Cholecalciferol) 1000 UNITS Tabs   This may have changed:  additional " instructions   Used for:  Paresthesias        Dose:  2000 Units   Take 2,000 Units by mouth daily   Quantity:  60 tablet   Refills:  0                Primary Care Provider Office Phone # Fax #    Esther Back [phone]       [facility] [address] [address]        Equal Access to Services     [facility]: Hadii aad ku hadasho Soomaali, waaxda luqadaha, qaybta kaalmada adeegyada, waxay idiin hayaan adeeg khyumikosh la'aan . So [facility] [phone].    ATENCIÓN: Si habla español, tiene a alarcon disposición servicios gratuitos de asistencia lingüística. Radhaame al [phone].    We comply with applicable federal civil rights laws and [place] laws. We do not discriminate on the basis of race, color, national origin, age, disability sex, sexual orientation or gender identity.            Thank you!     Thank you for choosing [facility] INFUSION SERVICES  for your care. Our goal is always to provide you with excellent care. Hearing back from our patients is one way we can continue to improve our services. Please take a few minutes to complete the written survey that you may receive in the mail after your visit with us. Thank you!             Your Updated Medication List - Protect others around you: Learn how to safely use, store and throw away your medicines at www.disposemymeds.org.          This list is accurate as of: 7/31/17  2:10 PM.  Always use your most recent med list.                   Brand Name Dispense Instructions for use Diagnosis    calcium carbonate 500 MG chewable tablet    TUMS     Take 1 chew tab by mouth daily as needed for heartburn        enoxaparin 80 MG/0.8ML injection    LOVENOX    60 Syringe    Inject 0.6 mLs (60 mg) Subcutaneous every 12 hours    Other pulmonary embolism without acute cor pulmonale, unspecified chronicity (H)       ESCITALOPRAM OXALATE PO      Take 10 mg by mouth daily        hydrochlorothiazide 25 MG tablet     HYDRODIURIL     Take 25 mg by mouth daily    Ovarian cancer, unspecified laterality (H)       levothyroxine 125 MCG tablet    SYNTHROID    30 tablet    Take 1 tablet (125 mcg) by mouth daily    Other pulmonary embolism without acute cor pulmonale, unspecified chronicity (H)       polyethylene glycol Packet    MIRALAX/GLYCOLAX    7 packet    Take 17 g by mouth daily    Ovarian cancer, unspecified laterality (H)       Potassium Chloride ER 20 MEQ Tbcr     30 tablet    Take 1 tablet (20 mEq) by mouth daily    Hypokalemia       PRILOSEC PO      Take 20 mg by mouth every morning        senna-docusate 8.6-50 MG per tablet    SENOKOT-S;PERICOLACE    60 tablet    Take 1-2 tablets by mouth 2 times daily Hold for loose stools    Ovarian cancer, unspecified laterality (H)       Vitamin D (Cholecalciferol) 1000 UNITS Tabs     60 tablet    Take 2,000 Units by mouth daily    Paresthesias

## 2017-07-31 NOTE — PROGRESS NOTES
Infusion Nursing Note:  Tosin Nina presents today for Neupogen    Patient seen by provider today: No   present during visit today: Not Applicable.    Note: N/A.    Intravenous Access:  No Intravenous access/labs at this visit.    Treatment Conditions:  Not Applicable.      Post Infusion Assessment:  Patient tolerated injection without incident.    Discharge Plan:   Discharge instructions reviewed with: Patient and spouse.  Patient and/or family verbalized understanding of discharge instructions and all questions answered.  AVS to patient via [employer].  Patient will return 8/3/17 for next appointment.   Patient discharged in stable condition accompanied by: .  Departure Mode: Wheelchair.    Allyn Leary RN

## 2017-08-03 ENCOUNTER — HOSPITAL ENCOUNTER (OUTPATIENT)
Facility: CLINIC | Age: 72
Setting detail: SPECIMEN
Discharge: HOME | End: 2017-08-03
Attending: OBSTETRICS & GYNECOLOGY | Admitting: NURSE PRACTITIONER
Payer: COMMERCIAL

## 2017-08-03 ENCOUNTER — INFUSION THERAPY VISIT (OUTPATIENT)
Dept: INFUSION THERAPY | Facility: CLINIC | Age: 72
End: 2017-08-03
Attending: OBSTETRICS & GYNECOLOGY
Payer: COMMERCIAL

## 2017-08-03 VITALS
WEIGHT: 160 LBS | BODY MASS INDEX: 32.25 KG/M2 | HEART RATE: 76 BPM | SYSTOLIC BLOOD PRESSURE: 115 MMHG | DIASTOLIC BLOOD PRESSURE: 74 MMHG | OXYGEN SATURATION: 97 % | TEMPERATURE: 97.9 F | HEIGHT: 59 IN

## 2017-08-03 DIAGNOSIS — D70.1 CHEMOTHERAPY-INDUCED NEUTROPENIA (H): Primary | ICD-10-CM

## 2017-08-03 DIAGNOSIS — T45.1X5A CHEMOTHERAPY-INDUCED NEUTROPENIA (H): Primary | ICD-10-CM

## 2017-08-03 DIAGNOSIS — C56.9 OVARIAN CANCER, UNSPECIFIED LATERALITY (H): ICD-10-CM

## 2017-08-03 DIAGNOSIS — E87.6 HYPOKALEMIA: ICD-10-CM

## 2017-08-03 LAB
ANISOCYTOSIS BLD QL SMEAR: SLIGHT
BASOPHILS # BLD AUTO: 0 10E9/L (ref 0–0.2)
BASOPHILS NFR BLD AUTO: 0 %
DACRYOCYTES BLD QL SMEAR: SLIGHT
DIFFERENTIAL METHOD BLD: ABNORMAL
EOSINOPHIL # BLD AUTO: 0 10E9/L (ref 0–0.7)
EOSINOPHIL NFR BLD AUTO: 0 %
ERYTHROCYTE [DISTWIDTH] IN BLOOD BY AUTOMATED COUNT: 16.2 % (ref 10–15)
HCT VFR BLD AUTO: 29.6 % (ref 35–47)
HGB BLD-MCNC: 9.5 G/DL (ref 11.7–15.7)
LYMPHOCYTES # BLD AUTO: 1.2 10E9/L (ref 0.8–5.3)
LYMPHOCYTES NFR BLD AUTO: 19 %
MACROCYTES BLD QL SMEAR: PRESENT
MAGNESIUM SERPL-MCNC: 1.3 MG/DL (ref 1.6–2.3)
MCH RBC QN AUTO: 33.1 PG (ref 26.5–33)
MCHC RBC AUTO-ENTMCNC: 32.1 G/DL (ref 31.5–36.5)
MCV RBC AUTO: 103 FL (ref 78–100)
METAMYELOCYTES # BLD: 0.3 10E9/L
METAMYELOCYTES NFR BLD MANUAL: 4 %
MONOCYTES # BLD AUTO: 0.5 10E9/L (ref 0–1.3)
MONOCYTES NFR BLD AUTO: 8 %
NEUTROPHILS # BLD AUTO: 4.3 10E9/L (ref 1.6–8.3)
NEUTROPHILS NFR BLD AUTO: 69 %
PLATELET # BLD AUTO: 70 10E9/L (ref 150–450)
PLATELET # BLD EST: ABNORMAL 10*3/UL
POTASSIUM SERPL-SCNC: 3.1 MMOL/L (ref 3.4–5.3)
RBC # BLD AUTO: 2.87 10E12/L (ref 3.8–5.2)
WBC # BLD AUTO: 6.3 10E9/L (ref 4–11)

## 2017-08-03 PROCEDURE — 96365 THER/PROPH/DIAG IV INF INIT: CPT

## 2017-08-03 PROCEDURE — 25000128 H RX IP 250 OP 636: Performed by: NURSE PRACTITIONER

## 2017-08-03 PROCEDURE — 25000128 H RX IP 250 OP 636: Performed by: OBSTETRICS & GYNECOLOGY

## 2017-08-03 PROCEDURE — 85025 COMPLETE CBC W/AUTO DIFF WBC: CPT | Performed by: NURSE PRACTITIONER

## 2017-08-03 PROCEDURE — 84132 ASSAY OF SERUM POTASSIUM: CPT | Performed by: NURSE PRACTITIONER

## 2017-08-03 PROCEDURE — 83735 ASSAY OF MAGNESIUM: CPT | Performed by: NURSE PRACTITIONER

## 2017-08-03 PROCEDURE — 25000125 ZZHC RX 250: Performed by: OBSTETRICS & GYNECOLOGY

## 2017-08-03 PROCEDURE — 25000132 ZZH RX MED GY IP 250 OP 250 PS 637: Performed by: OBSTETRICS & GYNECOLOGY

## 2017-08-03 RX ORDER — POTASSIUM CHLORIDE 1500 MG/1
20 TABLET, EXTENDED RELEASE ORAL ONCE
Status: COMPLETED | OUTPATIENT
Start: 2017-08-03 | End: 2017-08-03

## 2017-08-03 RX ORDER — HEPARIN SODIUM (PORCINE) LOCK FLUSH IV SOLN 100 UNIT/ML 100 UNIT/ML
500 SOLUTION INTRAVENOUS EVERY 8 HOURS
Status: DISCONTINUED | OUTPATIENT
Start: 2017-08-03 | End: 2017-08-03 | Stop reason: HOSPADM

## 2017-08-03 RX ORDER — POTASSIUM CHLORIDE 29.8 MG/ML
20 INJECTION INTRAVENOUS
Status: DISCONTINUED | OUTPATIENT
Start: 2017-08-03 | End: 2017-08-03

## 2017-08-03 RX ADMIN — SODIUM CHLORIDE, PRESERVATIVE FREE 500 UNITS: 5 INJECTION INTRAVENOUS at 11:35

## 2017-08-03 RX ADMIN — POTASSIUM CHLORIDE: 2 INJECTION, SOLUTION, CONCENTRATE INTRAVENOUS at 10:20

## 2017-08-03 RX ADMIN — POTASSIUM CHLORIDE 20 MEQ: 1500 TABLET, EXTENDED RELEASE ORAL at 11:06

## 2017-08-03 NOTE — MR AVS SNAPSHOT
After Visit Summary   8/3/2017    Tosin Nina    MRN: [id number]           Patient Information     Date Of Birth          1945        Visit Information        Provider Department      8/3/2017 9:00 AM RH INFUSION CHAIR 11 [facility] Infusion [facility]        Today's Diagnoses     Chemotherapy-induced neutropenia (H)    -  1    Ovarian cancer, unspecified laterality (H)        Hypokalemia           Follow-ups after your visit        Your next 10 appointments already scheduled     Aug 10, 2017 11:00 AM CDT   Level 4 with RH INFUSION CHAIR 12   [facility] Infusion [facility] ([facility])    [facility]  [address]  [place] [address]   [phone]            Aug 10, 2017 11:30 AM CDT   Return Visit with SALVADOR Gil [facility] ([facility])    [facility]  [address]  [facility] [address] [phone]            Sep 26, 2017  9:00 AM CDT   New Visit with Kelsie Nguyen    Cancer Risk Management Program ([facility])    [facility]  [address]  [facility] [id number] [phone]              Who to contact     If you have questions or need follow up information about today's clinic visit or your schedule please contact [facility] INFUSION SERVICES directly at [phone].  Normal or non-critical lab and imaging results will be communicated to you by MyChart, letter or phone within 4 business days after the clinic has received the results. If you do not hear from us within 7 days, please contact the clinic through MyChart or phone. If you have a critical or abnormal lab result, we will notify you by phone as soon as possible.  Submit refill requests through [employer] or call your pharmacy and they will forward the refill request to us. Please  "allow 3 business days for your refill to be completed.          Additional Information About Your Visit        MyChart Information     [employer] lets you send messages to your doctor, view your test results, renew your prescriptions, schedule appointments and more. To sign up, go to [email or web address] . Click on \"Log in\" on the left side of the screen, which will take you to the Welcome page. Then click on \"Sign up Now\" on the right side of the page.     You will be asked to enter the access code listed below, as well as some personal information. Please follow the directions to create your username and password.     Your access code is: [email or web address]  Expires: 8/10/2017 11:53 AM     Your access code will  in 90 days. If you need help or a new code, please call your [facility] clinic or [phone].        Care EveryWhere ID     This is your Care EveryWhere ID. This could be used by other organizations to access your [facility] medical records  [id number]        Your Vitals Were     Pulse Temperature Height Pulse Oximetry BMI (Body Mass Index)       76 97.9  F (36.6  C) (Tympanic) 1.499 m (4' 11.02\") 97% 32.3 kg/m2        Blood Pressure from Last 3 Encounters:   17 115/74   17 123/71   17 137/66    Weight from Last 3 Encounters:   17 72.6 kg (160 lb)   17 77.7 kg (171 lb 4.8 oz)   17 74.4 kg (164 lb)              We Performed the Following     CBC with platelets differential     Magnesium     Potassium          Today's Medication Changes          These changes are accurate as of: 8/3/17 11:44 AM.  If you have any questions, ask your nurse or doctor.               Start taking these medicines.        Dose/Directions    magnesium chloride 535 (64 MG) MG Tbcr CR tablet   Used for:  Ovarian cancer, unspecified laterality (H)        Dose:  535 mg   Take 1 tablet (535 mg) by mouth daily   Quantity:  30 tablet   Refills:  3         These medicines have changed or have updated " prescriptions.        Dose/Directions    Vitamin D (Cholecalciferol) 1000 UNITS Tabs   This may have changed:  additional instructions   Used for:  Paresthesias        Dose:  2000 Units   Take 2,000 Units by mouth daily   Quantity:  60 tablet   Refills:  0            Where to get your medicines      These medications were sent to [id number] - [place], [place] - [address] [place] [address] [address] AT [facility] [address]  [address], [facility] [address]     Phone:  [phone]     magnesium chloride 535 (64 MG) MG Tbcr CR tablet                Primary Care Provider Office Phone # Fax #    Esther Back [phone]       [facility] [address] [facility] [address]  [facility] [address]        Equal Access to Services     CITLALLI CUELLO : Hadii aad ku hadasho Sohollyali, waaxda luqadaha, qaybta kaalmada adeegyada, hetal long. So [facility] [phone].    ATENCIÓN: Si habla español, tiene a alarcon disposición servicios gratuitos de asistencia lingüística. [facility] [phone].    We comply with applicable federal civil rights laws and [place] laws. We do not discriminate on the basis of race, color, national origin, age, disability sex, sexual orientation or gender identity.            Thank you!     Thank you for choosing [facility] INFUSION SERVICES  for your care. Our goal is always to provide you with excellent care. Hearing back from our patients is one way we can continue to improve our services. Please take a few minutes to complete the written survey that you may receive in the mail after your visit with us. Thank you!             Your Updated Medication List - Protect others around you: Learn how to safely use, store and throw away your medicines at www.disposemymeds.org.          This list is accurate as of: 8/3/17 11:44 AM.  Always use your most recent med list.                   Brand Name Dispense Instructions for use Diagnosis    calcium  carbonate 500 MG chewable tablet    TUMS     Take 1 chew tab by mouth daily as needed for heartburn        enoxaparin 80 MG/0.8ML injection    LOVENOX    60 Syringe    Inject 0.6 mLs (60 mg) Subcutaneous every 12 hours    Other pulmonary embolism without acute cor pulmonale, unspecified chronicity (H)       ESCITALOPRAM OXALATE PO      Take 10 mg by mouth daily        hydrochlorothiazide 25 MG tablet    HYDRODIURIL     Take 25 mg by mouth daily    Ovarian cancer, unspecified laterality (H)       levothyroxine 125 MCG tablet    SYNTHROID    30 tablet    Take 1 tablet (125 mcg) by mouth daily    Other pulmonary embolism without acute cor pulmonale, unspecified chronicity (H)       magnesium chloride 535 (64 MG) MG Tbcr CR tablet     30 tablet    Take 1 tablet (535 mg) by mouth daily    Ovarian cancer, unspecified laterality (H)       polyethylene glycol Packet    MIRALAX/GLYCOLAX    7 packet    Take 17 g by mouth daily    Ovarian cancer, unspecified laterality (H)       Potassium Chloride ER 20 MEQ Tbcr     30 tablet    Take 1 tablet (20 mEq) by mouth daily    Hypokalemia       PRILOSEC PO      Take 20 mg by mouth every morning        senna-docusate 8.6-50 MG per tablet    SENOKOT-S;PERICOLACE    60 tablet    Take 1-2 tablets by mouth 2 times daily Hold for loose stools    Ovarian cancer, unspecified laterality (H)       Vitamin D (Cholecalciferol) 1000 UNITS Tabs     60 tablet    Take 2,000 Units by mouth daily    Paresthesias

## 2017-08-03 NOTE — PROGRESS NOTES
Infusion Nursing Note:  Tosin Nina presents today for C4D15.that was cancelled today  Patient seen by provider today: No   present during visit today: Not Applicable.    Note: Had some bleeding 1 day- with 2 blood clots.  Noting lo platelets, given teaching on avoiding sharp objects, avoid straining, report bruising or bleeding that doesn't stop.    Intravenous Access:  Labs drawn without difficulty.  Implanted Port.    Treatment Conditions:  Lab Results   Component Value Date    HGB 9.5 08/03/2017     Lab Results   Component Value Date    WBC 6.3 08/03/2017      Lab Results   Component Value Date    ANEU 4.3 08/03/2017     Lab Results   Component Value Date    PLT 70 08/03/2017      Lab Results   Component Value Date     07/20/2017                   Lab Results   Component Value Date    POTASSIUM 3.1 08/03/2017           Lab Results   Component Value Date    MAG 1.3 08/03/2017            Lab Results   Component Value Date    CR 0.55 07/20/2017                   Lab Results   Component Value Date    PATTI 8.8 07/20/2017                Lab Results   Component Value Date    BILITOTAL 0.3 07/20/2017           Lab Results   Component Value Date    ALBUMIN 2.9 07/20/2017                    Lab Results   Component Value Date    ALT 18 07/20/2017           Lab Results   Component Value Date    AST 16 07/20/2017     Results reviewed, labs did NOT meet treatment parameters: No taxol today with Platelets 70.          Post Infusion Assessment:  Patient tolerated infusion without incident.  Blood return noted pre and post infusion.  Site patent and intact, free from redness, edema or discomfort.  Access discontinued per protocol.    Discharge Plan:   Prescription refills given for Magnesium.  Discharge instructions reviewed with: Patient and Family.  Patient and/or family verbalized understanding of discharge instructions and all questions answered.  Copy of AVS reviewed with patient and/or family.   Patient will return 8/10/17 for next appointment.  Patient discharged in stable condition accompanied by: self and .  Departure Mode: Wheelchair.    Maru More RN

## 2017-08-10 ENCOUNTER — HOSPITAL ENCOUNTER (OUTPATIENT)
Facility: CLINIC | Age: 72
Setting detail: SPECIMEN
Discharge: HOME | End: 2017-08-10
Attending: OBSTETRICS & GYNECOLOGY | Admitting: OBSTETRICS & GYNECOLOGY
Payer: COMMERCIAL

## 2017-08-10 ENCOUNTER — ONCOLOGY VISIT (OUTPATIENT)
Dept: ONCOLOGY | Facility: CLINIC | Age: 72
End: 2017-08-10
Attending: OBSTETRICS & GYNECOLOGY
Payer: COMMERCIAL

## 2017-08-10 ENCOUNTER — INFUSION THERAPY VISIT (OUTPATIENT)
Dept: INFUSION THERAPY | Facility: CLINIC | Age: 72
End: 2017-08-10
Attending: OBSTETRICS & GYNECOLOGY
Payer: COMMERCIAL

## 2017-08-10 VITALS
HEIGHT: 59 IN | TEMPERATURE: 98.8 F | OXYGEN SATURATION: 96 % | RESPIRATION RATE: 16 BRPM | DIASTOLIC BLOOD PRESSURE: 77 MMHG | HEART RATE: 84 BPM | WEIGHT: 163.58 LBS | BODY MASS INDEX: 32.98 KG/M2 | SYSTOLIC BLOOD PRESSURE: 126 MMHG

## 2017-08-10 DIAGNOSIS — D69.6 THROMBOCYTOPENIA (H): ICD-10-CM

## 2017-08-10 DIAGNOSIS — D70.1 CHEMOTHERAPY-INDUCED NEUTROPENIA (H): ICD-10-CM

## 2017-08-10 DIAGNOSIS — D70.1 CHEMOTHERAPY-INDUCED NEUTROPENIA (H): Primary | ICD-10-CM

## 2017-08-10 DIAGNOSIS — C56.9 OVARIAN CANCER, UNSPECIFIED LATERALITY (H): Primary | ICD-10-CM

## 2017-08-10 DIAGNOSIS — T45.1X5A CHEMOTHERAPY-INDUCED NEUTROPENIA (H): ICD-10-CM

## 2017-08-10 DIAGNOSIS — Z51.11 ENCOUNTER FOR ANTINEOPLASTIC CHEMOTHERAPY: ICD-10-CM

## 2017-08-10 DIAGNOSIS — C56.9 OVARIAN CANCER, UNSPECIFIED LATERALITY (H): ICD-10-CM

## 2017-08-10 DIAGNOSIS — T45.1X5A CHEMOTHERAPY-INDUCED NEUTROPENIA (H): Primary | ICD-10-CM

## 2017-08-10 LAB
ALBUMIN SERPL-MCNC: 3 G/DL (ref 3.4–5)
ALP SERPL-CCNC: 108 U/L (ref 40–150)
ALT SERPL W P-5'-P-CCNC: 20 U/L (ref 0–50)
ANION GAP SERPL CALCULATED.3IONS-SCNC: 7 MMOL/L (ref 3–14)
AST SERPL W P-5'-P-CCNC: 15 U/L (ref 0–45)
BASOPHILS # BLD AUTO: 0 10E9/L (ref 0–0.2)
BASOPHILS NFR BLD AUTO: 0.5 %
BILIRUB SERPL-MCNC: 0.3 MG/DL (ref 0.2–1.3)
BUN SERPL-MCNC: 8 MG/DL (ref 7–30)
CALCIUM SERPL-MCNC: 9 MG/DL (ref 8.5–10.1)
CANCER AG125 SERPL-ACNC: 12 U/ML (ref 0–30)
CHLORIDE SERPL-SCNC: 104 MMOL/L (ref 94–109)
CO2 SERPL-SCNC: 29 MMOL/L (ref 20–32)
CREAT SERPL-MCNC: 0.6 MG/DL (ref 0.52–1.04)
DIFFERENTIAL METHOD BLD: ABNORMAL
EOSINOPHIL # BLD AUTO: 0 10E9/L (ref 0–0.7)
EOSINOPHIL NFR BLD AUTO: 0.2 %
ERYTHROCYTE [DISTWIDTH] IN BLOOD BY AUTOMATED COUNT: 17.1 % (ref 10–15)
GFR SERPL CREATININE-BSD FRML MDRD: ABNORMAL ML/MIN/1.7M2
GLUCOSE SERPL-MCNC: 104 MG/DL (ref 70–99)
HCT VFR BLD AUTO: 30 % (ref 35–47)
HGB BLD-MCNC: 9.8 G/DL (ref 11.7–15.7)
IMM GRANULOCYTES # BLD: 0 10E9/L (ref 0–0.4)
IMM GRANULOCYTES NFR BLD: 0.5 %
LYMPHOCYTES # BLD AUTO: 1 10E9/L (ref 0.8–5.3)
LYMPHOCYTES NFR BLD AUTO: 23.1 %
MAGNESIUM SERPL-MCNC: 1.8 MG/DL (ref 1.6–2.3)
MCH RBC QN AUTO: 34.3 PG (ref 26.5–33)
MCHC RBC AUTO-ENTMCNC: 32.7 G/DL (ref 31.5–36.5)
MCV RBC AUTO: 105 FL (ref 78–100)
MONOCYTES # BLD AUTO: 0.2 10E9/L (ref 0–1.3)
MONOCYTES NFR BLD AUTO: 3.9 %
NEUTROPHILS # BLD AUTO: 3 10E9/L (ref 1.6–8.3)
NEUTROPHILS NFR BLD AUTO: 71.8 %
NRBC # BLD AUTO: 0 10*3/UL
NRBC BLD AUTO-RTO: 0 /100
PLATELET # BLD AUTO: 102 10E9/L (ref 150–450)
POTASSIUM SERPL-SCNC: 3.8 MMOL/L (ref 3.4–5.3)
PROT SERPL-MCNC: 6.5 G/DL (ref 6.8–8.8)
RBC # BLD AUTO: 2.86 10E12/L (ref 3.8–5.2)
SODIUM SERPL-SCNC: 140 MMOL/L (ref 133–144)
WBC # BLD AUTO: 4.2 10E9/L (ref 4–11)

## 2017-08-10 PROCEDURE — 99214 OFFICE O/P EST MOD 30 MIN: CPT | Performed by: NURSE PRACTITIONER

## 2017-08-10 PROCEDURE — 96413 CHEMO IV INFUSION 1 HR: CPT

## 2017-08-10 PROCEDURE — 86304 IMMUNOASSAY TUMOR CA 125: CPT | Performed by: OBSTETRICS & GYNECOLOGY

## 2017-08-10 PROCEDURE — 96367 TX/PROPH/DG ADDL SEQ IV INF: CPT

## 2017-08-10 PROCEDURE — 25000128 H RX IP 250 OP 636: Performed by: NURSE PRACTITIONER

## 2017-08-10 PROCEDURE — 25000125 ZZHC RX 250: Performed by: NURSE PRACTITIONER

## 2017-08-10 PROCEDURE — 96417 CHEMO IV INFUS EACH ADDL SEQ: CPT

## 2017-08-10 PROCEDURE — S0028 INJECTION, FAMOTIDINE, 20 MG: HCPCS | Performed by: NURSE PRACTITIONER

## 2017-08-10 PROCEDURE — 80053 COMPREHEN METABOLIC PANEL: CPT | Performed by: OBSTETRICS & GYNECOLOGY

## 2017-08-10 PROCEDURE — 96375 TX/PRO/DX INJ NEW DRUG ADDON: CPT

## 2017-08-10 PROCEDURE — 85025 COMPLETE CBC W/AUTO DIFF WBC: CPT | Performed by: OBSTETRICS & GYNECOLOGY

## 2017-08-10 PROCEDURE — 83735 ASSAY OF MAGNESIUM: CPT | Performed by: OBSTETRICS & GYNECOLOGY

## 2017-08-10 RX ORDER — MEPERIDINE HYDROCHLORIDE 25 MG/ML
25 INJECTION INTRAMUSCULAR; INTRAVENOUS; SUBCUTANEOUS EVERY 30 MIN PRN
Status: CANCELLED | OUTPATIENT
Start: 2017-08-17

## 2017-08-10 RX ORDER — SODIUM CHLORIDE 9 MG/ML
1000 INJECTION, SOLUTION INTRAVENOUS CONTINUOUS PRN
Status: CANCELLED
Start: 2017-08-17

## 2017-08-10 RX ORDER — EPINEPHRINE 0.3 MG/.3ML
0.3 INJECTION SUBCUTANEOUS EVERY 5 MIN PRN
Status: CANCELLED | OUTPATIENT
Start: 2017-08-10

## 2017-08-10 RX ORDER — DIPHENHYDRAMINE HCL 25 MG
25 CAPSULE ORAL ONCE
Status: CANCELLED
Start: 2017-08-10

## 2017-08-10 RX ORDER — EPINEPHRINE 0.3 MG/.3ML
0.3 INJECTION SUBCUTANEOUS EVERY 5 MIN PRN
Status: CANCELLED | OUTPATIENT
Start: 2017-08-17

## 2017-08-10 RX ORDER — MEPERIDINE HYDROCHLORIDE 25 MG/ML
25 INJECTION INTRAMUSCULAR; INTRAVENOUS; SUBCUTANEOUS EVERY 30 MIN PRN
Status: CANCELLED | OUTPATIENT
Start: 2017-08-10

## 2017-08-10 RX ORDER — HEPARIN SODIUM (PORCINE) LOCK FLUSH IV SOLN 100 UNIT/ML 100 UNIT/ML
500 SOLUTION INTRAVENOUS EVERY 8 HOURS
Status: CANCELLED
Start: 2017-08-10

## 2017-08-10 RX ORDER — ALBUTEROL SULFATE 90 UG/1
1-2 AEROSOL, METERED RESPIRATORY (INHALATION)
Status: CANCELLED
Start: 2017-08-10

## 2017-08-10 RX ORDER — EPINEPHRINE 1 MG/ML
0.3 INJECTION INTRAMUSCULAR; INTRAVENOUS; SUBCUTANEOUS EVERY 5 MIN PRN
Status: CANCELLED | OUTPATIENT
Start: 2017-08-10

## 2017-08-10 RX ORDER — DIPHENHYDRAMINE HCL 25 MG
25 CAPSULE ORAL ONCE
Status: CANCELLED
Start: 2017-08-24

## 2017-08-10 RX ORDER — PALONOSETRON 0.05 MG/ML
0.25 INJECTION, SOLUTION INTRAVENOUS ONCE
Status: COMPLETED | OUTPATIENT
Start: 2017-08-10 | End: 2017-08-10

## 2017-08-10 RX ORDER — LORAZEPAM 2 MG/ML
0.5 INJECTION INTRAMUSCULAR EVERY 6 HOURS PRN
Status: CANCELLED
Start: 2017-08-17

## 2017-08-10 RX ORDER — DIPHENHYDRAMINE HYDROCHLORIDE 50 MG/ML
50 INJECTION INTRAMUSCULAR; INTRAVENOUS
Status: CANCELLED
Start: 2017-08-10

## 2017-08-10 RX ORDER — LORAZEPAM 2 MG/ML
0.5 INJECTION INTRAMUSCULAR EVERY 6 HOURS PRN
Status: CANCELLED
Start: 2017-08-10

## 2017-08-10 RX ORDER — EPINEPHRINE 0.3 MG/.3ML
0.3 INJECTION SUBCUTANEOUS EVERY 5 MIN PRN
Status: CANCELLED | OUTPATIENT
Start: 2017-08-24

## 2017-08-10 RX ORDER — EPINEPHRINE 1 MG/ML
0.3 INJECTION INTRAMUSCULAR; INTRAVENOUS; SUBCUTANEOUS EVERY 5 MIN PRN
Status: CANCELLED | OUTPATIENT
Start: 2017-08-24

## 2017-08-10 RX ORDER — ALBUTEROL SULFATE 0.83 MG/ML
2.5 SOLUTION RESPIRATORY (INHALATION)
Status: CANCELLED | OUTPATIENT
Start: 2017-08-10

## 2017-08-10 RX ORDER — MEPERIDINE HYDROCHLORIDE 25 MG/ML
25 INJECTION INTRAMUSCULAR; INTRAVENOUS; SUBCUTANEOUS EVERY 30 MIN PRN
Status: CANCELLED | OUTPATIENT
Start: 2017-08-24

## 2017-08-10 RX ORDER — HEPARIN SODIUM (PORCINE) LOCK FLUSH IV SOLN 100 UNIT/ML 100 UNIT/ML
500 SOLUTION INTRAVENOUS EVERY 8 HOURS
Status: CANCELLED
Start: 2017-08-24

## 2017-08-10 RX ORDER — DIPHENHYDRAMINE HYDROCHLORIDE 50 MG/ML
50 INJECTION INTRAMUSCULAR; INTRAVENOUS
Status: CANCELLED
Start: 2017-08-24

## 2017-08-10 RX ORDER — METHYLPREDNISOLONE SODIUM SUCCINATE 125 MG/2ML
125 INJECTION, POWDER, LYOPHILIZED, FOR SOLUTION INTRAMUSCULAR; INTRAVENOUS
Status: CANCELLED
Start: 2017-08-17

## 2017-08-10 RX ORDER — HEPARIN SODIUM (PORCINE) LOCK FLUSH IV SOLN 100 UNIT/ML 100 UNIT/ML
500 SOLUTION INTRAVENOUS EVERY 8 HOURS
Status: DISCONTINUED | OUTPATIENT
Start: 2017-08-10 | End: 2017-08-10 | Stop reason: HOSPADM

## 2017-08-10 RX ORDER — PALONOSETRON 0.05 MG/ML
0.25 INJECTION, SOLUTION INTRAVENOUS ONCE
Status: CANCELLED
Start: 2017-08-10

## 2017-08-10 RX ORDER — SODIUM CHLORIDE 9 MG/ML
1000 INJECTION, SOLUTION INTRAVENOUS CONTINUOUS PRN
Status: CANCELLED
Start: 2017-08-10

## 2017-08-10 RX ORDER — ALBUTEROL SULFATE 0.83 MG/ML
2.5 SOLUTION RESPIRATORY (INHALATION)
Status: CANCELLED | OUTPATIENT
Start: 2017-08-24

## 2017-08-10 RX ORDER — EPINEPHRINE 1 MG/ML
0.3 INJECTION INTRAMUSCULAR; INTRAVENOUS; SUBCUTANEOUS EVERY 5 MIN PRN
Status: CANCELLED | OUTPATIENT
Start: 2017-08-17

## 2017-08-10 RX ORDER — ALBUTEROL SULFATE 0.83 MG/ML
2.5 SOLUTION RESPIRATORY (INHALATION)
Status: CANCELLED | OUTPATIENT
Start: 2017-08-17

## 2017-08-10 RX ORDER — METHYLPREDNISOLONE SODIUM SUCCINATE 125 MG/2ML
125 INJECTION, POWDER, LYOPHILIZED, FOR SOLUTION INTRAMUSCULAR; INTRAVENOUS
Status: CANCELLED
Start: 2017-08-10

## 2017-08-10 RX ORDER — ALBUTEROL SULFATE 90 UG/1
1-2 AEROSOL, METERED RESPIRATORY (INHALATION)
Status: CANCELLED
Start: 2017-08-17

## 2017-08-10 RX ORDER — DIPHENHYDRAMINE HCL 25 MG
25 CAPSULE ORAL ONCE
Status: CANCELLED
Start: 2017-08-17

## 2017-08-10 RX ORDER — ALBUTEROL SULFATE 90 UG/1
1-2 AEROSOL, METERED RESPIRATORY (INHALATION)
Status: CANCELLED
Start: 2017-08-24

## 2017-08-10 RX ORDER — DIPHENHYDRAMINE HYDROCHLORIDE 50 MG/ML
50 INJECTION INTRAMUSCULAR; INTRAVENOUS
Status: CANCELLED
Start: 2017-08-17

## 2017-08-10 RX ORDER — HEPARIN SODIUM (PORCINE) LOCK FLUSH IV SOLN 100 UNIT/ML 100 UNIT/ML
500 SOLUTION INTRAVENOUS EVERY 8 HOURS
Status: CANCELLED
Start: 2017-08-17

## 2017-08-10 RX ORDER — METHYLPREDNISOLONE SODIUM SUCCINATE 125 MG/2ML
125 INJECTION, POWDER, LYOPHILIZED, FOR SOLUTION INTRAMUSCULAR; INTRAVENOUS
Status: CANCELLED
Start: 2017-08-24

## 2017-08-10 RX ORDER — LORAZEPAM 2 MG/ML
0.5 INJECTION INTRAMUSCULAR EVERY 6 HOURS PRN
Status: CANCELLED
Start: 2017-08-24

## 2017-08-10 RX ORDER — SODIUM CHLORIDE 9 MG/ML
1000 INJECTION, SOLUTION INTRAVENOUS CONTINUOUS PRN
Status: CANCELLED
Start: 2017-08-24

## 2017-08-10 RX ADMIN — SODIUM CHLORIDE 250 ML: 9 INJECTION, SOLUTION INTRAVENOUS at 12:12

## 2017-08-10 RX ADMIN — PACLITAXEL 135 MG: 6 INJECTION, SOLUTION, CONCENTRATE INTRAVENOUS at 12:57

## 2017-08-10 RX ADMIN — FAMOTIDINE 40 MG: 10 INJECTION INTRAVENOUS at 12:45

## 2017-08-10 RX ADMIN — DIPHENHYDRAMINE HYDROCHLORIDE 25 MG: 50 INJECTION INTRAMUSCULAR; INTRAVENOUS at 12:27

## 2017-08-10 RX ADMIN — CARBOPLATIN 615 MG: 10 INJECTION, SOLUTION INTRAVENOUS at 13:59

## 2017-08-10 RX ADMIN — DEXAMETHASONE SODIUM PHOSPHATE 12 MG: 10 INJECTION, SOLUTION INTRAMUSCULAR; INTRAVENOUS at 12:12

## 2017-08-10 RX ADMIN — SODIUM CHLORIDE, PRESERVATIVE FREE 500 UNITS: 5 INJECTION INTRAVENOUS at 14:48

## 2017-08-10 RX ADMIN — PALONOSETRON HYDROCHLORIDE 0.25 MG: 0.25 INJECTION INTRAVENOUS at 12:27

## 2017-08-10 ASSESSMENT — PAIN SCALES - GENERAL: PAINLEVEL: NO PAIN (0)

## 2017-08-10 NOTE — PROGRESS NOTES
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  She is accompanied today by her . She is feeling fairly well and tolerating chemo without difficulty. She continues to have some vaginal spotting off and on since surgery but has improved. She is on therapeutic lovenox for hx of PE. She notes occasional chills, visual difficulty that has not changed, and improved alternating constipation/diarrhea. She reports chronic muscle weakness/loss of balance/neuropathy, difficulty with walking, dizziness, and mood change. Continues to have neurologic changes, weakness, loss of balance and is wheel chair bound. Sx remain the same and have not worsened. She reports trace LE edema and her left LLE has always been more than her right. No increase in neuropathy.     At this time Manoj is the primary caregiver of Virginia. Virginia's daughter comes to help care for her so he can get some respite. Manoj reports he is managing ok at home. Notes that Virginia wakes up approx 1 time per night to use the commode and he has difficulty falling back asleep. He is wondering if he possibly could get something to help him with sleep. Patient's  is referred to his PCP.      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  She was discharged to a TCU where she is still residing with some but minimal improvement in her neurologic symptoms.  She still has quite a difficult time seeing especially with her right eye.  She also notes weakness mostly on her left side.  Both of these make it very difficult for her to walk and thus she is having to use a wheelchair for most of her mobility.  She tolerated her first cycle quite well with her biggest side effect  being nausea which improved drastically with a change in anti-emetics.        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 The right pulmonary artery is enlarged, similar to prior exams. No CT evidence of right heart strain. No suspicious mediastinal or perihilar lymphadenopathy. Bovine arch anatomy. No suspicious pulmonary nodules, evidence of infarction, or infection. Abdomen and pelvis: There are soft tissue nodules identified along the liver capsule measuring up to 3 cm inferiorly. There is a large amount of omental caking. Enlarged iliac and retroperitoneal lymph nodes for example a 2.6 cm right external iliac lymph node or group of lymph nodes. Heterogeneous enhancement of the uterine fundus could be due to fibroids or a mass. The overall size of the uterus does not appear significantly different than in 2014. The left ovary does appear slightly larger and lobular in appearance compared to prior exam. There is also a nodular area along the round ligament. It was not present on prior exam. There is an irregular lobulated mass involving the sigmoid colon. Abnormal, enlarged inguinal lymph nodes. Soft tissue implant in the posterior right retroperitoneum adjacent to the psoas was not present on prior exam. 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 Spondylolisthesis at L3-4. Small periumbilical hernia containing some of the abnormal omentum.      4/11/17:   268, CEA .6      4/12/17:  IR omental biopsy                           Pathology:  High grade serous carcinoma      4/14/17: Cycle #1 carboplatin AUC 6 and weekly Taxol 80mg/m2.  = 2648      5/5/17:  Cycle #2 carboplatin AUC 6  and weekly Taxol 80mg/m2.  = 370      5/26/17:  Cycle #3 carboplatin AUC 6 and weekly Taxol 80mg/m2.  = 63      6/16/17:  CT C/A/P:  Chest:  Resolution of previous right-sided pulmonary emboli since April. No mediastinal, hilar or axillary adenopathy. No pleural fluid.  Port-A-Cath right superior chest with tip in the SVC.  Short linear fibrosis or discoid atelectasis anterior medial right upper lobe. Lungs otherwise clear. Abdomen and Pelvis: Marked improvement in carcinomatosis and omental metastasis since 4/11/2017. In the anterior left pelvis there is a 1.2 cm nodule with adjacent linear opacity approximately 4 cm in length in an area of previous dense omental caking and metastatic disease. There is resolution of the previous anterior right-sided omental caking with subcentimeter trace of residual nodularity in this location. There are multiple new indeterminate nodular densities in the anterior abdominal wall subcutaneous fat as well as small collections of subcutaneous air, suggesting that these are medication injection sites.  Previous subserosal implants along the inferior liver have resolved. No focal liver lesions. Normal-appearing pancreas, adrenal glands and kidneys. Tiny hypodense spleen lesion is too small to characterize, not previously seen. Spleen otherwise appears normal. No periaortic or pelvic adenopathy with resolution of previous adenopathy. Lobulated enlarged uterus redemonstrated consistent with multiple fibroids. No free fluid. No acute bowel abnormality. Resolution of mesenteric right lower quadrant nodule is compatible with metastasis. On coronal images the terminal ileum takes an unusual circular course posterior to the right colon, but there is no evidence for obstruction. No aggressive bone lesions.   6/16/17:   = 27. Treatment planning with Dr Ennis: CT results were reviewed.  Given her excellent response and minimal residual disease, I believe she is an excellent  candidate for robotic interval debulking.  We discussed the need for 3 more cycles of chemotherapy following surgery with the hope of starting within 3-4 weeks of surgery.    6/28/17: Robotic total laparoscopic hysterectomy, bilateral salpingo-oophorectomy, lysis of adhesions, omentectomy, optimal interval tumor debulking to no gross residual disease, cystoscopy  Pathology: FINAL DIAGNOSIS:   A. UTERUS, CERVIX, BILATERAL FALLOPIAN TUBES AND OVARIES, HYSTERECTOMY   WITH BILATERAL SALPINGO-OOPHORECTOMY:   - Focal atypical endometrial hyperplasia on a background of atrophic endometrium   - Adenomyosis   - Leiomyomas   - Uterine serosal adhesions with psammoma bodies   - Right ovary with benign cortical inclusion cysts and surface fibrous adhesions featuring psammoma bodies   - Left ovarian serous carcinoma, high grade, with neoadjuvant chemotherapy effects   - Fallopian tubes with no significant histologic abnormality   B. OMENTUM, OMENTECTOMY:   - Omental adipose tissue with histiocytic reaction, cholesterol clefts and psammoma bodies, consistent with neoadjuvant chemotherapy effect   - No viable tumor cells seen   C. BLADDER PERITONEUM, BIOPSY:   - Fibroadipose tissue with histiocytic reaction, cholesterol clefts and psammoma bodies, consistent with neoadjuvant chemotherapy effect   - No viable tumor cells seen   D. LEFT PELVIC SIDEWALL, BIOPSY:   - Fibroadipose tissue with histiocytic reaction, cholesterol clefts and psammoma bodies, consistent with neoadjuvant chemotherapy effect   - No viable tumor cells seen   E. SIGMOID EPIPLOICA, BIOPSY:   - Adipose tissue with histiocytic reaction, cholesterol clefts and psammoma bodies, consistent with neoadjuvant chemotherapy effect   - No viable tumor cells seen      Review of Systems:  Systemic                            no weight gain; no fatigue; no fever; no chills; no night sweats; no appetite changes  Skin                            no rashes, or lesions; + hair  loss  Eye                            no irritation; no changes in vision; + visual difficulty  Ward-Laryngeal                            no dysphagia; no hoarseness   Pulmonary                            no cough; no shortness of breath  Cardiovascular                            no chest pain; no palpitations  Gastrointestinal                            improved alternating diarrhea/constipation; no abdominal pain; no changes in bowel  habits; no blood in stool; no nausea  Genitourinary                           no urinary frequency; no urinary urgency; no dysuria; no pain; no abnormal vaginal discharge; + vaginal spotting  Musculoskeletal                            no myalgias; no arthralgias; no back pain  Psychiatric                            no depressed mood; no anxiety + mood changes    Hematologic                            no tender lymph nodes; no noticeable swellings or lumps   Endocrine                            no hot flashes; no heat/cold intolerance         Neurological                           no tremor; + numbness and tingling; + loss of balance; + difficulty walking; no headaches; no difficulty sleeping      Obstetrics and Gynecology History:  ,   Menopause at age 52, took HRT for a short while, maybe 1 year    Past Medical History:    Past Medical History:   Diagnosis Date     Anemia      Cervical spinal stenosis      Depression      GERD (gastroesophageal reflux disease)      Hypertension      Hypokalemia      Hypothyroid      Lumbar spinal stenosis      Obesity      Ovarian cancer (H)      Paraneoplastic neuromyopathy and neuropathy (H)      PE (pulmonary thromboembolism) (H)      Thrombocytopenia (H)          Past Surgical History:    Past Surgical History:   Procedure Laterality Date     AS TOTAL KNEE ARTHROPLASTY Left      BACK SURGERY       CHOLECYSTECTOMY  2016     CYSTOSCOPY N/A 2017    Procedure: CYSTOSCOPY;;  Surgeon: Nessa Christina MD;  Location:   OR     DAVINCI HYSTERECTOMY TOTAL, BILATERAL SALPINGO-OOPHORECTMY, NODE DISSECTION, TUMOR STAGING, COMBINED N/A 6/28/2017    Procedure: COMBINED DAVINCI HYSTERECTOMY TOTAL, SALPINGO-OOPHORECTOMY, NODE DISSECTION, TUMOR STAGING;  Robotic total laparoscopic hysterectomy, bilateral salpingo-oophorectomy, omentectomy, lysis of adhesions, tumor debulking, cystoscopy;  Surgeon: Nessa Christina MD;  Location: [facility] OR     OPEN REDUCTION INTERNAL FIXATION WRIST Right 2009     THYROIDECTOMY           Health Maintenance Due   Topic Date Due     TETANUS IMMUNIZATION (SYSTEM ASSIGNED)  10/15/1965     HEPATITIS C SCREENING  10/15/1965     COLON CANCER SCREEN (SYSTEM ASSIGNED)  10/15/1997     ADVANCE DIRECTIVE PLANNING Q5 YRS  10/15/2002     FALL RISK ASSESSMENT  10/15/2012     DEXA SCAN SCREENING (SYSTEM ASSIGNED)  10/15/2012     PNEUMOCOCCAL (1 of 2 - PCV13) 10/15/2012     MAMMO SCREEN Q2 YR (SYSTEM ASSIGNED)  11/11/2015     Health Maintenance:  Last Pap Smear:             5 years              Result: normal  She has not had a history of abnormal Pap smears.      Last Mammogram:                       10/19/16              Result: normal                                           She has not had a history of abnormal mammograms.      Last Colonoscopy:                        2007              Result: normal  Current Medications:     Current Outpatient Prescriptions   Medication Sig Dispense Refill     magnesium chloride 535 (64 MG) MG TBCR CR tablet Take 1 tablet (535 mg) by mouth daily 30 tablet 3     hydrochlorothiazide (HYDRODIURIL) 25 MG tablet Take 25 mg by mouth daily   0     calcium carbonate (TUMS) 500 MG chewable tablet Take 1 chew tab by mouth daily as needed for heartburn       Omeprazole (PRILOSEC PO) Take 20 mg by mouth every morning       ESCITALOPRAM OXALATE PO Take 10 mg by mouth daily       Potassium Chloride ER 20 MEQ TBCR Take 1 tablet (20 mEq) by mouth daily (Patient taking differently: Take 20 mEq by  "mouth 2 times daily ) 30 tablet 3     levothyroxine (SYNTHROID) 125 MCG tablet Take 1 tablet (125 mcg) by mouth daily 30 tablet      Vitamin D, Cholecalciferol, 1000 UNITS TABS Take 2,000 Units by mouth daily (Patient taking differently: Take 2,000 Units by mouth daily LD 6/20/17, told to hold until after surgery) 60 tablet      enoxaparin (LOVENOX) 80 MG/0.8ML injection Inject 0.6 mLs (60 mg) Subcutaneous every 12 hours 60 Syringe 1     polyethylene glycol (MIRALAX/GLYCOLAX) Packet Take 17 g by mouth daily (Patient not taking: Reported on 7/27/2017) 7 packet      senna-docusate (SENOKOT-S;PERICOLACE) 8.6-50 MG per tablet Take 1-2 tablets by mouth 2 times daily Hold for loose stools (Patient not taking: Reported on 8/3/2017) 60 tablet 1         Allergies:        Allergies   Allergen Reactions     Amoxicillin Hives     Clarithromycin Other (See Comments)     \"I felt dopey, sleepy and like a druggie\"     Lisinopril Cough     Penicillins Rash        Social History:     Social History   Substance Use Topics     Smoking status: Never Smoker     Smokeless tobacco: Not on file     Alcohol use Yes      Comment: occasionally       History   Drug Use No     Lives with , feels safe at home.  Retired .  Enjoys playing golf, gardening.  Does have an advanced directive on file and would like her , Christiano to be her POA.  DNR/DNI    Family History:     The patient's family history is notable for     Family History   Problem Relation Age of Onset     Breast Cancer Mother      Heart Failure Mother      Breast Cancer Maternal Grandmother      Breast Cancer Maternal Aunt      Myocardial Infarction Father      Unknown/Adopted Brother      Unknown/Adopted Maternal Grandfather      Stomach Cancer Paternal Grandmother      Lung Cancer Paternal Grandfather      mustard gas in WW[place]         Physical Exam:     /77  Pulse 84  Temp 98.8  F (37.1  C) (Tympanic)  Resp 16  Ht 1.499 m (4' 11\")  Wt 74.2 kg (163 lb " 9.3 oz)  SpO2 96%  Breastfeeding? Unknown  BMI 33.04 kg/m2  Body mass index is 33.04 kg/(m^2).    General Appearance: healthy and alert, no distress     HEENT:  no thyromegaly, no palpable nodules or masses        Cardiovascular: regular rate and rhythm, no gallops, rubs or murmurs     Respiratory: lungs clear, no rales, rhonchi or wheezes    Musculoskeletal: extremities non tender and without edema    Skin: no lesions or rashes     Neurological: normal gait, no gross defects     Psychiatric: appropriate mood and affect                               Hematological: normal cervical, supraclavicular and inguinal lymph nodes     Gastrointestinal:       abdomen soft, non-tender, non-distended, no organomegaly or masses    Genitourinary:           Refuses    Assessment:      Tosin Nina is a 71 year old woman with a diagnosis of Stage IVB high grade serous ovarian cancer.       Plan:       1.)   Stage IVB high grade serous ovarian cancer. Reviewed labs with patient and her . Albumin 3.0/Protien 6.5, reviewed high protein diet. H/H 9.8/30.0, anemia of chronic disease/chemo induced. Plt 102, thrombocytopenia secondary to chemo and precautions given. Labs within limits for continuing with chemotherapy cycle #5 dose dense taxol/carbo.       2.)   Deconditioning and wheelchair bound state/caregiver role strain (). Patient returned home with home health care. They report they are coping ok at this time. Discussed patients  should follow up with his PCP as well. Stress the importance of self care and asking for help when he needs it. Virginia is considering going to her daughters house for a visit once she has completed chemotherapy.      3.)                     Chemotherapy side effects:  Myelosuppression with thrombocytopenia and anemia. Continue neupogen as prescribed at last cycle. Neutopenic and thrombocytopenic precautions provided    4.)                     Para-neoplastic syndrome.  She  did not show improvement with IVIG/steroids.  This is likely to be a long term issue.  She should have follow up with neurology in 1 months      5.)                     PE.  On therapuetic lovenox.  Per Dr Poole plan: Given her ovarian cancer, we will plan to continue her lovenox therapy until she has completed all her therapy at which time we will consult with hematology if it is reasonable for her to stop anti-coagulation. Call for heavy bleeding or go to the ED.      6.)                     Genetic risk factors were assessed and the patient does meet the qualifications for a referral and she has a visit scheduled in sept      7.)                     Labs and/or tests ordered include: CBC, CMP, Mag,       8.)                     Health maintenance issues addressed today include pt due for colonoscopy which can be addressed following her upfront treatment for ovarian cancer.  Jyoti Lopez CNP  8/10/2017 12:35 PM      CC  Patient Care Team:  Esther Back MD as PCP - General (Family Practice)  Henrik Martins, RN as Clinic Care Coordinator  Heather Hassan, RN as Nurse Coordinator (Neurology)  Anastasia Almodovar, RN as Nurse Coordinator (Neurology)

## 2017-08-10 NOTE — PROGRESS NOTES
Infusion Nursing Note:  Tosin Nina presents today for Cycle 5, Day 1 Taxol and Carboplatin.    Patient seen by provider today: Yes: Jyoti Lopez NP   present during visit today: Not Applicable.    Note: N/A.    Intravenous Access:  Labs drawn without difficulty.  Implanted Port.    Treatment Conditions:  Lab Results   Component Value Date    HGB 9.8 08/10/2017     Lab Results   Component Value Date    WBC 4.2 08/10/2017      Lab Results   Component Value Date    ANEU 3.0 08/10/2017     Lab Results   Component Value Date     08/10/2017      Lab Results   Component Value Date     08/10/2017                   Lab Results   Component Value Date    POTASSIUM 3.8 08/10/2017           Lab Results   Component Value Date    MAG 1.8 08/10/2017            Lab Results   Component Value Date    CR 0.60 08/10/2017                   Lab Results   Component Value Date    PATTI 9.0 08/10/2017                Lab Results   Component Value Date    BILITOTAL 0.3 08/10/2017           Lab Results   Component Value Date    ALBUMIN 3.0 08/10/2017                    Lab Results   Component Value Date    ALT 20 08/10/2017           Lab Results   Component Value Date    AST 15 08/10/2017     Results reviewed, labs MET treatment parameters, ok to proceed with treatment.          Post Infusion Assessment:  Patient tolerated infusion without incident.  Blood return noted pre and post infusion.  Site patent and intact, free from redness, edema or discomfort.  No evidence of extravasations.  Access discontinued per protocol.    Discharge Plan:   Patient declined prescription refills.  Copy of AVS reviewed with patient and/or family.  Patient will return 8/11 for next Neupogen appointment.    Allyn Boyer RN

## 2017-08-10 NOTE — NURSING NOTE
"Oncology Rooming Note    August 10, 2017 11:18 AM   Tosin Nina is a 71 year old female who presents for:    Chief Complaint   Patient presents with     Oncology Clinic Visit     Follow up      Initial Vitals: /77  Pulse 84  Temp 98.8  F (37.1  C) (Tympanic)  Resp 16  Ht 1.499 m (4' 11\")  Wt 74.2 kg (163 lb 9.3 oz)  SpO2 96%  Breastfeeding? Unknown  BMI 33.04 kg/m2 Estimated body mass index is 33.04 kg/(m^2) as calculated from the following:    Height as of this encounter: 1.499 m (4' 11\").    Weight as of this encounter: 74.2 kg (163 lb 9.3 oz). Body surface area is 1.76 meters squared.  No Pain (0) Comment: Data Unavailable   No LMP recorded. Patient is postmenopausal.  Allergies reviewed: Yes  Medications reviewed: Yes    Medications: Medication refills not needed today.  [facility] into [facility] [address] [place] [address] [address] [facility] [address]    Clinical concerns: Follow up- Pt c/o vaginal spotting.     8 minutes for nursing intake (face to face time)     Jesika Espinoza CMA     DISCHARGE PLAN:  Next appointments: See patient instruction section  Departure Mode: Chair  Accompanied by: friend  0 minutes for nursing discharge (face to face time)   Jesika Espinoza CMA                  "

## 2017-08-10 NOTE — MR AVS SNAPSHOT
After Visit Summary   8/10/2017    Tosin Nina    MRN: [id number]           Patient Information     Date Of Birth          1945        Visit Information        Provider Department      8/10/2017 11:30 AM Jyoti Lopez APRN CNP [facility]        Today's Diagnoses     Ovarian cancer, unspecified laterality (H)    -  1    Encounter for antineoplastic chemotherapy        Chemotherapy-induced neutropenia (H)        Thrombocytopenia (H)          Care Instructions    Patient needs to be scheduled for cycle #5 (Day 1 starts today) and #6 of chemo including neupogen injections. Scheduled Danay Samson          Follow-ups after your visit        Your next 10 appointments already scheduled     Aug 11, 2017  3:00 PM CDT   Level 1 with RH INFUSION CHAIR 9   [facility] ([facility])    [facility]  [address]  [facility] [address]   [phone]            Aug 12, 2017 10:00 AM CDT   IV Infusion with RH OP PROCEDURE RN#1   [facility] ([facility])    [address]  [facility] [address]   [phone]            Aug 13, 2017 10:00 AM CDT   IV Infusion with RH OP PROCEDURE RN#1   [facility]patient [facility] ([facility])    [address]  [facility] [address]   [phone]            Aug 14, 2017  2:30 PM CDT   Level 1 with RH INFUSION CHAIR 6   [facility] ([facility])    [facility]  [address]  [facility] [address]   [phone]            Aug 17, 2017 12:30 PM CDT   Level 4 with RH INFUSION CHAIR 8   [facility] ([facility])    [facility]  [address]  [place] [address]   [phone]            Aug 18, 2017  2:00 PM CDT    Level 1 with RH INFUSION CHAIR 1   [facility] ([facility])    [facility]  [address]  [facility] [address]   [phone]            Aug 19, 2017 10:00 AM CDT   IV Infusion with RH OP PROCEDURE RN#1   [facility] ([facility])    [address]  [facility] [address]   [phone]            Aug 20, 2017 10:00 AM CDT   IV Infusion with RH OP PROCEDURE RN#1   [facility] ([facility])    [address]  [facility] [address]   [phone]            Aug 21, 2017  2:00 PM CDT   Level 1 with RH INFUSION CHAIR 7   [facility] ([facility])    [facility]  [address]  [facility] [address]   [phone]            Aug 24, 2017 10:00 AM CDT   Level 4 with RH INFUSION CHAIR 12   [facility] ([facility])    [facility]  [address]  [facility] [address]   [phone]              Who to contact     If you have questions or need follow up information about today's clinic visit or your schedule please contact [facility] directly at [phone].  Normal or non-critical lab and imaging results will be communicated to you by MyChart, letter or phone within 4 business days after the clinic has received the results. If you do not hear from us within 7 days, please contact the clinic through MyChart or phone. If you have a critical or abnormal lab result, we will notify you by phone as soon as possible.  Submit refill requests through [email or web address] or call your pharmacy and they will forward the refill request to us. Please allow 3 business days for your refill to be completed.          Additional Information About Your Visit        MyChart Information      "Cloudstaff lets you send messages to your doctor, view your test results, renew your prescriptions, schedule appointments and more. To sign up, go to [email or web address] . Click on \"Log in\" on the left side of the screen, which will take you to the Welcome page. Then click on \"Sign up Now\" on the right side of the page.     You will be asked to enter the access code listed below, as well as some personal information. Please follow the directions to create your username and password.     Your access code is: [email or web address]  Expires: 2017 12:37 PM     Your access code will  in 90 days. If you need help or a new code, please call your [place] clinic or [phone].        Care EveryWhere ID     This is your Care EveryWhere ID. This could be used by other organizations to access your [place] medical records  [id number]        Your Vitals Were     Pulse Temperature Respirations Height Pulse Oximetry Breastfeeding?    84 98.8  F (37.1  C) (Tympanic) 16 1.499 m (4' 11\") 96% Unknown    BMI (Body Mass Index)                   33.04 kg/m2            Blood Pressure from Last 3 Encounters:   08/10/17 126/77   17 115/74   17 123/71    Weight from Last 3 Encounters:   08/10/17 74.2 kg (163 lb 9.3 oz)   17 72.6 kg (160 lb)   17 77.7 kg (171 lb 4.8 oz)              Today, you had the following     No orders found for display         Today's Medication Changes          These changes are accurate as of: 8/10/17 12:52 PM.  If you have any questions, ask your nurse or doctor.               These medicines have changed or have updated prescriptions.        Dose/Directions    Potassium Chloride ER 20 MEQ Tbcr   This may have changed:  when to take this   Used for:  Hypokalemia        Dose:  20 mEq   Take 1 tablet (20 mEq) by mouth daily   Quantity:  30 tablet   Refills:  3       Vitamin D (Cholecalciferol) 1000 UNITS Tabs   This may have changed:  additional instructions   Used for:  Paresthesias    "     Dose:  2000 Units   Take 2,000 Units by mouth daily   Quantity:  60 tablet   Refills:  0                Primary Care Provider Office Phone # Fax #    Esther Back [phone]       [facility] [address] [address]        Equal Access to Services     SARAILESLEY CUATE : Hadii aad ku hadasho Soomaali, waaxda luqadaha, qaybta kaalmada adeegyada, waxay chantellin hayflynnn adewendy claytondung laaurelio ah. So [facility] [phone].    ATENCIÓN: Si habla español, tiene a alarcon disposición servicios gratuitos de asistencia lingüística. Radhaame al [phone].    We comply with applicable federal civil rights laws and [place] laws. We do not discriminate on the basis of race, color, national origin, age, disability sex, sexual orientation or gender identity.            Thank you!     Thank you for choosing [facility]  for your care. Our goal is always to provide you with excellent care. Hearing back from our patients is one way we can continue to improve our services. Please take a few minutes to complete the written survey that you may receive in the mail after your visit with us. Thank you!             Your Updated Medication List - Protect others around you: Learn how to safely use, store and throw away your medicines at www.disposemymeds.org.          This list is accurate as of: 8/10/17 12:52 PM.  Always use your most recent med list.                   Brand Name Dispense Instructions for use Diagnosis    calcium carbonate 500 MG chewable tablet    TUMS     Take 1 chew tab by mouth daily as needed for heartburn        enoxaparin 80 MG/0.8ML injection    LOVENOX    60 Syringe    Inject 0.6 mLs (60 mg) Subcutaneous every 12 hours    Other pulmonary embolism without acute cor pulmonale, unspecified chronicity (H)       ESCITALOPRAM OXALATE PO      Take 10 mg by mouth daily        hydrochlorothiazide 25 MG tablet    HYDRODIURIL     Take 25 mg by mouth daily    Ovarian cancer,  unspecified laterality (H)       levothyroxine 125 MCG tablet    SYNTHROID    30 tablet    Take 1 tablet (125 mcg) by mouth daily    Other pulmonary embolism without acute cor pulmonale, unspecified chronicity (H)       magnesium chloride 535 (64 MG) MG Tbcr CR tablet     30 tablet    Take 1 tablet (535 mg) by mouth daily    Ovarian cancer, unspecified laterality (H)       polyethylene glycol Packet    MIRALAX/GLYCOLAX    7 packet    Take 17 g by mouth daily    Ovarian cancer, unspecified laterality (H)       Potassium Chloride ER 20 MEQ Tbcr     30 tablet    Take 1 tablet (20 mEq) by mouth daily    Hypokalemia       PRILOSEC PO      Take 20 mg by mouth every morning        senna-docusate 8.6-50 MG per tablet    SENOKOT-S;PERICOLACE    60 tablet    Take 1-2 tablets by mouth 2 times daily Hold for loose stools    Ovarian cancer, unspecified laterality (H)       Vitamin D (Cholecalciferol) 1000 UNITS Tabs     60 tablet    Take 2,000 Units by mouth daily    Paresthesias

## 2017-08-10 NOTE — PATIENT INSTRUCTIONS
Patient needs to be scheduled for cycle #5 (Day 1 starts today) and #6 of chemo including neupogen injections. Scheduled Danay Samson

## 2017-08-10 NOTE — MR AVS SNAPSHOT
After Visit Summary   8/10/2017    Tosin Nina    MRN: [id number]           Patient Information     Date Of Birth          1945        Visit Information        Provider Department      8/10/2017 11:00 AM RH INFUSION CHAIR 12 [facility]        Today's Diagnoses     Chemotherapy-induced neutropenia (H)    -  1    Ovarian cancer, unspecified laterality (H)           Follow-ups after your visit        Your next 10 appointments already scheduled     Aug 11, 2017  3:00 PM CDT   Level 1 with RH INFUSION CHAIR 9   [facility] ([facility])    [facility]  [address]  [facility] [address]   [phone]            Aug 12, 2017 10:00 AM CDT   IV Infusion with RH OP PROCEDURE RN#1   [facility]patient [facility] ([facility])    [address]  [facility] [address]   [phone]            Aug 13, 2017 10:00 AM CDT   IV Infusion with RH OP PROCEDURE RN#1   [facility]patient [facility] ([facility])    [address]  [facility] [address]   [phone]            Aug 14, 2017  2:30 PM CDT   Level 1 with RH INFUSION CHAIR 6   [facility] ([facility])    [facility]  [address]  [facility] [address]   [phone]            Aug 17, 2017 12:30 PM CDT   Level 2 with RH INFUSION CHAIR 8   [facility] ([facility])    [facility] [address]  [place] [address]   [phone]            Aug 18, 2017  2:00 PM CDT   Level 1 with RH INFUSION CHAIR 1   [facility] ([facility])    [facility] [address]  [place] [address]   [phone]            Aug 19, 2017  "10:00 AM CDT   IV Infusion with RH OP PROCEDURE RN#1   [facility] ([facility])    [address]  [facility] [address]   [phone]            Aug 20, 2017 10:00 AM CDT   IV Infusion with RH OP PROCEDURE RN#1   [facility] ([facility])    [address]  [facility] [address]   [phone]            Aug 21, 2017  2:00 PM CDT   Level 1 with RH INFUSION CHAIR 7   [facility] ([facility])    [facility]  [address]  [facility] [address] [phone]            Aug 24, 2017 10:00 AM CDT   Level 2 with RH INFUSION CHAIR 12   [facility] ([facility])    [facility]  [address]  [facility] [address] [phone]              Who to contact     If you have questions or need follow up information about today's clinic visit or your schedule please contact [facility] directly at [phone].  Normal or non-critical lab and imaging results will be communicated to you by Wilocityhart, letter or phone within 4 business days after the clinic has received the results. If you do not hear from us within 7 days, please contact the clinic through Wilocityhart or phone. If you have a critical or abnormal lab result, we will notify you by phone as soon as possible.  Submit refill requests through PlumTV or call your pharmacy and they will forward the refill request to us. Please allow 3 business days for your refill to be completed.          Additional Information About Your Visit        Wilocityhart Information     PlumTV lets you send messages to your doctor, view your test results, renew your prescriptions, schedule appointments and more. To sign up, go to [email or web address] . Click on \"Log in\" on the left side of the screen, " "which will take you to the Welcome page. Then click on \"Sign up Now\" on the right side of the page.     You will be asked to enter the access code listed below, as well as some personal information. Please follow the directions to create your username and password.     Your access code is: [email or web address]  Expires: 2017 12:37 PM     Your access code will  in 90 days. If you need help or a new code, please call your [facility] or [phone].        Care EveryWhere ID     This is your Care EveryWhere ID. This could be used by other organizations to access your [place] medical records  [id number]         Blood Pressure from Last 3 Encounters:   08/10/17 126/77   17 115/74   17 123/71    Weight from Last 3 Encounters:   08/10/17 74.2 kg (163 lb 9.3 oz)   17 72.6 kg (160 lb)   17 77.7 kg (171 lb 4.8 oz)              We Performed the Following          CBC with platelets differential     Comprehensive metabolic panel     Magnesium          Today's Medication Changes          These changes are accurate as of: 8/10/17  1:56 PM.  If you have any questions, ask your nurse or doctor.               These medicines have changed or have updated prescriptions.        Dose/Directions    Potassium Chloride ER 20 MEQ Tbcr   This may have changed:  when to take this   Used for:  Hypokalemia        Dose:  20 mEq   Take 1 tablet (20 mEq) by mouth daily   Quantity:  30 tablet   Refills:  3       Vitamin D (Cholecalciferol) 1000 UNITS Tabs   This may have changed:  additional instructions   Used for:  Paresthesias        Dose:  2000 Units   Take 2,000 Units by mouth daily   Quantity:  60 tablet   Refills:  0                Primary Care Provider Office Phone # Fax #    Esther Back [phone]       [facility] [address] [place] [address]        Equal Access to Services     CITLALLI CUELLO AH: Hadii zita Alves, jesus martinez, qaybta emilie " hetal andersonwendy feleciamichelle morse'aan ah. So [facility] [phone].    ATENCIÓN: Si aidenla chao, tiene a alarcon disposición servicios gratuitos de asistencia lingüística. Ronnie al [phone].    We comply with applicable federal civil rights laws and [place] laws. We do not discriminate on the basis of race, color, national origin, age, disability sex, sexual orientation or gender identity.            Thank you!     Thank you for choosing [facility]  for your care. Our goal is always to provide you with excellent care. Hearing back from our patients is one way we can continue to improve our services. Please take a few minutes to complete the written survey that you may receive in the mail after your visit with us. Thank you!             Your Updated Medication List - Protect others around you: Learn how to safely use, store and throw away your medicines at www.disposemymeds.org.          This list is accurate as of: 8/10/17  1:56 PM.  Always use your most recent med list.                   Brand Name Dispense Instructions for use Diagnosis    calcium carbonate 500 MG chewable tablet    TUMS     Take 1 chew tab by mouth daily as needed for heartburn        enoxaparin 80 MG/0.8ML injection    LOVENOX    60 Syringe    Inject 0.6 mLs (60 mg) Subcutaneous every 12 hours    Other pulmonary embolism without acute cor pulmonale, unspecified chronicity (H)       ESCITALOPRAM OXALATE PO      Take 10 mg by mouth daily        hydrochlorothiazide 25 MG tablet    HYDRODIURIL     Take 25 mg by mouth daily    Ovarian cancer, unspecified laterality (H)       levothyroxine 125 MCG tablet    SYNTHROID    30 tablet    Take 1 tablet (125 mcg) by mouth daily    Other pulmonary embolism without acute cor pulmonale, unspecified chronicity (H)       magnesium chloride 535 (64 MG) MG Tbcr CR tablet     30 tablet    Take 1 tablet (535 mg) by mouth daily    Ovarian cancer, unspecified laterality  (H)       polyethylene glycol Packet    MIRALAX/GLYCOLAX    7 packet    Take 17 g by mouth daily    Ovarian cancer, unspecified laterality (H)       Potassium Chloride ER 20 MEQ Tbcr     30 tablet    Take 1 tablet (20 mEq) by mouth daily    Hypokalemia       PRILOSEC PO      Take 20 mg by mouth every morning        senna-docusate 8.6-50 MG per tablet    SENOKOT-S;PERICOLACE    60 tablet    Take 1-2 tablets by mouth 2 times daily Hold for loose stools    Ovarian cancer, unspecified laterality (H)       Vitamin D (Cholecalciferol) 1000 UNITS Tabs     60 tablet    Take 2,000 Units by mouth daily    Paresthesias

## 2017-08-11 ENCOUNTER — INFUSION THERAPY VISIT (OUTPATIENT)
Dept: INFUSION THERAPY | Facility: CLINIC | Age: 72
End: 2017-08-11
Attending: OBSTETRICS & GYNECOLOGY
Payer: COMMERCIAL

## 2017-08-11 VITALS — TEMPERATURE: 96.9 F | HEART RATE: 73 BPM | SYSTOLIC BLOOD PRESSURE: 123 MMHG | DIASTOLIC BLOOD PRESSURE: 71 MMHG

## 2017-08-11 DIAGNOSIS — D70.1 CHEMOTHERAPY-INDUCED NEUTROPENIA (H): Primary | ICD-10-CM

## 2017-08-11 DIAGNOSIS — T45.1X5A CHEMOTHERAPY-INDUCED NEUTROPENIA (H): Primary | ICD-10-CM

## 2017-08-11 DIAGNOSIS — C56.9 OVARIAN CANCER, UNSPECIFIED LATERALITY (H): ICD-10-CM

## 2017-08-11 PROCEDURE — 25000128 H RX IP 250 OP 636: Performed by: NURSE PRACTITIONER

## 2017-08-11 PROCEDURE — 96372 THER/PROPH/DIAG INJ SC/IM: CPT

## 2017-08-11 RX ADMIN — FILGRASTIM 300 MCG: 300 INJECTION, SOLUTION INTRAVENOUS; SUBCUTANEOUS at 14:55

## 2017-08-11 NOTE — MR AVS SNAPSHOT
After Visit Summary   8/11/2017    Tosin Nina    MRN: [id number]           Patient Information     Date Of Birth          1945        Visit Information        Provider Department      8/11/2017 3:00 PM RH INFUSION CHAIR 9 [facility]        Today's Diagnoses     Chemotherapy-induced neutropenia (H)    -  1    Ovarian cancer, unspecified laterality (H)           Follow-ups after your visit        Your next 10 appointments already scheduled     Aug 12, 2017 10:00 AM CDT   IV Infusion with RH OP PROCEDURE RN#1   [facility] ([facility])    [address]  [facility] [address]   [phone]            Aug 13, 2017 10:00 AM CDT   IV Infusion with RH OP PROCEDURE RN#1   [facility] ([facility])    [address]  [facility] [address]   [phone]            Aug 14, 2017  2:30 PM CDT   Level 1 with RH INFUSION CHAIR 6   [facility] ([facility])    [facility]  [address]  [facility] [address]   [phone]            Aug 17, 2017 12:30 PM CDT   Level 2 with RH INFUSION CHAIR 8   [facility] ([facility])    [facility]  [address]  [facility] [address]   [phone]            Aug 18, 2017  2:00 PM CDT   Level 1 with RH INFUSION CHAIR 1   [facility] ([facility])    [facility]  [address]  [facility] [address]   [phone]            Aug 19, 2017 10:00 AM CDT   IV Infusion with RH OP PROCEDURE RN#1   [facility] ([facility])    [address]  [facility] [address]   [phone]            Aug 20, 2017 10:00 AM CDT   IV Infusion with RH OP PROCEDURE  "RN#1   [facility] ([facility])    [address]  [facility] [address]   [phone]            Aug 21, 2017  2:00 PM CDT   Level 1 with RH INFUSION CHAIR 7   [facility] ([facility])    [facility]  [address]  [facility] [address]   [phone]            Aug 24, 2017 10:00 AM CDT   Level 2 with RH INFUSION CHAIR 12   [facility] ([facility])    [facility]  [address]  [facility] [address] [phone]            Aug 25, 2017  2:00 PM CDT   Level 1 with RH INFUSION CHAIR 3   [facility] ([facility])    [facility]  [address]  [facility] [address] [phone]              Who to contact     If you have questions or need follow up information about today's clinic visit or your schedule please contact [facility] directly at [phone].  Normal or non-critical lab and imaging results will be communicated to you by MyChart, letter or phone within 4 business days after the clinic has received the results. If you do not hear from us within 7 days, please contact the clinic through VI Systemshart or phone. If you have a critical or abnormal lab result, we will notify you by phone as soon as possible.  Submit refill requests through artandseek or call your pharmacy and they will forward the refill request to us. Please allow 3 business days for your refill to be completed.          Additional Information About Your Visit        VI SystemsharAppAssure Software Information     artandseek lets you send messages to your doctor, view your test results, renew your prescriptions, schedule appointments and more. To sign up, go to [email or web address] . Click on \"Log in\" on the left side of the screen, which " "will take you to the Welcome page. Then click on \"Sign up Now\" on the right side of the page.     You will be asked to enter the access code listed below, as well as some personal information. Please follow the directions to create your username and password.     Your access code is: [email or web address]  Expires: 2017 12:37 PM     Your access code will  in 90 days. If you need help or a new code, please call your [facility] or [phone].        Care EveryWhere ID     This is your Care EveryWhere ID. This could be used by other organizations to access your [facility] medical records  [id number]        Your Vitals Were     Pulse Temperature                73 96.9  F (36.1  C) (Tympanic)           Blood Pressure from Last 3 Encounters:   17 123/71   08/10/17 126/77   17 115/74    Weight from Last 3 Encounters:   08/10/17 74.2 kg (163 lb 9.3 oz)   17 72.6 kg (160 lb)   17 77.7 kg (171 lb 4.8 oz)              Today, you had the following     No orders found for display         Today's Medication Changes          These changes are accurate as of: 17  3:23 PM.  If you have any questions, ask your nurse or doctor.               These medicines have changed or have updated prescriptions.        Dose/Directions    Potassium Chloride ER 20 MEQ Tbcr   This may have changed:  when to take this   Used for:  Hypokalemia        Dose:  20 mEq   Take 1 tablet (20 mEq) by mouth daily   Quantity:  30 tablet   Refills:  3       Vitamin D (Cholecalciferol) 1000 UNITS Tabs   This may have changed:  additional instructions   Used for:  Paresthesias        Dose:  2000 Units   Take 2,000 Units by mouth daily   Quantity:  60 tablet   Refills:  0                Primary Care Provider Office Phone # Fax #    Esther Back [phone]       [facility] [address] [address]        Equal Access to Services     CITLALLI CUELLO AH: Hadii jesus Marinelli " marcinraphael hetal bonner ah. So [facility] [phone].    ATENCIÓN: Si ej guidry, tiene a alarcon disposición servicios gratuitos de asistencia lingüística. Ronnie al [phone].    We comply with applicable federal civil rights laws and [place] laws. We do not discriminate on the basis of race, color, national origin, age, disability sex, sexual orientation or gender identity.            Thank you!     Thank you for choosing [facility] INF[facility]  for your care. Our goal is always to provide you with excellent care. Hearing back from our patients is one way we can continue to improve our services. Please take a few minutes to complete the written survey that you may receive in the mail after your visit with us. Thank you!             Your Updated Medication List - Protect others around you: Learn how to safely use, store and throw away your medicines at www.disposemymeds.org.          This list is accurate as of: 8/11/17  3:23 PM.  Always use your most recent med list.                   Brand Name Dispense Instructions for use Diagnosis    calcium carbonate 500 MG chewable tablet    TUMS     Take 1 chew tab by mouth daily as needed for heartburn        enoxaparin 80 MG/0.8ML injection    LOVENOX    60 Syringe    Inject 0.6 mLs (60 mg) Subcutaneous every 12 hours    Other pulmonary embolism without acute cor pulmonale, unspecified chronicity (H)       ESCITALOPRAM OXALATE PO      Take 10 mg by mouth daily        hydrochlorothiazide 25 MG tablet    HYDRODIURIL     Take 25 mg by mouth daily    Ovarian cancer, unspecified laterality (H)       levothyroxine 125 MCG tablet    SYNTHROID    30 tablet    Take 1 tablet (125 mcg) by mouth daily    Other pulmonary embolism without acute cor pulmonale, unspecified chronicity (H)       magnesium chloride 535 (64 MG) MG Tbcr CR tablet     30 tablet    Take 1 tablet (535 mg) by mouth daily    Ovarian cancer,  unspecified laterality (H)       polyethylene glycol Packet    MIRALAX/GLYCOLAX    7 packet    Take 17 g by mouth daily    Ovarian cancer, unspecified laterality (H)       Potassium Chloride ER 20 MEQ Tbcr     30 tablet    Take 1 tablet (20 mEq) by mouth daily    Hypokalemia       PRILOSEC PO      Take 20 mg by mouth every morning        senna-docusate 8.6-50 MG per tablet    SENOKOT-S;PERICOLACE    60 tablet    Take 1-2 tablets by mouth 2 times daily Hold for loose stools    Ovarian cancer, unspecified laterality (H)       Vitamin D (Cholecalciferol) 1000 UNITS Tabs     60 tablet    Take 2,000 Units by mouth daily    Paresthesias

## 2017-08-11 NOTE — PROGRESS NOTES
Infusion Nursing Note:  Tosin Nina presents today for Neupogen.    Patient seen by provider today: No   present during visit today: Not Applicable.    Note: N/A.    Intravenous Access:  No Intravenous access/labs at this visit.    Treatment Conditions:  Not Applicable.      Post Infusion Assessment:  Patient tolerated injection without incident.    Discharge Plan:   Discharge instructions reviewed with: Patient and Family.  AVS to patient via 10BestThingsHART.  Patient will return 8/14/17 for next appointment.   Patient discharged in stable condition accompanied by: .  Departure Mode: Wheelchair.    Allyn Leary RN

## 2017-08-12 ENCOUNTER — HOSPITAL ENCOUNTER (OUTPATIENT)
Dept: OUTPATIENT PROCEDURES | Facility: CLINIC | Age: 72
Discharge: HOME | End: 2017-08-12
Attending: OBSTETRICS & GYNECOLOGY | Admitting: OBSTETRICS & GYNECOLOGY
Payer: COMMERCIAL

## 2017-08-12 VITALS
SYSTOLIC BLOOD PRESSURE: 110 MMHG | TEMPERATURE: 97.7 F | HEART RATE: 94 BPM | DIASTOLIC BLOOD PRESSURE: 66 MMHG | OXYGEN SATURATION: 97 % | RESPIRATION RATE: 16 BRPM

## 2017-08-12 DIAGNOSIS — D70.1 CHEMOTHERAPY-INDUCED NEUTROPENIA (H): ICD-10-CM

## 2017-08-12 DIAGNOSIS — C56.9 OVARIAN CANCER, UNSPECIFIED LATERALITY (H): ICD-10-CM

## 2017-08-12 DIAGNOSIS — T45.1X5A CHEMOTHERAPY-INDUCED NEUTROPENIA (H): ICD-10-CM

## 2017-08-12 PROCEDURE — 96372 THER/PROPH/DIAG INJ SC/IM: CPT

## 2017-08-12 PROCEDURE — 25000128 H RX IP 250 OP 636: Performed by: NURSE PRACTITIONER

## 2017-08-12 RX ADMIN — FILGRASTIM 300 MCG: 300 INJECTION, SOLUTION INTRAVENOUS; SUBCUTANEOUS at 09:48

## 2017-08-13 ENCOUNTER — HOSPITAL ENCOUNTER (OUTPATIENT)
Dept: OUTPATIENT PROCEDURES | Facility: CLINIC | Age: 72
Discharge: HOME | End: 2017-08-13
Attending: OBSTETRICS & GYNECOLOGY | Admitting: OBSTETRICS & GYNECOLOGY
Payer: COMMERCIAL

## 2017-08-13 VITALS
SYSTOLIC BLOOD PRESSURE: 110 MMHG | OXYGEN SATURATION: 97 % | RESPIRATION RATE: 16 BRPM | TEMPERATURE: 98.3 F | DIASTOLIC BLOOD PRESSURE: 73 MMHG | HEART RATE: 100 BPM

## 2017-08-13 DIAGNOSIS — T45.1X5A CHEMOTHERAPY-INDUCED NEUTROPENIA (H): ICD-10-CM

## 2017-08-13 DIAGNOSIS — D70.1 CHEMOTHERAPY-INDUCED NEUTROPENIA (H): ICD-10-CM

## 2017-08-13 DIAGNOSIS — C56.9 OVARIAN CANCER, UNSPECIFIED LATERALITY (H): ICD-10-CM

## 2017-08-13 PROCEDURE — 96372 THER/PROPH/DIAG INJ SC/IM: CPT

## 2017-08-13 PROCEDURE — 25000128 H RX IP 250 OP 636: Performed by: NURSE PRACTITIONER

## 2017-08-13 RX ADMIN — FILGRASTIM 300 MCG: 300 INJECTION, SOLUTION INTRAVENOUS; SUBCUTANEOUS at 10:06

## 2017-08-14 ENCOUNTER — INFUSION THERAPY VISIT (OUTPATIENT)
Dept: INFUSION THERAPY | Facility: CLINIC | Age: 72
End: 2017-08-14
Attending: OBSTETRICS & GYNECOLOGY
Payer: COMMERCIAL

## 2017-08-14 VITALS
DIASTOLIC BLOOD PRESSURE: 76 MMHG | RESPIRATION RATE: 16 BRPM | TEMPERATURE: 97.3 F | SYSTOLIC BLOOD PRESSURE: 131 MMHG | HEART RATE: 88 BPM

## 2017-08-14 DIAGNOSIS — T45.1X5A CHEMOTHERAPY-INDUCED NEUTROPENIA (H): Primary | ICD-10-CM

## 2017-08-14 DIAGNOSIS — C56.9 OVARIAN CANCER, UNSPECIFIED LATERALITY (H): ICD-10-CM

## 2017-08-14 DIAGNOSIS — D70.1 CHEMOTHERAPY-INDUCED NEUTROPENIA (H): Primary | ICD-10-CM

## 2017-08-14 PROCEDURE — 25000128 H RX IP 250 OP 636: Performed by: NURSE PRACTITIONER

## 2017-08-14 PROCEDURE — 96372 THER/PROPH/DIAG INJ SC/IM: CPT

## 2017-08-14 RX ADMIN — FILGRASTIM 300 MCG: 300 INJECTION, SOLUTION INTRAVENOUS; SUBCUTANEOUS at 14:32

## 2017-08-14 NOTE — PROGRESS NOTES
Infusion Nursing Note:  Tosin Nina presents today for Neupogen.    Patient seen by provider today: No   present during visit today: Not Applicable.    Note: Denies problems, today is day 4 of Neupogen,.    Intravenous Access:  No Intravenous access/labs at this visit.    Treatment Conditions:  Not Applicable.      Post Infusion Assessment:  Patient tolerated injection without incident.  Site patent and intact, free from redness, edema or discomfort.    Discharge Plan:   Discharge instructions reviewed with: Patient and Family.  Patient will return Thurs   for next appointment.   Patient discharged in stable condition accompanied by: self and .  Departure Mode: Wheelchair.    Maru More RN

## 2017-08-14 NOTE — MR AVS SNAPSHOT
After Visit Summary   8/14/2017    Tosin Nina    MRN: [id number]           Patient Information     Date Of Birth          1945        Visit Information        Provider Department      8/14/2017 2:30 PM RH INFUSION CHAIR 6 [facility]        Today's Diagnoses     Chemotherapy-induced neutropenia (H)    -  1    Ovarian cancer, unspecified laterality (H)           Follow-ups after your visit        Your next 10 appointments already scheduled     Aug 17, 2017 12:30 PM CDT   Level 2 with RH INFUSION CHAIR 9   [facility] ([facility])    [facility] [place] [address]  [place] [address]   [phone]            Aug 18, 2017  2:00 PM CDT   Level 1 with RH INFUSION CHAIR 1   [facility] ([facility])    [facility]  [address]  [place] [address]   [phone]            Aug 19, 2017 10:00 AM CDT   IV Infusion with RH OP PROCEDURE RN#1   [facility] ([facility])    [address]  [facility] [address]   [phone]            Aug 20, 2017 10:00 AM CDT   IV Infusion with RH OP PROCEDURE RN#1   [facility] ([facility])    [address]  [facility] [address]   [phone]            Aug 21, 2017  2:00 PM CDT   Level 1 with RH INFUSION CHAIR 7   [facility] ([facility])    [facility]  [address]  [place] [address]   [phone]            Aug 24, 2017 10:00 AM CDT   Level 2 with RH INFUSION CHAIR 12   [facility] ([facility])    [facility]  [address]  [place] [address]   [phone]            Aug 25, 2017   "2:00 PM CDT   Level 1 with RH INFUSION CHAIR 3   [facility] ([facility])    [facility]  [address]  [facility] [address]   [phone]            Aug 26, 2017 10:00 AM CDT   IV Infusion with RH OP PROCEDURE RN#1   [facility] Outpatient Procedures ([facility])    [address]  [facility] [address]   [phone]            Aug 27, 2017  2:00 PM CDT   IV Infusion with RH OP PROCEDURE RN#1   [facility] Outpatient Procedures ([facility])    [address]  [facility] [address]   [phone]            Aug 28, 2017  2:00 PM CDT   Level 1 with RH INFUSION CHAIR 3   [facility] ([facility])    [facility]  [address]  [facility] [address]   [phone]              Who to contact     If you have questions or need follow up information about today's clinic visit or your schedule please contact [facility] INF[facility]ION [facility] directly at [phone].  Normal or non-critical lab and imaging results will be communicated to you by [employer] [place] [employer], letter or phone within 4 business days after the clinic has received the results. If you do not hear from us within 7 days, please contact the clinic through [employer] or phone. If you have a critical or abnormal lab result, we will notify you by phone as soon as possible.  Submit refill requests through Flooved or call your pharmacy and they will forward the refill request to us. Please allow 3 business days for your refill to be completed.          Additional Information About Your Visit        [employer] Information     Flooved lets you send messages to your doctor, view your test results, renew your prescriptions, schedule appointments and more. To sign up, go to [email or web address] . Click on \"Log in\" on the left side of the screen, " "which will take you to the Welcome page. Then click on \"Sign up Now\" on the right side of the page.     You will be asked to enter the access code listed below, as well as some personal information. Please follow the directions to create your username and password.     Your access code is: [email or web address]  Expires: 2017 12:37 PM     Your access code will  in 90 days. If you need help or a new code, please call your [facility] or [phone].        Care EveryWhere ID     This is your Care EveryWhere ID. This could be used by other organizations to access your [place] medical records  [id number]        Your Vitals Were     Pulse Temperature Respirations             88 97.3  F (36.3  C) (Tympanic) 16          Blood Pressure from Last 3 Encounters:   17 131/76   17 110/73   17 110/66    Weight from Last 3 Encounters:   08/10/17 74.2 kg (163 lb 9.3 oz)   17 72.6 kg (160 lb)   17 77.7 kg (171 lb 4.8 oz)              Today, you had the following     No orders found for display         Today's Medication Changes          These changes are accurate as of: 17  3:15 PM.  If you have any questions, ask your nurse or doctor.               These medicines have changed or have updated prescriptions.        Dose/Directions    Potassium Chloride ER 20 MEQ Tbcr   This may have changed:  when to take this   Used for:  Hypokalemia        Dose:  20 mEq   Take 1 tablet (20 mEq) by mouth daily   Quantity:  30 tablet   Refills:  3       Vitamin D (Cholecalciferol) 1000 UNITS Tabs   This may have changed:  additional instructions   Used for:  Paresthesias        Dose:  2000 Units   Take 2,000 Units by mouth daily   Quantity:  60 tablet   Refills:  0                Primary Care Provider Office Phone # Fax #    Esther Back [phone]       [facility] [address] [address]        Equal Access to Services     CITLALLI CUELLO AH: Zechariah boykin " Sofely, waharikada luqadaha, qaybta kaallouie anderson, hetal faulkner johnwendy feleciamichelle laAshvinflo mercedes. So [facility] [phone].    ATENCIÓN: Si ej guidry, tiene a alarcon disposición servicios gratuitos de asistencia lingüística. Ronnie al [phone].    We comply with applicable federal civil rights laws and [place] laws. We do not discriminate on the basis of race, color, national origin, age, disability sex, sexual orientation or gender identity.            Thank you!     Thank you for choosing [facility] INFUSION [facility]  for your care. Our goal is always to provide you with excellent care. Hearing back from our patients is one way we can continue to improve our services. Please take a few minutes to complete the written survey that you may receive in the mail after your visit with us. Thank you!             Your Updated Medication List - Protect others around you: Learn how to safely use, store and throw away your medicines at www.disposemymeds.org.          This list is accurate as of: 8/14/17  3:15 PM.  Always use your most recent med list.                   Brand Name Dispense Instructions for use Diagnosis    calcium carbonate 500 MG chewable tablet    TUMS     Take 1 chew tab by mouth daily as needed for heartburn        enoxaparin 80 MG/0.8ML injection    LOVENOX    60 Syringe    Inject 0.6 mLs (60 mg) Subcutaneous every 12 hours    Other pulmonary embolism without acute cor pulmonale, unspecified chronicity (H)       ESCITALOPRAM OXALATE PO      Take 10 mg by mouth daily        hydrochlorothiazide 25 MG tablet    HYDRODIURIL     Take 25 mg by mouth daily    Ovarian cancer, unspecified laterality (H)       levothyroxine 125 MCG tablet    SYNTHROID    30 tablet    Take 1 tablet (125 mcg) by mouth daily    Other pulmonary embolism without acute cor pulmonale, unspecified chronicity (H)       magnesium chloride 535 (64 MG) MG Tbcr CR tablet     30 tablet    Take 1 tablet (535 mg) by mouth daily     Ovarian cancer, unspecified laterality (H)       polyethylene glycol Packet    MIRALAX/GLYCOLAX    7 packet    Take 17 g by mouth daily    Ovarian cancer, unspecified laterality (H)       Potassium Chloride ER 20 MEQ Tbcr     30 tablet    Take 1 tablet (20 mEq) by mouth daily    Hypokalemia       PRILOSEC PO      Take 20 mg by mouth every morning        senna-docusate 8.6-50 MG per tablet    SENOKOT-S;PERICOLACE    60 tablet    Take 1-2 tablets by mouth 2 times daily Hold for loose stools    Ovarian cancer, unspecified laterality (H)       Vitamin D (Cholecalciferol) 1000 UNITS Tabs     60 tablet    Take 2,000 Units by mouth daily    Paresthesias

## 2017-08-17 ENCOUNTER — INFUSION THERAPY VISIT (OUTPATIENT)
Dept: INFUSION THERAPY | Facility: CLINIC | Age: 72
End: 2017-08-17
Attending: OBSTETRICS & GYNECOLOGY
Payer: COMMERCIAL

## 2017-08-17 ENCOUNTER — HOSPITAL ENCOUNTER (OUTPATIENT)
Facility: CLINIC | Age: 72
Setting detail: SPECIMEN
Discharge: HOME | End: 2017-08-17
Attending: OBSTETRICS & GYNECOLOGY | Admitting: NURSE PRACTITIONER
Payer: COMMERCIAL

## 2017-08-17 VITALS
DIASTOLIC BLOOD PRESSURE: 73 MMHG | RESPIRATION RATE: 16 BRPM | BODY MASS INDEX: 32.44 KG/M2 | WEIGHT: 160.6 LBS | SYSTOLIC BLOOD PRESSURE: 136 MMHG | TEMPERATURE: 98.1 F | HEART RATE: 81 BPM

## 2017-08-17 DIAGNOSIS — D70.1 CHEMOTHERAPY-INDUCED NEUTROPENIA (H): Primary | ICD-10-CM

## 2017-08-17 DIAGNOSIS — Z41.8 ENCOUNTER FOR OTHER PROCEDURES FOR PURPOSES OTHER THAN REMEDYING HEALTH STATE: ICD-10-CM

## 2017-08-17 DIAGNOSIS — T45.1X5A CHEMOTHERAPY-INDUCED NEUTROPENIA (H): Primary | ICD-10-CM

## 2017-08-17 DIAGNOSIS — C56.9 OVARIAN CANCER, UNSPECIFIED LATERALITY (H): ICD-10-CM

## 2017-08-17 LAB
BASOPHILS # BLD AUTO: 0 10E9/L (ref 0–0.2)
BASOPHILS NFR BLD AUTO: 0.5 %
DIFFERENTIAL METHOD BLD: ABNORMAL
EOSINOPHIL # BLD AUTO: 0 10E9/L (ref 0–0.7)
EOSINOPHIL NFR BLD AUTO: 0.5 %
ERYTHROCYTE [DISTWIDTH] IN BLOOD BY AUTOMATED COUNT: 16 % (ref 10–15)
HCT VFR BLD AUTO: 28.7 % (ref 35–47)
HGB BLD-MCNC: 9.5 G/DL (ref 11.7–15.7)
IMM GRANULOCYTES # BLD: 0 10E9/L (ref 0–0.4)
IMM GRANULOCYTES NFR BLD: 0.5 %
LYMPHOCYTES # BLD AUTO: 1.1 10E9/L (ref 0.8–5.3)
LYMPHOCYTES NFR BLD AUTO: 55.4 %
MAGNESIUM SERPL-MCNC: 1.5 MG/DL (ref 1.6–2.3)
MCH RBC QN AUTO: 34.1 PG (ref 26.5–33)
MCHC RBC AUTO-ENTMCNC: 33.1 G/DL (ref 31.5–36.5)
MCV RBC AUTO: 103 FL (ref 78–100)
MONOCYTES # BLD AUTO: 0.1 10E9/L (ref 0–1.3)
MONOCYTES NFR BLD AUTO: 5.9 %
NEUTROPHILS # BLD AUTO: 0.8 10E9/L (ref 1.6–8.3)
NEUTROPHILS NFR BLD AUTO: 37.2 %
NRBC # BLD AUTO: 0 10*3/UL
NRBC BLD AUTO-RTO: 0 /100
PLATELET # BLD AUTO: 110 10E9/L (ref 150–450)
POTASSIUM SERPL-SCNC: 3.5 MMOL/L (ref 3.4–5.3)
RBC # BLD AUTO: 2.79 10E12/L (ref 3.8–5.2)
WBC # BLD AUTO: 2 10E9/L (ref 4–11)

## 2017-08-17 PROCEDURE — 83735 ASSAY OF MAGNESIUM: CPT | Performed by: NURSE PRACTITIONER

## 2017-08-17 PROCEDURE — 96365 THER/PROPH/DIAG IV INF INIT: CPT

## 2017-08-17 PROCEDURE — 96372 THER/PROPH/DIAG INJ SC/IM: CPT | Mod: XS

## 2017-08-17 PROCEDURE — 85025 COMPLETE CBC W/AUTO DIFF WBC: CPT | Performed by: NURSE PRACTITIONER

## 2017-08-17 PROCEDURE — 84132 ASSAY OF SERUM POTASSIUM: CPT | Performed by: NURSE PRACTITIONER

## 2017-08-17 PROCEDURE — 25000128 H RX IP 250 OP 636: Performed by: OBSTETRICS & GYNECOLOGY

## 2017-08-17 PROCEDURE — 25000128 H RX IP 250 OP 636: Performed by: NURSE PRACTITIONER

## 2017-08-17 RX ORDER — HEPARIN SODIUM (PORCINE) LOCK FLUSH IV SOLN 100 UNIT/ML 100 UNIT/ML
500 SOLUTION INTRAVENOUS EVERY 8 HOURS
Status: DISCONTINUED | OUTPATIENT
Start: 2017-08-17 | End: 2017-08-17 | Stop reason: HOSPADM

## 2017-08-17 RX ADMIN — MAGNESIUM SULFATE HEPTAHYDRATE 2 G: 500 INJECTION, SOLUTION INTRAMUSCULAR; INTRAVENOUS at 13:20

## 2017-08-17 RX ADMIN — FILGRASTIM 300 MCG: 300 INJECTION, SOLUTION INTRAVENOUS; SUBCUTANEOUS at 14:06

## 2017-08-17 RX ADMIN — SODIUM CHLORIDE, PRESERVATIVE FREE 500 UNITS: 5 INJECTION INTRAVENOUS at 14:20

## 2017-08-17 NOTE — PROGRESS NOTES
Infusion Nursing Note:  Tosin Nina presents today for taxol but this was cancelled for neutropenia.  Gave IV magnesium and neupogen.    Patient seen by provider today: No   present during visit today: Not Applicable.    Note: Patient did have four days of neupogen and is still neutropenic today.  Notified Jyoti Lopez.  VORB; cancel chemo today (not deferred in plan), give neupogen today and tomorrow.  Give chemo in one week if labs normal.  Patient stated understanding of plan.  Patient denies fevers or signs of infection.    Intravenous Access:  Labs drawn without difficulty.  Implanted Port.    Treatment Conditions:  Lab Results   Component Value Date    HGB 9.5 08/17/2017     Lab Results   Component Value Date    WBC 2.0 08/17/2017      Lab Results   Component Value Date    ANEU 0.8 08/17/2017     Lab Results   Component Value Date     08/17/2017      Results reviewed, labs did NOT meet treatment parameters: ANC 0.8.        Post Infusion Assessment:  Patient tolerated infusion without incident.  Blood return noted pre and post infusion.  Site patent and intact, free from redness, edema or discomfort.  No evidence of extravasations.  Access discontinued per protocol.    Discharge Plan:   Copy of AVS reviewed with patient and/or family.  Patient will return tomorrow for neupogen then 8/24 for labs and chemo for next appointment.  Patient discharged in stable condition accompanied by: self and .  Departure Mode: Wheelchair.    Phyllis Maldonado RN

## 2017-08-17 NOTE — MR AVS SNAPSHOT
After Visit Summary   8/17/2017    Tosin Nina    MRN: [id number]           Patient Information     Date Of Birth          1945        Visit Information        Provider Department      8/17/2017 12:30 PM RH INFUSION CHAIR 9 [facility] Infusion Services        Today's Diagnoses     Chemotherapy-induced neutropenia (H)    -  1    Ovarian cancer, unspecified laterality (H)        Encounter for other procedures for purposes other than remedying health state           Follow-ups after your visit        Your next 10 appointments already scheduled     Aug 18, 2017  2:00 PM CDT   Level 1 with RH INFUSION CHAIR 1   [facility] Infusion Services ([facility])    [facility]  [address]  [facility] [address]   [phone]            Aug 24, 2017 10:00 AM CDT   Level 2 with RH INFUSION CHAIR 12   [facility] Infusion Services ([facility])    [facility]  [address]  [facility] [address]   [phone]            Aug 25, 2017  2:00 PM CDT   Level 1 with RH INFUSION CHAIR 3   [facility] Infusion Services ([facility])    [facility]  [address]  [facility] [address]   [phone]            Aug 26, 2017 10:00 AM CDT   IV Infusion with RH OP PROCEDURE RN#1   [facility] Outpatient Procedures ([facility])    [address]  [facility] [address]   [phone]            Aug 27, 2017  2:00 PM CDT   IV Infusion with RH OP PROCEDURE RN#1   [facility] Outpatient Procedures ([facility])    [address]  [facility] [address]   [phone]            Aug 28, 2017  2:00 PM CDT   Level 1 with RH INFUSION CHAIR 3   [facility] Infusion Services ([facility])    [facility]  [address]  [place] [address]   [phone]            Aug 31, 2017 10:00 AM CDT   Level 4 with RH INFUSION CHAIR 2   [facility] Infusion Services ([facility])    [facility]  [address]  [place] [address]   [phone]            Aug 31, 2017 10:00 AM CDT   Return Visit with SALVADOR Solis [facility] ([facility])    [facility]  [address]  [place] [address]   [phone]            Sep 26, 2017  9:00 AM CDT   New Visit with Kelsie Nguyen GC   Cancer Risk Management Program ([facility])    [facility]  [address]  [facility] [address] [phone]              Who to contact     If you have questions or need follow up information about today's clinic visit or your schedule please contact [facility] INFUSION SERVICES directly at [phone].  Normal or non-critical lab and imaging results will be communicated to you by Tower Vision[employer], letter or phone within 4 business days after the clinic has received the results. If you do not hear from us within 7 days, please contact the clinic through [employer] or phone. If you have a critical or abnormal lab result, we will notify you by phone as soon as possible.  Submit refill requests through Planet[employer] or call your pharmacy and they will forward the refill request to us. Please allow 3 business days for your refill to be completed.          Additional Information About Your Visit        MyChart Information     [employer] lets you send messages to your doctor, view your test results, renew your prescriptions, schedule appointments and more. To sign up, go to [email or web address] . Click on \"Log in\" on the left side of the screen, which will take you to the Welcome page. Then click on \"Sign up Now\" on the right side of the page. "     You will be asked to enter the access code listed below, as well as some personal information. Please follow the directions to create your username and password.     Your access code is: [email or web address]  Expires: 2017 12:37 PM     Your access code will  in 90 days. If you need help or a new code, please call your [facility] or [phone].        Care EveryWhere ID     This is your Care EveryWhere ID. This could be used by other organizations to access your [place] medical records  [id number]         Blood Pressure from Last 3 Encounters:   17 131/76   17 110/73   17 110/66    Weight from Last 3 Encounters:   08/10/17 74.2 kg (163 lb 9.3 oz)   17 72.6 kg (160 lb)   17 77.7 kg (171 lb 4.8 oz)              We Performed the Following     CBC with platelets differential     Magnesium     Potassium          Today's Medication Changes          These changes are accurate as of: 17  2:17 PM.  If you have any questions, ask your nurse or doctor.               These medicines have changed or have updated prescriptions.        Dose/Directions    Potassium Chloride ER 20 MEQ Tbcr   This may have changed:  when to take this   Used for:  Hypokalemia        Dose:  20 mEq   Take 1 tablet (20 mEq) by mouth daily   Quantity:  30 tablet   Refills:  3       Vitamin D (Cholecalciferol) 1000 UNITS Tabs   This may have changed:  additional instructions   Used for:  Paresthesias        Dose:  2000 Units   Take 2,000 Units by mouth daily   Quantity:  60 tablet   Refills:  0                Primary Care Provider Office Phone # Fax #    Esther Back [phone]       [facility] [address] [place] [address]        Equal Access to Services     [facility] AH: Zechariah Alves, wayanna martinez, qaybkelly anderson, hetal long. So [facility] [phone].    ATENCIÓN: Si habla español, tiene a alarcon disposición  servicios gratuitos de asistencia lingüística. Ronnie carbajal [phone].    We comply with applicable federal civil rights laws and [place] laws. We do not discriminate on the basis of race, color, national origin, age, disability sex, sexual orientation or gender identity.            Thank you!     Thank you for choosing [facility] INFUSION SERVICES  for your care. Our goal is always to provide you with excellent care. Hearing back from our patients is one way we can continue to improve our services. Please take a few minutes to complete the written survey that you may receive in the mail after your visit with us. Thank you!             Your Updated Medication List - Protect others around you: Learn how to safely use, store and throw away your medicines at www.disposemymeds.org.          This list is accurate as of: 8/17/17  2:17 PM.  Always use your most recent med list.                   Brand Name Dispense Instructions for use Diagnosis    calcium carbonate 500 MG chewable tablet    TUMS     Take 1 chew tab by mouth daily as needed for heartburn        enoxaparin 80 MG/0.8ML injection    LOVENOX    60 Syringe    Inject 0.6 mLs (60 mg) Subcutaneous every 12 hours    Other pulmonary embolism without acute cor pulmonale, unspecified chronicity (H)       ESCITALOPRAM OXALATE PO      Take 10 mg by mouth daily        hydrochlorothiazide 25 MG tablet    HYDRODIURIL     Take 25 mg by mouth daily    Ovarian cancer, unspecified laterality (H)       levothyroxine 125 MCG tablet    SYNTHROID    30 tablet    Take 1 tablet (125 mcg) by mouth daily    Other pulmonary embolism without acute cor pulmonale, unspecified chronicity (H)       magnesium chloride 535 (64 MG) MG Tbcr CR tablet     30 tablet    Take 1 tablet (535 mg) by mouth daily    Ovarian cancer, unspecified laterality (H)       polyethylene glycol Packet    MIRALAX/GLYCOLAX    7 packet    Take 17 g by mouth daily    Ovarian cancer, unspecified  laterality (H)       Potassium Chloride ER 20 MEQ Tbcr     30 tablet    Take 1 tablet (20 mEq) by mouth daily    Hypokalemia       PRILOSEC PO      Take 20 mg by mouth every morning        senna-docusate 8.6-50 MG per tablet    SENOKOT-S;PERICOLACE    60 tablet    Take 1-2 tablets by mouth 2 times daily Hold for loose stools    Ovarian cancer, unspecified laterality (H)       Vitamin D (Cholecalciferol) 1000 UNITS Tabs     60 tablet    Take 2,000 Units by mouth daily    Paresthesias

## 2017-08-17 NOTE — PROGRESS NOTES
"SPIRITUAL HEALTH SERVICES  SPIRITUAL ASSESSMENT Progress Note  [facility]    PRIMARY FOCUS:     Goals of care    Emotional/spiritual/Taoism distress    Support for coping    ILLNESS CIRCUMSTANCES:   Reviewed documentation. Reflective conversation shared with pt Yolanda which integrated elements of illness and family narratives.     Context of Serious Illness/Symptom(s) - Yolanda reviewed that she had a hysterectomy in June and is currently undergoing post-op chemotherapy.  She explained that she has no cancer left.    Persons/Resources Involved - Yolanda named Christiano, her daughter Cece, her son and his family, and a [place] of friends.    DISTRESS:     Emotional/Existential/Relational Distress - Yolanda became tearful upon sharing two stressors:  aylin Guerrero shared that she has lost most of the use of her legs and her vision has become impaired.  She asked \"What does God want me to do?\"  She feels that the safest thing she can do is watch TV but that is not fulfilling.  Once a week she and Christiano go out to see friends; in addition, her daughter and a friends come to her house about twice a week to help with bathing and to visit.  Yolanda and Christiano have a history of volunteering at [employer] and at the [employer] at [facility].  We explored possible ways she might be able to volunteer sitting down.  aylin Guerrero related that Christiano struggles with caring for her 24/7 at the level she needs and becomes frustrated or impatient with daily issues.  She reports that Christiano goes out 2-4 hours a day, which includes working out at a gym.  Yolanda acknowledged that they do not communicate and problem-solve very well.  Affirmed that her loss of physical functioning has been a significant change in her life and marriage.  Explored if marriage counseling might be supportive.  Yolanda expressed interest in counseling.    Spiritual/Islam Distress - As mentioned, Yolanda questions what purpose God has for her now that she is " wheelchair bound.    Social/Cultural/Economic Distress - none named during our conversation.    SPIRIT (Coping):     Jewish/Preethi - Episcopalian; Yolanda is affiliated with [employer] [facility] [place].    Spiritual Practice(s) - she mentioned people praying for her and welcomed my offer to keep her in prayer (pleasantly declining a prayer during our visit).    Emotional/Existential/Relational Connections - Yolanda enjoys being able to get out of the house to visit friends over brunch/lunch or having family and friends visit her at home.    SENSE-MAKING:    Goals of Care - Yolanda has finished PT and OT and will continue to do chemotherapy until her regimen is completed.    Meaning/Sense-Making - She processed her thoughts and feelings related to her loss of body functioning, to her sense of meaning and purpose, and to her marriage.    PLAN: Will consult  regarding marriage counseling referrals for pt.    Fernando Hodge M.Div., [facility]  Staff   Pager [phone]

## 2017-08-18 ENCOUNTER — INFUSION THERAPY VISIT (OUTPATIENT)
Dept: INFUSION THERAPY | Facility: CLINIC | Age: 72
End: 2017-08-18
Attending: OBSTETRICS & GYNECOLOGY
Payer: COMMERCIAL

## 2017-08-18 DIAGNOSIS — D70.1 CHEMOTHERAPY-INDUCED NEUTROPENIA (H): ICD-10-CM

## 2017-08-18 DIAGNOSIS — C56.9 OVARIAN CANCER, UNSPECIFIED LATERALITY (H): ICD-10-CM

## 2017-08-18 DIAGNOSIS — Z41.8 ENCOUNTER FOR OTHER PROCEDURES FOR PURPOSES OTHER THAN REMEDYING HEALTH STATE: Primary | ICD-10-CM

## 2017-08-18 DIAGNOSIS — T45.1X5A CHEMOTHERAPY-INDUCED NEUTROPENIA (H): ICD-10-CM

## 2017-08-18 PROCEDURE — 25000128 H RX IP 250 OP 636: Performed by: NURSE PRACTITIONER

## 2017-08-18 PROCEDURE — 96372 THER/PROPH/DIAG INJ SC/IM: CPT

## 2017-08-18 RX ADMIN — FILGRASTIM 300 MCG: 300 INJECTION, SOLUTION INTRAVENOUS; SUBCUTANEOUS at 14:01

## 2017-08-18 NOTE — MR AVS SNAPSHOT
After Visit Summary   8/18/2017    Tosin Nina    MRN: [id number]           Patient Information     Date Of Birth          1945        Visit Information        Provider Department      8/18/2017 2:00 PM RH INFUSION CHAIR 1 [facility] Infusion Services        Today's Diagnoses     Encounter for other procedures for purposes other than remedying health state    -  1    Chemotherapy-induced neutropenia (H)        Ovarian cancer, unspecified laterality (H)           Follow-ups after your visit        Your next 10 appointments already scheduled     Aug 24, 2017 10:00 AM CDT   Level 2 with RH INFUSION CHAIR 12   [facility] Infusion Services ([facility])    [facility] [place] [facility]  [address]  [facility] [address]   [phone]            Aug 25, 2017  2:00 PM CDT   Level 1 with RH INFUSION CHAIR 3   [facility] Infusion Services ([facility])    [facility]  [address]  [facility] [address]   [phone]            Aug 26, 2017 10:00 AM CDT   IV Infusion with RH OP PROCEDURE RN#1   [facility] Outpatient Procedures ([facility])    [address] [facility] [address]   [phone]            Aug 27, 2017  2:00 PM CDT   IV Infusion with RH OP PROCEDURE RN#1   [facility] Outpatient Procedures ([facility])    [address]  [facility] [address]   [phone]            Aug 28, 2017  2:00 PM CDT   Level 1 with RH INFUSION CHAIR 3   [facility] Infusion Services ([facility])    [facility] [place] [facility]  [address]  [facility] [address]   [phone]            Aug 31, 2017 10:00 AM CDT   Level 4 with RH INFUSION CHAIR 2   [facility] Infusion Services ([facility])    [facility] [place] [facility]  [address]  [place] [address]   [phone]            Aug 31, 2017 10:00 AM CDT   Return Visit with SALVAODR Solis CNP   [facility] Cancer Care ([facility])    [facility] [address]  [address]  [place] [address]   [phone]            Sep 26, 2017  9:00 AM CDT   New Visit with Kelsie Nguyen GC   Cancer Risk Management Program ([facility])    [facility]  [address]  [place] [address] [phone]              Who to contact     If you have questions or need follow up information about today's clinic visit or your schedule please contact [facility] INFUSION SERVICES directly at [phone].  Normal or non-critical lab and imaging results will be communicated to you by MyChart, letter or phone within 4 business days after the clinic has received the results. If you do not hear from us within 7 days, please contact the clinic through AppArchitecthart or phone. If you have a critical or abnormal lab result, we will notify you by phone as soon as possible.  Submit refill requests through [employer] or call your pharmacy and they will forward the refill request to us. Please allow 3 business days for your refill to be completed.          Additional Information About Your Visit        MyChart Information     [employer] lets you send messages to your doctor, view your test results, renew your prescriptions, schedule appointments and more. To sign up, go to [email or web address] . Click on \"Log in\" on the left side of the screen, which will take you to the Welcome page. Then click on \"Sign up Now\" on the right side of the page.     You will be asked to enter the access code listed below, as well as some personal information. Please follow the directions to create your username and password.     Your access code is: [email or web address]  Expires: 11/8/2017 12:37 PM     Your access " code will  in 90 days. If you need help or a new code, please call your [place] clinic or [phone].        Care EveryWhere ID     This is your Care EveryWhere ID. This could be used by other organizations to access your [place] medical records  [id number]         Blood Pressure from Last 3 Encounters:   17 136/73   17 131/76   17 110/73    Weight from Last 3 Encounters:   17 72.8 kg (160 lb 9.6 oz)   08/10/17 74.2 kg (163 lb 9.3 oz)   17 72.6 kg (160 lb)              Today, you had the following     No orders found for display         Today's Medication Changes          These changes are accurate as of: 17  2:51 PM.  If you have any questions, ask your nurse or doctor.               These medicines have changed or have updated prescriptions.        Dose/Directions    Potassium Chloride ER 20 MEQ Tbcr   This may have changed:  when to take this   Used for:  Hypokalemia        Dose:  20 mEq   Take 1 tablet (20 mEq) by mouth daily   Quantity:  30 tablet   Refills:  3       Vitamin D (Cholecalciferol) 1000 UNITS Tabs   This may have changed:  additional instructions   Used for:  Paresthesias        Dose:  2000 Units   Take 2,000 Units by mouth daily   Quantity:  60 tablet   Refills:  0                Primary Care Provider Office Phone # Fax #    Esther Back [phone]       [facility] [address] [place] [address]        Equal Access to Services     [facility] AH: Hadii zita ku hadasho Soomaali, waaxda luqadaha, qaybta kaalmada adeegyamarian, hetal regan . So [facility] [phone].    ATENCIÓN: Si habla chao, tiene a alarcon disposición servicios gratuitos de asistencia lingüística. Llame al [phone].    We comply with applicable federal civil rights laws and [place] laws. We do not discriminate on the basis of race, color, national origin, age, disability sex, sexual orientation or gender identity.             Thank you!     Thank you for choosing [facility] INFUSION SERVICES  for your care. Our goal is always to provide you with excellent care. Hearing back from our patients is one way we can continue to improve our services. Please take a few minutes to complete the written survey that you may receive in the mail after your visit with us. Thank you!             Your Updated Medication List - Protect others around you: Learn how to safely use, store and throw away your medicines at www.disposemymeds.org.          This list is accurate as of: 8/18/17  2:51 PM.  Always use your most recent med list.                   Brand Name Dispense Instructions for use Diagnosis    calcium carbonate 500 MG chewable tablet    TUMS     Take 1 chew tab by mouth daily as needed for heartburn        enoxaparin 80 MG/0.8ML injection    LOVENOX    60 Syringe    Inject 0.6 mLs (60 mg) Subcutaneous every 12 hours    Other pulmonary embolism without acute cor pulmonale, unspecified chronicity (H)       ESCITALOPRAM OXALATE PO      Take 10 mg by mouth daily        hydrochlorothiazide 25 MG tablet    HYDRODIURIL     Take 25 mg by mouth daily    Ovarian cancer, unspecified laterality (H)       levothyroxine 125 MCG tablet    SYNTHROID    30 tablet    Take 1 tablet (125 mcg) by mouth daily    Other pulmonary embolism without acute cor pulmonale, unspecified chronicity (H)       magnesium chloride 535 (64 MG) MG Tbcr CR tablet     30 tablet    Take 1 tablet (535 mg) by mouth daily    Ovarian cancer, unspecified laterality (H)       polyethylene glycol Packet    MIRALAX/GLYCOLAX    7 packet    Take 17 g by mouth daily    Ovarian cancer, unspecified laterality (H)       Potassium Chloride ER 20 MEQ Tbcr     30 tablet    Take 1 tablet (20 mEq) by mouth daily    Hypokalemia       PRILOSEC PO      Take 20 mg by mouth every morning        senna-docusate 8.6-50 MG per tablet    SENOKOT-S;PERICOLACE    60 tablet    Take 1-2 tablets by  mouth 2 times daily Hold for loose stools    Ovarian cancer, unspecified laterality (H)       Vitamin D (Cholecalciferol) 1000 UNITS Tabs     60 tablet    Take 2,000 Units by mouth daily    Paresthesias

## 2017-08-24 ENCOUNTER — TELEPHONE (OUTPATIENT)
Dept: ONCOLOGY | Facility: CLINIC | Age: 72
End: 2017-08-24

## 2017-08-24 ENCOUNTER — INFUSION THERAPY VISIT (OUTPATIENT)
Dept: INFUSION THERAPY | Facility: CLINIC | Age: 72
End: 2017-08-24
Attending: OBSTETRICS & GYNECOLOGY
Payer: COMMERCIAL

## 2017-08-24 ENCOUNTER — HOSPITAL ENCOUNTER (OUTPATIENT)
Facility: CLINIC | Age: 72
Setting detail: SPECIMEN
Discharge: HOME | End: 2017-08-24
Attending: OBSTETRICS & GYNECOLOGY | Admitting: NURSE PRACTITIONER
Payer: COMMERCIAL

## 2017-08-24 VITALS
TEMPERATURE: 97.9 F | WEIGHT: 165 LBS | OXYGEN SATURATION: 95 % | SYSTOLIC BLOOD PRESSURE: 122 MMHG | BODY MASS INDEX: 33.33 KG/M2 | HEART RATE: 79 BPM | DIASTOLIC BLOOD PRESSURE: 52 MMHG | RESPIRATION RATE: 18 BRPM

## 2017-08-24 DIAGNOSIS — I26.99 OTHER PULMONARY EMBOLISM WITHOUT ACUTE COR PULMONALE, UNSPECIFIED CHRONICITY (H): ICD-10-CM

## 2017-08-24 DIAGNOSIS — C56.9 OVARIAN CANCER, UNSPECIFIED LATERALITY (H): ICD-10-CM

## 2017-08-24 DIAGNOSIS — T45.1X5A CHEMOTHERAPY-INDUCED NEUTROPENIA (H): Primary | ICD-10-CM

## 2017-08-24 DIAGNOSIS — D70.1 CHEMOTHERAPY-INDUCED NEUTROPENIA (H): Primary | ICD-10-CM

## 2017-08-24 LAB
BASOPHILS # BLD AUTO: 0 10E9/L (ref 0–0.2)
BASOPHILS NFR BLD AUTO: 0.4 %
DIFFERENTIAL METHOD BLD: ABNORMAL
EOSINOPHIL # BLD AUTO: 0 10E9/L (ref 0–0.7)
EOSINOPHIL NFR BLD AUTO: 0.4 %
ERYTHROCYTE [DISTWIDTH] IN BLOOD BY AUTOMATED COUNT: 16.2 % (ref 10–15)
HCT VFR BLD AUTO: 27.2 % (ref 35–47)
HGB BLD-MCNC: 8.8 G/DL (ref 11.7–15.7)
IMM GRANULOCYTES # BLD: 0 10E9/L (ref 0–0.4)
IMM GRANULOCYTES NFR BLD: 1.5 %
LYMPHOCYTES # BLD AUTO: 0.9 10E9/L (ref 0.8–5.3)
LYMPHOCYTES NFR BLD AUTO: 34.8 %
MAGNESIUM SERPL-MCNC: 1.6 MG/DL (ref 1.6–2.3)
MCH RBC QN AUTO: 33.5 PG (ref 26.5–33)
MCHC RBC AUTO-ENTMCNC: 32.4 G/DL (ref 31.5–36.5)
MCV RBC AUTO: 103 FL (ref 78–100)
MONOCYTES # BLD AUTO: 0.2 10E9/L (ref 0–1.3)
MONOCYTES NFR BLD AUTO: 8.6 %
NEUTROPHILS # BLD AUTO: 1.5 10E9/L (ref 1.6–8.3)
NEUTROPHILS NFR BLD AUTO: 54.3 %
NRBC # BLD AUTO: 0 10*3/UL
NRBC BLD AUTO-RTO: 0 /100
PLATELET # BLD AUTO: 53 10E9/L (ref 150–450)
POTASSIUM SERPL-SCNC: 3.9 MMOL/L (ref 3.4–5.3)
RBC # BLD AUTO: 2.63 10E12/L (ref 3.8–5.2)
WBC # BLD AUTO: 2.7 10E9/L (ref 4–11)

## 2017-08-24 PROCEDURE — 83735 ASSAY OF MAGNESIUM: CPT | Performed by: NURSE PRACTITIONER

## 2017-08-24 PROCEDURE — 84132 ASSAY OF SERUM POTASSIUM: CPT | Performed by: NURSE PRACTITIONER

## 2017-08-24 PROCEDURE — 25000128 H RX IP 250 OP 636: Performed by: OBSTETRICS & GYNECOLOGY

## 2017-08-24 PROCEDURE — 85025 COMPLETE CBC W/AUTO DIFF WBC: CPT | Performed by: NURSE PRACTITIONER

## 2017-08-24 PROCEDURE — 36591 DRAW BLOOD OFF VENOUS DEVICE: CPT

## 2017-08-24 RX ORDER — HEPARIN SODIUM (PORCINE) LOCK FLUSH IV SOLN 100 UNIT/ML 100 UNIT/ML
500 SOLUTION INTRAVENOUS EVERY 8 HOURS
Status: DISCONTINUED | OUTPATIENT
Start: 2017-08-24 | End: 2017-08-24 | Stop reason: HOSPADM

## 2017-08-24 RX ADMIN — SODIUM CHLORIDE, PRESERVATIVE FREE 500 UNITS: 5 INJECTION INTRAVENOUS at 10:50

## 2017-08-24 NOTE — TELEPHONE ENCOUNTER
Talked with Jakob in our clinic pharmacy.  Jakob states that he has already talked to the pharmacy downstairs.  [facility]  Called Korey.  Advised Korey that the pharmacy downstairs here has already filled this Rx for pt.  No further action needed at this time.  Anna Goodwin RN BSN

## 2017-08-24 NOTE — MR AVS SNAPSHOT
After Visit Summary   8/24/2017    Tosin Nina    MRN: [id number]           Patient Information     Date Of Birth          1945        Visit Information        Provider Department      8/24/2017 10:00 AM RH INFUSION CHAIR 12 [facility]        Today's Diagnoses     Chemotherapy-induced neutropenia (H)    -  1    Ovarian cancer, unspecified laterality (H)        Other pulmonary embolism without acute cor pulmonale, unspecified chronicity (H)           Follow-ups after your visit        Your next 10 appointments already scheduled     Aug 31, 2017 10:00 AM CDT   Level 4 with RH INFUSION CHAIR 2   [facility] ([facility])    [facility]  [address]  [place] [address] [phone]            Aug 31, 2017 10:00 AM CDT   Return Visit with SALVADOR Solis [facility] ([facility])    [facility]  [address]  [place] [id number] [phone]            Sep 26, 2017  9:00 AM CDT   New Visit with Kelsie Nguyen GC   Cancer Risk Management Program ([facility])    [facility]  [address]  [facility] [id number] [phone]              Who to contact     If you have questions or need follow up information about today's clinic visit or your schedule please contact [facility] INFUSION SERVICES directly at [phone].  Normal or non-critical lab and imaging results will be communicated to you by MyChart, letter or phone within 4 business days after the clinic has received the results. If you do not hear from us within 7 days, please contact the clinic through MyChart or phone. If you have a critical or abnormal lab result, we will notify you by phone as soon as possible.  Submit refill requests through [employer] or call  "your pharmacy and they will forward the refill request to us. Please allow 3 business days for your refill to be completed.          Additional Information About Your Visit        SocioSquarehart Information     [employer] lets you send messages to your doctor, view your test results, renew your prescriptions, schedule appointments and more. To sign up, go to [email or web address] . Click on \"Log in\" on the left side of the screen, which will take you to the Welcome page. Then click on \"Sign up Now\" on the right side of the page.     You will be asked to enter the access code listed below, as well as some personal information. Please follow the directions to create your username and password.     Your access code is: [email or web address]  Expires: 2017 12:37 PM     Your access code will  in 90 days. If you need help or a new code, please call your [facility] clinic or [phone].        Care EveryWhere ID     This is your Care EveryWhere ID. This could be used by other organizations to access your [facility] medical records  [id number]        Your Vitals Were     Pulse Temperature Respirations Pulse Oximetry BMI (Body Mass Index)       79 97.9  F (36.6  C) (Tympanic) 18 95% 33.33 kg/m2        Blood Pressure from Last 3 Encounters:   17 122/52   17 136/73   17 131/76    Weight from Last 3 Encounters:   17 74.8 kg (165 lb)   17 72.8 kg (160 lb 9.6 oz)   08/10/17 74.2 kg (163 lb 9.3 oz)              We Performed the Following     CBC with platelets differential     Magnesium     Potassium          Today's Medication Changes          These changes are accurate as of: 17 10:47 AM.  If you have any questions, ask your nurse or doctor.               Start taking these medicines.        Dose/Directions    enoxaparin 80 MG/0.8ML injection   Commonly known as:  LOVENOX   Used for:  Other pulmonary embolism without acute cor pulmonale, unspecified chronicity (H)        Dose:  60 mg   Inject 0.6 mLs " (60 mg) Subcutaneous every 12 hours   Quantity:  60 Syringe   Refills:  1         These medicines have changed or have updated prescriptions.        Dose/Directions    Potassium Chloride ER 20 MEQ Tbcr   This may have changed:  when to take this   Used for:  Hypokalemia        Dose:  20 mEq   Take 1 tablet (20 mEq) by mouth daily   Quantity:  30 tablet   Refills:  3       Vitamin D (Cholecalciferol) 1000 UNITS Tabs   This may have changed:  additional instructions   Used for:  Paresthesias        Dose:  2000 Units   Take 2,000 Units by mouth daily   Quantity:  60 tablet   Refills:  0            Where to get your medicines      These medications were sent to [place], [place] - [address] [facility]  [address] [facility] [address]     Phone:  [phone]     enoxaparin 80 MG/0.8ML injection                Primary Care Provider Office Phone # Fax #    Esther Back [phone]       [facility] [address] [address]        Equal Access to Services     [facility] Hospital[facility]ITNEY AH: Hadii aad ku hadasho Soomaali, waaxda luqadaha, qaybta kaalmada adeegyada, waxay idiin hayflynnn nelson regan . So [facility] [phone].    ATENCIÓN: Si habla español, tiene a alarcon disposición servicios gratuitos de asistencia lingüística. [facility] [phone].    We comply with applicable federal civil rights laws and [place] laws. We do not discriminate on the basis of race, color, national origin, age, disability sex, sexual orientation or gender identity.            Thank you!     Thank you for choosing [facility] INFUSION SERVICES  for your care. Our goal is always to provide you with excellent care. Hearing back from our patients is one way we can continue to improve our services. Please take a few minutes to complete the written survey that you may receive in the mail after your visit with us. Thank you!             Your Updated Medication List  - Protect others around you: Learn how to safely use, store and throw away your medicines at www.disposemymeds.org.          This list is accurate as of: 8/24/17 10:47 AM.  Always use your most recent med list.                   Brand Name Dispense Instructions for use Diagnosis    calcium carbonate 500 MG chewable tablet    TUMS     Take 1 chew tab by mouth daily as needed for heartburn        enoxaparin 80 MG/0.8ML injection    LOVENOX    60 Syringe    Inject 0.6 mLs (60 mg) Subcutaneous every 12 hours    Other pulmonary embolism without acute cor pulmonale, unspecified chronicity (H)       ESCITALOPRAM OXALATE PO      Take 10 mg by mouth daily        hydrochlorothiazide 25 MG tablet    HYDRODIURIL     Take 25 mg by mouth daily    Ovarian cancer, unspecified laterality (H)       levothyroxine 125 MCG tablet    SYNTHROID    30 tablet    Take 1 tablet (125 mcg) by mouth daily    Other pulmonary embolism without acute cor pulmonale, unspecified chronicity (H)       magnesium chloride 535 (64 MG) MG Tbcr CR tablet     30 tablet    Take 1 tablet (535 mg) by mouth daily    Ovarian cancer, unspecified laterality (H)       polyethylene glycol Packet    MIRALAX/GLYCOLAX    7 packet    Take 17 g by mouth daily    Ovarian cancer, unspecified laterality (H)       Potassium Chloride ER 20 MEQ Tbcr     30 tablet    Take 1 tablet (20 mEq) by mouth daily    Hypokalemia       PRILOSEC PO      Take 20 mg by mouth every morning        senna-docusate 8.6-50 MG per tablet    SENOKOT-S;PERICOLACE    60 tablet    Take 1-2 tablets by mouth 2 times daily Hold for loose stools    Ovarian cancer, unspecified laterality (H)       Vitamin D (Cholecalciferol) 1000 UNITS Tabs     60 tablet    Take 2,000 Units by mouth daily    Paresthesias

## 2017-08-24 NOTE — PROGRESS NOTES
Infusion Nursing Note:  Tosin Nina presents today for Cycle 5, Day 15 Taxol cancelled due to low platelets.   Patient seen by provider today: No   present during visit today: Not Applicable.    Note:   TORB 8/24/17 at 1030 per Jyoti Lopez NP/Allyn Boyer RN Platelets 53. Cancel today's treatment and cancel Neupogen the next 4 days. Patient to return next week 8/31 for Cycle 6, Day 1 and provider appt.     Intravenous Access:  Labs drawn without difficulty.  Implanted Port.    Treatment Conditions:  Lab Results   Component Value Date    HGB 8.8 08/24/2017     Lab Results   Component Value Date    WBC 2.7 08/24/2017      Lab Results   Component Value Date    ANEU 1.5 08/24/2017     Lab Results   Component Value Date    PLT 53 08/24/2017      Potassium: 3.9  Magnesium: 1.6    Results reviewed, labs did NOT meet treatment parameters:         Post Infusion Assessment:  Patient tolerated lab draw without incident.  Blood return noted pre and post infusion.  Site patent and intact, free from redness, edema or discomfort.  No evidence of extravasations.  Access discontinued per protocol.    Discharge Plan:   Prescription refills given for Lovenox. Patient picking up at  discharge pharmacy.  Patient and/or family verbalized understanding of discharge instructions and all questions answered.  Copy of AVS reviewed with patient and/or family.  Patient will return for  next chemo/provider appt 8/31.  Patient discharged in stable condition accompanied by:  via wheelchair.    Allyn Boyer RN

## 2017-08-24 NOTE — TELEPHONE ENCOUNTER
Scarlet called from [facility] pharmacy in [place] she was calling in regards to Lovenox 80mg/0.8ml injection and has a few questions on dosing.

## 2017-08-24 NOTE — PROGRESS NOTES
SPIRITUAL HEALTH SERVICES Progress Note  [facility]    Visited pt Yolanda per her interest in couples counseling referrals.  Her spouse Christiano was present.  Just before the visit Yolanda learned that she could not receive chemo today because her platelets were low.  Yolanda expressed surprised and reviewed that this is the second week that she had to miss her infusion.  Offered the couples counseling referrals, which included Palliative SW Malgorzata Zapien.  Yolanda expressed interest in possibly meeting with Malgorzata and inquired about Palliative Care.  Provided a brief orientation to Palliative Care.    I remain available per pt/family request.    Fernando Hodge M.Div., [facility]  Staff   Phone [phone]

## 2017-08-28 RX ORDER — ALBUTEROL SULFATE 90 UG/1
1-2 AEROSOL, METERED RESPIRATORY (INHALATION)
Status: CANCELLED
Start: 2017-08-31

## 2017-08-28 RX ORDER — ALBUTEROL SULFATE 0.83 MG/ML
2.5 SOLUTION RESPIRATORY (INHALATION)
Status: CANCELLED | OUTPATIENT
Start: 2017-09-07

## 2017-08-28 RX ORDER — ALBUTEROL SULFATE 0.83 MG/ML
2.5 SOLUTION RESPIRATORY (INHALATION)
Status: CANCELLED | OUTPATIENT
Start: 2017-08-31

## 2017-08-28 RX ORDER — HEPARIN SODIUM (PORCINE) LOCK FLUSH IV SOLN 100 UNIT/ML 100 UNIT/ML
500 SOLUTION INTRAVENOUS EVERY 8 HOURS
Status: CANCELLED
Start: 2017-08-31

## 2017-08-28 RX ORDER — EPINEPHRINE 1 MG/ML
0.3 INJECTION INTRAMUSCULAR; INTRAVENOUS; SUBCUTANEOUS EVERY 5 MIN PRN
Status: CANCELLED | OUTPATIENT
Start: 2017-09-14

## 2017-08-28 RX ORDER — ALBUTEROL SULFATE 90 UG/1
1-2 AEROSOL, METERED RESPIRATORY (INHALATION)
Status: CANCELLED
Start: 2017-09-07

## 2017-08-28 RX ORDER — METHYLPREDNISOLONE SODIUM SUCCINATE 125 MG/2ML
125 INJECTION, POWDER, LYOPHILIZED, FOR SOLUTION INTRAMUSCULAR; INTRAVENOUS
Status: CANCELLED
Start: 2017-09-14

## 2017-08-28 RX ORDER — DIPHENHYDRAMINE HYDROCHLORIDE 50 MG/ML
50 INJECTION INTRAMUSCULAR; INTRAVENOUS
Status: CANCELLED
Start: 2017-09-14

## 2017-08-28 RX ORDER — EPINEPHRINE 0.3 MG/.3ML
0.3 INJECTION SUBCUTANEOUS EVERY 5 MIN PRN
Status: CANCELLED | OUTPATIENT
Start: 2017-09-14

## 2017-08-28 RX ORDER — ALBUTEROL SULFATE 90 UG/1
1-2 AEROSOL, METERED RESPIRATORY (INHALATION)
Status: CANCELLED
Start: 2017-09-14

## 2017-08-28 RX ORDER — LORAZEPAM 2 MG/ML
0.5 INJECTION INTRAMUSCULAR EVERY 6 HOURS PRN
Status: CANCELLED
Start: 2017-08-31

## 2017-08-28 RX ORDER — HEPARIN SODIUM (PORCINE) LOCK FLUSH IV SOLN 100 UNIT/ML 100 UNIT/ML
500 SOLUTION INTRAVENOUS EVERY 8 HOURS
Status: CANCELLED
Start: 2017-09-14

## 2017-08-28 RX ORDER — HEPARIN SODIUM (PORCINE) LOCK FLUSH IV SOLN 100 UNIT/ML 100 UNIT/ML
500 SOLUTION INTRAVENOUS EVERY 8 HOURS
Status: CANCELLED
Start: 2017-09-07

## 2017-08-28 RX ORDER — METHYLPREDNISOLONE SODIUM SUCCINATE 125 MG/2ML
125 INJECTION, POWDER, LYOPHILIZED, FOR SOLUTION INTRAMUSCULAR; INTRAVENOUS
Status: CANCELLED
Start: 2017-08-31

## 2017-08-28 RX ORDER — EPINEPHRINE 0.3 MG/.3ML
0.3 INJECTION SUBCUTANEOUS EVERY 5 MIN PRN
Status: CANCELLED | OUTPATIENT
Start: 2017-09-07

## 2017-08-28 RX ORDER — ALBUTEROL SULFATE 0.83 MG/ML
2.5 SOLUTION RESPIRATORY (INHALATION)
Status: CANCELLED | OUTPATIENT
Start: 2017-09-14

## 2017-08-28 RX ORDER — EPINEPHRINE 0.3 MG/.3ML
0.3 INJECTION SUBCUTANEOUS EVERY 5 MIN PRN
Status: CANCELLED | OUTPATIENT
Start: 2017-08-31

## 2017-08-28 RX ORDER — DIPHENHYDRAMINE HCL 25 MG
25 CAPSULE ORAL ONCE
Status: CANCELLED
Start: 2017-09-14

## 2017-08-28 RX ORDER — EPINEPHRINE 1 MG/ML
0.3 INJECTION INTRAMUSCULAR; INTRAVENOUS; SUBCUTANEOUS EVERY 5 MIN PRN
Status: CANCELLED | OUTPATIENT
Start: 2017-09-07

## 2017-08-28 RX ORDER — SODIUM CHLORIDE 9 MG/ML
1000 INJECTION, SOLUTION INTRAVENOUS CONTINUOUS PRN
Status: CANCELLED
Start: 2017-08-31

## 2017-08-28 RX ORDER — MEPERIDINE HYDROCHLORIDE 25 MG/ML
25 INJECTION INTRAMUSCULAR; INTRAVENOUS; SUBCUTANEOUS EVERY 30 MIN PRN
Status: CANCELLED | OUTPATIENT
Start: 2017-09-07

## 2017-08-28 RX ORDER — MEPERIDINE HYDROCHLORIDE 25 MG/ML
25 INJECTION INTRAMUSCULAR; INTRAVENOUS; SUBCUTANEOUS EVERY 30 MIN PRN
Status: CANCELLED | OUTPATIENT
Start: 2017-08-31

## 2017-08-28 RX ORDER — SODIUM CHLORIDE 9 MG/ML
1000 INJECTION, SOLUTION INTRAVENOUS CONTINUOUS PRN
Status: CANCELLED
Start: 2017-09-14

## 2017-08-28 RX ORDER — DIPHENHYDRAMINE HCL 25 MG
25 CAPSULE ORAL ONCE
Status: CANCELLED
Start: 2017-08-31

## 2017-08-28 RX ORDER — LORAZEPAM 2 MG/ML
0.5 INJECTION INTRAMUSCULAR EVERY 6 HOURS PRN
Status: CANCELLED
Start: 2017-09-14

## 2017-08-28 RX ORDER — METHYLPREDNISOLONE SODIUM SUCCINATE 125 MG/2ML
125 INJECTION, POWDER, LYOPHILIZED, FOR SOLUTION INTRAMUSCULAR; INTRAVENOUS
Status: CANCELLED
Start: 2017-09-07

## 2017-08-28 RX ORDER — LORAZEPAM 2 MG/ML
0.5 INJECTION INTRAMUSCULAR EVERY 6 HOURS PRN
Status: CANCELLED
Start: 2017-09-07

## 2017-08-28 RX ORDER — DIPHENHYDRAMINE HYDROCHLORIDE 50 MG/ML
50 INJECTION INTRAMUSCULAR; INTRAVENOUS
Status: CANCELLED
Start: 2017-09-07

## 2017-08-28 RX ORDER — SODIUM CHLORIDE 9 MG/ML
1000 INJECTION, SOLUTION INTRAVENOUS CONTINUOUS PRN
Status: CANCELLED
Start: 2017-09-07

## 2017-08-28 RX ORDER — PALONOSETRON 0.05 MG/ML
0.25 INJECTION, SOLUTION INTRAVENOUS ONCE
Status: CANCELLED
Start: 2017-08-31

## 2017-08-28 RX ORDER — DIPHENHYDRAMINE HCL 25 MG
25 CAPSULE ORAL ONCE
Status: CANCELLED
Start: 2017-09-07

## 2017-08-28 RX ORDER — EPINEPHRINE 1 MG/ML
0.3 INJECTION INTRAMUSCULAR; INTRAVENOUS; SUBCUTANEOUS EVERY 5 MIN PRN
Status: CANCELLED | OUTPATIENT
Start: 2017-08-31

## 2017-08-28 RX ORDER — MEPERIDINE HYDROCHLORIDE 25 MG/ML
25 INJECTION INTRAMUSCULAR; INTRAVENOUS; SUBCUTANEOUS EVERY 30 MIN PRN
Status: CANCELLED | OUTPATIENT
Start: 2017-09-14

## 2017-08-28 RX ORDER — DIPHENHYDRAMINE HYDROCHLORIDE 50 MG/ML
50 INJECTION INTRAMUSCULAR; INTRAVENOUS
Status: CANCELLED
Start: 2017-08-31

## 2017-08-31 ENCOUNTER — INFUSION THERAPY VISIT (OUTPATIENT)
Dept: INFUSION THERAPY | Facility: CLINIC | Age: 72
End: 2017-08-31
Attending: OBSTETRICS & GYNECOLOGY
Payer: COMMERCIAL

## 2017-08-31 ENCOUNTER — ONCOLOGY VISIT (OUTPATIENT)
Dept: ONCOLOGY | Facility: CLINIC | Age: 72
End: 2017-08-31
Attending: OBSTETRICS & GYNECOLOGY
Payer: COMMERCIAL

## 2017-08-31 ENCOUNTER — HOSPITAL ENCOUNTER (OUTPATIENT)
Facility: CLINIC | Age: 72
Setting detail: SPECIMEN
Discharge: HOME | End: 2017-08-31
Attending: OBSTETRICS & GYNECOLOGY | Admitting: NURSE PRACTITIONER
Payer: COMMERCIAL

## 2017-08-31 VITALS
SYSTOLIC BLOOD PRESSURE: 129 MMHG | RESPIRATION RATE: 16 BRPM | WEIGHT: 164.24 LBS | HEART RATE: 78 BPM | BODY MASS INDEX: 33.11 KG/M2 | DIASTOLIC BLOOD PRESSURE: 74 MMHG | OXYGEN SATURATION: 98 % | TEMPERATURE: 97.3 F | HEIGHT: 59 IN

## 2017-08-31 DIAGNOSIS — Z51.11 ENCOUNTER FOR ANTINEOPLASTIC CHEMOTHERAPY: Primary | ICD-10-CM

## 2017-08-31 DIAGNOSIS — T45.1X5A CHEMOTHERAPY-INDUCED NEUTROPENIA (H): Primary | ICD-10-CM

## 2017-08-31 DIAGNOSIS — G93.9 CEREBELLAR DISORDER: ICD-10-CM

## 2017-08-31 DIAGNOSIS — C56.9 OVARIAN CANCER, UNSPECIFIED LATERALITY (H): ICD-10-CM

## 2017-08-31 DIAGNOSIS — D70.1 CHEMOTHERAPY-INDUCED NEUTROPENIA (H): Primary | ICD-10-CM

## 2017-08-31 LAB
ALBUMIN SERPL-MCNC: 3.1 G/DL (ref 3.4–5)
ALP SERPL-CCNC: 102 U/L (ref 40–150)
ALT SERPL W P-5'-P-CCNC: 16 U/L (ref 0–50)
ANION GAP SERPL CALCULATED.3IONS-SCNC: 9 MMOL/L (ref 3–14)
AST SERPL W P-5'-P-CCNC: 14 U/L (ref 0–45)
BASOPHILS # BLD AUTO: 0 10E9/L (ref 0–0.2)
BASOPHILS NFR BLD AUTO: 0.5 %
BILIRUB SERPL-MCNC: 0.3 MG/DL (ref 0.2–1.3)
BUN SERPL-MCNC: 7 MG/DL (ref 7–30)
CALCIUM SERPL-MCNC: 8.8 MG/DL (ref 8.5–10.1)
CANCER AG125 SERPL-ACNC: 10 U/ML (ref 0–30)
CHLORIDE SERPL-SCNC: 105 MMOL/L (ref 94–109)
CO2 SERPL-SCNC: 28 MMOL/L (ref 20–32)
CREAT SERPL-MCNC: 0.52 MG/DL (ref 0.52–1.04)
DIFFERENTIAL METHOD BLD: ABNORMAL
EOSINOPHIL # BLD AUTO: 0 10E9/L (ref 0–0.7)
EOSINOPHIL NFR BLD AUTO: 0.3 %
ERYTHROCYTE [DISTWIDTH] IN BLOOD BY AUTOMATED COUNT: 18.3 % (ref 10–15)
GFR SERPL CREATININE-BSD FRML MDRD: >90 ML/MIN/1.7M2
GLUCOSE SERPL-MCNC: 101 MG/DL (ref 70–99)
HCT VFR BLD AUTO: 27.6 % (ref 35–47)
HGB BLD-MCNC: 8.8 G/DL (ref 11.7–15.7)
IMM GRANULOCYTES # BLD: 0 10E9/L (ref 0–0.4)
IMM GRANULOCYTES NFR BLD: 0.5 %
LYMPHOCYTES # BLD AUTO: 1 10E9/L (ref 0.8–5.3)
LYMPHOCYTES NFR BLD AUTO: 25.3 %
MAGNESIUM SERPL-MCNC: 1.7 MG/DL (ref 1.6–2.3)
MCH RBC QN AUTO: 33.6 PG (ref 26.5–33)
MCHC RBC AUTO-ENTMCNC: 31.9 G/DL (ref 31.5–36.5)
MCV RBC AUTO: 105 FL (ref 78–100)
MONOCYTES # BLD AUTO: 0.3 10E9/L (ref 0–1.3)
MONOCYTES NFR BLD AUTO: 8.8 %
NEUTROPHILS # BLD AUTO: 2.4 10E9/L (ref 1.6–8.3)
NEUTROPHILS NFR BLD AUTO: 64.6 %
NRBC # BLD AUTO: 0 10*3/UL
NRBC BLD AUTO-RTO: 0 /100
PLATELET # BLD AUTO: 123 10E9/L (ref 150–450)
POTASSIUM SERPL-SCNC: 3.7 MMOL/L (ref 3.4–5.3)
PROT SERPL-MCNC: 6.5 G/DL (ref 6.8–8.8)
RBC # BLD AUTO: 2.62 10E12/L (ref 3.8–5.2)
SODIUM SERPL-SCNC: 142 MMOL/L (ref 133–144)
WBC # BLD AUTO: 3.8 10E9/L (ref 4–11)

## 2017-08-31 PROCEDURE — S0028 INJECTION, FAMOTIDINE, 20 MG: HCPCS | Performed by: NURSE PRACTITIONER

## 2017-08-31 PROCEDURE — 96367 TX/PROPH/DG ADDL SEQ IV INF: CPT

## 2017-08-31 PROCEDURE — 80053 COMPREHEN METABOLIC PANEL: CPT | Performed by: NURSE PRACTITIONER

## 2017-08-31 PROCEDURE — 96375 TX/PRO/DX INJ NEW DRUG ADDON: CPT

## 2017-08-31 PROCEDURE — 86304 IMMUNOASSAY TUMOR CA 125: CPT | Performed by: NURSE PRACTITIONER

## 2017-08-31 PROCEDURE — 85025 COMPLETE CBC W/AUTO DIFF WBC: CPT | Performed by: NURSE PRACTITIONER

## 2017-08-31 PROCEDURE — 83735 ASSAY OF MAGNESIUM: CPT | Performed by: NURSE PRACTITIONER

## 2017-08-31 PROCEDURE — 96417 CHEMO IV INFUS EACH ADDL SEQ: CPT

## 2017-08-31 PROCEDURE — 25000125 ZZHC RX 250: Performed by: NURSE PRACTITIONER

## 2017-08-31 PROCEDURE — 99214 OFFICE O/P EST MOD 30 MIN: CPT | Performed by: NURSE PRACTITIONER

## 2017-08-31 PROCEDURE — 25000128 H RX IP 250 OP 636: Performed by: NURSE PRACTITIONER

## 2017-08-31 PROCEDURE — 96413 CHEMO IV INFUSION 1 HR: CPT

## 2017-08-31 RX ORDER — HEPARIN SODIUM (PORCINE) LOCK FLUSH IV SOLN 100 UNIT/ML 100 UNIT/ML
500 SOLUTION INTRAVENOUS EVERY 8 HOURS
Status: DISCONTINUED | OUTPATIENT
Start: 2017-08-31 | End: 2017-08-31 | Stop reason: HOSPADM

## 2017-08-31 RX ORDER — PALONOSETRON 0.05 MG/ML
0.25 INJECTION, SOLUTION INTRAVENOUS ONCE
Status: COMPLETED | OUTPATIENT
Start: 2017-08-31 | End: 2017-08-31

## 2017-08-31 RX ADMIN — FAMOTIDINE 40 MG: 10 INJECTION INTRAVENOUS at 11:44

## 2017-08-31 RX ADMIN — CARBOPLATIN 550 MG: 10 INJECTION, SOLUTION INTRAVENOUS at 13:50

## 2017-08-31 RX ADMIN — DEXAMETHASONE SODIUM PHOSPHATE 12 MG: 10 INJECTION, SOLUTION INTRAMUSCULAR; INTRAVENOUS at 12:06

## 2017-08-31 RX ADMIN — PACLITAXEL 135 MG: 6 INJECTION, SOLUTION INTRAVENOUS at 12:43

## 2017-08-31 RX ADMIN — DIPHENHYDRAMINE HYDROCHLORIDE 25 MG: 50 INJECTION INTRAMUSCULAR; INTRAVENOUS at 12:24

## 2017-08-31 RX ADMIN — SODIUM CHLORIDE 250 ML: 9 INJECTION, SOLUTION INTRAVENOUS at 11:43

## 2017-08-31 RX ADMIN — SODIUM CHLORIDE, PRESERVATIVE FREE 500 UNITS: 5 INJECTION INTRAVENOUS at 14:45

## 2017-08-31 RX ADMIN — PALONOSETRON HYDROCHLORIDE 0.25 MG: 0.25 INJECTION INTRAVENOUS at 11:45

## 2017-08-31 ASSESSMENT — PAIN SCALES - GENERAL: PAINLEVEL: NO PAIN (0)

## 2017-08-31 NOTE — MR AVS SNAPSHOT
After Visit Summary   8/31/2017    Tosin Nina    MRN: [id number]           Patient Information     Date Of Birth          1945        Visit Information        Provider Department      8/31/2017 10:00 AM RH INFUSION CHAIR 2 [facility]        Today's Diagnoses     Chemotherapy-induced neutropenia (H)    -  1    Ovarian cancer, unspecified laterality (H)           Follow-ups after your visit        Your next 10 appointments already scheduled     Sep 01, 2017  2:00 PM CDT   Level 1 with RH INFUSION CHAIR 11   [facility] ([facility])    [facility]  [address]  [place] [address]   [phone]            Sep 02, 2017 10:00 AM CDT   IV Infusion with RH OP PROCEDURE RN#1   [facility] Outpatient Procedures ([facility])    [address]  [facility] [address]   [phone]            Sep 03, 2017 10:00 AM CDT   IV Infusion with RH OP PROCEDURE RN#1   [facility] ([facility])    [address]  [facility] [address]   [phone]            Sep 04, 2017  1:00 PM CDT   IV Infusion with RH OP PROCEDURE RN#1   [facility] ([facility])    [address]  [facility] [address]   [phone]            Sep 07, 2017  8:00 AM CDT   Level 4 with RH INFUSION CHAIR 6   [facility] ([facility])    [facility]  [address]  [place] [address]   [phone]            Sep 08, 2017  1:30 PM CDT   Level 1 with RH INFUSION CHAIR 3   [facility] ([facility])    [facility]  [address]  [place] [address]   [phone]            Sep 09, 2017  2:00 PM CDT   IV Infusion with RH OP  "PROCEDURE RN#2   [facility]patient [facility] ([facility])    [address]  [facility] [address]   [phone]            Sep 10, 2017  2:00 PM CDT   IV Infusion with RH OP PROCEDURE RN#2   [facility]patient [facility] ([facility])    [address]  [facility] [address]   [phone]            Sep 11, 2017  2:00 PM CDT   Level 1 with RH INFUSION CHAIR 11   [facility] ([facility])    [facility]  [address]  [facility] [address]   [phone]            Sep 14, 2017 11:00 AM CDT   Level 4 with RH INFUSION CHAIR 10   [facility] ([facility])    [facility]  [address]  [facility] [address] [phone]              Who to contact     If you have questions or need follow up information about today's clinic visit or your schedule please contact [facility] INFUSION SERVICES directly at [phone].  Normal or non-critical lab and imaging results will be communicated to you by MyChart, letter or phone within 4 business days after the clinic has received the results. If you do not hear from us within 7 days, please contact the clinic through Yu[employer] or phone. If you have a critical or abnormal lab result, we will notify you by phone as soon as possible.  Submit refill requests through [employer] or call your pharmacy and they will forward the refill request to us. Please allow 3 business days for your refill to be completed.          Additional Information About Your Visit        Yuqing ElectrichariKONVERSE Information     [employer] lets you send messages to your doctor, view your test results, renew your prescriptions, schedule appointments and more. To sign up, go to [email or web address] . Click on \"Log in\" on the left side of the screen, which will take you to the Welcome " "page. Then click on \"Sign up Now\" on the right side of the page.     You will be asked to enter the access code listed below, as well as some personal information. Please follow the directions to create your username and password.     Your access code is: [email or web address]  Expires: 2017 12:37 PM     Your access code will  in 90 days. If you need help or a new code, please call your [facility] clinic or [phone].        Care EveryWhere ID     This is your Care EveryWhere ID. This could be used by other organizations to access your [facility] medical records  [id number]         Blood Pressure from Last 3 Encounters:   17 129/74   17 122/52   17 136/73    Weight from Last 3 Encounters:   17 74.5 kg (164 lb 3.9 oz)   17 74.8 kg (165 lb)   17 72.8 kg (160 lb 9.6 oz)              We Performed the Following          CBC with platelets differential     Comprehensive metabolic panel     Magnesium          Today's Medication Changes          These changes are accurate as of: 17  2:13 PM.  If you have any questions, ask your nurse or doctor.               These medicines have changed or have updated prescriptions.        Dose/Directions    Potassium Chloride ER 20 MEQ Tbcr   This may have changed:  when to take this   Used for:  Hypokalemia        Dose:  20 mEq   Take 1 tablet (20 mEq) by mouth daily   Quantity:  30 tablet   Refills:  3       Vitamin D (Cholecalciferol) 1000 UNITS Tabs   This may have changed:  additional instructions   Used for:  Paresthesias        Dose:  2000 Units   Take 2,000 Units by mouth daily   Quantity:  60 tablet   Refills:  0                Primary Care Provider Office Phone # Fax #    Esther Back [phone]       [facility] [address] [place] [address]        Equal Access to Services     CITLALLI CUELLO AH: Zechariah Alves, waaxda luqadaha, qaybta kaalmada monica, hetal damon " bruce renaaed ah. So [facility] [place] [phone].    ATENCIÓN: Si aidenla chao, tiene a alarcon disposición servicios gratuitos de asistencia lingüística. Ronnie al [phone].    We comply with applicable federal civil rights laws and [place] laws. We do not discriminate on the basis of race, color, national origin, age, disability sex, sexual orientation or gender identity.            Thank you!     Thank you for choosing [facility] INFUSION SERVICES  for your care. Our goal is always to provide you with excellent care. Hearing back from our patients is one way we can continue to improve our services. Please take a few minutes to complete the written survey that you may receive in the mail after your visit with us. Thank you!             Your Updated Medication List - Protect others around you: Learn how to safely use, store and throw away your medicines at www.disposemymeds.org.          This list is accurate as of: 8/31/17  2:13 PM.  Always use your most recent med list.                   Brand Name Dispense Instructions for use Diagnosis    calcium carbonate 500 MG chewable tablet    TUMS     Take 1 chew tab by mouth daily as needed for heartburn        enoxaparin 80 MG/0.8ML injection    LOVENOX    60 Syringe    Inject 0.6 mLs (60 mg) Subcutaneous every 12 hours    Other pulmonary embolism without acute cor pulmonale, unspecified chronicity (H)       ESCITALOPRAM OXALATE PO      Take 10 mg by mouth daily        hydrochlorothiazide 25 MG tablet    HYDRODIURIL     Take 25 mg by mouth daily    Ovarian cancer, unspecified laterality (H)       levothyroxine 125 MCG tablet    SYNTHROID    30 tablet    Take 1 tablet (125 mcg) by mouth daily    Other pulmonary embolism without acute cor pulmonale, unspecified chronicity (H)       magnesium chloride 535 (64 MG) MG Tbcr CR tablet     30 tablet    Take 1 tablet (535 mg) by mouth daily    Ovarian cancer, unspecified laterality (H)       polyethylene glycol Packet     MIRALAX/GLYCOLAX    7 packet    Take 17 g by mouth daily    Ovarian cancer, unspecified laterality (H)       Potassium Chloride ER 20 MEQ Tbcr     30 tablet    Take 1 tablet (20 mEq) by mouth daily    Hypokalemia       PRILOSEC PO      Take 20 mg by mouth every morning        senna-docusate 8.6-50 MG per tablet    SENOKOT-S;PERICOLACE    60 tablet    Take 1-2 tablets by mouth 2 times daily Hold for loose stools    Ovarian cancer, unspecified laterality (H)       Vitamin D (Cholecalciferol) 1000 UNITS Tabs     60 tablet    Take 2,000 Units by mouth daily    Paresthesias

## 2017-08-31 NOTE — PATIENT INSTRUCTIONS
Please schedule the following:  Days 2-5, 9-12, and 16-19 Neupogen injection  Day 8 and 15 chemo    Post-treatment visit with Dr. Ennis on 10/3- will need  and port flush  Neurology appt within the next 4-6 weeks if possible

## 2017-08-31 NOTE — PROGRESS NOTES
"Gynecologic Oncology Follow-Up Note  RE: Tosin Nina  MRN: [id number]  : 1945  Date of Visit: 2017    CC: Tosin Nina is a 71 year old year old female with stage IVB ovarian cancer who presents today for follow up regarding disease management.    HPI: Namita comes to the clinic accompanied by her  Christiano. She states she has been tolerating chemotherapy well. Her biggest concern is that she feels cold at times even though it is the summer but this improves with blankets and sweaters. She has started taking iron once daily for anemia. She reports mild fatigue but is sleeping well. She reports nausea if she takes her pills on an empty stomach but doesn't think this is related to chemotherapy and has not required anti-emetics. No vomiting. Takes senna for constipation with relief. Feels she is eating well. She takes in 4 8oz glasses of water daily and feels it is a struggle to take in more than that- denies feeling light headed or dehydrated. She had a couple of mild headaches upon waking that were relieved with Tylenol- she thinks they were due to the way she slept. She notes a \"tiny amount\" of vaginal spotting which has been minimal over the past two weeks. She notes a skin tear to her gluteal cleft for which she is using barrier cream. She has baseline numbness and tingling to her fingers and toes due to spinal stenosis- not worsening while on chemotherapy. 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 She is still doing her exercises at home. She has not yet followed up with neurology and does not yet have this scheduled. She does not want to see them until she completes chemotherapy due to the number of clinic visits she has with her Neupogen injections. Reports she is doing well emotionally but that she and her  Christiano will " be starting marriage counseling to facilitate improved communication. Denies fevers, infectious symptoms, or further episodes of bleeding. She denies need for refills and feels ready for chemotherapy today.    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  She was discharged to a TCU where she is still residing with some but minimal improvement in her neurologic symptoms.  She still has quite a difficult time seeing especially with her right eye.  She also notes weakness mostly on her left side.  Both of these make it very difficult for her to walk and thus she is having to use a wheelchair for most of her mobility.  She tolerated her first cycle quite well with her biggest side effect being nausea which improved drastically with a change in anti-emetics.        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 The right pulmonary artery is enlarged, similar to prior exams. No CT evidence of right heart strain. No suspicious mediastinal or perihilar lymphadenopathy. Bovine arch anatomy. No suspicious pulmonary nodules, evidence of infarction, or infection. Abdomen and pelvis: There are soft tissue nodules identified along the liver capsule measuring up to 3 cm inferiorly. There is a large amount of omental caking. Enlarged iliac and retroperitoneal lymph nodes for example a 2.6 cm right external iliac lymph node or group of lymph nodes. Heterogeneous enhancement of the uterine fundus could be due to fibroids or a mass. The overall size of the uterus does not appear significantly different than in 2014. The left ovary does appear slightly larger and lobular in appearance compared to  prior exam. There is also a nodular area along the round ligament. It was not present on prior exam. There is an irregular lobulated mass involving the sigmoid colon. Abnormal, enlarged inguinal lymph nodes. Soft tissue implant in the posterior right retroperitoneum adjacent to the psoas was not present on prior exam. 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 Spondylolisthesis at L3-4. Small periumbilical hernia containing some of the abnormal omentum.      4/11/17:   268, CEA .6      4/12/17:  IR omental biopsy                           Pathology:  High grade serous carcinoma      4/14/17: Cycle #1 carboplatin AUC 6 and weekly Taxol 80mg/m2.  = 2648      5/5/17:  Cycle #2 carboplatin AUC 6 and weekly Taxol 80mg/m2.  = 370      5/26/17:  Cycle #3 carboplatin AUC 6 and weekly Taxol 80mg/m2.  = 63      6/16/17:  CT C/A/P:  Chest:  Resolution of previous right-sided pulmonary emboli since April. No mediastinal, hilar or axillary adenopathy. No pleural fluid.  Port-A-Cath right superior chest with tip in the SVC.  Short linear fibrosis or discoid atelectasis anterior medial right upper lobe. Lungs otherwise clear. Abdomen and Pelvis: Marked improvement in carcinomatosis and omental metastasis since 4/11/2017. In the anterior left pelvis there is a 1.2 cm nodule with adjacent linear opacity approximately 4 cm in length in an area of previous dense omental caking and metastatic disease. There is resolution of the previous anterior right-sided omental caking with subcentimeter trace of residual nodularity in this location. There are multiple new indeterminate nodular densities in the anterior abdominal wall subcutaneous fat as well as small collections of subcutaneous air, suggesting that these are medication injection sites.  Previous subserosal implants along the inferior liver have resolved. No focal liver  lesions. Normal-appearing pancreas, adrenal glands and kidneys. Tiny hypodense spleen lesion is too small to characterize, not previously seen. Spleen otherwise appears normal. No periaortic or pelvic adenopathy with resolution of previous adenopathy. Lobulated enlarged uterus redemonstrated consistent with multiple fibroids. No free fluid. No acute bowel abnormality. Resolution of mesenteric right lower quadrant nodule is compatible with metastasis. On coronal images the terminal ileum takes an unusual circular course posterior to the right colon, but there is no evidence for obstruction. No aggressive bone lesions.     6/16/17:   = 27. Treatment planning with Dr Ennis: CT results were reviewed.  Given her excellent response and minimal residual disease, I believe she is an excellent candidate for robotic interval debulking.  We discussed the need for 3 more cycles of chemotherapy following surgery with the hope of starting within 3-4 weeks of surgery.      6/28/17: Robotic total laparoscopic hysterectomy, bilateral salpingo-oophorectomy, lysis of adhesions, omentectomy, optimal interval tumor debulking to no gross residual disease, cystoscopy  Pathology: FINAL DIAGNOSIS:   A. UTERUS, CERVIX, BILATERAL FALLOPIAN TUBES AND OVARIES, HYSTERECTOMY   WITH BILATERAL SALPINGO-OOPHORECTOMY:   - Focal atypical endometrial hyperplasia on a background of atrophic endometrium   - Adenomyosis   - Leiomyomas   - Uterine serosal adhesions with psammoma bodies   - Right ovary with benign cortical inclusion cysts and surface fibrous adhesions featuring psammoma bodies   - Left ovarian serous carcinoma, high grade, with neoadjuvant chemotherapy effects   - Fallopian tubes with no significant histologic abnormality   B. OMENTUM, OMENTECTOMY:   - Omental adipose tissue with histiocytic reaction, cholesterol clefts and psammoma bodies, consistent with neoadjuvant chemotherapy effect   - No viable tumor cells seen   C. BLADDER  PERITONEUM, BIOPSY:   - Fibroadipose tissue with histiocytic reaction, cholesterol clefts and psammoma bodies, consistent with neoadjuvant chemotherapy effect   - No viable tumor cells seen   D. LEFT PELVIC SIDEWALL, BIOPSY:   - Fibroadipose tissue with histiocytic reaction, cholesterol clefts and psammoma bodies, consistent with neoadjuvant chemotherapy effect   - No viable tumor cells seen   E. SIGMOID EPIPLOICA, BIOPSY:   - Adipose tissue with histiocytic reaction, cholesterol clefts and psammoma bodies, consistent with neoadjuvant chemotherapy effect   - No viable tumor cells seen     7/20/17: C4 carboplatin AUC 6/dose dense Taxol. D15 cancelled due to thrombocytopenia.  18.    8/10/17: C5 carboplatin AUC 6/dose dense Taxol. D8 cancelled secondary to neutropenia, D15 cancelled secondary to thrombocytopenia.  12.    8/31/17: C6 carboplatin AUC 5/dose dense Taxol.  pending.        Past Medical History:   Diagnosis Date     Anemia      Cervical spinal stenosis      Depression      GERD (gastroesophageal reflux disease)      Hypertension      Hypokalemia      Hypothyroid      Lumbar spinal stenosis      Obesity      Ovarian cancer (H)      Paraneoplastic neuromyopathy and neuropathy (H)      PE (pulmonary thromboembolism) (H)      Thrombocytopenia (H)        Past Surgical History:   Procedure Laterality Date     AS TOTAL KNEE ARTHROPLASTY Left      BACK SURGERY  2009     CHOLECYSTECTOMY  01/01/2016     CYSTOSCOPY N/A 6/28/2017    Procedure: CYSTOSCOPY;;  Surgeon: Nessa Christina MD;  Location: [facility] OR     DAVINCI HYSTERECTOMY TOTAL, BILATERAL SALPINGO-OOPHORECTMY, NODE DISSECTION, TUMOR STAGING, COMBINED N/A 6/28/2017    Procedure: COMBINED DAVINCI HYSTERECTOMY TOTAL, SALPINGO-OOPHORECTOMY, NODE DISSECTION, TUMOR STAGING;  Robotic total laparoscopic hysterectomy, bilateral salpingo-oophorectomy, omentectomy, lysis of adhesions, tumor debulking, cystoscopy;  Surgeon: Nessa Christina  "MD Matteo;  Location: [facility] OR     OPEN REDUCTION INTERNAL FIXATION WRIST Right 2009     THYROIDECTOMY         Current Outpatient Prescriptions   Medication     enoxaparin (LOVENOX) 80 MG/0.8ML injection     magnesium chloride 535 (64 MG) MG TBCR CR tablet     hydrochlorothiazide (HYDRODIURIL) 25 MG tablet     polyethylene glycol (MIRALAX/GLYCOLAX) Packet     senna-docusate (SENOKOT-S;PERICOLACE) 8.6-50 MG per tablet     calcium carbonate (TUMS) 500 MG chewable tablet     Omeprazole (PRILOSEC PO)     ESCITALOPRAM OXALATE PO     Potassium Chloride ER 20 MEQ TBCR     levothyroxine (SYNTHROID) 125 MCG tablet     Vitamin D, Cholecalciferol, 1000 UNITS TABS     No current facility-administered medications for this visit.           Allergies   Allergen Reactions     Amoxicillin Hives     Clarithromycin Other (See Comments)     \"I felt dopey, sleepy and like a druggie\"     Lisinopril Cough     Penicillins Rash       Family History   Problem Relation Age of Onset     Breast Cancer Mother      Heart Failure Mother      Breast Cancer Maternal Grandmother      Breast Cancer Maternal Aunt      Myocardial Infarction Father      Unknown/Adopted Brother      Unknown/Adopted Maternal Grandfather      Stomach Cancer Paternal Grandmother      Lung Cancer Paternal Grandfather      mustard gas in [place]       Social History     Social History     Marital status:      Spouse name: Christiano Nina     Number of children: 1     Years of education: N/A     Occupational History     Current: Retired      Former:       Social History Main Topics     Smoking status: Never Smoker     Smokeless tobacco: Not on file     Alcohol use Yes      Comment: occasionally     Drug use: No     Sexual activity: Not on file     Other Topics Concern     Not on file     Social History Narrative           ROS  General: + fatigue and weakness. Denies changes in weight, appetite changes, night sweats, hot flashes, fever, chills, or difficulty " "sleeping  HEENT: + headaches. Denies hair loss, visual difficulty or disturbances, spots or floaters, diplopia, masses, head injury, tinnitus, hearing loss, epistaxis, congestion, problems with teeth or gums, dysphonia, or dysphagia  Pulmonary: Denies cough, sputum, hemoptysis, shortness of breath, dyspnea on exertion, wheezing, or allergies  Cardiovascular: Denies chest pain, fainting, palpitations, murmurs, activity intolerance, swelling in legs, or high blood pressure  Gastrointestinal: + nausea and constipation. Denies vomiting, diarrhea, abdominal pain, bloating, heartburn, melena, hematochezia, or jaundice  Genitourinary: + vaginal spotting. Denies dysuria, urinary urgency or frequency, hematuria, cloudy or malodorous urine, incontinence, repeat urinary tract infections, flank pain, pelvic pain, vaginal discharge, or vaginal dryness  Integumentary: + skin tear. Denies rashes, changing moles, or scarring  Hematologic: Denies swollen lymph nodes, masses, easy bruising, or easy bleeding  Musculoskeletal: + weakness, wheelchair bound. Denies falls, back pain, myalgias, arthralgias, stiffness  Neurologic: + numbness and tingling, difficulty with walking, \"bouncing\" vision at times, R leg \"jumps\" at times.   Psychiatric: Denies anxiety, depression, nervousness, mood changes, suicidal thoughts, or difficulty concentrating  Endocrine: + thyroid disease. Denies polydipsia, polyuria, temperature intolerance        Physical Exam:    /74 (Patient Position: Chair)  Pulse 78  Temp 97.3  F (36.3  C) (Tympanic)  Resp 16  Ht 1.499 m (4' 11\")  Wt 74.5 kg (164 lb 3.9 oz)  SpO2 98%  BMI 33.17 kg/m2    CONSTITUTIONAL: Alert non-toxic appearing female in no acute distress  HEAD: Normocephalic, atraumatic  EYES: PERRLA; no scleral icterus; no nystagmus  ENT: Oropharynx pink without lesions  NECK: Neck supple without lymphadenopathy  RESPIRATORY: Lungs clear to auscultation, no increased work of breathing noted  CV: " Regular rate and rhythm, S1S2, no clicks, murmurs, rubs, or gallops; bilateral lower extremities with trace edema, dorsalis pedis pulses 2+ bilaterally  GASTROINTESTINAL: Normoactive bowel sounds x4 quadrants, abdomen soft, non-distended, and non-tender to palpation without masses or organomegaly  GENITOURINARY: Declines  LYMPHATIC: Cervical, supraclavicular, and inguinal nodes without lymphadenopathy  MUSCULOSKELETAL: Moves all extremities, 4/5 strength in lower muscle groups, 5/5 strength in upper muscle groups, no obvious muscle wasting  NEUROLOGIC: Sitting in wheelchair, CN III-XII intact  SKIN: Appropriate color for race, warm and dry, no rashes or lesions to unclothed skin- declines examination of skin tear to gluteal cleft  PSYCHIATRIC: Pleasant and interactive, affect bright, makes appropriate eye contact, thought process linear    Labs:      8/31/2017  Day 1   Hemoglobin 11.7 - 15.7 g/dL 8.8 (A)   Hematocrit 35.0 - 47.0 % 27.6 (A)   Platelet Count 150 - 450 10e9/L 123 (A)   Absolute Neutrophil 1.6 - 8.3 10e9/L 2.4   Sodium 133 - 144 mmol/L 142   Potassium 3.4 - 5.3 mmol/L 3.7   Chloride 94 - 109 mmol/L 105   Carbon Dioxide 20 - 32 mmol/L 28   Urea Nitrogen 7 - 30 mg/dL 7   Creatinine 0.52 - 1.04 mg/dL 0.52   Calcium 8.5 - 10.1 mg/dL 8.8   Magnesium 1.6 - 2.3 mg/dL 1.7   Bilirubin Total 0.2 - 1.3 mg/dL 0.3   ALT 0 - 50 U/L 16   AST 0 - 45 U/L 14   Alkaline Phosphatase 40 - 150 U/L 102   Albumin 3.4 - 5.0 g/dL 3.1 (A)   Protein Total 6.8 - 8.8 g/dL 6.5 (A)   WBC 4.0 - 11.0 10e9/L 3.8 (A)    pending    Assessment/Plan:  1) Stage IVB ovarian cancer: Tolerating treatment well with exception of thrombocytopenia and neutropenia- labs within parameters today. Proceed with cycle six of carboplatin/dose dense Taxol. Reviewed history of neutropenia and thrombocytopenia the past two cycles with Dr. Fernandes- to decrease carboplatin dose from AUC 6 to AUC 5. To receive four days of Neupogen after each dose of  Taxol. To receive total of six cycles followed by treatment planning with Dr. Ennis. Briefly discussed surveillance visits and port maintenance. Most likely feels cold secondary to anemia- discussed that she may take ferrous sulfate 325mg TID but discussed GI side effects. If feeling cold does not improve with improvement in hemoglobin, she should follow up with her PCP to evaluate her TSH as she is on levothyroxine. Reviewed signs and symptoms for when she should contact the clinic or seek additional care, including but not limited to fever, chills, inability to keep down food or fluids, nausea and vomiting not controlled with antiemetics, and diarrhea leading to dehydration. Patient to contact the clinic with any questions or concerns in the interim.  2) Paraneoplastic syndrome: Neurologic symptoms stable, continues to be wheelchair bound but is making improvements in her strength. Continue with exercises at home. Offered cancer rehab which she declines at this time. To follow up with neurology when she completes chemotherapy as she does not want to do this sooner.  3) Genetic counseling: Risk factors have been assessed and patient does meet criteria for testing. She has an appt with Kelsie Nguyen GC, on 9/26/17.  4) Health maintenance issues discussed include to follow up with PCP for non-gynecologic concerns and co-morbid conditions  5) Patient verbalized understanding of and agreement with plan    A total of 25 minutes of face to face time were spent with the patient with over 50% of that time spent in counseling, coordination of care, education, and symptom management.    SALVADOR Solis, FNP-C  Division of Gynecologic Oncology  [facility]  Pager: [phone]

## 2017-08-31 NOTE — NURSING NOTE
"Oncology Rooming Note    August 31, 2017 10:22 AM   Tosin Nina is a 71 year old female who presents for:    Chief Complaint   Patient presents with     Oncology Clinic Visit     Follow-up     Initial Vitals: Resp 16  Ht 1.499 m (4' 11\")  Wt 74.5 kg (164 lb 3.9 oz)  BMI 33.17 kg/m2 Estimated body mass index is 33.17 kg/(m^2) as calculated from the following:    Height as of this encounter: 1.499 m (4' 11\").    Weight as of this encounter: 74.5 kg (164 lb 3.9 oz). Body surface area is 1.76 meters squared.  No Pain (0) Comment: Data Unavailable   No LMP recorded. Patient is postmenopausal.  Allergies reviewed: Yes  Medications reviewed: Yes    Medications:No Refills needed today  Pharmacy name entered into [employer]:    [facility] [id number] [place] [address] - [address] [place] [address] [address] [address] [facility]    Clinical concerns: Follow-up     8 minutes for nursing intake (face to face time)   DISCHARGE PLAN:  Next appointments: See patient instruction section  Departure Mode: Ambulatory  Accompanied by:   0 minutes for nursing discharge (face to face time)   Teresa Sanches                  "

## 2017-08-31 NOTE — MR AVS SNAPSHOT
After Visit Summary   8/31/2017    Tosin Nina    MRN: [id number]           Patient Information     Date Of Birth          1945        Visit Information        Provider Department      8/31/2017 10:00 AM Adelina Bird APRN CNP [facility]        Today's Diagnoses     Encounter for antineoplastic chemotherapy    -  1    Ovarian cancer, unspecified laterality (H)        Cerebellar disorder          Care Instructions    Please schedule the following:  Days 2-5, 9-12, and 16-19 Neupogen injection  Day 8 and 15 chemo    Post-treatment visit with Dr. Ennis on 10/3- will need  and port flush  Neurology appt within the next 4-6 weeks if possible          Follow-ups after your visit        Your next 10 appointments already scheduled     Sep 07, 2017  8:00 AM CDT   Level 4 with RH INFUSION CHAIR 6   [facility] Infusion Services ([facility])    [facility]  [address]  [facility] [id number] [phone]            Sep 26, 2017  9:00 AM CDT   New Visit with Kelsie Nguyen GC   Cancer Risk Management Program ([facility])    [facility]  [address]  [facility] [id number] [phone]              Who to contact     If you have questions or need follow up information about today's clinic visit or your schedule please contact [facility] CANCER C[facility] directly at [phone].  Normal or non-critical lab and imaging results will be communicated to you by MyChart, letter or phone within 4 business days after the clinic has received the results. If you do not hear from us within 7 days, please contact the clinic through MyChart or phone. If you have a critical or abnormal lab result, we will notify you by phone as soon as possible.  Submit refill requests through [employer] or call your pharmacy and they will forward the refill request to us.  "Please allow 3 business days for your refill to be completed.          Additional Information About Your Visit        MyChart Information     [employer] lets you send messages to your doctor, view your test results, renew your prescriptions, schedule appointments and more. To sign up, go to [email or web address] . Click on \"Log in\" on the left side of the screen, which will take you to the Welcome page. Then click on \"Sign up Now\" on the right side of the page.     You will be asked to enter the access code listed below, as well as some personal information. Please follow the directions to create your username and password.     Your access code is: [email or web address]  Expires: 2017 12:37 PM     Your access code will  in 90 days. If you need help or a new code, please call your [place] clinic or [phone].        Care EveryWhere ID     This is your Care EveryWhere ID. This could be used by other organizations to access your [place] medical records  [id number]        Your Vitals Were     Pulse Temperature Respirations Height Pulse Oximetry BMI (Body Mass Index)    78 97.3  F (36.3  C) (Tympanic) 16 1.499 m (4' 11\") 98% 33.17 kg/m2       Blood Pressure from Last 3 Encounters:   17 129/74   17 122/52   17 136/73    Weight from Last 3 Encounters:   17 74.5 kg (164 lb 3.9 oz)   17 74.8 kg (165 lb)   17 72.8 kg (160 lb 9.6 oz)              Today, you had the following     No orders found for display         Today's Medication Changes          These changes are accurate as of: 17 11:33 AM.  If you have any questions, ask your nurse or doctor.               These medicines have changed or have updated prescriptions.        Dose/Directions    Potassium Chloride ER 20 MEQ Tbcr   This may have changed:  when to take this   Used for:  Hypokalemia        Dose:  20 mEq   Take 1 tablet (20 mEq) by mouth daily   Quantity:  30 tablet   Refills:  3       Vitamin D (Cholecalciferol) " 1000 UNITS Tabs   This may have changed:  additional instructions   Used for:  Paresthesias        Dose:  2000 Units   Take 2,000 Units by mouth daily   Quantity:  60 tablet   Refills:  0                Primary Care Provider Office Phone # Fax #    Esther Bcak [phone]       [facility] [address] [address]        Equal Access to Services     [facility]: Hadii aad ku hadasho Soomaali, waaxda luqadaha, qaybta kaalmada adeegyada, waxay idiin hayaan adeeg kharash la'aan ah. So [facility] [phone].    ATENCIÓN: Si habla español, tiene a alarcon disposición servicios gratuitos de asistencia lingüística. Ronnie al [phone].    We comply with applicable federal civil rights laws and [place] laws. We do not discriminate on the basis of race, color, national origin, age, disability sex, sexual orientation or gender identity.            Thank you!     Thank you for choosing [facility] CANCER [facility]  for your care. Our goal is always to provide you with excellent care. Hearing back from our patients is one way we can continue to improve our services. Please take a few minutes to complete the written survey that you may receive in the mail after your visit with us. Thank you!             Your Updated Medication List - Protect others around you: Learn how to safely use, store and throw away your medicines at www.disposemymeds.org.          This list is accurate as of: 8/31/17 11:33 AM.  Always use your most recent med list.                   Brand Name Dispense Instructions for use Diagnosis    calcium carbonate 500 MG chewable tablet    TUMS     Take 1 chew tab by mouth daily as needed for heartburn        enoxaparin 80 MG/0.8ML injection    LOVENOX    60 Syringe    Inject 0.6 mLs (60 mg) Subcutaneous every 12 hours    Other pulmonary embolism without acute cor pulmonale, unspecified chronicity (H)       ESCITALOPRAM OXALATE PO      Take 10 mg by mouth daily         hydrochlorothiazide 25 MG tablet    HYDRODIURIL     Take 25 mg by mouth daily    Ovarian cancer, unspecified laterality (H)       levothyroxine 125 MCG tablet    SYNTHROID    30 tablet    Take 1 tablet (125 mcg) by mouth daily    Other pulmonary embolism without acute cor pulmonale, unspecified chronicity (H)       magnesium chloride 535 (64 MG) MG Tbcr CR tablet     30 tablet    Take 1 tablet (535 mg) by mouth daily    Ovarian cancer, unspecified laterality (H)       polyethylene glycol Packet    MIRALAX/GLYCOLAX    7 packet    Take 17 g by mouth daily    Ovarian cancer, unspecified laterality (H)       Potassium Chloride ER 20 MEQ Tbcr     30 tablet    Take 1 tablet (20 mEq) by mouth daily    Hypokalemia       PRILOSEC PO      Take 20 mg by mouth every morning        senna-docusate 8.6-50 MG per tablet    SENOKOT-S;PERICOLACE    60 tablet    Take 1-2 tablets by mouth 2 times daily Hold for loose stools    Ovarian cancer, unspecified laterality (H)       Vitamin D (Cholecalciferol) 1000 UNITS Tabs     60 tablet    Take 2,000 Units by mouth daily    Paresthesias

## 2017-08-31 NOTE — PROGRESS NOTES
Infusion Nursing Note:  Tosin Nina presents today for C6D1 Paclitaxel/Carbo.    Patient seen by provider today: Yes: Adelina Bird   present during visit today: Not Applicable.    Note: Pt denies problems.    Intravenous Access:  Labs drawn without difficulty.  Implanted Port.    Treatment Conditions:  Lab Results   Component Value Date    HGB 8.8 08/31/2017     Lab Results   Component Value Date    WBC 3.8 08/31/2017      Lab Results   Component Value Date    ANEU 2.4 08/31/2017     Lab Results   Component Value Date     08/31/2017      Lab Results   Component Value Date     08/31/2017                   Lab Results   Component Value Date    POTASSIUM 3.7 08/31/2017           Lab Results   Component Value Date    MAG 1.7 08/31/2017            Lab Results   Component Value Date    CR 0.52 08/31/2017                   Lab Results   Component Value Date    PATTI 8.8 08/31/2017                Lab Results   Component Value Date    BILITOTAL 0.3 08/31/2017           Lab Results   Component Value Date    ALBUMIN 3.1 08/31/2017                    Lab Results   Component Value Date    ALT 16 08/31/2017           Lab Results   Component Value Date    AST 14 08/31/2017     Results reviewed, labs MET treatment parameters, ok to proceed with treatment.          Post Infusion Assessment:  Patient tolerated infusion without incident.  Blood return noted pre and post infusion.  Site patent and intact, free from redness, edema or discomfort.  No evidence of extravasations.  Access discontinued per protocol.    Discharge Plan:   Patient declined prescription refills.  Discharge instructions reviewed with: Patient.  Patient and/or family verbalized understanding of discharge instructions and all questions answered.  Copy of AVS reviewed with patient and/or family.  Patient will return Sept 1 for 4 days of Neupogen and chemo on the Sept 7th for next appointment.  Patient discharged in stable condition  accompanied by: self and .  Departure Mode: Wheelchair.    Maru More RN

## 2017-09-01 ENCOUNTER — INFUSION THERAPY VISIT (OUTPATIENT)
Dept: INFUSION THERAPY | Facility: CLINIC | Age: 72
End: 2017-09-01
Attending: NURSE PRACTITIONER
Payer: COMMERCIAL

## 2017-09-01 VITALS — HEART RATE: 71 BPM | TEMPERATURE: 96.6 F | SYSTOLIC BLOOD PRESSURE: 137 MMHG | DIASTOLIC BLOOD PRESSURE: 77 MMHG

## 2017-09-01 DIAGNOSIS — C56.9 OVARIAN CANCER, UNSPECIFIED LATERALITY (H): ICD-10-CM

## 2017-09-01 DIAGNOSIS — T45.1X5A CHEMOTHERAPY-INDUCED NEUTROPENIA (H): Primary | ICD-10-CM

## 2017-09-01 DIAGNOSIS — D70.1 CHEMOTHERAPY-INDUCED NEUTROPENIA (H): Primary | ICD-10-CM

## 2017-09-01 PROCEDURE — 96372 THER/PROPH/DIAG INJ SC/IM: CPT

## 2017-09-01 PROCEDURE — 25000128 H RX IP 250 OP 636: Performed by: NURSE PRACTITIONER

## 2017-09-01 RX ADMIN — FILGRASTIM 300 MCG: 300 INJECTION, SOLUTION INTRAVENOUS; SUBCUTANEOUS at 14:05

## 2017-09-01 NOTE — PROGRESS NOTES
Infusion Nursing Note:  Tosin Nina presents today for Neupgen.    Patient seen by provider today: No   present during visit today: Not Applicable.    Note: Pt will receive Neupogen at hospital Saturday through Monday.    Intravenous Access:  No Intravenous access/labs at this visit.    Treatment Conditions:  Not Applicable.      Post Infusion Assessment:  Patient tolerated injection without incident.  Site patent and intact, free from redness, edema or discomfort.    Discharge Plan:   Discharge instructions reviewed with: Patient.  AVS to patient via [employer].  Patient will return Sat-Mon to the hospital for Neupogen, and then 9/7/17 for treatment for next appointment.   Patient discharged in stable condition accompanied by: .  Departure Mode: Wheelchair.    Allyn Leary RN

## 2017-09-01 NOTE — MR AVS SNAPSHOT
After Visit Summary   9/1/2017    Tosin Nina    MRN: [id number]           Patient Information     Date Of Birth          1945        Visit Information        Provider Department      9/1/2017 2:00 PM RH INFUSION CHAIR 11 [facility] Infusion Services        Today's Diagnoses     Chemotherapy-induced neutropenia (H)    -  1    Ovarian cancer, unspecified laterality (H)           Follow-ups after your visit        Your next 10 appointments already scheduled     Sep 02, 2017 10:00 AM CDT   IV Infusion with RH OP PROCEDURE RN#1   [facility] Outpatient Procedures ([facility])    [address]  [facility] [address]   [phone]            Sep 03, 2017 10:00 AM CDT   IV Infusion with RH OP PROCEDURE RN#1   [facility] Outpatient Procedures ([facility])    [address]  [facility] [address]   [phone]            Sep 04, 2017  1:00 PM CDT   IV Infusion with RH OP PROCEDURE RN#1   [facility] Outpatient Procedures ([facility])    [address]  [facility] [address]   [phone]            Sep 07, 2017  8:00 AM CDT   Level 4 with RH INFUSION CHAIR 6   [facility] Infusion Services ([facility])    [facility]  [address]  [facility] [address]   [phone]            Sep 08, 2017  1:30 PM CDT   Level 1 with RH INFUSION CHAIR 12   [facility] Infusion Services ([facility])    [facility]  [address]  [facility] [address]   [phone]            Sep 09, 2017  2:00 PM CDT   IV Infusion with RH OP PROCEDURE RN#2   [facility] Outpatient Procedures ([facility])    [address]  [facility] [address]   [phone]            Sep 10, 2017  2:00 PM CDT   IV Infusion with RH OP PROCEDURE RN#2   [facility] Outpatient  "Procedures ([facility])    [address]  [facility] [address]   [phone]            Sep 11, 2017  2:00 PM CDT   Level 1 with RH INFUSION CHAIR 11   [facility] Infusion Services ([facility])    [facility]  [address]  [place] [address]   [phone]            Sep 14, 2017 11:00 AM CDT   Level 4 with RH INFUSION CHAIR 10   [facility] Infusion Services ([facility])    [facility]  [address]  [place] [address]   [phone]            Sep 15, 2017  2:00 PM CDT   Level 1 with RH INFUSION CHAIR 5   [facility] Infusion Services ([facility])    [facility]  [address]  [facility] [address]   [phone]              Who to contact     If you have questions or need follow up information about today's clinic visit or your schedule please contact [facility] INFUSION SERVICES directly at [phone].  Normal or non-critical lab and imaging results will be communicated to you by [employer], letter or phone within 4 business days after the clinic has received the results. If you do not hear from us within 7 days, please contact the clinic through [employer] or phone. If you have a critical or abnormal lab result, we will notify you by phone as soon as possible.  Submit refill requests through [employer] or call your pharmacy and they will forward the refill request to us. Please allow 3 business days for your refill to be completed.          Additional Information About Your Visit        [employer] Information     [employer] lets you send messages to your doctor, view your test results, renew your prescriptions, schedule appointments and more. To sign up, go to [email or web address] . Click on \"Log in\" on the left side of the screen, which will take you to the Welcome page. " "Then click on \"Sign up Now\" on the right side of the page.     You will be asked to enter the access code listed below, as well as some personal information. Please follow the directions to create your username and password.     Your access code is: [email or web address]  Expires: 2017 12:37 PM     Your access code will  in 90 days. If you need help or a new code, please call your [place] clinic or [phone].        Care EveryWhere ID     This is your Care EveryWhere ID. This could be used by other organizations to access your [place] medical records  [id number]        Your Vitals Were     Pulse Temperature                71 96.6  F (35.9  C) (Tympanic)           Blood Pressure from Last 3 Encounters:   17 137/77   17 129/74   17 122/52    Weight from Last 3 Encounters:   17 74.5 kg (164 lb 3.9 oz)   17 74.8 kg (165 lb)   17 72.8 kg (160 lb 9.6 oz)              Today, you had the following     No orders found for display         Today's Medication Changes          These changes are accurate as of: 17  2:10 PM.  If you have any questions, ask your nurse or doctor.               These medicines have changed or have updated prescriptions.        Dose/Directions    Potassium Chloride ER 20 MEQ Tbcr   This may have changed:  when to take this   Used for:  Hypokalemia        Dose:  20 mEq   Take 1 tablet (20 mEq) by mouth daily   Quantity:  30 tablet   Refills:  3       Vitamin D (Cholecalciferol) 1000 UNITS Tabs   This may have changed:  additional instructions   Used for:  Paresthesias        Dose:  2000 Units   Take 2,000 Units by mouth daily   Quantity:  60 tablet   Refills:  0                Primary Care Provider Office Phone # Fax #    Esther Back [phone]       [facility] [address] [place] [address]        Equal Access to Services     [facility]: Hadii zita Alves, waaxda luqadaha, qaybta emilie anderson, " hetal lopezwendy morse'aan ah. So [facility] [phone].    ATENCIÓN: Si ej guidry, tiene a alarcon disposición servicios gratuitos de asistencia lingüística. Ronnie carbajal [phone].    We comply with applicable federal civil rights laws and [place] laws. We do not discriminate on the basis of race, color, national origin, age, disability sex, sexual orientation or gender identity.            Thank you!     Thank you for choosing [facility] INFUSION SERVICES  for your care. Our goal is always to provide you with excellent care. Hearing back from our patients is one way we can continue to improve our services. Please take a few minutes to complete the written survey that you may receive in the mail after your visit with us. Thank you!             Your Updated Medication List - Protect others around you: Learn how to safely use, store and throw away your medicines at www.disposemymeds.org.          This list is accurate as of: 9/1/17  2:10 PM.  Always use your most recent med list.                   Brand Name Dispense Instructions for use Diagnosis    calcium carbonate 500 MG chewable tablet    TUMS     Take 1 chew tab by mouth daily as needed for heartburn        enoxaparin 80 MG/0.8ML injection    LOVENOX    60 Syringe    Inject 0.6 mLs (60 mg) Subcutaneous every 12 hours    Other pulmonary embolism without acute cor pulmonale, unspecified chronicity (H)       ESCITALOPRAM OXALATE PO      Take 10 mg by mouth daily        hydrochlorothiazide 25 MG tablet    HYDRODIURIL     Take 25 mg by mouth daily    Ovarian cancer, unspecified laterality (H)       levothyroxine 125 MCG tablet    SYNTHROID    30 tablet    Take 1 tablet (125 mcg) by mouth daily    Other pulmonary embolism without acute cor pulmonale, unspecified chronicity (H)       magnesium chloride 535 (64 MG) MG Tbcr CR tablet     30 tablet    Take 1 tablet (535 mg) by mouth daily    Ovarian cancer, unspecified laterality (H)        polyethylene glycol Packet    MIRALAX/GLYCOLAX    7 packet    Take 17 g by mouth daily    Ovarian cancer, unspecified laterality (H)       Potassium Chloride ER 20 MEQ Tbcr     30 tablet    Take 1 tablet (20 mEq) by mouth daily    Hypokalemia       PRILOSEC PO      Take 20 mg by mouth every morning        senna-docusate 8.6-50 MG per tablet    SENOKOT-S;PERICOLACE    60 tablet    Take 1-2 tablets by mouth 2 times daily Hold for loose stools    Ovarian cancer, unspecified laterality (H)       Vitamin D (Cholecalciferol) 1000 UNITS Tabs     60 tablet    Take 2,000 Units by mouth daily    Paresthesias

## 2017-09-02 ENCOUNTER — HOSPITAL ENCOUNTER (OUTPATIENT)
Dept: OUTPATIENT PROCEDURES | Facility: CLINIC | Age: 72
Discharge: HOME | End: 2017-09-02
Attending: NURSE PRACTITIONER | Admitting: NURSE PRACTITIONER
Payer: COMMERCIAL

## 2017-09-02 VITALS
SYSTOLIC BLOOD PRESSURE: 103 MMHG | HEART RATE: 88 BPM | OXYGEN SATURATION: 97 % | TEMPERATURE: 98 F | RESPIRATION RATE: 16 BRPM | DIASTOLIC BLOOD PRESSURE: 71 MMHG

## 2017-09-02 DIAGNOSIS — T45.1X5A CHEMOTHERAPY-INDUCED NEUTROPENIA (H): ICD-10-CM

## 2017-09-02 DIAGNOSIS — D70.1 CHEMOTHERAPY-INDUCED NEUTROPENIA (H): ICD-10-CM

## 2017-09-02 DIAGNOSIS — C56.9 OVARIAN CANCER, UNSPECIFIED LATERALITY (H): ICD-10-CM

## 2017-09-02 PROCEDURE — 96372 THER/PROPH/DIAG INJ SC/IM: CPT

## 2017-09-02 PROCEDURE — 25000128 H RX IP 250 OP 636: Performed by: NURSE PRACTITIONER

## 2017-09-02 RX ADMIN — FILGRASTIM 300 MCG: 300 INJECTION, SOLUTION INTRAVENOUS; SUBCUTANEOUS at 10:08

## 2017-09-03 ENCOUNTER — HOSPITAL ENCOUNTER (OUTPATIENT)
Dept: OUTPATIENT PROCEDURES | Facility: CLINIC | Age: 72
Discharge: HOME | End: 2017-09-03
Attending: NURSE PRACTITIONER | Admitting: NURSE PRACTITIONER
Payer: COMMERCIAL

## 2017-09-03 VITALS
SYSTOLIC BLOOD PRESSURE: 109 MMHG | HEART RATE: 101 BPM | DIASTOLIC BLOOD PRESSURE: 63 MMHG | RESPIRATION RATE: 16 BRPM | TEMPERATURE: 98.4 F | OXYGEN SATURATION: 98 %

## 2017-09-03 DIAGNOSIS — T45.1X5A CHEMOTHERAPY-INDUCED NEUTROPENIA (H): ICD-10-CM

## 2017-09-03 DIAGNOSIS — D70.1 CHEMOTHERAPY-INDUCED NEUTROPENIA (H): ICD-10-CM

## 2017-09-03 DIAGNOSIS — C56.9 OVARIAN CANCER, UNSPECIFIED LATERALITY (H): ICD-10-CM

## 2017-09-03 PROCEDURE — 25000128 H RX IP 250 OP 636: Performed by: NURSE PRACTITIONER

## 2017-09-03 PROCEDURE — 96372 THER/PROPH/DIAG INJ SC/IM: CPT

## 2017-09-03 NOTE — PROGRESS NOTES
VSS, tolerated injection well sub Q left arm. Will be returning tomorrow for next appointment. Discharged in the care of .

## 2017-09-04 ENCOUNTER — HOSPITAL ENCOUNTER (OUTPATIENT)
Dept: OUTPATIENT PROCEDURES | Facility: CLINIC | Age: 72
Discharge: HOME | End: 2017-09-04
Attending: FAMILY MEDICINE | Admitting: FAMILY MEDICINE
Payer: COMMERCIAL

## 2017-09-04 VITALS
TEMPERATURE: 98.3 F | OXYGEN SATURATION: 97 % | DIASTOLIC BLOOD PRESSURE: 79 MMHG | RESPIRATION RATE: 18 BRPM | HEART RATE: 90 BPM | SYSTOLIC BLOOD PRESSURE: 126 MMHG

## 2017-09-04 DIAGNOSIS — T45.1X5A CHEMOTHERAPY-INDUCED NEUTROPENIA (H): ICD-10-CM

## 2017-09-04 DIAGNOSIS — C56.9 OVARIAN CANCER, UNSPECIFIED LATERALITY (H): ICD-10-CM

## 2017-09-04 DIAGNOSIS — D70.1 CHEMOTHERAPY-INDUCED NEUTROPENIA (H): ICD-10-CM

## 2017-09-04 PROCEDURE — 96372 THER/PROPH/DIAG INJ SC/IM: CPT

## 2017-09-04 PROCEDURE — 25000128 H RX IP 250 OP 636: Performed by: NURSE PRACTITIONER

## 2017-09-04 RX ADMIN — FILGRASTIM 300 MCG: 300 INJECTION, SOLUTION INTRAVENOUS; SUBCUTANEOUS at 12:57

## 2017-09-07 ENCOUNTER — HOSPITAL ENCOUNTER (OUTPATIENT)
Facility: CLINIC | Age: 72
Setting detail: SPECIMEN
Discharge: HOME | End: 2017-09-07
Attending: NURSE PRACTITIONER | Admitting: NURSE PRACTITIONER
Payer: COMMERCIAL

## 2017-09-07 ENCOUNTER — INFUSION THERAPY VISIT (OUTPATIENT)
Dept: INFUSION THERAPY | Facility: CLINIC | Age: 72
End: 2017-09-07
Attending: NURSE PRACTITIONER
Payer: COMMERCIAL

## 2017-09-07 VITALS
SYSTOLIC BLOOD PRESSURE: 130 MMHG | OXYGEN SATURATION: 97 % | WEIGHT: 164.02 LBS | RESPIRATION RATE: 18 BRPM | DIASTOLIC BLOOD PRESSURE: 73 MMHG | TEMPERATURE: 97.3 F | BODY MASS INDEX: 33.13 KG/M2 | HEART RATE: 71 BPM

## 2017-09-07 DIAGNOSIS — D70.1 CHEMOTHERAPY-INDUCED NEUTROPENIA (H): Primary | ICD-10-CM

## 2017-09-07 DIAGNOSIS — T45.1X5A CHEMOTHERAPY-INDUCED NEUTROPENIA (H): Primary | ICD-10-CM

## 2017-09-07 DIAGNOSIS — C56.9 OVARIAN CANCER, UNSPECIFIED LATERALITY (H): ICD-10-CM

## 2017-09-07 LAB
BASOPHILS # BLD AUTO: 0 10E9/L (ref 0–0.2)
BASOPHILS NFR BLD AUTO: 0.3 %
DIFFERENTIAL METHOD BLD: ABNORMAL
EOSINOPHIL # BLD AUTO: 0 10E9/L (ref 0–0.7)
EOSINOPHIL NFR BLD AUTO: 0.5 %
ERYTHROCYTE [DISTWIDTH] IN BLOOD BY AUTOMATED COUNT: 17.4 % (ref 10–15)
HCT VFR BLD AUTO: 27.3 % (ref 35–47)
HGB BLD-MCNC: 9 G/DL (ref 11.7–15.7)
IMM GRANULOCYTES # BLD: 0.1 10E9/L (ref 0–0.4)
IMM GRANULOCYTES NFR BLD: 1.3 %
LYMPHOCYTES # BLD AUTO: 1.1 10E9/L (ref 0.8–5.3)
LYMPHOCYTES NFR BLD AUTO: 28.6 %
MAGNESIUM SERPL-MCNC: 1.8 MG/DL (ref 1.6–2.3)
MCH RBC QN AUTO: 34.2 PG (ref 26.5–33)
MCHC RBC AUTO-ENTMCNC: 33 G/DL (ref 31.5–36.5)
MCV RBC AUTO: 104 FL (ref 78–100)
MONOCYTES # BLD AUTO: 0.4 10E9/L (ref 0–1.3)
MONOCYTES NFR BLD AUTO: 10.3 %
NEUTROPHILS # BLD AUTO: 2.3 10E9/L (ref 1.6–8.3)
NEUTROPHILS NFR BLD AUTO: 59 %
NRBC # BLD AUTO: 0 10*3/UL
NRBC BLD AUTO-RTO: 0 /100
PLATELET # BLD AUTO: 172 10E9/L (ref 150–450)
POTASSIUM SERPL-SCNC: 3.5 MMOL/L (ref 3.4–5.3)
RBC # BLD AUTO: 2.63 10E12/L (ref 3.8–5.2)
WBC # BLD AUTO: 3.9 10E9/L (ref 4–11)

## 2017-09-07 PROCEDURE — 83735 ASSAY OF MAGNESIUM: CPT | Performed by: NURSE PRACTITIONER

## 2017-09-07 PROCEDURE — 25000128 H RX IP 250 OP 636: Performed by: NURSE PRACTITIONER

## 2017-09-07 PROCEDURE — S0028 INJECTION, FAMOTIDINE, 20 MG: HCPCS | Performed by: NURSE PRACTITIONER

## 2017-09-07 PROCEDURE — 96375 TX/PRO/DX INJ NEW DRUG ADDON: CPT

## 2017-09-07 PROCEDURE — 85025 COMPLETE CBC W/AUTO DIFF WBC: CPT | Performed by: NURSE PRACTITIONER

## 2017-09-07 PROCEDURE — 25000125 ZZHC RX 250: Performed by: NURSE PRACTITIONER

## 2017-09-07 PROCEDURE — 96413 CHEMO IV INFUSION 1 HR: CPT

## 2017-09-07 PROCEDURE — 84132 ASSAY OF SERUM POTASSIUM: CPT | Performed by: NURSE PRACTITIONER

## 2017-09-07 RX ORDER — HEPARIN SODIUM (PORCINE) LOCK FLUSH IV SOLN 100 UNIT/ML 100 UNIT/ML
500 SOLUTION INTRAVENOUS EVERY 8 HOURS
Status: DISCONTINUED | OUTPATIENT
Start: 2017-09-07 | End: 2017-09-07 | Stop reason: HOSPADM

## 2017-09-07 RX ADMIN — SODIUM CHLORIDE, PRESERVATIVE FREE 500 UNITS: 5 INJECTION INTRAVENOUS at 10:45

## 2017-09-07 RX ADMIN — DEXAMETHASONE SODIUM PHOSPHATE 12 MG: 10 INJECTION, SOLUTION INTRAMUSCULAR; INTRAVENOUS at 08:50

## 2017-09-07 RX ADMIN — PACLITAXEL 135 MG: 6 INJECTION, SOLUTION, CONCENTRATE INTRAVENOUS at 09:39

## 2017-09-07 RX ADMIN — DIPHENHYDRAMINE HYDROCHLORIDE 25 MG: 50 INJECTION, SOLUTION INTRAMUSCULAR; INTRAVENOUS at 09:22

## 2017-09-07 RX ADMIN — SODIUM CHLORIDE 250 ML: 9 INJECTION, SOLUTION INTRAVENOUS at 08:55

## 2017-09-07 RX ADMIN — FAMOTIDINE 40 MG: 10 INJECTION INTRAVENOUS at 09:06

## 2017-09-07 NOTE — PROGRESS NOTES
Infusion Nursing Note:  Tosin Nina presents today for Day 8, Cycle 6 Taxol, premeds and labs.    Patient seen by provider today: No   present during visit today: Not Applicable.    Note: N/A.    Intravenous Access:  Implanted Port.    Treatment Conditions:  Lab Results   Component Value Date    HGB 9.0 09/07/2017     Lab Results   Component Value Date    WBC 3.9 09/07/2017      Lab Results   Component Value Date    ANEU 2.3 09/07/2017     Lab Results   Component Value Date     09/07/2017      Lab Results   Component Value Date     08/31/2017                   Lab Results   Component Value Date    POTASSIUM 3.5 09/07/2017           Lab Results   Component Value Date    MAG 1.8 09/07/2017            Lab Results   Component Value Date    CR 0.52 08/31/2017                   Lab Results   Component Value Date    PATTI 8.8 08/31/2017                Lab Results   Component Value Date    BILITOTAL 0.3 08/31/2017           Lab Results   Component Value Date    ALBUMIN 3.1 08/31/2017                    Lab Results   Component Value Date    ALT 16 08/31/2017           Lab Results   Component Value Date    AST 14 08/31/2017     Results reviewed, labs MET treatment parameters, ok to proceed with treatment.  No need for electrolyte replacement today.    Post Infusion Assessment:  Patient tolerated infusion without incident.  Blood return noted pre and post infusion.  Site patent and intact, free from redness, edema or discomfort.  No evidence of extravasations.  Access discontinued per protocol.    Discharge Plan:   Patient declined prescription refills.  Discharge instructions reviewed with: Patient and Family.  Patient and/or family verbalized understanding of discharge instructions and all questions answered.  Copy of AVS reviewed with patient and/or family.  Patient will return 9/8/17-9/11/17 for Neupogen injections.  Patient discharged in stable condition accompanied by: .  Departure  Mode: Wheelchair.    Dara Maxwell RN

## 2017-09-07 NOTE — MR AVS SNAPSHOT
After Visit Summary   9/7/2017    Tosin Nina    MRN: [id number]           Patient Information     Date Of Birth          1945        Visit Information        Provider Department      9/7/2017 8:00 AM RH INFUSION CHAIR 6 [facility] Infusion [facility]        Today's Diagnoses     Chemotherapy-induced neutropenia (H)    -  1    Ovarian cancer, unspecified laterality (H)           Follow-ups after your visit        Your next 10 appointments already scheduled     Sep 08, 2017  1:30 PM CDT   Level 1 with RH INFUSION CHAIR 12   [facility] Infusion Services ([facility])    [facility]  [address]  [facility] [address]   [phone]            Sep 09, 2017  2:00 PM CDT   IV Infusion with RH OP PROCEDURE RN#2   [facility] Outpatient Procedures ([facility])    [address]  [facility] [address]   [phone]            Sep 10, 2017  2:00 PM CDT   IV Infusion with RH OP PROCEDURE RN#2   [facility] Outpatient Procedures ([facility])    [address]  [facility] [address]   [phone]            Sep 11, 2017  2:00 PM CDT   Level 1 with RH INFUSION CHAIR 11   [facility] Infusion Services ([facility])    [facility]  [address]  [facility] [address]   [phone]            Sep 14, 2017 11:00 AM CDT   Level 4 with RH INFUSION CHAIR 10   [facility] Infusion [facility] ([facility])    [facility] [address]  [place] [address]   [phone]            Sep 15, 2017  2:00 PM CDT   Level 1 with RH INFUSION CHAIR 5   [facility] Infusion [facility] ([facility])    [facility]  [address]  [place] [address]   [phone]            Sep 16, 2017  "10:00 AM CDT   IV Infusion with RH OP PROCEDURE RN#2   [facility] Outpatient Procedures ([facility])    [address]  [facility] [address]   [phone]            Sep 17, 2017 10:00 AM CDT   IV Infusion with RH OP PROCEDURE RN#2   [facility] Outpatient Procedures ([facility])    [address] [facility] [address]   [phone]            Sep 18, 2017  2:00 PM CDT   Level 1 with RH INFUSION CHAIR 1   [facility] Infusion Services ([facility])    [facility]  [address]  [facility] [address] [phone]            Sep 26, 2017  9:00 AM CDT   New Visit with Kelsie Nguyen    Cancer Risk Management Program ([facility])    [facility]  [address]  [facility] [address] [phone]              Who to contact     If you have questions or need follow up information about today's clinic visit or your schedule please contact [facility] INFUSION SERVICES directly at [phone].  Normal or non-critical lab and imaging results will be communicated to you by MyChart, letter or phone within 4 business days after the clinic has received the results. If you do not hear from us within 7 days, please contact the clinic through MyChart or phone. If you have a critical or abnormal lab result, we will notify you by phone as soon as possible.  Submit refill requests through "[employer]" or call your pharmacy and they will forward the refill request to us. Please allow 3 business days for your refill to be completed.          Additional Information About Your Visit        MyChart Information     "[employer]" lets you send messages to your doctor, view your test results, renew your prescriptions, schedule appointments and more. To sign up, go to [email or web address]" . Click on \"Log in\" on the left side of the screen, which will take you " "to the Welcome page. Then click on \"Sign up Now\" on the right side of the page.     You will be asked to enter the access code listed below, as well as some personal information. Please follow the directions to create your username and password.     Your access code is: [email or web address]  Expires: 2017 12:37 PM     Your access code will  in 90 days. If you need help or a new code, please call your [facility] or [phone].        Care EveryWhere ID     This is your Care EveryWhere ID. This could be used by other organizations to access your [place] medical records  [id number]        Your Vitals Were     Pulse Temperature Respirations Pulse Oximetry BMI (Body Mass Index)       71 97.3  F (36.3  C) (Tympanic) 18 97% 33.13 kg/m2        Blood Pressure from Last 3 Encounters:   17 130/73   17 126/79   17 109/63    Weight from Last 3 Encounters:   17 74.4 kg (164 lb 0.4 oz)   17 74.5 kg (164 lb 3.9 oz)   17 74.8 kg (165 lb)              We Performed the Following     CBC with platelets differential     Magnesium     Potassium          Today's Medication Changes          These changes are accurate as of: 17 10:56 AM.  If you have any questions, ask your nurse or doctor.               These medicines have changed or have updated prescriptions.        Dose/Directions    Potassium Chloride ER 20 MEQ Tbcr   This may have changed:  when to take this   Used for:  Hypokalemia        Dose:  20 mEq   Take 1 tablet (20 mEq) by mouth daily   Quantity:  30 tablet   Refills:  3       Vitamin D (Cholecalciferol) 1000 UNITS Tabs   This may have changed:  additional instructions   Used for:  Paresthesias        Dose:  2000 Units   Take 2,000 Units by mouth daily   Quantity:  60 tablet   Refills:  0                Primary Care Provider Office Phone # Fax #    Esther Back [phone]       [facility] [address] [place] [address]        Equal Access " to Services     CITLALLI CUELLO : Hadii zita Alves, waharikada luqadaha, qasusannata kaalmamarian anderson, hetal long. So [facility] [phone].    ATENCIÓN: Si aidenla chao, tiene a alarcon disposición servicios gratuitos de asistencia lingüística. Llame al [phone].    We comply with applicable federal civil rights laws and [place] laws. We do not discriminate on the basis of race, color, national origin, age, disability sex, sexual orientation or gender identity.            Thank you!     Thank you for choosing [facility] INFUSION SERVICES  for your care. Our goal is always to provide you with excellent care. Hearing back from our patients is one way we can continue to improve our services. Please take a few minutes to complete the written survey that you may receive in the mail after your visit with us. Thank you!             Your Updated Medication List - Protect others around you: Learn how to safely use, store and throw away your medicines at www.disposemymeds.org.          This list is accurate as of: 9/7/17 10:56 AM.  Always use your most recent med list.                   Brand Name Dispense Instructions for use Diagnosis    calcium carbonate 500 MG chewable tablet    TUMS     Take 1 chew tab by mouth daily as needed for heartburn        enoxaparin 80 MG/0.8ML injection    LOVENOX    60 Syringe    Inject 0.6 mLs (60 mg) Subcutaneous every 12 hours    Other pulmonary embolism without acute cor pulmonale, unspecified chronicity (H)       ESCITALOPRAM OXALATE PO      Take 10 mg by mouth daily        hydrochlorothiazide 25 MG tablet    HYDRODIURIL     Take 25 mg by mouth daily    Ovarian cancer, unspecified laterality (H)       levothyroxine 125 MCG tablet    SYNTHROID    30 tablet    Take 1 tablet (125 mcg) by mouth daily    Other pulmonary embolism without acute cor pulmonale, unspecified chronicity (H)       magnesium chloride 535 (64 MG) MG Tbcr CR tablet      30 tablet    Take 1 tablet (535 mg) by mouth daily    Ovarian cancer, unspecified laterality (H)       polyethylene glycol Packet    MIRALAX/GLYCOLAX    7 packet    Take 17 g by mouth daily    Ovarian cancer, unspecified laterality (H)       Potassium Chloride ER 20 MEQ Tbcr     30 tablet    Take 1 tablet (20 mEq) by mouth daily    Hypokalemia       PRILOSEC PO      Take 20 mg by mouth every morning        senna-docusate 8.6-50 MG per tablet    SENOKOT-S;PERICOLACE    60 tablet    Take 1-2 tablets by mouth 2 times daily Hold for loose stools    Ovarian cancer, unspecified laterality (H)       Vitamin D (Cholecalciferol) 1000 UNITS Tabs     60 tablet    Take 2,000 Units by mouth daily    Paresthesias

## 2017-09-08 ENCOUNTER — INFUSION THERAPY VISIT (OUTPATIENT)
Dept: INFUSION THERAPY | Facility: CLINIC | Age: 72
End: 2017-09-08
Attending: NURSE PRACTITIONER
Payer: COMMERCIAL

## 2017-09-08 VITALS
HEART RATE: 82 BPM | RESPIRATION RATE: 16 BRPM | SYSTOLIC BLOOD PRESSURE: 119 MMHG | DIASTOLIC BLOOD PRESSURE: 59 MMHG | TEMPERATURE: 97.5 F

## 2017-09-08 DIAGNOSIS — D70.1 CHEMOTHERAPY-INDUCED NEUTROPENIA (H): Primary | ICD-10-CM

## 2017-09-08 DIAGNOSIS — T45.1X5A CHEMOTHERAPY-INDUCED NEUTROPENIA (H): Primary | ICD-10-CM

## 2017-09-08 DIAGNOSIS — C56.9 OVARIAN CANCER, UNSPECIFIED LATERALITY (H): ICD-10-CM

## 2017-09-08 PROCEDURE — 25000128 H RX IP 250 OP 636: Performed by: NURSE PRACTITIONER

## 2017-09-08 PROCEDURE — 96372 THER/PROPH/DIAG INJ SC/IM: CPT

## 2017-09-08 RX ADMIN — FILGRASTIM 300 MCG: 300 INJECTION, SOLUTION INTRAVENOUS; SUBCUTANEOUS at 13:49

## 2017-09-08 NOTE — MR AVS SNAPSHOT
After Visit Summary   9/8/2017    Tosin Nina    MRN: [id number]           Patient Information     Date Of Birth          1945        Visit Information        Provider Department      9/8/2017 1:30 PM RH INFUSION CHAIR 12 [facility] Infusion Services        Today's Diagnoses     Chemotherapy-induced neutropenia (H)    -  1    Ovarian cancer, unspecified laterality (H)           Follow-ups after your visit        Your next 10 appointments already scheduled     Sep 09, 2017  2:00 PM CDT   IV Infusion with RH OP PROCEDURE RN#2   [facility] Outpatient Procedures ([facility])    [address]  [facility] [address]   [phone]            Sep 10, 2017  2:00 PM CDT   IV Infusion with RH OP PROCEDURE RN#2   [facility] Outpatient Procedures ([facility])    [address]  [facility] [address]   [phone]            Sep 11, 2017  2:00 PM CDT   Level 1 with RH INFUSION CHAIR 11   [facility] Infusion Services ([facility])    [facility]  [address]  [facility] [address]   [phone]            Sep 14, 2017 11:00 AM CDT   Level 4 with RH INFUSION CHAIR 10   [facility] Infusion Services ([facility])    [facility]  [address]  [facility] [address]   [phone]            Sep 15, 2017  2:00 PM CDT   Level 1 with RH INFUSION CHAIR 5   [facility] Infusion Services ([facility])    [facility]  [address]  [facility] [address]   [phone]            Sep 16, 2017 10:00 AM CDT   IV Infusion with RH OP PROCEDURE RN#2   [facility] Outpatient Procedures ([facility])    [address]  [facility] [address]   [phone]            Sep 17, 2017 10:00 AM CDT   IV Infusion with RH OP PROCEDURE  "RN#2   [facility] Outpatient Procedures ([facility])    [address]  [facility] [address]   [phone]            Sep 18, 2017  2:00 PM CDT   Level 1 with  INFUSION CHAIR 3    Infusion Services ([facility])    [facility]  [address]  [facility] [address]   [phone]            Sep 26, 2017  9:00 AM CDT   New Visit with Kelsie Nguyen GC   Cancer Risk Management Program ([facility])    [facility]  [address]  [facility] [address] [phone]            Oct 03, 2017 11:00 AM CDT   Return Visit with Nessa Foster MD   [facility] Cancer Care ([facility])    [facility]  [address]  [facility] [address] [phone]              Who to contact     If you have questions or need follow up information about today's clinic visit or your schedule please contact [facility] INFUSION SERVICES directly at [phone].  Normal or non-critical lab and imaging results will be communicated to you by MyChart, letter or phone within 4 business days after the clinic has received the results. If you do not hear from us within 7 days, please contact the clinic through [employer] or phone. If you have a critical or abnormal lab result, we will notify you by phone as soon as possible.  Submit refill requests through [employer] or call your pharmacy and they will forward the refill request to us. Please allow 3 business days for your refill to be completed.          Additional Information About Your Visit        [facility] Information     [employer] lets you send messages to your doctor, view your test results, renew your prescriptions, schedule appointments and more. To sign up, go to [email or web address] . Click on \"Log in\" on the left side of the screen, which will take you to " "the Welcome page. Then click on \"Sign up Now\" on the right side of the page.     You will be asked to enter the access code listed below, as well as some personal information. Please follow the directions to create your username and password.     Your access code is: [email or web address]  Expires: 2017 12:37 PM     Your access code will  in 90 days. If you need help or a new code, please call your [facility] or [phone].        Care EveryWhere ID     This is your Care EveryWhere ID. This could be used by other organizations to access your [place] medical records  [id number]        Your Vitals Were     Pulse Temperature Respirations             82 97.5  F (36.4  C) (Tympanic) 16          Blood Pressure from Last 3 Encounters:   17 119/59   17 130/73   17 126/79    Weight from Last 3 Encounters:   17 74.4 kg (164 lb 0.4 oz)   17 74.5 kg (164 lb 3.9 oz)   17 74.8 kg (165 lb)              Today, you had the following     No orders found for display         Today's Medication Changes          These changes are accurate as of: 17  2:07 PM.  If you have any questions, ask your nurse or doctor.               These medicines have changed or have updated prescriptions.        Dose/Directions    Potassium Chloride ER 20 MEQ Tbcr   This may have changed:  when to take this   Used for:  Hypokalemia        Dose:  20 mEq   Take 1 tablet (20 mEq) by mouth daily   Quantity:  30 tablet   Refills:  3       Vitamin D (Cholecalciferol) 1000 UNITS Tabs   This may have changed:  additional instructions   Used for:  Paresthesias        Dose:  2000 Units   Take 2,000 Units by mouth daily   Quantity:  60 tablet   Refills:  0                Primary Care Provider Office Phone # Fax #    Esther Back [phone]       [facility] [address] [address]        Equal Access to Services     CITLALLI CUELLO AH: jesus Gan, " raj phan johnhetal lyn ah. So [facility] [phone].    ATENCIÓN: Si ej guidry, tiene a alarcon disposición servicios gratuitos de asistencia lingüística. Ronnie al [phone].    We comply with applicable federal civil rights laws and [place] laws. We do not discriminate on the basis of race, color, national origin, age, disability sex, sexual orientation or gender identity.            Thank you!     Thank you for choosing [facility] INFUSION SERVICES  for your care. Our goal is always to provide you with excellent care. Hearing back from our patients is one way we can continue to improve our services. Please take a few minutes to complete the written survey that you may receive in the mail after your visit with us. Thank you!             Your Updated Medication List - Protect others around you: Learn how to safely use, store and throw away your medicines at www.disposemymeds.org.          This list is accurate as of: 9/8/17  2:07 PM.  Always use your most recent med list.                   Brand Name Dispense Instructions for use Diagnosis    calcium carbonate 500 MG chewable tablet    TUMS     Take 1 chew tab by mouth daily as needed for heartburn        enoxaparin 80 MG/0.8ML injection    LOVENOX    60 Syringe    Inject 0.6 mLs (60 mg) Subcutaneous every 12 hours    Other pulmonary embolism without acute cor pulmonale, unspecified chronicity (H)       ESCITALOPRAM OXALATE PO      Take 10 mg by mouth daily        hydrochlorothiazide 25 MG tablet    HYDRODIURIL     Take 25 mg by mouth daily    Ovarian cancer, unspecified laterality (H)       levothyroxine 125 MCG tablet    SYNTHROID    30 tablet    Take 1 tablet (125 mcg) by mouth daily    Other pulmonary embolism without acute cor pulmonale, unspecified chronicity (H)       magnesium chloride 535 (64 MG) MG Tbcr CR tablet     30 tablet    Take 1 tablet (535 mg) by mouth daily    Ovarian cancer,  unspecified laterality (H)       polyethylene glycol Packet    MIRALAX/GLYCOLAX    7 packet    Take 17 g by mouth daily    Ovarian cancer, unspecified laterality (H)       Potassium Chloride ER 20 MEQ Tbcr     30 tablet    Take 1 tablet (20 mEq) by mouth daily    Hypokalemia       PRILOSEC PO      Take 20 mg by mouth every morning        senna-docusate 8.6-50 MG per tablet    SENOKOT-S;PERICOLACE    60 tablet    Take 1-2 tablets by mouth 2 times daily Hold for loose stools    Ovarian cancer, unspecified laterality (H)       Vitamin D (Cholecalciferol) 1000 UNITS Tabs     60 tablet    Take 2,000 Units by mouth daily    Paresthesias

## 2017-09-08 NOTE — PROGRESS NOTES
"Infusion Nursing Note:  Tosin Nina presents today for neupogen.    Patient seen by provider today: No   present during visit today: Not Applicable.    Note: Patient complained of \"lumps\" on abdomen.  Did assess these and it seems to be a lot of scar tissue from lovenox injections.  She has been doing these for several months.  Told her and her  to start doing the injections away from these lumps and more on the outside abdominal area.  If the lumps are still bothering her next week when she gets chemo she will request to have Adelina look at them.    Intravenous Access:  No Intravenous access/labs at this visit.    Treatment Conditions:  Not Applicable.      Post Infusion Assessment:  Patient tolerated injection without incident.    Discharge Plan:   AVS to patient via MYCHART.  Patient will return tomorrow and Sunday at [facility] for neupogen for next appointment.   Patient discharged in stable condition accompanied by: self and .  Departure Mode: Wheelchair.    Phyllis Maldonado RN                        "

## 2017-09-09 ENCOUNTER — HOSPITAL ENCOUNTER (OUTPATIENT)
Dept: OUTPATIENT PROCEDURES | Facility: CLINIC | Age: 72
Discharge: HOME | End: 2017-09-09
Attending: NURSE PRACTITIONER | Admitting: NURSE PRACTITIONER
Payer: COMMERCIAL

## 2017-09-09 DIAGNOSIS — T45.1X5A CHEMOTHERAPY-INDUCED NEUTROPENIA (H): ICD-10-CM

## 2017-09-09 DIAGNOSIS — D70.1 CHEMOTHERAPY-INDUCED NEUTROPENIA (H): ICD-10-CM

## 2017-09-09 DIAGNOSIS — C56.9 OVARIAN CANCER, UNSPECIFIED LATERALITY (H): ICD-10-CM

## 2017-09-09 PROCEDURE — 25000128 H RX IP 250 OP 636: Performed by: NURSE PRACTITIONER

## 2017-09-09 PROCEDURE — 96372 THER/PROPH/DIAG INJ SC/IM: CPT

## 2017-09-09 RX ADMIN — FILGRASTIM 300 MCG: 300 INJECTION, SOLUTION INTRAVENOUS; SUBCUTANEOUS at 13:58

## 2017-09-10 ENCOUNTER — HOSPITAL ENCOUNTER (OUTPATIENT)
Dept: OUTPATIENT PROCEDURES | Facility: CLINIC | Age: 72
Discharge: HOME | End: 2017-09-10
Attending: NURSE PRACTITIONER | Admitting: NURSE PRACTITIONER
Payer: COMMERCIAL

## 2017-09-10 VITALS
SYSTOLIC BLOOD PRESSURE: 117 MMHG | DIASTOLIC BLOOD PRESSURE: 69 MMHG | TEMPERATURE: 97.9 F | HEART RATE: 108 BPM | RESPIRATION RATE: 16 BRPM

## 2017-09-10 DIAGNOSIS — T45.1X5A CHEMOTHERAPY-INDUCED NEUTROPENIA (H): ICD-10-CM

## 2017-09-10 DIAGNOSIS — C56.9 OVARIAN CANCER, UNSPECIFIED LATERALITY (H): ICD-10-CM

## 2017-09-10 DIAGNOSIS — D70.1 CHEMOTHERAPY-INDUCED NEUTROPENIA (H): ICD-10-CM

## 2017-09-10 PROCEDURE — 25000128 H RX IP 250 OP 636: Performed by: NURSE PRACTITIONER

## 2017-09-10 PROCEDURE — 96372 THER/PROPH/DIAG INJ SC/IM: CPT

## 2017-09-10 RX ADMIN — FILGRASTIM 300 MCG: 300 INJECTION, SOLUTION INTRAVENOUS; SUBCUTANEOUS at 14:17

## 2017-09-10 NOTE — PROGRESS NOTES
Arrived for neupogen injection in wheelchair accompanied by .  Denies any changes or new symptoms since yesterday.  Tolerated injection.  Aware of next appointment.  Discharged home in care of .

## 2017-09-11 ENCOUNTER — INFUSION THERAPY VISIT (OUTPATIENT)
Dept: INFUSION THERAPY | Facility: CLINIC | Age: 72
End: 2017-09-11
Attending: NURSE PRACTITIONER
Payer: COMMERCIAL

## 2017-09-11 VITALS
OXYGEN SATURATION: 98 % | SYSTOLIC BLOOD PRESSURE: 117 MMHG | TEMPERATURE: 97.8 F | RESPIRATION RATE: 16 BRPM | HEART RATE: 88 BPM | DIASTOLIC BLOOD PRESSURE: 69 MMHG

## 2017-09-11 DIAGNOSIS — D70.1 CHEMOTHERAPY-INDUCED NEUTROPENIA (H): Primary | ICD-10-CM

## 2017-09-11 DIAGNOSIS — T45.1X5A CHEMOTHERAPY-INDUCED NEUTROPENIA (H): Primary | ICD-10-CM

## 2017-09-11 DIAGNOSIS — C56.9 OVARIAN CANCER, UNSPECIFIED LATERALITY (H): ICD-10-CM

## 2017-09-11 PROCEDURE — 25000128 H RX IP 250 OP 636: Performed by: NURSE PRACTITIONER

## 2017-09-11 PROCEDURE — 96372 THER/PROPH/DIAG INJ SC/IM: CPT

## 2017-09-11 RX ADMIN — FILGRASTIM 300 MCG: 300 INJECTION, SOLUTION INTRAVENOUS; SUBCUTANEOUS at 14:14

## 2017-09-11 NOTE — MR AVS SNAPSHOT
After Visit Summary   9/11/2017    Tosin Nina    MRN: [id number]           Patient Information     Date Of Birth          1945        Visit Information        Provider Department      9/11/2017 2:00 PM RH INFUSION CHAIR 11 [facility]fusion [facility]        Today's Diagnoses     Chemotherapy-induced neutropenia (H)    -  1    Ovarian cancer, unspecified laterality (H)           Follow-ups after your visit        Your next 10 appointments already scheduled     Sep 14, 2017 11:00 AM CDT   Level 4 with RH INFUSION CHAIR 10   [facility] Infusion [facility] ([facility])    [facility] [place] [address]  [place] [address]   [phone]            Sep 15, 2017  2:00 PM CDT   Level 1 with RH INFUSION CHAIR 5   [facility] ([facility])    [facility]  [address]  [place] [address]   [phone]            Sep 16, 2017 10:00 AM CDT   IV Infusion with RH OP PROCEDURE RN#2   [facility] Outpatient Procedures ([facility])    [address]  [facility] [address]   [phone]            Sep 17, 2017 10:00 AM CDT   IV Infusion with RH OP PROCEDURE RN#2   [facility] Outpatient Procedures ([facility])    [address]  [facility] [address]   [phone]            Sep 18, 2017  2:00 PM CDT   Level 1 with RH INFUSION CHAIR 3   [facility] ([facility])    [facility]  [address]  [place] [address]   [phone]            Sep 26, 2017  9:00 AM CDT   New Visit with Kelsie Nguyen GC   Cancer Risk Management Program ([facility])    [facility]  [address]  [place] [address]   [phone]            Oct 03, 2017 11:00 AM CDT   Return  "Visit with Nessa Foster MD   [facility] ([facility])    [facility]  [address]  [place] [address] [phone]              Who to contact     If you have questions or need follow up information about today's clinic visit or your schedule please contact [facility] INF[facility]ION [facility] directly at [phone].  Normal or non-critical lab and imaging results will be communicated to you by [employer], letter or phone within 4 business days after the clinic has received the results. If you do not hear from us within 7 days, please contact the clinic through [employer] or phone. If you have a critical or abnormal lab result, we will notify you by phone as soon as possible.  Submit refill requests through [employer] or call your pharmacy and they will forward the refill request to us. Please allow 3 business days for your refill to be completed.          Additional Information About Your Visit        [employer] Information     [employer] lets you send messages to your doctor, view your test results, renew your prescriptions, schedule appointments and more. To sign up, go to [email or web address] . Click on \"Log in\" on the left side of the screen, which will take you to the Welcome page. Then click on \"Sign up Now\" on the right side of the page.     You will be asked to enter the access code listed below, as well as some personal information. Please follow the directions to create your username and password.     Your access code is: [email or web address]  Expires: 2017 12:37 PM     Your access code will  in 90 days. If you need help or a new code, please call your [place] clinic or [phone].        Care EveryWhere ID     This is your Care EveryWhere ID. This could be used by other organizations to access your [place] medical records  [id number]        Your Vitals Were     Pulse Temperature Respirations Pulse " Oximetry          88 97.8  F (36.6  C) (Tympanic) 16 98%         Blood Pressure from Last 3 Encounters:   09/11/17 117/69   09/10/17 117/69   09/08/17 119/59    Weight from Last 3 Encounters:   09/07/17 74.4 kg (164 lb 0.4 oz)   08/31/17 74.5 kg (164 lb 3.9 oz)   08/24/17 74.8 kg (165 lb)              Today, you had the following     No orders found for display         Today's Medication Changes          These changes are accurate as of: 9/11/17  2:22 PM.  If you have any questions, ask your nurse or doctor.               These medicines have changed or have updated prescriptions.        Dose/Directions    Potassium Chloride ER 20 MEQ Tbcr   This may have changed:  when to take this   Used for:  Hypokalemia        Dose:  20 mEq   Take 1 tablet (20 mEq) by mouth daily   Quantity:  30 tablet   Refills:  3       Vitamin D (Cholecalciferol) 1000 UNITS Tabs   This may have changed:  additional instructions   Used for:  Paresthesias        Dose:  2000 Units   Take 2,000 Units by mouth daily   Quantity:  60 tablet   Refills:  0                Primary Care Provider Office Phone # Fax #    Esther Back [phone]       [facility] [address] [place] [address]        Equal Access to Services     CITLALLI CUELLO AH: Hadii zita fields hadasho Soomaali, waaxda luqadaha, qaybta kaalmada adeegyada, hetal long. So [facility] [phone].    ATENCIÓN: Si habla español, tiene a alarcon disposición servicios gratuitos de asistencia lingüística. Llame al [phone].    We comply with applicable federal civil rights laws and [place] laws. We do not discriminate on the basis of race, color, national origin, age, disability sex, sexual orientation or gender identity.            Thank you!     Thank you for choosing [facility] INFUSION SERVICES  for your care. Our goal is always to provide you with excellent care. Hearing back from our patients is one way we can continue  to improve our services. Please take a few minutes to complete the written survey that you may receive in the mail after your visit with us. Thank you!             Your Updated Medication List - Protect others around you: Learn how to safely use, store and throw away your medicines at www.disposemymeds.org.          This list is accurate as of: 9/11/17  2:22 PM.  Always use your most recent med list.                   Brand Name Dispense Instructions for use Diagnosis    calcium carbonate 500 MG chewable tablet    TUMS     Take 1 chew tab by mouth daily as needed for heartburn        enoxaparin 80 MG/0.8ML injection    LOVENOX    60 Syringe    Inject 0.6 mLs (60 mg) Subcutaneous every 12 hours    Other pulmonary embolism without acute cor pulmonale, unspecified chronicity (H)       ESCITALOPRAM OXALATE PO      Take 10 mg by mouth daily        hydrochlorothiazide 25 MG tablet    HYDRODIURIL     Take 25 mg by mouth daily    Ovarian cancer, unspecified laterality (H)       levothyroxine 125 MCG tablet    SYNTHROID    30 tablet    Take 1 tablet (125 mcg) by mouth daily    Other pulmonary embolism without acute cor pulmonale, unspecified chronicity (H)       magnesium chloride 535 (64 MG) MG Tbcr CR tablet     30 tablet    Take 1 tablet (535 mg) by mouth daily    Ovarian cancer, unspecified laterality (H)       polyethylene glycol Packet    MIRALAX/GLYCOLAX    7 packet    Take 17 g by mouth daily    Ovarian cancer, unspecified laterality (H)       Potassium Chloride ER 20 MEQ Tbcr     30 tablet    Take 1 tablet (20 mEq) by mouth daily    Hypokalemia       PRILOSEC PO      Take 20 mg by mouth every morning        senna-docusate 8.6-50 MG per tablet    SENOKOT-S;PERICOLACE    60 tablet    Take 1-2 tablets by mouth 2 times daily Hold for loose stools    Ovarian cancer, unspecified laterality (H)       Vitamin D (Cholecalciferol) 1000 UNITS Tabs     60 tablet    Take 2,000 Units by mouth daily    Paresthesias

## 2017-09-11 NOTE — PROGRESS NOTES
Infusion Nursing Note:  [place] JOSEY Nirajchad presents today for neupogen.    Patient seen by provider today: No   present during visit today: Not Applicable.    Note: NA.    Intravenous Access:  Labs drawn without difficulty.  Peripheral IV placed.    Treatment Conditions:  Lab Results   Component Value Date    HGB 9.0 09/07/2017     Lab Results   Component Value Date    WBC 3.9 09/07/2017      Lab Results   Component Value Date    ANEU 2.3 09/07/2017     Lab Results   Component Value Date     09/07/2017      Lab Results   Component Value Date     08/31/2017                   Lab Results   Component Value Date    POTASSIUM 3.5 09/07/2017           Lab Results   Component Value Date    MAG 1.8 09/07/2017            Lab Results   Component Value Date    CR 0.52 08/31/2017                   Lab Results   Component Value Date    PATTI 8.8 08/31/2017                Lab Results   Component Value Date    BILITOTAL 0.3 08/31/2017           Lab Results   Component Value Date    ALBUMIN 3.1 08/31/2017                    Lab Results   Component Value Date    ALT 16 08/31/2017           Lab Results   Component Value Date    AST 14 08/31/2017     Urine protein negative.          Post Infusion Assessment:  Patient tolerated infusion without incident.  Site patent and intact, free from redness, edema or discomfort.  No evidence of extravasations.  Access discontinued per protocol.    Discharge Plan:   Prescription refills given for xeloda sent to mail order pharmacy.  Discharge instructions reviewed with: Patient.  Patient and/or family verbalized understanding of discharge instructions and all questions answered.  Patient discharged in stable condition accompanied by: self.  Departure Mode: Ambulatory.    Jessie Wolf RN

## 2017-09-14 ENCOUNTER — HOSPITAL ENCOUNTER (OUTPATIENT)
Facility: CLINIC | Age: 72
Setting detail: SPECIMEN
Discharge: HOME | End: 2017-09-14
Attending: NURSE PRACTITIONER | Admitting: NURSE PRACTITIONER
Payer: COMMERCIAL

## 2017-09-14 ENCOUNTER — INFUSION THERAPY VISIT (OUTPATIENT)
Dept: INFUSION THERAPY | Facility: CLINIC | Age: 72
End: 2017-09-14
Attending: NURSE PRACTITIONER
Payer: COMMERCIAL

## 2017-09-14 DIAGNOSIS — T45.1X5A CHEMOTHERAPY-INDUCED NEUTROPENIA (H): Primary | ICD-10-CM

## 2017-09-14 DIAGNOSIS — C56.9 OVARIAN CANCER, UNSPECIFIED LATERALITY (H): ICD-10-CM

## 2017-09-14 DIAGNOSIS — D70.1 CHEMOTHERAPY-INDUCED NEUTROPENIA (H): Primary | ICD-10-CM

## 2017-09-14 DIAGNOSIS — E87.6 HYPOKALEMIA: ICD-10-CM

## 2017-09-14 DIAGNOSIS — R10.13 DYSPEPSIA: ICD-10-CM

## 2017-09-14 LAB
BASOPHILS # BLD AUTO: 0 10E9/L (ref 0–0.2)
BASOPHILS NFR BLD AUTO: 0 %
DIFFERENTIAL METHOD BLD: ABNORMAL
EOSINOPHIL # BLD AUTO: 0 10E9/L (ref 0–0.7)
EOSINOPHIL NFR BLD AUTO: 0 %
ERYTHROCYTE [DISTWIDTH] IN BLOOD BY AUTOMATED COUNT: 17.9 % (ref 10–15)
HCT VFR BLD AUTO: 27.5 % (ref 35–47)
HGB BLD-MCNC: 9 G/DL (ref 11.7–15.7)
LYMPHOCYTES # BLD AUTO: 1.9 10E9/L (ref 0.8–5.3)
LYMPHOCYTES NFR BLD AUTO: 30 %
MAGNESIUM SERPL-MCNC: 1.6 MG/DL (ref 1.6–2.3)
MCH RBC QN AUTO: 34.1 PG (ref 26.5–33)
MCHC RBC AUTO-ENTMCNC: 32.7 G/DL (ref 31.5–36.5)
MCV RBC AUTO: 104 FL (ref 78–100)
METAMYELOCYTES # BLD: 0.2 10E9/L
METAMYELOCYTES NFR BLD MANUAL: 3 %
MONOCYTES # BLD AUTO: 0.4 10E9/L (ref 0–1.3)
MONOCYTES NFR BLD AUTO: 7 %
NEUTROPHILS # BLD AUTO: 3.8 10E9/L (ref 1.6–8.3)
NEUTROPHILS NFR BLD AUTO: 60 %
NRBC # BLD AUTO: 0.1 10*3/UL
NRBC BLD AUTO-RTO: 1 /100
PLATELET # BLD AUTO: 78 10E9/L (ref 150–450)
POTASSIUM SERPL-SCNC: 3.7 MMOL/L (ref 3.4–5.3)
RBC # BLD AUTO: 2.64 10E12/L (ref 3.8–5.2)
WBC # BLD AUTO: 6.4 10E9/L (ref 4–11)

## 2017-09-14 PROCEDURE — 84132 ASSAY OF SERUM POTASSIUM: CPT | Performed by: NURSE PRACTITIONER

## 2017-09-14 PROCEDURE — 85025 COMPLETE CBC W/AUTO DIFF WBC: CPT | Performed by: NURSE PRACTITIONER

## 2017-09-14 PROCEDURE — 25000128 H RX IP 250 OP 636: Performed by: NURSE PRACTITIONER

## 2017-09-14 PROCEDURE — 83735 ASSAY OF MAGNESIUM: CPT | Performed by: NURSE PRACTITIONER

## 2017-09-14 PROCEDURE — 36591 DRAW BLOOD OFF VENOUS DEVICE: CPT

## 2017-09-14 RX ORDER — HEPARIN SODIUM (PORCINE) LOCK FLUSH IV SOLN 100 UNIT/ML 100 UNIT/ML
500 SOLUTION INTRAVENOUS EVERY 8 HOURS
Status: DISCONTINUED | OUTPATIENT
Start: 2017-09-14 | End: 2017-09-14 | Stop reason: HOSPADM

## 2017-09-14 RX ORDER — POTASSIUM CHLORIDE 1500 MG/1
20 TABLET, EXTENDED RELEASE ORAL 2 TIMES DAILY
Qty: 60 TABLET | Refills: 3 | Status: SHIPPED | OUTPATIENT
Start: 2017-09-14

## 2017-09-14 RX ADMIN — SODIUM CHLORIDE, PRESERVATIVE FREE 500 UNITS: 5 INJECTION INTRAVENOUS at 13:00

## 2017-09-14 NOTE — MR AVS SNAPSHOT
After Visit Summary   9/14/2017    Tosin Nina    MRN: [id number]           Patient Information     Date Of Birth          1945        Visit Information        Provider Department      9/14/2017 11:00 AM RH INFUSION CHAIR 10 [facility] Infusion [facility]        Today's Diagnoses     Chemotherapy-induced neutropenia (H)    -  1    Ovarian cancer, unspecified laterality (H)        Hypokalemia        Dyspepsia           Follow-ups after your visit        Your next 10 appointments already scheduled     Sep 21, 2017 12:30 PM CDT   Level 4 with RH INFUSION CHAIR 7   [facility]fusion Services ([facility] [place])    [facility]  [address]  [facility] [address]   [phone]            Sep 26, 2017  9:00 AM CDT   New Visit with Kelsie Nguyen GC   Cancer Risk Management Program ([facility] [place])    [facility]  [address]  [facility] [address] [phone]            Oct 03, 2017 11:00 AM CDT   Return Visit with Nessa Foster MD   [facility] ([facility] [place])    [facility]  [address]  [facility] [id number] [phone]              Who to contact     If you have questions or need follow up information about today's clinic visit or your schedule please contact [facility] INFUSION SERVICES directly at [phone].  Normal or non-critical lab and imaging results will be communicated to you by MyChart, letter or phone within 4 business days after the clinic has received the results. If you do not hear from us within 7 days, please contact the clinic through MyChart or phone. If you have a critical or abnormal lab result, we will notify you by phone as soon as possible.  Submit refill requests through [employer] or call your pharmacy and they will forward the refill  "request to us. Please allow 3 business days for your refill to be completed.          Additional Information About Your Visit        [employer] Information     [employer] lets you send messages to your doctor, view your test results, renew your prescriptions, schedule appointments and more. To sign up, go to [email or web address] . Click on \"Log in\" on the left side of the screen, which will take you to the Welcome page. Then click on \"Sign up Now\" on the right side of the page.     You will be asked to enter the access code listed below, as well as some personal information. Please follow the directions to create your username and password.     Your access code is: [email or web address]  Expires: 2017 12:37 PM     Your access code will  in 90 days. If you need help or a new code, please call your [place] clinic or [phone].        Care EveryWhere ID     This is your Care EveryWhere ID. This could be used by other organizations to access your [place] medical records  [id number]         Blood Pressure from Last 3 Encounters:   17 117/69   09/10/17 117/69   17 119/59    Weight from Last 3 Encounters:   17 74.4 kg (164 lb 0.4 oz)   17 74.5 kg (164 lb 3.9 oz)   17 74.8 kg (165 lb)              We Performed the Following     CBC with platelets differential     Magnesium     Potassium          Today's Medication Changes          These changes are accurate as of: 17  1:43 PM.  If you have any questions, ask your nurse or doctor.               These medicines have changed or have updated prescriptions.        Dose/Directions    omeprazole 20 MG CR capsule   Commonly known as:  priLOSEC   This may have changed:  medication strength   Used for:  Ovarian cancer, unspecified laterality (H), Dyspepsia        Dose:  20 mg   Take 1 capsule (20 mg) by mouth every morning   Quantity:  30 capsule   Refills:  3       Vitamin D (Cholecalciferol) 1000 UNITS Tabs   This may have changed:  " additional instructions   Used for:  Paresthesias        Dose:  2000 Units   Take 2,000 Units by mouth daily   Quantity:  60 tablet   Refills:  0            Where to get your medicines      These medications were sent to [facility] [address] [facility]  [address] [facility] [address]     Phone:  [phone]     omeprazole 20 MG CR capsule    Potassium Chloride ER 20 MEQ Tbcr                Primary Care Provider Office Phone # Fax #    Esther Back [phone]       [facility] [address] [address]        Equal Access to Services     [facility]: Hadii aad ku hadasho Soomaali, waaxda luqadaha, qaybta kaalmada adeegyada, hetal olivares hayaan nelson regan . So [facility] [phone].    ATENCIÓN: Si habla español, tiene a alarcon disposición servicios gratuitos de asistencia lingüística. [facility] [phone].    We comply with applicable federal civil rights laws and [place] laws. We do not discriminate on the basis of race, color, national origin, age, disability sex, sexual orientation or gender identity.            Thank you!     Thank you for choosing [facility] INFUSION SERVICES  for your care. Our goal is always to provide you with excellent care. Hearing back from our patients is one way we can continue to improve our services. Please take a few minutes to complete the written survey that you may receive in the mail after your visit with us. Thank you!             Your Updated Medication List - Protect others around you: Learn how to safely use, store and throw away your medicines at www.disposemymeds.org.          This list is accurate as of: 9/14/17  1:43 PM.  Always use your most recent med list.                   Brand Name Dispense Instructions for use Diagnosis    calcium carbonate 500 MG chewable tablet    TUMS     Take 1 chew tab by mouth daily as needed for heartburn        enoxaparin 80  MG/0.8ML injection    LOVENOX    60 Syringe    Inject 0.6 mLs (60 mg) Subcutaneous every 12 hours    Other pulmonary embolism without acute cor pulmonale, unspecified chronicity (H)       ESCITALOPRAM OXALATE PO      Take 10 mg by mouth daily        hydrochlorothiazide 25 MG tablet    HYDRODIURIL     Take 25 mg by mouth daily    Ovarian cancer, unspecified laterality (H)       levothyroxine 125 MCG tablet    SYNTHROID    30 tablet    Take 1 tablet (125 mcg) by mouth daily    Other pulmonary embolism without acute cor pulmonale, unspecified chronicity (H)       magnesium chloride 535 (64 MG) MG Tbcr CR tablet     30 tablet    Take 1 tablet (535 mg) by mouth daily    Ovarian cancer, unspecified laterality (H)       omeprazole 20 MG CR capsule    priLOSEC    30 capsule    Take 1 capsule (20 mg) by mouth every morning    Ovarian cancer, unspecified laterality (H), Dyspepsia       polyethylene glycol Packet    MIRALAX/GLYCOLAX    7 packet    Take 17 g by mouth daily    Ovarian cancer, unspecified laterality (H)       Potassium Chloride ER 20 MEQ Tbcr     60 tablet    Take 1 tablet (20 mEq) by mouth 2 times daily    Hypokalemia       senna-docusate 8.6-50 MG per tablet    SENOKOT-S;PERICOLACE    60 tablet    Take 1-2 tablets by mouth 2 times daily Hold for loose stools    Ovarian cancer, unspecified laterality (H)       Vitamin D (Cholecalciferol) 1000 UNITS Tabs     60 tablet    Take 2,000 Units by mouth daily    Paresthesias

## 2017-09-14 NOTE — PROGRESS NOTES
Infusion Nursing Note:  oTsin Barbernidia presents today for labs, chemo deferred.    Patient seen by provider today: Yes: Adelina saw patient briefly in infusion.   present during visit today: Not Applicable.    Note: Platelets 78,000 today.  Will defer chemo by one week per Adelina.  This was supposed to be patient's last chemo.  She will get neulasta on pro next week after chemo instead of 4 days of neupogen since it will be the last chemo.      Intravenous Access:  Labs drawn without difficulty.  Implanted Port.    Treatment Conditions:  Lab Results   Component Value Date    HGB 9.0 09/14/2017     Lab Results   Component Value Date    WBC 6.4 09/14/2017      Lab Results   Component Value Date    ANEU 3.8 09/14/2017     Lab Results   Component Value Date    PLT 78 09/14/2017      Results reviewed, labs did NOT meet treatment parameters: PLT 78,000.        Post Infusion Assessment:  Patient tolerated infusion without incident.  Blood return noted pre and post infusion.  Site patent and intact, free from redness, edema or discomfort.  No evidence of extravasations.  Access discontinued per protocol.    Discharge Plan:   Patient discharged in stable condition accompanied by: self and .  Departure Mode: Wheelchair.  Patient changing appointments on way out today.    Phyllis Maldonado RN

## 2017-09-18 RX ORDER — MEPERIDINE HYDROCHLORIDE 25 MG/ML
25 INJECTION INTRAMUSCULAR; INTRAVENOUS; SUBCUTANEOUS EVERY 30 MIN PRN
Status: CANCELLED | OUTPATIENT
Start: 2017-09-21

## 2017-09-18 RX ORDER — DIPHENHYDRAMINE HCL 25 MG
25 CAPSULE ORAL ONCE
Status: CANCELLED
Start: 2017-09-21

## 2017-09-18 RX ORDER — HEPARIN SODIUM (PORCINE) LOCK FLUSH IV SOLN 100 UNIT/ML 100 UNIT/ML
500 SOLUTION INTRAVENOUS EVERY 8 HOURS
Status: CANCELLED
Start: 2017-09-21

## 2017-09-18 RX ORDER — ALBUTEROL SULFATE 0.83 MG/ML
2.5 SOLUTION RESPIRATORY (INHALATION)
Status: CANCELLED | OUTPATIENT
Start: 2017-09-21

## 2017-09-18 RX ORDER — EPINEPHRINE 0.3 MG/.3ML
0.3 INJECTION SUBCUTANEOUS EVERY 5 MIN PRN
Status: CANCELLED | OUTPATIENT
Start: 2017-09-21

## 2017-09-18 RX ORDER — ALBUTEROL SULFATE 90 UG/1
1-2 AEROSOL, METERED RESPIRATORY (INHALATION)
Status: CANCELLED
Start: 2017-09-21

## 2017-09-18 RX ORDER — EPINEPHRINE 1 MG/ML
0.3 INJECTION INTRAMUSCULAR; INTRAVENOUS; SUBCUTANEOUS EVERY 5 MIN PRN
Status: CANCELLED | OUTPATIENT
Start: 2017-09-21

## 2017-09-18 RX ORDER — METHYLPREDNISOLONE SODIUM SUCCINATE 125 MG/2ML
125 INJECTION, POWDER, LYOPHILIZED, FOR SOLUTION INTRAMUSCULAR; INTRAVENOUS
Status: CANCELLED
Start: 2017-09-21

## 2017-09-18 RX ORDER — SODIUM CHLORIDE 9 MG/ML
1000 INJECTION, SOLUTION INTRAVENOUS CONTINUOUS PRN
Status: CANCELLED
Start: 2017-09-21

## 2017-09-18 RX ORDER — LORAZEPAM 2 MG/ML
0.5 INJECTION INTRAMUSCULAR EVERY 6 HOURS PRN
Status: CANCELLED
Start: 2017-09-21

## 2017-09-18 RX ORDER — DIPHENHYDRAMINE HYDROCHLORIDE 50 MG/ML
50 INJECTION INTRAMUSCULAR; INTRAVENOUS
Status: CANCELLED
Start: 2017-09-21

## 2017-09-21 ENCOUNTER — INFUSION THERAPY VISIT (OUTPATIENT)
Dept: INFUSION THERAPY | Facility: CLINIC | Age: 72
End: 2017-09-21
Attending: OBSTETRICS & GYNECOLOGY
Payer: COMMERCIAL

## 2017-09-21 ENCOUNTER — HOSPITAL ENCOUNTER (OUTPATIENT)
Facility: CLINIC | Age: 72
Setting detail: SPECIMEN
Discharge: HOME | End: 2017-09-21
Attending: OBSTETRICS & GYNECOLOGY | Admitting: NURSE PRACTITIONER
Payer: COMMERCIAL

## 2017-09-21 VITALS
TEMPERATURE: 97.4 F | DIASTOLIC BLOOD PRESSURE: 62 MMHG | OXYGEN SATURATION: 96 % | SYSTOLIC BLOOD PRESSURE: 127 MMHG | HEART RATE: 83 BPM | RESPIRATION RATE: 18 BRPM

## 2017-09-21 DIAGNOSIS — I26.99 OTHER PULMONARY EMBOLISM WITHOUT ACUTE COR PULMONALE, UNSPECIFIED CHRONICITY (H): ICD-10-CM

## 2017-09-21 DIAGNOSIS — D70.1 CHEMOTHERAPY-INDUCED NEUTROPENIA (H): Primary | ICD-10-CM

## 2017-09-21 DIAGNOSIS — T45.1X5A CHEMOTHERAPY-INDUCED NEUTROPENIA (H): Primary | ICD-10-CM

## 2017-09-21 DIAGNOSIS — C56.9 OVARIAN CANCER, UNSPECIFIED LATERALITY (H): ICD-10-CM

## 2017-09-21 LAB
BASOPHILS # BLD AUTO: 0 10E9/L (ref 0–0.2)
BASOPHILS NFR BLD AUTO: 0.3 %
DIFFERENTIAL METHOD BLD: ABNORMAL
EOSINOPHIL # BLD AUTO: 0 10E9/L (ref 0–0.7)
EOSINOPHIL NFR BLD AUTO: 0.3 %
ERYTHROCYTE [DISTWIDTH] IN BLOOD BY AUTOMATED COUNT: 19.5 % (ref 10–15)
HCT VFR BLD AUTO: 28.5 % (ref 35–47)
HGB BLD-MCNC: 9.2 G/DL (ref 11.7–15.7)
IMM GRANULOCYTES # BLD: 0 10E9/L (ref 0–0.4)
IMM GRANULOCYTES NFR BLD: 0.8 %
LYMPHOCYTES # BLD AUTO: 1.1 10E9/L (ref 0.8–5.3)
LYMPHOCYTES NFR BLD AUTO: 30.3 %
MAGNESIUM SERPL-MCNC: 1.8 MG/DL (ref 1.6–2.3)
MCH RBC QN AUTO: 34.2 PG (ref 26.5–33)
MCHC RBC AUTO-ENTMCNC: 32.3 G/DL (ref 31.5–36.5)
MCV RBC AUTO: 106 FL (ref 78–100)
MONOCYTES # BLD AUTO: 0.3 10E9/L (ref 0–1.3)
MONOCYTES NFR BLD AUTO: 9.3 %
NEUTROPHILS # BLD AUTO: 2.2 10E9/L (ref 1.6–8.3)
NEUTROPHILS NFR BLD AUTO: 59 %
NRBC # BLD AUTO: 0 10*3/UL
NRBC BLD AUTO-RTO: 0 /100
PLATELET # BLD AUTO: 93 10E9/L (ref 150–450)
POTASSIUM SERPL-SCNC: 3.6 MMOL/L (ref 3.4–5.3)
RBC # BLD AUTO: 2.69 10E12/L (ref 3.8–5.2)
WBC # BLD AUTO: 3.7 10E9/L (ref 4–11)

## 2017-09-21 PROCEDURE — 83735 ASSAY OF MAGNESIUM: CPT | Performed by: NURSE PRACTITIONER

## 2017-09-21 PROCEDURE — 25000125 ZZHC RX 250: Performed by: NURSE PRACTITIONER

## 2017-09-21 PROCEDURE — 84132 ASSAY OF SERUM POTASSIUM: CPT | Performed by: NURSE PRACTITIONER

## 2017-09-21 PROCEDURE — 96375 TX/PRO/DX INJ NEW DRUG ADDON: CPT

## 2017-09-21 PROCEDURE — 96413 CHEMO IV INFUSION 1 HR: CPT

## 2017-09-21 PROCEDURE — 85025 COMPLETE CBC W/AUTO DIFF WBC: CPT | Performed by: NURSE PRACTITIONER

## 2017-09-21 PROCEDURE — S0028 INJECTION, FAMOTIDINE, 20 MG: HCPCS | Performed by: NURSE PRACTITIONER

## 2017-09-21 PROCEDURE — 25000128 H RX IP 250 OP 636: Performed by: NURSE PRACTITIONER

## 2017-09-21 PROCEDURE — 96377 APPLICATON ON-BODY INJECTOR: CPT | Mod: 59

## 2017-09-21 PROCEDURE — 96367 TX/PROPH/DG ADDL SEQ IV INF: CPT

## 2017-09-21 RX ORDER — HEPARIN SODIUM (PORCINE) LOCK FLUSH IV SOLN 100 UNIT/ML 100 UNIT/ML
500 SOLUTION INTRAVENOUS EVERY 8 HOURS
Status: DISCONTINUED | OUTPATIENT
Start: 2017-09-21 | End: 2017-09-21 | Stop reason: HOSPADM

## 2017-09-21 RX ADMIN — SODIUM CHLORIDE, PRESERVATIVE FREE 500 UNITS: 5 INJECTION INTRAVENOUS at 15:46

## 2017-09-21 RX ADMIN — SODIUM CHLORIDE 250 ML: 9 INJECTION, SOLUTION INTRAVENOUS at 13:48

## 2017-09-21 RX ADMIN — PACLITAXEL 135 MG: 6 INJECTION, SOLUTION INTRAVENOUS at 14:34

## 2017-09-21 RX ADMIN — DEXAMETHASONE SODIUM PHOSPHATE 12 MG: 10 INJECTION, SOLUTION INTRAMUSCULAR; INTRAVENOUS at 14:14

## 2017-09-21 RX ADMIN — PEGFILGRASTIM 6 MG: KIT SUBCUTANEOUS at 15:46

## 2017-09-21 RX ADMIN — FAMOTIDINE 40 MG: 10 INJECTION INTRAVENOUS at 13:48

## 2017-09-21 RX ADMIN — DIPHENHYDRAMINE HYDROCHLORIDE 25 MG: 50 INJECTION, SOLUTION INTRAMUSCULAR; INTRAVENOUS at 14:03

## 2017-09-21 NOTE — PROGRESS NOTES
Infusion Nursing Note:  Tosin Nina presents today for taxol.    Patient seen by provider today: No   present during visit today: Not Applicable.    Note: last chemo treatment.    Intravenous Access:  Lab draw site r chest, Needle type schmitt, Gauge 20.  Labs drawn without difficulty.  Implanted Port.    Treatment Conditions:  Lab Results   Component Value Date    HGB 9.2 09/21/2017     Lab Results   Component Value Date    WBC 3.7 09/21/2017      Lab Results   Component Value Date    ANEU 2.2 09/21/2017     Lab Results   Component Value Date    PLT 93 09/21/2017      Lab Results   Component Value Date     08/31/2017                   Lab Results   Component Value Date    POTASSIUM 3.6 09/21/2017           Lab Results   Component Value Date    MAG 1.8 09/21/2017            Lab Results   Component Value Date    CR 0.52 08/31/2017                   Lab Results   Component Value Date    PATTI 8.8 08/31/2017                Lab Results   Component Value Date    BILITOTAL 0.3 08/31/2017           Lab Results   Component Value Date    ALBUMIN 3.1 08/31/2017                    Lab Results   Component Value Date    ALT 16 08/31/2017           Lab Results   Component Value Date    AST 14 08/31/2017     Results reviewed, labs MET treatment parameters, ok to proceed with treatment.    Mag and K OK    ONPRO  Was placed on patient's: back of left arm.    Was placed at 1545 PM    ONPRO injector device Lot number: [id number]    Patient education included: what patient can expect after application, what colored lights mean on the device, when to remove device, when and where to call with questions or issues, all patients questions answered and that Neulasta administration will occur at 7 pm 9/22.    Patient tolerated administration well.    Post Infusion Assessment:  Patient tolerated infusion without incident.  Blood return noted pre and post infusion.  Site patent and intact, free from redness, edema or  discomfort.  No evidence of extravasations.  Access discontinued per protocol.    Discharge Plan:   Prescription refills given for lovenox.  Patient and/or family verbalized understanding of discharge instructions and all questions answered.  AVS to patient via [employer].  Patient will return next week for next appointment.   Patient discharged in stable condition accompanied by: self and .  Departure Mode: Wheelchair.    Toshia Morrow RN

## 2017-09-21 NOTE — MR AVS SNAPSHOT
After Visit Summary   9/21/2017    Tosin Nina    MRN: [id number]           Patient Information     Date Of Birth          1945        Visit Information        Provider Department      9/21/2017 12:30 PM [facility] INFUSION CHAIR 7 [facility]        Today's Diagnoses     Chemotherapy-induced neutropenia (H)    -  1    Ovarian cancer, unspecified laterality (H)        Other pulmonary embolism without acute cor pulmonale, unspecified chronicity (H)           Follow-ups after your visit        Your next 10 appointments already scheduled     Sep 26, 2017  9:00 AM CDT   New Visit with Kelsie Nguyen GC   Cancer Risk Management Program ([facility])    [facility]  [address]  [facility] [id number] [phone]            Oct 03, 2017 11:00 AM CDT   Return Visit with Nessa Foster MD   [facility] ([facility])    [facility]  [address]  [facility] [id number] [phone]              Who to contact     If you have questions or need follow up information about today's clinic visit or your schedule please contact [facility] directly at [phone].  Normal or non-critical lab and imaging results will be communicated to you by SETVIhart, letter or phone within 4 business days after the clinic has received the results. If you do not hear from us within 7 days, please contact the clinic through SETVIhart or phone. If you have a critical or abnormal lab result, we will notify you by phone as soon as possible.  Submit refill requests through [employer] or call your pharmacy and they will forward the refill request to us. Please allow 3 business days for your refill to be completed.          Additional Information About Your Visit        SETVIhart Information     [employer] lets you send messages to your doctor,  "view your test results, renew your prescriptions, schedule appointments and more. To sign up, go to [email or web address] . Click on \"Log in\" on the left side of the screen, which will take you to the Welcome page. Then click on \"Sign up Now\" on the right side of the page.     You will be asked to enter the access code listed below, as well as some personal information. Please follow the directions to create your username and password.     Your access code is: [email or web address]  Expires: 2017 12:37 PM     Your access code will  in 90 days. If you need help or a new code, please call your [place] clinic or [phone].        Care EveryWhere ID     This is your Care EveryWhere ID. This could be used by other organizations to access your [place] medical records  [id number]        Your Vitals Were     Pulse Temperature Respirations Pulse Oximetry          83 97.4  F (36.3  C) (Tympanic) 18 96%         Blood Pressure from Last 3 Encounters:   17 127/62   17 117/69   09/10/17 117/69    Weight from Last 3 Encounters:   17 74.4 kg (164 lb 0.4 oz)   17 74.5 kg (164 lb 3.9 oz)   17 74.8 kg (165 lb)              We Performed the Following     CBC with platelets differential     Magnesium     Potassium          Today's Medication Changes          These changes are accurate as of: 17  4:05 PM.  If you have any questions, ask your nurse or doctor.               These medicines have changed or have updated prescriptions.        Dose/Directions    Vitamin D (Cholecalciferol) 1000 UNITS Tabs   This may have changed:  additional instructions   Used for:  Paresthesias        Dose:  2000 Units   Take 2,000 Units by mouth daily   Quantity:  60 tablet   Refills:  0            Where to get your medicines      These medications were sent to [facility] [address] [place] [address]     Phone:  [phone]     " enoxaparin 80 MG/0.8ML injection                Primary Care Provider Office Phone # Fax #    Esther Back [phone]       [facility] [address] [address]        Equal Access to Services     SARAILESLEY CUATE : Hadii aad ku hadmaximo Soomaali, waaxda luqadaha, qaybta kaalmada adeegyada, waxay idiin hayflynnn adewendy barrera jass long. So [facility] [phone].    ATENCIÓN: Si habla español, tiene a alarcon disposición servicios gratuitos de asistencia lingüística. Llame al [phone].    We comply with applicable federal civil rights laws and [place] laws. We do not discriminate on the basis of race, color, national origin, age, disability sex, sexual orientation or gender identity.            Thank you!     Thank you for choosing [facility]  for your care. Our goal is always to provide you with excellent care. Hearing back from our patients is one way we can continue to improve our services. Please take a few minutes to complete the written survey that you may receive in the mail after your visit with us. Thank you!             Your Updated Medication List - Protect others around you: Learn how to safely use, store and throw away your medicines at www.disposemymeds.org.          This list is accurate as of: 9/21/17  4:05 PM.  Always use your most recent med list.                   Brand Name Dispense Instructions for use Diagnosis    calcium carbonate 500 MG chewable tablet    TUMS     Take 1 chew tab by mouth daily as needed for heartburn        enoxaparin 80 MG/0.8ML injection    LOVENOX    60 Syringe    Inject 0.6 mLs (60 mg) Subcutaneous every 12 hours    Other pulmonary embolism without acute cor pulmonale, unspecified chronicity (H)       ESCITALOPRAM OXALATE PO      Take 10 mg by mouth daily        hydrochlorothiazide 25 MG tablet    HYDRODIURIL     Take 25 mg by mouth daily    Ovarian cancer, unspecified laterality (H)       levothyroxine 125  MCG tablet    SYNTHROID    30 tablet    Take 1 tablet (125 mcg) by mouth daily    Other pulmonary embolism without acute cor pulmonale, unspecified chronicity (H)       magnesium chloride 535 (64 MG) MG Tbcr CR tablet     30 tablet    Take 1 tablet (535 mg) by mouth daily    Ovarian cancer, unspecified laterality (H)       omeprazole 20 MG CR capsule    priLOSEC    30 capsule    Take 1 capsule (20 mg) by mouth every morning    Ovarian cancer, unspecified laterality (H), Dyspepsia       polyethylene glycol Packet    MIRALAX/GLYCOLAX    7 packet    Take 17 g by mouth daily    Ovarian cancer, unspecified laterality (H)       Potassium Chloride ER 20 MEQ Tbcr     60 tablet    Take 1 tablet (20 mEq) by mouth 2 times daily    Hypokalemia       senna-docusate 8.6-50 MG per tablet    SENOKOT-S;PERICOLACE    60 tablet    Take 1-2 tablets by mouth 2 times daily Hold for loose stools    Ovarian cancer, unspecified laterality (H)       Vitamin D (Cholecalciferol) 1000 UNITS Tabs     60 tablet    Take 2,000 Units by mouth daily    Paresthesias

## 2017-09-22 ENCOUNTER — NURSE TRIAGE (OUTPATIENT)
Dept: NURSING | Facility: CLINIC | Age: 72
End: 2017-09-22

## 2017-09-22 ENCOUNTER — TELEPHONE (OUTPATIENT)
Dept: ONCOLOGY | Facility: CLINIC | Age: 72
End: 2017-09-22

## 2017-09-23 NOTE — TELEPHONE ENCOUNTER
Reason for Disposition    Nursing judgment    Additional Information    Negative: Nursing judgment    Negative: Information only call; adult is not ill or injured    Negative: Nursing judgment    Negative: Nursing judgment    Negative: Nursing judgment    Negative: Nursing judgment    Negative: Nursing judgment    Protocols used: NO GUIDELINE OR REFERENCE AVAILABLE-ADULT-    Paged Dr Hakeem Israel to call patient directly at 7:25 p.m.

## 2017-09-23 NOTE — TELEPHONE ENCOUNTER
27 hour point in treatment and it's leaking, it's wet, flashing green. She's supposed to be getting an infusion.  Paging on call oncologist to talk with patient directly.  Delaney Kimbrough RN-[facility]

## 2017-09-23 NOTE — TELEPHONE ENCOUNTER
Pt called due to leekage around Neulasta on pro inj device. No redness or warmth. Asked her to call the infusion center Monday to get it replaced or get another shot. In the mean time, asked he to see youtube video for the technique to peel off the device or go to nearest ER/urgent care.

## 2017-09-26 ENCOUNTER — ONCOLOGY VISIT (OUTPATIENT)
Dept: ONCOLOGY | Facility: CLINIC | Age: 72
End: 2017-09-26
Attending: GENETIC COUNSELOR, MS
Payer: COMMERCIAL

## 2017-09-26 DIAGNOSIS — Z80.3 FAMILY HISTORY OF MALIGNANT NEOPLASM OF BREAST: ICD-10-CM

## 2017-09-26 DIAGNOSIS — C56.9 OVARIAN CANCER, UNSPECIFIED LATERALITY (H): Primary | ICD-10-CM

## 2017-09-26 PROCEDURE — 96040 ZZH GENETIC COUNSELING, EACH 30 MINUTES: CPT | Performed by: GENETIC COUNSELOR, MS

## 2017-09-26 NOTE — PROGRESS NOTES
2017    Referring Provider: Nessa Ennis MD     Presenting Information:   I met with Tosin Nina today for genetic counseling at the Cancer Risk Management Program at the [facility] to discuss her recent diagnosis of ovarian cancer  and family history of breast cancer.  She is here today to review this history, cancer screening recommendations, and available genetic testing options.    Personal History:  Virginia is a 71 year old female.  She was diagnosed with ovarian cancer at age 71, her current age.  Her treatment included chemotherapy and surgery.  Her surgery was a total laparoscopic hysterectomy, bilateral salpingo-oophorectomy in 2017.      She had her first menstrual period at age 14 and her first child at age 22. By report, she  had her most recent mammogram in 2016.  She reported that she had a benign breast biopsy in her 50's.     Virginia's most recent and only colonoscopy in  was normal.    Family History: (Please see scanned pedigree for detailed family history information)    Her mother was diagnosed at age 69 with breast cancer. Her treatment included a bilateral mastectomy, chemotherapy and radiation.    Her maternal aunt was diagnosed with breast cancer at age 89 and she passed away at age 90.     Her maternal great-grandmother   of breast cancer in her 50's.     A paternal aunt  of cancer, primary and age of diagnosis unknown. She  in her 60's.     Her paternal grandmother may have cancer.  She  in her 60's.     Her maternal ethnicity is English, Romanian and Scotch. Her paternal ethnicity is [place].  There is no known Ashkenazi Worship ancestry on either side of her family.     Discussion:    Virginia's personal history ovarian cancer and family history of breast cancer may be  suggestive of a hereditary cancer syndrome.    We discussed the natural history and genetics of the BRCA1 and BRCA2 genes. We discussed that there are additional genes that  could cause increased risk of ovarian and breast cancer.  As many of these genes present with overlapping features in a family, it would be reasonable for Virginia to consider panel genetic testing to analyze multiple genes at once.    The test that would be most appropriate to include hereditary breast and ovarian cancer is the OvaNext panel at [facility] for hereditary cancer.   Genetic testing is available for 25 genes associated with hereditary gynecologic cancers: OvaNext (CHRISTY, BARD1, BRCA1, BRCA2, BRIP1, CDH1, CHEK2, DICER1, EPCAM, MLH1, MRE11A, MSH2, MSH6, MUTYH, NBN, NF1, PALB2, PMS2, PTEN, RAD50, RAD51C, RAD51D, SMARCA4, STK11, and TP53).  We discussed that some of the genes in the OvaNext panel are associated with specific hereditary cancer syndromes and have published management guidelines: Hereditary Breast and Ovarian Cancer syndrome (BRCA1, BRCA2), Quinn syndrome (MLH1, MSH2, MSH6, PMS2, EPCAM), Hereditary Diffuse Gastric Cancer (CDH1), Cowden syndrome (PTEN), Li Fraumeni syndrome (TP53), Peutz-Jeghers syndrome (STK11), MUTYH Associated Polyposis syndrome (MUTYH), and Neurofibromatosis type 1 (NF1).    Risk-reducing salpingo-oophorectomy can be considered in women with mutations in BRIP1, RAD51C, or RAD51D.  Breast cancer risk management guidelines are available for CHRISTY, CHEK2, PALB2, NF1, and NBN.  The remaining genes (BARD1, DICER1, MRE11A, RAD50, and SMARCA4) are associated with increased cancer risk and may allow us to make medical recommendations when mutations are identified.    A detailed handout regarding GYN panels  and the information we discussed was provided to Virginia at the end of our appointment today and can be found in the after visit summary.  Topics included: inheritance pattern, cancer risks, cancer screening recommendations, and also risks, benefits and limitations of testing.   Virginia would like me to contact [facility] to determine the cost of the test.  Virginia has  [employer] insurance in addition to Medicare.    Based on her personal and family history, Virginia meets current National Comprehensive Cancer Network (NCCN) criteria for genetic testing of the BRCA1 and BRCA2 genes.      Medical Management: For Virginia, we reviewed that the information from genetic testing may determine:    The type of screening regarding breast cancer (annual mammograms vs. high risk breast cancer screening including a breast MRI) if she has a mutation in the BRCA1 or BRCA2 gene.    The type of screening regarding colon cancer if she has a mutation in a gene associated with Quinn syndrome.      In that case, she would need more frequent colonoscopies.    Virginia may also consider surgery to reduce her risk of breast cancer such as a prophylactic mastectomy.    Additional screening for cancer will depend on the gene identified with a mutation.    These recommendations and possible targeted chemotherapies will be discussed in detail once genetic testing is completed.     Plan:  1) I will submit information to [employer] regarding the OvaNext test for Virginia.  2) Once the cost of the test is determined, I will contact Virginia and she will return to clinic to have her blood drawn.  3) In the meantime, Virginia will contact her relatives to find out more family history.     Face to face time: 40 minutes    Kelsie Nguyen MS [facility]  Genetic Counselor  [phone]

## 2017-09-26 NOTE — PATIENT INSTRUCTIONS
Assessing Cancer Risk  Only about 5-10% of cancers are thought to be due to an inherited cancer susceptibility gene.    These families often have:    Several people with the same or related types of cancer    Cancers diagnosed at a young age (before age 50)    Individuals with more than one primary cancer    Multiple generations of the family affected with cancer    Some people may be candidates for genetic testing of more than one gene.  For these families, genetic testing using a cancer panel may be offered.  These panels can test many genes at once known to increase the risk for gynecologic (and other) cancers:  BRCA1, BRCA2, BRIP1 MLH1, MSH2, MSH6, PMS2, EPCAM, PTEN, PALB2, RAD51C, RAD51D, and TP53. The purpose of this handout is to serve as a brief summary of the gynecologic cancer risk genes that have published clinical management guidelines for individuals who are found to carry a mutation.    ______________________________________________________________________________  Hereditary Breast and Ovarian Cancer Syndrome   (BRCA1 and BRCA2)  A single mutation in one of the copies of BRCA1 or BRCA2 increases the risk for breast and ovarian cancer, among others.  The risk for pancreatic cancer and melanoma may also be slightly increased in some families.  The chart below shows the chance that someone with a BRCA mutation would develop cancer in his or her lifetime1,2,3,4.        A person s ethnic background is also important to consider, as individuals of Ashkenazi Mu-ism ancestry have a higher chance of having a BRCA gene mutation.  There are three BRCA mutations that occur more frequently in this population.    Quinn Syndrome   (MLH1, MSH2, MSH6, PMS2, and EPCAM)  Currently five genes are known to cause Quinn Syndrome: MLH1, MSH2, MSH6, PMS2, and EPCAM.  A single mutation in one of the Quinn Syndrome genes increases the risk for colon, endometrial, ovarian, and stomach cancers.  Other cancers that occur  less commonly in Quinn Syndrome include urinary tract, skin, and brain cancers.  The chart below shows the chance that a person with Quinn syndrome would develop cancer in his or her lifetime7.      *Cancer risk varies depending on Quinn syndrome gene found    Cowden Syndrome   (PTEN)  Cowden syndrome is a hereditary condition that increases the risk for breast, thyroid, endometrial, and kidney cancer.  Cowden syndrome is caused by a mutation in the PTEN gene.  A single mutation in one of the copies of PTEN causes Cowden syndrome and increases cancer risk.  The chart below shows the chance that someone with a PTEN mutation would develop cancer in their lifetime5,6.  Other benign features seen in some individuals with Cowden syndrome include benign skin lesions (facial papules, keratoses, lipomas), learning disability, autism, thyroid nodules, colon polyps, and larger head size.      *One recent study found breast cancer risk to be increased to 85%  Li-Fraumeni Syndrome   (TP53)  Li-Fraumeni Syndrome (LFS) is a cancer predisposition syndrome caused by a mutation in the TP53 gene. A single mutation in one of the copies of TP53 increases the risk for multiple cancers. Individuals with LFS are at an increased risk for developing cancer at a young age. The general lifetime risk for development of cancer is 50% by age 30 and 90% by age 60.     Core Cancers: Sarcomas, Breast, Brain, Lung, Leukemias/Lymphomas, Adrenocortical carcinomas  Other Cancers: Gastrointestinal, Thyroid, Skin, Genitourinary    Additional Genes  PALB2  Mutations in PALB2 have been shown to increase the risk of breast cancer up to 33-58% in some families; where individuals fall within this risk range is dependent upon family history. PALB2 mutations have also been associated with increased risk for pancreatic cancer, although this risk has not been quantified yet.  Individuals who inherit two PALB2 mutations--one from their mother and one from their  father--have a condition called Fanconi Anemia.  This rare autosomal recessive condition is associated with short stature, developmental delay, bone marrow failure, and increased risk for childhood cancers.    BRIP1, RAD51C and RAD51D  Mutations in BRIP1, RAD51C, and RAD51D have been shown to increase the risk of ovarian cancer as well as female breast cancer.    ______________________________________________________________________________    Inheritance  All of the cancer syndromes reviewed above are inherited in an autosomal dominant pattern.  This means that if a parent has a mutation, each of his or her children will have a 50% chance of inheriting that same mutation.  Therefore, each child--male or female--would have a 50% chance of being at increased risk for developing cancer.      Image obtained from Genetics Home Reference, 2013     Mutations in some genes can occur de tomas, which means that a person s mutation occurred for the first time in them and was not inherited from a parent.  Now that they have the mutation, however, it can be passed on to future generations.    Genetic Testing  Genetic testing involves a blood test and will look for any harmful mutations that are associated with increased cancer risk.  If possible, it is recommended that the person(s) who has had cancer be tested before other family members.  That person will give us the most useful information about whether or not a specific gene is associated with the cancer in the family.    Results  There are three possible results of genetic testing:    Positive--a harmful mutation was identified in one or more of the genes    Negative--no mutation was identified in any of the 9 genes on this panel    Variant of unknown significance--a variation in one of the genes was identified, but it is unclear how this impacts cancer risk in the family    Advantages and Disadvantages   There are advantages and disadvantages to genetic  testing.  Advantages    May clarify your cancer risk    Can help you make medical decisions    May explain the cancers in your family    May give useful information to your family members (if you share your results)    Disadvantages    Possible negative emotional impact of learning about inherited cancer risk    Uncertainty in interpreting a negative test result in some situations    Possible genetic discrimination concerns (see below)    Genetic Information Nondiscrimination Act (TAYLOR)  [place] is a federal law that protects individuals from health insurance or employment discrimination based on a genetic test result alone.  Although rare, there are currently no legal discrimination protections in terms of life insurance, long term care, or disability insurances.  Visit the [employer] [place] website to learn more.    Reducing Cancer Risk  Each of the genes listed within this handout have nationally recognized cancer screening guidelines that would be recommended for individuals who test positive.  In addition to increased cancer screening, surgeries may be offered or recommended to reduce cancer risk.  Recommendations are based upon an individual s genetic test result as well as their personal and family history of cancer.    Questions to Think About Regarding Genetic Testing:    What effect will the test result have on me and my relationship with my family members if I have an inherited gene mutation?  If I don t have a gene mutation?    Should I share my test results, and how will my family react to this news, which may also affect them?    Are my children ready to learn new information that may one day affect their own health?        Hereditary Cancer Resources    FORCE: Facing Our Risk of Cancer Empowered facingourrisk.org   Bright Pink bebrightpink.org   Li-Fraumeni Syndrome Association lfsassociation.org   PTEN World PTENworld.com   Collaborative Group of the Americas on Inherited  Colorectal Cancer (CGA) cgaLifecare Hospital [email or web address].com http://www.[facility].org/   Cancer Care cancercare.org   American Cancer Society (ACS) cancer.org   National Cancer Mason (NCI) cancer.gov       Cancer Risk Management Program [phone] ([phone])  ? Kelsie Nguyen, MS, [facility]  [phone]  ? Dara Desir, MS, [facility]  [phone]  ? Zoila Clinton, MS, [facility]  [phone]  ? Elvie Bedoya, MS, [facility]  [phone]   ? Rafat Felipe, MS, [facility]  [phone]    References  1. Lainey A, Donald PDP, Antonio S, Elgin FORBES, Tai JE, Dave JL, David N, Tara H, Barby O, Maxim A, Sepideh B, Brittaney P, Manglen S, David DM, Leon N, Abigail E, Jung H, Edilberto E, Gary J, Pily J, Maggie B, Vanessa H, Thorlacius S, Eerola H, Nevectorna H, Antonia K, Manda OP. Average risks of breast and ovarian cancer associated with BRCA1 or BRCA2 mutations detected in case series unselected for family history: a combined analysis of 222 studies. Am J Hum Carolyne. 2003;72:1117-30.  2. Darryl N, Anaid M, Garcia G.  BRCA1 and BRCA2 Hereditary Breast and Ovarian Cancer. Gene Reviews online. 2013.  3. Valdemar YC, Bree S, Lorenza G, Peters S. Breast cancer risk among male BRCA1 and BRCA2 mutation carriers. J Natl Cancer Inst. 2007;99:1811-4.  4. Enmanuel DG, Donte I, Shahram J, Merry E, Destinee ER, Sophie F. Risk of breast cancer in male BRCA2 carriers. J Med Carolyne. 2010;47:710-1.  5. Walker MH, Jagdish J, Sosa J, Vito LA, Jermain MS, Eng C. Lifetime cancer risks in individuals with germline PTEN mutations. Clin Cancer Res. 2012;18:400-7.  6. Mark OLSON. Cowden Syndrome: A Critical Review of the Clinical Literature. J Carolyne . 2009:18:13-27.  7. National Comprehensive Cancer Network. Clinical practice guidelines in oncology, colorectal cancer screening. Available online (registration required). 2015.  8. Lainey HERNANDEZ et al. Breast-Cancer Risk in Families with Mutations in PALB2. NEJM. 2014; 371(6):497-506.

## 2017-09-26 NOTE — MR AVS SNAPSHOT
After Visit Summary   9/26/2017    Tosin Nina    MRN: [id number]           Patient Information     Date Of Birth          1945        Visit Information        Provider Department      9/26/2017 9:00 AM Kelsie Nguyen [facility] Cancer Risk Management Program        Care Instructions          Assessing Cancer Risk  Only about 5-10% of cancers are thought to be due to an inherited cancer susceptibility gene.    These families often have:    Several people with the same or related types of cancer    Cancers diagnosed at a young age (before age 50)    Individuals with more than one primary cancer    Multiple generations of the family affected with cancer    Some people may be candidates for genetic testing of more than one gene.  For these families, genetic testing using a cancer panel may be offered.  These panels can test many genes at once known to increase the risk for gynecologic (and other) cancers:  BRCA1, BRCA2, BRIP1 MLH1, MSH2, MSH6, PMS2, EPCAM, PTEN, PALB2, RAD51C, RAD51D, and TP53. The purpose of this handout is to serve as a brief summary of the gynecologic cancer risk genes that have published clinical management guidelines for individuals who are found to carry a mutation.    ______________________________________________________________________________  Hereditary Breast and Ovarian Cancer Syndrome   (BRCA1 and BRCA2)  A single mutation in one of the copies of BRCA1 or BRCA2 increases the risk for breast and ovarian cancer, among others.  The risk for pancreatic cancer and melanoma may also be slightly increased in some families.  The chart below shows the chance that someone with a BRCA mutation would develop cancer in his or her lifetime1,2,3,4.        A person s ethnic background is also important to consider, as individuals of Ashkenazi Y[place] ancestry have a higher chance of having a BRCA gene mutation.  There are three BRCA mutations that occur more frequently in this  population.    Quinn Syndrome   (MLH1, MSH2, MSH6, PMS2, and EPCAM)  Currently five genes are known to cause Quinn Syndrome: MLH1, MSH2, MSH6, PMS2, and EPCAM.  A single mutation in one of the Quinn Syndrome genes increases the risk for colon, endometrial, ovarian, and stomach cancers.  Other cancers that occur less commonly in Quinn Syndrome include urinary tract, skin, and brain cancers.  The chart below shows the chance that a person with Quinn syndrome would develop cancer in his or her lifetime7.      *Cancer risk varies depending on Quinn syndrome gene found    Cowden Syndrome   (PTEN)  Cowden syndrome is a hereditary condition that increases the risk for breast, thyroid, endometrial, and kidney cancer.  Cowden syndrome is caused by a mutation in the PTEN gene.  A single mutation in one of the copies of PTEN causes Cowden syndrome and increases cancer risk.  The chart below shows the chance that someone with a PTEN mutation would develop cancer in their lifetime5,6.  Other benign features seen in some individuals with Cowden syndrome include benign skin lesions (facial papules, keratoses, lipomas), learning disability, autism, thyroid nodules, colon polyps, and larger head size.      *One recent study found breast cancer risk to be increased to 85%  Li-Fraumeni Syndrome   (TP53)  Li-Fraumeni Syndrome (LFS) is a cancer predisposition syndrome caused by a mutation in the TP53 gene. A single mutation in one of the copies of TP53 increases the risk for multiple cancers. Individuals with LFS are at an increased risk for developing cancer at a young age. The general lifetime risk for development of cancer is 50% by age 30 and 90% by age 60.     Core Cancers: Sarcomas, Breast, Brain, Lung, Leukemias/Lymphomas, Adrenocortical carcinomas  Other Cancers: Gastrointestinal, Thyroid, Skin, Genitourinary    Additional Genes  PALB2  Mutations in PALB2 have been shown to increase the risk of breast cancer up to 33-58% in some  families; where individuals fall within this risk range is dependent upon family history. PALB2 mutations have also been associated with increased risk for pancreatic cancer, although this risk has not been quantified yet.  Individuals who inherit two PALB2 mutations--one from their mother and one from their father--have a condition called Fanconi Anemia.  This rare autosomal recessive condition is associated with short stature, developmental delay, bone marrow failure, and increased risk for childhood cancers.    BRIP1, RAD51C and RAD51D  Mutations in BRIP1, RAD51C, and RAD51D have been shown to increase the risk of ovarian cancer as well as female breast cancer.    ______________________________________________________________________________    Inheritance  All of the cancer syndromes reviewed above are inherited in an autosomal dominant pattern.  This means that if a parent has a mutation, each of his or her children will have a 50% chance of inheriting that same mutation.  Therefore, each child--male or female--would have a 50% chance of being at increased risk for developing cancer.      Image obtained from Genetics Home Reference, 2013     Mutations in some genes can occur de tomas, which means that a person s mutation occurred for the first time in them and was not inherited from a parent.  Now that they have the mutation, however, it can be passed on to future generations.    Genetic Testing  Genetic testing involves a blood test and will look for any harmful mutations that are associated with increased cancer risk.  If possible, it is recommended that the person(s) who has had cancer be tested before other family members.  That person will give us the most useful information about whether or not a specific gene is associated with the cancer in the family.    Results  There are three possible results of genetic testing:    Positive--a harmful mutation was identified in one or more of the  genes    Negative--no mutation was identified in any of the 9 genes on this panel    Variant of unknown significance--a variation in one of the genes was identified, but it is unclear how this impacts cancer risk in the family    Advantages and Disadvantages   There are advantages and disadvantages to genetic testing.  Advantages    May clarify your cancer risk    Can help you make medical decisions    May explain the cancers in your family    May give useful information to your family members (if you share your results)    Disadvantages    Possible negative emotional impact of learning about inherited cancer risk    Uncertainty in interpreting a negative test result in some situations    Possible genetic discrimination concerns (see below)    Genetic Information Nondiscrimination Act (TAYLOR)  [place]OR is a federal law that protects individuals from health insurance or employment discrimination based on a genetic test result alone.  Although rare, there are currently no legal discrimination protections in terms of life insurance, long term care, or disability insurances.  Visit the National Human Genome Research Dalton website to learn more.    Reducing Cancer Risk  Each of the genes listed within this handout have nationally recognized cancer screening guidelines that would be recommended for individuals who test positive.  In addition to increased cancer screening, surgeries may be offered or recommended to reduce cancer risk.  Recommendations are based upon an individual s genetic test result as well as their personal and family history of cancer.    Questions to Think About Regarding Genetic Testing:    What effect will the test result have on me and my relationship with my family members if I have an inherited gene mutation?  If I don t have a gene mutation?    Should I share my test results, and how will my family react to this news, which may also affect them?    Are my children ready to learn new information  that may one day affect their own health?        Hereditary Cancer Resources    FORCE: Facing Our Risk of Cancer Empowered facingourrisk.org   Bright Pink bebrightpink.org   Li-Fraumeni Syndrome Association lfsassociation.org   PTEN World PTENworld.com   Collaborative Group of the Americas on Inherited Colorectal Cancer (CGA) cgaicc.com http://www.facingourrisk.org/   Cancer Care cancercare.org   American Cancer Society (ACS) cancer.org   National Cancer Glasco (NCI) cancer.gov       Cancer Risk Management Program [phone]-CANCER ([phone])  ? Kelsie Nguyen, MS, [facility]  [phone]  ? Dara Thang, MS, [facility]  [phone]  ? Zoila Clinton, MS, [facility]  [phone]  ? Elvie Bedoya, MS, [facility]  [phone]   ? Rafat Felipe, MS, [facility]  [phone]    References  1. Lainey MCKEON, Donald PDP, Antonio S, Elgin FORBES, Tai JE, Dave JL, David N, Tara H, Barby O, Maxim A, Pasini B, Radidana P, Mantaylerkimichaela S, David DM, Leon N, Abigail E, Jung H, Edilberto E, Gary J, Grocecelia J, Maggie B, Vanessa H, Thorlacius S, Eerola H, Nevtasha H, Antonia K, Manda OP. Average risks of breast and ovarian cancer associated with BRCA1 or BRCA2 mutations detected in case series unselected for family history: a combined analysis of 222 studies. Am J Hum Carolyne. 2003;72:1117-30.  2. Darryl N, Anaid M, Garcia G.  BRCA1 and BRCA2 Hereditary Breast and Ovarian Cancer. Gene Reviews online. 2013.  3. Valdemar YC, Bree S, Lorenza G, Peters S. Breast cancer risk among male BRCA1 and BRCA2 mutation carriers. J Natl Cancer Inst. 2007;99:1811-4.  4. Enmanuel ANGUIANO, Donte I, Shahram J, Merry E, Destinee ER, Sophie F. Risk of breast cancer in male BRCA2 carriers. J Med Carolyne. 2010;47:710-1.  5. Walker MH, Jagdish J, Sosa J, Vito LA, Jermain MS, Minnie C. Lifetime cancer risks in individuals with germline PTEN mutations. Clin Cancer Res. 2012;18:400-7.  6. Mark OLSON. Cowden Syndrome: A Critical Review of the Clinical Literature. J Carolyne .  "2009:18:13-27.  7. National Comprehensive Cancer Network. Clinical practice guidelines in oncology, colorectal cancer screening. Available online (registration required). 2015.  8. Lainey HERNANDEZ et al. Breast-Cancer Risk in Families with Mutations in PALB2. NEJM. 2014; 371(6):497-506.                Follow-ups after your visit        Your next 10 appointments already scheduled     Oct 03, 2017 11:00 AM CDT   Return Visit with Nessa Foster MD   [facility] ([facility])    [facility]  [address]  [address]  [facility] [address] [phone]              Who to contact     If you have questions or need follow up information about today's clinic visit or your schedule please contact CANCER RISK MANAGEMENT PROGRAM directly at [phone].  Normal or non-critical lab and imaging results will be communicated to you by MyChart, letter or phone within 4 business days after the clinic has received the results. If you do not hear from us within 7 days, please contact the clinic through MyChart or phone. If you have a critical or abnormal lab result, we will notify you by phone as soon as possible.  Submit refill requests through [employer] or call your pharmacy and they will forward the refill request to us. Please allow 3 business days for your refill to be completed.          Additional Information About Your Visit        MyChart Information     [employer] lets you send messages to your doctor, view your test results, renew your prescriptions, schedule appointments and more. To sign up, go to [email or web address] . Click on \"Log in\" on the left side of the screen, which will take you to the Welcome page. Then click on \"Sign up Now\" on the right side of the page.     You will be asked to enter the access code listed below, as well as some personal information. Please follow the directions to create your username and password.     Your access code " is: [email or web address]  Expires: 2017 12:37 PM     Your access code will  in 90 days. If you need help or a new code, please call your [facility] clinic or [phone].        Care EveryWhere ID     This is your Care EveryWhere ID. This could be used by other organizations to access your [facility] medical records  [id number]         Blood Pressure from Last 3 Encounters:   17 127/62   17 117/69   09/10/17 117/69    Weight from Last 3 Encounters:   17 74.4 kg (164 lb 0.4 oz)   17 74.5 kg (164 lb 3.9 oz)   17 74.8 kg (165 lb)              Today, you had the following     No orders found for display         Today's Medication Changes          These changes are accurate as of: 17  9:40 AM.  If you have any questions, ask your nurse or doctor.               These medicines have changed or have updated prescriptions.        Dose/Directions    Vitamin D (Cholecalciferol) 1000 UNITS Tabs   This may have changed:  additional instructions   Used for:  Paresthesias        Dose:  2000 Units   Take 2,000 Units by mouth daily   Quantity:  60 tablet   Refills:  0                Primary Care Provider Office Phone # Fax #    Esther Back [phone]       [facility] [address] [place] [address]        Equal Access to Services     CITLALLI CUELLO AH: Hadii zita Alves, waaxda juan, qaybta kaalhetal arias . So [facility] [phone].    ATENCIÓN: Si habla español, tiene a alarcon disposición servicios gratuitos de asistencia lingüística. Ronnie al [phone].    We comply with applicable federal civil rights laws and [place] laws. We do not discriminate on the basis of race, color, national origin, age, disability sex, sexual orientation or gender identity.            Thank you!     Thank you for choosing CANCER RISK MANAGEMENT PROGRAM  for your care. Our goal is always to provide you with excellent care.  Hearing back from our patients is one way we can continue to improve our services. Please take a few minutes to complete the written survey that you may receive in the mail after your visit with us. Thank you!             Your Updated Medication List - Protect others around you: Learn how to safely use, store and throw away your medicines at www.disposemymeds.org.          This list is accurate as of: 9/26/17  9:40 AM.  Always use your most recent med list.                   Brand Name Dispense Instructions for use Diagnosis    calcium carbonate 500 MG chewable tablet    TUMS     Take 1 chew tab by mouth daily as needed for heartburn        enoxaparin 80 MG/0.8ML injection    LOVENOX    60 Syringe    Inject 0.6 mLs (60 mg) Subcutaneous every 12 hours    Other pulmonary embolism without acute cor pulmonale, unspecified chronicity (H)       ESCITALOPRAM OXALATE PO      Take 10 mg by mouth daily        hydrochlorothiazide 25 MG tablet    HYDRODIURIL     Take 25 mg by mouth daily    Ovarian cancer, unspecified laterality (H)       levothyroxine 125 MCG tablet    SYNTHROID    30 tablet    Take 1 tablet (125 mcg) by mouth daily    Other pulmonary embolism without acute cor pulmonale, unspecified chronicity (H)       magnesium chloride 535 (64 MG) MG Tbcr CR tablet     30 tablet    Take 1 tablet (535 mg) by mouth daily    Ovarian cancer, unspecified laterality (H)       omeprazole 20 MG CR capsule    priLOSEC    30 capsule    Take 1 capsule (20 mg) by mouth every morning    Ovarian cancer, unspecified laterality (H), Dyspepsia       polyethylene glycol Packet    MIRALAX/GLYCOLAX    7 packet    Take 17 g by mouth daily    Ovarian cancer, unspecified laterality (H)       Potassium Chloride ER 20 MEQ Tbcr     60 tablet    Take 1 tablet (20 mEq) by mouth 2 times daily    Hypokalemia       senna-docusate 8.6-50 MG per tablet    SENOKOT-S;PERICOLACE    60 tablet    Take 1-2 tablets by mouth 2 times daily Hold for loose stools     Ovarian cancer, unspecified laterality (H)       Vitamin D (Cholecalciferol) 1000 UNITS Tabs     60 tablet    Take 2,000 Units by mouth daily    Paresthesias

## 2017-09-26 NOTE — LETTER
Cancer Risk Management  Program Locations    [facility]  [facility]  Mailing Address  Cancer Risk Management Program  [facility]  [address]  [place] [address]    New patient appointments  [phone]  2017    Tosin Nina  [address]  [facility] [address]      Dear [place],  It was a pleasure to meet you and your  today.  This is a summary of your genetic counseling appointment.    2017    Referring Provider: Nessa Ennis MD     Presenting Information:   I met with Tosin Barbernidia today for genetic counseling at the Cancer Risk Management Program at the [facility] to discuss her recent diagnosis of ovarian cancer  and family history of breast cancer.  She is here today to review this history, cancer screening recommendations, and available genetic testing options.    Personal History:  Virginia is a 71 year old female.  She was diagnosed with ovarian cancer at age 71, her current age.  Her treatment included chemotherapy and surgery.  Her surgery was a total laparoscopic hysterectomy, bilateral salpingo-oophorectomy in 2017.      She had her first menstrual period at age 14 and her first child at age 22. By report, she  had her most recent mammogram in 2016.  She reported that she had a benign breast biopsy in her 50's.     Virginia's most recent and only colonoscopy in  was normal.    Family History: (Please see scanned pedigree for detailed family history information)    Her mother was diagnosed at age 69 with breast cancer. Her treatment included a bilateral mastectomy, chemotherapy and radiation.    Her maternal aunt was diagnosed with breast cancer at age 89 and she passed away at age 90.     Her maternal great-grandmother   of  breast cancer in her 50's.     A paternal aunt  of cancer, primary and age of diagnosis unknown. She  in her 60's.     Her paternal grandmother may have cancer.  She  in her 60's.     Her maternal ethnicity is English, Slovak and Scotch. Her paternal ethnicity is Austrian.  There is no known Ashkenazi Advent ancestry on either side of her family.     Discussion:    Virginia's personal history ovarian cancer and family history of breast cancer may be  suggestive of a hereditary cancer syndrome.    We discussed the natural history and genetics of the BRCA1 and BRCA2 genes. We discussed that there are additional genes that could cause increased risk of ovarian and breast cancer.  As many of these genes present with overlapping features in a family, it would be reasonable for Virginia to consider panel genetic testing to analyze multiple genes at once.    The test that would be most appropriate to include hereditary breast and ovarian cancer is the OvaNext panel at [facility] for hereditary cancer.   Genetic testing is available for 25 genes associated with hereditary gynecologic cancers: OvaNext (CHRISTY, BARD1, BRCA1, BRCA2, BRIP1, CDH1, CHEK2, DICER1, EPCAM, MLH1, MRE11A, MSH2, MSH6, MUTYH, NBN, NF1, PALB2, PMS2, PTEN, RAD50, RAD51C, RAD51D, SMARCA4, STK11, and TP53).  We discussed that some of the genes in the OvaNext panel are associated with specific hereditary cancer syndromes and have published management guidelines: Hereditary Breast and Ovarian Cancer syndrome (BRCA1, BRCA2), Quinn syndrome (MLH1, MSH2, MSH6, PMS2, EPCAM), Hereditary Diffuse Gastric Cancer (CDH1), Cowden syndrome (PTEN), Li Fraumeni syndrome (TP53), Peutz-Jeghers syndrome (STK11), MUTYH Associated Polyposis syndrome (MUTYH), and Neurofibromatosis type 1 (NF1).    Risk-reducing salpingo-oophorectomy can be considered in women with mutations in BRIP1, RAD51C, or RAD51D.  Breast cancer risk management guidelines are available for CHRISTY,  CHEK2, PALB2, NF1, and NBN.  The remaining genes (BARD1, DICER1, MRE11A, RAD50, and SMARCA4) are associated with increased cancer risk and may allow us to make medical recommendations when mutations are identified.    A detailed handout regarding GYN panels  and the information we discussed was provided to Virginia at the end of our appointment today and can be found in the after visit summary.  Topics included: inheritance pattern, cancer risks, cancer screening recommendations, and also risks, benefits and limitations of testing.   Virginia would like me to contact [employer] to determine the cost of the test.  Virginia has [employer] insurance in addition to Medicare.    Based on her personal and family history, Virginia meets current National Comprehensive Cancer Network (NCCN) criteria for genetic testing of the BRCA1 and BRCA2 genes.      Medical Management: For Virginia, we reviewed that the information from genetic testing may determine:    The type of screening regarding breast cancer (annual mammograms vs. high risk breast cancer screening including a breast MRI) if she has a mutation in the BRCA1 or BRCA2 gene.    The type of screening regarding colon cancer if she has a mutation in a gene associated with Quinn syndrome.      In that case, she would need more frequent colonoscopies.    Virginia may also consider surgery to reduce her risk of breast cancer such as a prophylactic mastectomy.    Additional screening for cancer will depend on the gene identified with a mutation.    These recommendations and possible targeted chemotherapies will be discussed in detail once genetic testing is completed.     Plan:  1) I will submit information to [employer] regarding the OvaNext test for Virginia.  2) Once the cost of the test is determined, I will contact Virginia and she will return to clinic to have her blood drawn.  3) In the meantime, Virginia will contact her relatives to find out more family history.      Face to face time: 40 minutes    Kelsie Nguyen MS [facility]  Genetic Counselor  [phone]

## 2017-10-02 NOTE — PATIENT INSTRUCTIONS
Visit with Hematology for anti-coagulation plan Scheduled  Carmen DOWLING      Visit with Neurology, Dr Lopez for follow up at the [facility] Scheduled  Carmen DOWLING      Next visit with Dr Ennis in 3 months Scheduled  Carmen DOWLING       prior to next visit Scheduled  Carmen DOWLING      Port flushes every 6 weeks Scheduled  Carmen DOWLING  AVS given to patient

## 2017-10-02 NOTE — PROGRESS NOTES
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  She is accompanied today by her . She is doing quite well following completion of chemotherapy.  She does note some worsened neuropathy (she had some in her fingers and toes at baseline) but is not interested in taking another medication at this time.  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  Her only other concern is her lovenox as she is interested in stopping this.      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  She was discharged to a TCU where she is still residing with some but minimal improvement in her neurologic symptoms.  She still has quite a difficult time seeing especially with her right eye.  She also notes weakness mostly on her left side.  Both of these make it very difficult for her to walk and thus she is having to use a wheelchair for most of her mobility.  She tolerated her first cycle quite well with her biggest side effect being nausea which improved drastically with a change in anti-emetics.      17-17:  Admit to [facility] for worsening dizziness, found to have stage IVB ovarian cancer (inguinal lymphadenopathy) causing paraneoplastic syndrome    17:  CT C/A/P:  Thrombus identified within the right lower lobe are artery and right upper lobar arteries. The right pulmonary artery is enlarged, similar to prior exams. No CT evidence of right heart strain. No suspicious mediastinal or perihilar lymphadenopathy. Bovine arch anatomy. No suspicious pulmonary nodules, evidence of infarction, or infection. Abdomen and pelvis: There are  soft tissue nodules identified along the liver capsule measuring up to 3 cm inferiorly. There is a large amount of omental caking. Enlarged iliac and retroperitoneal lymph nodes for example a 2.6 cm right external iliac lymph node or group of lymph nodes. Heterogeneous enhancement of the uterine fundus could be due to fibroids or a mass. The overall size of the uterus does not appear significantly different than in 2014. The left ovary does appear slightly larger and lobular in appearance compared to prior exam. There is also a nodular area along the round ligament. It was not present on prior exam. There is an irregular lobulated mass involving the sigmoid colon. Abnormal, enlarged inguinal lymph nodes. Soft tissue implant in the posterior right retroperitoneum adjacent to the psoas was not present on prior exam. 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 Spondylolisthesis at L3-4. Small periumbilical hernia containing some of the abnormal omentum.    4/11/17:   268, CEA .6    4/12/17:  IR omental biopsy   Pathology:  High grade serous carcinoma    4/14/17: Cycle #1 carboplatin AUC 6 and weekly Taxol 80mg/m2.  = 2648    5/5/17:  Cycle #2 carboplatin AUC 6 and weekly Taxol 80mg/m2.  = 370    5/26/17:  Cycle #3 carboplatin AUC 6 and weekly Taxol 80mg/m2.  = 63    6/16/17:  CT C/A/P:  Chest:  Resolution of previous right-sided pulmonary emboli since April. No mediastinal, hilar or axillary adenopathy. No pleural fluid.  Port-A-Cath right superior chest with tip in the SVC.  Short linear fibrosis or discoid atelectasis anterior medial right upper lobe. Lungs otherwise clear. Abdomen and Pelvis: Marked improvement in carcinomatosis and omental metastasis since 4/11/2017. In the anterior left pelvis there is a 1.2 cm nodule with adjacent linear opacity approximately 4 cm in length in an area of previous dense  omental caking and metastatic disease. There is resolution of the previous anterior right-sided omental caking with subcentimeter trace of residual nodularity in this location. There are multiple new indeterminate nodular densities in the anterior abdominal wall subcutaneous fat as well as small collections of subcutaneous air, suggesting that these are medication injection sites.  Previous subserosal implants along the inferior liver have resolved. No focal liver lesions. Normal-appearing pancreas, adrenal glands and kidneys. Tiny hypodense spleen lesion is too small to characterize, not previously seen. Spleen otherwise appears normal. No periaortic or pelvic adenopathy with resolution of previous adenopathy. Lobulated enlarged uterus redemonstrated consistent with multiple fibroids. No free fluid. No acute bowel abnormality. Resolution of mesenteric right lower quadrant nodule is compatible with metastasis. On coronal images the terminal ileum takes an unusual circular course posterior to the right colon, but there is no evidence for obstruction. No aggressive bone lesions.    6/16/17:   = 27    6/28/17:  Robotic total laparoscopic hysterectomy, bilateral salpingo-oophorectomy, lysis of adhesions, omentectomy, optimal tumor debulking to no residual disease   Pathology:  Minimal serous carcinoma remaining in the left ovary    7/20/17:  Cycle #4 carboplatin AUC 6 and weekly Taxol 80mg/m2. D15 cancelled due to thrombocytopenia (plt 70)   = 18    8/10/17:  Cycle #5 carboplatin AUC 6 and weekly Taxol 80mg/m2. D8 cancelled neutropenia , D15 cancelled thromobocytopenia (plt 53)  = 12    8/31/17:  Cycle #6 carboplatin AUC 5 due to myelosuppression and weekly Taxol 80mg/m2. D15 delayed thrombocytopenia (plt 78)  = 10    Review of Systems:  Systemic           no weight gain; no fatigue; no fever; no chills; no night sweats; no appetite changes  Skin           no rashes, or lesions; + hair  loss  Eye           no irritation; no changes in vision; + visual difficulty  Ward-Laryngeal           no dysphagia; no hoarseness   Pulmonary    no cough; no shortness of breath  Cardiovascular    no chest pain; no palpitations  Gastrointestinal    no diarrhea; no constipation; no abdominal pain; no changes in bowel  habits; no blood in stool  Genitourinary   no urinary frequency; no urinary urgency; no dysuria; no pain; no abnormal vaginal discharge; no abnormal vaginal bleeding  Breast    no breast discharge; no breast changes; no breast pain  Musculoskeletal    no myalgias; no arthralgias; no back pain  Psychiatric           no depressed mood; no anxiety    Hematologic            no tender lymph nodes; no noticeable swellings or lumps   Endocrine    no hot flashes; no heat/cold intolerance         Neurological   no tremor; + numbness and tingling; + loss of balance; + difficulty walking; no headaches; no difficulty sleeping    Obstetrics and Gynecology History:  ,   Menopause at age 52, took HRT for a short while, maybe 1 year        Past Medical History:  Past Medical History:   Diagnosis Date     Anemia      Cervical spinal stenosis      Depression      GERD (gastroesophageal reflux disease)      Hypertension      Hypokalemia      Hypothyroid      Lumbar spinal stenosis      Obesity      Ovarian cancer (H)      Paraneoplastic neuromyopathy and neuropathy (H)      PE (pulmonary thromboembolism) (H)      Thrombocytopenia (H)        Past Surgical History:  Past Surgical History:   Procedure Laterality Date     AS TOTAL KNEE ARTHROPLASTY Left      BACK SURGERY       CHOLECYSTECTOMY  2016     CYSTOSCOPY N/A 2017    Procedure: CYSTOSCOPY;;  Surgeon: Nessa Christina MD;  Location: [facility] OR     DAVINCI HYSTERECTOMY TOTAL, BILATERAL SALPINGO-OOPHORECTMY, NODE DISSECTION, TUMOR STAGING, COMBINED N/A 2017    Procedure: COMBINED DAVINCI HYSTERECTOMY TOTAL, SALPINGO-OOPHORECTOMY, NODE  DISSECTION, TUMOR STAGING;  Robotic total laparoscopic hysterectomy, bilateral salpingo-oophorectomy, omentectomy, lysis of adhesions, tumor debulking, cystoscopy;  Surgeon: Nessa Christina MD;  Location: [facility] OR     OPEN REDUCTION INTERNAL FIXATION WRIST Right 2009     THYROIDECTOMY         Health Maintenance:  Last Pap Smear: 5 years              Result: normal-No need for further pap smear exams  She has not had a history of abnormal Pap smears.    Last Mammogram: 10/19/16              Result: normal      She has not had a history of abnormal mammograms.    Last Colonoscopy: 2007              Result: normal      Current Medications:   has a current medication list which includes the following prescription(s): enoxaparin, potassium chloride er, omeprazole, magnesium chloride, hydrochlorothiazide, polyethylene glycol, senna-docusate, calcium carbonate, escitalopram oxalate, levothyroxine, and vitamin d (cholecalciferol).     Allergies:   Amoxicillin; Clarithromycin; Lisinopril; and Penicillins      Social History:  Social History     Social History     Marital status:      Spouse name: Christiano Nina     Number of children: 1     Years of education: N/A     Occupational History     Current: Retired      Former:       Social History Main Topics     Smoking status: Never Smoker     Smokeless tobacco: Not on file     Alcohol use Yes      Comment: occasionally     Drug use: No     Sexual activity: Not on file     Other Topics Concern     Not on file     Social History Narrative     Lives with , feels safe at home.  Retired .  Enjoys playing golf, gardening.  Does have an advanced directive on file and would like her Christiano to be her POA.  DNR/DNI    Family History:   The patient's family history is significant for.  Family History   Problem Relation Age of Onset     Breast Cancer Mother 69     Heart Failure Mother      Breast Cancer Maternal Grandmother      this is  "maternal great grandmother     Breast Cancer Maternal Aunt 89     Myocardial Infarction Father      Unknown/Adopted Brother      Unknown/Adopted Maternal Grandfather      Stomach Cancer Paternal Grandmother      Stomach mass-not sure if cancer     Lung Cancer Paternal Grandfather      mustard gas in WW[place]         Physical Exam:   /80  Pulse 84  Temp 98.8  F (37.1  C) (Tympanic)  Resp 16  Ht 1.499 m (4' 11\")  Wt 75.3 kg (166 lb)  SpO2 98%  BMI 33.53 kg/m2  Body mass index is 33.53 kg/(m^2).    General Appearance: healthy and alert, no distress            Assessment:    Tosin Nina is a 71 year old woman with a diagnosis of Stage IVB high grade serous ovarian cancer.     A total of 30 minutes was spent with the patient, >50% of which were spent in counseling the patient and/or treatment planning.      Plan:     1.)    Stage IVB high grade serous ovarian cancer. We discussed signs and symptoms of a recurrence and I have asked her to call if these should occur.  We discussed the role of surveillance with visits every 3 months for the first two years (9/19) then every 6 months for the following 3 years (9/22) then annually thereafter.  We reviewed that no imaging would be obtained unless concerning findings were discovered.  We discussed the risks and benefits of following  levels and she has decided to follow these.  We reviewed services available through [facility], [facility] for survivorship/support groups.  She will return in 3 months.  She will retain her port for now and port flushes will be arranged.    2.) Deconditioning and wheelchair bound state.  We discussed that this is likely her new baseline and she is accepting of this.  We discussed possibility of rehab, however given that this is not likely to improve she declines at this time    3.) Chemotherapy side effects:  Neuropathy is the only residual effect she is having.  We discussed possible treatments but she declines at this time.  We will " readdress at her 3 month visit and she will call in the interim if she decides she would like to start something    4.) Para-neoplastic syndrome.  Unfortunately, she did not show improvement with IVIG/steroids.  She follows with neurology and is supposed to follow up upon completion of her chemotherapy and thus referral was placed.    5.) PE.  On therapuetic lovenox.  Given her complex medical situation including paraneoplastic syndrome, wheelchair bound state and history of malignancy, I have asked her to see hematology for their recommendations on best duration of treatment as well as recommendations for best oral agent to transition to.     6.) Genetic risk factors were assessed and the patient has seen genetics on 9/26 with testing pending    7.) Advanced malignancy and paraneoplastic symptoms.  She follows with palliative care but feels she has minimal symptoms at this time and thus will hold off on further appointments for now    8.) Labs and/or tests ordered include:       9.) Health maintenance issues addressed today include pt due for colonoscopy which can be addressed at her first surveillance visit.            Thank you for allowing us to participate in the care of your patient.         Sincerely,    Nessa Ennis MD  Gynecologic Oncology  [facility]       CC

## 2017-10-03 ENCOUNTER — INFUSION THERAPY VISIT (OUTPATIENT)
Dept: INFUSION THERAPY | Facility: CLINIC | Age: 72
End: 2017-10-03
Attending: OBSTETRICS & GYNECOLOGY
Payer: COMMERCIAL

## 2017-10-03 ENCOUNTER — ONCOLOGY VISIT (OUTPATIENT)
Dept: ONCOLOGY | Facility: CLINIC | Age: 72
End: 2017-10-03
Attending: OBSTETRICS & GYNECOLOGY
Payer: COMMERCIAL

## 2017-10-03 VITALS
SYSTOLIC BLOOD PRESSURE: 125 MMHG | WEIGHT: 166 LBS | RESPIRATION RATE: 16 BRPM | DIASTOLIC BLOOD PRESSURE: 80 MMHG | BODY MASS INDEX: 33.47 KG/M2 | HEART RATE: 84 BPM | HEIGHT: 59 IN | TEMPERATURE: 98.8 F | OXYGEN SATURATION: 98 %

## 2017-10-03 DIAGNOSIS — C56.2 OVARIAN CANCER, LEFT (H): Primary | ICD-10-CM

## 2017-10-03 DIAGNOSIS — C56.1 OVARIAN CANCER, RIGHT (H): ICD-10-CM

## 2017-10-03 DIAGNOSIS — C56.9 OVARIAN CANCER (H): Primary | ICD-10-CM

## 2017-10-03 PROCEDURE — 25000128 H RX IP 250 OP 636: Performed by: OBSTETRICS & GYNECOLOGY

## 2017-10-03 PROCEDURE — 90662 IIV NO PRSV INCREASED AG IM: CPT | Performed by: OBSTETRICS & GYNECOLOGY

## 2017-10-03 PROCEDURE — 99214 OFFICE O/P EST MOD 30 MIN: CPT | Performed by: OBSTETRICS & GYNECOLOGY

## 2017-10-03 PROCEDURE — 99211 OFF/OP EST MAY X REQ PHY/QHP: CPT | Mod: 25

## 2017-10-03 PROCEDURE — G0008 ADMIN INFLUENZA VIRUS VAC: HCPCS

## 2017-10-03 RX ORDER — HEPARIN SODIUM (PORCINE) LOCK FLUSH IV SOLN 100 UNIT/ML 100 UNIT/ML
500 SOLUTION INTRAVENOUS ONCE
Status: COMPLETED | OUTPATIENT
Start: 2017-10-03 | End: 2017-10-03

## 2017-10-03 RX ORDER — HEPARIN SODIUM (PORCINE) LOCK FLUSH IV SOLN 100 UNIT/ML 100 UNIT/ML
500 SOLUTION INTRAVENOUS ONCE
Status: CANCELLED
Start: 2017-10-03 | End: 2017-10-03

## 2017-10-03 RX ADMIN — SODIUM CHLORIDE, PRESERVATIVE FREE 500 UNITS: 5 INJECTION INTRAVENOUS at 12:00

## 2017-10-03 RX ADMIN — INFLUENZA A VIRUSA/MICHIGAN/45/2015 X-275 (H1N1) ANTIGEN (FORMALDEHYDE INACTIVATED), INFLUENZA A VIRUS A/HONG KONG/4801/2014 X-263B (H3N2) ANTIGEN (FORMALDEHYDE INACTIVATED), AND INFLUENZA B VIRUS B/BRISBANE/60/2008 ANTIGEN (FORMALDEHYDE INACTIVATED) 0.5 ML: 60; 60; 60 INJECTION, SUSPENSION INTRAMUSCULAR at 11:51

## 2017-10-03 ASSESSMENT — PAIN SCALES - GENERAL: PAINLEVEL: NO PAIN (0)

## 2017-10-03 NOTE — PROGRESS NOTES
Infusion Nursing Note:  Tosin Nina presents today for port flush no labs ordered    Patient seen by provider today: Yes: Dr Ennis   present during visit today: Not Applicable.    Note: N/A.    Intravenous Access:  Labs drawn without difficulty.  Implanted Port.    Treatment Conditions:  Not Applicable.      Post Infusion Assessment:  Patient tolerated access without incident.  Blood return noted pre and post infusion.  Site patent and intact, free from redness, edema or discomfort.  Access discontinued per protocol.    Discharge Plan:   Discharge instructions reviewed with: Patient and Family.  Patient and/or family verbalized understanding of discharge instructions and all questions answered.  Patient discharged in stable condition accompanied by: self and .  Departure Mode: Wheelchair.    Maru More RN

## 2017-10-03 NOTE — NURSING NOTE
"Injectable Influenza Immunization Documentation    1.  Has the patient received the information for the injectable influenza vaccine? YES     2. Is the patient 6 months of age or older? YES     3. Does the patient have any of the following contraindications?         Severe allergy to eggs? No     Severe allergic reaction to previous influenza vaccines? No   Severe allergy to latex? No       History of Guillain-Middleburg syndrome? No     Currently have a temperature greater than 100.4F? No        4.  Severely egg allergic patients should have flu vaccine eligibility assessed by an MD, RN, or pharmacist, and those who received flu vaccine should be observed for 15 min by an MD, RN, Pharmacist, Medical Technician, or member of clinic staff.\": YES       Vaccination given by ZONIA Delaney CMA        "

## 2017-10-03 NOTE — MR AVS SNAPSHOT
After Visit Summary   10/3/2017    Tosin Nina    MRN: [id number]           Patient Information     Date Of Birth          1945        Visit Information        Provider Department      10/3/2017 11:00 AM Nessa Christina MD [facility]        Care Instructions    Visit with Hematology for anti-coagulation plan Scheduled  Carmen D      Visit with Neurology, Dr Lopez for follow up at the [place] Scheduled  Carmen D      Next visit with Dr Ennis in 3 months Scheduled  Carmen D       prior to next visit Scheduled  Carmen D      Port flushes every 6 weeks Scheduled  Carmen D  AVS given to patient            Follow-ups after your visit        Your next 10 appointments already scheduled     Oct 12, 2017  2:45 PM CDT   New Visit with Kavya Barahona MD   [facility] ([facility])    [facility]  [address]  [facility] [address]   [phone]            Nov 02, 2017  3:00 PM CDT   (Arrive by 2:45 PM)   Return Visit with James Lopez MD   [facility] ([facility])    [address] [place] [address]   [phone]            Nov 14, 2017 10:00 AM CST   Level 1 with RH INFUSION CHAIR 9   [facility] ([facility])    [facility]  [address]  [facility] [address]   [phone]            Dec 26, 2017 10:00 AM CST   Level 1 with RH INFUSION CHAIR 8   [facility] ([facility])    [facility]  [address]  [facility] [address]   [phone]            Dec 26, 2017 10:30 AM CST   Return Visit with Nessa Foster MD   [facility] ([facility])    [facility]  [address]  [facility] [address]   [phone]              Who to contact     If you have questions or need follow up information about today's clinic visit or your schedule please contact [facility] directly at [phone].  Normal or non-critical lab and imaging results will be communicated to you by MyChart, letter or phone within 4 business days after the clinic has received the results. If you do not hear from us within 7 days, please contact the clinic through CipherAppshart or phone. If you have a critical or abnormal lab result, we will notify you by phone as soon as possible.  Submit refill requests through cartmi or call your pharmacy and they will forward the refill request to us. Please allow 3 business days for your refill to be completed.          Additional Information About Your Visit        MyCharFOBO Information     cartmi lets you send messages to your doctor, view your test results, renew your prescriptions, schedule appointments and more. To sign up, go to [email or web address] . Click on \"Log in\" on the left side of the screen, which will take you to the Welcome page. Then click on \"Sign up Now\" on the right side of the page.     You will be asked to enter the access code listed below, as well as some personal information. Please follow the directions to create your username and password.     Your access code is: [email or web address]  Expires: 2017 12:37 PM     Your access code will  in 90 days. If you need help or a new code, please call your [place] clinic or [phone].        Care EveryWhere ID     This is your Care EveryWhere ID. This could be used by other organizations to access your [place] medical records  [id number]        Your Vitals Were     Pulse Temperature Respirations Height Pulse Oximetry BMI (Body Mass Index)    84 98.8  F (37.1  C) (Tympanic) 16 1.499 m (4' 11\") 98% 33.53 kg/m2       Blood Pressure from Last 3 Encounters:   10/03/17 125/80 "   09/21/17 127/62   09/11/17 117/69    Weight from Last 3 Encounters:   10/03/17 75.3 kg (166 lb)   09/07/17 74.4 kg (164 lb 0.4 oz)   08/31/17 74.5 kg (164 lb 3.9 oz)              Today, you had the following     No orders found for display         Today's Medication Changes          These changes are accurate as of: 10/3/17 12:10 PM.  If you have any questions, ask your nurse or doctor.               These medicines have changed or have updated prescriptions.        Dose/Directions    Vitamin D (Cholecalciferol) 1000 UNITS Tabs   This may have changed:  additional instructions   Used for:  Paresthesias        Dose:  2000 Units   Take 2,000 Units by mouth daily   Quantity:  60 tablet   Refills:  0                Primary Care Provider Office Phone # Fax #    Esther Back [phone]       [facility] [address] [address]        Equal Access to Services     CITLALLI CUELLO : Hadii zita fields hadasho Soomaali, waaxda luqadaha, qaybta kaalmada adeegyada, hetal regan . So [facility] [phone].    ATENCIÓN: Si habla español, tiene a alarcon disposición servicios gratuitos de asistencia lingüística. Llame al [phone].    We comply with applicable federal civil rights laws and [place] laws. We do not discriminate on the basis of race, color, national origin, age, disability, sex, sexual orientation, or gender identity.            Thank you!     Thank you for choosing [facility]  for your care. Our goal is always to provide you with excellent care. Hearing back from our patients is one way we can continue to improve our services. Please take a few minutes to complete the written survey that you may receive in the mail after your visit with us. Thank you!             Your Updated Medication List - Protect others around you: Learn how to safely use, store and throw away your medicines at www.disposemymeds.org.          This list is  accurate as of: 10/3/17 12:10 PM.  Always use your most recent med list.                   Brand Name Dispense Instructions for use Diagnosis    calcium carbonate 500 MG chewable tablet    TUMS     Take 1 chew tab by mouth daily as needed for heartburn        enoxaparin 80 MG/0.8ML injection    LOVENOX    60 Syringe    Inject 0.6 mLs (60 mg) Subcutaneous every 12 hours    Other pulmonary embolism without acute cor pulmonale, unspecified chronicity (H)       ESCITALOPRAM OXALATE PO      Take 10 mg by mouth daily        hydrochlorothiazide 25 MG tablet    HYDRODIURIL     Take 25 mg by mouth daily    Ovarian cancer, unspecified laterality (H)       levothyroxine 125 MCG tablet    SYNTHROID    30 tablet    Take 1 tablet (125 mcg) by mouth daily    Other pulmonary embolism without acute cor pulmonale, unspecified chronicity (H)       magnesium chloride 535 (64 MG) MG Tbcr CR tablet     30 tablet    Take 1 tablet (535 mg) by mouth daily    Ovarian cancer, unspecified laterality (H)       omeprazole 20 MG CR capsule    priLOSEC    30 capsule    Take 1 capsule (20 mg) by mouth every morning    Ovarian cancer, unspecified laterality (H), Dyspepsia       polyethylene glycol Packet    MIRALAX/GLYCOLAX    7 packet    Take 17 g by mouth daily    Ovarian cancer, unspecified laterality (H)       Potassium Chloride ER 20 MEQ Tbcr     60 tablet    Take 1 tablet (20 mEq) by mouth 2 times daily    Hypokalemia       senna-docusate 8.6-50 MG per tablet    SENOKOT-S;PERICOLACE    60 tablet    Take 1-2 tablets by mouth 2 times daily Hold for loose stools    Ovarian cancer, unspecified laterality (H)       Vitamin D (Cholecalciferol) 1000 UNITS Tabs     60 tablet    Take 2,000 Units by mouth daily    Paresthesias

## 2017-10-03 NOTE — MR AVS SNAPSHOT
After Visit Summary   10/3/2017    Tosin Nina    MRN: [id number]           Patient Information     Date Of Birth          1945        Visit Information        Provider Department      10/3/2017 11:00 AM RH INFUSION CHAIR 8 [facility]fusion [facility]        Today's Diagnoses     Ovarian cancer (H)    -  1       Follow-ups after your visit        Your next 10 appointments already scheduled     Oct 12, 2017  2:45 PM CDT   New Visit with Kavya Barahona MD   [facility] ([facility])    [facility]  [address]  [facility] [address]   [phone]            Nov 02, 2017  3:00 PM CDT   (Arrive by 2:45 PM)   Return Visit with James Lopez MD   [facility] ([facility])    [address] [place] [id number] [phone]            Dec 22, 2017 11:00 AM CST   Level 1 with RH INFUSION CHAIR 1   [facility]fusion [facility] ([facility])    [facility]  [address]  [facility] [address]   [phone]            Dec 26, 2017 10:00 AM CST   Level 1 with RH INFUSION CHAIR 8   [facility] ([facility])    [facility]  [address]  [facility] [address] [phone]            Dec 26, 2017 10:30 AM CST   Return Visit with Nessa Foster MD   [facility] ([facility])    [facility]  [address]  [facility] [address] [phone]              Who to contact     If you have questions or need follow up information about today's clinic visit or your schedule please contact [facility] INFUSION SERVICES directly at [phone].  Normal or non-critical lab and imaging results will be  "communicated to you by [employer]art, letter or phone within 4 business days after the clinic has received the results. If you do not hear from us within 7 days, please contact the clinic through [employer] or phone. If you have a critical or abnormal lab result, we will notify you by phone as soon as possible.  Submit refill requests through [employer] or call your pharmacy and they will forward the refill request to us. Please allow 3 business days for your refill to be completed.          Additional Information About Your Visit        [employer] Information     [employer] lets you send messages to your doctor, view your test results, renew your prescriptions, schedule appointments and more. To sign up, go to [email or web address] . Click on \"Log in\" on the left side of the screen, which will take you to the Welcome page. Then click on \"Sign up Now\" on the right side of the page.     You will be asked to enter the access code listed below, as well as some personal information. Please follow the directions to create your username and password.     Your access code is: [email or web address]  Expires: 2017 12:37 PM     Your access code will  in 90 days. If you need help or a new code, please call your [place] clinic or [phone].        Care EveryWhere ID     This is your Care EveryWhere ID. This could be used by other organizations to access your [place] medical records  [id number]         Blood Pressure from Last 3 Encounters:   10/03/17 125/80   17 127/62   17 117/69    Weight from Last 3 Encounters:   10/03/17 75.3 kg (166 lb)   17 74.4 kg (164 lb 0.4 oz)   17 74.5 kg (164 lb 3.9 oz)              Today, you had the following     No orders found for display         Today's Medication Changes          These changes are accurate as of: 10/3/17  4:29 PM.  If you have any questions, ask your nurse or doctor.               These medicines have changed or have updated prescriptions.        " Dose/Directions    Vitamin D (Cholecalciferol) 1000 UNITS Tabs   This may have changed:  additional instructions   Used for:  Paresthesias        Dose:  2000 Units   Take 2,000 Units by mouth daily   Quantity:  60 tablet   Refills:  0                Primary Care Provider Office Phone # Fax #    Esther Back [phone]       [facility] [address] [address]        Equal Access to Services     [facility]: Hadii aad ku hadasho Soomaali, waaxda luqadaha, qaybta kaalmada adeegyada, waxay idiin hayaan adeeg bruce laantoinen . So [facility] [phone].    ATENCIÓN: Si aidenla chao, tiene a alarcon disposición servicios gratuitos de asistencia lingüística. Llame al [phone].    We comply with applicable federal civil rights laws and [place] laws. We do not discriminate on the basis of race, color, national origin, age, disability, sex, sexual orientation, or gender identity.            Thank you!     Thank you for choosing [facility] INFUSION SERVICES  for your care. Our goal is always to provide you with excellent care. Hearing back from our patients is one way we can continue to improve our services. Please take a few minutes to complete the written survey that you may receive in the mail after your visit with us. Thank you!             Your Updated Medication List - Protect others around you: Learn how to safely use, store and throw away your medicines at www.disposemymeds.org.          This list is accurate as of: 10/3/17  4:29 PM.  Always use your most recent med list.                   Brand Name Dispense Instructions for use Diagnosis    calcium carbonate 500 MG chewable tablet    TUMS     Take 1 chew tab by mouth daily as needed for heartburn        enoxaparin 80 MG/0.8ML injection    LOVENOX    60 Syringe    Inject 0.6 mLs (60 mg) Subcutaneous every 12 hours    Other pulmonary embolism without acute cor pulmonale, unspecified chronicity (H)        ESCITALOPRAM OXALATE PO      Take 10 mg by mouth daily        hydrochlorothiazide 25 MG tablet    HYDRODIURIL     Take 25 mg by mouth daily    Ovarian cancer, unspecified laterality (H)       levothyroxine 125 MCG tablet    SYNTHROID    30 tablet    Take 1 tablet (125 mcg) by mouth daily    Other pulmonary embolism without acute cor pulmonale, unspecified chronicity (H)       magnesium chloride 535 (64 MG) MG Tbcr CR tablet     30 tablet    Take 1 tablet (535 mg) by mouth daily    Ovarian cancer, unspecified laterality (H)       omeprazole 20 MG CR capsule    priLOSEC    30 capsule    Take 1 capsule (20 mg) by mouth every morning    Ovarian cancer, unspecified laterality (H), Dyspepsia       polyethylene glycol Packet    MIRALAX/GLYCOLAX    7 packet    Take 17 g by mouth daily    Ovarian cancer, unspecified laterality (H)       Potassium Chloride ER 20 MEQ Tbcr     60 tablet    Take 1 tablet (20 mEq) by mouth 2 times daily    Hypokalemia       senna-docusate 8.6-50 MG per tablet    SENOKOT-S;PERICOLACE    60 tablet    Take 1-2 tablets by mouth 2 times daily Hold for loose stools    Ovarian cancer, unspecified laterality (H)       Vitamin D (Cholecalciferol) 1000 UNITS Tabs     60 tablet    Take 2,000 Units by mouth daily    Paresthesias

## 2017-10-03 NOTE — NURSING NOTE
"Oncology Rooming Note    October 3, 2017 11:18 AM   Tosin Nina is a 71 year old female who presents for:    Chief Complaint   Patient presents with     Oncology Clinic Visit     Follow up      Initial Vitals: /80  Pulse 84  Temp 98.8  F (37.1  C) (Tympanic)  Resp 16  Ht 1.499 m (4' 11\")  Wt 75.3 kg (166 lb)  SpO2 98%  BMI 33.53 kg/m2 Estimated body mass index is 33.53 kg/(m^2) as calculated from the following:    Height as of this encounter: 1.499 m (4' 11\").    Weight as of this encounter: 75.3 kg (166 lb). Body surface area is 1.77 meters squared.  No Pain (0) Comment: Data Unavailable   No LMP recorded. Patient is postmenopausal.  Allergies reviewed: Yes  Medications reviewed: Yes    Medications: Medication refills not needed today.  Pharmacy name entered into [employer]:    [email or web address] [address] [place] [address] - [address] [place] [address]  AT [address] [address] [address] [facility]    Clinical concerns: Follow up    8 minutes for nursing intake (face to face time)     Jesika Espinoza CMA    DISCHARGE PLAN:  Next appointments: See patient instruction section  Departure Mode: W/C  Accompanied by:   0 minutes for nursing discharge (face to face time)   Jesika Espinoza CMA                "

## 2017-10-05 ENCOUNTER — TELEPHONE (OUTPATIENT)
Dept: NEUROLOGY | Facility: CLINIC | Age: 72
End: 2017-10-05

## 2017-10-05 NOTE — TELEPHONE ENCOUNTER
"Emmanuel called with questions about cerebellar syndrome, MRI findings, question of stroke or \"blockage.\"  I told him that I last saw pt in June and was unaware of MRI review that showed new finding.  He will check with pt's .   "

## 2017-10-11 ENCOUNTER — DOCUMENTATION ONLY (OUTPATIENT)
Dept: OTHER | Facility: CLINIC | Age: 72
End: 2017-10-11

## 2017-10-11 PROBLEM — Z71.89 ADVANCED DIRECTIVES, COUNSELING/DISCUSSION: Chronic | Status: ACTIVE | Noted: 2017-10-11

## 2017-10-12 ENCOUNTER — ONCOLOGY VISIT (OUTPATIENT)
Dept: ONCOLOGY | Facility: CLINIC | Age: 72
End: 2017-10-12
Attending: INTERNAL MEDICINE
Payer: COMMERCIAL

## 2017-10-12 VITALS
TEMPERATURE: 98.8 F | WEIGHT: 167.6 LBS | HEIGHT: 59 IN | RESPIRATION RATE: 16 BRPM | BODY MASS INDEX: 33.79 KG/M2 | DIASTOLIC BLOOD PRESSURE: 81 MMHG | OXYGEN SATURATION: 97 % | HEART RATE: 99 BPM | SYSTOLIC BLOOD PRESSURE: 128 MMHG

## 2017-10-12 DIAGNOSIS — I26.99 OTHER ACUTE PULMONARY EMBOLISM WITHOUT ACUTE COR PULMONALE (H): Primary | ICD-10-CM

## 2017-10-12 PROCEDURE — 99215 OFFICE O/P EST HI 40 MIN: CPT | Performed by: INTERNAL MEDICINE

## 2017-10-12 PROCEDURE — 99211 OFF/OP EST MAY X REQ PHY/QHP: CPT

## 2017-10-12 ASSESSMENT — PAIN SCALES - GENERAL: PAINLEVEL: MILD PAIN (2)

## 2017-10-12 NOTE — MR AVS SNAPSHOT
After Visit Summary   10/12/2017    Tosin Nina    MRN: [id number]           Patient Information     Date Of Birth          1945        Visit Information        Provider Department      10/12/2017 2:45 PM Kavya Barahona MD [facility] Oncology [facility]      Today's Diagnoses     Other acute pulmonary embolism without acute cor pulmonale (H)    -  1      Care Instructions    -please ask pharmacy to talk to patient about Xarelto and check insurance coverage            Follow-ups after your visit        Your next 10 appointments already scheduled     Nov 02, 2017  3:00 PM CDT   (Arrive by 2:45 PM)   Return Visit with James Lopez MD   [facility] ([facility])    [address] [place] [address] [phone]            Dec 22, 2017 11:00 AM CST   Level 1 with RH INFUSION CHAIR 1   [facility] Infusion Services ([facility])    [facility]  [address]  [facility] [address]   [phone]            Dec 26, 2017 10:00 AM CST   Level 1 with RH INFUSION CHAIR 8   [facility] ([facility])    [facility]  [address]  [facility] [address] [phone]            Dec 26, 2017 10:30 AM CST   Return Visit with Nessa Foster MD   [facility] ([facility])    [facility]  [address]  [facility] [address] [phone]              Who to contact     If you have questions or need follow up information about today's clinic visit or your schedule please contact [facility] CANCER CARE directly at [phone].  Normal or non-critical lab and imaging results will be communicated to you by MyChart, letter or phone within 4 business days after the clinic  "has received the results. If you do not hear from us within 7 days, please contact the clinic through [email or web address] or phone. If you have a critical or abnormal lab result, we will notify you by phone as soon as possible.  Submit refill requests through [email or web address] or call your pharmacy and they will forward the refill request to us. Please allow 3 business days for your refill to be completed.          Additional Information About Your Visit        [email or web address] Information     [email or web address] lets you send messages to your doctor, view your test results, renew your prescriptions, schedule appointments and more. To sign up, go to [email or web address] . Click on \"Log in\" on the left side of the screen, which will take you to the Welcome page. Then click on \"Sign up Now\" on the right side of the page.     You will be asked to enter the access code listed below, as well as some personal information. Please follow the directions to create your username and password.     Your access code is: [email or web address]  Expires: 2017 12:37 PM     Your access code will  in 90 days. If you need help or a new code, please call your [place] clinic or [phone].        Care EveryWhere ID     This is your Care EveryWhere ID. This could be used by other organizations to access your [place] medical records  [id number]        Your Vitals Were     Pulse Temperature Respirations Height Pulse Oximetry BMI (Body Mass Index)    99 98.8  F (37.1  C) (Tympanic) 16 1.499 m (4' 11\") 97% 33.85 kg/m2       Blood Pressure from Last 3 Encounters:   10/12/17 128/81   10/03/17 125/80   17 127/62    Weight from Last 3 Encounters:   10/12/17 76 kg (167 lb 9.6 oz)   10/03/17 75.3 kg (166 lb)   17 74.4 kg (164 lb 0.4 oz)              Today, you had the following     No orders found for display         Today's Medication Changes          These changes are accurate as of: 10/12/17  5:08 PM.  If you have any questions, ask your nurse or doctor.             "   Start taking these medicines.        Dose/Directions    * rivaroxaban ANTICOAGULANT 15 MG Tabs tablet   Commonly known as:  XARELTO   Used for:  Other acute pulmonary embolism without acute cor pulmonale (H)   Started by:  Kavya Barahona MD        Dose:  15 mg   Start taking on:  10/13/2017   Take 1 tablet (15 mg) by mouth 2 times daily (with meals)   Quantity:  42 tablet   Refills:  0       * rivaroxaban ANTICOAGULANT 20 MG Tabs tablet   Commonly known as:  XARELTO   Used for:  Other acute pulmonary embolism without acute cor pulmonale (H)   Started by:  Kavya Barahona MD        Dose:  20 mg   Start taking on:  11/3/2017   Take 1 tablet (20 mg) by mouth daily (with dinner)   Quantity:  30 tablet   Refills:  3       * Notice:  This list has 2 medication(s) that are the same as other medications prescribed for you. Read the directions carefully, and ask your doctor or other care provider to review them with you.      These medicines have changed or have updated prescriptions.        Dose/Directions    Vitamin D (Cholecalciferol) 1000 UNITS Tabs   This may have changed:  additional instructions   Used for:  Paresthesias        Dose:  2000 Units   Take 2,000 Units by mouth daily   Quantity:  60 tablet   Refills:  0         Stop taking these medicines if you haven't already. Please contact your care team if you have questions.     enoxaparin 80 MG/0.8ML injection   Commonly known as:  LOVENOX   Stopped by:  Kavya Barahona MD                Where to get your medicines      These medications were sent to [facility] [id number] [place] [address]  [id number] [place] [address]     Phone:  [phone]     rivaroxaban ANTICOAGULANT 15 MG Tabs tablet         These medications were sent to [facility] [address] [place] [address] [address] [place] [address]  [address] [facility] [address]     Phone:   [phone]     rivaroxaban ANTICOAGULANT 20 MG Tabs tablet                Primary Care Provider Office Phone # Fax #    Esther Back [phone]       [facility] [address] [address]        Equal Access to Services     [facility] : Hadii aad ku hadasho Soomaali, waaxda luqadaha, qaybta kaalmada adeegyada, waxay chantellin hayflynnn nelson claytondung regan . So [facility] [phone].    ATENCIÓN: Si habla español, tiene a alarcon disposición servicios gratuitos de asistencia lingüística. Radhaame al [phone].    We comply with applicable federal civil rights laws and [place] laws. We do not discriminate on the basis of race, color, national origin, age, disability, sex, sexual orientation, or gender identity.            Thank you!     Thank you for choosing [facility]CER [facility]  for your care. Our goal is always to provide you with excellent care. Hearing back from our patients is one way we can continue to improve our services. Please take a few minutes to complete the written survey that you may receive in the mail after your visit with us. Thank you!             Your Updated Medication List - Protect others around you: Learn how to safely use, store and throw away your medicines at www.disposemymeds.org.          This list is accurate as of: 10/12/17  5:08 PM.  Always use your most recent med list.                   Brand Name Dispense Instructions for use Diagnosis    ESCITALOPRAM OXALATE PO      Take 10 mg by mouth daily        hydrochlorothiazide 25 MG tablet    HYDRODIURIL     Take 25 mg by mouth daily    Ovarian cancer, unspecified laterality (H)       levothyroxine 125 MCG tablet    SYNTHROID    30 tablet    Take 1 tablet (125 mcg) by mouth daily    Other pulmonary embolism without acute cor pulmonale, unspecified chronicity (H)       magnesium chloride 535 (64 MG) MG Tbcr CR tablet     30 tablet    Take 1 tablet (535 mg) by mouth daily    Ovarian cancer,  unspecified laterality (H)       omeprazole 20 MG CR capsule    priLOSEC    30 capsule    Take 1 capsule (20 mg) by mouth every morning    Ovarian cancer, unspecified laterality (H), Dyspepsia       Potassium Chloride ER 20 MEQ Tbcr     60 tablet    Take 1 tablet (20 mEq) by mouth 2 times daily    Hypokalemia       * rivaroxaban ANTICOAGULANT 15 MG Tabs tablet   Start taking on:  10/13/2017    XARELTO    42 tablet    Take 1 tablet (15 mg) by mouth 2 times daily (with meals)    Other acute pulmonary embolism without acute cor pulmonale (H)       * rivaroxaban ANTICOAGULANT 20 MG Tabs tablet   Start taking on:  11/3/2017    XARELTO    30 tablet    Take 1 tablet (20 mg) by mouth daily (with dinner)    Other acute pulmonary embolism without acute cor pulmonale (H)       senna-docusate 8.6-50 MG per tablet    SENOKOT-S;PERICOLACE    60 tablet    Take 1-2 tablets by mouth 2 times daily Hold for loose stools    Ovarian cancer, unspecified laterality (H)       Vitamin D (Cholecalciferol) 1000 UNITS Tabs     60 tablet    Take 2,000 Units by mouth daily    Paresthesias       * Notice:  This list has 2 medication(s) that are the same as other medications prescribed for you. Read the directions carefully, and ask your doctor or other care provider to review them with you.

## 2017-10-12 NOTE — PROGRESS NOTES
[facility]    Hematology/Oncology New Patient Note      Today's Date: 10/12/17    Reason for Consult: pulmonary embolism, anticoagulation concerns      HISTORY OF PRESENT ILLNESS: Tosin Nina is a 71 year old female who presents with hisory of pulmonary embolism.  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 Nessa Ennis.  She completed 6 cycles of carboplatin+paclitaxel in 8/31/17.  In 4/11/17, she was found to have right lower and upper lobar pulmonary emboli.  She was started on therapeutic dose Lovenox at the time.    Namita comes in with her  Christiano today.  She says that she has been getting bruising and hard spots on her abdomen from the Lovenox injection and wonders if she can switch to a pill.  She denies frequent falls.  She is wheelchair bound.  She denies any significant bleeding episodes with the Lovenox.          REVIEW OF SYSTEMS:   14 point ROS was reviewed and is negative other than as noted above in HPI.       HOME MEDICATIONS:  Current Outpatient Prescriptions   Medication Sig Dispense Refill     enoxaparin (LOVENOX) 80 MG/0.8ML injection Inject 0.6 mLs (60 mg) Subcutaneous every 12 hours 60 Syringe 1     Potassium Chloride ER 20 MEQ TBCR Take 1 tablet (20 mEq) by mouth 2 times daily 60 tablet 3     omeprazole (PRILOSEC) 20 MG CR capsule Take 1 capsule (20 mg) by mouth every morning 30 capsule 3     magnesium chloride 535 (64 MG) MG TBCR CR tablet Take 1 tablet (535 mg) by mouth daily 30 tablet 3     hydrochlorothiazide (HYDRODIURIL) 25 MG tablet Take 25 mg by mouth daily   0     senna-docusate (SENOKOT-S;PERICOLACE) 8.6-50 MG per tablet Take 1-2 tablets by mouth 2 times daily Hold for loose stools 60 tablet 1     ESCITALOPRAM OXALATE PO Take 10 mg by mouth daily       levothyroxine (SYNTHROID) 125 MCG tablet Take 1 tablet (125 mcg) by mouth daily 30 tablet      Vitamin D, Cholecalciferol, 1000 UNITS TABS  "Take 2,000 Units by mouth daily (Patient taking differently: Take 2,000 Units by mouth daily LD 6/20/17, told to hold until after surgery) 60 tablet          ALLERGIES:  Allergies   Allergen Reactions     Amoxicillin Hives     Clarithromycin Other (See Comments)     \"I felt dopey, sleepy and like a druggie\"     Lisinopril Cough     Penicillins Rash         PAST MEDICAL HISTORY:  Past Medical History:   Diagnosis Date     Anemia      Cervical spinal stenosis      Depression      GERD (gastroesophageal reflux disease)      Hypertension      Hypokalemia      Hypothyroid      Lumbar spinal stenosis      Obesity      Ovarian cancer (H)      Paraneoplastic neuromyopathy and neuropathy (H)      PE (pulmonary thromboembolism) (H)      Thrombocytopenia (H)          PAST SURGICAL HISTORY:  Past Surgical History:   Procedure Laterality Date     AS TOTAL KNEE ARTHROPLASTY Left      BACK SURGERY  2009     CHOLECYSTECTOMY  01/01/2016     CYSTOSCOPY N/A 6/28/2017    Procedure: CYSTOSCOPY;;  Surgeon: Nessa Christina MD;  Location: [facility] OR     DAVINCI HYSTERECTOMY TOTAL, BILATERAL SALPINGO-OOPHORECTMY, NODE DISSECTION, TUMOR STAGING, COMBINED N/A 6/28/2017    Procedure: COMBINED DAVINCI HYSTERECTOMY TOTAL, SALPINGO-OOPHORECTOMY, NODE DISSECTION, TUMOR STAGING;  Robotic total laparoscopic hysterectomy, bilateral salpingo-oophorectomy, omentectomy, lysis of adhesions, tumor debulking, cystoscopy;  Surgeon: Nessa Christina MD;  Location: [facility] OR     OPEN REDUCTION INTERNAL FIXATION WRIST Right 2009     THYROIDECTOMY           SOCIAL HISTORY:  Social History     Social History     Marital status:      Spouse name: Christiano Nina     Number of children: 1     Years of education: N/A     Occupational History     Current: Retired      Former:       Social History Main Topics     Smoking status: Never Smoker     Smokeless tobacco: Not on file     Alcohol use Yes      Comment: occasionally     Drug use: No " "    Sexual activity: Not on file     Other Topics Concern     Not on file     Social History Narrative         FAMILY HISTORY:  Family History   Problem Relation Age of Onset     Breast Cancer Mother 69     Heart Failure Mother      Breast Cancer Maternal Grandmother      this is maternal great grandmother     Breast Cancer Maternal Aunt 89     Myocardial Infarction Father      Unknown/Adopted Brother      Unknown/Adopted Maternal Grandfather      Stomach Cancer Paternal Grandmother      Stomach mass-not sure if cancer     Lung Cancer Paternal Grandfather      must[employer] gas in [place]         PHYSICAL EXAM:  Vital signs:  Resp 16  Ht 1.499 m (4' 11\")  Wt 76 kg (167 lb 9.6 oz)  BMI 33.85 kg/m2   GENERAL/CONSTITUTIONAL: No acute distress. Accompanied by .  RESPIRATORY: Clear to auscultation bilaterally. No crackles or wheezing.   CARDIOVASCULAR: Regular rate and rhythm without murmurs, gallops, or rubs.  GASTROINTESTINAL: No tenderness. The patient has normal bowel sounds. No guarding.    MUSCULOSKELETAL: Warm and well-perfused.  NEUROLOGIC: Alert, oriented, answers questions appropriately.  INTEGUMENTARY: No jaundice.  GAIT: Wheelchair bound.      LABS:  CBC RESULTS:   Recent Labs   Lab Test  09/21/17   1300   WBC  3.7*   RBC  2.69*   HGB  9.2*   HCT  28.5*   MCV  106*   MCH  34.2*   MCHC  32.3   RDW  19.5*   PLT  93*       Recent Labs   Lab Test  09/21/17   1300  09/14/17   1120   08/31/17   1005   08/10/17   1105   NA   --    --    --   142   --   140   POTASSIUM  3.6  3.7   < >  3.7   < >  3.8   CHLORIDE   --    --    --   105   --   104   CO2   --    --    --   28   --   29   ANIONGAP   --    --    --   9   --   7   GLC   --    --    --   101*   --   104*   BUN   --    --    --   7   --   8   CR   --    --    --   0.52   --   0.60   PATTI   --    --    --   8.8   --   9.0    < > = values in this interval not displayed.         ASSESSMENT/PLAN:  Tosin Nina is a 71 year old female with:    Pulmonary " embolism: Diagnosed in April 2017 when her cancer was found, and she has been on therapeutic dose Lovenox for ~6 months.  She has been having a lot of discomfort with it with the abdomen injections and wonders if she can stop it or switch to an oral agent.  Considering her cancer history and immobility, she remains at high risk for recurrence of embolus.  I recommended that she continue on anticoagulation indefinitely.  Patient is okay with this, but she would rather switch to an oral agent.  The data is best for Lovenox in treatment of emboli in setting of active malignancy, but considering patient has completed treatment and is on surveillance, it would reasonable to switch to an oral agent.  I discussed 2 options, warfarin or one of the direct oral anticoagulants, such as Xarelto.  We discussed the advantages and disadvantages of both.  With warfarin, she would require frequent blood draws to check her INR to keep in range of 2-3.  Warfarin has more interactions for food and medications.  Xarelto would not require blood draws for monitoring, and would be a steady dose with less interactions with food or medications.  However, currently, there is no known reversal agent for Xarelto in case of life threatening bleeding.      After some discussion, patient decided that she wants to switch to Xarelto.    -she will stop Lovenox  -she will start Xarelto: 15 mg po twice a day x 21 days, and then 20 mg po daily - prescription written  -she had pharmacy counseling today  -I offered another appointment here in 3-6 months to check on how she's doing on the medication.  She says that she has an appointment with Dr. Ennis in December so she will continue follow-up's with her for now.  -if there are any hematologic issues or questions that arise, I am happy to see her back in clinic anytime      I spent a total of 45 minutes with the patient, with over >50% of the time in counseling and/or coordination of care.          Kavya Barahona MD  Hematology/Oncology  [facility]

## 2017-10-12 NOTE — NURSING NOTE
"Oncology Rooming Note    October 12, 2017 2:51 PM   Tosin Nina is a 71 year old female who presents for:    Chief Complaint   Patient presents with     Oncology Clinic Visit     New Consult-Referred by Dr. Ennis     Initial Vitals: Resp 16  Ht 1.499 m (4' 11\")  Wt 76 kg (167 lb 9.6 oz)  BMI 33.85 kg/m2 Estimated body mass index is 33.85 kg/(m^2) as calculated from the following:    Height as of this encounter: 1.499 m (4' 11\").    Weight as of this encounter: 76 kg (167 lb 9.6 oz). Body surface area is 1.78 meters squared.  Mild Pain (2) Comment: Right arm pain   No LMP recorded. Patient is postmenopausal.  Allergies reviewed: Yes  Medications reviewed: Yes    Medications: Medication refills not needed today.  Pharmacy name entered into [employer]Â®:    [facility] [address] [place] [address] [address] [address] [facility]    Clinical concerns:New Consult, would like to discuss coming off of Lovenox. Referred by .    8 minutes for nursing intake (face to face time)   DISCHARGE PLAN:  Next appointments: See patient instruction section  Departure Mode: Ambulatory  Accompanied by: -Christiano  0 minutes for nursing discharge (face to face time)   Teresa Sanches                "

## 2017-10-12 NOTE — LETTER
10/12/2017         RE: Tosin Nina  [id number]  [place] [facility] [place] [address]        Dear Colleague,    Thank you for referring your patient, Tosin Nina, to the [facility]. Please see a copy of my visit note below.    [facility]    Hematology/Oncology New Patient Note      Today's Date: 10/12/17    Reason for Consult: pulmonary embolism, anticoagulation concerns      HISTORY OF PRESENT ILLNESS: Tosin Nina is a 71 year old female who presents with hisory of pulmonary embolism.  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 Nessa Ennis.  She completed 6 cycles of carboplatin+paclitaxel in 8/31/17.  In 4/11/17, she was found to have right lower and upper lobar pulmonary emboli.  She was started on therapeutic dose Lovenox at the time.    Namita comes in with her  Christiano today.  She says that she has been getting bruising and hard spots on her abdomen from the Lovenox injection and wonders if she can switch to a pill.  She denies frequent falls.  She is wheelchair bound.  She denies any significant bleeding episodes with the Lovenox.          REVIEW OF SYSTEMS:   14 point ROS was reviewed and is negative other than as noted above in HPI.       HOME MEDICATIONS:  Current Outpatient Prescriptions   Medication Sig Dispense Refill     enoxaparin (LOVENOX) 80 MG/0.8ML injection Inject 0.6 mLs (60 mg) Subcutaneous every 12 hours 60 Syringe 1     Potassium Chloride ER 20 MEQ TBCR Take 1 tablet (20 mEq) by mouth 2 times daily 60 tablet 3     omeprazole (PRILOSEC) 20 MG CR capsule Take 1 capsule (20 mg) by mouth every morning 30 capsule 3     magnesium chloride 535 (64 MG) MG TBCR CR tablet Take 1 tablet (535 mg) by mouth daily 30 tablet 3     hydrochlorothiazide (HYDRODIURIL) 25 MG tablet Take 25 mg by mouth daily   0     senna-docusate (SENOKOT-S;PERICOLACE) 8.6-50 MG per tablet Take  "1-2 tablets by mouth 2 times daily Hold for loose stools 60 tablet 1     ESCITALOPRAM OXALATE PO Take 10 mg by mouth daily       levothyroxine (SYNTHROID) 125 MCG tablet Take 1 tablet (125 mcg) by mouth daily 30 tablet      Vitamin D, Cholecalciferol, 1000 UNITS TABS Take 2,000 Units by mouth daily (Patient taking differently: Take 2,000 Units by mouth daily LD 6/20/17, told to hold until after surgery) 60 tablet          ALLERGIES:  Allergies   Allergen Reactions     Amoxicillin Hives     Clarithromycin Other (See Comments)     \"I felt dopey, sleepy and like a druggie\"     Lisinopril Cough     Penicillins Rash         PAST MEDICAL HISTORY:  Past Medical History:   Diagnosis Date     Anemia      Cervical spinal stenosis      Depression      GERD (gastroesophageal reflux disease)      Hypertension      Hypokalemia      Hypothyroid      Lumbar spinal stenosis      Obesity      Ovarian cancer (H)      Paraneoplastic neuromyopathy and neuropathy (H)      PE (pulmonary thromboembolism) (H)      Thrombocytopenia (H)          PAST SURGICAL HISTORY:  Past Surgical History:   Procedure Laterality Date     AS TOTAL KNEE ARTHROPLASTY Left      BACK SURGERY  2009     CHOLECYSTECTOMY  01/01/2016     CYSTOSCOPY N/A 6/28/2017    Procedure: CYSTOSCOPY;;  Surgeon: Nessa Christina MD;  Location: [facility] OR     DAVINCI HYSTERECTOMY TOTAL, BILATERAL SALPINGO-OOPHORECTMY, NODE DISSECTION, TUMOR STAGING, COMBINED N/A 6/28/2017    Procedure: COMBINED DAVINCI HYSTERECTOMY TOTAL, SALPINGO-OOPHORECTOMY, NODE DISSECTION, TUMOR STAGING;  Robotic total laparoscopic hysterectomy, bilateral salpingo-oophorectomy, omentectomy, lysis of adhesions, tumor debulking, cystoscopy;  Surgeon: Nessa Christina MD;  Location: [facility] OR     OPEN REDUCTION INTERNAL FIXATION WRIST Right 2009     THYROIDECTOMY           SOCIAL HISTORY:  Social History     Social History     Marital status:      Spouse name: Christiano Nina     Number of children: " "1     Years of education: N/A     Occupational History     Current: Retired      Former:       Social History Main Topics     Smoking status: Never Smoker     Smokeless tobacco: Not on file     Alcohol use Yes      Comment: occasionally     Drug use: No     Sexual activity: Not on file     Other Topics Concern     Not on file     Social History Narrative         FAMILY HISTORY:  Family History   Problem Relation Age of Onset     Breast Cancer Mother 69     Heart Failure Mother      Breast Cancer Maternal Grandmother      this is maternal great grandmother     Breast Cancer Maternal Aunt 89     Myocardial Infarction Father      Unknown/Adopted Brother      Unknown/Adopted Maternal Grandfather      Stomach Cancer Paternal Grandmother      Stomach mass-not sure if cancer     Lung Cancer Paternal Grandfather      must[employer] gas in [place]         PHYSICAL EXAM:  Vital signs:  Resp 16  Ht 1.499 m (4' 11\")  Wt 76 kg (167 lb 9.6 oz)  BMI 33.85 kg/m2   GENERAL/CONSTITUTIONAL: No acute distress. Accompanied by .  RESPIRATORY: Clear to auscultation bilaterally. No crackles or wheezing.   CARDIOVASCULAR: Regular rate and rhythm without murmurs, gallops, or rubs.  GASTROINTESTINAL: No tenderness. The patient has normal bowel sounds. No guarding.    MUSCULOSKELETAL: Warm and well-perfused.  NEUROLOGIC: Alert, oriented, answers questions appropriately.  INTEGUMENTARY: No jaundice.  GAIT: Wheelchair bound.      LABS:  CBC RESULTS:   Recent Labs   Lab Test  09/21/17   1300   WBC  3.7*   RBC  2.69*   HGB  9.2*   HCT  28.5*   MCV  106*   MCH  34.2*   MCHC  32.3   RDW  19.5*   PLT  93*       Recent Labs   Lab Test  09/21/17   1300  09/14/17   1120   08/31/17   1005   08/10/17   1105   NA   --    --    --   142   --   140   POTASSIUM  3.6  3.7   < >  3.7   < >  3.8   CHLORIDE   --    --    --   105   --   104   CO2   --    --    --   28   --   29   ANIONGAP   --    --    --   9   --   7   GLC   --    --    --   101*   " --   104*   BUN   --    --    --   7   --   8   CR   --    --    --   0.52   --   0.60   PATTI   --    --    --   8.8   --   9.0    < > = values in this interval not displayed.         ASSESSMENT/PLAN:  Tosin Nina is a 71 year old female with:    Pulmonary embolism: Diagnosed in April 2017 when her cancer was found, and she has been on therapeutic dose Lovenox for ~6 months.  She has been having a lot of discomfort with it with the abdomen injections and wonders if she can stop it or switch to an oral agent.  Considering her cancer history and immobility, she remains at high risk for recurrence of embolus.  I recommended that she continue on anticoagulation indefinitely.  Patient is okay with this, but she would rather switch to an oral agent.  The data is best for Lovenox in treatment of emboli in setting of active malignancy, but considering patient has completed treatment and is on surveillance, it would reasonable to switch to an oral agent.  I discussed 2 options, warfarin or one of the direct oral anticoagulants, such as Xarelto.  We discussed the advantages and disadvantages of both.  With warfarin, she would require frequent blood draws to check her INR to keep in range of 2-3.  Warfarin has more interactions for food and medications.  Xarelto would not require blood draws for monitoring, and would be a steady dose with less interactions with food or medications.  However, currently, there is no known reversal agent for Xarelto in case of life threatening bleeding.      After some discussion, patient decided that she wants to switch to Xarelto.    -she will stop Lovenox  -she will start Xarelto: 15 mg po twice a day x 21 days, and then 20 mg po daily - prescription written  -she had pharmacy counseling today  -I offered another appointment here in 3-6 months to check on how she's doing on the medication.  She says that she has an appointment with Dr. Ennis in December so she will continue follow-up's  with her for now.  -if there are any hematologic issues or questions that arise, I am happy to see her back in clinic anytime      I spent a total of 45 minutes with the patient, with over >50% of the time in counseling and/or coordination of care.         Kavya Barahona MD  Hematology/Oncology  [facility]      Pharmacy note: Xarelto    I discussed her dose and taking 15 mg PO twice daily after meals x 21 days and then 20 mg PO daily.  She had an asymptomatic PE.  We also discussed watching for signs/sx of bleeding and to call if seen.  She verbalized understanding.    Martin Meese, [facility]      Again, thank you for allowing me to participate in the care of your patient.        Sincerely,        Kavya Barahona MD

## 2017-10-12 NOTE — PROGRESS NOTES
Pharmacy note: Xarelto    I discussed her dose and taking 15 mg PO twice daily after meals x 21 days and then 20 mg PO daily.  She had an asymptomatic PE.  We also discussed watching for signs/sx of bleeding and to call if seen.  She verbalized understanding.    Martin Meese, [facility]

## 2017-10-31 ENCOUNTER — ALLIED HEALTH/NURSE VISIT (OUTPATIENT)
Dept: ONCOLOGY | Facility: CLINIC | Age: 72
End: 2017-10-31

## 2017-10-31 DIAGNOSIS — Z71.9 COUNSELING NOS(V65.40): Primary | ICD-10-CM

## 2017-10-31 NOTE — MR AVS SNAPSHOT
After Visit Summary   10/31/2017    Tosin Nina    MRN: [id number]           Patient Information     Date Of Birth          1945        Visit Information        Provider Department      10/31/2017 3:39 PM Abigail Quijano LICSW [facility]        Today's Diagnoses     Counseling NOS(V65.40)    -  1       Follow-ups after your visit        Your next 10 appointments already scheduled     Nov 02, 2017  3:00 PM CDT   (Arrive by 2:45 PM)   Return Visit with James Lopez MD   [facility] ([facility])    [address] [place] [address]  3rd [facility] [address]   [phone]            Dec 22, 2017 11:00 AM CST   Level 1 with RH INFUSION CHAIR 1   [facility] ([facility])    [facility]  [address]  [facility] [address]   [phone]            Dec 26, 2017 10:00 AM CST   Level 1 with RH INFUSION CHAIR 8   [facility] ([facility])    [facility]  [address]  [facility] [address]   [phone]            Jan 02, 2018 10:30 AM CST   Return Visit with Nessa Foster MD   [facility] ([facility])    [facility]  [address]  [facility] [address] [phone]              Who to contact     If you have questions or need follow up information about today's clinic visit or your schedule please contact [facility] directly at [phone].  Normal or non-critical lab and imaging results will be communicated to you by MyChart, letter or phone within 4 business days after the clinic has received the results. If you do not hear from us within 7 days, please contact the clinic through MyChart or phone. If you have a critical or abnormal lab result, we will notify you by phone as soon  as possible.  Submit refill requests through [employer] or call your pharmacy and they will forward the refill request to us. Please allow 3 business days for your refill to be completed.          Additional Information About Your Visit        [employer] Information     [employer] gives you secure access to your electronic health record. If you see a primary care provider, you can also send messages to your care team and make appointments. If you have questions, please call your primary care clinic.  If you do not have a primary care provider, please call [phone] and they will assist you.        Care EveryWhere ID     This is your Care EveryWhere ID. This could be used by other organizations to access your [place] medical records  [id number]         Blood Pressure from Last 3 Encounters:   10/12/17 128/81   10/03/17 125/80   09/21/17 127/62    Weight from Last 3 Encounters:   10/12/17 76 kg (167 lb 9.6 oz)   10/03/17 75.3 kg (166 lb)   09/07/17 74.4 kg (164 lb 0.4 oz)              Today, you had the following     No orders found for display         Today's Medication Changes          These changes are accurate as of: 10/31/17  4:14 PM.  If you have any questions, ask your nurse or doctor.               These medicines have changed or have updated prescriptions.        Dose/Directions    Vitamin D (Cholecalciferol) 1000 UNITS Tabs   This may have changed:  additional instructions   Used for:  Paresthesias        Dose:  2000 Units   Take 2,000 Units by mouth daily   Quantity:  60 tablet   Refills:  0                Primary Care Provider Office Phone # Fax #    Esther Back [phone]       [place] [address] [place] [id number]        Equal Access to Services     [facility] AH: Hadii zita Alves, jesus martinez, qaybhetal howard. So [facility] [phone].    ATENCIÓN: Si habla español, tiene a alarcon disposición servicios  kadi de asistencia lingüística. Ronnie carbajal [phone].    We comply with applicable federal civil rights laws and [place] laws. We do not discriminate on the basis of race, color, national origin, age, disability, sex, sexual orientation, or gender identity.            Thank you!     Thank you for choosing [facility]  for your care. Our goal is always to provide you with excellent care. Hearing back from our patients is one way we can continue to improve our services. Please take a few minutes to complete the written survey that you may receive in the mail after your visit with us. Thank you!             Your Updated Medication List - Protect others around you: Learn how to safely use, store and throw away your medicines at www.disposemymeds.org.          This list is accurate as of: 10/31/17  4:14 PM.  Always use your most recent med list.                   Brand Name Dispense Instructions for use Diagnosis    ESCITALOPRAM OXALATE PO      Take 10 mg by mouth daily        hydrochlorothiazide 25 MG tablet    HYDRODIURIL     Take 25 mg by mouth daily    Ovarian cancer, unspecified laterality (H)       levothyroxine 125 MCG tablet    SYNTHROID    30 tablet    Take 1 tablet (125 mcg) by mouth daily    Other pulmonary embolism without acute cor pulmonale, unspecified chronicity (H)       magnesium chloride 535 (64 MG) MG Tbcr CR tablet     30 tablet    Take 1 tablet (535 mg) by mouth daily    Ovarian cancer, unspecified laterality (H)       omeprazole 20 MG CR capsule    priLOSEC    30 capsule    Take 1 capsule (20 mg) by mouth every morning    Ovarian cancer, unspecified laterality (H), Dyspepsia       Potassium Chloride ER 20 MEQ Tbcr     60 tablet    Take 1 tablet (20 mEq) by mouth 2 times daily    Hypokalemia       * rivaroxaban ANTICOAGULANT 15 MG Tabs tablet    XARELTO    42 tablet    Take 1 tablet (15 mg) by mouth 2 times daily (with meals)    Other acute pulmonary embolism without acute cor  pulmonale (H)       * rivaroxaban ANTICOAGULANT 20 MG Tabs tablet   Start taking on:  11/3/2017    XARELTO    30 tablet    Take 1 tablet (20 mg) by mouth daily (with dinner)    Other acute pulmonary embolism without acute cor pulmonale (H)       senna-docusate 8.6-50 MG per tablet    SENOKOT-S;PERICOLACE    60 tablet    Take 1-2 tablets by mouth 2 times daily Hold for loose stools    Ovarian cancer, unspecified laterality (H)       Vitamin D (Cholecalciferol) 1000 UNITS Tabs     60 tablet    Take 2,000 Units by mouth daily    Paresthesias       * Notice:  This list has 2 medication(s) that are the same as other medications prescribed for you. Read the directions carefully, and ask your doctor or other care provider to review them with you.

## 2017-10-31 NOTE — PROGRESS NOTES
Social Work Progress Note      Data/Intervention:  Patient Name:  Tosin Nina  /Age:  1945 (72 year old)    Reason for Follow-Up:  Mrs. 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 Nessa Ennis.  She completed 6 cycles of carboplatin+paclitaxel on 17. This clinician received referral from amy Jc for additional psychosocial support. This clinician called pt per Mr. Jc's request and introduced pt to social work services and support.      Intervention:   Mrs. Nina reports that she lives in multi-level home with her . Pt reports that she had chair lift placed last week which now enables her to get to and from bedroom and main floor much easier. Pt reports that she still needs to cope with 7 stairs, but that she receives full-time assistance during the day from her  as she is wheelchair bound. Pt reports that  does meal prep, assists with bathing (as daughter who usually assists with this has own medical concerns at this time).      This clinician provided information about range of SW support available from resource assistance in community to emotional support. Pt acknowledged that she feels well supported at this point in time, but would appreciate pamphlet of information that she had received when first starting treatment about wig resources. Pt reported that she would like  to stop by clinic and  resources when able.     Resources Provided:  This clinician created packet with the following information: survivorship program at , survivorship booklet ACS, [employer] catalog for wig support, local wig vendors, [employer] programming, financial support, local support groups, nutrition works classes, [facility] contact.     This clinician also provided pt with this clinician's contact information if in the case of psychosocial need or distress.    Plan:  Pt appears to be  coping well and acknowledged that she would reach out to this clinician if in the case of psychosocial distress or need. This clinician will try to introduce self to pt at next visit with Dr. Chandler on 12/26/17.     Please call or page if needs or concerns arise.     CEASAR Hays, LincolnHealthSW  Direct Phone: [phone]  Pager: [phone]

## 2017-11-02 ENCOUNTER — OFFICE VISIT (OUTPATIENT)
Dept: NEUROLOGY | Facility: CLINIC | Age: 72
End: 2017-11-02

## 2017-11-02 VITALS — HEART RATE: 95 BPM | DIASTOLIC BLOOD PRESSURE: 64 MMHG | HEIGHT: 59 IN | SYSTOLIC BLOOD PRESSURE: 128 MMHG

## 2017-11-02 DIAGNOSIS — R26.9 GAIT DISORDER: Primary | ICD-10-CM

## 2017-11-02 DIAGNOSIS — G11.9 CEREBELLAR ATAXIA (H): ICD-10-CM

## 2017-11-02 DIAGNOSIS — R26.9 GAIT DISORDER: ICD-10-CM

## 2017-11-02 LAB — VIT B12 SERPL-MCNC: 507 PG/ML (ref 193–986)

## 2017-11-02 ASSESSMENT — PAIN SCALES - GENERAL: PAINLEVEL: NO PAIN (0)

## 2017-11-02 NOTE — NURSING NOTE
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

## 2017-11-02 NOTE — MR AVS SNAPSHOT
"              After Visit Summary   11/2/2017    Tosin Nina    MRN: [id number]           Patient Information     Date Of Birth          1945        Visit Information        Provider Department      11/2/2017 3:00 PM James Lopez MD [facility]        Today's Diagnoses     Gait disorder    -  1    Cerebellar ataxia (H)           Follow-ups after your visit        Additional Services     PHYSICAL THERAPY REFERRAL       *This therapy referral will be filtered to a centralized scheduling office at [facility] and the patient will receive a call to schedule an appointment at a [place] location most convenient for them. *     [facility] provides Physical Therapy evaluation and treatment and many specialty services across the [place] system.  If requesting a specialty program, please choose from the list below.    If you have not heard from the scheduling office within 2 business days, please call [phone] for all locations, with the exception of [place], please call [phone].  Treatment: Evaluation & Treatment  Special Instructions/Modalities: none  Special Programs: may need Home PT eval    Please be aware that coverage of these services is subject to the terms and limitations of your health insurance plan.  Call member services at your health plan with any benefit or coverage questions.      **Note to Provider:  If you are referring outside of [place] for the therapy appointment, please list the name of the location in the \"special instructions\" above, print the referral and give to the patient to schedule the appointment.                  Follow-up notes from your care team     Return if symptoms worsen or fail to improve.      Your next 10 appointments already scheduled     Nov 02, 2017  3:45 PM CDT   LAB with [facility] ([facility])    [address] [place] [address] "   [phone]           Please do not eat 10-12 hours before your appointment if you are coming in fasting for labs on lipids, cholesterol, or glucose (sugar). This does not apply to pregnant women. Water, hot tea and black coffee (with nothing added) are okay. Do not drink other fluids, diet soda or chew gum.            Dec 22, 2017 11:00 AM CST   Level 1 with RH INFUSION CHAIR 1   [facility] ([facility])    [place] [address]  [facility] [address]   [phone]            Dec 26, 2017 10:00 AM CST   Level 1 with RH INFUSION CHAIR 8   [facility] ([facility])    [facility]  [address]  [facility] [address]   [phone]            Jan 02, 2018 10:30 AM CST   Return Visit with Nessa Foster MD   [facility] ([facility])    [address]  [facility] [address]   [phone]              Future tests that were ordered for you today     Open Future Orders        Priority Expected Expires Ordered    Vitamin B12 Routine  11/2/2018 11/2/2017    Methylmalonic acid Routine  11/2/2018 11/2/2017            Who to contact     Please call your clinic at [phone] to:    Ask questions about your health    Make or cancel appointments    Discuss your medicines    Learn about your test results    Speak to your doctor   If you have compliments or concerns about an experience at your clinic, or if you wish to file a complaint, please contact [facility] Patient Relations at [phone] or email us at [email or web address]         Additional Information About Your Visit        MyChart Information     [employer] gives you secure access to your electronic health record. If you see a primary care provider, you can also  "send messages to your care team and make appointments. If you have questions, please call your primary care clinic.  If you do not have a primary care provider, please call [phone] and they will assist you.      [employer] is an electronic gateway that provides easy, online access to your medical records. With [employer], you can request a clinic appointment, read your test results, renew a prescription or communicate with your care team.     To access your existing account, please contact your [facility] or call [phone] for assistance.        Care EveryWhere ID     This is your Care EveryWhere ID. This could be used by other organizations to access your [place] medical records  [id number]        Your Vitals Were     Pulse Height                95 1.499 m (4' 11\")           Blood Pressure from Last 3 Encounters:   11/02/17 128/64   10/12/17 128/81   10/03/17 125/80    Weight from Last 3 Encounters:   10/12/17 76 kg (167 lb 9.6 oz)   10/03/17 75.3 kg (166 lb)   09/07/17 74.4 kg (164 lb 0.4 oz)              We Performed the Following     PHYSICAL THERAPY REFERRAL          Today's Medication Changes          These changes are accurate as of: 11/2/17  3:18 PM.  If you have any questions, ask your nurse or doctor.               These medicines have changed or have updated prescriptions.        Dose/Directions    Vitamin D (Cholecalciferol) 1000 UNITS Tabs   This may have changed:  additional instructions   Used for:  Paresthesias        Dose:  2000 Units   Take 2,000 Units by mouth daily   Quantity:  60 tablet   Refills:  0                Primary Care Provider Office Phone # Fax #    Esther Back [phone]       [facility] [address] [place] [address]        Equal Access to Services     [facility] AH: Zechariah Alves, jesus martinez, hetal bonner. So wa " [phone].    ATENCIÓN: Si ej guidry, tiene a alarcon disposición servicios gratuitos de asistencia lingüística. Ronnie carbajal [phone].    We comply with applicable federal civil rights laws and [place] laws. We do not discriminate on the basis of race, color, national origin, age, disability, sex, sexual orientation, or gender identity.            Thank you!     Thank you for choosing [facility]UR[facility]  for your care. Our goal is always to provide you with excellent care. Hearing back from our patients is one way we can continue to improve our services. Please take a few minutes to complete the written survey that you may receive in the mail after your visit with us. Thank you!             Your Updated Medication List - Protect others around you: Learn how to safely use, store and throw away your medicines at www.disposemymeds.org.          This list is accurate as of: 11/2/17  3:18 PM.  Always use your most recent med list.                   Brand Name Dispense Instructions for use Diagnosis    ESCITALOPRAM OXALATE PO      Take 10 mg by mouth daily        hydrochlorothiazide 25 MG tablet    HYDRODIURIL     Take 25 mg by mouth daily    Ovarian cancer, unspecified laterality (H)       levothyroxine 125 MCG tablet    SYNTHROID    30 tablet    Take 1 tablet (125 mcg) by mouth daily    Other pulmonary embolism without acute cor pulmonale, unspecified chronicity (H)       magnesium chloride 535 (64 MG) MG Tbcr CR tablet     30 tablet    Take 1 tablet (535 mg) by mouth daily    Ovarian cancer, unspecified laterality (H)       omeprazole 20 MG CR capsule    priLOSEC    30 capsule    Take 1 capsule (20 mg) by mouth every morning    Ovarian cancer, unspecified laterality (H), Dyspepsia       Potassium Chloride ER 20 MEQ Tbcr     60 tablet    Take 1 tablet (20 mEq) by mouth 2 times daily    Hypokalemia       * rivaroxaban ANTICOAGULANT 15 MG Tabs tablet    XARELTO    42 tablet    Take 1 tablet (15 mg) by mouth 2 times  daily (with meals)    Other acute pulmonary embolism without acute cor pulmonale (H)       * rivaroxaban ANTICOAGULANT 20 MG Tabs tablet   Start taking on:  11/3/2017    XARELTO    30 tablet    Take 1 tablet (20 mg) by mouth daily (with dinner)    Other acute pulmonary embolism without acute cor pulmonale (H)       senna-docusate 8.6-50 MG per tablet    SENOKOT-S;PERICOLACE    60 tablet    Take 1-2 tablets by mouth 2 times daily Hold for loose stools    Ovarian cancer, unspecified laterality (H)       Vitamin D (Cholecalciferol) 1000 UNITS Tabs     60 tablet    Take 2,000 Units by mouth daily    Paresthesias       * Notice:  This list has 2 medication(s) that are the same as other medications prescribed for you. Read the directions carefully, and ask your doctor or other care provider to review them with you.

## 2017-11-02 NOTE — LETTER
11/2/2017       RE: Tosin Nina  [address]  [facility] [address]     Dear Colleague,    Thank you for referring your patient, Tosin Nina, to the [facility] NEUROLOGY at [facility]. Please see a copy of my visit note below.    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  She has ovarian cancer.  She is here today with her , Christiano and her son, Emmanuel.  The main issue is that she wants to be more mobile.  She did have physical therapy after her cancer surgery and the therapy continued after she went home up until mid-August.  I had seen her in June.  At that time she was admitted to the hospital for a 5-day course of methylprednisolone and IVIG.  She did not benefit from therapy.  She feels that her strength is good, but she continues to be quite clumsy.  Whenever she gets out of her wheelchair, she has to hold on to something.  She has a walker at home but hardly ever uses it.  She has not been falling.  It is especially difficult for her to get out of a chair or couch.  She is clumsy in her left hand more so than the right.  She also has tingling in her hands, but it is felt to be due to chemotherapy.  She also has tingling in her feet and in the left lower leg.      PHYSICAL EXAMINATION:   GENERAL:   The patient is cooperative and in no distress.   VITAL SIGNS:  Her blood pressure is 128/64.     NEUROLOGIC:   There is obvious ataxia on finger-nose-finger, left more than right, and also heel-knee-shin.  She was able to stand up out of a chair with 2-person assistance but is quite unsteady and I did not attempt to have her ambulate.  Reflexes are present at the knees and absent at the ankles.  Toes are downgoing.      ASSESSMENT:   1.  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   2.  Ovarian cancer.   3.  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      DISCUSSION:  The patient is seen with the above problem list.  At this point, I am going to check a vitamin B12 level.  I am going refer her back to physical therapy to see if more could be done to improve her mobility.  She may need to see Occupational Therapy as well.  I also discussed doing an electrodiagnostic study to make sure she does not have entrapment neuropathy or some other condition.  She would just as soon hold off on that for now and I think that is reasonable.  I can see her in followup on an as needed basis in the next 2 or 3 months.  They know to call me if they have questions or concerns.      Radha Lopez MD      cc:   Nessa Foster MD   [facility]    [address] [place] [address], [address] [address]         RADHA LOPEZ MD             D: 2017 15:31   T: 2017 07:34   MT: AMARIS      Name:     ZAHIDA DUFF   MRN:      [id number]        Account:      [id number]   :      1945           Service Date: 2017      Document: [id number]           addendum: reviewed normal B12 of 500 and normal MMA with pt.    Again, thank you for allowing me to participate in the care of your patient.      Sincerely,    Radha Lopez MD

## 2017-11-03 ENCOUNTER — TELEPHONE (OUTPATIENT)
Dept: ONCOLOGY | Facility: CLINIC | Age: 72
End: 2017-11-03

## 2017-11-03 NOTE — TELEPHONE ENCOUNTER
Pt's  is calling.  States that pt will need new Rx for xarelto 20 mg daily.  States that pt has almost completed her Rx for xarelto 15 mg BID for 21 days, and will need to change to xarelto 20 mg daily. Pt's  states that pt is doing well with the new medication (xarelto)--no side effects and no signs of bleeding.  Per chart review, Dr Barahona already sent in Rx for xarelto 20 mg tablets on 10/12/2017, to be taken after completing the Rx for xarelto 15 mg BID for 21 days.  Called [facility] pharmacy where the Rx for xarelto 20 mg was sent, but they state that they have no record of this Rx. Gave verbal order over the phone per the Rx that was sent in by Dr Barahona on 10/12/2017 for xarelto 20 mg daily.  Pt's  has been notified. Patient's  verbalized understanding and agreement with plan.  Pt's  was instructed to call the clinic with any questions, concerns, or worsening symptoms.   Anna Goodwin RN BSN

## 2017-11-03 NOTE — PROGRESS NOTES
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  She has ovarian cancer.  She is here today with her , Christiano and her son, Emmanuel.  The main issue is that she wants to be more mobile.  She did have physical therapy after her cancer surgery and the therapy continued after she went home up until mid-August.  I had seen her in June.  At that time she was admitted to the hospital for a 5-day course of methylprednisolone and IVIG.  She did not benefit from therapy.  She feels that her strength is good, but she continues to be quite clumsy.  Whenever she gets out of her wheelchair, she has to hold on to something.  She has a walker at home but hardly ever uses it.  She has not been falling.  It is especially difficult for her to get out of a chair or couch.  She is clumsy in her left hand more so than the right.  She also has tingling in her hands, but it is felt to be due to chemotherapy.  She also has tingling in her feet and in the left lower leg.      PHYSICAL EXAMINATION:   GENERAL:   The patient is cooperative and in no distress.   VITAL SIGNS:  Her blood pressure is 128/64.     NEUROLOGIC:   There is obvious ataxia on finger-nose-finger, left more than right, and also heel-knee-shin.  She was able to stand up out of a chair with 2-person assistance but is quite unsteady and I did not attempt to have her ambulate.  Reflexes are present at the knees and absent at the ankles.  Toes are downgoing.      ASSESSMENT:   1.  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   2.  Ovarian cancer.   3.  Neuropathy, likely due to chemotherapy.      DISCUSSION:  The patient is seen with the above problem list.  At this point, I am going to check a vitamin B12 level.  I am going refer her back to physical therapy to see if more could be done to improve her mobility.  She may need to see Occupational Therapy as well.  I also  discussed doing an electrodiagnostic study to make sure she does not have entrapment neuropathy or some other condition.  She would just as soon hold off on that for now and I think that is reasonable.  I can see her in followup on an as needed basis in the next 2 or 3 months.  They know to call me if they have questions or concerns.      Radha Lopez MD      cc:   Nessa Foster MD   [facility]    [address] [place] [address], [address] [address]         RADHA LOPEZ MD             D: 2017 15:31   T: 2017 07:34   MT: AMARIS      Name:     ZAHIDA DUFF   MRN:      [id number]        Account:      [id number]   :      1945           Service Date: 2017      Document: [id number]           addendum: reviewed normal B12 of 500 and normal MMA with pt.

## 2017-11-04 LAB — METHYLMALONATE SERPL-SCNC: 0.28 UMOL/L (ref 0–0.4)

## 2017-11-14 ENCOUNTER — INFUSION THERAPY VISIT (OUTPATIENT)
Dept: INFUSION THERAPY | Facility: CLINIC | Age: 72
End: 2017-11-14
Attending: OBSTETRICS & GYNECOLOGY
Payer: COMMERCIAL

## 2017-11-14 DIAGNOSIS — C56.9 MALIGNANT NEOPLASM OF OVARY, UNSPECIFIED LATERALITY (H): Primary | ICD-10-CM

## 2017-11-14 PROCEDURE — 96523 IRRIG DRUG DELIVERY DEVICE: CPT

## 2017-11-14 NOTE — PROGRESS NOTES
Infusion Nursing Note:  Tosin Nina presents today for port flush.    Patient seen by provider today: No   present during visit today: Not Applicable.    Note: N/A.    Intravenous Access:  Implanted Port.    Treatment Conditions:  Not Applicable.      Post Infusion Assessment:  Blood return noted pre and post infusion.  Site patent and intact, free from redness, edema or discomfort.  No evidence of extravasations.  Access discontinued per protocol.    Discharge Plan:   Discharge instructions reviewed with: Patient and Family.  Patient and/or family verbalized understanding of discharge instructions and all questions answered.  AVS to patient via [employer].  Patient will return 12/22/17 for next appointment.   Patient discharged in stable condition accompanied by: .  Departure Mode: Wheelchair.    Olivia Golden RN

## 2017-11-14 NOTE — MR AVS SNAPSHOT
After Visit Summary   11/14/2017    Tosin Nina    MRN: [id number]           Patient Information     Date Of Birth          1945        Visit Information        Provider Department      11/14/2017 10:15 AM RH LAB DRAW 1 [facility]        Today's Diagnoses     Malignant neoplasm of ovary, unspecified laterality (H)    -  1       Follow-ups after your visit        Your next 10 appointments already scheduled     Nov 22, 2017  2:30 PM CST   Neuro Eval with Marilu Cleary, PT   [facility] ([facility])    [address]  [facility] [address]   [phone]            Dec 22, 2017 11:00 AM CST   Level 1 with RH INFUSION CHAIR 1   [facility] ([facility])    [facility]  [address]  [facility] [id number] [phone]            Jan 02, 2018 10:30 AM CST   Return Visit with Nessa Foster MD   [facility] ([facility])    [facility]  [address]  [facility] [id number] [phone]              Who to contact     If you have questions or need follow up information about today's clinic visit or your schedule please contact [facility] directly at [phone].  Normal or non-critical lab and imaging results will be communicated to you by MyChart, letter or phone within 4 business days after the clinic has received the results. If you do not hear from us within 7 days, please contact the clinic through MyChart or phone. If you have a critical or abnormal lab result, we will notify you by phone as soon as possible.  Submit refill requests through [employer] or call your pharmacy and they will forward the refill request to us. Please allow 3 business days for your refill to be completed.          Additional  Information About Your Visit        MyChart Information     [employer] gives you secure access to your electronic health record. If you see a primary care provider, you can also send messages to your care team and make appointments. If you have questions, please call your primary care clinic.  If you do not have a primary care provider, please call [phone] and they will assist you.        Care EveryWhere ID     This is your Care EveryWhere ID. This could be used by other organizations to access your [place] medical records  [id number]         Blood Pressure from Last 3 Encounters:   11/02/17 128/64   10/12/17 128/81   10/03/17 125/80    Weight from Last 3 Encounters:   10/12/17 76 kg (167 lb 9.6 oz)   10/03/17 75.3 kg (166 lb)   09/07/17 74.4 kg (164 lb 0.4 oz)              Today, you had the following     No orders found for display         Today's Medication Changes          These changes are accurate as of: 11/14/17 10:55 AM.  If you have any questions, ask your nurse or doctor.               These medicines have changed or have updated prescriptions.        Dose/Directions    Vitamin D (Cholecalciferol) 1000 UNITS Tabs   This may have changed:  additional instructions   Used for:  Paresthesias        Dose:  2000 Units   Take 2,000 Units by mouth daily   Quantity:  60 tablet   Refills:  0                Primary Care Provider Office Phone # Fax #    Esther Back [phone]       [facility] [address] [place] [address]        Equal Access to Services     CITLALLI CUELLO AH: Hadii zita fields hadmaximo Sohollyali, waaxda luqadaha, qaybta kaalmada adeegmonique, hetal regan . So [facility] [phone].    ATENCIÓN: Si habla español, tiene a alarcon disposición servicios gratuitos de asistencia lingüística. Llame al [phone].    We comply with applicable federal civil rights laws and [place] laws. We do not discriminate on the basis of race, color, national origin,  age, disability, sex, sexual orientation, or gender identity.            Thank you!     Thank you for choosing [facility]  for your care. Our goal is always to provide you with excellent care. Hearing back from our patients is one way we can continue to improve our services. Please take a few minutes to complete the written survey that you may receive in the mail after your visit with us. Thank you!             Your Updated Medication List - Protect others around you: Learn how to safely use, store and throw away your medicines at www.disposemymeds.org.          This list is accurate as of: 11/14/17 10:55 AM.  Always use your most recent med list.                   Brand Name Dispense Instructions for use Diagnosis    ESCITALOPRAM OXALATE PO      Take 10 mg by mouth daily        hydrochlorothiazide 25 MG tablet    HYDRODIURIL     Take 25 mg by mouth daily    Ovarian cancer, unspecified laterality (H)       levothyroxine 125 MCG tablet    SYNTHROID    30 tablet    Take 1 tablet (125 mcg) by mouth daily    Other pulmonary embolism without acute cor pulmonale, unspecified chronicity (H)       magnesium chloride 535 (64 MG) MG Tbcr CR tablet     30 tablet    Take 1 tablet (535 mg) by mouth daily    Ovarian cancer, unspecified laterality (H)       omeprazole 20 MG CR capsule    priLOSEC    30 capsule    Take 1 capsule (20 mg) by mouth every morning    Ovarian cancer, unspecified laterality (H), Dyspepsia       Potassium Chloride ER 20 MEQ Tbcr     60 tablet    Take 1 tablet (20 mEq) by mouth 2 times daily    Hypokalemia       * rivaroxaban ANTICOAGULANT 15 MG Tabs tablet    XARELTO    42 tablet    Take 1 tablet (15 mg) by mouth 2 times daily (with meals)    Other acute pulmonary embolism without acute cor pulmonale (H)       * rivaroxaban ANTICOAGULANT 20 MG Tabs tablet    XARELTO    30 tablet    Take 1 tablet (20 mg) by mouth daily (with dinner)    Other acute pulmonary embolism  without acute cor pulmonale (H)       senna-docusate 8.6-50 MG per tablet    SENOKOT-S;PERICOLACE    60 tablet    Take 1-2 tablets by mouth 2 times daily Hold for loose stools    Ovarian cancer, unspecified laterality (H)       Vitamin D (Cholecalciferol) 1000 UNITS Tabs     60 tablet    Take 2,000 Units by mouth daily    Paresthesias       * Notice:  This list has 2 medication(s) that are the same as other medications prescribed for you. Read the directions carefully, and ask your doctor or other care provider to review them with you.

## 2017-11-22 ENCOUNTER — HOSPITAL ENCOUNTER (OUTPATIENT)
Dept: PHYSICAL THERAPY | Facility: CLINIC | Age: 72
Setting detail: THERAPIES SERIES
End: 2017-11-22
Attending: PSYCHIATRY & NEUROLOGY
Payer: COMMERCIAL

## 2017-11-22 PROCEDURE — 97110 THERAPEUTIC EXERCISES: CPT | Mod: GP

## 2017-11-22 PROCEDURE — 97161 PT EVAL LOW COMPLEX 20 MIN: CPT | Mod: GP

## 2017-11-22 PROCEDURE — G8979 MOBILITY GOAL STATUS: HCPCS | Mod: GP,CJ

## 2017-11-22 PROCEDURE — G8978 MOBILITY CURRENT STATUS: HCPCS | Mod: GP,CL

## 2017-11-22 PROCEDURE — 40000185 ZZHC STATISTIC PT OUTPT VISIT

## 2017-11-22 NOTE — PROGRESS NOTES
11/22/17 1400   Quick Adds   Type of Visit Initial OP PT Evaluation   General Information   Start of Care Date 11/22/17   Referring Physician James Lopez MD   Orders Evaluate and Treat as Indicated   Order Date 11/02/17   Medical Diagnosis Cerebellar ataxia (H) G11.9    Onset of illness/injury or Date of Surgery 03/01/17  (March 2017)   Precautions/Limitations fall precautions   Special Instructions may need Home PT eval   Surgical/Medical history reviewed Yes   Pertinent history of current problem (include personal factors and/or comorbidities that impact the POC) Pt is a 72-year-old woman who has 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 Nessa Ennis.  She completed 6 cycles of carboplatin+paclitaxel on 8/31/17.  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  She also has a hx of right lower and upper lobar pulmonary emboli (4/11/17).  Pt also has neuropathy, likely due to chemotherapy.  Pt underwent robotic assisted total laparoscopic hysterectomy, bilateral salpingo-oophorectomy, omentectomy, lysis of adhesions, tumor debulking, cystoscopy in June 2017.     Pertinent Visual History  Pt reports visual impairments - says she doesn't see things as sharp or clear anymore.    Prior level of function comment Prior to March 2017, pt was (I) with mobility and ADL's.    Current Community Support Family/friend caregiver   Patient role/Employment history Retired   Living environment House/[facility]   Home/Community Accessibility Comments Pt lives with her  in a home with 1 step with handrail to enter and then pt states she walks ~10' with a walker to the stairs that will take her to the main level (7 stairs with 2 handrails which she is able to navigate).  Pt states she then uses a w/c on the main level.  Pt then uses a chair lift to access the upper level where the  "bedroom and bathroom are located.  Pt's  assists her with transfers, limited gait, and with ADL's and her daughter also helps on occasion although she lives 1+ hours away.  Pt's daughter will be having back surgery and her  is having surgery for skin cancer so they may need outside assistance.  They have a  and have received information about home care services.    Current Assistive Devices Front Wheeled Walker;Manual Wheel Chair;Gait Belt   ADL Devices Shower/Tub Chair;Shower/Tub Grab Bar;Raised Toilet Seat  with Arms;Wall Grab Bar   Assistive Devices Comments Pt's  assists her with transfers and pt reports shs is able to walk ~10' with a FWW and assist of her .  She has assist with all ADL's and has a shower chair, grab bars, and a raised toilet seat with arms.    Patient/Family Goals Statement Pt reports she would for her legs to get stronger.    General Information Comments Pt reports she was diagnosed with ovarian cancer and paraneoplastic cerebellar syndrome in April 2017.  She reports that in March she started to have trouble walking and needed to use a cane and then regressed to needing a walker and then a w/c.  She reports having surgery in June 2017 and then went to TCU for 1 week and then had home PT which ended in August.  She reports she finished chemotherapy the first week of October.  She states she was mainly working on seated and standing strengthening exercises with home PT.    Fall Risk Screen   Fall screen completed by PT   Have you fallen 2 or more times in the past year? No   Have you fallen and had an injury in the past year? No   Timed Up and Go score (seconds) 2:45.57   Is patient a fall risk? Yes   Fall screen comments Pt reports 1 recent fall in which she slipped and had no injury.    Pain   Pain comments Pt reports her RUE is \"sore\" and she was told she has arthritis.  She received an injection in the past and this was helpful.   Cognitive Status " "Examination   Orientation orientation to person, place and time   Level of Consciousness alert   Follows Commands and Answers Questions 100% of the time   Personal Safety and Judgment intact   Posture   Posture Forward head position;Protracted shoulders   Strength   Strength Comments functional weakness demonstrated with transfers and gait.   Transfer Skills   Transfer Comments CGA with FWW and UE use   Gait   Gait Comments pt ambulated with FWW and CGA demonstrating step through pattern with increased WB through UE's.  Pt has bilateral LE ataxia (L>R) and demonstrated instability and 2 episodes of LOB but was able to self correct.  PT provided CGA only.    Gait Special Tests   Gait Special Tests 25 FOOT TIMED WALK   Gait Special Tests 25 Foot Timed Walk   Seconds 1:39.50   Comments with FWW   Balance Special Tests   Balance Special Tests Timed up and go;Sit to stand reps   Balance Special Tests Timed Up and Go   Comments 2:45.57 with FWW   Balance Special Tests Sit to Stand Reps in 30 Seconds   Reps in 30 seconds 0   Height 16\"   Sensory Examination   Sensory Perception Comments Reports tingling in both hands (palms only) and all fingers.  She also reprots tingling in both feet (L>R).   Coordination   Coordination Comments ataxia in both LE's (L>R).   Planned Therapy Interventions   Planned Therapy Interventions balance training;gait training;motor coordination training;neuromuscular re-education;strengthening;stretching;transfer training   Clinical Impression   Criteria for Skilled Therapeutic Interventions Met yes, treatment indicated   PT Diagnosis impaired balance and coordination, generalized weakness, deconditioning impacting functional mobility.   Influenced by the following impairments impaired balance and coordination, generalized weakness, deconditioning.   Functional limitations due to impairments impaired functional mobility and impaired functional activity tolerance.   Clinical Presentation " Stable/Uncomplicated   Clinical Presentation Rationale pt's medical condition is currently stable, finished chemotherapy the first week of October.    Clinical Decision Making (Complexity) Low complexity   Therapy Frequency 2 times/Week   Predicted Duration of Therapy Intervention (days/wks) 6 weeks   Risk & Benefits of therapy have been explained Yes   Patient, Family & other staff in agreement with plan of care Yes   Education Assessment   Barriers to Learning No barriers   GOALS   PT Eval Goals 1;2;3;4   Goal 1   Goal Identifier 1   Goal Description Pt will complete at least 4 sit to stands in 30 seconds demonstrating improved functional strength to facilitate increased independence with transfers.    Target Date 01/03/18   Goal 2   Goal Identifier 2   Goal Description Pt will complete TUG in less than 2 minutes using FWW demonstrating improved balance and gait pattern to facilitate increased independence with mobility.    Target Date 01/03/18   Goal 3   Goal Identifier 3   Goal Description Pt will improve score on 25 foot timed walk using FWW by 10 seconds demonstrating improved balance and gait pattern to facilitate increased independence with mobility.    Target Date 01/03/18   Goal 4   Goal Identifier 4   Goal Description Pt and caregiver will demonstrate (I) with strengthening and gait home program to facilitate increased independence with mobility.    Target Date 01/03/18   Total Evaluation Time   Total Evaluation Time (Minutes) 48

## 2017-11-27 ENCOUNTER — TELEPHONE (OUTPATIENT)
Dept: CONSULT | Facility: CLINIC | Age: 72
End: 2017-11-27

## 2017-11-27 NOTE — TELEPHONE ENCOUNTER
Notified [place] that we received prior authorization approval. Provided [place] with estimated out of pocket cost for testing. [place] expressed understanding and stated that she wants to proceed with a blood draw. A  will call to set up blood draw apportionment. [place] had no further questions.    Khadra Ralph    Division of Genetics  [facility] [place]  P: [phone]

## 2017-12-01 ENCOUNTER — HOSPITAL ENCOUNTER (OUTPATIENT)
Dept: PHYSICAL THERAPY | Facility: CLINIC | Age: 72
Setting detail: THERAPIES SERIES
End: 2017-12-01
Attending: PSYCHIATRY & NEUROLOGY
Payer: COMMERCIAL

## 2017-12-01 PROCEDURE — 97112 NEUROMUSCULAR REEDUCATION: CPT | Mod: GP | Performed by: PHYSICAL THERAPIST

## 2017-12-01 PROCEDURE — 40000719 ZZHC STATISTIC PT DEPARTMENT NEURO VISIT: Performed by: PHYSICAL THERAPIST

## 2017-12-01 PROCEDURE — 97116 GAIT TRAINING THERAPY: CPT | Mod: GP | Performed by: PHYSICAL THERAPIST

## 2017-12-05 ENCOUNTER — HOSPITAL ENCOUNTER (OUTPATIENT)
Dept: PHYSICAL THERAPY | Facility: CLINIC | Age: 72
Setting detail: THERAPIES SERIES
End: 2017-12-05
Attending: PSYCHIATRY & NEUROLOGY
Payer: COMMERCIAL

## 2017-12-05 PROCEDURE — 97116 GAIT TRAINING THERAPY: CPT | Mod: GP | Performed by: PHYSICAL THERAPIST

## 2017-12-05 PROCEDURE — 40000719 ZZHC STATISTIC PT DEPARTMENT NEURO VISIT: Performed by: PHYSICAL THERAPIST

## 2017-12-05 PROCEDURE — 97112 NEUROMUSCULAR REEDUCATION: CPT | Mod: GP | Performed by: PHYSICAL THERAPIST

## 2017-12-08 NOTE — ADDENDUM NOTE
Encounter addended by: Merry Santos, PT on: 12/8/2017  7:54 AM<BR>     Actions taken: Flowsheet data copied forward, Flowsheet accepted

## 2017-12-13 DIAGNOSIS — C56.9 OVARIAN CANCER, UNSPECIFIED LATERALITY (H): ICD-10-CM

## 2017-12-13 NOTE — NURSING NOTE
Signed Prescriptions:                        Disp   Refills    magnesium chloride 535 (64 MG) MG TBCR CR *30 tab*3        Sig: Take 1 tablet (535 mg) by mouth daily  Authorizing Provider: TOM HANCOCK     Rx has been signed by Dr Ennis.  Anna Goodwin RN BSN

## 2017-12-13 NOTE — TELEPHONE ENCOUNTER
Pending Prescriptions:                       Disp   Refills    magnesium chloride 535 (64 MG) MG TBCR CR*30 tab*3            Sig: Take 1 tablet (535 mg) by mouth daily          Last Written Prescription Date: 8/30/2017  Last Fill Quantity: 30,  # refills: 3   Last Office Visit with [facility], P or [facility] prescribing provider: 10/3/2017                                         Next 5 appointments (look out 90 days)     Jan 02, 2018 10:30 AM CST   Return Visit with Nessa Foster MD   [facility] ([facility])    [facility]  [address]  [facility] [address] [phone]                  Will route to Dr Ennis for advice regarding refill request.  Anna Goodwin RN BSN

## 2017-12-19 DIAGNOSIS — C56.9 OVARIAN CANCER, UNSPECIFIED LATERALITY (H): Primary | ICD-10-CM

## 2017-12-20 ENCOUNTER — ONCOLOGY VISIT (OUTPATIENT)
Dept: ONCOLOGY | Facility: CLINIC | Age: 72
End: 2017-12-20
Attending: GENETIC COUNSELOR, MS
Payer: COMMERCIAL

## 2017-12-20 ENCOUNTER — HOSPITAL ENCOUNTER (OUTPATIENT)
Facility: CLINIC | Age: 72
Setting detail: SPECIMEN
Discharge: HOME | End: 2017-12-20
Attending: OBSTETRICS & GYNECOLOGY | Admitting: OBSTETRICS & GYNECOLOGY
Payer: COMMERCIAL

## 2017-12-20 ENCOUNTER — HOSPITAL ENCOUNTER (OUTPATIENT)
Dept: LAB | Facility: CLINIC | Age: 72
End: 2017-12-20
Attending: GENETIC COUNSELOR, MS
Payer: COMMERCIAL

## 2017-12-20 DIAGNOSIS — Z80.3 FAMILY HISTORY OF MALIGNANT NEOPLASM OF BREAST: ICD-10-CM

## 2017-12-20 DIAGNOSIS — C56.9 OVARIAN CANCER, UNSPECIFIED LATERALITY (H): ICD-10-CM

## 2017-12-20 DIAGNOSIS — C56.9 MALIGNANT NEOPLASM OF OVARY (H): Primary | ICD-10-CM

## 2017-12-20 LAB — CANCER AG125 SERPL-ACNC: 5 U/ML (ref 0–30)

## 2017-12-20 PROCEDURE — 86304 IMMUNOASSAY TUMOR CA 125: CPT | Performed by: OBSTETRICS & GYNECOLOGY

## 2017-12-20 PROCEDURE — 40000072 ZZH STATISTIC GENETIC COUNSELING, < 16 MIN: Performed by: GENETIC COUNSELOR, MS

## 2017-12-20 NOTE — PROGRESS NOTES
Infusion Nursing Note:  Tosin Nina presents today for port labs and flush.    Patient seen by provider today:YES Kelsie Nguyen   present during visit today: Not Applicable.    Note: N/A.    Intravenous Access:  Lab draw site R chest, Needle type schmitt, Gauge 20.  Labs drawn without difficulty.  Implanted Port.    Treatment Conditions:  NA    Post Infusion Assessment:  Patient tolerated injection without incident.  Blood return noted pre and post infusion.  Site patent and intact, free from redness, edema or discomfort.  No evidence of extravasations.    Discharge Plan:   Discharge instructions reviewed with: Patient.  AVS to patient via [employer].  Patient will return  for next appointment 1/2.   Patient discharged in stable condition accompanied by: self.  Departure Mode: Ambulatory.    Toshia Morrow RN              t labs, flush

## 2017-12-20 NOTE — MR AVS SNAPSHOT
After Visit Summary   12/20/2017    Tosin Nina    MRN: [id number]           Patient Information     Date Of Birth          1945        Visit Information        Provider Department      12/20/2017 12:30 PM Kelsie Nguyen GC Cancer Risk Management Program        Today's Diagnoses     Malignant neoplasm of ovary (H)    -  1    Family history of malignant neoplasm of breast           Follow-ups after your visit        Your next 10 appointments already scheduled     Dec 20, 2017  1:15 PM CST   LAB with RH LAB DRAW 2   [facility] ([facility])    [facility]  [address]  [facility] [address]   [phone]           Please do not eat 10-12 hours before your appointment if you are coming in fasting for labs on lipids, cholesterol, or glucose (sugar). This does not apply to pregnant women. Water, hot tea and black coffee (with nothing added) are okay. Do not drink other fluids, diet soda or chew gum.            Jan 02, 2018 10:30 AM CST   Return Visit with Nessa Foster MD   [facility] ([facility])    [facility]  [address]  [facility] [address]   [phone]            Jan 03, 2018  8:45 AM CST   Neuro Treatment with Sarah العراقي, PT   [facility] ([facility]    [address] [address]   [phone]            Jan 16, 2018  3:15 PM CST   Neuro Treatment with Siobhan Bai, PT   [facility] ([facility])    [address] [address]   [phone]            Jan 18, 2018  2:45 PM CST   Neuro Treatment with Siobhan Bai, PT   [facility] ([facility])    [address] [address]   [phone]            Jan 23, 2018  3:15 PM CST    Neuro Treatment with Siobhan Bai, PT   [facility] ([facility])    [address]  [facility] [address]   [phone]            Jan 24, 2018 10:00 AM CST   LAB with RH LAB DRAW 1   [facility] ([facility])    [facility]  [address]  [facility] [address]   [phone]           Please do not eat 10-12 hours before your appointment if you are coming in fasting for labs on lipids, cholesterol, or glucose (sugar). This does not apply to pregnant women. Water, hot tea and black coffee (with nothing added) are okay. Do not drink other fluids, diet soda or chew gum.            Jan 25, 2018  2:45 PM CST   Neuro Treatment with Siobhan Bai, PT   [facility] ([facility])    [address] [facility] [address]   [phone]            Jan 30, 2018  4:00 PM CST   Neuro Treatment with Siobhan Bai, PT   [facility] ([facility])    [address] [facility] [address]   [phone]            Feb 01, 2018  2:00 PM CST   Neuro Treatment with Siobhan Bai, PT   [facility] ([facility])    [address]  [facility] [address]   [phone]              Future tests that were ordered for you today     Open Future Orders        Priority Expected Expires Ordered     Routine 12/20/2017 1/2/2018 12/19/2017            Who to contact     If you have questions or need follow up information about today's clinic visit or your schedule please contact CANCER RISK MANAGEMENT PROGRAM directly at [phone].  Normal or non-critical lab and imaging results will be communicated to you by MyChart, letter or phone within 4 business days after the clinic has received the results. If you do not hear from us within 7 days, please contact the  clinic through [employer] or phone. If you have a critical or abnormal lab result, we will notify you by phone as soon as possible.  Submit refill requests through [employer] or call your pharmacy and they will forward the refill request to us. Please allow 3 business days for your refill to be completed.          Additional Information About Your Visit        [employer] Information     [employer] gives you secure access to your electronic health record. If you see a primary care provider, you can also send messages to your care team and make appointments. If you have questions, please call your primary care clinic.  If you do not have a primary care provider, please call [phone] and they will assist you.        Care EveryWhere ID     This is your Care EveryWhere ID. This could be used by other organizations to access your [place] medical records  [id number]         Blood Pressure from Last 3 Encounters:   11/02/17 128/64   10/12/17 128/81   10/03/17 125/80    Weight from Last 3 Encounters:   10/12/17 76 kg (167 lb 9.6 oz)   10/03/17 75.3 kg (166 lb)   09/07/17 74.4 kg (164 lb 0.4 oz)                 Today's Medication Changes          These changes are accurate as of: 12/20/17  1:09 PM.  If you have any questions, ask your nurse or doctor.               These medicines have changed or have updated prescriptions.        Dose/Directions    Vitamin D (Cholecalciferol) 1000 UNITS Tabs   This may have changed:  additional instructions   Used for:  Paresthesias        Dose:  2000 Units   Take 2,000 Units by mouth daily   Quantity:  60 tablet   Refills:  0                Primary Care Provider Office Phone # Fax #    Esther Back [phone]       [facility] [address] [place] [address]        Equal Access to Services     [facility] : Zechariah Alves, jesus martinez, hetal bonner. So [facility] [phone].    ATENCIÓN: Si  ej guidry, tiene a alarcon disposición servicios gratuitos de asistencia lingüística. Ronnie carbajal [phone].    We comply with applicable federal civil rights laws and [place] laws. We do not discriminate on the basis of race, color, national origin, age, disability, sex, sexual orientation, or gender identity.            Thank you!     Thank you for choosing CANCER RISK MANAGEMENT PROGRAM  for your care. Our goal is always to provide you with excellent care. Hearing back from our patients is one way we can continue to improve our services. Please take a few minutes to complete the written survey that you may receive in the mail after your visit with us. Thank you!             Your Updated Medication List - Protect others around you: Learn how to safely use, store and throw away your medicines at www.disposemymeds.org.          This list is accurate as of: 12/20/17  1:09 PM.  Always use your most recent med list.                   Brand Name Dispense Instructions for use Diagnosis    ESCITALOPRAM OXALATE PO      Take 10 mg by mouth daily        hydrochlorothiazide 25 MG tablet    HYDRODIURIL     Take 25 mg by mouth daily    Ovarian cancer, unspecified laterality (H)       levothyroxine 125 MCG tablet    SYNTHROID    30 tablet    Take 1 tablet (125 mcg) by mouth daily    Other pulmonary embolism without acute cor pulmonale, unspecified chronicity (H)       magnesium chloride 535 (64 MG) MG Tbcr CR tablet     30 tablet    Take 1 tablet (535 mg) by mouth daily    Ovarian cancer, unspecified laterality (H)       omeprazole 20 MG CR capsule    priLOSEC    30 capsule    Take 1 capsule (20 mg) by mouth every morning    Ovarian cancer, unspecified laterality (H), Dyspepsia       Potassium Chloride ER 20 MEQ Tbcr     60 tablet    Take 1 tablet (20 mEq) by mouth 2 times daily    Hypokalemia       * rivaroxaban ANTICOAGULANT 15 MG Tabs tablet    XARELTO    42 tablet    Take 1 tablet (15 mg) by mouth 2 times daily (with meals)     Other acute pulmonary embolism without acute cor pulmonale (H)       * rivaroxaban ANTICOAGULANT 20 MG Tabs tablet    XARELTO    30 tablet    Take 1 tablet (20 mg) by mouth daily (with dinner)    Other acute pulmonary embolism without acute cor pulmonale (H)       senna-docusate 8.6-50 MG per tablet    SENOKOT-S;PERICOLACE    60 tablet    Take 1-2 tablets by mouth 2 times daily Hold for loose stools    Ovarian cancer, unspecified laterality (H)       Vitamin D (Cholecalciferol) 1000 UNITS Tabs     60 tablet    Take 2,000 Units by mouth daily    Paresthesias       * Notice:  This list has 2 medication(s) that are the same as other medications prescribed for you. Read the directions carefully, and ask your doctor or other care provider to review them with you.

## 2017-12-20 NOTE — LETTER
2017         RE: Tosin Nina  [address] [facility] [address]        Dear Colleague,    Thank you for referring your patient, Tosin Nina, to the CANCER RISK MANAGEMENT PROGRAM. Please see a copy of my visit note below.    2017    Referring Provider: Nessa Ennis MD    Presenting Information:   Tosin Nina came in to clinic for a follow-up visit today. She had previously been seen in the Cancer Risk Management Program at the [facility]  on 17. [place] came to clinic today to have her blood drawn for the OvaNext panel. The genes on this panel are associated with hereditary ovarian and breast cancer.     Discussion:    We previously reviewed the details of Virginia's family and personal history. See note from previous appointment for details.     Additional family history provided by [place]:    Her paternal grandmother did not have stomach cancer.    A paternal aunt  of stomach cancer at age 61. Her daughter, [place]'s cousin  of breast cancer at age 63.     A paternal uncle  at 90 of prostate cancer.      Consent was obtained and genetic testing for the OvaNext panel [facility].     The information from this test may determine cancer screening recommendations as well as options for risk reducing surgeries for [place] and family members.  These recommendations will be discussed in detail once genetic testing is completed.    Plan:  1. All of [place]'s questions were answered.   2. Virginia signed a consent form at the conclusion of this visit and had her blood drawn for the OvaNext panel.   3. I will call [place] with the results. Turn around time: 4 weeks    Face to face time 10 minutes.    Kelsie Nguyen MS [facility]  Genetic Counselor  [phone]    Again, thank you for allowing me to participate in the care of your patient.        Sincerely,        Kelsie Nguyen GC

## 2017-12-20 NOTE — PROGRESS NOTES
2017    Referring Provider: Nessa Ennis MD    Presenting Information:   Tosin Nina came in to clinic for a follow-up visit today. She had previously been seen in the Cancer Risk Management Program at the [facility]  on 17. [place] came to clinic today to have her blood drawn for the OvaNext panel. The genes on this panel are associated with hereditary ovarian and breast cancer.     Discussion:    We previously reviewed the details of Virginia's family and personal history. See note from previous appointment for details.     Additional family history provided by [place]:    Her paternal grandmother did not have stomach cancer.    A paternal aunt  of stomach cancer at age 61. Her daughter, [place]'s cousin  of breast cancer at age 63.     A paternal uncle  at 90 of prostate cancer.      Consent was obtained and genetic testing for the OvaNext panel was sent to [facility].     The information from this test may determine cancer screening recommendations as well as options for risk reducing surgeries for [place] and family members.  These recommendations will be discussed in detail once genetic testing is completed.    Plan:  1. All of [place]'s questions were answered.   2. [place] signed a consent form at the conclusion of this visit and had her blood drawn for the OvaNext panel.   3. I will call [place] with the results. Turn around time: 4 weeks    Face to face time 10 minutes.    Kelsie Nguyen MS [facility]  Genetic Counselor  [phone]

## 2017-12-21 LAB — MISCELLANEOUS TEST: NORMAL

## 2018-01-02 ENCOUNTER — ALLIED HEALTH/NURSE VISIT (OUTPATIENT)
Dept: ONCOLOGY | Facility: CLINIC | Age: 73
End: 2018-01-02

## 2018-01-02 ENCOUNTER — ONCOLOGY VISIT (OUTPATIENT)
Dept: ONCOLOGY | Facility: CLINIC | Age: 73
End: 2018-01-02
Attending: OBSTETRICS & GYNECOLOGY
Payer: COMMERCIAL

## 2018-01-02 VITALS
HEIGHT: 59 IN | BODY MASS INDEX: 33.26 KG/M2 | WEIGHT: 165 LBS | SYSTOLIC BLOOD PRESSURE: 123 MMHG | OXYGEN SATURATION: 98 % | DIASTOLIC BLOOD PRESSURE: 74 MMHG | TEMPERATURE: 97.8 F | HEART RATE: 66 BPM | RESPIRATION RATE: 16 BRPM

## 2018-01-02 DIAGNOSIS — Z12.12 SCREENING FOR MALIGNANT NEOPLASM OF THE RECTUM: ICD-10-CM

## 2018-01-02 DIAGNOSIS — C56.2 OVARIAN CANCER, LEFT (H): ICD-10-CM

## 2018-01-02 DIAGNOSIS — C56.1 OVARIAN CANCER, RIGHT (H): Primary | ICD-10-CM

## 2018-01-02 DIAGNOSIS — Z71.9 COUNSELING NOS(V65.40): Primary | ICD-10-CM

## 2018-01-02 DIAGNOSIS — F32.9 REACTIVE DEPRESSION: ICD-10-CM

## 2018-01-02 PROCEDURE — 99214 OFFICE O/P EST MOD 30 MIN: CPT | Performed by: OBSTETRICS & GYNECOLOGY

## 2018-01-02 PROCEDURE — G0463 HOSPITAL OUTPT CLINIC VISIT: HCPCS

## 2018-01-02 RX ORDER — ESCITALOPRAM OXALATE 5 MG/1
10 TABLET ORAL DAILY
Qty: 90 TABLET | Refills: 3 | Status: SHIPPED | OUTPATIENT
Start: 2018-01-02 | End: 2018-07-05

## 2018-01-02 NOTE — PATIENT INSTRUCTIONS
Colonoscopy-Scheduled for 3/5/18. Misty RANDOLPH.    Port flushes every 6 weeks-Scheduled. Misty RBIGGS     prior to next visit-Scheduled. Misty RANDOLPH.    Next visit in 3 months-Scheduled. Misty MARTIN printed and given to Pt. Misty RANDOLPH.

## 2018-01-02 NOTE — LETTER
2018         RE: Tosin Nina  [id number]  [facility] [id number]        Dear Colleague,    Thank you for referring your patient, Tosin Nina, to the [facility]. Please see a copy of my visit note below.    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  She is accompanied today by her  but he left at the start of our visit to give her privacy. She has been feeling quite well physically with the exception of some nausea and decreased appetite.   She has had a cold lately but is improving.  She had been going to PT prior to her cold but now has not been able to go in a few weeks.  She has seen some minimal improvement in her strength but is starting to accept that this is her new baseline and that she will likely not walk well again.  She has been feeling quite depressed lately as she has stopped her celexa as she ran out and her relationship with her  has been quite strained.  She reports while he does take good physical care of her, emotionally he is not there for her and he is very angry with her b/c she has become a burden to him (so he feels).  She does have one friend she has spoken to about this as well as her daughter but just feels quite isolated.    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  She was discharged to a TCU where she is still residing with some but minimal improvement in her neurologic symptoms.  She still has quite a difficult time seeing especially with her right eye.  She also notes weakness mostly on her left side.  Both of these make it very difficult for her to walk and thus she is having to use a wheelchair for most of her mobility.  She tolerated her first cycle quite  well with her biggest side effect being nausea which improved drastically with a change in anti-emetics.      4/11/17-4/16/17:  Admit to [facility] for worsening dizziness, found to have stage IVB ovarian cancer (inguinal lymphadenopathy) causing paraneoplastic syndrome    4/11/17:  CT C/A/P:  Thrombus identified within the right lower lobe are artery and right upper lobar arteries. The right pulmonary artery is enlarged, similar to prior exams. No CT evidence of right heart strain. No suspicious mediastinal or perihilar lymphadenopathy. Bovine arch anatomy. No suspicious pulmonary nodules, evidence of infarction, or infection. Abdomen and pelvis: There are soft tissue nodules identified along the liver capsule measuring up to 3 cm inferiorly. There is a large amount of omental caking. Enlarged iliac and retroperitoneal lymph nodes for example a 2.6 cm right external iliac lymph node or group of lymph nodes. Heterogeneous enhancement of the uterine fundus could be due to fibroids or a mass. The overall size of the uterus does not appear significantly different than in 2014. The left ovary does appear slightly larger and lobular in appearance compared to prior exam. There is also a nodular area along the round ligament. It was not present on prior exam. There is an irregular lobulated mass involving the sigmoid colon. Abnormal, enlarged inguinal lymph nodes. Soft tissue implant in the posterior right retroperitoneum adjacent to the psoas was not present on prior exam. 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 Spondylolisthesis at L3-4. Small periumbilical hernia containing some of the abnormal omentum.    4/11/17:   268, CEA .6    4/12/17:  IR omental biopsy   Pathology:  High grade serous carcinoma    4/14/17: Cycle #1 carboplatin AUC 6 and weekly Taxol 80mg/m2.  = 2648    5/5/17:  Cycle #2 carboplatin AUC 6 and weekly  Taxol 80mg/m2.  = 370    5/26/17:  Cycle #3 carboplatin AUC 6 and weekly Taxol 80mg/m2.  = 63    6/16/17:  CT C/A/P:  Chest:  Resolution of previous right-sided pulmonary emboli since April. No mediastinal, hilar or axillary adenopathy. No pleural fluid.  Port-A-Cath right superior chest with tip in the SVC.  Short linear fibrosis or discoid atelectasis anterior medial right upper lobe. Lungs otherwise clear. Abdomen and Pelvis: Marked improvement in carcinomatosis and omental metastasis since 4/11/2017. In the anterior left pelvis there is a 1.2 cm nodule with adjacent linear opacity approximately 4 cm in length in an area of previous dense omental caking and metastatic disease. There is resolution of the previous anterior right-sided omental caking with subcentimeter trace of residual nodularity in this location. There are multiple new indeterminate nodular densities in the anterior abdominal wall subcutaneous fat as well as small collections of subcutaneous air, suggesting that these are medication injection sites.  Previous subserosal implants along the inferior liver have resolved. No focal liver lesions. Normal-appearing pancreas, adrenal glands and kidneys. Tiny hypodense spleen lesion is too small to characterize, not previously seen. Spleen otherwise appears normal. No periaortic or pelvic adenopathy with resolution of previous adenopathy. Lobulated enlarged uterus redemonstrated consistent with multiple fibroids. No free fluid. No acute bowel abnormality. Resolution of mesenteric right lower quadrant nodule is compatible with metastasis. On coronal images the terminal ileum takes an unusual circular course posterior to the right colon, but there is no evidence for obstruction. No aggressive bone lesions.    6/16/17:   = 27    6/28/17:  Robotic total laparoscopic hysterectomy, bilateral salpingo-oophorectomy, lysis of adhesions, omentectomy, optimal tumor debulking to no residual  disease   Pathology:  Minimal serous carcinoma remaining in the left ovary    17:  Cycle #4 carboplatin AUC 6 and weekly Taxol 80mg/m2. D15 cancelled due to thrombocytopenia (plt 70)   = 18    8/10/17:  Cycle #5 carboplatin AUC 6 and weekly Taxol 80mg/m2. D8 cancelled neutropenia , D15 cancelled thromobocytopenia (plt 53)  = 12    17:  Cycle #6 carboplatin AUC 5 due to myelosuppression and weekly Taxol 80mg/m2. D15 delayed thrombocytopenia (plt 78)  = 10    Review of Systems:  Systemic           no weight gain; no fatigue; no fever; no chills; no night sweats; + appetite changes  Skin           no rashes, or lesions  Eye           no irritation; no changes in vision; + visual difficulty  Ward-Laryngeal           no dysphagia; no hoarseness; + stuffiness/congestion  Pulmonary    + cough; + sputum; no shortness of breath  Cardiovascular    no chest pain; no palpitations  Gastrointestinal    + nausea; no diarrhea; no constipation; no abdominal pain; no changes in bowel  habits; no blood in stool  Genitourinary   no urinary frequency; no urinary urgency; no dysuria; no pain; no abnormal vaginal discharge; no abnormal vaginal bleeding  Breast    no breast discharge; no breast changes; no breast pain  Musculoskeletal    no myalgias; no arthralgias; no back pain; + joint pain  Psychiatric           + depressed mood; no anxiety    Hematologic            no tender lymph nodes; no noticeable swellings or lumps   Endocrine    no hot flashes; no heat/cold intolerance         Neurological   no tremor; + numbness and tingling; + loss of balance; + difficulty walking; no headaches; no difficulty sleeping    Obstetrics and Gynecology History:  ,   Menopause at age 52, took HRT for a short while, maybe 1 year        Past Medical History:  Past Medical History:   Diagnosis Date     Anemia      Cervical spinal stenosis      Depression      GERD (gastroesophageal reflux disease)       Hypertension      Hypokalemia      Hypothyroid      Lumbar spinal stenosis      Obesity      Ovarian cancer (H)      Paraneoplastic neuromyopathy and neuropathy (H)      PE (pulmonary thromboembolism) (H)      Thrombocytopenia (H)        Past Surgical History:  Past Surgical History:   Procedure Laterality Date     AS TOTAL KNEE ARTHROPLASTY Left      BACK SURGERY  2009     CHOLECYSTECTOMY  01/01/2016     CYSTOSCOPY N/A 6/28/2017    Procedure: CYSTOSCOPY;;  Surgeon: Nessa Christina MD;  Location: [facility] OR     DAVINCI HYSTERECTOMY TOTAL, BILATERAL SALPINGO-OOPHORECTMY, NODE DISSECTION, TUMOR STAGING, COMBINED N/A 6/28/2017    Procedure: COMBINED DAVINCI HYSTERECTOMY TOTAL, SALPINGO-OOPHORECTOMY, NODE DISSECTION, TUMOR STAGING;  Robotic total laparoscopic hysterectomy, bilateral salpingo-oophorectomy, omentectomy, lysis of adhesions, tumor debulking, cystoscopy;  Surgeon: Nessa Christina MD;  Location: [facility] OR     OPEN REDUCTION INTERNAL FIXATION WRIST Right 2009     THYROIDECTOMY         Health Maintenance:  Last Pap Smear: 5 years              Result: normal-No need for further pap smear exams  She has not had a history of abnormal Pap smears.    Last Mammogram: 10/19/16              Result: normal      She has not had a history of abnormal mammograms.    Last Colonoscopy: 2007              Result: normal      Current Medications:   has a current medication list which includes the following prescription(s): magnesium chloride, rivaroxaban anticoagulant, rivaroxaban anticoagulant, potassium chloride er, omeprazole, hydrochlorothiazide, senna-docusate, escitalopram oxalate, levothyroxine, and vitamin d (cholecalciferol).     Allergies:   Amoxicillin; Clarithromycin; Lisinopril; and Penicillins      Social History:  Social History     Social History     Marital status:      Spouse name: Christiano Nina     Number of children: 1     Years of education: N/A     Occupational History     Current: Retired  "     Former:       Social History Main Topics     Smoking status: Never Smoker     Smokeless tobacco: Not on file     Alcohol use Yes      Comment: occasionally     Drug use: No     Sexual activity: Not on file     Other Topics Concern     Not on file     Social History Narrative     Lives with , feels safe at home.  Retired .  Enjoys playing golf, gardening.  Does have an advanced directive on file and would like her , Christiano to be her POA.  DNR/DNI    Family History:   The patient's family history is significant for.  Family History   Problem Relation Age of Onset     Breast Cancer Mother 69     Heart Failure Mother      Breast Cancer Maternal Grandmother      this is maternal great grandmother     Breast Cancer Maternal Aunt 89     Myocardial Infarction Father      Unknown/Adopted Brother      Unknown/Adopted Maternal Grandfather      Stomach Cancer Paternal Grandmother      Stomach mass-not sure if cancer     Lung Cancer Paternal Grandfather      mustard gas in WW[place]         Physical Exam:   /74  Pulse 66  Temp 97.8  F (36.6  C) (Tympanic)  Resp 16  Ht 1.499 m (4' 11\")  Wt 74.8 kg (165 lb)  SpO2 98%  BMI 33.33 kg/m2  Body mass index is 33.33 kg/(m^2).    General Appearance: healthy and alert, no distress     HEENT:  no thyromegaly, no palpable nodules or masses        Cardiovascular: regular rate and rhythm, no gallops, rubs or murmurs     Respiratory: lungs clear, no rales, rhonchi or wheezes, normal diaphragmatic excursion    Musculoskeletal: extremities non tender and without edema    Skin: no lesions or rashes     Neurological: Impaired gait and significant weakness especially in lower extremities with a resting tremor in the lower limbs     Psychiatric: appropriate mood and affect                               Hematological: normal cervical, supraclavicular and inguinal lymph nodes     Gastrointestinal:       abdomen soft, non-tender, non-distended, no " organomegaly or masses    Genitourinary: External genitalia and urethral meatus appears normal.  Vagina is smooth without nodularity or masses.  Cervix surgically absent.  Bimanual exam reveal no masses, nodularity or fullness.  Recto-vaginal exam confirms these findings.            Assessment:    Tosin Nina is a 72 year old woman with a diagnosis of Stage IVB high grade serous ovarian cancer.     A total of 30 minutes was spent with the patient, >50% of which were spent in counseling the patient and/or treatment planning.      Plan:     1.)    Stage IVB high grade serous ovarian cancer. YONATAN s/p neoadjuvant chemotherapy, optimal interval debulking and adjuvant chemotherapy.  Reviewed signs and symptoms of a recurrence and I have asked her to call if these should occur.  Reinforced surveillance visits every 3 months for the first two years (9/19) then every 6 months for the following 3 years (9/22) then annually thereafter. She will return in 3 months with a  prior to her visit.  She will retain her port for now and port flushes will be arranged.    2.) Deconditioning and wheelchair bound state. Patient is following with PT to hopefully improve her strength, however she is accepting that this is likely her new baseline.    3.) Chemotherapy side effects:  Neuropathy is the only residual effect she is having and this is mild and improving    4.) Para-neoplastic syndrome.  She saw neurology on 11/2 and they have discussed optional neuro testing to determine if there is any entrapment as a cause of her symptoms, however she is not interested in pursuing this at this time    5.) PE.  Plan lifelong anticoagulation.  She is currently on Xeralto     6.) Genetic risk factors were assessed and the patient has seen genetics and testing is pending    7.) Advanced malignancy and paraneoplastic symptoms.  She follows with palliative care but feels she has minimal symptoms at this time and thus will hold off on  further appointments for now    8.) Emotional stressors.  We talked at length about possible coping mechanisms as well as services that are available to assist her with daily activities including home health care, transportation options, support groups for her and her  through [facility] or [facility].  She was also seen by SW who offered additional resources for her and will follow up with her soon to see how she is coping.  I have also refilled her prescription for cymbalta and she will restart this to assist with her depression as I also feel this is contributing to her lack of appetite and some of her other physical symptoms.    9.) Labs and/or tests ordered include:       10.) Health maintenance issues addressed today include pt due for colonoscopy and she will schedule            Thank you for allowing us to participate in the care of your patient.         Sincerely,    Nessa Ennis MD  Gynecologic Oncology  [facility]       CC           Again, thank you for allowing me to participate in the care of your patient.        Sincerely,        Cristian Ennis MD

## 2018-01-02 NOTE — PROGRESS NOTES
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  She is accompanied today by her  but he left at the start of our visit to give her privacy. She has been feeling quite well physically with the exception of some nausea and decreased appetite.   She has had a cold lately but is improving.  She had been going to PT prior to her cold but now has not been able to go in a few weeks.  She has seen some minimal improvement in her strength but is starting to accept that this is her new baseline and that she will likely not walk well again.  She has been feeling quite depressed lately as she has stopped her celexa as she ran out and her relationship with her  has been quite strained.  She reports while he does take good physical care of her, emotionally he is not there for her and he is very angry with her b/c she has become a burden to him (so he feels).  She does have one friend she has spoken to about this as well as her daughter but just feels quite isolated.    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  She was discharged to a TCU where she is still residing with some but minimal improvement in her neurologic symptoms.  She still has quite a difficult time seeing especially with her right eye.  She also notes weakness mostly on her left side.  Both of these make it very difficult for her to walk and thus she is having to use a wheelchair for most of her mobility.  She tolerated her first cycle quite well with her biggest side effect being nausea which improved drastically with a change in anti-emetics.      17-17:  Admit to [facility] for worsening dizziness, found to have stage IVB ovarian cancer (inguinal lymphadenopathy) causing paraneoplastic  syndrome    4/11/17:  CT C/A/P:  Thrombus identified within the right lower lobe are artery and right upper lobar arteries. The right pulmonary artery is enlarged, similar to prior exams. No CT evidence of right heart strain. No suspicious mediastinal or perihilar lymphadenopathy. Bovine arch anatomy. No suspicious pulmonary nodules, evidence of infarction, or infection. Abdomen and pelvis: There are soft tissue nodules identified along the liver capsule measuring up to 3 cm inferiorly. There is a large amount of omental caking. Enlarged iliac and retroperitoneal lymph nodes for example a 2.6 cm right external iliac lymph node or group of lymph nodes. Heterogeneous enhancement of the uterine fundus could be due to fibroids or a mass. The overall size of the uterus does not appear significantly different than in 2014. The left ovary does appear slightly larger and lobular in appearance compared to prior exam. There is also a nodular area along the round ligament. It was not present on prior exam. There is an irregular lobulated mass involving the sigmoid colon. Abnormal, enlarged inguinal lymph nodes. Soft tissue implant in the posterior right retroperitoneum adjacent to the psoas was not present on prior exam. 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 Spondylolisthesis at L3-4. Small periumbilical hernia containing some of the abnormal omentum.    4/11/17:   268, CEA .6    4/12/17:  IR omental biopsy   Pathology:  High grade serous carcinoma    4/14/17: Cycle #1 carboplatin AUC 6 and weekly Taxol 80mg/m2.  = 2648    5/5/17:  Cycle #2 carboplatin AUC 6 and weekly Taxol 80mg/m2.  = 370    5/26/17:  Cycle #3 carboplatin AUC 6 and weekly Taxol 80mg/m2.  = 63    6/16/17:  CT C/A/P:  Chest:  Resolution of previous right-sided pulmonary emboli since April. No mediastinal, hilar or axillary adenopathy. No  pleural fluid.  Port-A-Cath right superior chest with tip in the SVC.  Short linear fibrosis or discoid atelectasis anterior medial right upper lobe. Lungs otherwise clear. Abdomen and Pelvis: Marked improvement in carcinomatosis and omental metastasis since 4/11/2017. In the anterior left pelvis there is a 1.2 cm nodule with adjacent linear opacity approximately 4 cm in length in an area of previous dense omental caking and metastatic disease. There is resolution of the previous anterior right-sided omental caking with subcentimeter trace of residual nodularity in this location. There are multiple new indeterminate nodular densities in the anterior abdominal wall subcutaneous fat as well as small collections of subcutaneous air, suggesting that these are medication injection sites.  Previous subserosal implants along the inferior liver have resolved. No focal liver lesions. Normal-appearing pancreas, adrenal glands and kidneys. Tiny hypodense spleen lesion is too small to characterize, not previously seen. Spleen otherwise appears normal. No periaortic or pelvic adenopathy with resolution of previous adenopathy. Lobulated enlarged uterus redemonstrated consistent with multiple fibroids. No free fluid. No acute bowel abnormality. Resolution of mesenteric right lower quadrant nodule is compatible with metastasis. On coronal images the terminal ileum takes an unusual circular course posterior to the right colon, but there is no evidence for obstruction. No aggressive bone lesions.    6/16/17:   = 27    6/28/17:  Robotic total laparoscopic hysterectomy, bilateral salpingo-oophorectomy, lysis of adhesions, omentectomy, optimal tumor debulking to no residual disease   Pathology:  Minimal serous carcinoma remaining in the left ovary    7/20/17:  Cycle #4 carboplatin AUC 6 and weekly Taxol 80mg/m2. D15 cancelled due to thrombocytopenia (plt 70)   = 18    8/10/17:  Cycle #5 carboplatin AUC 6 and weekly Taxol  80mg/m2. D8 cancelled neutropenia , D15 cancelled thromobocytopenia (plt 53)  = 12    17:  Cycle #6 carboplatin AUC 5 due to myelosuppression and weekly Taxol 80mg/m2. D15 delayed thrombocytopenia (plt 78)  = 10    Review of Systems:  Systemic           no weight gain; no fatigue; no fever; no chills; no night sweats; + appetite changes  Skin           no rashes, or lesions  Eye           no irritation; no changes in vision; + visual difficulty  Ward-Laryngeal           no dysphagia; no hoarseness; + stuffiness/congestion  Pulmonary    + cough; + sputum; no shortness of breath  Cardiovascular    no chest pain; no palpitations  Gastrointestinal    + nausea; no diarrhea; no constipation; no abdominal pain; no changes in bowel  habits; no blood in stool  Genitourinary   no urinary frequency; no urinary urgency; no dysuria; no pain; no abnormal vaginal discharge; no abnormal vaginal bleeding  Breast    no breast discharge; no breast changes; no breast pain  Musculoskeletal    no myalgias; no arthralgias; no back pain; + joint pain  Psychiatric           + depressed mood; no anxiety    Hematologic            no tender lymph nodes; no noticeable swellings or lumps   Endocrine    no hot flashes; no heat/cold intolerance         Neurological   no tremor; + numbness and tingling; + loss of balance; + difficulty walking; no headaches; no difficulty sleeping    Obstetrics and Gynecology History:  ,   Menopause at age 52, took HRT for a short while, maybe 1 year        Past Medical History:  Past Medical History:   Diagnosis Date     Anemia      Cervical spinal stenosis      Depression      GERD (gastroesophageal reflux disease)      Hypertension      Hypokalemia      Hypothyroid      Lumbar spinal stenosis      Obesity      Ovarian cancer (H)      Paraneoplastic neuromyopathy and neuropathy (H)      PE (pulmonary thromboembolism) (H)      Thrombocytopenia (H)        Past Surgical History:  Past  Surgical History:   Procedure Laterality Date     AS TOTAL KNEE ARTHROPLASTY Left      BACK SURGERY  2009     CHOLECYSTECTOMY  01/01/2016     CYSTOSCOPY N/A 6/28/2017    Procedure: CYSTOSCOPY;;  Surgeon: Nessa Christina MD;  Location: [facility] OR     DAVINCI HYSTERECTOMY TOTAL, BILATERAL SALPINGO-OOPHORECTMY, NODE DISSECTION, TUMOR STAGING, COMBINED N/A 6/28/2017    Procedure: COMBINED DAVINCI HYSTERECTOMY TOTAL, SALPINGO-OOPHORECTOMY, NODE DISSECTION, TUMOR STAGING;  Robotic total laparoscopic hysterectomy, bilateral salpingo-oophorectomy, omentectomy, lysis of adhesions, tumor debulking, cystoscopy;  Surgeon: Nessa Christina MD;  Location: [facility] OR     OPEN REDUCTION INTERNAL FIXATION WRIST Right 2009     THYROIDECTOMY         Health Maintenance:  Last Pap Smear: 5 years              Result: normal-No need for further pap smear exams  She has not had a history of abnormal Pap smears.    Last Mammogram: 10/19/16              Result: normal      She has not had a history of abnormal mammograms.    Last Colonoscopy: 2007              Result: normal      Current Medications:   has a current medication list which includes the following prescription(s): magnesium chloride, rivaroxaban anticoagulant, rivaroxaban anticoagulant, potassium chloride er, omeprazole, hydrochlorothiazide, senna-docusate, escitalopram oxalate, levothyroxine, and vitamin d (cholecalciferol).     Allergies:   Amoxicillin; Clarithromycin; Lisinopril; and Penicillins      Social History:  Social History     Social History     Marital status:      Spouse name: Christiano Nina     Number of children: 1     Years of education: N/A     Occupational History     Current: Retired      Former:       Social History Main Topics     Smoking status: Never Smoker     Smokeless tobacco: Not on file     Alcohol use Yes      Comment: occasionally     Drug use: No     Sexual activity: Not on file     Other Topics Concern     Not on file  "    Social History Narrative     Lives with , feels safe at home.  Retired .  Enjoys playing golf, gardening.  Does have an advanced directive on file and would like her , Christiano to be her POA.  DNR/DNI    Family History:   The patient's family history is significant for.  Family History   Problem Relation Age of Onset     Breast Cancer Mother 69     Heart Failure Mother      Breast Cancer Maternal Grandmother      this is maternal great grandmother     Breast Cancer Maternal Aunt 89     Myocardial Infarction Father      Unknown/Adopted Brother      Unknown/Adopted Maternal Grandfather      Stomach Cancer Paternal Grandmother      Stomach mass-not sure if cancer     Lung Cancer Paternal Grandfather      mustard gas in WW[place]         Physical Exam:   /74  Pulse 66  Temp 97.8  F (36.6  C) (Tympanic)  Resp 16  Ht 1.499 m (4' 11\")  Wt 74.8 kg (165 lb)  SpO2 98%  BMI 33.33 kg/m2  Body mass index is 33.33 kg/(m^2).    General Appearance: healthy and alert, no distress     HEENT:  no thyromegaly, no palpable nodules or masses        Cardiovascular: regular rate and rhythm, no gallops, rubs or murmurs     Respiratory: lungs clear, no rales, rhonchi or wheezes, normal diaphragmatic excursion    Musculoskeletal: extremities non tender and without edema    Skin: no lesions or rashes     Neurological: Impaired gait and significant weakness especially in lower extremities with a resting tremor in the lower limbs     Psychiatric: appropriate mood and affect                               Hematological: normal cervical, supraclavicular and inguinal lymph nodes     Gastrointestinal:       abdomen soft, non-tender, non-distended, no organomegaly or masses    Genitourinary: External genitalia and urethral meatus appears normal.  Vagina is smooth without nodularity or masses.  Cervix surgically absent.  Bimanual exam reveal no masses, nodularity or fullness.  Recto-vaginal exam confirms these " findings.            Assessment:    Tosin Nina is a 72 year old woman with a diagnosis of Stage IVB high grade serous ovarian cancer.     A total of 30 minutes was spent with the patient, >50% of which were spent in counseling the patient and/or treatment planning.      Plan:     1.)    Stage IVB high grade serous ovarian cancer. YONATAN s/p neoadjuvant chemotherapy, optimal interval debulking and adjuvant chemotherapy.  Reviewed signs and symptoms of a recurrence and I have asked her to call if these should occur.  Reinforced surveillance visits every 3 months for the first two years (9/19) then every 6 months for the following 3 years (9/22) then annually thereafter. She will return in 3 months with a  prior to her visit.  She will retain her port for now and port flushes will be arranged.    2.) Deconditioning and wheelchair bound state. Patient is following with PT to hopefully improve her strength, however she is accepting that this is likely her new baseline.    3.) Chemotherapy side effects:  Neuropathy is the only residual effect she is having and this is mild and improving    4.) Para-neoplastic syndrome.  She saw neurology on 11/2 and they have discussed optional neuro testing to determine if there is any entrapment as a cause of her symptoms, however she is not interested in pursuing this at this time    5.) PE.  Plan lifelong anticoagulation.  She is currently on Xeralto     6.) Genetic risk factors were assessed and the patient has seen genetics and testing is pending    7.) Advanced malignancy and paraneoplastic symptoms.  She follows with palliative care but feels she has minimal symptoms at this time and thus will hold off on further appointments for now    8.) Emotional stressors.  We talked at length about possible coping mechanisms as well as services that are available to assist her with daily activities including home health care, transportation options, support groups for her and her   through [facility] or [facility].  She was also seen by SW who offered additional resources for her and will follow up with her soon to see how she is coping.  I have also refilled her prescription for cymbalta and she will restart this to assist with her depression as I also feel this is contributing to her lack of appetite and some of her other physical symptoms.    9.) Labs and/or tests ordered include:       10.) Health maintenance issues addressed today include pt due for colonoscopy and she will schedule            Thank you for allowing us to participate in the care of your patient.         Sincerely,    Nessa Ennis MD  Gynecologic Oncology  [facility]       CC

## 2018-01-02 NOTE — MR AVS SNAPSHOT
After Visit Summary   1/2/2018    Tosin Nina    MRN: [id number]           Patient Information     Date Of Birth          1945        Visit Information        Provider Department      1/2/2018 10:30 AM Nessa Christina MD [facility]        Today's Diagnoses     Ovarian cancer, right (H)    -  1    Ovarian cancer, left (H)        Screening for malignant neoplasm of the rectum        Reactive depression          Care Instructions        Colonoscopy    Port flushes every 6 weeks     prior to next visit    Next visit in 3 months          Follow-ups after your visit        Additional Services     GASTROENTEROLOGY ADULT REF PROCEDURE ONLY                 Your next 10 appointments already scheduled     Jan 16, 2018  3:15 PM CST   Neuro Treatment with Siobhan Bai, PT   [facility] ([facility])    [address] [facility] [facility] [address]   [phone]            Jan 18, 2018  2:45 PM CST   Neuro Treatment with Siobhan Bai, PT   [facility] ([facility])    [address] [facility] [address]   [phone]            Jan 23, 2018  3:15 PM CST   Neuro Treatment with Siobhan Bai, PT   [facility] ([facility])    [address] [facility] [facility] [address]   [phone]            Jan 24, 2018 10:00 AM CST   LAB with RH LAB DRAW 1   [facility] ([facility])    [facility]  [address]  [facility] [address]   [phone]           Please do not eat 10-12 hours before your appointment if you are coming in fasting for labs on lipids, cholesterol, or glucose (sugar). This does not apply to pregnant women. Water, hot tea and black coffee (with nothing added) are okay. Do not drink other fluids, diet soda or chew gum.            Jan  25, 2018  2:45 PM CST   Neuro Treatment with Siobhan GONZALES Richardson, PT   [facility] ([facility])    [address]  [facility] [address]   [phone]            Jan 30, 2018  4:00 PM CST   Neuro Treatment with Siobhan T Richardson, PT   [facility] ([facility])    [address]  [facility] [address]   [phone]            Feb 01, 2018  2:00 PM CST   Neuro Treatment with Siobhan T Richardson, PT   [facility] ([facility])    [address] [facility] [address]   [phone]            Feb 26, 2018 10:30 AM CST   LAB with RH LAB DRAW 2   [facility] ([facility])    [facility]  [address]  [facility] [address]   [phone]           Please do not eat 10-12 hours before your appointment if you are coming in fasting for labs on lipids, cholesterol, or glucose (sugar). This does not apply to pregnant women. Water, hot tea and black coffee (with nothing added) are okay. Do not drink other fluids, diet soda or chew gum.            Mar 05, 2018   Procedure with Nila Munson MD   [facility] ([facility])    [address] [place] [address]   [phone]           [facility] is located at [address] [place]            Apr 03, 2018 10:30 AM CDT   LAB with RH LAB DRAW 2   [facility] ([facility])    [facility]  [address]  [facility] [address] [phone]           Please do not eat 10-12 hours before your appointment if you are coming in fasting for labs on lipids, cholesterol, or glucose (sugar). This does not apply to pregnant women. Water, hot tea and black coffee (with nothing added) are okay. Do not drink other fluids, diet  "soda or chew gum.              Future tests that were ordered for you today     Open Future Orders        Priority Expected Expires Ordered    GASTROENTEROLOGY ADULT REF PROCEDURE ONLY Routine  1/1/2019 1/1/2018     Routine 4/1/2018 1/1/2019 1/1/2018            Who to contact     If you have questions or need follow up information about today's clinic visit or your schedule please contact [facility] directly at [phone].  Normal or non-critical lab and imaging results will be communicated to you by [employer], letter or phone within 4 business days after the clinic has received the results. If you do not hear from us within 7 days, please contact the clinic through [employer] or phone. If you have a critical or abnormal lab result, we will notify you by phone as soon as possible.  Submit refill requests through [employer] or call your pharmacy and they will forward the refill request to us. Please allow 3 business days for your refill to be completed.          Additional Information About Your Visit        [email or web address] Information     [employer] gives you secure access to your electronic health record. If you see a primary care provider, you can also send messages to your care team and make appointments. If you have questions, please call your primary care clinic.  If you do not have a primary care provider, please call [phone] and they will assist you.        Care EveryWhere ID     This is your Care EveryWhere ID. This could be used by other organizations to access your [place] medical records  [id number]        Your Vitals Were     Pulse Temperature Respirations Height Pulse Oximetry BMI (Body Mass Index)    66 97.8  F (36.6  C) (Tympanic) 16 1.499 m (4' 11\") 98% 33.33 kg/m2       Blood Pressure from Last 3 Encounters:   01/02/18 123/74   11/02/17 128/64   10/12/17 128/81    Weight from Last 3 Encounters:   01/02/18 74.8 kg (165 lb)   10/12/17 76 kg (167 lb 9.6 oz)   10/03/17 75.3 kg (166 " lb)                 Today's Medication Changes          These changes are accurate as of: 1/2/18 12:46 PM.  If you have any questions, ask your nurse or doctor.               These medicines have changed or have updated prescriptions.        Dose/Directions    escitalopram 5 MG tablet   Commonly known as:  LEXAPRO   This may have changed:  medication strength   Used for:  Reactive depression   Changed by:  Nessa Christina MD        Dose:  10 mg   Take 2 tablets (10 mg) by mouth daily   Quantity:  90 tablet   Refills:  3       rivaroxaban ANTICOAGULANT 20 MG Tabs tablet   Commonly known as:  XARELTO   This may have changed:  Another medication with the same name was removed. Continue taking this medication, and follow the directions you see here.   Used for:  Other acute pulmonary embolism without acute cor pulmonale (H)   Changed by:  Kavya Barahona MD        Dose:  20 mg   Take 1 tablet (20 mg) by mouth daily (with dinner)   Quantity:  30 tablet   Refills:  3       Vitamin D (Cholecalciferol) 1000 UNITS Tabs   This may have changed:  additional instructions   Used for:  Paresthesias        Dose:  2000 Units   Take 2,000 Units by mouth daily   Quantity:  60 tablet   Refills:  0            Where to get your medicines      These medications were sent to [facility] [address] [address]     Phone:  [phone]     escitalopram 5 MG tablet                Primary Care Provider Office Phone # Fax #    Esther Back [phone]       [place] [id number] [place] [id number]        Equal Access to Services     [facility] AH: Hadii aad ku hadasho Soomaali, waaxda luqadaha, qaybta kaalmada adeegyada, hetal regan . So [facility] [phone].    ATENCIÓN: Si habla español, tiene a alarcon disposición servicios gratuitos de asistencia lingüística. Llame al  [phone].    We comply with applicable federal civil rights laws and [place] laws. We do not discriminate on the basis of race, color, national origin, age, disability, sex, sexual orientation, or gender identity.            Thank you!     Thank you for choosing [facility]  for your care. Our goal is always to provide you with excellent care. Hearing back from our patients is one way we can continue to improve our services. Please take a few minutes to complete the written survey that you may receive in the mail after your visit with us. Thank you!             Your Updated Medication List - Protect others around you: Learn how to safely use, store and throw away your medicines at www.disposemymeds.org.          This list is accurate as of: 1/2/18 12:46 PM.  Always use your most recent med list.                   Brand Name Dispense Instructions for use Diagnosis    escitalopram 5 MG tablet    LEXAPRO    90 tablet    Take 2 tablets (10 mg) by mouth daily    Reactive depression       hydrochlorothiazide 25 MG tablet    HYDRODIURIL     Take 25 mg by mouth daily    Ovarian cancer, unspecified laterality (H)       levothyroxine 125 MCG tablet    SYNTHROID    30 tablet    Take 1 tablet (125 mcg) by mouth daily    Other pulmonary embolism without acute cor pulmonale, unspecified chronicity (H)       magnesium chloride 535 (64 MG) MG Tbcr CR tablet     30 tablet    Take 1 tablet (535 mg) by mouth daily    Ovarian cancer, unspecified laterality (H)       omeprazole 20 MG CR capsule    priLOSEC    30 capsule    Take 1 capsule (20 mg) by mouth every morning    Ovarian cancer, unspecified laterality (H), Dyspepsia       Potassium Chloride ER 20 MEQ Tbcr     60 tablet    Take 1 tablet (20 mEq) by mouth 2 times daily    Hypokalemia       rivaroxaban ANTICOAGULANT 20 MG Tabs tablet    XARELTO    30 tablet    Take 1 tablet (20 mg) by mouth daily (with dinner)    Other acute pulmonary embolism without  acute cor pulmonale (H)       senna-docusate 8.6-50 MG per tablet    SENOKOT-S;PERICOLACE    60 tablet    Take 1-2 tablets by mouth 2 times daily Hold for loose stools    Ovarian cancer, unspecified laterality (H)       Vitamin D (Cholecalciferol) 1000 UNITS Tabs     60 tablet    Take 2,000 Units by mouth daily    Paresthesias

## 2018-01-02 NOTE — PROGRESS NOTES
"Oncology Distress Screening Follow-up  Clinical Social Work  [facility]    Identified Concern and Score From Distress Screenin. How concerned are you about your ability to eat?   8           2. How concerned are you about unintended weight loss or your current weight?   5           3. How concerned are you about feeling depressed or very sad?   8           4. How concerned are you about feeling anxious or very scared?   8           5. How concerned are you about your spiritual wellbeing or sense of peace?   0           6. How concerned are you about work and home life issues that may be affected by your cancer?   6           7. How concerned are you about knowing what resources are available to help you?   0               Date of Distress Screenin18    Intervention:   This clinician met with Namita Nina at the request of Dr. Ennis for emotional support and resource assistance. 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 This clinician met with Namita and  Christiano for extensive conversation regarding pt and 's coping, as well as opportunities for additional support. Pt acknowledged today feeling increasingly depressed and \"alone\" in the context of increased dependence upon  for daily functioning which has lead to a strained relationship with . Pt tearful throughout much of discussion today, especially around discussion centered around how hard she is working to contribute to home life and how challenging it has been for pt and  to connect. Pt vocalized today how important it is to her to be asked if she wants to be held of touched, and asked how she is feeling. Pt admits today that there are times when she chooses to stay in [facility] all day as she does not want to ask her  to assist with dressing. Extensive discussion with pt and  today about what each person needs to feel " "validated and cared for in relationship, and highlighted the understandable feelings and concerns that arise with adjusting to life in a wheelchair and supporting a loved one who is wheelchair bound. Provided education about strategies to support healthy communication, focusing on what pt is doing well and using \"I feel\" statements.  acknowledged that going to local gym and connecting with caregiver support group has been important in his coping, and pt reports that she benefit from connecting with friends and family. Encouraged pt to think about scheduling time with friend Mahnaz as a means of supporting her coping. This clinician also provided pt with information about [employer] for additional support groups, and recommended that pt meet individually with therapist. Provided pt with [employer] resource, and pt indicated that she plans to call today and schedule initial meeting with therapist. This clinician also provided pt and  with information about private pay homecare support agencies, as pt indicated that daughter who has been able to help with many of pt's homecare needs will be unable to provide this care for 6 weeks.       Education Provided:   [employer]  Open and supportive communication strategies  Private Duty Support    Follow-up Required:   1) This clinician LVM for pt to return this clinician's call regarding wheelchair specific support.   2) This clinician will plan to follow-up with pt in 1 week and explore whether pt would like to meet with this clinician for brief period for additional psychosocial support prior to pt's getting connected with outpatient therapist.     Pt has this clinician's contact information and knows to reach out in the case of psychosocial distress or concern.     CEASAR [place], [facility]  Phone: [phone]  Pager: [phone]    [facility]: M, T  *every other Thursday, 8am-4:30pm  [facility]: W, F, *every other " Thursday, 8am-4:30pm

## 2018-01-02 NOTE — NURSING NOTE
"Oncology Rooming Note    January 2, 2018 10:47 AM   Tosin Nina is a 72 year old female who presents for:    Chief Complaint   Patient presents with     Oncology Clinic Visit     Follow up     Initial Vitals: /74  Pulse 66  Temp 97.8  F (36.6  C) (Tympanic)  Resp 16  Ht 1.499 m (4' 11\")  SpO2 98% Estimated body mass index is 33.85 kg/(m^2) as calculated from the following:    Height as of 10/12/17: 1.499 m (4' 11\").    Weight as of 10/12/17: 76 kg (167 lb 9.6 oz). There is no height or weight on file to calculate BSA.  Data Unavailable Comment: Data Unavailable   No LMP recorded. Patient is postmenopausal.  Allergies reviewed: Yes  Medications reviewed: Yes    Medications: Medication refills not needed today.  Pharmacy name entered into [employer]:    [facility] [address] [place] [address] - [address] [facility] [address]  [address] [address] [facility]    Clinical concerns: Follow up    8 minutes for nursing intake (face to face time)     Jesika Espinoza CMA       DISCHARGE PLAN:  Next appointments: See patient instruction section  Departure Mode: W/C  Accompanied by:   0 minutes for nursing discharge (face to face time)   Jesika Espinoza CMA                  "

## 2018-01-02 NOTE — MR AVS SNAPSHOT
After Visit Summary   1/2/2018    Tosin Nina    MRN: [id number]           Patient Information     Date Of Birth          1945        Visit Information        Provider Department      1/2/2018 2:43 PM Abigail Quijano [facility]        Today's Diagnoses     Counseling NOS(V65.40)    -  1       Follow-ups after your visit        Your next 10 appointments already scheduled     Jan 16, 2018  3:15 PM CST   Neuro Treatment with Siobhan Bai, PT   [facility] ([facility])    [address] [facility] [address]   [phone]            Jan 18, 2018  2:45 PM CST   Neuro Treatment with Siobhan Bai, PT   [facility] ([facility])    [address] [facility] [address] [address]   [phone]            Jan 23, 2018  3:15 PM CST   Neuro Treatment with Siobhan Bai, PT   [facility] ([facility])    [address] [address]   [phone]            Jan 24, 2018 10:00 AM CST   LAB with RH LAB DRAW 1   [facility] ([facility])    [facility]  [address]  [facility] [address]   [phone]           Please do not eat 10-12 hours before your appointment if you are coming in fasting for labs on lipids, cholesterol, or glucose (sugar). This does not apply to pregnant women. Water, hot tea and black coffee (with nothing added) are okay. Do not drink other fluids, diet soda or chew gum.            Jan 25, 2018  2:45 PM CST   Neuro Treatment with Siobhan Bai, PT   [facility] ([facility])    [address] [facility] [address]   [phone]            Jan 30, 2018  4:00 PM CST   Neuro Treatment with Siobhan Bai, PT   [facility] ([facility])    [address]  [place] [address]   [phone]            Feb 01, 2018  2:00 PM CST   Neuro Treatment with Siobhan Bai PT   [facility] ([facility])    [address] [address]   [phone]            Feb 26, 2018 10:30 AM CST   LAB with RH LAB DRAW 2   [facility] ([facility])    [facility]  [address]  [facility] [address]   [phone]           Please do not eat 10-12 hours before your appointment if you are coming in fasting for labs on lipids, cholesterol, or glucose (sugar). This does not apply to pregnant women. Water, hot tea and black coffee (with nothing added) are okay. Do not drink other fluids, diet soda or chew gum.            Mar 05, 2018   Procedure with Nila Munson MD   [facility] ([facility])    [address] [facility] [address]   [phone]           [facility] [address] [address] [facility] [place]            Apr 03, 2018 10:30 AM CDT   LAB with RH LAB DRAW 2   [facility] ([facility])    [facility]  [address]  [facility] [address]   [phone]           Please do not eat 10-12 hours before your appointment if you are coming in fasting for labs on lipids, cholesterol, or glucose (sugar). This does not apply to pregnant women. Water, hot tea and black coffee (with nothing added) are okay. Do not drink other fluids, diet soda or chew gum.              Future tests that were ordered for you today     Open Future Orders        Priority Expected Expires Ordered    GASTROENTEROLOGY ADULT REF PROCEDURE ONLY Routine  1/1/2019 1/1/2018     Routine 4/1/2018 1/1/2019 1/1/2018            Who to contact     If you have questions or need follow up information about today's  clinic visit or your schedule please contact [facility] directly at [phone].  Normal or non-critical lab and imaging results will be communicated to you by MyChart, letter or phone within 4 business days after the clinic has received the results. If you do not hear from us within 7 days, please contact the clinic through MySupportAssistanthart or phone. If you have a critical or abnormal lab result, we will notify you by phone as soon as possible.  Submit refill requests through [employer] or call your pharmacy and they will forward the refill request to us. Please allow 3 business days for your refill to be completed.          Additional Information About Your Visit        [employer]SupportAssistant[employer] Information     [employer] gives you secure access to your electronic health record. If you see a primary care provider, you can also send messages to your care team and make appointments. If you have questions, please call your primary care clinic.  If you do not have a primary care provider, please call [phone] and they will assist you.        Care EveryWhere ID     This is your Care EveryWhere ID. This could be used by other organizations to access your [facility] medical records  [id number]         Blood Pressure from Last 3 Encounters:   01/02/18 123/74   11/02/17 128/64   10/12/17 128/81    Weight from Last 3 Encounters:   01/02/18 74.8 kg (165 lb)   10/12/17 76 kg (167 lb 9.6 oz)   10/03/17 75.3 kg (166 lb)              Today, you had the following     No orders found for display         Today's Medication Changes          These changes are accurate as of: 1/2/18  3:25 PM.  If you have any questions, ask your nurse or doctor.               These medicines have changed or have updated prescriptions.        Dose/Directions    escitalopram 5 MG tablet   Commonly known as:  LEXAPRO   This may have changed:  medication strength   Used for:  Reactive depression   Changed by:  Nessa Christina MD        Dose:  10 mg   Take 2  tablets (10 mg) by mouth daily   Quantity:  90 tablet   Refills:  3       rivaroxaban ANTICOAGULANT 20 MG Tabs tablet   Commonly known as:  XARELTO   This may have changed:  Another medication with the same name was removed. Continue taking this medication, and follow the directions you see here.   Used for:  Other acute pulmonary embolism without acute cor pulmonale (H)   Changed by:  Kavya Barahona MD        Dose:  20 mg   Take 1 tablet (20 mg) by mouth daily (with dinner)   Quantity:  30 tablet   Refills:  3       Vitamin D (Cholecalciferol) 1000 UNITS Tabs   This may have changed:  additional instructions   Used for:  Paresthesias        Dose:  2000 Units   Take 2,000 Units by mouth daily   Quantity:  60 tablet   Refills:  0            Where to get your medicines      These medications were sent to [address] [address]     Phone:  [phone]     escitalopram 5 MG tablet                Primary Care Provider Office Phone # Fax #    Esther Back [phone]       [place] [address] [address] [address]        Equal Access to Services     [facility]: Hadii aad ku hadasho Soomaali, waaxda luqadaha, qaybta kaalmada adeegyada, hetal olivares hayflo regan . So [facility] [phone].    ATENCIÓN: Si habla español, tiene a alarcon disposición servicios gratuitos de asistencia lingüística. [facility] [phone].    We comply with applicable federal civil rights laws and [place] laws. We do not discriminate on the basis of race, color, national origin, age, disability, sex, sexual orientation, or gender identity.            Thank you!     Thank you for choosing [facility]  for your care. Our goal is always to provide you with excellent care. Hearing back from our patients is one way we can continue to improve our services. Please take a few minutes to complete  the written survey that you may receive in the mail after your visit with us. Thank you!             Your Updated Medication List - Protect others around you: Learn how to safely use, store and throw away your medicines at [email or web address].          This list is accurate as of: 1/2/18  3:25 PM.  Always use your most recent med list.                   Brand Name Dispense Instructions for use Diagnosis    escitalopram 5 MG tablet    LEXAPRO    90 tablet    Take 2 tablets (10 mg) by mouth daily    Reactive depression       hydrochlorothiazide 25 MG tablet    HYDRODIURIL     Take 25 mg by mouth daily    Ovarian cancer, unspecified laterality (H)       levothyroxine 125 MCG tablet    SYNTHROID    30 tablet    Take 1 tablet (125 mcg) by mouth daily    Other pulmonary embolism without acute cor pulmonale, unspecified chronicity (H)       magnesium chloride 535 (64 MG) MG Tbcr CR tablet     30 tablet    Take 1 tablet (535 mg) by mouth daily    Ovarian cancer, unspecified laterality (H)       omeprazole 20 MG CR capsule    priLOSEC    30 capsule    Take 1 capsule (20 mg) by mouth every morning    Ovarian cancer, unspecified laterality (H), Dyspepsia       Potassium Chloride ER 20 MEQ Tbcr     60 tablet    Take 1 tablet (20 mEq) by mouth 2 times daily    Hypokalemia       rivaroxaban ANTICOAGULANT 20 MG Tabs tablet    XARELTO    30 tablet    Take 1 tablet (20 mg) by mouth daily (with dinner)    Other acute pulmonary embolism without acute cor pulmonale (H)       senna-docusate 8.6-50 MG per tablet    SENOKOT-S;PERICOLACE    60 tablet    Take 1-2 tablets by mouth 2 times daily Hold for loose stools    Ovarian cancer, unspecified laterality (H)       Vitamin D (Cholecalciferol) 1000 UNITS Tabs     60 tablet    Take 2,000 Units by mouth daily    Paresthesias

## 2018-01-04 LAB — LAB SCANNED RESULT: NORMAL

## 2018-01-09 ENCOUNTER — TELEPHONE (OUTPATIENT)
Dept: ONCOLOGY | Facility: CLINIC | Age: 73
End: 2018-01-09

## 2018-01-09 NOTE — TELEPHONE ENCOUNTER
Social Work Progress Note      Data/Intervention:  Patient Name:  Tosin Nina  /Age:  1945 (72 year old)    Reason for Follow-Up:  This clinician called pt for planned telephone check-in after in-person visit with pt last week.     Intervention:   Namita reports today that family has had emotionally overwhelming week with  getting surgery for skin cancer, and daughter having consultation for back surgery. Provided emotional support surrounding understandably emotionally difficult week. Pt reports that she continues to be interested in working with an individual therapist, but has not had the time to schedule this appointment at [facility] as of yet. Pt reports that her  will plan to call and schedule private duty home aids after he has more time recovering from surgery.     This clinician offered in person meeting with this clinician for 1:1 support prior to pt's starting services with individual therapist at [facility]. Pt reported that she would look at calender with daughter later in week and would call this clinician if interested in meeting in person for additional emotional counseling and support.     Plan:  1) This clinician will await to hear from pt about scheduling for 1-3 sessions of emotional support in interim of pt's getting connected with outpatient therapist  2) Pt and family have this clinician's contact information and know to reach out in the case of psychosocial distress or need.     Please call or page if needs or concerns arise.     CEASAR Hays, [facility]  Direct Phone: [phone]  Pager: [phone]

## 2018-01-09 NOTE — TELEPHONE ENCOUNTER
Received positive distress screen regarding patient's concern for ability to eat.  Called and spoke with patient. Patient states her appetite has decreased and feels she cannot eat large meals.  Patient has been trying to increase her protein intake.  Offered suggestions for ways to increase protein intake. Suggest patient eat 4-5 small meals per day.  Encouraged patient to contact clinic if has further questions. Patient verbalized understanding of plan.    Misha Grubbs, RD, LD  Clinical Dietitian  [facility]  [phone] (direct)

## 2018-01-18 ENCOUNTER — HOSPITAL ENCOUNTER (OUTPATIENT)
Dept: PHYSICAL THERAPY | Facility: CLINIC | Age: 73
Setting detail: THERAPIES SERIES
End: 2018-01-18
Attending: PSYCHIATRY & NEUROLOGY
Payer: COMMERCIAL

## 2018-01-18 PROCEDURE — 40000719 ZZHC STATISTIC PT DEPARTMENT NEURO VISIT: Performed by: PHYSICAL THERAPIST

## 2018-01-18 PROCEDURE — 97112 NEUROMUSCULAR REEDUCATION: CPT | Mod: GP | Performed by: PHYSICAL THERAPIST

## 2018-01-19 ENCOUNTER — TELEPHONE (OUTPATIENT)
Dept: ONCOLOGY | Facility: CLINIC | Age: 73
End: 2018-01-19

## 2018-01-19 NOTE — TELEPHONE ENCOUNTER
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 Ennis at the [facility]. This clinician called pt today for routine psychosocial check-in and emotional support.     Intervention:   This clinician spoke with pt about her coping over previous week given multiple stressors. Pt acknowledged that she has yet to reach out to outpatient therapist, but is planning on doing that at the beginning of next week. Pt reported that she and  are looking into private aids to assist with home chores. This clinician provided pt and  contact information again for private duty aids in [facility], and reminded family of this clinician's willingness to see pt before next scheduled visit with Dr. Ennis for additional emotional support and counseling.     Plan:  Family aware of SW support through clinic, and will reach out in the case of psychosocial distress or concern.     Please call or page if needs or concerns arise.     CEASAR Hays, [facility]  Direct Phone: [phone]  Pager: [phone]

## 2018-01-23 ENCOUNTER — TELEPHONE (OUTPATIENT)
Dept: ONCOLOGY | Facility: CLINIC | Age: 73
End: 2018-01-23

## 2018-01-23 NOTE — LETTER
Cancer Risk Management  Program [facility]  [facility]  [facility]  Mailing Address  Cancer Risk Management Program  [facility]  [address]  [place] [address]    New patient appointments  [phone]  January 23, 2018    Tosin Nina  [address]  [facility] [address]      Dear [place],  This is a summary of our phone conversation regarding your genetic test results.  A copy of your test results should be included in this note. Please contact me with any questions.    1/23/2018    Referring Provider: Nessa Ennis MD    Presenting Information:  I spoke to [place] by phone today to discuss her genetic testing results. Her blood was drawn on  12/20/17. The OvaNext panel for hereditary ovarian and breast cancer.   was ordered  from [facility]. This testing was done because of [place]'s personal history of ovarian cancer and family history of breast cancer.    Genetic Testing Result: NEGATIVE  [place] is negative for mutations in the  CHRISTY, BARD1, BRCA1, BRCA2, BRIP1, CDH1, CHEK2, DICER1, EPCAM, MLH1, MRE11A, MSH2, MSH6, MUTYH, NBN, NF1, PALB2, PMS2, PTEN, RAD50, RAD51C, RAD51D, SMARCA4, STK11, and TP53  genes.    No mutations were found in any of the 25 genes analyzed.  This test involved sequencing and deletion/duplication analysis of all genes with the exception of EPCAM (deletions/duplications only).    Testing did not detect an identifiable mutation associated with  Hereditary Breast and Ovarian Cancer syndrome (BRCA1, BRCA2), Quinn syndrome (MLH1, MSH2, MSH6, PMS2, EPCAM), Hereditary Diffuse Gastric Cancer (CDH1), Cowden syndrome (PTEN), Li Fraumeni syndrome (TP53), Peutz-Jeghers syndrome (STK11), MUTYH Associated Polyposis (MAP), or Neurofibromatosis type 1 (NF1).      A copy of the  "test report can be found in the Laboratory tab, dated 12/20/17 and named \"SEND OUTS [facility] TEST\". The report is scanned in as a linked document.    Interpretation:  We discussed several different interpretations of this negative test result.    1. One explanation may be that there is a different gene or combination of genes and environment that are associated with the cancers in [place] and/or her relatives.  2. There is also a small possibility that there is a mutation in one of these genes, and we could not find it with our current testing methods.       Inheritance:  We reviewed the autosomal dominant inheritance of the CHRISTY, BARD1, BRCA1, BRCA2, BRIP1, CDH1, CHEK2, DICER1, EPCAM, MLH1, MRE11A, MSH2, MSH6, MUTYH, NBN, NF1, PALB2, PMS2, PTEN, RAD50, RAD51C, RAD51D, SMARCA4, STK11, and TP53 genes.  We discussed that [place] did not pass on an identifiable mutation in these genes to her daughter based on this test result.  Mutations in these genes do not skip generations.      Summary:  We do not have an explanation for [place]'s diagnosis of ovarian cancer.    Genetic testing is rapidly advancing, and new cancer susceptibility genes will most likely be identified in the future.  Therefore, I encouraged [place] to contact me  if there are changes in her personal or family history.  This may change how we assess her cancer risk, screening, and the testing we would offer.    Plan:  1.  I will mail [place] a copy of her test results.  2. She plans to follow-up with Dr. Ennis.  3. She should contact me  if her family history changes.    If [place] has any further questions, I encouraged her to contact me at .    Kelsie Nguyen MS [facility]  Genetic Counselor  [phone]                      "

## 2018-01-24 PROCEDURE — 96523 IRRIG DRUG DELIVERY DEVICE: CPT

## 2018-01-24 PROCEDURE — 25000128 H RX IP 250 OP 636: Performed by: OBSTETRICS & GYNECOLOGY

## 2018-01-24 RX ORDER — HEPARIN SODIUM (PORCINE) LOCK FLUSH IV SOLN 100 UNIT/ML 100 UNIT/ML
500 SOLUTION INTRAVENOUS ONCE
Status: COMPLETED | OUTPATIENT
Start: 2018-01-24 | End: 2018-01-24

## 2018-01-24 RX ADMIN — SODIUM CHLORIDE, PRESERVATIVE FREE 500 UNITS: 5 INJECTION INTRAVENOUS at 09:52

## 2018-01-24 NOTE — PROGRESS NOTES
Infusion Nursing Note:  Tosin Nina presents today for port flush.    Patient seen by provider today: No   present during visit today: Not Applicable.    Note: N/A.    Intravenous Access:  Implanted Port.    Treatment Conditions:  Not Applicable.      Post Infusion Assessment:  Patient tolerated injection without incident.  Site patent and intact, free from redness, edema or discomfort.  No evidence of extravasations.  Access discontinued per protocol.    Discharge Plan:   AVS to patient via MYCHART.  Patient will return 2/26/18 for port flush for next appointment.   Patient discharged in stable condition accompanied by: .  Departure Mode: Wheelchair.    Allyn Leary RN

## 2018-01-24 NOTE — TELEPHONE ENCOUNTER
"1/23/2018    Referring Provider: Nessa Ennis MD    Presenting Information:  I spoke to [place] by phone today to discuss her genetic testing results. Her blood was drawn on  12/20/17. The OvaNext panel for hereditary ovarian and breast cancer.   was ordered  from [facility]. This testing was done because of [place]'s personal history of ovarian cancer and family history of breast cancer.    Genetic Testing Result: NEGATIVE  [place] is negative for mutations in the  CHRISTY, BARD1, BRCA1, BRCA2, BRIP1, CDH1, CHEK2, DICER1, EPCAM, MLH1, MRE11A, MSH2, MSH6, MUTYH, NBN, NF1, PALB2, PMS2, PTEN, RAD50, RAD51C, RAD51D, SMARCA4, STK11, and TP53  genes.    No mutations were found in any of the 25 genes analyzed.  This test involved sequencing and deletion/duplication analysis of all genes with the exception of EPCAM (deletions/duplications only).    Testing did not detect an identifiable mutation associated with  Hereditary Breast and Ovarian Cancer syndrome (BRCA1, BRCA2), Qiunn syndrome (MLH1, MSH2, MSH6, PMS2, EPCAM), Hereditary Diffuse Gastric Cancer (CDH1), Cowden syndrome (PTEN), Li Fraumeni syndrome (TP53), Peutz-Jeghers syndrome (STK11), MUTYH Associated Polyposis (MAP), or Neurofibromatosis type 1 (NF1).      A copy of the test report can be found in the Laboratory tab, dated 12/20/17 and named \"[facility]\". The report is scanned in as a linked document.    Interpretation:  We discussed several different interpretations of this negative test result.    1. One explanation may be that there is a different gene or combination of genes and environment that are associated with the cancers in [place] and/or her relatives.  2. There is also a small possibility that there is a mutation in one of these genes, and we could not find it with our current testing methods.       Inheritance:  We reviewed the autosomal dominant inheritance of the CHRISTY, BARD1, BRCA1, BRCA2, BRIP1, CDH1, CHEK2, DICER1, EPCAM, MLH1, " MRE11A, MSH2, MSH6, MUTYH, NBN, NF1, PALB2, PMS2, PTEN, RAD50, RAD51C, RAD51D, SMARCA4, STK11, and TP53 genes.  We discussed that [place] did not pass on an identifiable mutation in these genes to her daughter based on this test result.  Mutations in these genes do not skip generations.      Summary:  We do not have an explanation for [place]'s diagnosis of ovarian cancer.    Genetic testing is rapidly advancing, and new cancer susceptibility genes will most likely be identified in the future.  Therefore, I encouraged [place] to contact me  if there are changes in her personal or family history.  This may change how we assess her cancer risk, screening, and the testing we would offer.    Plan:  1.  I will mail [place] a copy of her test results.  2. She plans to follow-up with Dr. Ennis.  3. She should contact me  if her family history changes.    If [place] has any further questions, I encouraged her to contact me at .    Kelsie Nguyen MS [facility]  Genetic Counselor  [phone]

## 2018-01-25 ENCOUNTER — HOSPITAL ENCOUNTER (OUTPATIENT)
Dept: PHYSICAL THERAPY | Facility: CLINIC | Age: 73
Setting detail: THERAPIES SERIES
End: 2018-01-25
Attending: PSYCHIATRY & NEUROLOGY
Payer: COMMERCIAL

## 2018-01-25 PROCEDURE — 97112 NEUROMUSCULAR REEDUCATION: CPT | Mod: GP | Performed by: PHYSICAL THERAPIST

## 2018-01-25 PROCEDURE — 40000719 ZZHC STATISTIC PT DEPARTMENT NEURO VISIT: Performed by: PHYSICAL THERAPIST

## 2018-01-30 ENCOUNTER — HOSPITAL ENCOUNTER (OUTPATIENT)
Dept: PHYSICAL THERAPY | Facility: CLINIC | Age: 73
Setting detail: THERAPIES SERIES
End: 2018-01-30
Attending: PSYCHIATRY & NEUROLOGY
Payer: COMMERCIAL

## 2018-01-30 PROCEDURE — 40000719 ZZHC STATISTIC PT DEPARTMENT NEURO VISIT: Performed by: PHYSICAL THERAPIST

## 2018-01-30 PROCEDURE — 97116 GAIT TRAINING THERAPY: CPT | Mod: GP | Performed by: PHYSICAL THERAPIST

## 2018-01-30 PROCEDURE — 97112 NEUROMUSCULAR REEDUCATION: CPT | Mod: GP | Performed by: PHYSICAL THERAPIST

## 2018-02-01 ENCOUNTER — HOSPITAL ENCOUNTER (OUTPATIENT)
Dept: PHYSICAL THERAPY | Facility: CLINIC | Age: 73
Setting detail: THERAPIES SERIES
End: 2018-02-01
Attending: PSYCHIATRY & NEUROLOGY
Payer: COMMERCIAL

## 2018-02-01 PROCEDURE — 40000719 ZZHC STATISTIC PT DEPARTMENT NEURO VISIT: Performed by: PHYSICAL THERAPIST

## 2018-02-01 PROCEDURE — 97116 GAIT TRAINING THERAPY: CPT | Mod: GP | Performed by: PHYSICAL THERAPIST

## 2018-02-01 PROCEDURE — 97112 NEUROMUSCULAR REEDUCATION: CPT | Mod: GP | Performed by: PHYSICAL THERAPIST

## 2018-02-06 ENCOUNTER — HOSPITAL ENCOUNTER (OUTPATIENT)
Dept: PHYSICAL THERAPY | Facility: CLINIC | Age: 73
Setting detail: THERAPIES SERIES
End: 2018-02-06
Attending: PSYCHIATRY & NEUROLOGY
Payer: COMMERCIAL

## 2018-02-06 PROCEDURE — 97116 GAIT TRAINING THERAPY: CPT | Mod: GP | Performed by: PHYSICAL THERAPIST

## 2018-02-06 PROCEDURE — 97110 THERAPEUTIC EXERCISES: CPT | Mod: GP | Performed by: PHYSICAL THERAPIST

## 2018-02-06 PROCEDURE — 40000719 ZZHC STATISTIC PT DEPARTMENT NEURO VISIT: Performed by: PHYSICAL THERAPIST

## 2018-02-08 NOTE — OR NURSING
Spoke with pt about her past health hx and the probability of adhesions that would make her scheduled colonoscopy more uncomfortable. She states her colonoscopy 10 yrs ago was very uncomfortable and she appreciates our concern. Connected pt to our schedulers so that she could set appt for colon to be done in the OR with deeper sedation.

## 2018-02-14 ENCOUNTER — HOSPITAL ENCOUNTER (OUTPATIENT)
Dept: PHYSICAL THERAPY | Facility: CLINIC | Age: 73
Setting detail: THERAPIES SERIES
End: 2018-02-14
Attending: PSYCHIATRY & NEUROLOGY
Payer: COMMERCIAL

## 2018-02-14 PROCEDURE — 97112 NEUROMUSCULAR REEDUCATION: CPT | Mod: GP | Performed by: PHYSICAL THERAPIST

## 2018-02-14 PROCEDURE — 40000719 ZZHC STATISTIC PT DEPARTMENT NEURO VISIT: Performed by: PHYSICAL THERAPIST

## 2018-02-14 PROCEDURE — 97116 GAIT TRAINING THERAPY: CPT | Mod: GP | Performed by: PHYSICAL THERAPIST

## 2018-02-15 NOTE — PROGRESS NOTES
"Outpatient Physical Therapy Progress Note     Patient: Tosin Nina  : 1945    Beginning/End Dates of Reporting Period:  2017 to 2018    Referring Provider: James Lopez MD    Therapy Diagnosis: impaired balance and coordination, generalized weakness, deconditioning impacting functional mobility     Client Self Report: Pt reported feeling less \"bouncy\"  at home for the past wk.    Objective Measurements:    Objective Measure: TUG  Details: time- 1:20.59    Objective Measure: 25 ft walk  Details: 56.12 sec          Goals:  Goal Identifier 1   Goal Description Pt will complete at least 4 sit to stands in 30 seconds demonstrating improved functional strength to facilitate increased independence with transfers.    Target Date 18   Date Met      Progress: this goals has not been assessed since initial evaluation.  Focus in therapy has been on quality and movement pattern of sit<>stand for adequate motor control and stability from variable surfaces.  This is important to focus on before she is expected to increase her speed of movement.  Will continue towards this goal building on the fundamentals she needs to achieve this.       Goal Identifier New goal 2   Goal Description Namita will complete the TUG in less than 45 sec with her front w. walker to demonstrate improvements in gait and balance.   Target Date 18   Date Met   in progress   Progress:  This goal is not yet met however Namita has improved significantly on the TUG, improving from 2 min and 45.6 sec to 1 min and 20.6 sec since her initial eval.  Goal remains valid and appropriate.        Goal Identifier New goal 3   Goal Description Namita will be able to ambulate 25 feet with her walker in 30 seconds or less to demonstrate continued improvements in gait safety and efficiency.     Target Date 18   Date Met   in progress   Progress:  Namita has again improved significantly from her initial eval, improving from 1 " min and 39.5 sec to 56.12 sec. Using her 4WW.  Goal remains valid and appropriate.       Goal Identifier 4   Goal Description Pt and caregiver will demonstrate (I) with strengthening and gait home program to facilitate increased independence with mobility.    Target Date 02/23/18   Date Met   in progress   Progress:  Home program continues to be progressed and modified as appropriate.       Progress Toward Goals:   Progress this reporting period: Namita is showing improvements in body awareness, motor planning, patterns and control which has allowed her to make significant improvements in her TUG and 25 foot walk tests.  She has improved on both of these by nearly 50% on each.  Focus in therapy has been on proper movement patterns and sequencing for adequate motor control and stability for all functional mobility in variable environments.    Progress limited due to in consistency of treatment attendance after initial evaluation due to illness of patient herself and family members.  Now has been able to come more consistently.      Plan:  Continue therapy per current plan of care of 2 x per week.  Anticipate the need for skilled PT intervention for at least 10 weeks to achieve adequate neuroplastic change and strength for increased independence and safety with household and community function.   Changes to goals: continue goals above and extend goals 1,2 and 4 to 5/2/2018.  Continue with goal 3 to 4/2/18.      Discharge:  No

## 2018-02-26 PROCEDURE — 96523 IRRIG DRUG DELIVERY DEVICE: CPT

## 2018-02-26 PROCEDURE — 25000128 H RX IP 250 OP 636: Performed by: OBSTETRICS & GYNECOLOGY

## 2018-02-26 RX ORDER — HEPARIN SODIUM (PORCINE) LOCK FLUSH IV SOLN 100 UNIT/ML 100 UNIT/ML
500 SOLUTION INTRAVENOUS EVERY 8 HOURS PRN
Status: DISCONTINUED | OUTPATIENT
Start: 2018-02-26 | End: 2018-02-26 | Stop reason: HOSPADM

## 2018-02-26 RX ADMIN — SODIUM CHLORIDE, PRESERVATIVE FREE 500 UNITS: 5 INJECTION INTRAVENOUS at 10:38

## 2018-02-26 NOTE — PROGRESS NOTES
Infusion Nursing Note:  Tosin Barbernidia presents today for port flush.    Patient seen by provider today: No   present during visit today: Not Applicable.    Note: N/A.    Intravenous Access:  Implanted Port.    Treatment Conditions:  Not Applicable.    Post Infusion Assessment:  Patient tolerated port flush without incident.  Blood return noted pre and post infusion.  Site patent and intact, free from redness, edema or discomfort.  No evidence of extravasations.  Access discontinued per protocol.    Discharge Plan:   Discharge instructions reviewed with: Patient.  Patient and/or family verbalized understanding of discharge instructions and all questions answered.  Copy of AVS reviewed with patient and/or family.  Patient will return 4/3/18 for labs for next appointment.  Patient discharged in stable condition accompanied by: .  Departure Mode: Wheelchair.    Mildred Kimball RN

## 2018-02-26 NOTE — MR AVS SNAPSHOT
After Visit Summary   2/26/2018    Tosin Nina    MRN: [id number]           Patient Information     Date Of Birth          1945        Visit Information        Provider Department      2/26/2018 10:30 AM RH LAB DRAW 2 [facility]         Follow-ups after your visit        Your next 10 appointments already scheduled     Apr 03, 2018 10:30 AM CDT   LAB with RH LAB DRAW 2   [facility] ([facility])    [place] [id number]  [facility] [address]   [phone]           Please do not eat 10-12 hours before your appointment if you are coming in fasting for labs on lipids, cholesterol, or glucose (sugar). This does not apply to pregnant women. Water, hot tea and black coffee (with nothing added) are okay. Do not drink other fluids, diet soda or chew gum.            Apr 10, 2018 11:30 AM CDT   Return Visit with Nessa Foster MD   [facility] ([facility])    [facility]  [address]  [facility] [address]   [phone]            Apr 20, 2018   Procedure with Nila Munson MD   [facility] (--)    [address]  [facility] [id number] [phone]              Who to contact     If you have questions or need follow up information about today's clinic visit or your schedule please contact [facility] directly at [phone].  Normal or non-critical lab and imaging results will be communicated to you by MyChart, letter or phone within 4 business days after the clinic has received the results. If you do not hear from us within 7 days, please contact the clinic through MyChart or phone. If you have a critical or abnormal lab result, we will notify you by phone as soon as possible.  Submit refill requests through  [email or web address] or call your pharmacy and they will forward the refill request to us. Please allow 3 business days for your refill to be completed.          Additional Information About Your Visit        MyChart Information     [email or web address] gives you secure access to your electronic health record. If you see a primary care provider, you can also send messages to your care team and make appointments. If you have questions, please call your primary care clinic.  If you do not have a primary care provider, please call [phone] and they will assist you.        Care EveryWhere ID     This is your Care EveryWhere ID. This could be used by other organizations to access your [facility] medical records  [id number]         Blood Pressure from Last 3 Encounters:   01/02/18 123/74   11/02/17 128/64   10/12/17 128/81    Weight from Last 3 Encounters:   01/02/18 74.8 kg (165 lb)   10/12/17 76 kg (167 lb 9.6 oz)   10/03/17 75.3 kg (166 lb)              Today, you had the following     No orders found for display         Today's Medication Changes          These changes are accurate as of 2/26/18 10:36 AM.  If you have any questions, ask your nurse or doctor.               These medicines have changed or have updated prescriptions.        Dose/Directions    Vitamin D (Cholecalciferol) 1000 UNITS Tabs   This may have changed:  additional instructions   Used for:  Paresthesias        Dose:  2000 Units   Take 2,000 Units by mouth daily   Quantity:  60 tablet   Refills:  0                Primary Care Provider Office Phone # Fax #    Estherdaphnie Back [phone]       [place] [id number] [place] [address]        Equal Access to Services     [facility]: Hadii zita fields hadkayode Sofely, waaxda luqadaha, qaybta kaalmada hetal anderson . So [facility] [phone].    ATENCIÓN: Si habla español, tiene a alarcon disposición servicios gratuitos de asistencia lingüística. Llame al  [phone].    We comply with applicable federal civil rights laws and [place] laws. We do not discriminate on the basis of race, color, national origin, age, disability, sex, sexual orientation, or gender identity.            Thank you!     Thank you for choosing [facility]  for your care. Our goal is always to provide you with excellent care. Hearing back from our patients is one way we can continue to improve our services. Please take a few minutes to complete the written survey that you may receive in the mail after your visit with us. Thank you!             Your Updated Medication List - Protect others around you: Learn how to safely use, store and throw away your medicines at www.disposemymeds.org.          This list is accurate as of 2/26/18 10:36 AM.  Always use your most recent med list.                   Brand Name Dispense Instructions for use Diagnosis    escitalopram 5 MG tablet    LEXAPRO    90 tablet    Take 2 tablets (10 mg) by mouth daily    Reactive depression       hydrochlorothiazide 25 MG tablet    HYDRODIURIL     Take 25 mg by mouth daily    Ovarian cancer, unspecified laterality (H)       levothyroxine 125 MCG tablet    SYNTHROID    30 tablet    Take 1 tablet (125 mcg) by mouth daily    Other pulmonary embolism without acute cor pulmonale, unspecified chronicity (H)       magnesium chloride 535 (64 MG) MG Tbcr CR tablet     30 tablet    Take 1 tablet (535 mg) by mouth daily    Ovarian cancer, unspecified laterality (H)       omeprazole 20 MG CR capsule    priLOSEC    30 capsule    Take 1 capsule (20 mg) by mouth every morning    Ovarian cancer, unspecified laterality (H), Dyspepsia       Potassium Chloride ER 20 MEQ Tbcr     60 tablet    Take 1 tablet (20 mEq) by mouth 2 times daily    Hypokalemia       rivaroxaban ANTICOAGULANT 20 MG Tabs tablet    XARELTO    30 tablet    Take 1 tablet (20 mg) by mouth daily (with dinner)    Other acute pulmonary  embolism without acute cor pulmonale (H)       senna-docusate 8.6-50 MG per tablet    SENOKOT-S;PERICOLACE    60 tablet    Take 1-2 tablets by mouth 2 times daily Hold for loose stools    Ovarian cancer, unspecified laterality (H)       Vitamin D (Cholecalciferol) 1000 UNITS Tabs     60 tablet    Take 2,000 Units by mouth daily    Paresthesias

## 2018-03-01 ENCOUNTER — TELEPHONE (OUTPATIENT)
Dept: ONCOLOGY | Facility: CLINIC | Age: 73
End: 2018-03-01

## 2018-03-01 DIAGNOSIS — I26.99 OTHER ACUTE PULMONARY EMBOLISM WITHOUT ACUTE COR PULMONALE (H): ICD-10-CM

## 2018-03-01 NOTE — TELEPHONE ENCOUNTER
[place] is calling because she will be out of her Xarelto 20 mg prescription very, very soon.  She has only 2 left.  She would like the prescription sent to the [facility] pharmacy in [place].  And would like it to include the 3 refills as it was filled the same way 11/3/17. Carmen DOWLING

## 2018-03-01 NOTE — TELEPHONE ENCOUNTER
Dr Barahona refilled xarelto and sent to pharmacy at [facility] per pt request.  Dr Barahona recommends Dr Ennis to refill other refills moving forward as she is not currently seeing pt.  Pt called and aware of refill completed and states she will see PCP as well next month and she will discuss with MD or with Dr Ennis at next visit in April, 2018.  Pt agreeable to plan.    Kim Jones RN, BSN

## 2018-04-03 ENCOUNTER — HOSPITAL ENCOUNTER (OUTPATIENT)
Facility: CLINIC | Age: 73
Setting detail: SPECIMEN
Discharge: HOME | End: 2018-04-03
Attending: OBSTETRICS & GYNECOLOGY | Admitting: OBSTETRICS & GYNECOLOGY
Payer: COMMERCIAL

## 2018-04-03 DIAGNOSIS — C56.9 OVARIAN CANCER, UNSPECIFIED LATERALITY (H): ICD-10-CM

## 2018-04-03 DIAGNOSIS — C56.9 OVARIAN CANCER (H): Primary | ICD-10-CM

## 2018-04-03 DIAGNOSIS — C56.1 OVARIAN CANCER, RIGHT (H): ICD-10-CM

## 2018-04-03 DIAGNOSIS — Z51.81 ENCOUNTER FOR THERAPEUTIC DRUG MONITORING: ICD-10-CM

## 2018-04-03 DIAGNOSIS — Z41.8 ENCOUNTER FOR OTHER PROCEDURES FOR PURPOSES OTHER THAN REMEDYING HEALTH STATE: ICD-10-CM

## 2018-04-03 DIAGNOSIS — T45.1X5A CHEMOTHERAPY-INDUCED NEUTROPENIA (H): ICD-10-CM

## 2018-04-03 DIAGNOSIS — C56.2 OVARIAN CANCER, LEFT (H): ICD-10-CM

## 2018-04-03 DIAGNOSIS — D70.1 CHEMOTHERAPY-INDUCED NEUTROPENIA (H): ICD-10-CM

## 2018-04-03 LAB — CANCER AG125 SERPL-ACNC: <5 U/ML (ref 0–30)

## 2018-04-03 PROCEDURE — 36591 DRAW BLOOD OFF VENOUS DEVICE: CPT

## 2018-04-03 PROCEDURE — 86304 IMMUNOASSAY TUMOR CA 125: CPT | Performed by: OBSTETRICS & GYNECOLOGY

## 2018-04-03 RX ORDER — HEPARIN SODIUM (PORCINE) LOCK FLUSH IV SOLN 100 UNIT/ML 100 UNIT/ML
500 SOLUTION INTRAVENOUS ONCE
Status: CANCELLED | OUTPATIENT
Start: 2018-04-03 | End: 2018-04-03

## 2018-04-03 RX ORDER — HEPARIN SODIUM (PORCINE) LOCK FLUSH IV SOLN 100 UNIT/ML 100 UNIT/ML
500 SOLUTION INTRAVENOUS ONCE
Status: CANCELLED
Start: 2018-04-03 | End: 2018-04-03

## 2018-04-03 NOTE — MR AVS SNAPSHOT
After Visit Summary   4/3/2018    Tosin Nina    MRN: [id number]           Patient Information     Date Of Birth          1945        Visit Information        Provider Department      4/3/2018 10:30 AM RH LAB DRAW 1 [facility]        Today's Diagnoses     Encounter for therapeutic drug monitoring    -  1    Encounter for other procedures for purposes other than remedying health state        Chemotherapy-induced neutropenia (H)        Ovarian cancer, unspecified laterality (H)        Ovarian cancer (H)        Ovarian cancer, right (H)        Ovarian cancer, left (H)           Follow-ups after your visit        Your next 10 appointments already scheduled     Apr 10, 2018 11:30 AM CDT   Return Visit with Nessa Foster MD   [facility] ([facility])    [facility]  [address]  [facility] [address] [phone]            Apr 20, 2018   Procedure with Nila Munson MD   [facility] (--)    [address]  [facility] [address]   [phone]            May 09, 2018 12:15 PM CDT   LAB with RH LAB DRAW 1   [facility] ([facility])    [facility]  [address]  [facility] [address]   [phone]           Please do not eat 10-12 hours before your appointment if you are coming in fasting for labs on lipids, cholesterol, or glucose (sugar). This does not apply to pregnant women. Water, hot tea and black coffee (with nothing added) are okay. Do not drink other fluids, diet soda or chew gum.              Who to contact     If you have questions or need follow up information about today's clinic visit or your schedule please contact [facility] directly at [phone].  Normal or non-critical lab and imaging results will  be communicated to you by [employer], letter or phone within 4 business days after the clinic has received the results. If you do not hear from us within 7 days, please contact the clinic through [employer] or phone. If you have a critical or abnormal lab result, we will notify you by phone as soon as possible.  Submit refill requests through [employer] or call your pharmacy and they will forward the refill request to us. Please allow 3 business days for your refill to be completed.          Additional Information About Your Visit        [employer] Information     [employer] gives you secure access to your electronic health record. If you see a primary care provider, you can also send messages to your care team and make appointments. If you have questions, please call your primary care clinic.  If you do not have a primary care provider, please call [phone] and they will assist you.        Care EveryWhere ID     This is your Care EveryWhere ID. This could be used by other organizations to access your [facility] medical records  [id number]         Blood Pressure from Last 3 Encounters:   01/02/18 123/74   11/02/17 128/64   10/12/17 128/81    Weight from Last 3 Encounters:   01/02/18 74.8 kg (165 lb)   10/12/17 76 kg (167 lb 9.6 oz)   10/03/17 75.3 kg (166 lb)              We Performed the Following               Today's Medication Changes          These changes are accurate as of 4/3/18 10:50 AM.  If you have any questions, ask your nurse or doctor.               These medicines have changed or have updated prescriptions.        Dose/Directions    Vitamin D (Cholecalciferol) 1000 UNITS Tabs   This may have changed:  additional instructions   Used for:  Paresthesias        Dose:  2000 Units   Take 2,000 Units by mouth daily   Quantity:  60 tablet   Refills:  0                Primary Care Provider Office Phone # Fax #    Esther Back [phone]       [address]  [address]        Equal Access to Services     [facility]: Hadii zita fields lucretia Alves, waharikada luqraphael, qaybta kadomingamarian anderson, hetal long. So [facility] [phone].    ATENCIÓN: Si habla español, tiene a alarcon disposición servicios gratuitos de asistencia lingüística. Radhaame al [phone].    We comply with applicable federal civil rights laws and [place] laws. We do not discriminate on the basis of race, color, national origin, age, disability, sex, sexual orientation, or gender identity.            Thank you!     Thank you for choosing [facility]  for your care. Our goal is always to provide you with excellent care. Hearing back from our patients is one way we can continue to improve our services. Please take a few minutes to complete the written survey that you may receive in the mail after your visit with us. Thank you!             Your Updated Medication List - Protect others around you: Learn how to safely use, store and throw away your medicines at www.disposemymeds.org.          This list is accurate as of 4/3/18 10:50 AM.  Always use your most recent med list.                   Brand Name Dispense Instructions for use Diagnosis    escitalopram 5 MG tablet    LEXAPRO    90 tablet    Take 2 tablets (10 mg) by mouth daily    Reactive depression       hydrochlorothiazide 25 MG tablet    HYDRODIURIL     Take 25 mg by mouth daily    Ovarian cancer, unspecified laterality (H)       levothyroxine 125 MCG tablet    SYNTHROID    30 tablet    Take 1 tablet (125 mcg) by mouth daily    Other pulmonary embolism without acute cor pulmonale, unspecified chronicity (H)       magnesium chloride 535 (64 MG) MG Tbcr CR tablet     30 tablet    Take 1 tablet (535 mg) by mouth daily    Ovarian cancer, unspecified laterality (H)       omeprazole 20 MG CR capsule    priLOSEC    30 capsule    Take 1 capsule (20 mg) by mouth every morning    Ovarian cancer,  unspecified laterality (H), Dyspepsia       Potassium Chloride ER 20 MEQ Tbcr     60 tablet    Take 1 tablet (20 mEq) by mouth 2 times daily    Hypokalemia       rivaroxaban ANTICOAGULANT 20 MG Tabs tablet    XARELTO    30 tablet    Take 1 tablet (20 mg) by mouth daily (with dinner)    Other acute pulmonary embolism without acute cor pulmonale (H)       senna-docusate 8.6-50 MG per tablet    SENOKOT-S;PERICOLACE    60 tablet    Take 1-2 tablets by mouth 2 times daily Hold for loose stools    Ovarian cancer, unspecified laterality (H)       Vitamin D (Cholecalciferol) 1000 UNITS Tabs     60 tablet    Take 2,000 Units by mouth daily    Paresthesias

## 2018-04-03 NOTE — PROGRESS NOTES
Infusion Nursing Note:  Tosin Nina presents today for lab/Port flush.    Patient seen by provider today: No   present during visit today: Not Applicable.    Note: Returning for MD bean in a week.  Port flushed with Heparin 500units/5ml and deaccessed    Intravenous Access:  Labs drawn without difficulty.  Implanted Port.    Treatment Conditions:  Not Applicable.      Post Infusion Assessment:  Blood return noted pre and post infusion.  Site patent and intact, free from redness, edema or discomfort.  Access discontinued per protocol.    Discharge Plan:   Discharge instructions reviewed with: Patient.  Patient and/or family verbalized understanding of discharge instructions and all questions answered.  AVS to patient via [employer].  Patient will return 4/10 for next appointment.   Patient discharged in stable condition accompanied by: self and .  Departure Mode: Wheelchair.    Maru More RN

## 2018-04-10 ENCOUNTER — ONCOLOGY VISIT (OUTPATIENT)
Dept: ONCOLOGY | Facility: CLINIC | Age: 73
End: 2018-04-10
Attending: OBSTETRICS & GYNECOLOGY
Payer: COMMERCIAL

## 2018-04-10 VITALS
OXYGEN SATURATION: 95 % | HEART RATE: 70 BPM | DIASTOLIC BLOOD PRESSURE: 78 MMHG | RESPIRATION RATE: 16 BRPM | WEIGHT: 168 LBS | TEMPERATURE: 98.1 F | BODY MASS INDEX: 33.93 KG/M2 | SYSTOLIC BLOOD PRESSURE: 135 MMHG

## 2018-04-10 DIAGNOSIS — C56.1 OVARIAN CANCER, RIGHT (H): ICD-10-CM

## 2018-04-10 DIAGNOSIS — Z12.11 SPECIAL SCREENING FOR MALIGNANT NEOPLASMS, COLON: ICD-10-CM

## 2018-04-10 DIAGNOSIS — C56.2 OVARIAN CANCER, LEFT (H): Primary | ICD-10-CM

## 2018-04-10 DIAGNOSIS — Z12.31 VISIT FOR SCREENING MAMMOGRAM: ICD-10-CM

## 2018-04-10 PROCEDURE — 99213 OFFICE O/P EST LOW 20 MIN: CPT | Performed by: OBSTETRICS & GYNECOLOGY

## 2018-04-10 PROCEDURE — G0463 HOSPITAL OUTPT CLINIC VISIT: HCPCS

## 2018-04-10 RX ORDER — LOSARTAN POTASSIUM 100 MG/1
100 TABLET ORAL
COMMUNITY
Start: 2018-03-31

## 2018-04-10 ASSESSMENT — PAIN SCALES - GENERAL: PAINLEVEL: MILD PAIN (2)

## 2018-04-10 NOTE — PATIENT INSTRUCTIONS
Mammogram patient will call to schedule     Colonoscopy this is scheduled at [facility] under sx     Port flushes every 6 weeks one is scheduled      prior to next visit scheduled Danay Samson    Next visit with NP (Adelina) in 3 months scheduled Danay Samson  Avs given to patient

## 2018-04-10 NOTE — LETTER
4/10/2018         RE: Tosin Nina  [address]  [facility] [id number]        Dear Colleague,    Thank you for referring your patient, Tosin Nina, to the [facility]. Please see a copy of my visit note below.    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  She is accompanied today by her  but he left at the start of our visit to give her privacy. She is feeling very well.  She has started the anti-depressant and this has helped her significantly.  Her  has not changed, however it does not both her as much as she is now feeling better.  She is getting out more with friends.    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  She was discharged to a TCU where she is still residing with some but minimal improvement in her neurologic symptoms.  She still has quite a difficult time seeing especially with her right eye.  She also notes weakness mostly on her left side.  Both of these make it very difficult for her to walk and thus she is having to use a wheelchair for most of her mobility.  She tolerated her first cycle quite well with her biggest side effect being nausea which improved drastically with a change in anti-emetics.      17-17:  Admit to [facility] for worsening dizziness, found to have stage IVB ovarian cancer (inguinal lymphadenopathy) causing paraneoplastic syndrome    17:  CT C/A/P:  Thrombus identified within the right lower lobe are artery and right upper lobar arteries. The right pulmonary artery is enlarged, similar to prior exams. No CT evidence of right heart strain. No suspicious mediastinal or perihilar lymphadenopathy. Bovine arch anatomy. No suspicious pulmonary nodules,  evidence of infarction, or infection. Abdomen and pelvis: There are soft tissue nodules identified along the liver capsule measuring up to 3 cm inferiorly. There is a large amount of omental caking. Enlarged iliac and retroperitoneal lymph nodes for example a 2.6 cm right external iliac lymph node or group of lymph nodes. Heterogeneous enhancement of the uterine fundus could be due to fibroids or a mass. The overall size of the uterus does not appear significantly different than in 2014. The left ovary does appear slightly larger and lobular in appearance compared to prior exam. There is also a nodular area along the round ligament. It was not present on prior exam. There is an irregular lobulated mass involving the sigmoid colon. Abnormal, enlarged inguinal lymph nodes. Soft tissue implant in the posterior right retroperitoneum adjacent to the psoas was not present on prior exam. 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 Spondylolisthesis at L3-4. Small periumbilical hernia containing some of the abnormal omentum.    4/11/17:   268, CEA .6    4/12/17:  IR omental biopsy   Pathology:  High grade serous carcinoma    4/14/17: Cycle #1 carboplatin AUC 6 and weekly Taxol 80mg/m2.  = 2648    5/5/17:  Cycle #2 carboplatin AUC 6 and weekly Taxol 80mg/m2.  = 370    5/26/17:  Cycle #3 carboplatin AUC 6 and weekly Taxol 80mg/m2.  = 63    6/16/17:  CT C/A/P:  Chest:  Resolution of previous right-sided pulmonary emboli since April. No mediastinal, hilar or axillary adenopathy. No pleural fluid.  Port-A-Cath right superior chest with tip in the SVC.  Short linear fibrosis or discoid atelectasis anterior medial right upper lobe. Lungs otherwise clear. Abdomen and Pelvis: Marked improvement in carcinomatosis and omental metastasis since 4/11/2017. In the anterior left pelvis there is a 1.2 cm nodule with adjacent  linear opacity approximately 4 cm in length in an area of previous dense omental caking and metastatic disease. There is resolution of the previous anterior right-sided omental caking with subcentimeter trace of residual nodularity in this location. There are multiple new indeterminate nodular densities in the anterior abdominal wall subcutaneous fat as well as small collections of subcutaneous air, suggesting that these are medication injection sites.  Previous subserosal implants along the inferior liver have resolved. No focal liver lesions. Normal-appearing pancreas, adrenal glands and kidneys. Tiny hypodense spleen lesion is too small to characterize, not previously seen. Spleen otherwise appears normal. No periaortic or pelvic adenopathy with resolution of previous adenopathy. Lobulated enlarged uterus redemonstrated consistent with multiple fibroids. No free fluid. No acute bowel abnormality. Resolution of mesenteric right lower quadrant nodule is compatible with metastasis. On coronal images the terminal ileum takes an unusual circular course posterior to the right colon, but there is no evidence for obstruction. No aggressive bone lesions.    6/16/17:   = 27    6/28/17:  Robotic total laparoscopic hysterectomy, bilateral salpingo-oophorectomy, lysis of adhesions, omentectomy, optimal tumor debulking to no residual disease   Pathology:  Minimal serous carcinoma remaining in the left ovary    7/20/17:  Cycle #4 carboplatin AUC 6 and weekly Taxol 80mg/m2. D15 cancelled due to thrombocytopenia (plt 70)   = 18    8/10/17:  Cycle #5 carboplatin AUC 6 and weekly Taxol 80mg/m2. D8 cancelled neutropenia , D15 cancelled thromobocytopenia (plt 53)  = 12    8/31/17:  Cycle #6 carboplatin AUC 5 due to myelosuppression and weekly Taxol 80mg/m2. D15 delayed thrombocytopenia (plt 78)  = 10    Review of Systems:  Systemic           no weight gain; no fatigue; no fever; no chills; no night sweats;  no appetite changes  Skin           no rashes, or lesions  Eye           no irritation; no changes in vision; + visual difficulty  Ward-Laryngeal           no dysphagia; no hoarseness; no stuffiness/congestion  Pulmonary    no cough; no shortness of breath  Cardiovascular    no chest pain; no palpitations  Gastrointestinal    no nausea; no diarrhea; no constipation; no abdominal pain; no changes in bowel  habits; no blood in stool  Genitourinary   no urinary frequency; no urinary urgency; no dysuria; no pain; no abnormal vaginal discharge; no abnormal vaginal bleeding  Breast    no breast discharge; no breast changes; no breast pain  Musculoskeletal    no myalgias; no arthralgias; no back pain; + joint pain  Psychiatric           no depressed mood; no anxiety    Hematologic            no tender lymph nodes; no noticeable swellings or lumps   Endocrine    no hot flashes; no heat/cold intolerance         Neurological   no tremor; + numbness and tingling; + loss of balance; + difficulty walking; no headaches; no difficulty sleeping    Obstetrics and Gynecology History:  ,   Menopause at age 52, took HRT for a short while, maybe 1 year        Past Medical History:  Past Medical History:   Diagnosis Date     Anemia      Cervical spinal stenosis      Depression      GERD (gastroesophageal reflux disease)      Hypertension      Hypokalemia      Hypothyroid      Lumbar spinal stenosis      Obesity      Ovarian cancer (H)      Paraneoplastic neuromyopathy and neuropathy (H)      PE (pulmonary thromboembolism) (H)      Thrombocytopenia (H)        Past Surgical History:  Past Surgical History:   Procedure Laterality Date     AS TOTAL KNEE ARTHROPLASTY Left      BACK SURGERY       CHOLECYSTECTOMY  2016     CYSTOSCOPY N/A 2017    Procedure: CYSTOSCOPY;;  Surgeon: Nessa Christina MD;  Location: [facility] OR     DAVINCI HYSTERECTOMY TOTAL, BILATERAL SALPINGO-OOPHORECTMY, NODE DISSECTION, TUMOR STAGING,  COMBINED N/A 6/28/2017    Procedure: COMBINED DAVINCI HYSTERECTOMY TOTAL, SALPINGO-OOPHORECTOMY, NODE DISSECTION, TUMOR STAGING;  Robotic total laparoscopic hysterectomy, bilateral salpingo-oophorectomy, omentectomy, lysis of adhesions, tumor debulking, cystoscopy;  Surgeon: Nessa Christina MD;  Location: [facility] OR     OPEN REDUCTION INTERNAL FIXATION WRIST Right 2009     THYROIDECTOMY         Health Maintenance:  Last Pap Smear: No need for further pap smear exams  She has not had a history of abnormal Pap smears.    Last Mammogram: 10/19/16              Result: normal      She has not had a history of abnormal mammograms.    Last Colonoscopy: 2007              Result: normal      Current Medications:   has a current medication list which includes the following prescription(s): losartan, multiple vitamins-minerals, rivaroxaban anticoagulant, escitalopram, magnesium chloride, potassium chloride er, hydrochlorothiazide, levothyroxine, and vitamin d (cholecalciferol).     Allergies:   Amoxicillin; Clarithromycin; Lisinopril; and Penicillins      Social History:  Social History     Social History     Marital status:      Spouse name: Christiano Nina     Number of children: 1     Years of education: N/A     Occupational History     Current: Retired      Former:       Social History Main Topics     Smoking status: Never Smoker     Smokeless tobacco: Not on file     Alcohol use Yes      Comment: occasionally     Drug use: No     Sexual activity: Not on file     Other Topics Concern     Not on file     Social History Narrative     Lives with , feels safe at home.  Retired .  Enjoys playing golf, gardening.  Does have an advanced directive on file and would like her Christiano to be her POA.  DNR/DNI    Family History:   The patient's family history is significant for.  Family History   Problem Relation Age of Onset     Breast Cancer Mother 69     Heart Failure Mother       Breast Cancer Maternal Grandmother      this is maternal great grandmother     Breast Cancer Maternal Aunt 89     Myocardial Infarction Father      Unknown/Adopted Brother      Unknown/Adopted Maternal Grandfather      Stomach Cancer Paternal Grandmother      Stomach mass-not sure if cancer     Lung Cancer Paternal Grandfather      mustard gas in WW[place]         Physical Exam:   /78 (Patient Position: Chair)  Pulse 70  Temp 98.1  F (36.7  C) (Tympanic)  Resp 16  Wt 76.2 kg (168 lb)  SpO2 95%  BMI 33.93 kg/m2  Body mass index is 33.93 kg/(m^2).    General Appearance: healthy and alert, no distress     HEENT:  no thyromegaly, no palpable nodules or masses        Cardiovascular: regular rate and rhythm, no gallops, rubs or murmurs     Respiratory: lungs clear, no rales, rhonchi or wheezes, normal diaphragmatic excursion    Musculoskeletal: extremities non tender and without edema    Skin: no lesions or rashes     Neurological: Impaired gait and significant weakness especially in lower extremities with a resting tremor in the lower limbs     Psychiatric: appropriate mood and affect                               Hematological: normal cervical, supraclavicular and inguinal lymph nodes     Gastrointestinal:       abdomen soft, non-tender, non-distended, no organomegaly or masses    Genitourinary: External genitalia and urethral meatus appears normal.  Vagina is smooth without nodularity or masses.  Cervix surgically absent.  Bimanual exam reveal no masses, nodularity or fullness.  Recto-vaginal exam confirms these findings.            Assessment:    Tosin Nina is a 72 year old woman with a diagnosis of Stage IVB high grade serous ovarian cancer.     A total of 15 minutes was spent with the patient, >50% of which were spent in counseling the patient and/or treatment planning.      Plan:     1.)    Stage IVB high grade serous ovarian cancer. YONATAN s/p neoadjuvant chemotherapy, optimal interval debulking and  adjuvant chemotherapy.   remains low at <5. Reviewed signs and symptoms of a recurrence and I have asked her to call if these should occur.  Reinforced surveillance visits every 3 months for the first two years (9/19) then every 6 months for the following 3 years (9/22) then annually thereafter. She will return in 3 months with a  prior to her visit.  We will alternate her visits with me and the NP.  She will retain her port for now and port flushes will be arranged.  We discussed options for improved mobility and I have recommended she visit [facility] to assess options for mobility.    2.) Deconditioning and wheelchair bound state. Patient is following with PT to hopefully improve her strength, however she is accepting that this is likely her new baseline.    3.) Chemotherapy side effects:  Neuropathy is the only residual effect she is having and this is mild and improving    4.) Para-neoplastic syndrome.  Resolved    5.) PE.  Plan lifelong anticoagulation.  She is currently on Xeralto which she will continue indefinitely     6.) Genetic risk factors were assessed and the patient has seen genetics and testing was negative    7.) Advanced malignancy and paraneoplastic symptoms.  She follows with palliative care but feels she has minimal symptoms at this time and thus will hold off on further appointments for now    8.) Emotional stressors.  This has improved significantly on her antidepressant which she will continue    9.) Labs and/or tests ordered include:       10.) Health maintenance issues addressed today include pt due for mammogram and colonoscopy (already scheduled) and she will schedule            Thank you for allowing us to participate in the care of your patient.         Sincerely,    Nessa Ennis MD  Gynecologic Oncology  [facility]       CC           Again, thank you for allowing me to participate in the care of your patient.        Sincerely,        Cristian  Nessa Ennis MD

## 2018-04-10 NOTE — PROGRESS NOTES
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  She is accompanied today by her  but he left at the start of our visit to give her privacy. She is feeling very well.  She has started the anti-depressant and this has helped her significantly.  Her  has not changed, however it does not both her as much as she is now feeling better.  She is getting out more with friends.    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  She was discharged to a TCU where she is still residing with some but minimal improvement in her neurologic symptoms.  She still has quite a difficult time seeing especially with her right eye.  She also notes weakness mostly on her left side.  Both of these make it very difficult for her to walk and thus she is having to use a wheelchair for most of her mobility.  She tolerated her first cycle quite well with her biggest side effect being nausea which improved drastically with a change in anti-emetics.      17-17:  Admit to [facility] for worsening dizziness, found to have stage IVB ovarian cancer (inguinal lymphadenopathy) causing paraneoplastic syndrome    17:  CT C/A/P:  Thrombus identified within the right lower lobe are artery and right upper lobar arteries. The right pulmonary artery is enlarged, similar to prior exams. No CT evidence of right heart strain. No suspicious mediastinal or perihilar lymphadenopathy. Bovine arch anatomy. No suspicious pulmonary nodules, evidence of infarction, or infection. Abdomen and pelvis: There are soft tissue nodules identified along the liver capsule measuring up to 3 cm inferiorly. There is a large amount of omental caking. Enlarged iliac and retroperitoneal lymph nodes for example a 2.6 cm  right external iliac lymph node or group of lymph nodes. Heterogeneous enhancement of the uterine fundus could be due to fibroids or a mass. The overall size of the uterus does not appear significantly different than in 2014. The left ovary does appear slightly larger and lobular in appearance compared to prior exam. There is also a nodular area along the round ligament. It was not present on prior exam. There is an irregular lobulated mass involving the sigmoid colon. Abnormal, enlarged inguinal lymph nodes. Soft tissue implant in the posterior right retroperitoneum adjacent to the psoas was not present on prior exam. 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 Spondylolisthesis at L3-4. Small periumbilical hernia containing some of the abnormal omentum.    4/11/17:   268, CEA .6    4/12/17:  IR omental biopsy   Pathology:  High grade serous carcinoma    4/14/17: Cycle #1 carboplatin AUC 6 and weekly Taxol 80mg/m2.  = 2648    5/5/17:  Cycle #2 carboplatin AUC 6 and weekly Taxol 80mg/m2.  = 370    5/26/17:  Cycle #3 carboplatin AUC 6 and weekly Taxol 80mg/m2.  = 63    6/16/17:  CT C/A/P:  Chest:  Resolution of previous right-sided pulmonary emboli since April. No mediastinal, hilar or axillary adenopathy. No pleural fluid.  Port-A-Cath right superior chest with tip in the SVC.  Short linear fibrosis or discoid atelectasis anterior medial right upper lobe. Lungs otherwise clear. Abdomen and Pelvis: Marked improvement in carcinomatosis and omental metastasis since 4/11/2017. In the anterior left pelvis there is a 1.2 cm nodule with adjacent linear opacity approximately 4 cm in length in an area of previous dense omental caking and metastatic disease. There is resolution of the previous anterior right-sided omental caking with subcentimeter trace of residual nodularity in this location. There are multiple  new indeterminate nodular densities in the anterior abdominal wall subcutaneous fat as well as small collections of subcutaneous air, suggesting that these are medication injection sites.  Previous subserosal implants along the inferior liver have resolved. No focal liver lesions. Normal-appearing pancreas, adrenal glands and kidneys. Tiny hypodense spleen lesion is too small to characterize, not previously seen. Spleen otherwise appears normal. No periaortic or pelvic adenopathy with resolution of previous adenopathy. Lobulated enlarged uterus redemonstrated consistent with multiple fibroids. No free fluid. No acute bowel abnormality. Resolution of mesenteric right lower quadrant nodule is compatible with metastasis. On coronal images the terminal ileum takes an unusual circular course posterior to the right colon, but there is no evidence for obstruction. No aggressive bone lesions.    6/16/17:   = 27    6/28/17:  Robotic total laparoscopic hysterectomy, bilateral salpingo-oophorectomy, lysis of adhesions, omentectomy, optimal tumor debulking to no residual disease   Pathology:  Minimal serous carcinoma remaining in the left ovary    7/20/17:  Cycle #4 carboplatin AUC 6 and weekly Taxol 80mg/m2. D15 cancelled due to thrombocytopenia (plt 70)   = 18    8/10/17:  Cycle #5 carboplatin AUC 6 and weekly Taxol 80mg/m2. D8 cancelled neutropenia , D15 cancelled thromobocytopenia (plt 53)  = 12    8/31/17:  Cycle #6 carboplatin AUC 5 due to myelosuppression and weekly Taxol 80mg/m2. D15 delayed thrombocytopenia (plt 78)  = 10    Review of Systems:  Systemic           no weight gain; no fatigue; no fever; no chills; no night sweats; no appetite changes  Skin           no rashes, or lesions  Eye           no irritation; no changes in vision; + visual difficulty  Ward-Laryngeal           no dysphagia; no hoarseness; no stuffiness/congestion  Pulmonary    no cough; no shortness of  breath  Cardiovascular    no chest pain; no palpitations  Gastrointestinal    no nausea; no diarrhea; no constipation; no abdominal pain; no changes in bowel  habits; no blood in stool  Genitourinary   no urinary frequency; no urinary urgency; no dysuria; no pain; no abnormal vaginal discharge; no abnormal vaginal bleeding  Breast    no breast discharge; no breast changes; no breast pain  Musculoskeletal    no myalgias; no arthralgias; no back pain; + joint pain  Psychiatric           no depressed mood; no anxiety    Hematologic            no tender lymph nodes; no noticeable swellings or lumps   Endocrine    no hot flashes; no heat/cold intolerance         Neurological   no tremor; + numbness and tingling; + loss of balance; + difficulty walking; no headaches; no difficulty sleeping    Obstetrics and Gynecology History:  ,   Menopause at age 52, took HRT for a short while, maybe 1 year        Past Medical History:  Past Medical History:   Diagnosis Date     Anemia      Cervical spinal stenosis      Depression      GERD (gastroesophageal reflux disease)      Hypertension      Hypokalemia      Hypothyroid      Lumbar spinal stenosis      Obesity      Ovarian cancer (H)      Paraneoplastic neuromyopathy and neuropathy (H)      PE (pulmonary thromboembolism) (H)      Thrombocytopenia (H)        Past Surgical History:  Past Surgical History:   Procedure Laterality Date     AS TOTAL KNEE ARTHROPLASTY Left      BACK SURGERY       CHOLECYSTECTOMY  2016     CYSTOSCOPY N/A 2017    Procedure: CYSTOSCOPY;;  Surgeon: Nessa Christina MD;  Location: [facility] OR     DAVINCI HYSTERECTOMY TOTAL, BILATERAL SALPINGO-OOPHORECTMY, NODE DISSECTION, TUMOR STAGING, COMBINED N/A 2017    Procedure: COMBINED DAVINCI HYSTERECTOMY TOTAL, SALPINGO-OOPHORECTOMY, NODE DISSECTION, TUMOR STAGING;  Robotic total laparoscopic hysterectomy, bilateral salpingo-oophorectomy, omentectomy, lysis of adhesions, tumor  debulking, cystoscopy;  Surgeon: Nessa Christina MD;  Location: [facility] OR     OPEN REDUCTION INTERNAL FIXATION WRIST Right 2009     THYROIDECTOMY         Health Maintenance:  Last Pap Smear: No need for further pap smear exams  She has not had a history of abnormal Pap smears.    Last Mammogram: 10/19/16              Result: normal      She has not had a history of abnormal mammograms.    Last Colonoscopy: 2007              Result: normal      Current Medications:   has a current medication list which includes the following prescription(s): losartan, multiple vitamins-minerals, rivaroxaban anticoagulant, escitalopram, magnesium chloride, potassium chloride er, hydrochlorothiazide, levothyroxine, and vitamin d (cholecalciferol).     Allergies:   Amoxicillin; Clarithromycin; Lisinopril; and Penicillins      Social History:  Social History     Social History     Marital status:      Spouse name: Christiano Nina     Number of children: 1     Years of education: N/A     Occupational History     Current: Retired      Former:       Social History Main Topics     Smoking status: Never Smoker     Smokeless tobacco: Not on file     Alcohol use Yes      Comment: occasionally     Drug use: No     Sexual activity: Not on file     Other Topics Concern     Not on file     Social History Narrative     Lives with , feels safe at home.  Retired .  Enjoys playing golf, gardening.  Does have an advanced directive on file and would like her Christiano to be her POA.  DNR/DNI    Family History:   The patient's family history is significant for.  Family History   Problem Relation Age of Onset     Breast Cancer Mother 69     Heart Failure Mother      Breast Cancer Maternal Grandmother      this is maternal great grandmother     Breast Cancer Maternal Aunt 89     Myocardial Infarction Father      Unknown/Adopted Brother      Unknown/Adopted Maternal Grandfather      Stomach Cancer Paternal  Grandmother      Stomach mass-not sure if cancer     Lung Cancer Paternal Grandfather      mustard gas in WW[place]         Physical Exam:   /78 (Patient Position: Chair)  Pulse 70  Temp 98.1  F (36.7  C) (Tympanic)  Resp 16  Wt 76.2 kg (168 lb)  SpO2 95%  BMI 33.93 kg/m2  Body mass index is 33.93 kg/(m^2).    General Appearance: healthy and alert, no distress     HEENT:  no thyromegaly, no palpable nodules or masses        Cardiovascular: regular rate and rhythm, no gallops, rubs or murmurs     Respiratory: lungs clear, no rales, rhonchi or wheezes, normal diaphragmatic excursion    Musculoskeletal: extremities non tender and without edema    Skin: no lesions or rashes     Neurological: Impaired gait and significant weakness especially in lower extremities with a resting tremor in the lower limbs     Psychiatric: appropriate mood and affect                               Hematological: normal cervical, supraclavicular and inguinal lymph nodes     Gastrointestinal:       abdomen soft, non-tender, non-distended, no organomegaly or masses    Genitourinary: External genitalia and urethral meatus appears normal.  Vagina is smooth without nodularity or masses.  Cervix surgically absent.  Bimanual exam reveal no masses, nodularity or fullness.  Recto-vaginal exam confirms these findings.            Assessment:    Tosin Nina is a 72 year old woman with a diagnosis of Stage IVB high grade serous ovarian cancer.     A total of 15 minutes was spent with the patient, >50% of which were spent in counseling the patient and/or treatment planning.      Plan:     1.)    Stage IVB high grade serous ovarian cancer. YONATAN s/p neoadjuvant chemotherapy, optimal interval debulking and adjuvant chemotherapy.   remains low at <5. Reviewed signs and symptoms of a recurrence and I have asked her to call if these should occur.  Reinforced surveillance visits every 3 months for the first two years (9/19) then every 6 months  for the following 3 years (9/22) then annually thereafter. She will return in 3 months with a  prior to her visit.  We will alternate her visits with me and the NP.  She will retain her port for now and port flushes will be arranged.  We discussed options for improved mobility and I have recommended she visit [facility] to assess options for mobility.    2.) Deconditioning and wheelchair bound state. Patient is following with PT to hopefully improve her strength, however she is accepting that this is likely her new baseline.    3.) Chemotherapy side effects:  Neuropathy is the only residual effect she is having and this is mild and improving    4.) Para-neoplastic syndrome.  Resolved    5.) PE.  Plan lifelong anticoagulation.  She is currently on Xeralto which she will continue indefinitely     6.) Genetic risk factors were assessed and the patient has seen genetics and testing was negative    7.) Advanced malignancy and paraneoplastic symptoms.  She follows with palliative care but feels she has minimal symptoms at this time and thus will hold off on further appointments for now    8.) Emotional stressors.  This has improved significantly on her antidepressant which she will continue    9.) Labs and/or tests ordered include:       10.) Health maintenance issues addressed today include pt due for mammogram and colonoscopy (already scheduled) and she will schedule            Thank you for allowing us to participate in the care of your patient.         Sincerely,    Nessa Ennis MD  Gynecologic Oncology  [facility]       CC

## 2018-04-10 NOTE — MR AVS SNAPSHOT
After Visit Summary   4/10/2018    Tosin Nina    MRN: [id number]           Patient Information     Date Of Birth          1945        Visit Information        Provider Department      4/10/2018 11:30 AM Nessa Christina MD [facility]        Today's Diagnoses     Ovarian cancer, left (H)    -  1    Ovarian cancer, right (H)        Special screening for malignant neoplasms, colon        Visit for screening mammogram          Care Instructions    Mammogram patient will call to schedule     Colonoscopy this is scheduled at [facility] under sx     Port flushes every 6 weeks one is scheduled      prior to next visit scheduled Danay Samson    Next visit with NP (Adelina) in 3 months scheduled Danay Samson  Avs given to patient           Follow-ups after your visit        Additional Services     GASTROENTEROLOGY ADULT REF PROCEDURE ONLY                 Your next 10 appointments already scheduled     Apr 20, 2018   Procedure with Nila Munson MD   [facility] (--)    [address]  [facility] [address]   [phone]            May 09, 2018 12:15 PM CDT   LAB with RH LAB DRAW 1   [facility] ([facility] [place])    [facility]  [address]  [facility] [address]   [phone]           Please do not eat 10-12 hours before your appointment if you are coming in fasting for labs on lipids, cholesterol, or glucose (sugar). This does not apply to pregnant women. Water, hot tea and black coffee (with nothing added) are okay. Do not drink other fluids, diet soda or chew gum.            Jul 09, 2018 11:00 AM CDT   LAB with RH LAB DRAW 1   [facility] ([facility])    [facility]  [address]  [facility] [address]   [phone]           Please do not eat 10-12  hours before your appointment if you are coming in fasting for labs on lipids, cholesterol, or glucose (sugar). This does not apply to pregnant women. Water, hot tea and black coffee (with nothing added) are okay. Do not drink other fluids, diet soda or chew gum.            Jul 12, 2018 11:20 AM CDT   Return Visit with SALVADOR Solis CNP   [facility] ([facility])    [facility]  [address]  [facility] [id number] [phone]              Future tests that were ordered for you today     Open Future Orders        Priority Expected Expires Ordered    GASTROENTEROLOGY ADULT REF PROCEDURE ONLY Routine  4/9/2019 4/9/2018    MA Screening Digital Bilateral Routine  4/9/2019 4/9/2018     Routine 7/9/2018 4/9/2019 4/9/2018            Who to contact     If you have questions or need follow up information about today's clinic visit or your schedule please contact [facility] directly at [phone].  Normal or non-critical lab and imaging results will be communicated to you by [employer], letter or phone within 4 business days after the clinic has received the results. If you do not hear from us within 7 days, please contact the clinic through [employer] or phone. If you have a critical or abnormal lab result, we will notify you by phone as soon as possible.  Submit refill requests through [employer] or call your pharmacy and they will forward the refill request to us. Please allow 3 business days for your refill to be completed.          Additional Information About Your Visit        [employer] Information     [employer] gives you secure access to your electronic health record. If you see a primary care provider, you can also send messages to your care team and make appointments. If you have questions, please call your primary care clinic.  If you do not have a primary care provider, please call [phone] and they will  assist you.        Care EveryWhere ID     This is your Care EveryWhere ID. This could be used by other organizations to access your [facility] medical records  [id number]        Your Vitals Were     Pulse Temperature Respirations Pulse Oximetry BMI (Body Mass Index)       70 98.1  F (36.7  C) (Tympanic) 16 95% 33.93 kg/m2        Blood Pressure from Last 3 Encounters:   04/10/18 135/78   01/02/18 123/74   11/02/17 128/64    Weight from Last 3 Encounters:   04/10/18 76.2 kg (168 lb)   01/02/18 74.8 kg (165 lb)   10/12/17 76 kg (167 lb 9.6 oz)                 Today's Medication Changes          These changes are accurate as of 4/10/18 12:41 PM.  If you have any questions, ask your nurse or doctor.               These medicines have changed or have updated prescriptions.        Dose/Directions    Vitamin D (Cholecalciferol) 1000 UNITS Tabs   This may have changed:  additional instructions   Used for:  Paresthesias        Dose:  2000 Units   Take 2,000 Units by mouth daily   Quantity:  60 tablet   Refills:  0                Primary Care Provider Office Phone # Fax #    Eshter Back [phone]       [facility] [address] [place] [address]        Equal Access to Services     CITLALLI CUELLO AH: Hadlidia islaso Sofely, waaxda luqadaha, qaybta kaalmada adeegyada, hetal long. So [facility] [phone].    ATENCIÓN: Si habla español, tiene a alarcon disposición servicios gratuitos de asistencia lingüística. Llame al [phone].    We comply with applicable federal civil rights laws and [place] laws. We do not discriminate on the basis of race, color, national origin, age, disability, sex, sexual orientation, or gender identity.            Thank you!     Thank you for choosing [facility]  for your care. Our goal is always to provide you with excellent care. Hearing back from our patients is one way we can continue to improve our services.  Please take a few minutes to complete the written survey that you may receive in the mail after your visit with us. Thank you!             Your Updated Medication List - Protect others around you: Learn how to safely use, store and throw away your medicines at www.disposemymeds.org.          This list is accurate as of 4/10/18 12:41 PM.  Always use your most recent med list.                   Brand Name Dispense Instructions for use Diagnosis    escitalopram 5 MG tablet    LEXAPRO    90 tablet    Take 2 tablets (10 mg) by mouth daily    Reactive depression       hydrochlorothiazide 25 MG tablet    HYDRODIURIL     Take 25 mg by mouth daily    Ovarian cancer, unspecified laterality (H)       levothyroxine 125 MCG tablet    SYNTHROID    30 tablet    Take 1 tablet (125 mcg) by mouth daily    Other pulmonary embolism without acute cor pulmonale, unspecified chronicity (H)       losartan 100 MG tablet    COZAAR     Take 100 mg by mouth        magnesium chloride 535 (64 Mg) MG Tbcr CR tablet     30 tablet    Take 1 tablet (535 mg) by mouth daily    Ovarian cancer, unspecified laterality (H)       MULTI FOR HER PO           Potassium Chloride ER 20 MEQ Tbcr     60 tablet    Take 1 tablet (20 mEq) by mouth 2 times daily    Hypokalemia       rivaroxaban ANTICOAGULANT 20 MG Tabs tablet    XARELTO    30 tablet    Take 1 tablet (20 mg) by mouth daily (with dinner)    Other acute pulmonary embolism without acute cor pulmonale (H)       Vitamin D (Cholecalciferol) 1000 UNITS Tabs     60 tablet    Take 2,000 Units by mouth daily    Paresthesias

## 2018-04-10 NOTE — NURSING NOTE
"Oncology Rooming Note    April 10, 2018 11:37 AM   Tosin Nina is a 72 year old female who presents for:    Chief Complaint   Patient presents with     Oncology Clinic Visit     Ovarian cancer-Follow up     Initial Vitals: Resp 16  Wt 76.2 kg (168 lb)  BMI 33.93 kg/m2 Estimated body mass index is 33.93 kg/(m^2) as calculated from the following:    Height as of 1/2/18: 1.499 m (4' 11\").    Weight as of this encounter: 76.2 kg (168 lb). Body surface area is 1.78 meters squared.  Mild Pain (2) Comment: upper arm, right knee and left hip   No LMP recorded. Patient is postmenopausal.  Allergies reviewed: Yes  Medications reviewed: Yes    Medications: Medication refills not needed today.  Pharmacy name entered into [employer]:    [facility] [address] [place] [address] - [address] [place] [address] [address] - [address] [employer]    Clinical concerns: Follow-up     8 minutes for nursing intake (face to face time)     DISCHARGE PLAN:  Next appointments: See patient instruction section  Departure Mode: Ambulatory  Accompanied by:   0 minutes for nursing discharge (face to face time)   Teresa Sanches                "

## 2018-04-20 ENCOUNTER — ANESTHESIA (OUTPATIENT)
Dept: SURGERY | Facility: CLINIC | Age: 73
End: 2018-04-20
Payer: COMMERCIAL

## 2018-04-20 ENCOUNTER — HOSPITAL ENCOUNTER (OUTPATIENT)
Facility: CLINIC | Age: 73
Discharge: HOME | End: 2018-04-20
Attending: INTERNAL MEDICINE | Admitting: INTERNAL MEDICINE
Payer: COMMERCIAL

## 2018-04-20 ENCOUNTER — ANESTHESIA EVENT (OUTPATIENT)
Dept: SURGERY | Facility: CLINIC | Age: 73
End: 2018-04-20
Payer: COMMERCIAL

## 2018-04-20 VITALS
BODY MASS INDEX: 33.87 KG/M2 | HEIGHT: 59 IN | TEMPERATURE: 97 F | SYSTOLIC BLOOD PRESSURE: 125 MMHG | WEIGHT: 168 LBS | DIASTOLIC BLOOD PRESSURE: 68 MMHG | RESPIRATION RATE: 16 BRPM | OXYGEN SATURATION: 96 %

## 2018-04-20 LAB
COLONOSCOPY: NORMAL
INTERPRETATION ECG - MUSE: NORMAL

## 2018-04-20 PROCEDURE — 36000050 ZZH SURGERY LEVEL 2 1ST 30 MIN: Performed by: INTERNAL MEDICINE

## 2018-04-20 PROCEDURE — 25000128 H RX IP 250 OP 636: Performed by: ANESTHESIOLOGY

## 2018-04-20 PROCEDURE — 25000125 ZZHC RX 250: Performed by: NURSE ANESTHETIST, CERTIFIED REGISTERED

## 2018-04-20 PROCEDURE — 71000027 ZZH RECOVERY PHASE 2 EACH 15 MINS: Performed by: INTERNAL MEDICINE

## 2018-04-20 PROCEDURE — 40000306 ZZH STATISTIC PRE PROC ASSESS II: Performed by: INTERNAL MEDICINE

## 2018-04-20 PROCEDURE — 37000009 ZZH ANESTHESIA TECHNICAL FEE, EACH ADDTL 15 MIN: Performed by: INTERNAL MEDICINE

## 2018-04-20 PROCEDURE — 88305 TISSUE EXAM BY PATHOLOGIST: CPT | Mod: 26 | Performed by: INTERNAL MEDICINE

## 2018-04-20 PROCEDURE — 88305 TISSUE EXAM BY PATHOLOGIST: CPT | Performed by: INTERNAL MEDICINE

## 2018-04-20 PROCEDURE — 37000008 ZZH ANESTHESIA TECHNICAL FEE, 1ST 30 MIN: Performed by: INTERNAL MEDICINE

## 2018-04-20 PROCEDURE — 40000037 ZZH STATISTIC COLONOSCOPY (OR PROCEDURE): Performed by: INTERNAL MEDICINE

## 2018-04-20 PROCEDURE — 93010 ELECTROCARDIOGRAM REPORT: CPT | Performed by: INTERNAL MEDICINE

## 2018-04-20 PROCEDURE — 25000128 H RX IP 250 OP 636: Performed by: NURSE ANESTHETIST, CERTIFIED REGISTERED

## 2018-04-20 RX ORDER — ONDANSETRON 4 MG/1
4 TABLET, ORALLY DISINTEGRATING ORAL EVERY 6 HOURS PRN
Status: DISCONTINUED | OUTPATIENT
Start: 2018-04-20 | End: 2018-04-20 | Stop reason: HOSPADM

## 2018-04-20 RX ORDER — ONDANSETRON 2 MG/ML
4 INJECTION INTRAMUSCULAR; INTRAVENOUS EVERY 30 MIN PRN
Status: DISCONTINUED | OUTPATIENT
Start: 2018-04-20 | End: 2018-04-20 | Stop reason: HOSPADM

## 2018-04-20 RX ORDER — HEPARIN SODIUM,PORCINE 10 UNIT/ML
5-10 VIAL (ML) INTRAVENOUS EVERY 24 HOURS
Status: DISCONTINUED | OUTPATIENT
Start: 2018-04-20 | End: 2018-04-20 | Stop reason: HOSPADM

## 2018-04-20 RX ORDER — ONDANSETRON 2 MG/ML
4 INJECTION INTRAMUSCULAR; INTRAVENOUS EVERY 6 HOURS PRN
Status: DISCONTINUED | OUTPATIENT
Start: 2018-04-20 | End: 2018-04-20 | Stop reason: HOSPADM

## 2018-04-20 RX ORDER — SODIUM CHLORIDE, SODIUM LACTATE, POTASSIUM CHLORIDE, CALCIUM CHLORIDE 600; 310; 30; 20 MG/100ML; MG/100ML; MG/100ML; MG/100ML
INJECTION, SOLUTION INTRAVENOUS CONTINUOUS
Status: DISCONTINUED | OUTPATIENT
Start: 2018-04-20 | End: 2018-04-20 | Stop reason: HOSPADM

## 2018-04-20 RX ORDER — HEPARIN SODIUM,PORCINE 10 UNIT/ML
5-10 VIAL (ML) INTRAVENOUS
Status: DISCONTINUED | OUTPATIENT
Start: 2018-04-20 | End: 2018-04-20 | Stop reason: HOSPADM

## 2018-04-20 RX ORDER — HEPARIN SODIUM (PORCINE) LOCK FLUSH IV SOLN 100 UNIT/ML 100 UNIT/ML
5 SOLUTION INTRAVENOUS
Status: DISCONTINUED | OUTPATIENT
Start: 2018-04-20 | End: 2018-04-20 | Stop reason: HOSPADM

## 2018-04-20 RX ORDER — LIDOCAINE 40 MG/G
CREAM TOPICAL
Status: DISCONTINUED | OUTPATIENT
Start: 2018-04-20 | End: 2018-04-20 | Stop reason: HOSPADM

## 2018-04-20 RX ORDER — FENTANYL CITRATE 50 UG/ML
25-50 INJECTION, SOLUTION INTRAMUSCULAR; INTRAVENOUS
Status: DISCONTINUED | OUTPATIENT
Start: 2018-04-20 | End: 2018-04-20 | Stop reason: HOSPADM

## 2018-04-20 RX ORDER — ONDANSETRON 2 MG/ML
INJECTION INTRAMUSCULAR; INTRAVENOUS PRN
Status: DISCONTINUED | OUTPATIENT
Start: 2018-04-20 | End: 2018-04-20

## 2018-04-20 RX ORDER — GLYCOPYRROLATE 0.2 MG/ML
INJECTION, SOLUTION INTRAMUSCULAR; INTRAVENOUS PRN
Status: DISCONTINUED | OUTPATIENT
Start: 2018-04-20 | End: 2018-04-20

## 2018-04-20 RX ORDER — ONDANSETRON 4 MG/1
4 TABLET, ORALLY DISINTEGRATING ORAL EVERY 30 MIN PRN
Status: DISCONTINUED | OUTPATIENT
Start: 2018-04-20 | End: 2018-04-20 | Stop reason: HOSPADM

## 2018-04-20 RX ORDER — ONDANSETRON 2 MG/ML
4 INJECTION INTRAMUSCULAR; INTRAVENOUS
Status: DISCONTINUED | OUTPATIENT
Start: 2018-04-20 | End: 2018-04-20 | Stop reason: HOSPADM

## 2018-04-20 RX ORDER — NALOXONE HYDROCHLORIDE 0.4 MG/ML
.1-.4 INJECTION, SOLUTION INTRAMUSCULAR; INTRAVENOUS; SUBCUTANEOUS
Status: DISCONTINUED | OUTPATIENT
Start: 2018-04-20 | End: 2018-04-20 | Stop reason: HOSPADM

## 2018-04-20 RX ORDER — PROPOFOL 10 MG/ML
INJECTION, EMULSION INTRAVENOUS PRN
Status: DISCONTINUED | OUTPATIENT
Start: 2018-04-20 | End: 2018-04-20

## 2018-04-20 RX ORDER — KETAMINE HYDROCHLORIDE 10 MG/ML
INJECTION INTRAMUSCULAR; INTRAVENOUS PRN
Status: DISCONTINUED | OUTPATIENT
Start: 2018-04-20 | End: 2018-04-20

## 2018-04-20 RX ORDER — MEPERIDINE HYDROCHLORIDE 25 MG/ML
12.5 INJECTION INTRAMUSCULAR; INTRAVENOUS; SUBCUTANEOUS
Status: DISCONTINUED | OUTPATIENT
Start: 2018-04-20 | End: 2018-04-20 | Stop reason: HOSPADM

## 2018-04-20 RX ORDER — HYDRALAZINE HYDROCHLORIDE 20 MG/ML
2.5-5 INJECTION INTRAMUSCULAR; INTRAVENOUS EVERY 10 MIN PRN
Status: DISCONTINUED | OUTPATIENT
Start: 2018-04-20 | End: 2018-04-20 | Stop reason: HOSPADM

## 2018-04-20 RX ORDER — FLUMAZENIL 0.1 MG/ML
0.2 INJECTION, SOLUTION INTRAVENOUS
Status: DISCONTINUED | OUTPATIENT
Start: 2018-04-20 | End: 2018-04-20 | Stop reason: HOSPADM

## 2018-04-20 RX ADMIN — PROPOFOL 30 MG: 10 INJECTION, EMULSION INTRAVENOUS at 08:11

## 2018-04-20 RX ADMIN — GLYCOPYRROLATE 0.2 MG: 0.2 INJECTION, SOLUTION INTRAMUSCULAR; INTRAVENOUS at 08:06

## 2018-04-20 RX ADMIN — KETAMINE HYDROCHLORIDE 20 MG: 10 INJECTION, SOLUTION INTRAMUSCULAR; INTRAVENOUS at 08:10

## 2018-04-20 RX ADMIN — PHENYLEPHRINE HYDROCHLORIDE 100 MCG: 10 INJECTION, SOLUTION INTRAMUSCULAR; INTRAVENOUS; SUBCUTANEOUS at 08:24

## 2018-04-20 RX ADMIN — MIDAZOLAM 2 MG: 1 INJECTION INTRAMUSCULAR; INTRAVENOUS at 08:06

## 2018-04-20 RX ADMIN — SODIUM CHLORIDE, POTASSIUM CHLORIDE, SODIUM LACTATE AND CALCIUM CHLORIDE: 600; 310; 30; 20 INJECTION, SOLUTION INTRAVENOUS at 08:06

## 2018-04-20 RX ADMIN — KETAMINE HYDROCHLORIDE 7.5 MG: 10 INJECTION, SOLUTION INTRAMUSCULAR; INTRAVENOUS at 08:15

## 2018-04-20 RX ADMIN — DEXMEDETOMIDINE HYDROCHLORIDE 0.5 MCG/KG/HR: 100 INJECTION, SOLUTION INTRAVENOUS at 08:10

## 2018-04-20 RX ADMIN — ONDANSETRON 4 MG: 2 INJECTION INTRAMUSCULAR; INTRAVENOUS at 08:10

## 2018-04-20 RX ADMIN — SODIUM CHLORIDE, PRESERVATIVE FREE 5 ML: 5 INJECTION INTRAVENOUS at 10:01

## 2018-04-20 NOTE — ANESTHESIA POSTPROCEDURE EVALUATION
Patient: Tosin Nina    Procedure(s):  Colonoscopy (FV) mfd 4/6  PS sent MJD - Wound Class: II-Clean Contaminated    Diagnosis:Screening  Diagnosis Additional Information: - Six 1 to 5 mm polyps in the ascending colon, removed with a cold snare. Resected and retrieved.   - One 1 mm polyp in the sigmoid colon, removed with                             a cold biopsy forceps. Resected and retrieved.                        ,      - Diverticulosis in the sigmoid colon.                             - External and internal hemorrhoids.                             - The examination was otherwise normal on direct                             and retroflexion views.    Anesthesia Type:  MAC    Note:  Anesthesia Post Evaluation    Patient location during evaluation: PACU  Patient participation: Able to fully participate in evaluation  Level of consciousness: awake and alert  Pain management: adequate  Airway patency: patent  Cardiovascular status: acceptable  Respiratory status: acceptable  Hydration status: acceptable  PONV: none     Anesthetic complications: None          Last vitals:  Vitals:    04/20/18 0835 04/20/18 0840 04/20/18 0853   BP: (!) 89/47 (!) 85/47 91/53   Resp:      Temp:      SpO2: 94% 94% 94%         Electronically Signed By: Gibran Bang MD  April 20, 2018  9:28 AM

## 2018-04-20 NOTE — PROGRESS NOTES
"PRE-PROCEDURE H&P    CHIEF COMPLAINT / REASON FOR PROCEDURE:  screening    PERTINENT HISTORY :    Past Medical History:   Diagnosis Date     Anemia      Cervical spinal stenosis      Depression      GERD (gastroesophageal reflux disease)      Hypertension      Hypokalemia      Hypothyroid      Lumbar spinal stenosis      Obesity      Ovarian cancer (H)      Paraneoplastic neuromyopathy and neuropathy (H)      PE (pulmonary thromboembolism) (H)      Thrombocytopenia (H)       Past Surgical History:   Procedure Laterality Date     AS TOTAL KNEE ARTHROPLASTY Left      BACK SURGERY  2009     CHOLECYSTECTOMY  01/01/2016     CYSTOSCOPY N/A 6/28/2017    Procedure: CYSTOSCOPY;;  Surgeon: Nessa Christina MD;  Location: [facility] OR     DAVINCI HYSTERECTOMY TOTAL, BILATERAL SALPINGO-OOPHORECTMY, NODE DISSECTION, TUMOR STAGING, COMBINED N/A 6/28/2017    Procedure: COMBINED DAVINCI HYSTERECTOMY TOTAL, SALPINGO-OOPHORECTOMY, NODE DISSECTION, TUMOR STAGING;  Robotic total laparoscopic hysterectomy, bilateral salpingo-oophorectomy, omentectomy, lysis of adhesions, tumor debulking, cystoscopy;  Surgeon: Nessa Christina MD;  Location: [facility] OR     OPEN REDUCTION INTERNAL FIXATION WRIST Right 2009     THYROIDECTOMY           Bleeding tendencies:  No    Relevant Family History:  NONE     Relevant Social History:  NONE      A relevant review of systems was performed and was negative    Current symptoms include: in remission from ovarian cancer    ALLERGIES/SENSITIVITIES:   Allergies   Allergen Reactions     Amoxicillin Hives     Clarithromycin Other (See Comments)     \"I felt dopey, sleepy and like a druggie\"     Lisinopril Cough     Penicillins Rash       CURRENT MEDICATIONS:   Prior to Admission Medications   Prescriptions Last Dose Informant Patient Reported? Taking?   Multiple Vitamins-Minerals (MULTI FOR HER PO) 4/13/2018  Yes Yes   Potassium Chloride ER 20 MEQ TBCR 4/19/2018 at Unknown time  No Yes   Sig: Take 1 tablet " (20 mEq) by mouth 2 times daily   Vitamin D, Cholecalciferol, 1000 UNITS TABS 4/13/2018  No Yes   Sig: Take 2,000 Units by mouth daily   Patient taking differently: Take 2,000 Units by mouth daily LD 6/20/17, told to hold until after surgery   enoxaparin (LOVENOX) 80 MG/0.8ML injection 4/19/2018 at Unknown time  Yes Yes   Sig: Inject 80 mg Subcutaneous   escitalopram (LEXAPRO) 5 MG tablet 4/19/2018 at Unknown time  No Yes   Sig: Take 2 tablets (10 mg) by mouth daily   hydrochlorothiazide (HYDRODIURIL) 25 MG tablet 4/20/2018 at 0200  Yes Yes   Sig: Take 25 mg by mouth daily    levothyroxine (SYNTHROID) 125 MCG tablet 4/20/2018 at 0200  No Yes   Sig: Take 1 tablet (125 mcg) by mouth daily   losartan (COZAAR) 100 MG tablet 4/19/2018 at Unknown time  Yes Yes   Sig: Take 100 mg by mouth   magnesium chloride 535 (64 MG) MG TBCR CR tablet Past Week at Unknown time  No Yes   Sig: Take 1 tablet (535 mg) by mouth daily   rivaroxaban ANTICOAGULANT (XARELTO) 20 MG TABS tablet 4/17/2018  No Yes   Sig: Take 1 tablet (20 mg) by mouth daily (with dinner)      Facility-Administered Medications: None        PRE-SEDATION ASSESSMENT:    Lung Exam:  normal  Heart Exam:  normal  Airway Exam: normal  Previous reaction to anesthesia/sedation:   No  Sedation plan based on assessment: per anesthesia  ASA Classification:  3 - Severe systemic disease, but not incapacitating    Comments: xarelto held for 3 days, no lovenox since Wed.    IMPRESSION:  Colon cancer screening in patient with Stage IV ovarian cancer, risks including perforation explained    PLAN:  colonoscopy     Nila Munson MD  [facility]  Office: [phone]

## 2018-04-20 NOTE — ANESTHESIA CARE TRANSFER NOTE
Patient: Tosin Nina    Procedure(s):  Colonoscopy (FV) mfd 4/6  PS sent MJD - Wound Class: II-Clean Contaminated    Diagnosis: Screening  Diagnosis Additional Information: No value filed.    Anesthesia Type:   MAC     Note:  Airway :Room Air  Patient transferred to:Phase II  Handoff Report: Identifed the Patient, Identified the Reponsible Provider, Reviewed the pertinent medical history, Discussed the surgical course, Reviewed Intra-OP anesthesia mangement and issues during anesthesia, Set expectations for post-procedure period and Allowed opportunity for questions and acknowledgement of understanding      Vitals: (Last set prior to Anesthesia Care Transfer)    CRNA VITALS  4/20/2018 0807 - 4/20/2018 0837      4/20/2018             Pulse: 73    SpO2: 96 %                Electronically Signed By: SALVADOR Egan CRNA  April 20, 2018  8:37 AM

## 2018-04-20 NOTE — IP AVS SNAPSHOT
[facility] PreOP/PostOP    [address]    [facility] [address]    Phone:  [phone]    Fax:  [phone]                                       After Visit Summary   4/20/2018    Tosin Nina    MRN: [id number]           After Visit Summary Signature Page     I have received my discharge instructions, and my questions have been answered. I have discussed any challenges I see with this plan with the nurse or doctor.    ..........................................................................................................................................  Patient/Patient Representative Signature      ..........................................................................................................................................  Patient Representative Print Name and Relationship to Patient    ..................................................               ................................................  Date                                            Time    ..........................................................................................................................................  Reviewed by Signature/Title    ...................................................              ..............................................  Date                                                            Time

## 2018-04-20 NOTE — IP AVS SNAPSHOT
MRN:[id number]                      After Visit Summary   4/20/2018    Tosin Nina    MRN: [id number]           Thank you!     Thank you for choosing [facility] for your care. Our goal is always to provide you with excellent care. Hearing back from our patients is one way we can continue to improve our services. Please take a few minutes to complete the written survey that you may receive in the mail after you visit. If you would like to speak to someone directly about your visit please contact Patient Relations at [phone]. Thank you!          Patient Information     Date Of Birth          1945        About your hospital stay     You were admitted on:  April 20, 2018 You last received care in the:  [facility] PreOP/PostOP    You were discharged on:  April 20, 2018       Who to Call     For medical emergencies, please call 911.  For non-urgent questions about your medical care, please call your primary care provider or clinic, [phone]  For questions related to your surgery, please call your surgery clinic        Attending Provider     Provider Nila Lewis MD Gastroenterology       Primary Care Provider Office Phone # Fax #    Eshter Back [phone]      Your next 10 appointments already scheduled     May 09, 2018 12:15 PM CDT   LAB with RH LAB DRAW 1   [facility] ([facility])    [facility]  [address] [place]  [facility] [address]   [phone]           Please do not eat 10-12 hours before your appointment if you are coming in fasting for labs on lipids, cholesterol, or glucose (sugar). This does not apply to pregnant women. Water, hot tea and black coffee (with nothing added) are okay. Do not drink other fluids, diet soda or chew gum.            Jun 13, 2018 11:00 AM CDT   injection with RH LAB DRAW 1   [facility]  [facility] ([facility])    [facility]  [address]  [facility] [address]   [phone]            2018 11:00 AM CDT   LAB with RH LAB DRAW 1   [facility] ([facility])    [facility]  [address]  [facility] [address]   [phone]           Please do not eat 10-12 hours before your appointment if you are coming in fasting for labs on lipids, cholesterol, or glucose (sugar). This does not apply to pregnant women. Water, hot tea and black coffee (with nothing added) are okay. Do not drink other fluids, diet soda or chew gum.            2018 11:20 AM CDT   Return Visit with SALVADOR Solis CNP   [facility] ([facility])    [facility]  [address]  [facility] [address]   [phone]              Further instructions from your care team       COLONOSCOPY DISCHARGE INSTRUCTIONS    You may not drive, use heavy equipment or consume alcohol for 24 hours because the drugs you were given may cause dizziness, drowsiness, forgetfulness and slower reaction time.    You may resume your regular diet and medications.    If you had a biopsy or polypectomy done, do not take aspirin, aleve (naproxen) or ibuprofen products for the next 10 days.  Tylenol (acetaminophen) is safe to take.    What to watch for:    Problems rarely occur after the exam.  It is important for you to be aware of the early signs of a possible complication.  Call your doctor immediately if you notice any of the followin.  Unusual or persistent abdominal pain (pain that does not move around like a gas pain).    2.  Passing bright red blood from your rectum.    3.  Black or bloody stools.    4.  Temperature above 100.6 degrees F (37.5 degrees C)    If you fell this has become a medical emergency  "please call 911.    GENERAL ANESTHESIA OR SEDATION ADULT DISCHARGE INSTRUCTIONS   SPECIAL PRECAUTIONS FOR 24 HOURS AFTER SURGERY    IT IS NOT UNUSUAL TO FEEL LIGHT-HEADED OR FAINT, UP TO 24 HOURS AFTER SURGERY OR WHILE TAKING PAIN MEDICATION.  IF YOU HAVE THESE SYMPTOMS; SIT FOR A FEW MINUTES BEFORE STANDING AND HAVE SOMEONE ASSIST YOU WHEN YOU GET UP TO WALK OR USE THE BATHROOM.    YOU SHOULD REST AND RELAX FOR THE NEXT 24 HOURS AND YOU MUST MAKE ARRANGEMENTS TO HAVE SOMEONE STAY WITH YOU FOR AT LEAST 24 HOURS AFTER YOUR DISCHARGE.  AVOID HAZARDOUS AND STRENUOUS ACTIVITIES.  DO NOT MAKE IMPORTANT DECISIONS FOR 24 HOURS.    DO NOT DRIVE ANY VEHICLE OR OPERATE MECHANICAL EQUIPMENT FOR 24 HOURS FOLLOWING THE END OF YOUR SURGERY.  EVEN THOUGH YOU MAY FEEL NORMAL, YOUR REACTIONS MAY BE AFFECTED BY THE MEDICATION YOU HAVE RECEIVED.    DO NOT DRINK ALCOHOLIC BEVERAGES FOR 24 HOURS FOLLOWING YOUR SURGERY.    DRINK CLEAR LIQUIDS (APPLE JUICE, GINGER ALE, 7-UP, BROTH, ETC.).  PROGRESS TO YOUR REGULAR DIET AS YOU FEEL ABLE.    YOU MAY HAVE A DRY MOUTH, A SORE THROAT, MUSCLES ACHES OR TROUBLE SLEEPING.  THESE SHOULD GO AWAY AFTER 24 HOURS.    CALL YOUR DOCTOR FOR ANY OF THE FOLLOWING:  SIGNS OF INFECTION (FEVER, GROWING TENDERNESS AT THE SURGERY SITE, A LARGE AMOUNT OF DRAINAGE OR BLEEDING, SEVERE PAIN, FOUL-SMELLING DRAINAGE, REDNESS OR SWELLING.    IT HAS BEEN OVER 8 TO 10 HOURS SINCE SURGERY AND YOU ARE STILL NOT ABLE TO URINATE (PASS WATER).     Pending Results     Date and Time Order Name Status Description    4/20/2018 0828 Surgical pathology exam In process             Admission Information     Date & Time Provider Department Dept. Phone    4/20/2018 Nila Munson MD [facility] PreOP/PostOP [phone]      Your Vitals Were     Blood Pressure Temperature Respirations Height Weight Pulse Oximetry    91/53 97.1  F (36.2  C) (Temporal) 14 1.499 m (4' 11\") 76.2 kg (168 lb) 94%    BMI (Body Mass Index)       "             33.93 kg/m2           [employer] Information     [employer] gives you secure access to your electronic health record. If you see a primary care provider, you can also send messages to your care team and make appointments. If you have questions, please call your primary care clinic.  If you do not have a primary care provider, please call [phone] and they will assist you.        Care EveryWhere ID     This is your Care EveryWhere ID. This could be used by other organizations to access your [facility] medical records  [id number]        Equal Access to Services     CITLALLI CUELLO : Hadii zita ku hadasho Soomaali, waaxda luqadaha, qaybta kaalmada adeegyamarian, hetal regan . So [facility] [phone].    ATENCIÓN: Si habla español, tiene a alarcon disposición servicios gratuitos de asistencia lingüística. Llame al [phone].    We comply with applicable federal civil rights laws and [place] laws. We do not discriminate on the basis of race, color, national origin, age, disability, sex, sexual orientation, or gender identity.               Review of your medicines      UNREVIEWED medicines. Ask your doctor about these medicines        Dose / Directions    enoxaparin 80 MG/0.8ML injection   Commonly known as:  LOVENOX        Dose:  80 mg   Inject 80 mg Subcutaneous   Refills:  0       escitalopram 5 MG tablet   Commonly known as:  LEXAPRO   Used for:  Reactive depression        Dose:  10 mg   Take 2 tablets (10 mg) by mouth daily   Quantity:  90 tablet   Refills:  3       hydrochlorothiazide 25 MG tablet   Commonly known as:  HYDRODIURIL   Indication:  patient is currently taking a half of tablet daily.   Used for:  Ovarian cancer, unspecified laterality (H)        Dose:  25 mg   Take 25 mg by mouth daily   Refills:  0       levothyroxine 125 MCG tablet   Commonly known as:  SYNTHROID   Used for:  Other pulmonary embolism without acute cor pulmonale, unspecified chronicity (H)        Dose:  125  mcg   Take 1 tablet (125 mcg) by mouth daily   Quantity:  30 tablet   Refills:  0       losartan 100 MG tablet   Commonly known as:  COZAAR        Dose:  100 mg   Take 100 mg by mouth   Refills:  0       magnesium chloride 535 (64 Mg) MG Tbcr CR tablet   Used for:  Ovarian cancer, unspecified laterality (H)        Dose:  535 mg   Take 1 tablet (535 mg) by mouth daily   Quantity:  30 tablet   Refills:  3       MULTI FOR HER PO        Refills:  0       Potassium Chloride ER 20 MEQ Tbcr   Used for:  Hypokalemia        Dose:  20 mEq   Take 1 tablet (20 mEq) by mouth 2 times daily   Quantity:  60 tablet   Refills:  3       rivaroxaban ANTICOAGULANT 20 MG Tabs tablet   Commonly known as:  XARELTO   Used for:  Other acute pulmonary embolism without acute cor pulmonale (H)        Dose:  20 mg   Take 1 tablet (20 mg) by mouth daily (with dinner)   Quantity:  30 tablet   Refills:  3       Vitamin D (Cholecalciferol) 1000 units Tabs   Used for:  Paresthesias        Dose:  2000 Units   Take 2,000 Units by mouth daily   Quantity:  60 tablet   Refills:  0                Protect others around you: Learn how to safely use, store and throw away your medicines at www.disposemymeds.org.             Medication List: This is a list of all your medications and when to take them. Check marks below indicate your daily home schedule. Keep this list as a reference.      Medications           Morning Afternoon Evening Bedtime As Needed    enoxaparin 80 MG/0.8ML injection   Commonly known as:  LOVENOX   Inject 80 mg Subcutaneous                                escitalopram 5 MG tablet   Commonly known as:  LEXAPRO   Take 2 tablets (10 mg) by mouth daily                                hydrochlorothiazide 25 MG tablet   Commonly known as:  HYDRODIURIL   Take 25 mg by mouth daily                                levothyroxine 125 MCG tablet   Commonly known as:  SYNTHROID   Take 1 tablet (125 mcg) by mouth daily                                 losartan 100 MG tablet   Commonly known as:  COZAAR   Take 100 mg by mouth                                magnesium chloride 535 (64 Mg) MG Tbcr CR tablet   Take 1 tablet (535 mg) by mouth daily                                MULTI FOR HER PO                                Potassium Chloride ER 20 MEQ Tbcr   Take 1 tablet (20 mEq) by mouth 2 times daily                                rivaroxaban ANTICOAGULANT 20 MG Tabs tablet   Commonly known as:  XARELTO   Take 1 tablet (20 mg) by mouth daily (with dinner)                                Vitamin D (Cholecalciferol) 1000 units Tabs   Take 2,000 Units by mouth daily

## 2018-04-20 NOTE — DISCHARGE INSTRUCTIONS
COLONOSCOPY DISCHARGE INSTRUCTIONS    You may not drive, use heavy equipment or consume alcohol for 24 hours because the drugs you were given may cause dizziness, drowsiness, forgetfulness and slower reaction time.    You may resume your regular diet and medications.    If you had a biopsy or polypectomy done, do not take aspirin, aleve (naproxen) or ibuprofen products for the next 10 days.  Tylenol (acetaminophen) is safe to take.    What to watch for:    Problems rarely occur after the exam.  It is important for you to be aware of the early signs of a possible complication.  Call your doctor immediately if you notice any of the followin.  Unusual or persistent abdominal pain (pain that does not move around like a gas pain).    2.  Passing bright red blood from your rectum.    3.  Black or bloody stools.    4.  Temperature above 100.6 degrees F (37.5 degrees C)    If you fell this has become a medical emergency please call 911.    GENERAL ANESTHESIA OR SEDATION ADULT DISCHARGE INSTRUCTIONS   SPECIAL PRECAUTIONS FOR 24 HOURS AFTER SURGERY    IT IS NOT UNUSUAL TO FEEL LIGHT-HEADED OR FAINT, UP TO 24 HOURS AFTER SURGERY OR WHILE TAKING PAIN MEDICATION.  IF YOU HAVE THESE SYMPTOMS; SIT FOR A FEW MINUTES BEFORE STANDING AND HAVE SOMEONE ASSIST YOU WHEN YOU GET UP TO WALK OR USE THE BATHROOM.    YOU SHOULD REST AND RELAX FOR THE NEXT 24 HOURS AND YOU MUST MAKE ARRANGEMENTS TO HAVE SOMEONE STAY WITH YOU FOR AT LEAST 24 HOURS AFTER YOUR DISCHARGE.  AVOID HAZARDOUS AND STRENUOUS ACTIVITIES.  DO NOT MAKE IMPORTANT DECISIONS FOR 24 HOURS.    DO NOT DRIVE ANY VEHICLE OR OPERATE MECHANICAL EQUIPMENT FOR 24 HOURS FOLLOWING THE END OF YOUR SURGERY.  EVEN THOUGH YOU MAY FEEL NORMAL, YOUR REACTIONS MAY BE AFFECTED BY THE MEDICATION YOU HAVE RECEIVED.    DO NOT DRINK ALCOHOLIC BEVERAGES FOR 24 HOURS FOLLOWING YOUR SURGERY.    DRINK CLEAR LIQUIDS (APPLE JUICE, GINGER ALE, 7-UP, BROTH, ETC.).  PROGRESS TO YOUR REGULAR DIET AS  YOU FEEL ABLE.    YOU MAY HAVE A DRY MOUTH, A SORE THROAT, MUSCLES ACHES OR TROUBLE SLEEPING.  THESE SHOULD GO AWAY AFTER 24 HOURS.    CALL YOUR DOCTOR FOR ANY OF THE FOLLOWING:  SIGNS OF INFECTION (FEVER, GROWING TENDERNESS AT THE SURGERY SITE, A LARGE AMOUNT OF DRAINAGE OR BLEEDING, SEVERE PAIN, FOUL-SMELLING DRAINAGE, REDNESS OR SWELLING.    IT HAS BEEN OVER 8 TO 10 HOURS SINCE SURGERY AND YOU ARE STILL NOT ABLE TO URINATE (PASS WATER).

## 2018-04-20 NOTE — ANESTHESIA PREPROCEDURE EVALUATION
Anesthesia Evaluation     . Pt has had prior anesthetic. Type: General    No history of anesthetic complications          ROS/MED HX    ENT/Pulmonary:  - neg pulmonary ROS     Neurologic:  - neg neurologic ROS     Cardiovascular:     (+) hypertension----. : . . . :. .       METS/Exercise Tolerance:     Hematologic:  - neg hematologic  ROS       Musculoskeletal:  - neg musculoskeletal ROS       GI/Hepatic:     (+) GERD Asymptomatic on medication,       Renal/Genitourinary:  - ROS Renal section negative       Endo:     (+) thyroid problem hypothyroidism, .      Psychiatric:     (+) psychiatric history depression      Infectious Disease:  - neg infectious disease ROS       Malignancy:      - no malignancy   Other:    - neg other ROS                 Physical Exam  Normal systems: cardiovascular, pulmonary and dental    Airway   Mallampati: II  TM distance: >3 FB  Neck ROM: full    Dental     Cardiovascular       Pulmonary                     Anesthesia Plan      History & Physical Review  History and physical reviewed and following examination; no interval change.    ASA Status:  3 .    NPO Status:  > 8 hours    Plan for MAC   PONV prophylaxis:  Ondansetron (or other 5HT-3)       Postoperative Care  Postoperative pain management:  IV analgesics and Oral pain medications.      Consents  Anesthetic plan, risks, benefits and alternatives discussed with:  Patient or representative and Patient..                          .

## 2018-04-20 NOTE — LETTER
March 12, 2018      Tosin Nina  [address]  [place] [address]        Dear [place],       Thank you for choosing [facility]. You are scheduled for the following service.    You will need to have a History and Physical Exam done within 30 days of this scheduled procedure. Please arrange this with your primary care physician.    Date:  4-20-18    Procedure:   COLONOSCOPY    Doctor: Fallon                       Arrival Time:  0630   *check in at the Surgery Center desk*     Procedure Time:  0800     Location:   [facility]      Surgery Center (on the [facility] the [facility] [place]       [address] [place], [place] [address]      MIRALAX -GATORADE  DOUBLE PREP  (without magnesium citrate)  Colonoscopy is the most accurate test to detect colon polyps and colon cancer; and the only test where polyps can be removed. During this procedure, a doctor examines the lining of your large intestine and rectum through a flexible tube.     Transportation  Arrange for a ride for the day of your procedure with a responsible adult.  A taxi ride is not an option unless you are accompanied by a responsible adult. If you fail to arrange transportation with a responsible adult, your procedure will be cancelled and rescheduled    Purchase the  following supplies at your local pharmacy:  - 2 (two) bisacodyl tablets: each tablet contains 5 mg.  (Dulcolax  laxative NOT Dulcolax  stool softener)   - 2 (two) 8.3 oz bottles of Polyethylene Glycol (PEG) 3350 Powder   (MiraLAX , Smooth LAX , ClearLAX  or equivalent)  - 128 oz Gatorade    Regular Gatorade , Gatorade G2 , Powerade , Powerade Zero  or Pedialyte  is acceptable. Red colored flavors are not allowed; all other colors (yellow, green, orange, purple and blue) are okay. It is also okay to buy four 2.12 oz packets of powdered Gatorade that can be mixed with water to a total volume of 128 oz of liquid.    PREPARATION FOR  COLONOSCOPY    7 days before:    Discontinue fiber supplements and medications containing iron. This includes Metamucil  and Fibercon ; and multivitamins with iron.  3 days before:     Begin a low-fiber diet. A low-fiber diet helps make the cleanout more effective.     Examples of a low-fiber diet include (but are not limited to): white bread, white rice, pasta, crackers, fish, chicken, eggs, ground beef, creamy peanut butter, cooked/steamed/boiled vegetables, canned fruit, bananas, melons, milk, plain yogurt cheese, salad dressing and other condiments.     The following are not allowed on a low-fiber diet: seeds, nuts, popcorn, bran, whole wheat, corn, quinoa, raw fruits and vegetables, berries and dried fruit, beans and lentils.    For additional details on low-fiber diet, please refer to the table on the last page.  2 days before:    Stop eating solid foods in the morning.    Begin a clear liquid diet (liquids you can see through).     Examples of a clear liquid diet include: water, tea, clear broth or bouillon, Gatorade, Pedialyte or Powerade, carbonated and non-carbonated soft drinks (Sprite , 7-Up , ginger ale), strained fruit juices without pulp (apple, white grape, white cranberry), Jell-O  and popsicles.     The following are not allowed on a clear liquid diet: red liquids, alcoholic beverages, coffee, dairy products (milk, creamer and yogurt), protein shakes, creamy broths, juice with pulp and chewing tobacco.    At 4 pm (and no later than 6pm): start drinking the Miralax-Gatorade preparation (8.3 oz of Miralax mixed with 64 oz of Gatorade in a large pitcher). Drink 1 (one) 8 oz glass every 15 minutes thereafter, until the mixture is gone.  1 day before:    Continue the clear liquid diet.    At noon: take 2 (two) bisacodyl tablets.     At 4 pm (and no later than 6pm): start drinking the Miralax-Gatorade preparation (8.3 oz of Miralax mixed with 64 oz of Gatorade in a large pitcher). Drink 1 (one) 8 oz  glass every 15 minutes thereafter, until the mixture is gone.    COLON CLEANSING TIPS: drink adequate amounts of fluids before and after your colon cleansing to prevent dehydration. Stay near a toilet because you will have diarrhea. Even if you are sitting on the toilet, continue to drink the cleansing solution every 15 minutes. If you feel nauseous or vomit, rinse your mouth with water, take a 15 to 30-minute-break and then continue drinking the solution. You will be uncomfortable until the stool has flushed from your colon (in about 2 to 4 hours). You may feel chilled.    Day of your procedure  You may take all of your morning medications including blood pressure medications, blood thinners (if you have not been instructed to stop these by our office), methadone, anti-seizure medications with sips of water 3 hours prior to your procedure or earlier. Do not take insulin or vitamins prior to your procedure. Continue the clear liquid diet.   4 hours prior:     STOP consuming all liquids.     Do not take anything by mouth during this time.     Allow extra time to travel to your procedure as you may need to stop and use a restroom along the way.  You are ready for the procedure, if you followed all instructions and your stool is no longer formed, but clear or yellow liquid. If you are unsure whether your colon is clean, please call our office at [phone] before you leave for your appointment.    Bring the following to your procedure:  - Insurance Card/Photo ID.   - List of current medications including over-the-counter medications and supplements.   - Your rescue inhaler if you currently use one to control asthma.     Canceling or rescheduling your appointment:  If you must cancel or reschedule your appointment, please call [phone] as soon as possible.    COLONOSCOPY PRE-PROCEDURE CHECKLIST  If you have diabetes, ask your regular doctor for diet and medication restrictions.  If you take an anticoagulant or  anti-platelet medication (such as Coumadin , Lovenox , Pradaxa , Xarelto , Eliquis , etc.), please call your primary doctor for advice on holding this medication.  If you take aspirin you may continue to do so.  If you are or may be pregnant, please discuss the risks and benefits of this procedure with your doctor.    What happens during a colonoscopy?  Plan to spend up to two hours, starting at registration time, at the endoscopy center the day of your procedure. The colonoscopy takes an average of 15 to 30 minutes. Recovery time is about 30 minutes.    Before the exam:    You will change into a gown.    Your medical history and medication list will be reviewed with you, unless that has been done over the phone prior to the procedure.     A nurse will insert an intravenous (IV) line into your hand or arm.    The doctor will meet with you and will give you a consent form to sign.    During the exam:     Medicine will be given through the IV line to help you relax.     Your heart rate and oxygen levels will be monitored. If your blood pressure is low, you may be given fluids through the IV line.     The doctor will insert a flexible hollow tube, called a colonoscope, into your rectum. The scope will be advanced slowly through the large intestine (colon).    You may have a feeling of fullness or pressure.     If an abnormal tissue or a polyp is found, the doctor may remove it through the endoscope for closer examination, or biopsy. Tissue removal is painless    After the exam:           Any tissue samples removed during the exam will be sent to a lab for evaluation. It may take 5-7 working days for you to be notified of the results.     A nurse will provide you with complete discharge instructions before you leave the endoscopy center. Be sure to ask the nurse for specific instructions if you take blood thinners such as Aspirin, Coumadin or Plavix.     The doctor will prepare a full report for you and for the  physician who referred you for the procedure.     Your doctor will talk with you about the initial results of your exam.      Medication given during the exam will prohibit you from driving for the rest of the day.     Following the exam, you may resume your normal diet. Your first meal should be light, no greasy foods. Avoid alcohol until the next day.     You may resume your regular activities the day after the procedure.     LOW-FIBER DIET  Foods RECOMMENDED Foods to AVOID   Breads, Cereal, Rice and Pasta:   White bread, rolls, biscuits, croissant and krunal toast.   Waffles, Vietnamese toast and pancakes.   White rice, noodles, pasta, macaroni and peeled cooked potatoes.   Plain crackers and saltines.   Cooked cereals: farina, cream of rice.   Cold cereals: Puffed Rice , Rice Krispies , Corn Flakes  and Special K    Breads, Cereal, Rice and Pasta:   Breads or rolls with nuts, seeds or fruit.   Whole wheat, pumpernickel, rye breads and cornbread.   Potatoes with skin, brown or wild rice, and kasha (buckwheat).     Vegetables:   Tender cooked and canned vegetables without seeds: carrots, asparagus tips, green or wax beans, pumpkin, spinach, lima beans. Vegetables:   Raw or steamed vegetables.   Vegetables with seeds.   Sauerkraut.   Winter squash, peas, broccoli, Brussel sprouts, cabbage, onions, cauliflower, baked beans, peas and corn.   Fruits:   Strained fruit juice.   Canned fruit, except pineapple.   Ripe bananas and melon. Fruits:   Prunes and prune juice.   Raw fruits.   Dried fruits: figs, dates and raisins.   Milk/Dairy:   Milk: plain or flavored.   Yogurt, custard and ice cream.   Cheese and cottage cheese Milk/Dairy:     Meat and other proteins:   ground, well-cooked tender beef, lamb, ham, veal, pork, fish, poultry and organ meats.   Eggs.   Peanut butter without nuts. Meat and other proteins:   Tough, fibrous meats with gristle.   Dry beans, peas and lentils.   Peanut butter with nuts.   Tofu.   Fats,  Snack, Sweets, Condiments and Beverages:   Margarine, butter, oils, mayonnaise, sour cream and salad dressing, plain gravy.   Sugar, hard candy, clear jelly, honey and syrup.   Spices, cooked herbs, bouillon, broth and soups made with allowed vegetable, ketchup and mustard.   Coffee, tea and carbonated drinks.   Plain cakes, cookies and pretzels.   Gelatin, plain puddings, custard, ice cream, sherbet and popsicles. Fats, Snack, Sweets, Condiments and Beverages:   Nuts, seeds and coconut.   Jam, marmalade and preserves.   Pickles, olives, relish and horseradish.   All desserts containing nuts, seeds, dried fruit and coconut; or made from whole grains or bran.   Candy made with nuts or seeds.   Popcorn.       DIRECTIONS TO THE SURGERY CENTER  From the north ([facility])  Take [address] south, exit on [address] [address]. Get into the left hand anselmo, turn left (east), go one-half mile to [address] and turn left. Go north to the first stoplight, take a right on [email or web address] and follow it to the Surgery Center entrance.    From the south ([facility])  Take [address]N to the [address] split and exit on [address] [address]. On [address] [address], turn left (west) to [address]. Turn right (north) on [address]. Go north to the first stoplight, take a right on [address] and follow it to the Surgery Center entrance.    From the east via 35E ([facility])  Take [address] south to [address] [address] exit. Turn right on [address] [address]. Go west to [address]. Turn right (north) on [address]. Go to the first stoplight, take a right and follow on [address] to the Surgery Center entrance.    From the east via [address] ([facility])  Take [address] west to [address]. Turn left (south) on [address] to [address]. Turn left (east) on [address] and follow it to the Surgery Center entrance.    From the west via [address] ([place], [place])  Take [address] east to [address]. Turn right (south) on [address] to [address]. Turn left (east) on [address] and follow it to the Surgery Center entrance.

## 2018-04-23 LAB — COPATH REPORT: NORMAL

## 2018-05-08 ENCOUNTER — TRANSFERRED RECORDS (OUTPATIENT)
Dept: HEALTH INFORMATION MANAGEMENT | Facility: CLINIC | Age: 73
End: 2018-05-08

## 2018-05-25 ENCOUNTER — ALLIED HEALTH/NURSE VISIT (OUTPATIENT)
Dept: INFUSION THERAPY | Facility: CLINIC | Age: 73
End: 2018-05-25
Attending: OBSTETRICS & GYNECOLOGY
Payer: COMMERCIAL

## 2018-05-25 DIAGNOSIS — C56.9 MALIGNANT NEOPLASM OF OVARY (H): Primary | ICD-10-CM

## 2018-07-05 DIAGNOSIS — F32.9 REACTIVE DEPRESSION: ICD-10-CM

## 2018-07-05 RX ORDER — ESCITALOPRAM OXALATE 10 MG/1
10 TABLET ORAL DAILY
Qty: 30 TABLET | Refills: 2 | Status: SHIPPED | OUTPATIENT
Start: 2018-07-05

## 2018-07-05 RX ORDER — ESCITALOPRAM OXALATE 5 MG/1
10 TABLET ORAL DAILY
Qty: 90 TABLET | Refills: 3 | Status: CANCELLED | OUTPATIENT
Start: 2018-07-05

## 2018-07-05 NOTE — PROGRESS NOTES
Received refill request for escitalopram. Called Namita- she states escitalopram 10mg PO daily is controlling depression and anxiety well without any SI or side effects. She is feeling well on this and would like to continue. Prescription changed from two 5mg tablets daily to one 10mg tablet daily- reviewed this with Namita. Given a 90 day supply, encouraged her to have follow up with her PCP for anxiety/depression management now that she sees our team for surveillance rather than active treatment. She verbalized understanding of and agreement with plan.  SALVADOR Solis, FNP-C  Gynecologic Oncology  [facility]  Pager: [phone]

## 2018-07-05 NOTE — TELEPHONE ENCOUNTER
Refill Request for Escitalopram (Lexapro ) 5 mg tablet  Sig: take 2 tablets (10 mg) by mouth daily. Disp: 90 tabs, Refill: 3.    Submitted refill request to Adelina Bird NP.   Last seen by Dr. Foster on 4/10/18, next appointment on 7/12/18 with Adelina.      Concha Rayo, RN, BSN, OCN

## 2018-07-09 ENCOUNTER — HOSPITAL ENCOUNTER (OUTPATIENT)
Facility: CLINIC | Age: 73
Setting detail: SPECIMEN
Discharge: HOME | End: 2018-07-09
Attending: OBSTETRICS & GYNECOLOGY | Admitting: OBSTETRICS & GYNECOLOGY
Payer: COMMERCIAL

## 2018-07-09 DIAGNOSIS — C56.2 OVARIAN CANCER, LEFT (H): ICD-10-CM

## 2018-07-09 DIAGNOSIS — C56.1 OVARIAN CANCER, RIGHT (H): ICD-10-CM

## 2018-07-09 LAB — CANCER AG125 SERPL-ACNC: 6 U/ML (ref 0–30)

## 2018-07-09 PROCEDURE — 25000128 H RX IP 250 OP 636: Performed by: OBSTETRICS & GYNECOLOGY

## 2018-07-09 PROCEDURE — 86304 IMMUNOASSAY TUMOR CA 125: CPT | Performed by: OBSTETRICS & GYNECOLOGY

## 2018-07-09 PROCEDURE — 36591 DRAW BLOOD OFF VENOUS DEVICE: CPT

## 2018-07-09 RX ORDER — HEPARIN SODIUM (PORCINE) LOCK FLUSH IV SOLN 100 UNIT/ML 100 UNIT/ML
500 SOLUTION INTRAVENOUS EVERY 8 HOURS PRN
Status: DISCONTINUED | OUTPATIENT
Start: 2018-07-09 | End: 2018-07-09 | Stop reason: HOSPADM

## 2018-07-09 RX ADMIN — SODIUM CHLORIDE, PRESERVATIVE FREE 500 UNITS: 5 INJECTION INTRAVENOUS at 11:12

## 2018-07-09 NOTE — PROGRESS NOTES
Infusion Nursing Note:  Tosin Nina presents today for Port lab.    Patient seen by provider today: No   present during visit today: Not Applicable.    Note: N/A.    Intravenous Access:  Labs drawn without difficulty.  Implanted Port.    Treatment Conditions:  Not Applicable.    Post Infusion Assessment:  Patient tolerated blood draw without incident.    Discharge Plan:   Discharge instructions reviewed with: Patient.  Patient and/or family verbalized understanding of discharge instructions and all questions answered.  AVS to patient via [employer].  Patient will return in 4-6 weeks for a port flush for next appointment; will schedule on her way out.   Patient discharged in stable condition accompanied by: daughter.  Departure Mode: Wheelchair.    Mildred Kimball RN

## 2018-07-12 ENCOUNTER — ONCOLOGY VISIT (OUTPATIENT)
Dept: ONCOLOGY | Facility: CLINIC | Age: 73
End: 2018-07-12
Attending: NURSE PRACTITIONER
Payer: COMMERCIAL

## 2018-07-12 VITALS
HEART RATE: 69 BPM | WEIGHT: 172.2 LBS | DIASTOLIC BLOOD PRESSURE: 78 MMHG | RESPIRATION RATE: 16 BRPM | HEIGHT: 59 IN | OXYGEN SATURATION: 96 % | BODY MASS INDEX: 34.72 KG/M2 | TEMPERATURE: 97.8 F | SYSTOLIC BLOOD PRESSURE: 143 MMHG

## 2018-07-12 DIAGNOSIS — Z85.43 ENCOUNTER FOR FOLLOW-UP SURVEILLANCE OF OVARIAN CANCER: Primary | ICD-10-CM

## 2018-07-12 DIAGNOSIS — Z08 ENCOUNTER FOR FOLLOW-UP SURVEILLANCE OF OVARIAN CANCER: Primary | ICD-10-CM

## 2018-07-12 PROCEDURE — 99214 OFFICE O/P EST MOD 30 MIN: CPT | Performed by: NURSE PRACTITIONER

## 2018-07-12 PROCEDURE — G0463 HOSPITAL OUTPT CLINIC VISIT: HCPCS

## 2018-07-12 ASSESSMENT — PAIN SCALES - GENERAL: PAINLEVEL: MILD PAIN (2)

## 2018-07-12 NOTE — LETTER
"    2018         RE: Tosin Nina  [address]  [facility] [address]        Dear Colleague,    Thank you for referring your patient, Tosin Nina, to the [facility]. Please see a copy of my visit note below.    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 She completed treatment with six cycles of carboplatin/paclitaxel on 17. She presents today for a three month surveillance visit.    HPI: Namita comes to the clinic accompanied by her  Christiano. He was present during the interview but left for privacy during her exam. She has been feeling well without gynecologic concerns. She continues with weakness in her lower legs and an intention tremor to her legs but has accepted this as her baseline- does exercises per PT but is not actively in PT at this time. Declines seeing neurology for further management. States she would like to lose weight, plans on starting a healthy diet. Reports she feels \"like my old self\" now that she has started escitalopram- no longer feels depressed, denies SI. Continues on Xarelto for hx of PE, denies any bleeding or medication adherence difficulty. States her  continues to have difficulty coping with Chazs disease and complications and that he also has physical health concerns too but states he declines to be seen for these issues. Namita adamantly states that she feels safe at home and in her relationship. She is up to date on visits with her PCP, her mammogram, and her colonoscopy. Denies unintended weight loss, fatigue,  changes in vision or hearing, shortness of breath, cough, chest pain, abdominal pain, dyspepsia, nausea, vomiting, constipation, diarrhea, bloating, dysuria, urinary frequency or urgency, hematuria, pelvic pain, " lower back pain, vaginal bleeding, vaginal discharge, numbness or tingling, or swelling of the extremities.      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  She was discharged to a TCU where she is still residing with some but minimal improvement in her neurologic symptoms.  She still has quite a difficult time seeing especially with her right eye.  She also notes weakness mostly on her left side.  Both of these make it very difficult for her to walk and thus she is having to use a wheelchair for most of her mobility.  She tolerated her first cycle quite well with her biggest side effect being nausea which improved drastically with a change in anti-emetics.       4/11/17-4/16/17:  Admit to [facility] for worsening dizziness, found to have stage IVB ovarian cancer (inguinal lymphadenopathy) causing paraneoplastic syndrome     4/11/17:  CT C/A/P:  Thrombus identified within the right lower lobe are artery and right upper lobar arteries. The right pulmonary artery is enlarged, similar to prior exams. No CT evidence of right heart strain. No suspicious mediastinal or perihilar lymphadenopathy. Bovine arch anatomy. No suspicious pulmonary nodules, evidence of infarction, or infection. Abdomen and pelvis: There are soft tissue nodules identified along the liver capsule measuring up to 3 cm inferiorly. There is a large amount of omental caking. Enlarged iliac and retroperitoneal lymph nodes for example a 2.6 cm right external iliac lymph node or group of lymph nodes. Heterogeneous enhancement of the uterine fundus could be due to fibroids or a mass. The overall size of the uterus does not appear significantly different than in 2014. The left ovary does appear slightly larger and lobular in appearance compared to prior exam. There is also a nodular area along the round ligament. It was not present on prior exam. There is  an irregular lobulated mass involving the sigmoid colon. Abnormal, enlarged inguinal lymph nodes. Soft tissue implant in the posterior right retroperitoneum adjacent to the psoas was not present on prior exam. 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 Spondylolisthesis at L3-4. Small periumbilical hernia containing some of the abnormal omentum.     4/11/17:   268, CEA .6     4/12/17:  IR omental biopsy                           Pathology:  High grade serous carcinoma     4/14/17: Cycle #1 carboplatin AUC 6 and weekly Taxol 80mg/m2.  = 2648     5/5/17:  Cycle #2 carboplatin AUC 6 and weekly Taxol 80mg/m2.  = 370     5/26/17:  Cycle #3 carboplatin AUC 6 and weekly Taxol 80mg/m2.  = 63     6/16/17:  CT C/A/P:  Chest:  Resolution of previous right-sided pulmonary emboli since April. No mediastinal, hilar or axillary adenopathy. No pleural fluid.  Port-A-Cath right superior chest with tip in the SVC.  Short linear fibrosis or discoid atelectasis anterior medial right upper lobe. Lungs otherwise clear. Abdomen and Pelvis: Marked improvement in carcinomatosis and omental metastasis since 4/11/2017. In the anterior left pelvis there is a 1.2 cm nodule with adjacent linear opacity approximately 4 cm in length in an area of previous dense omental caking and metastatic disease. There is resolution of the previous anterior right-sided omental caking with subcentimeter trace of residual nodularity in this location. There are multiple new indeterminate nodular densities in the anterior abdominal wall subcutaneous fat as well as small collections of subcutaneous air, suggesting that these are medication injection sites.  Previous subserosal implants along the inferior liver have resolved. No focal liver lesions. Normal-appearing pancreas, adrenal glands and kidneys. Tiny hypodense spleen lesion is too small to  characterize, not previously seen. Spleen otherwise appears normal. No periaortic or pelvic adenopathy with resolution of previous adenopathy. Lobulated enlarged uterus redemonstrated consistent with multiple fibroids. No free fluid. No acute bowel abnormality. Resolution of mesenteric right lower quadrant nodule is compatible with metastasis. On coronal images the terminal ileum takes an unusual circular course posterior to the right colon, but there is no evidence for obstruction. No aggressive bone lesions.     6/16/17:   = 27     6/28/17:  Robotic total laparoscopic hysterectomy, bilateral salpingo-oophorectomy, lysis of adhesions, omentectomy, optimal tumor debulking to no residual disease                           Pathology:  Minimal serous carcinoma remaining in the left ovary     7/20/17:  Cycle #4 carboplatin AUC 6 and weekly Taxol 80mg/m2. D15 cancelled due to thrombocytopenia (plt 70)   = 18     8/10/17:  Cycle #5 carboplatin AUC 6 and weekly Taxol 80mg/m2. D8 cancelled neutropenia , D15 cancelled thromobocytopenia (plt 53)  = 12     8/31/17:  Cycle #6 carboplatin AUC 5 due to myelosuppression and weekly Taxol 80mg/m2. D15 delayed thrombocytopenia (plt 78)  = 10     12/20/17:  5    4/3/18:  <5    7/9/18:  6    Past Medical History:   Diagnosis Date     Anemia      Cervical spinal stenosis      Depression      GERD (gastroesophageal reflux disease)      Hypertension      Hypokalemia      Hypothyroid      Lumbar spinal stenosis      Obesity      Ovarian cancer (H)      Paraneoplastic neuromyopathy and neuropathy (H)      PE (pulmonary thromboembolism) (H)      Thrombocytopenia (H)        Past Surgical History:   Procedure Laterality Date     AS TOTAL KNEE ARTHROPLASTY Left      BACK SURGERY  2009     CHOLECYSTECTOMY  01/01/2016     COLONOSCOPY N/A 4/20/2018    Procedure: COLONOSCOPY;  Colonoscopy ;  Surgeon: Nila Munson MD;  Location:  OR      CYSTOSCOPY N/A 6/28/2017    Procedure: CYSTOSCOPY;;  Surgeon: Nessa Christina MD;  Location: [facility] OR     DAVINCI HYSTERECTOMY TOTAL, BILATERAL SALPINGO-OOPHORECTMY, NODE DISSECTION, TUMOR STAGING, COMBINED N/A 6/28/2017    Procedure: COMBINED DAVINCI HYSTERECTOMY TOTAL, SALPINGO-OOPHORECTOMY, NODE DISSECTION, TUMOR STAGING;  Robotic total laparoscopic hysterectomy, bilateral salpingo-oophorectomy, omentectomy, lysis of adhesions, tumor debulking, cystoscopy;  Surgeon: Nessa Christina MD;  Location: [facility] OR     OPEN REDUCTION INTERNAL FIXATION WRIST Right 2009     THYROIDECTOMY         Social History     Social History     Marital status:      Spouse name: Christiano Nina     Number of children: 1     Years of education: N/A     Occupational History     Current: Retired      Former:       Social History Main Topics     Smoking status: Never Smoker     Smokeless tobacco: Never Used     Alcohol use Yes      Comment: occasionally     Drug use: No     Sexual activity: Not on file     Other Topics Concern     Not on file     Social History Narrative       Family History   Problem Relation Age of Onset     Breast Cancer Mother 69     Heart Failure Mother      Breast Cancer Maternal Grandmother      this is maternal great grandmother     Breast Cancer Maternal Aunt 89     Myocardial Infarction Father      Unknown/Adopted Brother      Unknown/Adopted Maternal Grandfather      Stomach Cancer Paternal Grandmother      Stomach mass-not sure if cancer     Lung Cancer Paternal Grandfather      mustard gas in WW[place]       Current Outpatient Prescriptions   Medication     enoxaparin (LOVENOX) 80 MG/0.8ML injection     escitalopram (LEXAPRO) 10 MG tablet     hydrochlorothiazide (HYDRODIURIL) 25 MG tablet     levothyroxine (SYNTHROID) 125 MCG tablet     losartan (COZAAR) 100 MG tablet     magnesium chloride 535 (64 MG) MG TBCR CR tablet     Multiple Vitamins-Minerals (MULTI FOR HER PO)     Potassium  "Chloride ER 20 MEQ TBCR     rivaroxaban ANTICOAGULANT (XARELTO) 20 MG TABS tablet     Vitamin D, Cholecalciferol, 1000 UNITS TABS     No current facility-administered medications for this visit.           Allergies   Allergen Reactions     Amoxicillin Hives     Clarithromycin Other (See Comments)     \"I felt dopey, sleepy and like a druggie\"     Lisinopril Cough     Penicillins Rash       Review of Systems  General: + weakness. Denies fatigue, changes in weight, appetite changes, night sweats, hot flashes, fever, chills, or difficulty sleeping  HEENT: + visual disturbances (occasionally has bouncing vision related to her paraneoplastic syndrome). Denies headaches, hair loss, masses, head injury, tinnitus, hearing loss, epistaxis, congestion, problems with teeth or gums, dysphonia, or dysphagia  Pulmonary: Denies cough, sputum, hemoptysis, shortness of breath, dyspnea on exertion, wheezing, or allergies  Cardiovascular: Denies chest pain, fainting, palpitations, murmurs, activity intolerance, swelling in legs, or high blood pressure  Gastrointestinal: Denies nausea, vomiting, constipation, diarrhea, abdominal pain, bloating, heartburn, melena, hematochezia, or jaundice  Genitourinary: Denies dysuria, urinary urgency or frequency, hematuria, cloudy or malodorous urine, incontinence, repeat urinary tract infections, flank pain, pelvic pain, vaginal bleeding, vaginal discharge, or vaginal dryness  Integumentary: Denies rashes, sores, changing moles, or scarring  Hematologic: Denies swollen lymph nodes, masses, easy bruising, or easy bleeding  Musculoskeletal: + weakness. Denies falls, back pain, myalgias, arthralgias, stiffness, or muscle cramps  Neurologic:+ difficulty with walking. Denies numbness or tingling, changes in memory, dizziness, seizures, or tremors  Psychiatric: Denies anxiety, depression, mood changes, suicidal thoughts, or difficulty concentrating  Endocrine: Denies polydipsia, polyuria, temperature " "intolerance, or history of thyroid disease      OBJECTIVE:    Physical Exam:    /78  Pulse 69  Temp 97.8  F (36.6  C) (Tympanic)  Resp 16  Ht 1.499 m (4' 11\")  Wt 78.1 kg (172 lb 3.2 oz)  SpO2 96%  BMI 34.78 kg/m2    CONSTITUTIONAL: Alert non-toxic appearing female in no acute distress  HEAD: Normocephalic, atraumatic  EYES: PERRLA; no scleral icterus  ENT: Oropharynx pink without lesions  NECK: Neck supple without lymphadenopathy  RESPIRATORY: Lungs clear to auscultation, no increased work of breathing noted  CV: Regular rate and rhythm, S1S2, no clicks, murmurs, rubs, or gallops; bilateral lower extremities with trace edema equal bilaterally, dorsalis pedis pulses 2+ bilaterally  GASTROINTESTINAL: Normoactive bowel sounds x4 quadrants, abdomen soft, non-distended, and non-tender to palpation without masses or organomegaly  GENITOURINARY: External genitalia and urethral meatus pink without lesions, masses, or excoriation. Vagina pale without masses or lesions. Vaginal cuff without nodularity, masses, or lesions. Cervix surgically absent. Bimanual exam reveals no masses or fullness. Rectovaginal exam confirms these findings.  LYMPHATIC: Cervical, supraclavicular, and inguinal nodes without lymphadenopathy  MUSCULOSKELETAL: Moves all extremities, no obvious muscle wasting  NEUROLOGIC: Able to stand but requires assistance by holding onto a chair, intention tremor to bilateral lower extremities noted  SKIN: Appropriate color for race, warm and dry, no rashes or lesions to unclothed skin  PSYCHIATRIC: Pleasant and interactive, affect bright, makes appropriate eye contact, thought process linear    Data:   6    Assessment/Plan:  1) Surveillance for stage IVB ovarian cancer: No signs of recurrence. Continue surveillance every three months x2 years (through 8/2019) followed by every six months x3 years (through 8/2022) then annually thereafter with  and pelvic/rectal exam. Reviewed signs and " symptoms  of recurrence including but not limited to bleeding from vagina, bladder, or rectum, bloating, early satiety, swelling in the lower extremities, abdominal or lower back pain, changes in bowel or bladder patterns, shortness of breath, increased fatigue, unexplained weight loss, and night sweats. Reviewed signs and symptoms for when she should contact the clinic or seek additional care. Patient to contact the clinic with any questions or concerns in the interim.  2) Deconditioning: Wheelchair bound, continues with some deficits and visual difficulty at times, denies need for intervention. Continues with exercises previously recommended by PT.  3) Hx of PE: Continues on Xarelto as prescribed by Dr. Barahona- to be on this indefinitely. No bleeding.  4) Depression: Reports this is well controlled on escitalopram, denies need for intervention.  7) Genetic testing: Negative panel testing   8) Labs:   9) Health maintenance issues discussed today include to follow up with PCP for co-morbid conditions and non-gynecologic issues. Reviewed importance of healthy diet for weight management, being as active as possible.  10) Patient verbalized understanding of and agreement with plan.    A total of 20 minutes of face to face time spent with patient, over 50% of which was spent in counseling and coordination of care.    SALVADOR Solis, FNP-C  Division of Gynecologic Oncology  [facility]  Pager: [phone]              Again, thank you for allowing me to participate in the care of your patient.        Sincerely,        SALVADOR Solis CNP

## 2018-07-12 NOTE — NURSING NOTE
"Oncology Rooming Note    July 12, 2018 11:26 AM   Tosin Nina is a 72 year old female who presents for:    Chief Complaint   Patient presents with     Oncology Clinic Visit     Ovarian cancer-Follow up     Initial Vitals: Resp 16  Ht 1.499 m (4' 11\")  Wt 78.1 kg (172 lb 3.2 oz)  BMI 34.78 kg/m2 Estimated body mass index is 34.78 kg/(m^2) as calculated from the following:    Height as of this encounter: 1.499 m (4' 11\").    Weight as of this encounter: 78.1 kg (172 lb 3.2 oz). Body surface area is 1.8 meters squared.  Mild Pain (2) Comment: right shoulder   No LMP recorded. Patient is postmenopausal.  Allergies reviewed: Yes  Medications reviewed: Yes    Medications: Medication refills not needed today.  Pharmacy name entered into [employer]:    [facility] [id number] [place] [address] - [address] [place] [address] [address] - [address] [facility]    Clinical concerns: Follow-up     8 minutes for nursing intake (face to face time)     Teresa Sanches MA  DISCHARGE PLAN:  Next appointments: See patient instruction section  Departure Mode: Ambulatory  Accompanied by: -Christiano  0 minutes for nursing discharge (face to face time)   Teresa Sanches"

## 2018-07-12 NOTE — PROGRESS NOTES
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 She completed treatment with six cycles of carboplatin/paclitaxel on 17. She presents today for a three month surveillance visit.    HPI: Namita comes to the clinic accompanied by her  Christiano. He was present during the interview but left for privacy during her exam. She has been feeling well without gynecologic concerns. She continues with weakness in her lower legs and an intention tremor to her legs but has accepted this as her baseline- does exercises per PT but is not actively in PT at this time. Declines seeing neurology for further management. States she would like to lose weight, plans on starting a healthy diet. Reports she feels \"like my old self\" now that she has started escitalopram- no longer feels depressed, denies SI. Continues on Xarelto for hx of PE, denies any bleeding or medication adherence difficulty. States her  continues to have difficulty coping with Namita's disease and complications and that he also has physical health concerns too but states he declines to be seen for these issues. aNmita adamantly states that she feels safe at home and in her relationship. She is up to date on visits with her PCP, her mammogram, and her colonoscopy. Denies unintended weight loss, fatigue,  changes in vision or hearing, shortness of breath, cough, chest pain, abdominal pain, dyspepsia, nausea, vomiting, constipation, diarrhea, bloating, dysuria, urinary frequency or urgency, hematuria, pelvic pain, lower back pain, vaginal bleeding, vaginal discharge, numbness or tingling, or swelling of the extremities.      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  " She was discharged to a TCU where she is still residing with some but minimal improvement in her neurologic symptoms.  She still has quite a difficult time seeing especially with her right eye.  She also notes weakness mostly on her left side.  Both of these make it very difficult for her to walk and thus she is having to use a wheelchair for most of her mobility.  She tolerated her first cycle quite well with her biggest side effect being nausea which improved drastically with a change in anti-emetics.       4/11/17-4/16/17:  Admit to [facility] for worsening dizziness, found to have stage IVB ovarian cancer (inguinal lymphadenopathy) causing paraneoplastic syndrome     4/11/17:  CT C/A/P:  Thrombus identified within the right lower lobe are artery and right upper lobar arteries. The right pulmonary artery is enlarged, similar to prior exams. No CT evidence of right heart strain. No suspicious mediastinal or perihilar lymphadenopathy. Bovine arch anatomy. No suspicious pulmonary nodules, evidence of infarction, or infection. Abdomen and pelvis: There are soft tissue nodules identified along the liver capsule measuring up to 3 cm inferiorly. There is a large amount of omental caking. Enlarged iliac and retroperitoneal lymph nodes for example a 2.6 cm right external iliac lymph node or group of lymph nodes. Heterogeneous enhancement of the uterine fundus could be due to fibroids or a mass. The overall size of the uterus does not appear significantly different than in 2014. The left ovary does appear slightly larger and lobular in appearance compared to prior exam. There is also a nodular area along the round ligament. It was not present on prior exam. There is an irregular lobulated mass involving the sigmoid colon. Abnormal, enlarged inguinal lymph nodes. Soft tissue implant in the posterior right retroperitoneum adjacent to the psoas was not present on prior exam. Bones and soft tissues: Degenerative changes in the spine  with flowing anterior osteophytes in the thoracic spine compatible with dish. Spondylolisthesis at L3-4. Small periumbilical hernia containing some of the abnormal omentum.     4/11/17:   268, CEA .6     4/12/17:  IR omental biopsy                           Pathology:  High grade serous carcinoma     4/14/17: Cycle #1 carboplatin AUC 6 and weekly Taxol 80mg/m2.  = 2648     5/5/17:  Cycle #2 carboplatin AUC 6 and weekly Taxol 80mg/m2.  = 370     5/26/17:  Cycle #3 carboplatin AUC 6 and weekly Taxol 80mg/m2.  = 63     6/16/17:  CT C/A/P:  Chest:  Resolution of previous right-sided pulmonary emboli since April. No mediastinal, hilar or axillary adenopathy. No pleural fluid.  Port-A-Cath right superior chest with tip in the SVC.  Short linear fibrosis or discoid atelectasis anterior medial right upper lobe. Lungs otherwise clear. Abdomen and Pelvis: Marked improvement in carcinomatosis and omental metastasis since 4/11/2017. In the anterior left pelvis there is a 1.2 cm nodule with adjacent linear opacity approximately 4 cm in length in an area of previous dense omental caking and metastatic disease. There is resolution of the previous anterior right-sided omental caking with subcentimeter trace of residual nodularity in this location. There are multiple new indeterminate nodular densities in the anterior abdominal wall subcutaneous fat as well as small collections of subcutaneous air, suggesting that these are medication injection sites.  Previous subserosal implants along the inferior liver have resolved. No focal liver lesions. Normal-appearing pancreas, adrenal glands and kidneys. Tiny hypodense spleen lesion is too small to characterize, not previously seen. Spleen otherwise appears normal. No periaortic or pelvic adenopathy with resolution of previous adenopathy. Lobulated enlarged uterus redemonstrated consistent with multiple fibroids. No free fluid. No acute bowel abnormality. Resolution of  mesenteric right lower quadrant nodule is compatible with metastasis. On coronal images the terminal ileum takes an unusual circular course posterior to the right colon, but there is no evidence for obstruction. No aggressive bone lesions.     6/16/17:   = 27     6/28/17:  Robotic total laparoscopic hysterectomy, bilateral salpingo-oophorectomy, lysis of adhesions, omentectomy, optimal tumor debulking to no residual disease                           Pathology:  Minimal serous carcinoma remaining in the left ovary     7/20/17:  Cycle #4 carboplatin AUC 6 and weekly Taxol 80mg/m2. D15 cancelled due to thrombocytopenia (plt 70)   = 18     8/10/17:  Cycle #5 carboplatin AUC 6 and weekly Taxol 80mg/m2. D8 cancelled neutropenia , D15 cancelled thromobocytopenia (plt 53)  = 12     8/31/17:  Cycle #6 carboplatin AUC 5 due to myelosuppression and weekly Taxol 80mg/m2. D15 delayed thrombocytopenia (plt 78)  = 10     12/20/17:  5    4/3/18:  <5    7/9/18:  6    Past Medical History:   Diagnosis Date     Anemia      Cervical spinal stenosis      Depression      GERD (gastroesophageal reflux disease)      Hypertension      Hypokalemia      Hypothyroid      Lumbar spinal stenosis      Obesity      Ovarian cancer (H)      Paraneoplastic neuromyopathy and neuropathy (H)      PE (pulmonary thromboembolism) (H)      Thrombocytopenia (H)        Past Surgical History:   Procedure Laterality Date     AS TOTAL KNEE ARTHROPLASTY Left      BACK SURGERY  2009     CHOLECYSTECTOMY  01/01/2016     COLONOSCOPY N/A 4/20/2018    Procedure: COLONOSCOPY;  Colonoscopy ;  Surgeon: Nila Munson MD;  Location: [facility] OR     CYSTOSCOPY N/A 6/28/2017    Procedure: CYSTOSCOPY;;  Surgeon: Nessa Christina MD;  Location: [facility] OR     DAVINCI HYSTERECTOMY TOTAL, BILATERAL SALPINGO-OOPHORECTMY, NODE DISSECTION, TUMOR STAGING, COMBINED N/A 6/28/2017    Procedure: COMBINED DAVINCI HYSTERECTOMY TOTAL,  SALPINGO-OOPHORECTOMY, NODE DISSECTION, TUMOR STAGING;  Robotic total laparoscopic hysterectomy, bilateral salpingo-oophorectomy, omentectomy, lysis of adhesions, tumor debulking, cystoscopy;  Surgeon: Nessa Christina MD;  Location: [facility] OR     OPEN REDUCTION INTERNAL FIXATION WRIST Right 2009     THYROIDECTOMY         Social History     Social History     Marital status:      Spouse name: Christiano Nina     Number of children: 1     Years of education: N/A     Occupational History     Current: Retired      Former:       Social History Main Topics     Smoking status: Never Smoker     Smokeless tobacco: Never Used     Alcohol use Yes      Comment: occasionally     Drug use: No     Sexual activity: Not on file     Other Topics Concern     Not on file     Social History Narrative       Family History   Problem Relation Age of Onset     Breast Cancer Mother 69     Heart Failure Mother      Breast Cancer Maternal Grandmother      this is maternal great grandmother     Breast Cancer Maternal Aunt 89     Myocardial Infarction Father      Unknown/Adopted Brother      Unknown/Adopted Maternal Grandfather      Stomach Cancer Paternal Grandmother      Stomach mass-not sure if cancer     Lung Cancer Paternal Grandfather      must[employer] in WW[place]       Current Outpatient Prescriptions   Medication     enoxaparin (LOVENOX) 80 MG/0.8ML injection     escitalopram (LEXAPRO) 10 MG tablet     hydrochlorothiazide (HYDRODIURIL) 25 MG tablet     levothyroxine (SYNTHROID) 125 MCG tablet     losartan (COZAAR) 100 MG tablet     magnesium chloride 535 (64 MG) MG TBCR CR tablet     Multiple Vitamins-Minerals (MULTI FOR HER PO)     Potassium Chloride ER 20 MEQ TBCR     rivaroxaban ANTICOAGULANT (XARELTO) 20 MG TABS tablet     Vitamin D, Cholecalciferol, 1000 UNITS TABS     No current facility-administered medications for this visit.           Allergies   Allergen Reactions     Amoxicillin Hives     Clarithromycin Other  "(See Comments)     \"I felt dopey, sleepy and like a druggie\"     Lisinopril Cough     Penicillins Rash       Review of Systems  General: + weakness. Denies fatigue, changes in weight, appetite changes, night sweats, hot flashes, fever, chills, or difficulty sleeping  HEENT: + visual disturbances (occasionally has bouncing vision related to her paraneoplastic syndrome). Denies headaches, hair loss, masses, head injury, tinnitus, hearing loss, epistaxis, congestion, problems with teeth or gums, dysphonia, or dysphagia  Pulmonary: Denies cough, sputum, hemoptysis, shortness of breath, dyspnea on exertion, wheezing, or allergies  Cardiovascular: Denies chest pain, fainting, palpitations, murmurs, activity intolerance, swelling in legs, or high blood pressure  Gastrointestinal: Denies nausea, vomiting, constipation, diarrhea, abdominal pain, bloating, heartburn, melena, hematochezia, or jaundice  Genitourinary: Denies dysuria, urinary urgency or frequency, hematuria, cloudy or malodorous urine, incontinence, repeat urinary tract infections, flank pain, pelvic pain, vaginal bleeding, vaginal discharge, or vaginal dryness  Integumentary: Denies rashes, sores, changing moles, or scarring  Hematologic: Denies swollen lymph nodes, masses, easy bruising, or easy bleeding  Musculoskeletal: + weakness. Denies falls, back pain, myalgias, arthralgias, stiffness, or muscle cramps  Neurologic:+ difficulty with walking. Denies numbness or tingling, changes in memory, dizziness, seizures, or tremors  Psychiatric: Denies anxiety, depression, mood changes, suicidal thoughts, or difficulty concentrating  Endocrine: Denies polydipsia, polyuria, temperature intolerance, or history of thyroid disease      OBJECTIVE:    Physical Exam:    /78  Pulse 69  Temp 97.8  F (36.6  C) (Tympanic)  Resp 16  Ht 1.499 m (4' 11\")  Wt 78.1 kg (172 lb 3.2 oz)  SpO2 96%  BMI 34.78 kg/m2    CONSTITUTIONAL: Alert non-toxic appearing female in no " acute distress  HEAD: Normocephalic, atraumatic  EYES: PERRLA; no scleral icterus  ENT: Oropharynx pink without lesions  NECK: Neck supple without lymphadenopathy  RESPIRATORY: Lungs clear to auscultation, no increased work of breathing noted  CV: Regular rate and rhythm, S1S2, no clicks, murmurs, rubs, or gallops; bilateral lower extremities with trace edema equal bilaterally, dorsalis pedis pulses 2+ bilaterally  GASTROINTESTINAL: Normoactive bowel sounds x4 quadrants, abdomen soft, non-distended, and non-tender to palpation without masses or organomegaly  GENITOURINARY: External genitalia and urethral meatus pink without lesions, masses, or excoriation. Vagina pale without masses or lesions. Vaginal cuff without nodularity, masses, or lesions. Cervix surgically absent. Bimanual exam reveals no masses or fullness. Rectovaginal exam confirms these findings.  LYMPHATIC: Cervical, supraclavicular, and inguinal nodes without lymphadenopathy  MUSCULOSKELETAL: Moves all extremities, no obvious muscle wasting  NEUROLOGIC: Able to stand but requires assistance by holding onto a chair, intention tremor to bilateral lower extremities noted  SKIN: Appropriate color for race, warm and dry, no rashes or lesions to unclothed skin  PSYCHIATRIC: Pleasant and interactive, affect bright, makes appropriate eye contact, thought process linear    Data:   6    Assessment/Plan:  1) Surveillance for stage IVB ovarian cancer: No signs of recurrence. Continue surveillance every three months x2 years (through 8/2019) followed by every six months x3 years (through 8/2022) then annually thereafter with  and pelvic/rectal exam. Reviewed signs and symptoms  of recurrence including but not limited to bleeding from vagina, bladder, or rectum, bloating, early satiety, swelling in the lower extremities, abdominal or lower back pain, changes in bowel or bladder patterns, shortness of breath, increased fatigue, unexplained weight loss,  and night sweats. Reviewed signs and symptoms for when she should contact the clinic or seek additional care. Patient to contact the clinic with any questions or concerns in the interim.  2) Deconditioning: Wheelchair bound, continues with some deficits and visual difficulty at times, denies need for intervention. Continues with exercises previously recommended by PT.  3) Hx of PE: Continues on Xarelto as prescribed by Dr. Barahona- to be on this indefinitely. No bleeding.  4) Depression: Reports this is well controlled on escitalopram, denies need for intervention.  7) Genetic testing: Negative panel testing   8) Labs:   9) Health maintenance issues discussed today include to follow up with PCP for co-morbid conditions and non-gynecologic issues. Reviewed importance of healthy diet for weight management, being as active as possible.  10) Patient verbalized understanding of and agreement with plan.    A total of 20 minutes of face to face time spent with patient, over 50% of which was spent in counseling and coordination of care.    SALVADOR Solis, FNP-C  Division of Gynecologic Oncology  [facility]  Pager: [phone]

## 2018-07-12 NOTE — PATIENT INSTRUCTIONS
See Adelina in three months, lab appt in three months for  Scheduled  Carmen MARTIN given to patient

## 2018-07-12 NOTE — MR AVS SNAPSHOT
After Visit Summary   7/12/2018    Tosin Nina    MRN: [id number]           Patient Information     Date Of Birth          1945        Visit Information        Provider Department      7/12/2018 11:20 AM Adelina Bird APRN CNP [facility]        Care Instructions    See Adelina in three months, lab appt in three months for  Scheduled  Carmen MARTIN given to patient            Follow-ups after your visit        Your next 10 appointments already scheduled     Aug 06, 2018  1:15 PM CDT   LAB with [facility] LAB DRAW 1   [facility] ([facility])    [place] [address]  [facility] [address]   [phone]           Please do not eat 10-12 hours before your appointment if you are coming in fasting for labs on lipids, cholesterol, or glucose (sugar). This does not apply to pregnant women. Water, hot tea and black coffee (with nothing added) are okay. Do not drink other fluids, diet soda or chew gum.            Sep 06, 2018 12:45 PM CDT   LAB with [facility] LAB DRAW 1   [facility] ([facility])    [facility]  [address]  [facility] [address]   [phone]           Please do not eat 10-12 hours before your appointment if you are coming in fasting for labs on lipids, cholesterol, or glucose (sugar). This does not apply to pregnant women. Water, hot tea and black coffee (with nothing added) are okay. Do not drink other fluids, diet soda or chew gum.            Oct 04, 2018 12:45 PM CDT   LAB with [facility] LAB DRAW 1   [facility] ([facility])    [facility]  [address]  [place] [address]   [phone]           Please do not eat 10-12 hours before your appointment if you are coming in fasting for labs on lipids,  "cholesterol, or glucose (sugar). This does not apply to pregnant women. Water, hot tea and black coffee (with nothing added) are okay. Do not drink other fluids, diet soda or chew gum.            Oct 11, 2018 10:40 AM CDT   Return Visit with SALVADOR Solis CNP   [facility] ([facility])    [facility]  [address]  [facility] [address] [phone]              Who to contact     If you have questions or need follow up information about today's clinic visit or your schedule please contact [facility] directly at [phone].  Normal or non-critical lab and imaging results will be communicated to you by Yurbudshart, letter or phone within 4 business days after the clinic has received the results. If you do not hear from us within 7 days, please contact the clinic through Yurbudshart or phone. If you have a critical or abnormal lab result, we will notify you by phone as soon as possible.  Submit refill requests through Comfy or call your pharmacy and they will forward the refill request to us. Please allow 3 business days for your refill to be completed.          Additional Information About Your Visit        Comfy Information     Comfy gives you secure access to your electronic health record. If you see a primary care provider, you can also send messages to your care team and make appointments. If you have questions, please call your primary care clinic.  If you do not have a primary care provider, please call [phone] and they will assist you.        Care EveryWhere ID     This is your Care EveryWhere ID. This could be used by other organizations to access your [facility] medical records  [id number]        Your Vitals Were     Pulse Temperature Respirations Height Pulse Oximetry BMI (Body Mass Index)    69 97.8  F (36.6  C) (Tympanic) 16 1.499 m (4' 11\") 96% 34.78 kg/m2       Blood Pressure from Last 3 " Encounters:   07/12/18 143/78   04/20/18 125/68   04/10/18 135/78    Weight from Last 3 Encounters:   07/12/18 78.1 kg (172 lb 3.2 oz)   04/20/18 76.2 kg (168 lb)   04/10/18 76.2 kg (168 lb)              Today, you had the following     No orders found for display         Today's Medication Changes          These changes are accurate as of 7/12/18 12:23 PM.  If you have any questions, ask your nurse or doctor.               These medicines have changed or have updated prescriptions.        Dose/Directions    Vitamin D (Cholecalciferol) 1000 units Tabs   This may have changed:  additional instructions   Used for:  Paresthesias        Dose:  2000 Units   Take 2,000 Units by mouth daily   Quantity:  60 tablet   Refills:  0                Primary Care Provider Office Phone # Fax #    Esther Back [phone]       [facility] [address] [address]        Equal Access to Services     [facility]: Hadii zita fields hadasho Sofely, waaxda luqadaha, qaybta kaalmada adeegyada, waxay marshall regan . So [facility] [phone].    ATENCIÓN: Si habla español, tiene a alarcon disposición servicios gratuitos de asistencia lingüística. Llame al [phone].    We comply with applicable federal civil rights laws and [place] laws. We do not discriminate on the basis of race, color, national origin, age, disability, sex, sexual orientation, or gender identity.            Thank you!     Thank you for choosing [facility]  for your care. Our goal is always to provide you with excellent care. Hearing back from our patients is one way we can continue to improve our services. Please take a few minutes to complete the written survey that you may receive in the mail after your visit with us. Thank you!             Your Updated Medication List - Protect others around you: Learn how to safely use, store and throw away your medicines at www.disposemymeds.org.           This list is accurate as of 7/12/18 12:23 PM.  Always use your most recent med list.                   Brand Name Dispense Instructions for use Diagnosis    enoxaparin 80 MG/0.8ML injection    LOVENOX     Inject 80 mg Subcutaneous        escitalopram 10 MG tablet    LEXAPRO    30 tablet    Take 1 tablet (10 mg) by mouth daily    Reactive depression       hydrochlorothiazide 25 MG tablet    HYDRODIURIL     Take 25 mg by mouth daily    Ovarian cancer, unspecified laterality (H)       levothyroxine 125 MCG tablet    SYNTHROID    30 tablet    Take 1 tablet (125 mcg) by mouth daily    Other pulmonary embolism without acute cor pulmonale, unspecified chronicity (H)       losartan 100 MG tablet    COZAAR     Take 100 mg by mouth        magnesium chloride 535 (64 Mg) MG Tbcr CR tablet     30 tablet    Take 1 tablet (535 mg) by mouth daily    Ovarian cancer, unspecified laterality (H)       MULTI FOR HER PO           Potassium Chloride ER 20 MEQ Tbcr     60 tablet    Take 1 tablet (20 mEq) by mouth 2 times daily    Hypokalemia       rivaroxaban ANTICOAGULANT 20 MG Tabs tablet    XARELTO    30 tablet    Take 1 tablet (20 mg) by mouth daily (with dinner)    Other acute pulmonary embolism without acute cor pulmonale (H)       Vitamin D (Cholecalciferol) 1000 units Tabs     60 tablet    Take 2,000 Units by mouth daily    Paresthesias

## 2018-08-06 PROCEDURE — 25000128 H RX IP 250 OP 636: Performed by: OBSTETRICS & GYNECOLOGY

## 2018-08-06 PROCEDURE — 96523 IRRIG DRUG DELIVERY DEVICE: CPT

## 2018-08-06 RX ORDER — HEPARIN SODIUM (PORCINE) LOCK FLUSH IV SOLN 100 UNIT/ML 100 UNIT/ML
500 SOLUTION INTRAVENOUS EVERY 8 HOURS PRN
Status: DISCONTINUED | OUTPATIENT
Start: 2018-08-06 | End: 2018-08-06 | Stop reason: HOSPADM

## 2018-08-06 RX ADMIN — SODIUM CHLORIDE, PRESERVATIVE FREE 500 UNITS: 5 INJECTION INTRAVENOUS at 13:33

## 2018-08-06 NOTE — PROGRESS NOTES
Infusion Nursing Note:  Tosin Nina presents today for port flush.    Patient seen by provider today: No   present during visit today: Not Applicable.    Note: N/A.    Intravenous Access:  Implanted Port.    Treatment Conditions:  Not Applicable.    Post Infusion Assessment:  Patient tolerated port flush without incident.    Discharge Plan:   Discharge instructions reviewed with: Patient.  Patient and/or family verbalized understanding of discharge instructions and all questions answered.  AVS to patient via zEconomyT.  Patient will return 9/6/18 for next port flush appointment.   Patient discharged in stable condition accompanied by: daughter.  Departure Mode: Wheelchair.    Mildred Kimball RN

## 2018-09-06 DIAGNOSIS — Z51.81 ENCOUNTER FOR THERAPEUTIC DRUG MONITORING: Primary | ICD-10-CM

## 2018-09-06 DIAGNOSIS — C56.9 OVARIAN CANCER, UNSPECIFIED LATERALITY (H): ICD-10-CM

## 2018-09-06 DIAGNOSIS — C56.9 OVARIAN CANCER (H): ICD-10-CM

## 2018-09-06 PROCEDURE — 25000128 H RX IP 250 OP 636: Performed by: OBSTETRICS & GYNECOLOGY

## 2018-09-06 PROCEDURE — 96523 IRRIG DRUG DELIVERY DEVICE: CPT

## 2018-09-06 RX ORDER — HEPARIN SODIUM (PORCINE) LOCK FLUSH IV SOLN 100 UNIT/ML 100 UNIT/ML
500 SOLUTION INTRAVENOUS ONCE
Status: COMPLETED | OUTPATIENT
Start: 2018-09-06 | End: 2018-09-06

## 2018-09-06 RX ORDER — HEPARIN SODIUM (PORCINE) LOCK FLUSH IV SOLN 100 UNIT/ML 100 UNIT/ML
500 SOLUTION INTRAVENOUS ONCE
Status: CANCELLED
Start: 2018-09-06 | End: 2018-09-06

## 2018-09-06 RX ADMIN — SODIUM CHLORIDE, PRESERVATIVE FREE 500 UNITS: 5 INJECTION INTRAVENOUS at 12:45

## 2018-09-06 NOTE — PROGRESS NOTES
Infusion Nursing Note:  Tosin Nina presents today for port flush.    Patient seen by provider today: No   present during visit today: Not Applicable.    Note: N/A.    Intravenous Access:  Implanted Port.    Treatment Conditions:  Not Applicable.      Post Infusion Assessment:  Blood return noted.  Site patent and intact, free from redness, edema or discomfort.  No evidence of extravasations.  Access discontinued per protocol.    Discharge Plan:   Patient and/or family verbalized understanding of discharge instructions and all questions answered.  Patient discharged in stable condition accompanied by: daughter.  Departure Mode: Wheelchair.    Chelsea Garcia RN

## 2018-09-24 ENCOUNTER — DOCUMENTATION ONLY (OUTPATIENT)
Dept: OTHER | Facility: CLINIC | Age: 73
End: 2018-09-24

## 2018-09-24 PROBLEM — Z71.89 ADVANCED DIRECTIVES, COUNSELING/DISCUSSION: Chronic | Status: RESOLVED | Noted: 2017-10-11 | Resolved: 2018-09-24

## 2018-10-04 ENCOUNTER — HOSPITAL ENCOUNTER (OUTPATIENT)
Facility: CLINIC | Age: 73
Setting detail: SPECIMEN
Discharge: HOME | End: 2018-10-04
Attending: NURSE PRACTITIONER | Admitting: NURSE PRACTITIONER
Payer: COMMERCIAL

## 2018-10-04 DIAGNOSIS — Z08 ENCOUNTER FOR FOLLOW-UP SURVEILLANCE OF OVARIAN CANCER: ICD-10-CM

## 2018-10-04 DIAGNOSIS — Z85.43 ENCOUNTER FOR FOLLOW-UP SURVEILLANCE OF OVARIAN CANCER: ICD-10-CM

## 2018-10-04 LAB — CANCER AG125 SERPL-ACNC: 7 U/ML (ref 0–30)

## 2018-10-04 PROCEDURE — 36591 DRAW BLOOD OFF VENOUS DEVICE: CPT

## 2018-10-04 PROCEDURE — 86304 IMMUNOASSAY TUMOR CA 125: CPT | Performed by: NURSE PRACTITIONER

## 2018-10-04 NOTE — PROGRESS NOTES
Infusion Nursing Note:  Tosin Nina presents today for port labs.    Patient seen by provider today: No   present during visit today: Not Applicable.    Note: N/A.    Intravenous Access:  Labs drawn without difficulty.  Implanted Port.    Treatment Conditions:  Not Applicable.      Post Infusion Assessment:  No evidence of extravasations.  Access discontinued per protocol.    Discharge Plan:   AVS to patient via MYCHART.  Patient will return 10/11/18 for next appointment.   Patient discharged in stable condition accompanied by: daughter.  Departure Mode: Wheelchair.    Shruthi Crockett RN

## 2018-10-11 ENCOUNTER — ONCOLOGY VISIT (OUTPATIENT)
Dept: ONCOLOGY | Facility: CLINIC | Age: 73
End: 2018-10-11
Attending: NURSE PRACTITIONER
Payer: COMMERCIAL

## 2018-10-11 VITALS
BODY MASS INDEX: 35.08 KG/M2 | WEIGHT: 174 LBS | HEIGHT: 59 IN | SYSTOLIC BLOOD PRESSURE: 143 MMHG | OXYGEN SATURATION: 98 % | DIASTOLIC BLOOD PRESSURE: 77 MMHG | HEART RATE: 71 BPM | RESPIRATION RATE: 16 BRPM | TEMPERATURE: 97.9 F

## 2018-10-11 DIAGNOSIS — C80.1 PARANEOPLASTIC NEUROLOGIC DISORDER (H): ICD-10-CM

## 2018-10-11 DIAGNOSIS — Z85.43 ENCOUNTER FOR FOLLOW-UP SURVEILLANCE OF OVARIAN CANCER: Primary | ICD-10-CM

## 2018-10-11 DIAGNOSIS — S39.012A STRAIN OF LUMBAR REGION, INITIAL ENCOUNTER: ICD-10-CM

## 2018-10-11 DIAGNOSIS — Z08 ENCOUNTER FOR FOLLOW-UP SURVEILLANCE OF OVARIAN CANCER: Primary | ICD-10-CM

## 2018-10-11 DIAGNOSIS — G98.8 PARANEOPLASTIC NEUROLOGIC DISORDER (H): ICD-10-CM

## 2018-10-11 PROCEDURE — G0463 HOSPITAL OUTPT CLINIC VISIT: HCPCS

## 2018-10-11 PROCEDURE — 99214 OFFICE O/P EST MOD 30 MIN: CPT | Performed by: NURSE PRACTITIONER

## 2018-10-11 RX ORDER — CYCLOBENZAPRINE HCL 5 MG
5 TABLET ORAL 3 TIMES DAILY PRN
Qty: 20 TABLET | Refills: 0 | Status: SHIPPED | OUTPATIENT
Start: 2018-10-11 | End: 2019-07-11

## 2018-10-11 ASSESSMENT — PAIN SCALES - GENERAL: PAINLEVEL: SEVERE PAIN (6)

## 2018-10-11 NOTE — LETTER
"    10/11/2018         RE: Tosin Nina  [address]  [facility] [address]        Dear Colleague,    Thank you for referring your patient, Tosin Nina, to the [facility]. Please see a copy of my visit note below.    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 She completed treatment with six cycles of carboplatin/paclitaxel on 17. She presents today for a three month surveillance visit.    HPI: Namita comes to the clinic accompanied by her daughter Cece. Reports that she has been feeling very well since her last visit, however, she strained her lower back yesterday while getting up from the bathroom and is quite uncomfortable today. States she occasionally strains her back, has taken Norco and/or Flexeril in the past with relief. Most uncomfortable in the right lumbar region. Took \"an old\" oxycodone today, also using Tylenol with relief. No saddle anesthesia, incontinence, or new weakness in her legs. Continues to have BLE weakness and difficulty with walking secondary to history of paraneoplastic syndrome. She will be moving to a one level independent senior living facility with her daughter Cece as her caregiver, would like an electric wheelchair if possible as this facility is much larger than her house. She and her  Christiano are still , but she feels he has not accepted her new baseline and needing a wheelchair and that he has not been a helpful caregiver. He will not be moving with her. Her daughter remains supportive of Namita. Reports depression is well controlled on escitalopram with no side effects. Continues on Xarelto for hx of PE, denies any bleeding or medication adherence difficulty. She is up to date on visits with her PCP, her mammogram, " and her colonoscopy. Denies unintended weight loss, fatigue,  changes in vision or hearing, shortness of breath, cough, chest pain, abdominal pain, dyspepsia, nausea, vomiting, constipation, diarrhea, bloating, dysuria, urinary frequency or urgency, hematuria, pelvic pain, vaginal bleeding, vaginal discharge, numbness or tingling, or swelling of the extremities.      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  She was discharged to a TCU where she is still residing with some but minimal improvement in her neurologic symptoms.  She still has quite a difficult time seeing especially with her right eye.  She also notes weakness mostly on her left side.  Both of these make it very difficult for her to walk and thus she is having to use a wheelchair for most of her mobility.  She tolerated her first cycle quite well with her biggest side effect being nausea which improved drastically with a change in anti-emetics.       4/11/17-4/16/17:  Admit to [facility] for worsening dizziness, found to have stage IVB ovarian cancer (inguinal lymphadenopathy) causing paraneoplastic syndrome     4/11/17:  CT C/A/P:  Thrombus identified within the right lower lobe are artery and right upper lobar arteries. The right pulmonary artery is enlarged, similar to prior exams. No CT evidence of right heart strain. No suspicious mediastinal or perihilar lymphadenopathy. Bovine arch anatomy. No suspicious pulmonary nodules, evidence of infarction, or infection. Abdomen and pelvis: There are soft tissue nodules identified along the liver capsule measuring up to 3 cm inferiorly. There is a large amount of omental caking. Enlarged iliac and retroperitoneal lymph nodes for example a 2.6 cm right external iliac lymph node or group of lymph nodes. Heterogeneous enhancement of the uterine fundus could be due to fibroids or a mass. The overall size of  the uterus does not appear significantly different than in 2014. The left ovary does appear slightly larger and lobular in appearance compared to prior exam. There is also a nodular area along the round ligament. It was not present on prior exam. There is an irregular lobulated mass involving the sigmoid colon. Abnormal, enlarged inguinal lymph nodes. Soft tissue implant in the posterior right retroperitoneum adjacent to the psoas was not present on prior exam. 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 Spondylolisthesis at L3-4. Small periumbilical hernia containing some of the abnormal omentum.     4/11/17:   268, CEA .6     4/12/17:  IR omental biopsy                           Pathology:  High grade serous carcinoma     4/14/17: Cycle #1 carboplatin AUC 6 and weekly Taxol 80mg/m2.  = 2648     5/5/17:  Cycle #2 carboplatin AUC 6 and weekly Taxol 80mg/m2.  = 370     5/26/17:  Cycle #3 carboplatin AUC 6 and weekly Taxol 80mg/m2.  = 63     6/16/17:  CT C/A/P:  Chest:  Resolution of previous right-sided pulmonary emboli since April. No mediastinal, hilar or axillary adenopathy. No pleural fluid.  Port-A-Cath right superior chest with tip in the SVC.  Short linear fibrosis or discoid atelectasis anterior medial right upper lobe. Lungs otherwise clear. Abdomen and Pelvis: Marked improvement in carcinomatosis and omental metastasis since 4/11/2017. In the anterior left pelvis there is a 1.2 cm nodule with adjacent linear opacity approximately 4 cm in length in an area of previous dense omental caking and metastatic disease. There is resolution of the previous anterior right-sided omental caking with subcentimeter trace of residual nodularity in this location. There are multiple new indeterminate nodular densities in the anterior abdominal wall subcutaneous fat as well as small collections of subcutaneous air,  suggesting that these are medication injection sites.  Previous subserosal implants along the inferior liver have resolved. No focal liver lesions. Normal-appearing pancreas, adrenal glands and kidneys. Tiny hypodense spleen lesion is too small to characterize, not previously seen. Spleen otherwise appears normal. No periaortic or pelvic adenopathy with resolution of previous adenopathy. Lobulated enlarged uterus redemonstrated consistent with multiple fibroids. No free fluid. No acute bowel abnormality. Resolution of mesenteric right lower quadrant nodule is compatible with metastasis. On coronal images the terminal ileum takes an unusual circular course posterior to the right colon, but there is no evidence for obstruction. No aggressive bone lesions.     6/16/17:   = 27     6/28/17:  Robotic total laparoscopic hysterectomy, bilateral salpingo-oophorectomy, lysis of adhesions, omentectomy, optimal tumor debulking to no residual disease                           Pathology:  Minimal serous carcinoma remaining in the left ovary     7/20/17:  Cycle #4 carboplatin AUC 6 and weekly Taxol 80mg/m2. D15 cancelled due to thrombocytopenia (plt 70)   = 18     8/10/17:  Cycle #5 carboplatin AUC 6 and weekly Taxol 80mg/m2. D8 cancelled neutropenia , D15 cancelled thromobocytopenia (plt 53)  = 12     8/31/17:  Cycle #6 carboplatin AUC 5 due to myelosuppression and weekly Taxol 80mg/m2. D15 delayed thrombocytopenia (plt 78)  = 10     12/20/17:  5    4/3/18:  <5    7/9/18:  6    10/4/18:  7    Past Medical History:   Diagnosis Date     Anemia      Cervical spinal stenosis      Depression      GERD (gastroesophageal reflux disease)      Hypertension      Hypokalemia      Hypothyroid      Lumbar spinal stenosis      Obesity      Ovarian cancer (H)      Paraneoplastic neuromyopathy and neuropathy (H)      PE (pulmonary thromboembolism) (H)      Thrombocytopenia (H)        Past  Surgical History:   Procedure Laterality Date     AS TOTAL KNEE ARTHROPLASTY Left      BACK SURGERY  2009     CHOLECYSTECTOMY  01/01/2016     COLONOSCOPY N/A 4/20/2018    Procedure: COLONOSCOPY;  Colonoscopy ;  Surgeon: Nila Munson MD;  Location: [facility] OR     CYSTOSCOPY N/A 6/28/2017    Procedure: CYSTOSCOPY;;  Surgeon: Nessa Christina MD;  Location: [facility] OR     DAVINCI HYSTERECTOMY TOTAL, BILATERAL SALPINGO-OOPHORECTMY, NODE DISSECTION, TUMOR STAGING, COMBINED N/A 6/28/2017    Procedure: COMBINED DAVINCI HYSTERECTOMY TOTAL, SALPINGO-OOPHORECTOMY, NODE DISSECTION, TUMOR STAGING;  Robotic total laparoscopic hysterectomy, bilateral salpingo-oophorectomy, omentectomy, lysis of adhesions, tumor debulking, cystoscopy;  Surgeon: Nessa Christina MD;  Location: [facility] OR     OPEN REDUCTION INTERNAL FIXATION WRIST Right 2009     THYROIDECTOMY         Social History     Social History     Marital status:      Spouse name: Christiano Nina     Number of children: 1     Years of education: N/A     Occupational History     Current: Retired      Former:       Social History Main Topics     Smoking status: Never Smoker     Smokeless tobacco: Never Used     Alcohol use Yes      Comment: occasionally     Drug use: No     Sexual activity: Not on file     Other Topics Concern     Not on file     Social History Narrative       Family History   Problem Relation Age of Onset     Breast Cancer Mother 69     Heart Failure Mother      Breast Cancer Maternal Grandmother      this is maternal great grandmother     Breast Cancer Maternal Aunt 89     Myocardial Infarction Father      Unknown/Adopted Brother      Unknown/Adopted Maternal Grandfather      Stomach Cancer Paternal Grandmother      Stomach mass-not sure if cancer     Lung Cancer Paternal Grandfather      mustard gas in WW[place]       Current Outpatient Prescriptions   Medication     enoxaparin (LOVENOX) 80 MG/0.8ML injection     escitalopram  "(LEXAPRO) 10 MG tablet     hydrochlorothiazide (HYDRODIURIL) 25 MG tablet     levothyroxine (SYNTHROID) 125 MCG tablet     losartan (COZAAR) 100 MG tablet     magnesium chloride 535 (64 MG) MG TBCR CR tablet     Multiple Vitamins-Minerals (MULTI FOR HER PO)     Potassium Chloride ER 20 MEQ TBCR     rivaroxaban ANTICOAGULANT (XARELTO) 20 MG TABS tablet     Vitamin D, Cholecalciferol, 1000 UNITS TABS     No current facility-administered medications for this visit.           Allergies   Allergen Reactions     Amoxicillin Hives     Clarithromycin Other (See Comments)     \"I felt dopey, sleepy and like a druggie\"     Lisinopril Cough     Penicillins Rash       Review of Systems  General: + weakness. Denies fatigue, changes in weight, appetite changes, night sweats, hot flashes, fever, chills, or difficulty sleeping  HEENT: Denies headaches, hair loss, masses, head injury, tinnitus, hearing loss, epistaxis, congestion, problems with teeth or gums, dysphonia, or dysphagia  Pulmonary: Denies cough, sputum, hemoptysis, shortness of breath, dyspnea on exertion, wheezing, or allergies  Cardiovascular: Denies chest pain, fainting, palpitations, murmurs, activity intolerance, swelling in legs, or high blood pressure  Gastrointestinal: Denies nausea, vomiting, constipation, diarrhea, abdominal pain, bloating, heartburn, melena, hematochezia, or jaundice  Genitourinary: Denies dysuria, urinary urgency or frequency, hematuria, cloudy or malodorous urine, incontinence, repeat urinary tract infections, flank pain, pelvic pain, vaginal bleeding, vaginal discharge, or vaginal dryness  Integumentary: Denies rashes, sores, changing moles, or scarring  Hematologic: Denies swollen lymph nodes, masses, easy bruising, or easy bleeding  Musculoskeletal: + weakness, back pain. Denies falls, myalgias, arthralgias, stiffness, or muscle cramps  Neurologic:+ difficulty with walking. Denies numbness or tingling, changes in memory, dizziness, " "seizures, or tremors  Psychiatric: Denies anxiety, depression, mood changes, suicidal thoughts, or difficulty concentrating  Endocrine: Denies polydipsia, polyuria, temperature intolerance, or history of thyroid disease      OBJECTIVE:    Physical Exam:    /77  Pulse 71  Temp 97.9  F (36.6  C) (Tympanic)  Resp 16  Ht 1.499 m (4' 11\")  Wt 78.9 kg (174 lb)  SpO2 98%  BMI 35.14 kg/m2    CONSTITUTIONAL: Alert non-toxic appearing female in no acute distress  HEAD: Normocephalic, atraumatic  EYES: PERRLA; no scleral icterus  ENT: Oropharynx pink without lesions  NECK: Neck supple without lymphadenopathy  RESPIRATORY: Lungs clear to auscultation, no increased work of breathing noted  CV: Regular rate and rhythm, S1S2, no clicks, murmurs, rubs, or gallops; bilateral lower extremities with trace edema equal bilaterally, dorsalis pedis pulses 2+ bilaterally  GASTROINTESTINAL: Normoactive bowel sounds x4 quadrants, abdomen soft, non-distended, and non-tender to palpation without masses or organomegaly  GENITOURINARY: External genitalia and urethral meatus pink without lesions, masses, or excoriation. Vagina pale without masses or lesions. Vaginal cuff without nodularity, masses, or lesions. Cervix surgically absent. Bimanual exam reveals no masses or fullness. Rectovaginal exam confirms these findings.  LYMPHATIC: Cervical, supraclavicular, and inguinal nodes without lymphadenopathy  MUSCULOSKELETAL: Moves all extremities, no obvious muscle wasting; tenderness to R lumbar region, no spinal tenderness, no visible spinal abnormality  NEUROLOGIC: Able to stand but requires assistance by holding onto a chair, intention tremor to bilateral lower extremities noted  SKIN: Appropriate color for race, warm and dry, no rashes or lesions to unclothed skin  PSYCHIATRIC: Pleasant and interactive, affect bright, makes appropriate eye contact, thought process linear    Data:   7    Assessment/Plan:  1) Surveillance for " stage IVB ovarian cancer: No signs of recurrence. Continue surveillance every three months x2 years (through 8/2019) followed by every six months x3 years (through 8/2022) then annually thereafter with  and pelvic/rectal exam. Reviewed signs and symptoms  of recurrence including but not limited to bleeding from vagina, bladder, or rectum, bloating, early satiety, swelling in the lower extremities, abdominal or lower back pain, changes in bowel or bladder patterns, shortness of breath, increased fatigue, unexplained weight loss, and night sweats. Reviewed signs and symptoms for when she should contact the clinic or seek additional care. Patient to contact the clinic with any questions or concerns in the interim. Treatment summary printed off and reviewed with patient and daughter, given an extra copy for her PCP.  2) History of paraneoplastic syndrome: Continues to affect her balance and gait, does home exercises prescribed by PT. RX for electric wheelchair while living at her new facility.   3) Hx of PE: Continues on Xarelto as prescribed by Dr. Barahona- to be on this indefinitely. No bleeding.  4) Depression: Reports this is well controlled on escitalopram, denies need for intervention.  5) Back pain: Suspect R lumbar strain, no signs or symptoms of cauda equina syndrome. Encouraged her to remain physically active, use moist heat, schedule Tylenol (no more than 3g daily). Cyclobenzaprine 5mg PO TID PRN ordered- strict instruction not to take this with sedating medications, alcohol, or drive/operate machinery/make big life decisions while taking this medication. Reviewed red flags with back pain and when to seek further care. If her symptoms fail to improve over the next few days to follow up with PCP.  6) Genetic testing: Negative panel testing   7) Labs:   8) Health maintenance issues discussed today include to follow up with PCP for co-morbid conditions and non-gynecologic issues.   9) Patient  verbalized understanding of and agreement with plan.    A total of 25 minutes of face to face time spent with patient, over 50% of which was spent in counseling and coordination of care.    SALVADOR Solis, FNP-C  Division of Gynecologic Oncology  [facility]  Pager: [phone]              Again, thank you for allowing me to participate in the care of your patient.        Sincerely,        SALVADOR Solis CNP

## 2018-10-11 NOTE — MR AVS SNAPSHOT
After Visit Summary   10/11/2018    Tosin Nina    MRN: [id number]           Patient Information     Date Of Birth          1945        Visit Information        Provider Department      10/11/2018 10:40 AM Adelina Bird APRN CNP [facility]        Today's Diagnoses     Strain of lumbar region, initial encounter    -  1    Paraneoplastic neurologic disorder (H)          Care Instructions        See Adelina in three months for surveillance    Port flushes monthly,  in three months          Follow-ups after your visit        Your next 10 appointments already scheduled     Nov 05, 2018 12:45 PM CST   LAB with RH LAB DRAW 1   [facility] ([facility])    [facility]  [address]  [place] [address]   [phone]           Please do not eat 10-12 hours before your appointment if you are coming in fasting for labs on lipids, cholesterol, or glucose (sugar). This does not apply to pregnant women. Water, hot tea and black coffee (with nothing added) are okay. Do not drink other fluids, diet soda or chew gum.            Dec 03, 2018  1:00 PM CST   LAB with RH LAB DRAW 1   [facility] ([facility])    [facility]  [address]  [place] [address]   [phone]           Please do not eat 10-12 hours before your appointment if you are coming in fasting for labs on lipids, cholesterol, or glucose (sugar). This does not apply to pregnant women. Water, hot tea and black coffee (with nothing added) are okay. Do not drink other fluids, diet soda or chew gum.            Jan 14, 2019  2:00 PM CST   LAB with RH LAB DRAW 1   [facility] ([facility])    [facility]  [address]  [place] [address]   [phone]            Please do not eat 10-12 hours before your appointment if you are coming in fasting for labs on lipids, cholesterol, or glucose (sugar). This does not apply to pregnant women. Water, hot tea and black coffee (with nothing added) are okay. Do not drink other fluids, diet soda or chew gum.            Jan 17, 2019  2:00 PM CST   Return Visit with SALVADOR Solis CNP   [facility] ([facility])    [facility]  [address]  [facility] [address] [phone]              Who to contact     If you have questions or need follow up information about today's clinic visit or your schedule please contact [facility] directly at [phone].  Normal or non-critical lab and imaging results will be communicated to you by MyChart, letter or phone within 4 business days after the clinic has received the results. If you do not hear from us within 7 days, please contact the clinic through [employer] or phone. If you have a critical or abnormal lab result, we will notify you by phone as soon as possible.  Submit refill requests through [employer] or call your pharmacy and they will forward the refill request to us. Please allow 3 business days for your refill to be completed.          Additional Information About Your Visit        [employer] Information     [employer] gives you secure access to your electronic health record. If you see a primary care provider, you can also send messages to your care team and make appointments. If you have questions, please call your primary care clinic.  If you do not have a primary care provider, please call [phone] and they will assist you.        Care EveryWhere ID     This is your Care EveryWhere ID. This could be used by other organizations to access your [facility] medical records  [id number]        Your Vitals Were     Pulse Temperature Respirations Height Pulse Oximetry BMI (Body Mass  "Index)    71 97.9  F (36.6  C) (Tympanic) 16 1.499 m (4' 11\") 98% 35.14 kg/m2       Blood Pressure from Last 3 Encounters:   10/11/18 143/77   07/12/18 143/78   04/20/18 125/68    Weight from Last 3 Encounters:   10/11/18 78.9 kg (174 lb)   07/12/18 78.1 kg (172 lb 3.2 oz)   04/20/18 76.2 kg (168 lb)              Today, you had the following     No orders found for display         Today's Medication Changes          These changes are accurate as of 10/11/18 11:48 AM.  If you have any questions, ask your nurse or doctor.               Start taking these medicines.        Dose/Directions    cyclobenzaprine 5 MG tablet   Commonly known as:  FLEXERIL   Used for:  Strain of lumbar region, initial encounter        Dose:  5 mg   Take 1 tablet (5 mg) by mouth 3 times daily as needed for muscle spasms   Quantity:  20 tablet   Refills:  0       order for DME   Used for:  Paraneoplastic neurologic disorder (H)        Equipment being ordered: electric wheelchair   Quantity:  1 Units   Refills:  0         These medicines have changed or have updated prescriptions.        Dose/Directions    Vitamin D (Cholecalciferol) 1000 units Tabs   This may have changed:  additional instructions   Used for:  Paresthesias        Dose:  2000 Units   Take 2,000 Units by mouth daily   Quantity:  60 tablet   Refills:  0            Where to get your medicines      These medications were sent to [facility] [address] [place] [address]  AT [address] 42 [facility] [address]     Phone:  [phone]     cyclobenzaprine 5 MG tablet         Some of these will need a paper prescription and others can be bought over the counter.  Ask your nurse if you have questions.     Bring a paper prescription for each of these medications     order for DME                Primary Care Provider Office Phone # Fax #    Esther Back [phone]       [facility] [address] [place] [address]" 102  [facility] [address]        Equal Access to Services     LESLEY CUATE : Hadii zita ku hadkayode Sofely, waaxda luqadaha, qaybta kaalmamarian damonbharathetal fonsecayumikodung long. So [facility] [phone].    ATENCIÓN: Si habla español, tiene a alarcon disposición servicios gratuitos de asistencia lingüística. Radhaame al [phone].    We comply with applicable federal civil rights laws and [place] laws. We do not discriminate on the basis of race, color, national origin, age, disability, sex, sexual orientation, or gender identity.            Thank you!     Thank you for choosing [facility]  for your care. Our goal is always to provide you with excellent care. Hearing back from our patients is one way we can continue to improve our services. Please take a few minutes to complete the written survey that you may receive in the mail after your visit with us. Thank you!             Your Updated Medication List - Protect others around you: Learn how to safely use, store and throw away your medicines at www.disposemymeds.org.          This list is accurate as of 10/11/18 11:48 AM.  Always use your most recent med list.                   Brand Name Dispense Instructions for use Diagnosis    cyclobenzaprine 5 MG tablet    FLEXERIL    20 tablet    Take 1 tablet (5 mg) by mouth 3 times daily as needed for muscle spasms    Strain of lumbar region, initial encounter       enoxaparin 80 MG/0.8ML injection    LOVENOX     Inject 80 mg Subcutaneous        escitalopram 10 MG tablet    LEXAPRO    30 tablet    Take 1 tablet (10 mg) by mouth daily    Reactive depression       hydrochlorothiazide 25 MG tablet    HYDRODIURIL     Take 25 mg by mouth daily    Ovarian cancer, unspecified laterality (H)       levothyroxine 125 MCG tablet    SYNTHROID    30 tablet    Take 1 tablet (125 mcg) by mouth daily    Other pulmonary embolism without acute cor pulmonale, unspecified chronicity (H)       losartan 100 MG  tablet    COZAAR     Take 100 mg by mouth        magnesium chloride 535 (64 Mg) MG Tbcr CR tablet     30 tablet    Take 1 tablet (535 mg) by mouth daily    Ovarian cancer, unspecified laterality (H)       MULTI FOR HER PO           order for DME     1 Units    Equipment being ordered: electric wheelchair    Paraneoplastic neurologic disorder (H)       Potassium Chloride ER 20 MEQ Tbcr     60 tablet    Take 1 tablet (20 mEq) by mouth 2 times daily    Hypokalemia       rivaroxaban ANTICOAGULANT 20 MG Tabs tablet    XARELTO    30 tablet    Take 1 tablet (20 mg) by mouth daily (with dinner)    Other acute pulmonary embolism without acute cor pulmonale (H)       Vitamin D (Cholecalciferol) 1000 units Tabs     60 tablet    Take 2,000 Units by mouth daily    Paresthesias

## 2018-10-11 NOTE — PROGRESS NOTES
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 She completed treatment with six cycles of carboplatin/paclitaxel on 17. She presents today for a three month surveillance visit.    HPI: Namita comes to the clinic accompanied by her daughter Cece. Reports that she has been feeling very well since her last visit, however, she strained her lower back yesterday while getting up from the bathroom and is quite uncomfortable today. States she occasionally strains her back, has taken Norco and/or Flexeril in the past with relief. Most uncomfortable in the right lumbar region. Took \"an old\" oxycodone today, also using Tylenol with relief. No saddle anesthesia, incontinence, or new weakness in her legs. Continues to have BLE weakness and difficulty with walking secondary to history of paraneoplastic syndrome. She will be moving to a one level independent senior living facility with her daughter Cece as her caregiver, would like an electric wheelchair if possible as this facility is much larger than her house. She and her  Christiano are still , but she feels he has not accepted her new baseline and needing a wheelchair and that he has not been a helpful caregiver. He will not be moving with her. Her daughter remains supportive of Namita. Reports depression is well controlled on escitalopram with no side effects. Continues on Xarelto for hx of PE, denies any bleeding or medication adherence difficulty. She is up to date on visits with her PCP, her mammogram, and her colonoscopy. Denies unintended weight loss, fatigue,  changes in vision or hearing, shortness of breath, cough, chest pain, abdominal pain, dyspepsia, nausea, vomiting, constipation, diarrhea, bloating, dysuria, urinary frequency or urgency, hematuria, " pelvic pain, vaginal bleeding, vaginal discharge, numbness or tingling, or swelling of the extremities.      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  She was discharged to a TCU where she is still residing with some but minimal improvement in her neurologic symptoms.  She still has quite a difficult time seeing especially with her right eye.  She also notes weakness mostly on her left side.  Both of these make it very difficult for her to walk and thus she is having to use a wheelchair for most of her mobility.  She tolerated her first cycle quite well with her biggest side effect being nausea which improved drastically with a change in anti-emetics.       4/11/17-4/16/17:  Admit to [facility] for worsening dizziness, found to have stage IVB ovarian cancer (inguinal lymphadenopathy) causing paraneoplastic syndrome     4/11/17:  CT C/A/P:  Thrombus identified within the right lower lobe are artery and right upper lobar arteries. The right pulmonary artery is enlarged, similar to prior exams. No CT evidence of right heart strain. No suspicious mediastinal or perihilar lymphadenopathy. Bovine arch anatomy. No suspicious pulmonary nodules, evidence of infarction, or infection. Abdomen and pelvis: There are soft tissue nodules identified along the liver capsule measuring up to 3 cm inferiorly. There is a large amount of omental caking. Enlarged iliac and retroperitoneal lymph nodes for example a 2.6 cm right external iliac lymph node or group of lymph nodes. Heterogeneous enhancement of the uterine fundus could be due to fibroids or a mass. The overall size of the uterus does not appear significantly different than in 2014. The left ovary does appear slightly larger and lobular in appearance compared to prior exam. There is also a nodular area along the round ligament. It was not present on prior exam. There is an  irregular lobulated mass involving the sigmoid colon. Abnormal, enlarged inguinal lymph nodes. Soft tissue implant in the posterior right retroperitoneum adjacent to the psoas was not present on prior exam. 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 Spondylolisthesis at L3-4. Small periumbilical hernia containing some of the abnormal omentum.     4/11/17:   268, CEA .6     4/12/17:  IR omental biopsy                           Pathology:  High grade serous carcinoma     4/14/17: Cycle #1 carboplatin AUC 6 and weekly Taxol 80mg/m2.  = 2648     5/5/17:  Cycle #2 carboplatin AUC 6 and weekly Taxol 80mg/m2.  = 370     5/26/17:  Cycle #3 carboplatin AUC 6 and weekly Taxol 80mg/m2.  = 63     6/16/17:  CT C/A/P:  Chest:  Resolution of previous right-sided pulmonary emboli since April. No mediastinal, hilar or axillary adenopathy. No pleural fluid.  Port-A-Cath right superior chest with tip in the SVC.  Short linear fibrosis or discoid atelectasis anterior medial right upper lobe. Lungs otherwise clear. Abdomen and Pelvis: Marked improvement in carcinomatosis and omental metastasis since 4/11/2017. In the anterior left pelvis there is a 1.2 cm nodule with adjacent linear opacity approximately 4 cm in length in an area of previous dense omental caking and metastatic disease. There is resolution of the previous anterior right-sided omental caking with subcentimeter trace of residual nodularity in this location. There are multiple new indeterminate nodular densities in the anterior abdominal wall subcutaneous fat as well as small collections of subcutaneous air, suggesting that these are medication injection sites.  Previous subserosal implants along the inferior liver have resolved. No focal liver lesions. Normal-appearing pancreas, adrenal glands and kidneys. Tiny hypodense spleen lesion is too small to  characterize, not previously seen. Spleen otherwise appears normal. No periaortic or pelvic adenopathy with resolution of previous adenopathy. Lobulated enlarged uterus redemonstrated consistent with multiple fibroids. No free fluid. No acute bowel abnormality. Resolution of mesenteric right lower quadrant nodule is compatible with metastasis. On coronal images the terminal ileum takes an unusual circular course posterior to the right colon, but there is no evidence for obstruction. No aggressive bone lesions.     6/16/17:   = 27     6/28/17:  Robotic total laparoscopic hysterectomy, bilateral salpingo-oophorectomy, lysis of adhesions, omentectomy, optimal tumor debulking to no residual disease                           Pathology:  Minimal serous carcinoma remaining in the left ovary     7/20/17:  Cycle #4 carboplatin AUC 6 and weekly Taxol 80mg/m2. D15 cancelled due to thrombocytopenia (plt 70)   = 18     8/10/17:  Cycle #5 carboplatin AUC 6 and weekly Taxol 80mg/m2. D8 cancelled neutropenia , D15 cancelled thromobocytopenia (plt 53)  = 12     8/31/17:  Cycle #6 carboplatin AUC 5 due to myelosuppression and weekly Taxol 80mg/m2. D15 delayed thrombocytopenia (plt 78)  = 10     12/20/17:  5    4/3/18:  <5    7/9/18:  6    10/4/18:  7    Past Medical History:   Diagnosis Date     Anemia      Cervical spinal stenosis      Depression      GERD (gastroesophageal reflux disease)      Hypertension      Hypokalemia      Hypothyroid      Lumbar spinal stenosis      Obesity      Ovarian cancer (H)      Paraneoplastic neuromyopathy and neuropathy (H)      PE (pulmonary thromboembolism) (H)      Thrombocytopenia (H)        Past Surgical History:   Procedure Laterality Date     AS TOTAL KNEE ARTHROPLASTY Left      BACK SURGERY  2009     CHOLECYSTECTOMY  01/01/2016     COLONOSCOPY N/A 4/20/2018    Procedure: COLONOSCOPY;  Colonoscopy ;  Surgeon: Nila Munson MD;   Location: [facility] OR     CYSTOSCOPY N/A 6/28/2017    Procedure: CYSTOSCOPY;;  Surgeon: Nessa Christina MD;  Location: [facility] OR     DAVINCI HYSTERECTOMY TOTAL, BILATERAL SALPINGO-OOPHORECTMY, NODE DISSECTION, TUMOR STAGING, COMBINED N/A 6/28/2017    Procedure: COMBINED DAVINCI HYSTERECTOMY TOTAL, SALPINGO-OOPHORECTOMY, NODE DISSECTION, TUMOR STAGING;  Robotic total laparoscopic hysterectomy, bilateral salpingo-oophorectomy, omentectomy, lysis of adhesions, tumor debulking, cystoscopy;  Surgeon: Nessa Christina MD;  Location: [facility] OR     OPEN REDUCTION INTERNAL FIXATION WRIST Right 2009     THYROIDECTOMY         Social History     Social History     Marital status:      Spouse name: Christiano Nina     Number of children: 1     Years of education: N/A     Occupational History     Current: Retired      Former:       Social History Main Topics     Smoking status: Never Smoker     Smokeless tobacco: Never Used     Alcohol use Yes      Comment: occasionally     Drug use: No     Sexual activity: Not on file     Other Topics Concern     Not on file     Social History Narrative       Family History   Problem Relation Age of Onset     Breast Cancer Mother 69     Heart Failure Mother      Breast Cancer Maternal Grandmother      this is maternal great grandmother     Breast Cancer Maternal Aunt 89     Myocardial Infarction Father      Unknown/Adopted Brother      Unknown/Adopted Maternal Grandfather      Stomach Cancer Paternal Grandmother      Stomach mass-not sure if cancer     Lung Cancer Paternal Grandfather      must[employer] gas in WW[place]       Current Outpatient Prescriptions   Medication     enoxaparin (LOVENOX) 80 MG/0.8ML injection     escitalopram (LEXAPRO) 10 MG tablet     hydrochlorothiazide (HYDRODIURIL) 25 MG tablet     levothyroxine (SYNTHROID) 125 MCG tablet     losartan (COZAAR) 100 MG tablet     magnesium chloride 535 (64 MG) MG TBCR CR tablet     Multiple Vitamins-Minerals (MULTI FOR HER  "PO)     Potassium Chloride ER 20 MEQ TBCR     rivaroxaban ANTICOAGULANT (XARELTO) 20 MG TABS tablet     Vitamin D, Cholecalciferol, 1000 UNITS TABS     No current facility-administered medications for this visit.           Allergies   Allergen Reactions     Amoxicillin Hives     Clarithromycin Other (See Comments)     \"I felt dopey, sleepy and like a druggie\"     Lisinopril Cough     Penicillins Rash       Review of Systems  General: + weakness. Denies fatigue, changes in weight, appetite changes, night sweats, hot flashes, fever, chills, or difficulty sleeping  HEENT: Denies headaches, hair loss, masses, head injury, tinnitus, hearing loss, epistaxis, congestion, problems with teeth or gums, dysphonia, or dysphagia  Pulmonary: Denies cough, sputum, hemoptysis, shortness of breath, dyspnea on exertion, wheezing, or allergies  Cardiovascular: Denies chest pain, fainting, palpitations, murmurs, activity intolerance, swelling in legs, or high blood pressure  Gastrointestinal: Denies nausea, vomiting, constipation, diarrhea, abdominal pain, bloating, heartburn, melena, hematochezia, or jaundice  Genitourinary: Denies dysuria, urinary urgency or frequency, hematuria, cloudy or malodorous urine, incontinence, repeat urinary tract infections, flank pain, pelvic pain, vaginal bleeding, vaginal discharge, or vaginal dryness  Integumentary: Denies rashes, sores, changing moles, or scarring  Hematologic: Denies swollen lymph nodes, masses, easy bruising, or easy bleeding  Musculoskeletal: + weakness, back pain. Denies falls, myalgias, arthralgias, stiffness, or muscle cramps  Neurologic:+ difficulty with walking. Denies numbness or tingling, changes in memory, dizziness, seizures, or tremors  Psychiatric: Denies anxiety, depression, mood changes, suicidal thoughts, or difficulty concentrating  Endocrine: Denies polydipsia, polyuria, temperature intolerance, or history of thyroid disease      OBJECTIVE:    Physical Exam:    BP " "143/77  Pulse 71  Temp 97.9  F (36.6  C) (Tympanic)  Resp 16  Ht 1.499 m (4' 11\")  Wt 78.9 kg (174 lb)  SpO2 98%  BMI 35.14 kg/m2    CONSTITUTIONAL: Alert non-toxic appearing female in no acute distress  HEAD: Normocephalic, atraumatic  EYES: PERRLA; no scleral icterus  ENT: Oropharynx pink without lesions  NECK: Neck supple without lymphadenopathy  RESPIRATORY: Lungs clear to auscultation, no increased work of breathing noted  CV: Regular rate and rhythm, S1S2, no clicks, murmurs, rubs, or gallops; bilateral lower extremities with trace edema equal bilaterally, dorsalis pedis pulses 2+ bilaterally  GASTROINTESTINAL: Normoactive bowel sounds x4 quadrants, abdomen soft, non-distended, and non-tender to palpation without masses or organomegaly  GENITOURINARY: External genitalia and urethral meatus pink without lesions, masses, or excoriation. Vagina pale without masses or lesions. Vaginal cuff without nodularity, masses, or lesions. Cervix surgically absent. Bimanual exam reveals no masses or fullness. Rectovaginal exam confirms these findings.  LYMPHATIC: Cervical, supraclavicular, and inguinal nodes without lymphadenopathy  MUSCULOSKELETAL: Moves all extremities, no obvious muscle wasting; tenderness to R lumbar region, no spinal tenderness, no visible spinal abnormality  NEUROLOGIC: Able to stand but requires assistance by holding onto a chair, intention tremor to bilateral lower extremities noted  SKIN: Appropriate color for race, warm and dry, no rashes or lesions to unclothed skin  PSYCHIATRIC: Pleasant and interactive, affect bright, makes appropriate eye contact, thought process linear    Data:   7    Assessment/Plan:  1) Surveillance for stage IVB ovarian cancer: No signs of recurrence. Continue surveillance every three months x2 years (through 8/2019) followed by every six months x3 years (through 8/2022) then annually thereafter with  and pelvic/rectal exam. Reviewed signs and symptoms  " of recurrence including but not limited to bleeding from vagina, bladder, or rectum, bloating, early satiety, swelling in the lower extremities, abdominal or lower back pain, changes in bowel or bladder patterns, shortness of breath, increased fatigue, unexplained weight loss, and night sweats. Reviewed signs and symptoms for when she should contact the clinic or seek additional care. Patient to contact the clinic with any questions or concerns in the interim. Treatment summary printed off and reviewed with patient and daughter, given an extra copy for her PCP.  2) History of paraneoplastic syndrome: Continues to affect her balance and gait, does home exercises prescribed by PT. RX for electric wheelchair while living at her new facility.   3) Hx of PE: Continues on Xarelto as prescribed by Dr. Barahona- to be on this indefinitely. No bleeding.  4) Depression: Reports this is well controlled on escitalopram, denies need for intervention.  5) Back pain: Suspect R lumbar strain, no signs or symptoms of cauda equina syndrome. Encouraged her to remain physically active, use moist heat, schedule Tylenol (no more than 3g daily). Cyclobenzaprine 5mg PO TID PRN ordered- strict instruction not to take this with sedating medications, alcohol, or drive/operate machinery/make big life decisions while taking this medication. Reviewed red flags with back pain and when to seek further care. If her symptoms fail to improve over the next few days to follow up with PCP.  6) Genetic testing: Negative panel testing   7) Labs:   8) Health maintenance issues discussed today include to follow up with PCP for co-morbid conditions and non-gynecologic issues.   9) Patient verbalized understanding of and agreement with plan.    A total of 25 minutes of face to face time spent with patient, over 50% of which was spent in counseling and coordination of care.    SALVADOR Solis, FNP-C  Division of Gynecologic Oncology  [facility]  Pager:  [phone]

## 2018-10-11 NOTE — NURSING NOTE
"Oncology Rooming Note    October 11, 2018 10:56 AM   Tosin Nina is a 72 year old female who presents for:    No chief complaint on file.    Initial Vitals: /77  Pulse 71  Temp 97.9  F (36.6  C) (Tympanic)  Resp 16  Ht 1.499 m (4' 11\")  Wt 78.9 kg (174 lb)  SpO2 98%  BMI 35.14 kg/m2 Estimated body mass index is 35.14 kg/(m^2) as calculated from the following:    Height as of this encounter: 1.499 m (4' 11\").    Weight as of this encounter: 78.9 kg (174 lb). Body surface area is 1.81 meters squared.  Severe Pain (6) Comment: R LBP   No LMP recorded. Patient is postmenopausal.  Allergies reviewed: Yes  Medications reviewed: Yes    Medications: Medication refills not needed today.  Pharmacy name entered into EPIC:    [facility] [address] [place] [address] [address] [address] [facility]    Clinical concerns: f/u  -back pain     8 minutes for nursing intake (face to face time)     Danyelle Orellana CMA              DISCHARGE PLAN:  Next appointments: See patient instruction section  Departure Mode: Ambulatory  Accompanied by: daughter  0 minutes for nursing discharge (face to face time)   Danyelle Orellana CMA      "

## 2018-10-12 ENCOUNTER — TELEPHONE (OUTPATIENT)
Dept: ONCOLOGY | Facility: CLINIC | Age: 73
End: 2018-10-12

## 2018-10-12 NOTE — TELEPHONE ENCOUNTER
Prior Authorization Approval    Authorization Effective Date: 10/10/2018  Authorization Expiration Date: 10/10/2019  Medication: Ibrance 125mg  Approved Dose/Quantity:  #21 / per 28 days  Reference #: PA# [id number]   Insurance Company: [employer] Contact - Phone [phone] Fax [phone]  Expected CoPay:       CoPay Card Available:      Foundation Assistance Needed:    Which Pharmacy is filling the prescription (Not needed for infusion/clinic administered): [facility] [address]  Pharmacy Notified: Yes  Patient Notified: Yes

## 2018-10-24 NOTE — PROGRESS NOTES
"Outpatient Physical Therapy Discharge Note     Patient: Tosin Nina  : 1945    Beginning/End Dates of Reporting Period:  2017 to 2018    Referring Provider: James Lopez MD    Therapy Diagnosis: impaired balance and coordination, generalized weakness, deconditioning impacting functional mobility     Client Self Report: Pt reported feeling less \"bouncy\"  at home for the past wk.    Objective Measurements:    Objective Measure: TUG  Details: time- 1:20.59    Objective Measure: 25 ft walk  Details: 56.12 sec         Goals:  Goal Identifier 1   Goal Description Pt will complete at least 4 sit to stands in 30 seconds demonstrating improved functional strength to facilitate increased independence with transfers.    Target Date 18   Date Met   not met   Progress:     Goal Identifier New goal 2   Goal Description Namita will complete the TUG in less than 45 sec with her front w. walker to demonstrate improvements in gait and balance.   Target Date 18   Date Met   not fully met   Progress: This goal not met however Namita improved significantly on the TUG, improving from 2 min and 45.6 sec to 1 min and 20.6 sec since her initial eval.        Goal Identifier New goal 3   Goal Description Namita will be able to ambulate 25 feet with her walker in 30 seconds or less to demonstrate continued improvements in gait safety and efficiency.     Target Date 18   Date Met   not fully met   Progress: Namita  improved significantly from her initial eval, improving from 1 min and 39.5 sec to 56.12 sec. Using her 4WW.     Goal Identifier 4   Goal Description Pt and caregiver will demonstrate (I) with strengthening and gait home program to facilitate increased independence with mobility.    Target Date 18   Date Met   not fully met   Progress:  Unable to complete home program education due to pt not returning to PT.      Progress Toward Goals:   Progress this reporting period: Namita " demonstrated improvements in body awareness, motor planning, patterns and control which has allowed her to make significant improvements in her TUG and 25 foot walk tests.  She improved on both of these by nearly 50% on each.  Focus in therapy has been on proper movement patterns and sequencing for adequate motor control and stability for all functional mobility in variable environments.    Progress limited due to inconsistency of treatment attendance after initial evaluation due to illness of patient herself and family members.    Plan:  Discharge from therapy.    Discharge:    Reason for Discharge: Patient had multiple cancellations and has failed to schedule further appointments.    Equipment Issued: none    Discharge Plan: discharge PT

## 2018-10-24 NOTE — ADDENDUM NOTE
Encounter addended by: Siobhan Bai, PT on: 10/24/2018  9:41 AM<BR>     Actions taken: Sign clinical note, Episode resolved

## 2018-11-05 PROCEDURE — 96523 IRRIG DRUG DELIVERY DEVICE: CPT

## 2018-11-05 PROCEDURE — 25000128 H RX IP 250 OP 636: Performed by: NURSE PRACTITIONER

## 2018-11-05 RX ORDER — HEPARIN SODIUM (PORCINE) LOCK FLUSH IV SOLN 100 UNIT/ML 100 UNIT/ML
5 SOLUTION INTRAVENOUS EVERY 8 HOURS
Status: DISCONTINUED | OUTPATIENT
Start: 2018-11-05 | End: 2018-11-05 | Stop reason: HOSPADM

## 2018-11-05 RX ADMIN — SODIUM CHLORIDE, PRESERVATIVE FREE 5 ML: 5 INJECTION INTRAVENOUS at 12:51

## 2018-11-05 NOTE — PROGRESS NOTES
Infusion Nursing Note:  Tosin Nina presents today for port flush.    Patient seen by provider today: No   present during visit today: Not Applicable.    Note: N/A.    Intravenous Access:  Implanted Port.    Treatment Conditions:  Not Applicable.      Post Infusion Assessment:  Site patent and intact, free from redness, edema or discomfort.  No evidence of extravasations.  Access discontinued per protocol.    Discharge Plan:   Scheduled for next port flush on 12/3/18.    Nila Gibson RN

## 2018-12-17 PROCEDURE — 96523 IRRIG DRUG DELIVERY DEVICE: CPT

## 2018-12-17 PROCEDURE — 25000128 H RX IP 250 OP 636: Performed by: OBSTETRICS & GYNECOLOGY

## 2018-12-17 RX ORDER — HEPARIN SODIUM (PORCINE) LOCK FLUSH IV SOLN 100 UNIT/ML 100 UNIT/ML
500 SOLUTION INTRAVENOUS ONCE
Status: COMPLETED | OUTPATIENT
Start: 2018-12-17 | End: 2018-12-17

## 2018-12-17 RX ADMIN — SODIUM CHLORIDE, PRESERVATIVE FREE 500 UNITS: 5 INJECTION INTRAVENOUS at 14:11

## 2018-12-17 NOTE — PROGRESS NOTES
Infusion Nursing Note:  Tosin Nina presents today for port flush.    Patient seen by provider today: No   present during visit today: Not Applicable.    Note: N/A.    Intravenous Access:  Implanted Port.    Treatment Conditions:  Not Applicable.      Post Infusion Assessment:  Site patent and intact, free from redness, edema or discomfort.  No evidence of extravasations.    Discharge Plan:   AVS to patient via MYCHART.  Patient will return 1/14/18 for labs for next appointment.   Patient discharged in stable condition accompanied by: family.  Departure Mode: Wheelchair.    Allyn Leary RN

## 2019-01-14 ENCOUNTER — HOSPITAL ENCOUNTER (OUTPATIENT)
Facility: CLINIC | Age: 74
Setting detail: SPECIMEN
Discharge: HOME | End: 2019-01-14
Attending: NURSE PRACTITIONER | Admitting: NURSE PRACTITIONER
Payer: COMMERCIAL

## 2019-01-14 DIAGNOSIS — Z08 ENCOUNTER FOR FOLLOW-UP SURVEILLANCE OF OVARIAN CANCER: ICD-10-CM

## 2019-01-14 DIAGNOSIS — Z85.43 ENCOUNTER FOR FOLLOW-UP SURVEILLANCE OF OVARIAN CANCER: ICD-10-CM

## 2019-01-14 LAB — CANCER AG125 SERPL-ACNC: 6 U/ML (ref 0–30)

## 2019-01-14 PROCEDURE — 36415 COLL VENOUS BLD VENIPUNCTURE: CPT | Performed by: NURSE PRACTITIONER

## 2019-01-14 PROCEDURE — 86304 IMMUNOASSAY TUMOR CA 125: CPT | Performed by: NURSE PRACTITIONER

## 2019-01-14 PROCEDURE — 36591 DRAW BLOOD OFF VENOUS DEVICE: CPT

## 2019-01-14 NOTE — PROGRESS NOTES
Infusion Nursing Note:  Tosin Nina presents today for port labs.    Patient seen by provider today: No   present during visit today: Not Applicable.    Note: N/A.    Intravenous Access:  Labs drawn without difficulty.  Implanted Port.    Treatment Conditions:  Not Applicable.      Post Infusion Assessment:  No evidence of extravasations.  Access discontinued per protocol.    Discharge Plan:   Patient discharged in stable condition accompanied by: self.  Departure Mode: Wheelchair.    Shruthi Crockett RN

## 2019-01-17 ENCOUNTER — ONCOLOGY VISIT (OUTPATIENT)
Dept: ONCOLOGY | Facility: CLINIC | Age: 74
End: 2019-01-17
Attending: NURSE PRACTITIONER
Payer: COMMERCIAL

## 2019-01-17 VITALS
TEMPERATURE: 98.1 F | HEIGHT: 59 IN | WEIGHT: 178 LBS | HEART RATE: 93 BPM | DIASTOLIC BLOOD PRESSURE: 68 MMHG | RESPIRATION RATE: 16 BRPM | SYSTOLIC BLOOD PRESSURE: 126 MMHG | OXYGEN SATURATION: 95 % | BODY MASS INDEX: 35.88 KG/M2

## 2019-01-17 DIAGNOSIS — Z08 ENCOUNTER FOR FOLLOW-UP SURVEILLANCE OF OVARIAN CANCER: Primary | ICD-10-CM

## 2019-01-17 DIAGNOSIS — Z85.43 ENCOUNTER FOR FOLLOW-UP SURVEILLANCE OF OVARIAN CANCER: Primary | ICD-10-CM

## 2019-01-17 PROCEDURE — G0463 HOSPITAL OUTPT CLINIC VISIT: HCPCS

## 2019-01-17 PROCEDURE — 99213 OFFICE O/P EST LOW 20 MIN: CPT | Performed by: NURSE PRACTITIONER

## 2019-01-17 ASSESSMENT — PAIN SCALES - GENERAL: PAINLEVEL: NO PAIN (0)

## 2019-01-17 ASSESSMENT — MIFFLIN-ST. JEOR: SCORE: 1228.03

## 2019-01-17 NOTE — LETTER
2019         RE: Tosin Nina  [address]  [facility] [address]        Dear Colleague,    Thank you for referring your patient, Tosin Nina, to the [facility]. Please see a copy of my visit note below.    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 She completed treatment with six cycles of carboplatin/paclitaxel on 17. She presents today for a three month surveillance visit.    HPI: Namita comes to the clinic accompanied by her daughter Cece. Reports that she has been feeling very well since her last visit. She has moved into an independent senior living center. She remains unsteady and wheelchair bound secondary to sequelae of paraneoplastic syndrome, however, she is able to exercise through [employer] most days and plans on restarting exercising at [employer] with her daughter. She remains fairly independent, though Cece does help with showering. Her only concern is that she has had some diarrhea after the first meal of the day for the past few months, however, has added in cottage cheese and milk to her diet the past week and is now slightly constipated. Reports depression is well controlled on escitalopram with no side effects. Continues on Xarelto for hx of PE, denies any bleeding or medication adherence difficulty. She is due to see her PCP for an annual exam. She is up to date on her mammogram and her colonoscopy. Denies unintended weight loss, fatigue,  changes in vision or hearing, shortness of breath, cough, chest pain, abdominal pain, dyspepsia, nausea, vomiting,  bloating, dysuria, urinary frequency or urgency, hematuria, pelvic pain, vaginal bleeding, vaginal discharge, numbness or tingling, or swelling of the extremities. In  the process of  her , denies concerns with this, well supported by her family.      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  She was discharged to a TCU where she is still residing with some but minimal improvement in her neurologic symptoms.  She still has quite a difficult time seeing especially with her right eye.  She also notes weakness mostly on her left side.  Both of these make it very difficult for her to walk and thus she is having to use a wheelchair for most of her mobility.  She tolerated her first cycle quite well with her biggest side effect being nausea which improved drastically with a change in anti-emetics.       4/11/17-4/16/17:  Admit to [facility] for worsening dizziness, found to have stage IVB ovarian cancer (inguinal lymphadenopathy) causing paraneoplastic syndrome     4/11/17:  CT C/A/P:  Thrombus identified within the right lower lobe are artery and right upper lobar arteries. The right pulmonary artery is enlarged, similar to prior exams. No CT evidence of right heart strain. No suspicious mediastinal or perihilar lymphadenopathy. Bovine arch anatomy. No suspicious pulmonary nodules, evidence of infarction, or infection. Abdomen and pelvis: There are soft tissue nodules identified along the liver capsule measuring up to 3 cm inferiorly. There is a large amount of omental caking. Enlarged iliac and retroperitoneal lymph nodes for example a 2.6 cm right external iliac lymph node or group of lymph nodes. Heterogeneous enhancement of the uterine fundus could be due to fibroids or a mass. The overall size of the uterus does not appear significantly different than in 2014. The left ovary does appear slightly larger and lobular in appearance compared to prior exam. There is also a nodular area along the round ligament. It was not present on prior exam. There is an irregular  lobulated mass involving the sigmoid colon. Abnormal, enlarged inguinal lymph nodes. Soft tissue implant in the posterior right retroperitoneum adjacent to the psoas was not present on prior exam. 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 Spondylolisthesis at L3-4. Small periumbilical hernia containing some of the abnormal omentum.     4/11/17:   268, CEA .6     4/12/17:  IR omental biopsy                           Pathology:  High grade serous carcinoma     4/14/17: Cycle #1 carboplatin AUC 6 and weekly Taxol 80mg/m2.  = 2648     5/5/17:  Cycle #2 carboplatin AUC 6 and weekly Taxol 80mg/m2.  = 370     5/26/17:  Cycle #3 carboplatin AUC 6 and weekly Taxol 80mg/m2.  = 63     6/16/17:  CT C/A/P:  Chest:  Resolution of previous right-sided pulmonary emboli since April. No mediastinal, hilar or axillary adenopathy. No pleural fluid.  Port-A-Cath right superior chest with tip in the SVC.  Short linear fibrosis or discoid atelectasis anterior medial right upper lobe. Lungs otherwise clear. Abdomen and Pelvis: Marked improvement in carcinomatosis and omental metastasis since 4/11/2017. In the anterior left pelvis there is a 1.2 cm nodule with adjacent linear opacity approximately 4 cm in length in an area of previous dense omental caking and metastatic disease. There is resolution of the previous anterior right-sided omental caking with subcentimeter trace of residual nodularity in this location. There are multiple new indeterminate nodular densities in the anterior abdominal wall subcutaneous fat as well as small collections of subcutaneous air, suggesting that these are medication injection sites.  Previous subserosal implants along the inferior liver have resolved. No focal liver lesions. Normal-appearing pancreas, adrenal glands and kidneys. Tiny hypodense spleen lesion is too small to characterize, not  previously seen. Spleen otherwise appears normal. No periaortic or pelvic adenopathy with resolution of previous adenopathy. Lobulated enlarged uterus redemonstrated consistent with multiple fibroids. No free fluid. No acute bowel abnormality. Resolution of mesenteric right lower quadrant nodule is compatible with metastasis. On coronal images the terminal ileum takes an unusual circular course posterior to the right colon, but there is no evidence for obstruction. No aggressive bone lesions.     6/16/17:   = 27     6/28/17:  Robotic total laparoscopic hysterectomy, bilateral salpingo-oophorectomy, lysis of adhesions, omentectomy, optimal tumor debulking to no residual disease                           Pathology:  Minimal serous carcinoma remaining in the left ovary     7/20/17:  Cycle #4 carboplatin AUC 6 and weekly Taxol 80mg/m2. D15 cancelled due to thrombocytopenia (plt 70)   = 18     8/10/17:  Cycle #5 carboplatin AUC 6 and weekly Taxol 80mg/m2. D8 cancelled neutropenia , D15 cancelled thromobocytopenia (plt 53)  = 12     8/31/17:  Cycle #6 carboplatin AUC 5 due to myelosuppression and weekly Taxol 80mg/m2. D15 delayed thrombocytopenia (plt 78)  = 10     12/20/17:  5    4/3/18:  <5    7/9/18:  6    10/4/18:  7    1/14/19:  6    Past Medical History:   Diagnosis Date     Anemia      Cervical spinal stenosis      Depression      GERD (gastroesophageal reflux disease)      Hypertension      Hypokalemia      Hypothyroid      Lumbar spinal stenosis      Obesity      Ovarian cancer (H)      Paraneoplastic neuromyopathy and neuropathy (H)      PE (pulmonary thromboembolism) (H)      Thrombocytopenia (H)        Past Surgical History:   Procedure Laterality Date     AS TOTAL KNEE ARTHROPLASTY Left      BACK SURGERY  2009     CHOLECYSTECTOMY  01/01/2016     COLONOSCOPY N/A 4/20/2018    Procedure: COLONOSCOPY;  Colonoscopy ;  Surgeon: Nila Munson MD;   Location: [facility] OR     CYSTOSCOPY N/A 6/28/2017    Procedure: CYSTOSCOPY;;  Surgeon: Nessa Christina MD;  Location: [facility] OR     DAVINCI HYSTERECTOMY TOTAL, BILATERAL SALPINGO-OOPHORECTMY, NODE DISSECTION, TUMOR STAGING, COMBINED N/A 6/28/2017    Procedure: COMBINED DAVINCI HYSTERECTOMY TOTAL, SALPINGO-OOPHORECTOMY, NODE DISSECTION, TUMOR STAGING;  Robotic total laparoscopic hysterectomy, bilateral salpingo-oophorectomy, omentectomy, lysis of adhesions, tumor debulking, cystoscopy;  Surgeon: Nessa Christina MD;  Location: [facility] OR     OPEN REDUCTION INTERNAL FIXATION WRIST Right 2009     THYROIDECTOMY         Social History     Socioeconomic History     Marital status:      Spouse name: Christiano Nina     Number of children: 1     Years of education: Not on file     Highest education level: Not on file   Social Needs     Financial resource strain: Not on file     Food insecurity - worry: Not on file     Food insecurity - inability: Not on file     Transportation needs - medical: Not on file     Transportation needs - non-medical: Not on file   Occupational History     Occupation: Current: Retired     Occupation: Former:    Tobacco Use     Smoking status: Never Smoker     Smokeless tobacco: Never Used   Substance and Sexual Activity     Alcohol use: Yes     Comment: occasionally     Drug use: No     Sexual activity: Not on file   Other Topics Concern     Not on file   Social History Narrative     Not on file       Family History   Problem Relation Age of Onset     Breast Cancer Mother 69     Heart Failure Mother      Breast Cancer Maternal Grandmother         this is maternal great grandmother     Breast Cancer Maternal Aunt 89     Myocardial Infarction Father      Unknown/Adopted Brother      Unknown/Adopted Maternal Grandfather      Stomach Cancer Paternal Grandmother         Stomach mass-not sure if cancer     Lung Cancer Paternal Grandfather         must[employer] in WW[place]       Current  "Outpatient Medications   Medication     cyclobenzaprine (FLEXERIL) 5 MG tablet     enoxaparin (LOVENOX) 80 MG/0.8ML injection     escitalopram (LEXAPRO) 10 MG tablet     hydrochlorothiazide (HYDRODIURIL) 25 MG tablet     levothyroxine (SYNTHROID) 125 MCG tablet     losartan (COZAAR) 100 MG tablet     magnesium chloride 535 (64 MG) MG TBCR CR tablet     Multiple Vitamins-Minerals (MULTI FOR HER PO)     order for DME     Potassium Chloride ER 20 MEQ TBCR     rivaroxaban ANTICOAGULANT (XARELTO) 20 MG TABS tablet     Vitamin D, Cholecalciferol, 1000 UNITS TABS     No current facility-administered medications for this visit.           Allergies   Allergen Reactions     Amoxicillin Hives     Clarithromycin Other (See Comments)     \"I felt dopey, sleepy and like a druggie\"     Lisinopril Cough     Penicillins Rash       Review of Systems  General: + weakness. Denies fatigue, changes in weight, appetite changes, night sweats, hot flashes, fever, chills, or difficulty sleeping  HEENT: Denies headaches, hair loss, masses, head injury, tinnitus, hearing loss, epistaxis, congestion, problems with teeth or gums, dysphonia, or dysphagia  Pulmonary: Denies cough, sputum, hemoptysis, shortness of breath, dyspnea on exertion, wheezing, or allergies  Cardiovascular: Denies chest pain, fainting, palpitations, murmurs, activity intolerance, swelling in legs, or high blood pressure  Gastrointestinal: + diarrhea, constipation. Denies nausea, vomiting, abdominal pain, bloating, heartburn, melena, hematochezia, or jaundice  Genitourinary: Denies dysuria, urinary urgency or frequency, hematuria, cloudy or malodorous urine, incontinence, repeat urinary tract infections, flank pain, pelvic pain, vaginal bleeding, vaginal discharge, or vaginal dryness  Integumentary: Denies rashes, sores, changing moles, or scarring  Hematologic: Denies swollen lymph nodes, masses, easy bruising, or easy bleeding  Musculoskeletal: + weakness. Denies falls, " "myalgias, arthralgias, back pain, stiffness, or muscle cramps  Neurologic:+ difficulty with walking, numbness/tingling. Denies changes in memory, dizziness, seizures, or tremors  Psychiatric: + depression (well controlled). Denies anxiety, mood changes, suicidal thoughts, or difficulty concentrating  Endocrine: Denies polydipsia, polyuria, temperature intolerance, or history of thyroid disease      OBJECTIVE:    Physical Exam:    /68   Pulse 93   Temp 98.1  F (36.7  C) (Oral)   Resp 16   Ht 1.499 m (4' 11\")   Wt 80.7 kg (178 lb)   SpO2 95%   BMI 35.95 kg/m       CONSTITUTIONAL: Alert non-toxic appearing female in no acute distress  HEAD: Normocephalic, atraumatic  EYES: PERRLA; no scleral icterus  ENT: Oropharynx pink without lesions  NECK: Neck supple without lymphadenopathy  RESPIRATORY: Lungs clear to auscultation, no increased work of breathing noted  CV: Regular rate and rhythm, S1S2, no clicks, murmurs, rubs, or gallops; bilateral lower extremities without edema, dorsalis pedis pulses 2+ bilaterally  GASTROINTESTINAL: Normoactive bowel sounds x4 quadrants, abdomen soft, non-distended, and non-tender to palpation without masses or organomegaly  GENITOURINARY: External genitalia and urethral meatus pink without lesions, masses, or excoriation. Vagina pale, hypoestrogenic without masses or lesions. Vaginal cuff without nodularity, masses, or lesions. Cervix surgically absent. Bimanual exam reveals no masses or fullness. Rectovaginal exam confirms these findings.  LYMPHATIC: Cervical, supraclavicular, and inguinal nodes without lymphadenopathy  MUSCULOSKELETAL: Moves all extremities, no obvious muscle wasting  NEUROLOGIC: Able to stand but requires assistance by holding onto a chair, intention tremor to bilateral lower extremities noted  SKIN: Appropriate color for race, warm and dry, no rashes or lesions to unclothed skin  PSYCHIATRIC: Pleasant and interactive, affect bright, makes appropriate eye " contact, thought process linear    Data:   6    Assessment/Plan:  1) Surveillance for stage IVB ovarian cancer: No signs of recurrence. Continue surveillance every three months x2 years (through 8/2019) followed by every six months x3 years (through 8/2022) then annually thereafter with  and pelvic/rectal exam. Reviewed signs and symptoms  of recurrence including but not limited to bleeding from vagina, bladder, or rectum, bloating, early satiety, swelling in the lower extremities, abdominal or lower back pain, changes in bowel or bladder patterns, shortness of breath, increased fatigue, unexplained weight loss, and night sweats. Reviewed signs and symptoms for when she should contact the clinic or seek additional care. Patient to contact the clinic with any questions or concerns in the interim.   2) History of paraneoplastic syndrome: Continues to affect her balance and gait but reports this is stable, does home exercises prescribed by ALONSO and Lobito Monahan.  3) Hx of PE: Continues on Xarelto as prescribed by Dr. Barahona- to be on this indefinitely. No bleeding.  4) Depression: Reports this is well controlled on escitalopram, denies need for intervention.  5) Bowel changes: Had diarrhea, now slightly constipated. Discussed adding in a fiber supplement/bulking agent such as Metamucil or Citrucel, to ensure that she is drinking at least 2L fluids daily as well, continue with physical activity.  6) Genetic testing: Negative panel testing   7) Labs:   8) Health maintenance issues discussed today include to follow up with PCP for co-morbid conditions and non-gynecologic issues. To schedule her annual exam with her PCP.  9) Patient verbalized understanding of and agreement with plan.    A total of 20 minutes of face to face time spent with patient, over 50% of which was spent in counseling and coordination of care.    SALVADOR Solis, FNP-C  Division of Gynecologic Oncology  [facility]  Pager:  [phone]              Again, thank you for allowing me to participate in the care of your patient.        Sincerely,        SALVADOR Solis CNP

## 2019-01-17 NOTE — NURSING NOTE
"Oncology Rooming Note    January 17, 2019 1:57 PM   Tosin Nina is a 73 year old female who presents for:    Chief Complaint   Patient presents with     Oncology Clinic Visit     Ovarian cancer      Initial Vitals: /68   Pulse 93   Temp 98.1  F (36.7  C) (Oral)   Resp 16   Ht 1.499 m (4' 11\")   Wt 80.7 kg (178 lb)   SpO2 95%   BMI 35.95 kg/m   Estimated body mass index is 35.95 kg/m  as calculated from the following:    Height as of this encounter: 1.499 m (4' 11\").    Weight as of this encounter: 80.7 kg (178 lb). Body surface area is 1.83 meters squared.  No Pain (0) Comment: Data Unavailable   No LMP recorded. Patient is postmenopausal.  Allergies reviewed: Yes  Medications reviewed: Yes    Medications: Medication refills not needed today.  Pharmacy name entered into EPIC:    [facility] [address] [place] [address] [address] [address] [facility]    Clinical concerns: follow up     8 minutes for nursing intake (face to face time)     Vi Delaney CMA     DISCHARGE PLAN:  Next appointments: See patient instruction section  Departure Mode: Ambulatory  Accompanied by: daughter  0 minutes for nursing discharge (face to face time)   Vi Delaney CMA                  "

## 2019-01-17 NOTE — PROGRESS NOTES
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 She completed treatment with six cycles of carboplatin/paclitaxel on 17. She presents today for a three month surveillance visit.    HPI: Namita comes to the clinic accompanied by her daughter Cece. Reports that she has been feeling very well since her last visit. She has moved into an independent senior living center. She remains unsteady and wheelchair bound secondary to sequelae of paraneoplastic syndrome, however, she is able to exercise through [employer] most days and plans on restarting exercising at [employer] with her daughter. She remains fairly independent, though Cece does help with showering. Her only concern is that she has had some diarrhea after the first meal of the day for the past few months, however, has added in cottage cheese and milk to her diet the past week and is now slightly constipated. Reports depression is well controlled on escitalopram with no side effects. Continues on Xarelto for hx of PE, denies any bleeding or medication adherence difficulty. She is due to see her PCP for an annual exam. She is up to date on her mammogram and her colonoscopy. Denies unintended weight loss, fatigue,  changes in vision or hearing, shortness of breath, cough, chest pain, abdominal pain, dyspepsia, nausea, vomiting,  bloating, dysuria, urinary frequency or urgency, hematuria, pelvic pain, vaginal bleeding, vaginal discharge, numbness or tingling, or swelling of the extremities. In the process of  her , denies concerns with this, well supported by her family.      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  She was  discharged to a TCU where she is still residing with some but minimal improvement in her neurologic symptoms.  She still has quite a difficult time seeing especially with her right eye.  She also notes weakness mostly on her left side.  Both of these make it very difficult for her to walk and thus she is having to use a wheelchair for most of her mobility.  She tolerated her first cycle quite well with her biggest side effect being nausea which improved drastically with a change in anti-emetics.       4/11/17-4/16/17:  Admit to [facility] for worsening dizziness, found to have stage IVB ovarian cancer (inguinal lymphadenopathy) causing paraneoplastic syndrome     4/11/17:  CT C/A/P:  Thrombus identified within the right lower lobe are artery and right upper lobar arteries. The right pulmonary artery is enlarged, similar to prior exams. No CT evidence of right heart strain. No suspicious mediastinal or perihilar lymphadenopathy. Bovine arch anatomy. No suspicious pulmonary nodules, evidence of infarction, or infection. Abdomen and pelvis: There are soft tissue nodules identified along the liver capsule measuring up to 3 cm inferiorly. There is a large amount of omental caking. Enlarged iliac and retroperitoneal lymph nodes for example a 2.6 cm right external iliac lymph node or group of lymph nodes. Heterogeneous enhancement of the uterine fundus could be due to fibroids or a mass. The overall size of the uterus does not appear significantly different than in 2014. The left ovary does appear slightly larger and lobular in appearance compared to prior exam. There is also a nodular area along the round ligament. It was not present on prior exam. There is an irregular lobulated mass involving the sigmoid colon. Abnormal, enlarged inguinal lymph nodes. Soft tissue implant in the posterior right retroperitoneum adjacent to the psoas was not present on prior exam. Bones and soft tissues: Degenerative changes in the spine with  flowing anterior osteophytes in the thoracic spine compatible with dish. Spondylolisthesis at L3-4. Small periumbilical hernia containing some of the abnormal omentum.     4/11/17:   268, CEA .6     4/12/17:  IR omental biopsy                           Pathology:  High grade serous carcinoma     4/14/17: Cycle #1 carboplatin AUC 6 and weekly Taxol 80mg/m2.  = 2648     5/5/17:  Cycle #2 carboplatin AUC 6 and weekly Taxol 80mg/m2.  = 370     5/26/17:  Cycle #3 carboplatin AUC 6 and weekly Taxol 80mg/m2.  = 63     6/16/17:  CT C/A/P:  Chest:  Resolution of previous right-sided pulmonary emboli since April. No mediastinal, hilar or axillary adenopathy. No pleural fluid.  Port-A-Cath right superior chest with tip in the SVC.  Short linear fibrosis or discoid atelectasis anterior medial right upper lobe. Lungs otherwise clear. Abdomen and Pelvis: Marked improvement in carcinomatosis and omental metastasis since 4/11/2017. In the anterior left pelvis there is a 1.2 cm nodule with adjacent linear opacity approximately 4 cm in length in an area of previous dense omental caking and metastatic disease. There is resolution of the previous anterior right-sided omental caking with subcentimeter trace of residual nodularity in this location. There are multiple new indeterminate nodular densities in the anterior abdominal wall subcutaneous fat as well as small collections of subcutaneous air, suggesting that these are medication injection sites.  Previous subserosal implants along the inferior liver have resolved. No focal liver lesions. Normal-appearing pancreas, adrenal glands and kidneys. Tiny hypodense spleen lesion is too small to characterize, not previously seen. Spleen otherwise appears normal. No periaortic or pelvic adenopathy with resolution of previous adenopathy. Lobulated enlarged uterus redemonstrated consistent with multiple fibroids. No free fluid. No acute bowel abnormality. Resolution of  mesenteric right lower quadrant nodule is compatible with metastasis. On coronal images the terminal ileum takes an unusual circular course posterior to the right colon, but there is no evidence for obstruction. No aggressive bone lesions.     6/16/17:   = 27     6/28/17:  Robotic total laparoscopic hysterectomy, bilateral salpingo-oophorectomy, lysis of adhesions, omentectomy, optimal tumor debulking to no residual disease                           Pathology:  Minimal serous carcinoma remaining in the left ovary     7/20/17:  Cycle #4 carboplatin AUC 6 and weekly Taxol 80mg/m2. D15 cancelled due to thrombocytopenia (plt 70)   = 18     8/10/17:  Cycle #5 carboplatin AUC 6 and weekly Taxol 80mg/m2. D8 cancelled neutropenia , D15 cancelled thromobocytopenia (plt 53)  = 12     8/31/17:  Cycle #6 carboplatin AUC 5 due to myelosuppression and weekly Taxol 80mg/m2. D15 delayed thrombocytopenia (plt 78)  = 10     12/20/17:  5    4/3/18:  <5    7/9/18:  6    10/4/18:  7    1/14/19:  6    Past Medical History:   Diagnosis Date     Anemia      Cervical spinal stenosis      Depression      GERD (gastroesophageal reflux disease)      Hypertension      Hypokalemia      Hypothyroid      Lumbar spinal stenosis      Obesity      Ovarian cancer (H)      Paraneoplastic neuromyopathy and neuropathy (H)      PE (pulmonary thromboembolism) (H)      Thrombocytopenia (H)        Past Surgical History:   Procedure Laterality Date     AS TOTAL KNEE ARTHROPLASTY Left      BACK SURGERY  2009     CHOLECYSTECTOMY  01/01/2016     COLONOSCOPY N/A 4/20/2018    Procedure: COLONOSCOPY;  Colonoscopy ;  Surgeon: Nila Munson MD;  Location: [facility] OR     CYSTOSCOPY N/A 6/28/2017    Procedure: CYSTOSCOPY;;  Surgeon: Nessa Christina MD;  Location: [facility] OR     DAVINCI HYSTERECTOMY TOTAL, BILATERAL SALPINGO-OOPHORECTMY, NODE DISSECTION, TUMOR STAGING, COMBINED N/A 6/28/2017     Procedure: COMBINED DAVINCI HYSTERECTOMY TOTAL, SALPINGO-OOPHORECTOMY, NODE DISSECTION, TUMOR STAGING;  Robotic total laparoscopic hysterectomy, bilateral salpingo-oophorectomy, omentectomy, lysis of adhesions, tumor debulking, cystoscopy;  Surgeon: Nessa Christina MD;  Location: [facility] OR     OPEN REDUCTION INTERNAL FIXATION WRIST Right 2009     THYROIDECTOMY         Social History     Socioeconomic History     Marital status:      Spouse name: Christiano Nina     Number of children: 1     Years of education: Not on file     Highest education level: Not on file   Social Needs     Financial resource strain: Not on file     Food insecurity - worry: Not on file     Food insecurity - inability: Not on file     Transportation needs - medical: Not on file     Transportation needs - non-medical: Not on file   Occupational History     Occupation: Current: Retired     Occupation: Former:    Tobacco Use     Smoking status: Never Smoker     Smokeless tobacco: Never Used   Substance and Sexual Activity     Alcohol use: Yes     Comment: occasionally     Drug use: No     Sexual activity: Not on file   Other Topics Concern     Not on file   Social History Narrative     Not on file       Family History   Problem Relation Age of Onset     Breast Cancer Mother 69     Heart Failure Mother      Breast Cancer Maternal Grandmother         this is maternal great grandmother     Breast Cancer Maternal Aunt 89     Myocardial Infarction Father      Unknown/Adopted Brother      Unknown/Adopted Maternal Grandfather      Stomach Cancer Paternal Grandmother         Stomach mass-not sure if cancer     Lung Cancer Paternal Grandfather         must[employer] in [place]       Current Outpatient Medications   Medication     cyclobenzaprine (FLEXERIL) 5 MG tablet     enoxaparin (LOVENOX) 80 MG/0.8ML injection     escitalopram (LEXAPRO) 10 MG tablet     hydrochlorothiazide (HYDRODIURIL) 25 MG tablet     levothyroxine (SYNTHROID) 125  "MCG tablet     losartan (COZAAR) 100 MG tablet     magnesium chloride 535 (64 MG) MG TBCR CR tablet     Multiple Vitamins-Minerals (MULTI FOR HER PO)     order for DME     Potassium Chloride ER 20 MEQ TBCR     rivaroxaban ANTICOAGULANT (XARELTO) 20 MG TABS tablet     Vitamin D, Cholecalciferol, 1000 UNITS TABS     No current facility-administered medications for this visit.           Allergies   Allergen Reactions     Amoxicillin Hives     Clarithromycin Other (See Comments)     \"I felt dopey, sleepy and like a druggie\"     Lisinopril Cough     Penicillins Rash       Review of Systems  General: + weakness. Denies fatigue, changes in weight, appetite changes, night sweats, hot flashes, fever, chills, or difficulty sleeping  HEENT: Denies headaches, hair loss, masses, head injury, tinnitus, hearing loss, epistaxis, congestion, problems with teeth or gums, dysphonia, or dysphagia  Pulmonary: Denies cough, sputum, hemoptysis, shortness of breath, dyspnea on exertion, wheezing, or allergies  Cardiovascular: Denies chest pain, fainting, palpitations, murmurs, activity intolerance, swelling in legs, or high blood pressure  Gastrointestinal: + diarrhea, constipation. Denies nausea, vomiting, abdominal pain, bloating, heartburn, melena, hematochezia, or jaundice  Genitourinary: Denies dysuria, urinary urgency or frequency, hematuria, cloudy or malodorous urine, incontinence, repeat urinary tract infections, flank pain, pelvic pain, vaginal bleeding, vaginal discharge, or vaginal dryness  Integumentary: Denies rashes, sores, changing moles, or scarring  Hematologic: Denies swollen lymph nodes, masses, easy bruising, or easy bleeding  Musculoskeletal: + weakness. Denies falls, myalgias, arthralgias, back pain, stiffness, or muscle cramps  Neurologic:+ difficulty with walking, numbness/tingling. Denies changes in memory, dizziness, seizures, or tremors  Psychiatric: + depression (well controlled). Denies anxiety, mood changes, " "suicidal thoughts, or difficulty concentrating  Endocrine: Denies polydipsia, polyuria, temperature intolerance, or history of thyroid disease      OBJECTIVE:    Physical Exam:    /68   Pulse 93   Temp 98.1  F (36.7  C) (Oral)   Resp 16   Ht 1.499 m (4' 11\")   Wt 80.7 kg (178 lb)   SpO2 95%   BMI 35.95 kg/m      CONSTITUTIONAL: Alert non-toxic appearing female in no acute distress  HEAD: Normocephalic, atraumatic  EYES: PERRLA; no scleral icterus  ENT: Oropharynx pink without lesions  NECK: Neck supple without lymphadenopathy  RESPIRATORY: Lungs clear to auscultation, no increased work of breathing noted  CV: Regular rate and rhythm, S1S2, no clicks, murmurs, rubs, or gallops; bilateral lower extremities without edema, dorsalis pedis pulses 2+ bilaterally  GASTROINTESTINAL: Normoactive bowel sounds x4 quadrants, abdomen soft, non-distended, and non-tender to palpation without masses or organomegaly  GENITOURINARY: External genitalia and urethral meatus pink without lesions, masses, or excoriation. Vagina pale, hypoestrogenic without masses or lesions. Vaginal cuff without nodularity, masses, or lesions. Cervix surgically absent. Bimanual exam reveals no masses or fullness. Rectovaginal exam confirms these findings.  LYMPHATIC: Cervical, supraclavicular, and inguinal nodes without lymphadenopathy  MUSCULOSKELETAL: Moves all extremities, no obvious muscle wasting  NEUROLOGIC: Able to stand but requires assistance by holding onto a chair, intention tremor to bilateral lower extremities noted  SKIN: Appropriate color for race, warm and dry, no rashes or lesions to unclothed skin  PSYCHIATRIC: Pleasant and interactive, affect bright, makes appropriate eye contact, thought process linear    Data:   6    Assessment/Plan:  1) Surveillance for stage IVB ovarian cancer: No signs of recurrence. Continue surveillance every three months x2 years (through 8/2019) followed by every six months x3 years (through " 8/2022) then annually thereafter with  and pelvic/rectal exam. Reviewed signs and symptoms  of recurrence including but not limited to bleeding from vagina, bladder, or rectum, bloating, early satiety, swelling in the lower extremities, abdominal or lower back pain, changes in bowel or bladder patterns, shortness of breath, increased fatigue, unexplained weight loss, and night sweats. Reviewed signs and symptoms for when she should contact the clinic or seek additional care. Patient to contact the clinic with any questions or concerns in the interim.   2) History of paraneoplastic syndrome: Continues to affect her balance and gait but reports this is stable, does home exercises prescribed by ALONSO and Lobito Monahan.  3) Hx of PE: Continues on Xarelto as prescribed by Dr. Barahona- to be on this indefinitely. No bleeding.  4) Depression: Reports this is well controlled on escitalopram, denies need for intervention.  5) Bowel changes: Had diarrhea, now slightly constipated. Discussed adding in a fiber supplement/bulking agent such as Metamucil or Citrucel, to ensure that she is drinking at least 2L fluids daily as well, continue with physical activity.  6) Genetic testing: Negative panel testing   7) Labs:   8) Health maintenance issues discussed today include to follow up with PCP for co-morbid conditions and non-gynecologic issues. To schedule her annual exam with her PCP.  9) Patient verbalized understanding of and agreement with plan.    A total of 20 minutes of face to face time spent with patient, over 50% of which was spent in counseling and coordination of care.    SALVADOR Solis, FNP-C  Division of Gynecologic Oncology  [facility]  Pager: [phone]

## 2019-02-18 ENCOUNTER — ALLIED HEALTH/NURSE VISIT (OUTPATIENT)
Dept: INFUSION THERAPY | Facility: CLINIC | Age: 74
End: 2019-02-18
Attending: OBSTETRICS & GYNECOLOGY
Payer: COMMERCIAL

## 2019-02-18 DIAGNOSIS — Z85.43 PERSONAL HISTORY OF OVARIAN CANCER: Primary | ICD-10-CM

## 2019-02-18 PROCEDURE — 25000128 H RX IP 250 OP 636: Performed by: OBSTETRICS & GYNECOLOGY

## 2019-02-18 PROCEDURE — 96523 IRRIG DRUG DELIVERY DEVICE: CPT

## 2019-02-18 RX ORDER — HEPARIN SODIUM (PORCINE) LOCK FLUSH IV SOLN 100 UNIT/ML 100 UNIT/ML
500 SOLUTION INTRAVENOUS EVERY 8 HOURS PRN
Status: DISCONTINUED | OUTPATIENT
Start: 2019-02-18 | End: 2019-02-18 | Stop reason: HOSPADM

## 2019-02-18 RX ADMIN — Medication 500 UNITS: at 14:15

## 2019-02-18 NOTE — PROGRESS NOTES
Infusion Nursing Note:  Tosin Nina presents today for Port flush.    Patient seen by provider today: No   present during visit today: Not Applicable.    Note: N/A.    Intravenous Access:  Implanted Port.    Treatment Conditions:  Not Applicable.      Post Infusion Assessment:  Patient tolerated port flush without incident.    Discharge Plan:   Discharge instructions reviewed with: Patient.  Patient and/or family verbalized understanding of discharge instructions and all questions answered.  AVS to patient via [facility].  Patient will return 3/14 for next appointment.   Patient discharged in stable condition accompanied by: daughter.  Departure Mode: Wheelchair.    Mildred Kimball RN

## 2019-03-14 ENCOUNTER — ALLIED HEALTH/NURSE VISIT (OUTPATIENT)
Dept: INFUSION THERAPY | Facility: CLINIC | Age: 74
End: 2019-03-14
Attending: OBSTETRICS & GYNECOLOGY
Payer: COMMERCIAL

## 2019-03-14 DIAGNOSIS — Z85.43 PERSONAL HISTORY OF OVARIAN CANCER: Primary | ICD-10-CM

## 2019-03-14 PROCEDURE — 96523 IRRIG DRUG DELIVERY DEVICE: CPT

## 2019-03-14 NOTE — PROGRESS NOTES
Infusion Nursing Note:  Tosin Nina presents today for port flush.     present during visit today: Not Applicable.    Note: N/A.    Intravenous Access:  Implanted Port.    Treatment Conditions:        Post Lab Assessment:  Patient tolerated access and flush  Blood return noted   Site patent and intact, free from redness, edema or discomfort.  No evidence of extravasations.  Access discontinued    Discharge Plan:   Patient and/or family verbalized understanding of  instructions and all questions answered.  Patient  to (lobby/clinic) in stable condition accompanied by: self and daughter.  Patient to see provider today: No  Departure Mode: Wheelchair.  Toshia Morrow RN

## 2019-04-01 ENCOUNTER — HOSPITAL ENCOUNTER (OUTPATIENT)
Facility: CLINIC | Age: 74
Setting detail: SPECIMEN
End: 2019-04-01
Attending: NURSE PRACTITIONER
Payer: COMMERCIAL

## 2019-04-04 ENCOUNTER — HOSPITAL ENCOUNTER (OUTPATIENT)
Facility: CLINIC | Age: 74
Setting detail: SPECIMEN
Discharge: HOME | End: 2019-04-04
Attending: NURSE PRACTITIONER | Admitting: NURSE PRACTITIONER
Payer: COMMERCIAL

## 2019-04-04 ENCOUNTER — ONCOLOGY VISIT (OUTPATIENT)
Dept: ONCOLOGY | Facility: CLINIC | Age: 74
End: 2019-04-04
Attending: NURSE PRACTITIONER
Payer: COMMERCIAL

## 2019-04-04 VITALS
DIASTOLIC BLOOD PRESSURE: 80 MMHG | TEMPERATURE: 98.2 F | HEART RATE: 68 BPM | WEIGHT: 170 LBS | HEIGHT: 59 IN | RESPIRATION RATE: 16 BRPM | SYSTOLIC BLOOD PRESSURE: 144 MMHG | OXYGEN SATURATION: 95 % | BODY MASS INDEX: 34.27 KG/M2

## 2019-04-04 DIAGNOSIS — Z85.43 ENCOUNTER FOR FOLLOW-UP SURVEILLANCE OF OVARIAN CANCER: ICD-10-CM

## 2019-04-04 DIAGNOSIS — Z08 ENCOUNTER FOR FOLLOW-UP SURVEILLANCE OF OVARIAN CANCER: Primary | ICD-10-CM

## 2019-04-04 DIAGNOSIS — Z85.43 ENCOUNTER FOR FOLLOW-UP SURVEILLANCE OF OVARIAN CANCER: Primary | ICD-10-CM

## 2019-04-04 DIAGNOSIS — Z08 ENCOUNTER FOR FOLLOW-UP SURVEILLANCE OF OVARIAN CANCER: ICD-10-CM

## 2019-04-04 LAB — CANCER AG125 SERPL-ACNC: 7 U/ML (ref 0–30)

## 2019-04-04 PROCEDURE — 36591 DRAW BLOOD OFF VENOUS DEVICE: CPT

## 2019-04-04 PROCEDURE — 99213 OFFICE O/P EST LOW 20 MIN: CPT | Performed by: NURSE PRACTITIONER

## 2019-04-04 PROCEDURE — 86304 IMMUNOASSAY TUMOR CA 125: CPT | Performed by: NURSE PRACTITIONER

## 2019-04-04 PROCEDURE — G0463 HOSPITAL OUTPT CLINIC VISIT: HCPCS

## 2019-04-04 ASSESSMENT — MIFFLIN-ST. JEOR: SCORE: 1191.74

## 2019-04-04 ASSESSMENT — PAIN SCALES - GENERAL: PAINLEVEL: NO PAIN (1)

## 2019-04-04 NOTE — NURSING NOTE
"Oncology Rooming Note    April 4, 2019 11:29 AM   Tosin Nina is a 73 year old female who presents for:    Chief Complaint   Patient presents with     Oncology Clinic Visit     Encounter for follow-up surveillance of ovarian cancer      Initial Vitals: /80   Pulse 68   Temp 98.2  F (36.8  C) (Tympanic)   Resp 16   Ht 1.499 m (4' 11\")   Wt 77.1 kg (170 lb)   SpO2 95%   BMI 34.34 kg/m   Estimated body mass index is 34.34 kg/m  as calculated from the following:    Height as of this encounter: 1.499 m (4' 11\").    Weight as of this encounter: 77.1 kg (170 lb). Body surface area is 1.79 meters squared.  No Pain (1) Comment: Data Unavailable   No LMP recorded. Patient is postmenopausal.  Allergies reviewed: Yes  Medications reviewed: Yes    Medications: Medication refills not needed today.  Pharmacy name entered into [facility]:    [facility] [address] [place], [place] - [address] [facility] [address] PH[facility] [address] [facility]    Clinical concerns: follow up      Vi Delaney CMA              "

## 2019-04-04 NOTE — PROGRESS NOTES
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 She completed treatment with six cycles of carboplatin/paclitaxel on 17. She presents today for a three month surveillance visit.    HPI: Namita comes to the clinic accompanied by her daughter Cece. Reports that she continues to feel well, though she is recovering from an upper respiratory tract infection. She also has had a \"sensation\" in her bilateral lower quadrants the past week- denies pain, fullness, aching, or discomfort and is unable to further describe this. Has not required intervention and is not worsening over time- states she would not have noticed it if not for her history of cancer. No changes in bowels or bladder, no early satiety or bloating. Does note some heartburn at times.  She remains unsteady and wheelchair bound secondary to sequelae of paraneoplastic syndrome, however, she is able to exercise through [employer] most days. Reports depression is well controlled on escitalopram with no side effects- she questions whether she needs this yet but would like to stay on it while her divorce is being finalized. Continues on Xarelto for hx of PE, denies any bleeding or medication adherence difficulty. She is up to date on her mammogram and her colonoscopy. Denies unintended weight loss, fatigue,  changes in vision or hearing, shortness of breath, cough, chest pain,  nausea, vomiting,  bloating, dysuria, urinary frequency or urgency, hematuria, pelvic pain, vaginal bleeding, vaginal discharge, numbness or tingling, or swelling of the extremities.      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  She was discharged to a TCU where she is still " residing with some but minimal improvement in her neurologic symptoms.  She still has quite a difficult time seeing especially with her right eye.  She also notes weakness mostly on her left side.  Both of these make it very difficult for her to walk and thus she is having to use a wheelchair for most of her mobility.  She tolerated her first cycle quite well with her biggest side effect being nausea which improved drastically with a change in anti-emetics.       4/11/17-4/16/17:  Admit to [facility] for worsening dizziness, found to have stage IVB ovarian cancer (inguinal lymphadenopathy) causing paraneoplastic syndrome     4/11/17:  CT C/A/P:  Thrombus identified within the right lower lobe are artery and right upper lobar arteries. The right pulmonary artery is enlarged, similar to prior exams. No CT evidence of right heart strain. No suspicious mediastinal or perihilar lymphadenopathy. Bovine arch anatomy. No suspicious pulmonary nodules, evidence of infarction, or infection. Abdomen and pelvis: There are soft tissue nodules identified along the liver capsule measuring up to 3 cm inferiorly. There is a large amount of omental caking. Enlarged iliac and retroperitoneal lymph nodes for example a 2.6 cm right external iliac lymph node or group of lymph nodes. Heterogeneous enhancement of the uterine fundus could be due to fibroids or a mass. The overall size of the uterus does not appear significantly different than in 2014. The left ovary does appear slightly larger and lobular in appearance compared to prior exam. There is also a nodular area along the round ligament. It was not present on prior exam. There is an irregular lobulated mass involving the sigmoid colon. Abnormal, enlarged inguinal lymph nodes. Soft tissue implant in the posterior right retroperitoneum adjacent to the psoas was not present on prior exam. Bones and soft tissues: Degenerative changes in the spine with flowing anterior osteophytes in the  thoracic spine compatible with dish. Spondylolisthesis at L3-4. Small periumbilical hernia containing some of the abnormal omentum.     4/11/17:   268, CEA .6     4/12/17:  IR omental biopsy                           Pathology:  High grade serous carcinoma     4/14/17: Cycle #1 carboplatin AUC 6 and weekly Taxol 80mg/m2.  = 2648     5/5/17:  Cycle #2 carboplatin AUC 6 and weekly Taxol 80mg/m2.  = 370     5/26/17:  Cycle #3 carboplatin AUC 6 and weekly Taxol 80mg/m2.  = 63     6/16/17:  CT C/A/P:  Chest:  Resolution of previous right-sided pulmonary emboli since April. No mediastinal, hilar or axillary adenopathy. No pleural fluid.  Port-A-Cath right superior chest with tip in the SVC.  Short linear fibrosis or discoid atelectasis anterior medial right upper lobe. Lungs otherwise clear. Abdomen and Pelvis: Marked improvement in carcinomatosis and omental metastasis since 4/11/2017. In the anterior left pelvis there is a 1.2 cm nodule with adjacent linear opacity approximately 4 cm in length in an area of previous dense omental caking and metastatic disease. There is resolution of the previous anterior right-sided omental caking with subcentimeter trace of residual nodularity in this location. There are multiple new indeterminate nodular densities in the anterior abdominal wall subcutaneous fat as well as small collections of subcutaneous air, suggesting that these are medication injection sites.  Previous subserosal implants along the inferior liver have resolved. No focal liver lesions. Normal-appearing pancreas, adrenal glands and kidneys. Tiny hypodense spleen lesion is too small to characterize, not previously seen. Spleen otherwise appears normal. No periaortic or pelvic adenopathy with resolution of previous adenopathy. Lobulated enlarged uterus redemonstrated consistent with multiple fibroids. No free fluid. No acute bowel abnormality. Resolution of mesenteric right lower quadrant nodule  is compatible with metastasis. On coronal images the terminal ileum takes an unusual circular course posterior to the right colon, but there is no evidence for obstruction. No aggressive bone lesions.     6/16/17:   = 27     6/28/17:  Robotic total laparoscopic hysterectomy, bilateral salpingo-oophorectomy, lysis of adhesions, omentectomy, optimal tumor debulking to no residual disease                           Pathology:  Minimal serous carcinoma remaining in the left ovary     7/20/17:  Cycle #4 carboplatin AUC 6 and weekly Taxol 80mg/m2. D15 cancelled due to thrombocytopenia (plt 70)   = 18     8/10/17:  Cycle #5 carboplatin AUC 6 and weekly Taxol 80mg/m2. D8 cancelled neutropenia , D15 cancelled thromobocytopenia (plt 53)  = 12     8/31/17:  Cycle #6 carboplatin AUC 5 due to myelosuppression and weekly Taxol 80mg/m2. D15 delayed thrombocytopenia (plt 78)  = 10     12/20/17:  5    4/3/18:  <5    7/9/18:  6    10/4/18:  7    1/14/19:  6    Past Medical History:   Diagnosis Date     Anemia      Cervical spinal stenosis      Depression      GERD (gastroesophageal reflux disease)      Hypertension      Hypokalemia      Hypothyroid      Lumbar spinal stenosis      Obesity      Ovarian cancer (H)      Paraneoplastic neuromyopathy and neuropathy (H)      PE (pulmonary thromboembolism) (H)      Thrombocytopenia (H)        Past Surgical History:   Procedure Laterality Date     AS TOTAL KNEE ARTHROPLASTY Left      BACK SURGERY  2009     CHOLECYSTECTOMY  01/01/2016     COLONOSCOPY N/A 4/20/2018    Procedure: COLONOSCOPY;  Colonoscopy ;  Surgeon: Nila Munson MD;  Location: [facility] OR     CYSTOSCOPY N/A 6/28/2017    Procedure: CYSTOSCOPY;;  Surgeon: Nessa Christina MD;  Location: [facility] OR     DAVINCI HYSTERECTOMY TOTAL, BILATERAL SALPINGO-OOPHORECTMY, NODE DISSECTION, TUMOR STAGING, COMBINED N/A 6/28/2017    Procedure: COMBINED DAVINCI HYSTERECTOMY TOTAL,  SALPINGO-OOPHORECTOMY, NODE DISSECTION, TUMOR STAGING;  Robotic total laparoscopic hysterectomy, bilateral salpingo-oophorectomy, omentectomy, lysis of adhesions, tumor debulking, cystoscopy;  Surgeon: Nessa Christina MD;  Location: [facility] OR     OPEN REDUCTION INTERNAL FIXATION WRIST Right 2009     THYROIDECTOMY         Social History     Socioeconomic History     Marital status:      Spouse name: Christiano Nina     Number of children: 1     Years of education: Not on file     Highest education level: Not on file   Occupational History     Occupation: Current: Retired     Occupation: Former:    Social Needs     Financial resource strain: Not on file     Food insecurity:     Worry: Not on file     Inability: Not on file     Transportation needs:     Medical: Not on file     Non-medical: Not on file   Tobacco Use     Smoking status: Never Smoker     Smokeless tobacco: Never Used   Substance and Sexual Activity     Alcohol use: Yes     Comment: occasionally     Drug use: No     Sexual activity: Not on file   Lifestyle     Physical activity:     Days per week: Not on file     Minutes per session: Not on file     Stress: Not on file   Relationships     Social connections:     Talks on phone: Not on file     Gets together: Not on file     Attends [employer] service: Not on file     Active member of club or organization: Not on file     Attends meetings of clubs or organizations: Not on file     Relationship status: Not on file     Intimate partner violence:     Fear of current or ex partner: Not on file     Emotionally abused: Not on file     Physically abused: Not on file     Forced sexual activity: Not on file   Other Topics Concern     Not on file   Social History Narrative     Not on file       Family History   Problem Relation Age of Onset     Breast Cancer Mother 69     Heart Failure Mother      Breast Cancer Maternal Grandmother         this is maternal great grandmother     Breast Cancer  "Maternal Aunt 89     Myocardial Infarction Father      Unknown/Adopted Brother      Unknown/Adopted Maternal Grandfather      Stomach Cancer Paternal Grandmother         Stomach mass-not sure if cancer     Lung Cancer Paternal Grandfather         must[employer] in [employer]       Current Outpatient Medications   Medication     cyclobenzaprine (FLEXERIL) 5 MG tablet     escitalopram (LEXAPRO) 10 MG tablet     hydrochlorothiazide (HYDRODIURIL) 25 MG tablet     levothyroxine (SYNTHROID) 125 MCG tablet     losartan (COZAAR) 100 MG tablet     magnesium chloride 535 (64 MG) MG TBCR CR tablet     Multiple Vitamins-Minerals (MULTI FOR HER PO)     order for DME     Potassium Chloride ER 20 MEQ TBCR     rivaroxaban ANTICOAGULANT (XARELTO) 20 MG TABS tablet     Vitamin D, Cholecalciferol, 1000 UNITS TABS     No current facility-administered medications for this visit.           Allergies   Allergen Reactions     Amoxicillin Hives     Clarithromycin Other (See Comments)     \"I felt dopey, sleepy and like a druggie\"     Lisinopril Cough     Penicillins Rash       Review of Systems  General: + weakness. Denies fatigue, changes in weight, appetite changes, night sweats, hot flashes, fever, chills, or difficulty sleeping  HEENT: Denies headaches, hair loss, masses, head injury, tinnitus, hearing loss, epistaxis, congestion, problems with teeth or gums, dysphonia, or dysphagia  Pulmonary: + cough (recovering from URI). Denies  sputum, hemoptysis, shortness of breath, dyspnea on exertion, wheezing, or allergies  Cardiovascular: Denies chest pain, fainting, palpitations, murmurs, activity intolerance, swelling in legs, or high blood pressure  Gastrointestinal: + abdominal \"sensation\", heartburn. Denies nausea, vomiting, abdominal pain, bloating, constipation, diarrhea, melena, hematochezia, or jaundice  Genitourinary: Denies dysuria, urinary urgency or frequency, hematuria, cloudy or malodorous urine, incontinence, repeat urinary tract " "infections, flank pain, pelvic pain, vaginal bleeding, vaginal discharge, or vaginal dryness  Integumentary: Denies rashes, sores, changing moles, or scarring  Hematologic: Denies swollen lymph nodes, masses, easy bruising, or easy bleeding  Musculoskeletal: + weakness. Denies falls, myalgias, arthralgias, back pain, stiffness, or muscle cramps  Neurologic:+ difficulty with walking, numbness/tingling. Denies changes in memory, dizziness, seizures, or tremors  Psychiatric: + depression (well controlled). Denies anxiety, mood changes, suicidal thoughts, or difficulty concentrating  Endocrine: Denies polydipsia, polyuria, temperature intolerance, or history of thyroid disease      OBJECTIVE:    Physical Exam:    /80   Resp 16   Ht 1.499 m (4' 11\")   Wt 77.1 kg (170 lb)   BMI 34.34 kg/m      CONSTITUTIONAL: Alert non-toxic appearing female in no acute distress  HEAD: Normocephalic, atraumatic  EYES: PERRLA; no scleral icterus  ENT: Oropharynx pink without lesions  NECK: Neck supple without lymphadenopathy  RESPIRATORY: Lungs clear to auscultation, no increased work of breathing noted  CV: Regular rate and rhythm, S1S2, no clicks, murmurs, rubs, or gallops; bilateral lower extremities without edema, dorsalis pedis pulses 2+ bilaterally  GASTROINTESTINAL: Normoactive bowel sounds x4 quadrants, abdomen soft, non-distended, and non-tender to palpation without masses or organomegaly  GENITOURINARY: External genitalia and urethral meatus pink without lesions, masses, or excoriation. Vagina pale, hypoestrogenic without masses or lesions. Vaginal cuff without nodularity, masses, or lesions. Cervix surgically absent. Bimanual exam reveals no masses or fullness. Rectovaginal exam confirms these findings.  LYMPHATIC: Cervical, supraclavicular, and inguinal nodes without lymphadenopathy  MUSCULOSKELETAL: Moves all extremities, no obvious muscle wasting  NEUROLOGIC: Able to stand but requires assistance by holding onto a " "chair, intention tremor to bilateral lower extremities noted  SKIN: Appropriate color for race, warm and dry, no rashes or lesions to unclothed skin  PSYCHIATRIC: Pleasant and interactive, affect bright, makes appropriate eye contact, thought process linear    Data:   pending    Assessment/Plan:  1) Surveillance for stage IVB ovarian cancer: Exam without concerning findings, awaiting . To monitor her abdominal \"sensation\" and heartburn- if these symptoms worsen over the next couple of weeks I would recommend further imaging. Continue surveillance every three months x2 years (through 8/2019) followed by every six months x3 years (through 8/2022) then annually thereafter with  and pelvic/rectal exam. Reviewed signs and symptoms  of recurrence including but not limited to bleeding from vagina, bladder, or rectum, bloating, early satiety, swelling in the lower extremities, abdominal or lower back pain, changes in bowel or bladder patterns, shortness of breath, increased fatigue, unexplained weight loss, and night sweats. Reviewed signs and symptoms for when she should contact the clinic or seek additional care. Patient to contact the clinic with any questions or concerns in the interim.   2) History of paraneoplastic syndrome: Continues to affect her balance and gait but reports this is stable, does home exercises prescribed by ALONSO and Lobito Monahan.  3) Hx of PE: Continues on Xarelto as prescribed by Dr. Barahona- to be on this indefinitely. No bleeding.  4) Depression: Reports this is well controlled on escitalopram, denies need for intervention. Would like to continue on escitalopram while her divorce is finalized.  5) Genetic testing: Negative panel testing   6) Labs:   7) Health maintenance issues discussed today include to follow up with PCP for co-morbid conditions and non-gynecologic issues.   8) Patient verbalized understanding of and agreement with plan.    A total of 20 minutes of face " to face time spent with patient, over 50% of which was spent in counseling and coordination of care.    SALVADOR Solis, FNP-C  Division of Gynecologic Oncology  [facility]  Pager: [phone]

## 2019-04-04 NOTE — PROGRESS NOTES
Infusion Nursing Note:  Tosin Nina presents today for port  draw.    Patient seen by provider today: Yes: Adelina   present during visit today: Not Applicable.    Note: N/A.    Intravenous Access:  Labs drawn without difficulty.  Implanted Port.    Treatment Conditions:  Not Applicable.      Post Infusion Assessment:  Site patent and intact, free from redness, edema or discomfort.  No evidence of extravasations.  Access discontinued per protocol.       Discharge Plan:   Patient discharged in stable condition accompanied by: self.  Departure Mode: Ambulatory.    Shruthi Crockett RN

## 2019-05-08 ENCOUNTER — INFUSION THERAPY VISIT (OUTPATIENT)
Dept: INFUSION THERAPY | Facility: CLINIC | Age: 74
End: 2019-05-08
Attending: OBSTETRICS & GYNECOLOGY
Payer: COMMERCIAL

## 2019-05-08 ENCOUNTER — HOSPITAL ENCOUNTER (OUTPATIENT)
Facility: CLINIC | Age: 74
Setting detail: SPECIMEN
End: 2019-05-08
Attending: OBSTETRICS & GYNECOLOGY
Payer: COMMERCIAL

## 2019-05-08 DIAGNOSIS — Z85.43 PERSONAL HISTORY OF OVARIAN CANCER: Primary | ICD-10-CM

## 2019-05-08 PROCEDURE — 96523 IRRIG DRUG DELIVERY DEVICE: CPT

## 2019-05-08 PROCEDURE — 25000128 H RX IP 250 OP 636: Performed by: OBSTETRICS & GYNECOLOGY

## 2019-05-08 RX ORDER — HEPARIN SODIUM (PORCINE) LOCK FLUSH IV SOLN 100 UNIT/ML 100 UNIT/ML
500 SOLUTION INTRAVENOUS ONCE
Status: COMPLETED | OUTPATIENT
Start: 2019-05-08 | End: 2019-05-08

## 2019-05-08 RX ADMIN — Medication 500 UNITS: at 13:28

## 2019-05-08 NOTE — PROGRESS NOTES
Infusion Nursing Note:  Tosin Nina presents today for port flush.    Patient seen by provider today: No   present during visit today: Not Applicable.    Note: N/A.    Intravenous Access:  Implanted Port.    Treatment Conditions:  Not Applicable.      Post Infusion Assessment:  Site patent and intact, free from redness, edema or discomfort.  No evidence of extravasations.  Access discontinued per protocol.       Discharge Plan:   Discharge instructions reviewed with: Patient.  AVS to patient via [employer].  Patient will return 6/5/19 for next port flush for next appointment.   Patient discharged in stable condition accompanied by: friend.  Departure Mode: Wheelchair.    Allyn Leary RN

## 2019-06-05 ENCOUNTER — HOSPITAL ENCOUNTER (OUTPATIENT)
Facility: CLINIC | Age: 74
Setting detail: SPECIMEN
End: 2019-06-05
Attending: OBSTETRICS & GYNECOLOGY
Payer: COMMERCIAL

## 2019-06-05 ENCOUNTER — INFUSION THERAPY VISIT (OUTPATIENT)
Dept: INFUSION THERAPY | Facility: CLINIC | Age: 74
End: 2019-06-05
Attending: OBSTETRICS & GYNECOLOGY
Payer: COMMERCIAL

## 2019-06-05 DIAGNOSIS — C56.9 MALIGNANT NEOPLASM OF OVARY, UNSPECIFIED LATERALITY (H): ICD-10-CM

## 2019-06-05 DIAGNOSIS — Z51.81 ENCOUNTER FOR THERAPEUTIC DRUG MONITORING: Primary | ICD-10-CM

## 2019-06-05 DIAGNOSIS — C56.9 OVARIAN CANCER, UNSPECIFIED LATERALITY (H): ICD-10-CM

## 2019-06-05 PROCEDURE — 96523 IRRIG DRUG DELIVERY DEVICE: CPT

## 2019-06-05 PROCEDURE — 25000128 H RX IP 250 OP 636: Performed by: OBSTETRICS & GYNECOLOGY

## 2019-06-05 RX ORDER — HEPARIN SODIUM (PORCINE) LOCK FLUSH IV SOLN 100 UNIT/ML 100 UNIT/ML
5 SOLUTION INTRAVENOUS
Status: DISCONTINUED | OUTPATIENT
Start: 2019-06-05 | End: 2019-06-05 | Stop reason: HOSPADM

## 2019-06-05 RX ORDER — HEPARIN SODIUM (PORCINE) LOCK FLUSH IV SOLN 100 UNIT/ML 100 UNIT/ML
5 SOLUTION INTRAVENOUS
Status: CANCELLED | OUTPATIENT
Start: 2019-06-05

## 2019-06-05 RX ADMIN — Medication 5 ML: at 14:05

## 2019-06-05 NOTE — PROGRESS NOTES
Infusion Nursing Note:  Tosin Nina presents today for port flush.    Patient seen by provider today: No   present during visit today: Not Applicable.    Note: N/A.    Intravenous Access:  Implanted Port.    Treatment Conditions:  Not Applicable.      Post Infusion Assessment:  Patient tolerated infusion without incident.  Blood return noted pre and post infusion.  Site patent and intact, free from redness, edema or discomfort.  No evidence of extravasations.  Access discontinued per protocol.       Discharge Plan:   Discharge instructions reviewed with: Patient.  Patient and/or family verbalized understanding of discharge instructions and all questions answered.  AVS to patient via [employer].  Patient will return 7/8 for next appointment.   Patient discharged in stable condition accompanied by: friend.  Departure Mode: Wheelchair.    Mildred Kimball RN

## 2019-07-08 ENCOUNTER — HOSPITAL ENCOUNTER (OUTPATIENT)
Facility: CLINIC | Age: 74
Setting detail: SPECIMEN
Discharge: HOME | End: 2019-07-08
Attending: NURSE PRACTITIONER | Admitting: NURSE PRACTITIONER
Payer: COMMERCIAL

## 2019-07-08 DIAGNOSIS — Z85.43 ENCOUNTER FOR FOLLOW-UP SURVEILLANCE OF OVARIAN CANCER: ICD-10-CM

## 2019-07-08 DIAGNOSIS — Z08 ENCOUNTER FOR FOLLOW-UP SURVEILLANCE OF OVARIAN CANCER: ICD-10-CM

## 2019-07-08 LAB — CANCER AG125 SERPL-ACNC: 7 U/ML (ref 0–30)

## 2019-07-08 PROCEDURE — 36591 DRAW BLOOD OFF VENOUS DEVICE: CPT

## 2019-07-08 PROCEDURE — 86304 IMMUNOASSAY TUMOR CA 125: CPT | Performed by: NURSE PRACTITIONER

## 2019-07-08 NOTE — PROGRESS NOTES
Infusion Nursing Note:  Tosin Nina presents today for labs.    Patient seen by provider today: No   present during visit today: Not Applicable.    Note: N/A.    Intravenous Access:  Labs drawn without difficulty.  Implanted Port.    Treatment Conditions:  Not Applicable.      Post Infusion Assessment:  No evidence of extravasations.  Access discontinued per protocol.       Discharge Plan:   Patient discharged in stable condition accompanied by: daughter  Departure Mode: Wheelchair.    Shruthi Crockett RN

## 2019-07-10 NOTE — PROGRESS NOTES
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 She completed treatment with six cycles of carboplatin/paclitaxel on 17. She presents today for a three month surveillance visit.    HPI: Namita comes to the clinic accompanied by her daughter Cece. She is overall feeling well, though she reports three episodes of rectal bleeding after bowel movements over the past three months- reports this is a small amount each time, denies black tarry stools. She is unsure about hemorrhoids but has had polyps noted on colonoscopy, last done 2018. She also had an episode of vaginal spotting two days ago which has not recurred. On Xarelto indefinitely for hx of PE but denies significant bleeding, passing clots. Denies hematuria, heather vaginal bleeding/clots, dysuria, urgency, urinary frequency, sensation of incomplete emptying, or cloudy/malodorous urine. No pelvic or vaginal pain. Does have occasional bouts of diarrhea in the morning, though she states this is her baseline with a history of IBS. She remains unsteady and wheelchair bound secondary to sequelae of paraneoplastic syndrome, however, she is able to exercise through [employer] most days and has joined a gym, does exercises she had learned from PT. She is due for a mammogram, is up to date on colonoscopy, plans to establish care with a new PCP now that she has moved. Denies unintended weight loss, fatigue,  changes in vision or hearing, shortness of breath, cough, chest pain,  nausea, vomiting,  bloating, dysuria, urinary frequency or urgency, hematuria, pelvic pain, numbness or tingling, or swelling of the extremities.      Oncology History:  She presented to the ED with neurologic symptoms and was found to have stage IVB ovarian cancer along with a paraneoplastic  syndrome.  She was discharged to a TCU where she is still residing with some but minimal improvement in her neurologic symptoms.  She still has quite a difficult time seeing especially with her right eye.  She also notes weakness mostly on her left side.  Both of these make it very difficult for her to walk and thus she is having to use a wheelchair for most of her mobility.  She tolerated her first cycle quite well with her biggest side effect being nausea which improved drastically with a change in anti-emetics.       4/11/17-4/16/17:  Admit to [facility] for worsening dizziness, found to have stage IVB ovarian cancer (inguinal lymphadenopathy) causing paraneoplastic syndrome     4/11/17:  CT C/A/P:  Thrombus identified within the right lower lobe are artery and right upper lobar arteries. The right pulmonary artery is enlarged, similar to prior exams. No CT evidence of right heart strain. No suspicious mediastinal or perihilar lymphadenopathy. Bovine arch anatomy. No suspicious pulmonary nodules, evidence of infarction, or infection. Abdomen and pelvis: There are soft tissue nodules identified along the liver capsule measuring up to 3 cm inferiorly. There is a large amount of omental caking. Enlarged iliac and retroperitoneal lymph nodes for example a 2.6 cm right external iliac lymph node or group of lymph nodes. Heterogeneous enhancement of the uterine fundus could be due to fibroids or a mass. The overall size of the uterus does not appear significantly different than in 2014. The left ovary does appear slightly larger and lobular in appearance compared to prior exam. There is also a nodular area along the round ligament. It was not present on prior exam. There is an irregular lobulated mass involving the sigmoid colon. Abnormal, enlarged inguinal lymph nodes. Soft tissue implant in the posterior right retroperitoneum adjacent to the psoas was not present on prior exam. Bones and soft tissues: Degenerative changes  in the spine with flowing anterior osteophytes in the thoracic spine compatible with dish. Spondylolisthesis at L3-4. Small periumbilical hernia containing some of the abnormal omentum.     4/11/17:   268, CEA .6     4/12/17:  IR omental biopsy                           Pathology:  High grade serous carcinoma     4/14/17: Cycle #1 carboplatin AUC 6 and weekly Taxol 80mg/m2.  = 2648     5/5/17:  Cycle #2 carboplatin AUC 6 and weekly Taxol 80mg/m2.  = 370     5/26/17:  Cycle #3 carboplatin AUC 6 and weekly Taxol 80mg/m2.  = 63     6/16/17:  CT C/A/P:  Chest:  Resolution of previous right-sided pulmonary emboli since April. No mediastinal, hilar or axillary adenopathy. No pleural fluid.  Port-A-Cath right superior chest with tip in the SVC.  Short linear fibrosis or discoid atelectasis anterior medial right upper lobe. Lungs otherwise clear. Abdomen and Pelvis: Marked improvement in carcinomatosis and omental metastasis since 4/11/2017. In the anterior left pelvis there is a 1.2 cm nodule with adjacent linear opacity approximately 4 cm in length in an area of previous dense omental caking and metastatic disease. There is resolution of the previous anterior right-sided omental caking with subcentimeter trace of residual nodularity in this location. There are multiple new indeterminate nodular densities in the anterior abdominal wall subcutaneous fat as well as small collections of subcutaneous air, suggesting that these are medication injection sites.  Previous subserosal implants along the inferior liver have resolved. No focal liver lesions. Normal-appearing pancreas, adrenal glands and kidneys. Tiny hypodense spleen lesion is too small to characterize, not previously seen. Spleen otherwise appears normal. No periaortic or pelvic adenopathy with resolution of previous adenopathy. Lobulated enlarged uterus redemonstrated consistent with multiple fibroids. No free fluid. No acute bowel abnormality.  Resolution of mesenteric right lower quadrant nodule is compatible with metastasis. On coronal images the terminal ileum takes an unusual circular course posterior to the right colon, but there is no evidence for obstruction. No aggressive bone lesions.     6/16/17:   = 27     6/28/17:  Robotic total laparoscopic hysterectomy, bilateral salpingo-oophorectomy, lysis of adhesions, omentectomy, optimal tumor debulking to no residual disease                           Pathology:  Minimal serous carcinoma remaining in the left ovary     7/20/17:  Cycle #4 carboplatin AUC 6 and weekly Taxol 80mg/m2. D15 cancelled due to thrombocytopenia (plt 70)   = 18     8/10/17:  Cycle #5 carboplatin AUC 6 and weekly Taxol 80mg/m2. D8 cancelled neutropenia , D15 cancelled thromobocytopenia (plt 53)  = 12     8/31/17:  Cycle #6 carboplatin AUC 5 due to myelosuppression and weekly Taxol 80mg/m2. D15 delayed thrombocytopenia (plt 78)  = 10     12/20/17:  5    4/3/18:  <5    7/9/18:  6    10/4/18:  7    1/14/19:  6    4/4/19:  7    7/8/19:  7    Past Medical History:   Diagnosis Date     Anemia      Cervical spinal stenosis      Depression      GERD (gastroesophageal reflux disease)      Hypertension      Hypokalemia      Hypothyroid      Lumbar spinal stenosis      Obesity      Ovarian cancer (H)      Paraneoplastic neuromyopathy and neuropathy (H)      PE (pulmonary thromboembolism) (H)      Thrombocytopenia (H)        Past Surgical History:   Procedure Laterality Date     AS TOTAL KNEE ARTHROPLASTY Left      BACK SURGERY  2009     CHOLECYSTECTOMY  01/01/2016     COLONOSCOPY N/A 4/20/2018    Procedure: COLONOSCOPY;  Colonoscopy ;  Surgeon: Nila Munson MD;  Location: [facility] OR     CYSTOSCOPY N/A 6/28/2017    Procedure: CYSTOSCOPY;;  Surgeon: Nessa Christina MD;  Location: [facility] OR     DAVINCI HYSTERECTOMY TOTAL, BILATERAL SALPINGO-OOPHORECTMY, NODE  DISSECTION, TUMOR STAGING, COMBINED N/A 6/28/2017    Procedure: COMBINED DAVINCI HYSTERECTOMY TOTAL, SALPINGO-OOPHORECTOMY, NODE DISSECTION, TUMOR STAGING;  Robotic total laparoscopic hysterectomy, bilateral salpingo-oophorectomy, omentectomy, lysis of adhesions, tumor debulking, cystoscopy;  Surgeon: Nessa Christina MD;  Location: [facility] OR     OPEN REDUCTION INTERNAL FIXATION WRIST Right 2009     THYROIDECTOMY         Social History     Socioeconomic History     Marital status:      Spouse name: Christiano Nina     Number of children: 1     Years of education: Not on file     Highest education level: Not on file   Occupational History     Occupation: Current: Retired     Occupation: Former:    Social Needs     Financial resource strain: Not on file     Food insecurity:     Worry: Not on file     Inability: Not on file     Transportation needs:     Medical: Not on file     Non-medical: Not on file   Tobacco Use     Smoking status: Never Smoker     Smokeless tobacco: Never Used   Substance and Sexual Activity     Alcohol use: Yes     Comment: occasionally     Drug use: No     Sexual activity: Not on file   Lifestyle     Physical activity:     Days per week: Not on file     Minutes per session: Not on file     Stress: Not on file   Relationships     Social connections:     Talks on phone: Not on file     Gets together: Not on file     Attends Muslim service: Not on file     Active member of club or organization: Not on file     Attends meetings of clubs or organizations: Not on file     Relationship status: Not on file     Intimate partner violence:     Fear of current or ex partner: Not on file     Emotionally abused: Not on file     Physically abused: Not on file     Forced sexual activity: Not on file   Other Topics Concern     Not on file   Social History Narrative     Not on file       Family History   Problem Relation Age of Onset     Breast Cancer Mother 69     Heart Failure Mother   "    Breast Cancer Maternal Grandmother         this is maternal great grandmother     Breast Cancer Maternal Aunt 89     Myocardial Infarction Father      Unknown/Adopted Brother      Unknown/Adopted Maternal Grandfather      Stomach Cancer Paternal Grandmother         Stomach mass-not sure if cancer     Lung Cancer Paternal Grandfather         [employer] in [place]       Current Outpatient Medications   Medication     cyclobenzaprine (FLEXERIL) 5 MG tablet     escitalopram (LEXAPRO) 10 MG tablet     hydrochlorothiazide (HYDRODIURIL) 25 MG tablet     levothyroxine (SYNTHROID) 125 MCG tablet     losartan (COZAAR) 100 MG tablet     magnesium chloride 535 (64 MG) MG TBCR CR tablet     Multiple Vitamins-Minerals (MULTI FOR HER PO)     order for DME     Potassium Chloride ER 20 MEQ TBCR     rivaroxaban ANTICOAGULANT (XARELTO) 20 MG TABS tablet     Vitamin D, Cholecalciferol, 1000 UNITS TABS     No current facility-administered medications for this visit.           Allergies   Allergen Reactions     Amoxicillin Hives     Clarithromycin Other (See Comments)     \"I felt dopey, sleepy and like a druggie\"     Lisinopril Cough     Penicillins Rash       Review of Systems  10 point ROS negative other than the symptoms noted above in the HPI.      OBJECTIVE:    Physical Exam:    /73   Pulse 81   Temp 97.5  F (36.4  C) (Oral)   Resp 16   Ht 1.499 m (4' 11\")   Wt 79.8 kg (176 lb)   SpO2 93%   BMI 35.55 kg/m      CONSTITUTIONAL: Alert non-toxic appearing female in no acute distress  HEAD: Normocephalic, atraumatic  NECK: Neck supple without lymphadenopathy  RESPIRATORY: Lungs clear to auscultation, no increased work of breathing noted  CV: Regular rate and rhythm, S1S2, no clicks, murmurs, rubs, or gallops; bilateral lower extremities without edema, dorsalis pedis pulses 2+ bilaterally  GASTROINTESTINAL: Normoactive bowel sounds x4 quadrants, abdomen soft, non-distended, and non-tender to palpation without masses or " organomegaly  GENITOURINARY: External genitalia and urethral meatus pink without lesions, masses, or excoriation. Vagina pale, hypoestrogenic without masses or lesions. Vaginal cuff without nodularity, masses, or lesions. Cervix surgically absent. Bimanual exam reveals no masses or fullness. No apparent source of bleeding, no bleeding or irritation on exam. Rectovaginal exam confirms these findings- soft brown stool on glove, no bleeding, no palpable mass.  LYMPHATIC: Cervical, supraclavicular, and inguinal nodes without lymphadenopathy  MUSCULOSKELETAL: Moves all extremities, no obvious muscle wasting  NEUROLOGIC: Able to stand but requires assistance by holding onto a chair, intention tremor to bilateral lower extremities noted  SKIN: Appropriate color for race, warm and dry, no rashes or lesions to unclothed skin  PSYCHIATRIC: Pleasant and interactive, affect bright, makes appropriate eye contact, thought process linear    Data:   7    Assessment/Plan:  1) Surveillance for stage IVB ovarian cancer:  7, exam without findings concerning for recurrent disease. Repeat colonoscopy ordered given her rectal bleeding and history of polyps. No evidence of vaginal bleeding- if this recurs, to have UA/UC. If colonoscopy is normal and she continues to have rectal bleeding, to return to clinic for repeat exam and possible imaging. If symptoms resolve, she may transition to surveillance every six months x3 years (through 8/2022) then annually thereafter with  and pelvic/rectal exam. Port flush and repeat  in one month given her rectal bleeding. Reviewed signs and symptoms  of recurrence including but not limited to bleeding from vagina, bladder, or rectum, bloating, early satiety, swelling in the lower extremities, abdominal or lower back pain, changes in bowel or bladder patterns, shortness of breath, increased fatigue, unexplained weight loss, and night sweats. Reviewed signs and symptoms for when she  should contact the clinic or seek additional care. Patient to contact the clinic with any questions or concerns in the interim.   2) History of paraneoplastic syndrome: Continues to affect her balance and gait but reports this is stable, does home exercises prescribed by ALONSO and Lobito Monahan, goes to gym.  3) Genetic testing: Negative panel testing   4) Labs: , colonoscopy  5) Health maintenance issues discussed today include to follow up with PCP for co-morbid conditions and non-gynecologic issues. Mammogram ordered.  6) Patient verbalized understanding of and agreement with plan.    A total of 30 minutes of face to face time spent with patient, over 50% of which was spent in counseling and coordination of care.    SALVADOR Solis, FNP-C  Division of Gynecologic Oncology  [facility]  Pager: [phone]

## 2019-07-11 ENCOUNTER — HOSPITAL ENCOUNTER (OUTPATIENT)
Facility: CLINIC | Age: 74
End: 2019-07-11
Attending: INTERNAL MEDICINE | Admitting: INTERNAL MEDICINE
Payer: COMMERCIAL

## 2019-07-11 ENCOUNTER — ONCOLOGY VISIT (OUTPATIENT)
Dept: ONCOLOGY | Facility: CLINIC | Age: 74
End: 2019-07-11
Attending: NURSE PRACTITIONER
Payer: COMMERCIAL

## 2019-07-11 ENCOUNTER — HOSPITAL ENCOUNTER (OUTPATIENT)
Facility: CLINIC | Age: 74
Setting detail: SPECIMEN
End: 2019-07-11
Attending: NURSE PRACTITIONER
Payer: COMMERCIAL

## 2019-07-11 VITALS
TEMPERATURE: 97.5 F | RESPIRATION RATE: 16 BRPM | OXYGEN SATURATION: 93 % | SYSTOLIC BLOOD PRESSURE: 133 MMHG | HEIGHT: 59 IN | BODY MASS INDEX: 35.48 KG/M2 | HEART RATE: 81 BPM | DIASTOLIC BLOOD PRESSURE: 73 MMHG | WEIGHT: 176 LBS

## 2019-07-11 DIAGNOSIS — K62.5 RECTAL BLEEDING: ICD-10-CM

## 2019-07-11 DIAGNOSIS — Z08 ENCOUNTER FOR FOLLOW-UP SURVEILLANCE OF OVARIAN CANCER: Primary | ICD-10-CM

## 2019-07-11 DIAGNOSIS — Z85.43 ENCOUNTER FOR FOLLOW-UP SURVEILLANCE OF OVARIAN CANCER: Primary | ICD-10-CM

## 2019-07-11 DIAGNOSIS — Z12.31 ENCOUNTER FOR SCREENING MAMMOGRAM FOR BREAST CANCER: ICD-10-CM

## 2019-07-11 PROCEDURE — G0463 HOSPITAL OUTPT CLINIC VISIT: HCPCS

## 2019-07-11 PROCEDURE — 99214 OFFICE O/P EST MOD 30 MIN: CPT | Performed by: NURSE PRACTITIONER

## 2019-07-11 ASSESSMENT — MIFFLIN-ST. JEOR: SCORE: 1218.96

## 2019-07-11 ASSESSMENT — PAIN SCALES - GENERAL: PAINLEVEL: NO PAIN (0)

## 2019-07-11 NOTE — LETTER
August 1, 2019      Tosin Nina  [address] [address]        Dear [place],     Thank you for choosing [facility]. You are scheduled for the following service(s).   Please be aware that coverage of these services is subject to the terms and limitations of your health insurance plan.  Call member services at your health plan with any benefit or coverage questions.    Date:  8/21/19             Procedure:  COLONOSCOPY  Doctor:        DR Jackie MARMOLEJO   Arrival Time:  9 15 AM  *Check in at Emergency/Endoscopy desk*  Procedure Time:  9 45 AM      Location:   [facility]        Endoscopy Department, First Floor (Enter through ER Doors) *        [address] [place], [place] [address] [phone] or [phone] ([facility]) to reschedule      MIRALAX -GATORADE  PREP  Colonoscopy is the most accurate test to detect colon polyps and colon cancer; and the only test where polyps can be removed. During this procedure, a doctor examines the lining of your large intestine and rectum through a flexible tube.   Transportation  You must arrange for a ride for the day of your procedure with a responsible adult. A taxi , Uber, etc, is not an option unless you are accompanied by a responsible adult. If you fail to arrange transportation with a responsible adult, your procedure will be cancelled and rescheduled.    Purchase the  following supplies at your local pharmacy:  - 2 (two) bisacodyl tablets: each tablet contains 5 mg.  (Dulcolax  laxative NOT Dulcolax  stool softener)   - 1 (one) 8.3 oz bottle of Polyethylene Glycol (PEG) 3350 Powder   (MiraLAX , Smooth LAX , ClearLAX  or equivalent)  - 64 oz Gatorade    Regular Gatorade, Gatorade G2 , Powerade , Powerade Zero  or Pedialyte  is acceptable. Red colored flavors are not allowed; all other colors (yellow, green, orange, purple and blue) are okay. It is also okay to buy two 2.12 oz packets of  powdered Gatorade that can be mixed with water to a total volume of 64 oz of liquid.  - 1 (one) 10 oz bottle of Magnesium Citrate (Red colored flavors are not allowed)  It is also okay for you to use a 0.5 oz package of powdered magnesium citrate (17 g) mixed with 10 oz of water.      PREPARATION FOR COLONOSCOPY    7 days before:    Discontinue fiber supplements and medications containing iron. This includes Metamucil  and Fibercon ; and multivitamins with iron.    3 days before:    Begin a low-fiber diet. A low-fiber diet helps making the cleanout more effective.     Examples of a low-fiber diet include (but are not limited to): white bread, white rice, pasta, crackers, fish, chicken, eggs, ground beef, creamy peanut butter, cooked/steamed/boiled vegetables, canned fruit, bananas, melons, milk, plain yogurt cheese, salad dressing and other condiments.     The following are not allowed on a low-fiber diet: seeds, nuts, popcorn, bran, whole wheat, corn, quinoa, raw fruits and vegetables, berries and dried fruit, beans and lentils.    For additional details on low-fiber diet, please refer to the table on the last page.    2 days before:    Continue the low-fiber diet.     Drink at least 8 glasses of water throughout the day.     Stop eating solid foods at 11:45 pm.  1.   2. 1 day before:    In the morning: begin a clear liquid diet (liquids you can see through).     Examples of a clear liquid diet include: water, clear broth or bouillon, Gatorade, Pedialyte or Powerade, carbonated and non-carbonated soft drinks (Sprite , 7-Up , ginger ale), strained fruit juices without pulp (apple, white grape, white cranberry), Jell-O  and popsicles.     The following are not allowed on a clear liquid diet: red liquids, alcoholic beverages, dairy products (milk, creamer, and yogurt), protein shakes, creamy broths, juice with pulp and chewing tobacco.    At noon: take 2 (two) bisacodyl tablets     At 4 (and no later than 6pm): start  drinking the Miralax-Gatorade preparation (8.3 oz of Miralax mixed with 64 oz of Gatorade in a large pitcher). Drink 1(one) 8 oz glass every 15 minutes thereafter, until the mixture is gone.    COLON CLEANSING TIPS: drink adequate amounts of fluids before and after your colon cleansing to prevent dehydration. Stay near a toilet because you will have diarrhea. Even if you are sitting on the toilet, continue to drink the cleansing solution every 15 minutes. If you feel nauseous or vomit, rinse your mouth with water, take a 15 to 30-minute-break and then continue drinking the solution. You will be uncomfortable until the stool has flushed from your colon (in about 2 to 4 hours). You may feel chilled.    Day of your procedure  You may take all of your morning medications including blood pressure medications, blood thinners (if you have not been instructed to stop these by our office), methadone, anti-seizure medications with sips of water 3 hours prior to your procedure or earlier. Do not take insulin or vitamins prior to your procedure. Continue the clear liquid diet.       4 hours prior: drink 10 oz of magnesium citrate. It may be easier to drink it with a straw.    STOP consuming all liquids after that.     Do not take anything by mouth during this time.     Allow extra time to travel to your procedure as you may need to stop and use a restroom along the way.    You are ready for the procedure, if you followed all instructions and your stool is no longer formed, but clear or yellow liquid. If you are unsure whether your colon is clean, please call our office at [phone] before you leave for your appointment.    Bring the following to your procedure:  - Insurance Card/Photo ID.   - List of current medications including over-the-counter medications and supplements.   - Your rescue inhaler if you currently use one to control asthma.      Canceling or rescheduling your appointment:   If you must cancel or reschedule  your appointment, please call [phone] as soon as possible.      COLONOSCOPY PRE-PROCEDURE CHECKLIST    If you have diabetes, ask your regular doctor for diet and medication restrictions.  If you take an anticoagulant or anti-platelet medication (such as Coumadin , Lovenox , Pradaxa , Xarelto , Eliquis , etc.), please call your primary doctor for advice on holding this medication.  If you take aspirin you may continue to do so.  If you are or may be pregnant, please discuss the risks and benefits of this procedure with your doctor.        What happens during a colonoscopy?    Plan to spend up to two hours, starting at registration time, at the endoscopy center the day of your procedure. The colonoscopy takes an average of 15 to 30 minutes. Recovery time is about 30 minutes.      Before the exam:    You will change into a gown.    Your medical history and medication list will be reviewed with you, unless that has been done over the phone prior to the procedure.     A nurse will insert an intravenous (IV) line into your hand or arm.    The doctor will meet with you and will give you a consent form to sign.  1. During the exam:     Medicine will be given through the IV line to help you relax.     Your heart rate and oxygen levels will be monitored. If your blood pressure is low, you may be given fluids through the IV line.     The doctor will insert a flexible hollow tube, called a colonoscope, into your rectum. The scope will be advanced slowly through the large intestine (colon).    You may have a feeling of fullness or pressure.     If an abnormal tissue or a polyp is found, the doctor may remove it through the endoscope for closer examination, or biopsy. Tissue removal is painless    After the exam:           Any tissue samples removed during the exam will be sent to a lab for evaluation. It may take 5-7 working days for you to be notified of the results.     A nurse will provide you with complete discharge  instructions before you leave the endoscopy center. Be sure to ask the nurse for specific instructions if you take blood thinners such as Aspirin, Coumadin or Plavix.     The doctor will prepare a full report for you and for the physician who referred you for the procedure.     Your doctor will talk with you about the initial results of your exam.      Medication given during the exam will prohibit you from driving for the rest of the day.     Following the exam, you may resume your normal diet. Your first meal should be light, no greasy foods. Avoid alcohol until the next day.     You may resume your regular activities the day after the procedure.         LOW-FIBER DIET    Foods RECOMMENDED Foods to AVOID   Breads, Cereal, Rice and Pasta:   White bread, rolls, biscuits, croissant and krunal toast.   Waffles, Hebrew toast and pancakes.   White rice, noodles, pasta, macaroni and peeled cooked potatoes.   Plain crackers and saltines.   Cooked cereals: farina, cream of rice.   Cold cereals: Puffed Rice , Rice Krispies , Corn Flakes  and Special K    Breads, Cereal, Rice and Pasta:   Breads or rolls with nuts, seeds or fruit.   Whole wheat, pumpernickel, rye breads and cornbread.   Potatoes with skin, brown or wild rice, and kasha (buckwheat).     Vegetables:   Tender cooked and canned vegetables without seeds: carrots, asparagus tips, green or wax beans, pumpkin, spinach, lima beans. Vegetables:   Raw or steamed vegetables.   Vegetables with seeds.   Sauerkraut.   Winter squash, peas, broccoli, Brussel sprouts, cabbage, onions, cauliflower, baked beans, peas and corn.   Fruits:   Strained fruit juice.   Canned fruit, except pineapple.   Ripe bananas and melon. Fruits:   Prunes and prune juice.   Raw fruits.   Dried fruits: figs, dates and raisins.   Milk/Dairy:   Milk: plain or flavored.   Yogurt, custard and ice cream.   Cheese and cottage cheese Milk/Dairy:     Meat and other proteins:   ground, well-cooked tender  beef, lamb, ham, veal, pork, fish, poultry and organ meats.   Eggs.   Peanut butter without nuts. Meat and other proteins:   Tough, fibrous meats with gristle.   Dry beans, peas and lentils.   Peanut butter with nuts.   Tofu.   Fats, Snack, Sweets, Condiments and Beverages:   Margarine, butter, oils, mayonnaise, sour cream and salad dressing, plain gravy.   Sugar, hard candy, clear jelly, honey and syrup.   Spices, cooked herbs, bouillon, broth and soups made with allowed vegetable, ketchup and mustard.   Coffee, tea and carbonated drinks.   Plain cakes, cookies and pretzels.   Gelatin, plain puddings, custard, ice cream, sherbet and popsicles. Fats, Snack, Sweets, Condiments and Beverages:   Nuts, seeds and coconut.   Jam, marmalade and preserves.   Pickles, olives, relish and horseradish.   All desserts containing nuts, seeds, dried fruit and coconut; or made from whole grains or bran.   Candy made with nuts or seeds.   Popcorn.                     DIRECTIONS TO THE ENDOSCOPY DEPARTMENT     From the north ([facility])  Take [address] South, exit on [address] [address]. Get into the left hand anselmo, turn left (east), go one-half mile to [address] and turn left. Go north to the first stoplight, take a right on [address] and follow it to the Emergency entrance.    From the south ([facility])  Take 35N to the [address]E split and exit on [address] [address]. On [address] [address], turn left (west) to [address]. Turn right (north) on [address]. Go north to the first stoplight, take a right on [address] and follow it to the Emergency entrance.    From the east via 35E ([facility])  Take [address] south to [address] [address] exit. Turn right on [address] [address]. Go west to [address]. Turn right (north) on [address]. Go to the first stoplight, take a right and follow on [address] to the Emergency entrance.    From the east via [address] ([facility])  [address]  to [address]. Turn left (south) on [address] to [address]. Turn left (east) on [address] and follow it to the Emergency entrance.    From the west via [address] ([place], [place])  Take [address] east to [address]. Turn right (south) on [address] to [address]. Turn left (east) on [address] and follow it to the Emergency entrance.

## 2019-07-11 NOTE — NURSING NOTE
"Oncology Rooming Note    July 11, 2019 1:50 PM   Tosin Nina is a 73 year old female who presents for:    Chief Complaint   Patient presents with     Oncology Clinic Visit     Encounter for follow-up surveillance of ovarian cancer      Initial Vitals: /73   Pulse 81   Temp 97.5  F (36.4  C) (Oral)   Resp 16   Ht 1.499 m (4' 11\")   Wt 79.8 kg (176 lb)   SpO2 93%   BMI 35.55 kg/m   Estimated body mass index is 35.55 kg/m  as calculated from the following:    Height as of this encounter: 1.499 m (4' 11\").    Weight as of this encounter: 79.8 kg (176 lb). Body surface area is 1.82 meters squared.  No Pain (0) Comment: Data Unavailable   No LMP recorded. Patient is postmenopausal.  Allergies reviewed: Yes  Medications reviewed: Yes    Medications: Medication refills not needed today.  Pharmacy name entered into [employer]:    [facility] [address] [place], [place] - [address] [facility] [address] [facility] [address] [facility]    Clinical concerns: follow up       Vi Delaney CMA              "

## 2019-08-01 NOTE — OR NURSING
WITH THE PREP LETTER THAT SENT TO PT ,PT INSTRUCTED TO CONTACT HER PRIMARY MD REGARDING HER XARELTO AND FOLLOW THE INSTUCTION ,WE RECOMMEND TO STOP IT FOR 3 DAYS ,WE WILL FOLLOW THE INSTRUCTIONS OF THE MD

## 2019-08-09 ENCOUNTER — HOSPITAL ENCOUNTER (OUTPATIENT)
Facility: CLINIC | Age: 74
Setting detail: SPECIMEN
Discharge: HOME | End: 2019-08-09
Attending: NURSE PRACTITIONER | Admitting: NURSE PRACTITIONER
Payer: COMMERCIAL

## 2019-08-09 DIAGNOSIS — Z08 ENCOUNTER FOR FOLLOW-UP SURVEILLANCE OF OVARIAN CANCER: ICD-10-CM

## 2019-08-09 DIAGNOSIS — Z85.43 ENCOUNTER FOR FOLLOW-UP SURVEILLANCE OF OVARIAN CANCER: ICD-10-CM

## 2019-08-09 LAB — CANCER AG125 SERPL-ACNC: 6 U/ML (ref 0–30)

## 2019-08-09 PROCEDURE — 36591 DRAW BLOOD OFF VENOUS DEVICE: CPT

## 2019-08-09 PROCEDURE — 25000128 H RX IP 250 OP 636: Performed by: NURSE PRACTITIONER

## 2019-08-09 PROCEDURE — 86304 IMMUNOASSAY TUMOR CA 125: CPT | Performed by: NURSE PRACTITIONER

## 2019-08-09 RX ORDER — HEPARIN SODIUM (PORCINE) LOCK FLUSH IV SOLN 100 UNIT/ML 100 UNIT/ML
5 SOLUTION INTRAVENOUS EVERY 8 HOURS
Status: DISCONTINUED | OUTPATIENT
Start: 2019-08-09 | End: 2019-08-09 | Stop reason: HOSPADM

## 2019-08-09 RX ADMIN — Medication 5 ML: at 11:42

## 2019-08-09 NOTE — PROGRESS NOTES
Infusion Nursing Note:  Tosin Nina presents today for port labs.    Patient seen by provider today: No   present during visit today: Not Applicable.    Note: N/A.    Intravenous Access:  Lab draw site port, Needle type port, Gauge 20 3/4in.  Labs drawn without difficulty.  Implanted Port.    Treatment Conditions:  Not Applicable.      Post Infusion Assessment:  Patient tolerated procedure without incident.       Discharge Plan:   Discharge instructions reviewed with: Patient.  Patient discharged in stable condition accompanied by: daughter.  Departure Mode: Wheelchair.  Will wait to schedule next appointment until she gets the lab results from this appointment.  She may get her port  removed if labs stable.     TRAVIS COLIN RN

## 2019-09-13 ENCOUNTER — HOSPITAL ENCOUNTER (OUTPATIENT)
Facility: CLINIC | Age: 74
Setting detail: SPECIMEN
End: 2019-09-13
Attending: NURSE PRACTITIONER
Payer: COMMERCIAL

## 2019-09-13 DIAGNOSIS — C56.9 OVARIAN CANCER, UNSPECIFIED LATERALITY (H): ICD-10-CM

## 2019-09-13 DIAGNOSIS — C56.9 MALIGNANT NEOPLASM OF OVARY, UNSPECIFIED LATERALITY (H): ICD-10-CM

## 2019-09-13 DIAGNOSIS — Z51.81 ENCOUNTER FOR THERAPEUTIC DRUG MONITORING: Primary | ICD-10-CM

## 2019-09-13 PROCEDURE — 25000128 H RX IP 250 OP 636: Performed by: OBSTETRICS & GYNECOLOGY

## 2019-09-13 PROCEDURE — 96523 IRRIG DRUG DELIVERY DEVICE: CPT

## 2019-09-13 RX ORDER — HEPARIN SODIUM (PORCINE) LOCK FLUSH IV SOLN 100 UNIT/ML 100 UNIT/ML
5 SOLUTION INTRAVENOUS
Status: DISCONTINUED | OUTPATIENT
Start: 2019-09-13 | End: 2019-09-13 | Stop reason: HOSPADM

## 2019-09-13 RX ORDER — HEPARIN SODIUM (PORCINE) LOCK FLUSH IV SOLN 100 UNIT/ML 100 UNIT/ML
5 SOLUTION INTRAVENOUS
Status: CANCELLED | OUTPATIENT
Start: 2019-09-13

## 2019-09-13 RX ADMIN — Medication 5 ML: at 12:52

## 2019-09-13 NOTE — PROGRESS NOTES
Nursing Note:  Tosin Nina presents today for port flush.    Patient seen by provider today: No   present during visit today: Not Applicable.    Note: Patient having colonoscopy in October and said based on the results she may get her port removed.     Intravenous Access:  Implanted Port.    Discharge Plan:   Patient dc'd in stable condition. Next appt for port flush 10/25    Allyn Boyer, RN, RN

## 2019-10-01 ENCOUNTER — HEALTH MAINTENANCE LETTER (OUTPATIENT)
Age: 74
End: 2019-10-01

## 2019-10-21 DIAGNOSIS — C56.9 OVARIAN CANCER, UNSPECIFIED LATERALITY (H): Primary | ICD-10-CM

## 2019-10-25 ENCOUNTER — HOSPITAL ENCOUNTER (OUTPATIENT)
Facility: CLINIC | Age: 74
Setting detail: SPECIMEN
Discharge: HOME | End: 2019-10-25
Attending: NURSE PRACTITIONER | Admitting: NURSE PRACTITIONER
Payer: COMMERCIAL

## 2019-10-25 DIAGNOSIS — C56.9 MALIGNANT NEOPLASM OF OVARY, UNSPECIFIED LATERALITY (H): ICD-10-CM

## 2019-10-25 DIAGNOSIS — C56.9 OVARIAN CANCER, UNSPECIFIED LATERALITY (H): Primary | ICD-10-CM

## 2019-10-25 DIAGNOSIS — Z51.81 ENCOUNTER FOR THERAPEUTIC DRUG MONITORING: ICD-10-CM

## 2019-10-25 LAB — CANCER AG125 SERPL-ACNC: 7 U/ML (ref 0–30)

## 2019-10-25 PROCEDURE — 36591 DRAW BLOOD OFF VENOUS DEVICE: CPT

## 2019-10-25 PROCEDURE — 25000128 H RX IP 250 OP 636: Performed by: OBSTETRICS & GYNECOLOGY

## 2019-10-25 PROCEDURE — 86304 IMMUNOASSAY TUMOR CA 125: CPT | Performed by: NURSE PRACTITIONER

## 2019-10-25 RX ORDER — HEPARIN SODIUM (PORCINE) LOCK FLUSH IV SOLN 100 UNIT/ML 100 UNIT/ML
5 SOLUTION INTRAVENOUS
Status: DISCONTINUED | OUTPATIENT
Start: 2019-10-25 | End: 2019-10-25 | Stop reason: HOSPADM

## 2019-10-25 RX ORDER — HEPARIN SODIUM (PORCINE) LOCK FLUSH IV SOLN 100 UNIT/ML 100 UNIT/ML
5 SOLUTION INTRAVENOUS
Status: CANCELLED | OUTPATIENT
Start: 2019-10-25

## 2019-10-25 RX ADMIN — Medication 5 ML: at 13:00

## 2019-10-25 NOTE — PROGRESS NOTES
Nursing Note:  Tosin Nina presents today for port labs.    Patient seen by provider today: No   present during visit today: Not Applicable.    Note: N/A.    Intravenous Access:  Labs drawn without difficulty.  Implanted Port.    Discharge Plan:   Patient will call for next port flush.    Phyllis Maldonado, RN, RN

## 2019-12-15 ENCOUNTER — HEALTH MAINTENANCE LETTER (OUTPATIENT)
Age: 74
End: 2019-12-15

## 2020-01-16 ENCOUNTER — ONCOLOGY VISIT (OUTPATIENT)
Dept: ONCOLOGY | Facility: CLINIC | Age: 75
End: 2020-01-16
Attending: NURSE PRACTITIONER
Payer: COMMERCIAL

## 2020-01-16 ENCOUNTER — HOSPITAL ENCOUNTER (OUTPATIENT)
Facility: CLINIC | Age: 75
Setting detail: SPECIMEN
Discharge: HOME | End: 2020-01-16
Attending: NURSE PRACTITIONER | Admitting: NURSE PRACTITIONER
Payer: COMMERCIAL

## 2020-01-16 VITALS
WEIGHT: 186 LBS | DIASTOLIC BLOOD PRESSURE: 73 MMHG | TEMPERATURE: 97.4 F | BODY MASS INDEX: 37.57 KG/M2 | RESPIRATION RATE: 16 BRPM | HEART RATE: 83 BPM | OXYGEN SATURATION: 94 % | SYSTOLIC BLOOD PRESSURE: 135 MMHG

## 2020-01-16 DIAGNOSIS — K43.9 VENTRAL HERNIA WITHOUT OBSTRUCTION OR GANGRENE: ICD-10-CM

## 2020-01-16 DIAGNOSIS — Z85.43 ENCOUNTER FOR FOLLOW-UP SURVEILLANCE OF OVARIAN CANCER: Primary | ICD-10-CM

## 2020-01-16 DIAGNOSIS — C56.9 OVARIAN CANCER, UNSPECIFIED LATERALITY (H): Primary | ICD-10-CM

## 2020-01-16 DIAGNOSIS — C56.9 OVARIAN CANCER, UNSPECIFIED LATERALITY (H): ICD-10-CM

## 2020-01-16 DIAGNOSIS — Z08 ENCOUNTER FOR FOLLOW-UP SURVEILLANCE OF OVARIAN CANCER: Primary | ICD-10-CM

## 2020-01-16 PROCEDURE — 86304 IMMUNOASSAY TUMOR CA 125: CPT | Performed by: NURSE PRACTITIONER

## 2020-01-16 PROCEDURE — 99214 OFFICE O/P EST MOD 30 MIN: CPT | Performed by: NURSE PRACTITIONER

## 2020-01-16 PROCEDURE — G0463 HOSPITAL OUTPT CLINIC VISIT: HCPCS

## 2020-01-16 PROCEDURE — 36591 DRAW BLOOD OFF VENOUS DEVICE: CPT

## 2020-01-16 PROCEDURE — 25000128 H RX IP 250 OP 636: Performed by: NURSE PRACTITIONER

## 2020-01-16 RX ORDER — HEPARIN SODIUM (PORCINE) LOCK FLUSH IV SOLN 100 UNIT/ML 100 UNIT/ML
5 SOLUTION INTRAVENOUS ONCE
Status: COMPLETED | OUTPATIENT
Start: 2020-01-16 | End: 2020-01-16

## 2020-01-16 RX ADMIN — Medication 5 ML: at 13:04

## 2020-01-16 ASSESSMENT — PAIN SCALES - GENERAL: PAINLEVEL: NO PAIN (0)

## 2020-01-16 NOTE — PROGRESS NOTES
Nursing Note:  Tosin Nina presents today for Port labs.    Patient seen by provider today: Yes: Adelina NP   present during visit today: Not Applicable.    Note: N/A.    Intravenous Access:  Lab draw site rigth port, Needle type [place], Gauge 20.  Labs drawn without difficulty.  Implanted Port.    Discharge Plan:   Patient was sent to [facility] for NP appointment.    Chelsea Garcia RN

## 2020-01-16 NOTE — PROGRESS NOTES
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 She completed treatment with six cycles of carboplatin/paclitaxel on 17. She presents today for a six month surveillance visit.    HPI: Namita comes to the clinic accompanied by her daughter Cece. She is feeling well. Her only concern is that she noticed a lump sticking out of her abdomen yesterday while getting her pants on. Not painful and not visible while sitting normally. No concerns about rectal bleeding today or vaginal bleeding.  She remains unsteady and wheelchair bound secondary to sequelae of paraneoplastic syndrome, however, she is very active and exercises frequently.  She is up to date on colonoscopy, mammogram, and regular follow up with PCP. Denies unintended weight loss, fatigue,  changes in vision or hearing, shortness of breath, cough, chest pain,  nausea, vomiting,  bloating, dysuria, urinary frequency or urgency, hematuria, pelvic pain, numbness or tingling, or swelling of the extremities. She is now officially  and is happy about this but also expresses sadness for the dissolution of her marriage. Denies need for intervention.      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  She was discharged to a TCU where she is still residing with some but minimal improvement in her neurologic symptoms.  She still has quite a difficult time seeing especially with her right eye.  She also notes weakness mostly on her left side.  Both of these make it very difficult for her to walk and thus she is having to use a wheelchair for most of her mobility.  She tolerated her first cycle quite well with her biggest side effect being nausea which improved  drastically with a change in anti-emetics.       4/11/17-4/16/17:  Admit to [facility] for worsening dizziness, found to have stage IVB ovarian cancer (inguinal lymphadenopathy) causing paraneoplastic syndrome     4/11/17:  CT C/A/P:  Thrombus identified within the right lower lobe are artery and right upper lobar arteries. The right pulmonary artery is enlarged, similar to prior exams. No CT evidence of right heart strain. No suspicious mediastinal or perihilar lymphadenopathy. Bovine arch anatomy. No suspicious pulmonary nodules, evidence of infarction, or infection. Abdomen and pelvis: There are soft tissue nodules identified along the liver capsule measuring up to 3 cm inferiorly. There is a large amount of omental caking. Enlarged iliac and retroperitoneal lymph nodes for example a 2.6 cm right external iliac lymph node or group of lymph nodes. Heterogeneous enhancement of the uterine fundus could be due to fibroids or a mass. The overall size of the uterus does not appear significantly different than in 2014. The left ovary does appear slightly larger and lobular in appearance compared to prior exam. There is also a nodular area along the round ligament. It was not present on prior exam. There is an irregular lobulated mass involving the sigmoid colon. Abnormal, enlarged inguinal lymph nodes. Soft tissue implant in the posterior right retroperitoneum adjacent to the psoas was not present on prior exam. 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 Spondylolisthesis at L3-4. Small periumbilical hernia containing some of the abnormal omentum.     4/11/17:   268, CEA .6     4/12/17:  IR omental biopsy                           Pathology:  High grade serous carcinoma     4/14/17: Cycle #1 carboplatin AUC 6 and weekly Taxol 80mg/m2.  = 2648     5/5/17:  Cycle #2 carboplatin AUC 6 and weekly Taxol 80mg/m2.  =  370     5/26/17:  Cycle #3 carboplatin AUC 6 and weekly Taxol 80mg/m2.  = 63     6/16/17:  CT C/A/P:  Chest:  Resolution of previous right-sided pulmonary emboli since April. No mediastinal, hilar or axillary adenopathy. No pleural fluid.  Port-A-Cath right superior chest with tip in the SVC.  Short linear fibrosis or discoid atelectasis anterior medial right upper lobe. Lungs otherwise clear. Abdomen and Pelvis: Marked improvement in carcinomatosis and omental metastasis since 4/11/2017. In the anterior left pelvis there is a 1.2 cm nodule with adjacent linear opacity approximately 4 cm in length in an area of previous dense omental caking and metastatic disease. There is resolution of the previous anterior right-sided omental caking with subcentimeter trace of residual nodularity in this location. There are multiple new indeterminate nodular densities in the anterior abdominal wall subcutaneous fat as well as small collections of subcutaneous air, suggesting that these are medication injection sites.  Previous subserosal implants along the inferior liver have resolved. No focal liver lesions. Normal-appearing pancreas, adrenal glands and kidneys. Tiny hypodense spleen lesion is too small to characterize, not previously seen. Spleen otherwise appears normal. No periaortic or pelvic adenopathy with resolution of previous adenopathy. Lobulated enlarged uterus redemonstrated consistent with multiple fibroids. No free fluid. No acute bowel abnormality. Resolution of mesenteric right lower quadrant nodule is compatible with metastasis. On coronal images the terminal ileum takes an unusual circular course posterior to the right colon, but there is no evidence for obstruction. No aggressive bone lesions.     6/16/17:   = 27     6/28/17:  Robotic total laparoscopic hysterectomy, bilateral salpingo-oophorectomy, lysis of adhesions, omentectomy, optimal tumor debulking to no residual disease                            Pathology:  Minimal serous carcinoma remaining in the left ovary     7/20/17:  Cycle #4 carboplatin AUC 6 and weekly Taxol 80mg/m2. D15 cancelled due to thrombocytopenia (plt 70)   = 18     8/10/17:  Cycle #5 carboplatin AUC 6 and weekly Taxol 80mg/m2. D8 cancelled neutropenia , D15 cancelled thromobocytopenia (plt 53)  = 12     8/31/17:  Cycle #6 carboplatin AUC 5 due to myelosuppression and weekly Taxol 80mg/m2. D15 delayed thrombocytopenia (plt 78)  = 10     12/20/17:  5    4/3/18:  <5    7/9/18:  6    10/4/18:  7    1/14/19:  6    4/4/19:  7    7/8/19:  7    Past Medical History:   Diagnosis Date     Anemia      Cervical spinal stenosis      Depression      Depressive disorder      GERD (gastroesophageal reflux disease)      Hypertension      Hypokalemia      Hypothyroid      Lumbar spinal stenosis      Obesity      Ovarian cancer (H)      Paraneoplastic neuromyopathy and neuropathy (H)      PE (pulmonary thromboembolism) (H)      Thrombocytopenia (H)        Past Surgical History:   Procedure Laterality Date     AS TOTAL KNEE ARTHROPLASTY Left      BACK SURGERY  2009     CHOLECYSTECTOMY  01/01/2016     COLONOSCOPY N/A 4/20/2018    Procedure: COLONOSCOPY;  Colonoscopy ;  Surgeon: Nila Munson MD;  Location: [facility] OR     CYSTOSCOPY N/A 6/28/2017    Procedure: CYSTOSCOPY;;  Surgeon: Nessa Christina MD;  Location: [facility] OR     DAVINCI HYSTERECTOMY TOTAL, BILATERAL SALPINGO-OOPHORECTMY, NODE DISSECTION, TUMOR STAGING, COMBINED N/A 6/28/2017    Procedure: COMBINED DAVINCI HYSTERECTOMY TOTAL, SALPINGO-OOPHORECTOMY, NODE DISSECTION, TUMOR STAGING;  Robotic total laparoscopic hysterectomy, bilateral salpingo-oophorectomy, omentectomy, lysis of adhesions, tumor debulking, cystoscopy;  Surgeon: Nessa Christina MD;  Location: [facility] OR     OPEN REDUCTION INTERNAL FIXATION WRIST Right 2009     THYROIDECTOMY         Social History      Socioeconomic History     Marital status:      Spouse name: Christiano Nina     Number of children: 1     Years of education: Not on file     Highest education level: Not on file   Occupational History     Occupation: Current: Retired     Occupation: Former:    Social Needs     Financial resource strain: Not on file     Food insecurity:     Worry: Not on file     Inability: Not on file     Transportation needs:     Medical: Not on file     Non-medical: Not on file   Tobacco Use     Smoking status: Never Smoker     Smokeless tobacco: Never Used   Substance and Sexual Activity     Alcohol use: Yes     Comment: occasionally     Drug use: No     Sexual activity: Not on file   Lifestyle     Physical activity:     Days per week: Not on file     Minutes per session: Not on file     Stress: Not on file   Relationships     Social connections:     Talks on phone: Not on file     Gets together: Not on file     Attends [employer] witness service: Not on file     Active member of club or organization: Not on file     Attends meetings of clubs or organizations: Not on file     Relationship status: Not on file     Intimate partner violence:     Fear of current or ex partner: Not on file     Emotionally abused: Not on file     Physically abused: Not on file     Forced sexual activity: Not on file   Other Topics Concern     Parent/sibling w/ CABG, MI or angioplasty before 65F 55M? Not Asked   Social History Narrative     Not on file       Family History   Problem Relation Age of Onset     Breast Cancer Mother 69     Heart Failure Mother      Breast Cancer Maternal Grandmother         this is maternal great grandmother     Breast Cancer Maternal Aunt 89     Myocardial Infarction Father      Unknown/Adopted Brother      Unknown/Adopted Maternal Grandfather      Stomach Cancer Paternal Grandmother         Stomach mass-not sure if cancer     Lung Cancer Paternal Grandfather         must[employer] in [place]       Current  "Outpatient Medications   Medication     escitalopram (LEXAPRO) 10 MG tablet     hydrochlorothiazide (HYDRODIURIL) 25 MG tablet     levothyroxine (SYNTHROID) 125 MCG tablet     losartan (COZAAR) 100 MG tablet     magnesium chloride 535 (64 MG) MG TBCR CR tablet     order for DME     Potassium Chloride ER 20 MEQ TBCR     rivaroxaban ANTICOAGULANT (XARELTO) 20 MG TABS tablet     UNABLE TO FIND     Vitamin D, Cholecalciferol, 1000 UNITS TABS     Multiple Vitamins-Minerals (MULTI FOR HER PO)     No current facility-administered medications for this visit.      Facility-Administered Medications Ordered in Other Visits   Medication     sodium chloride (PF) 0.9% PF flush 10 mL          Allergies   Allergen Reactions     Amoxicillin Hives     Clarithromycin Other (See Comments)     \"I felt dopey, sleepy and like a druggie\"     Lisinopril Cough     Penicillins Rash       Review of Systems  10 point ROS negative other than the symptoms noted above in the HPI.      OBJECTIVE:    Physical Exam:    /73   Pulse 83   Temp 97.4  F (36.3  C) (Tympanic)   Resp 16   Wt 84.4 kg (186 lb)   SpO2 94%   BMI 37.57 kg/m      CONSTITUTIONAL: Alert non-toxic appearing female in no acute distress  HEAD: Normocephalic, atraumatic  NECK: Neck supple without lymphadenopathy  RESPIRATORY: Lungs clear to auscultation, no increased work of breathing noted  CV: Regular rate and rhythm, S1S2, no clicks, murmurs, rubs, or gallops; bilateral lower extremities without edema, dorsalis pedis pulses 2+ bilaterally  GASTROINTESTINAL: Normoactive bowel sounds x4 quadrants, abdomen soft, non-distended, and non-tender to palpation without masses or organomegaly; soft reducible ventral hernia palpated with Valsalva maneuver  GENITOURINARY: External genitalia and urethral meatus pink without lesions, masses, or excoriation. Vagina pale, hypoestrogenic without masses or lesions. Vaginal cuff without nodularity, masses, or lesions. Cervix surgically " absent. Bimanual exam reveals no masses or fullness. Rectovaginal exam confirms these findings.  LYMPHATIC: Cervical, supraclavicular, and inguinal nodes without lymphadenopathy  MUSCULOSKELETAL: Moves all extremities, no obvious muscle wasting  NEUROLOGIC: Able to stand but requires assistance by holding onto a chair, intention tremor to bilateral lower extremities noted  SKIN: Appropriate color for race, warm and dry, no rashes or lesions to unclothed skin  PSYCHIATRIC: Pleasant and interactive, affect bright, makes appropriate eye contact, thought process linear    Data:   pending    Assessment/Plan:  1) Surveillance for stage IVB ovarian cancer: No evidence of recurrent disease,  pending. Continue surveillance every six months x3 years (through 8/2022) then annually thereafter with  and pelvic/rectal exam. Port removal previously ordered- she would like to schedule this. Reviewed signs and symptoms  of recurrence including but not limited to bleeding from vagina, bladder, or rectum, bloating, early satiety, swelling in the lower extremities, abdominal or lower back pain, changes in bowel or bladder patterns, shortness of breath, increased fatigue, unexplained weight loss, and night sweats. Reviewed signs and symptoms for when she should contact the clinic or seek additional care. Patient to contact the clinic with any questions or concerns in the interim.   2) History of paraneoplastic syndrome: Continues to affect her balance and gait but reports this is stable, exercising frequently  3) Ventral hernia: Soft and reducible. Discussed s/sxs strangulation and when to seek care. Given written information on hernia as well.  4) Genetic testing: Negative panel testing   5) Labs:   6) Health maintenance issues discussed today include to follow up with PCP for co-morbid conditions and non-gynecologic issues.   7) Patient verbalized understanding of and agreement with plan.    A total of 20  minutes of face to face time spent with patient, over 50% of which was spent in counseling and coordination of care.    SALVADOR Solis, FNP-C  Division of Gynecologic Oncology  [facility]  Pager: [phone]

## 2020-01-16 NOTE — NURSING NOTE
"Oncology Rooming Note    January 16, 2020 1:18 PM   Tosin Nina is a 74 year old female who presents for:    Chief Complaint   Patient presents with     Oncology Clinic Visit     Encounter for follow-up surveillance of ovarian cancer      Initial Vitals: /73   Pulse 83   Temp 97.4  F (36.3  C) (Tympanic)   Resp 16   Wt 84.4 kg (186 lb)   SpO2 94%   BMI 37.57 kg/m   Estimated body mass index is 37.57 kg/m  as calculated from the following:    Height as of 7/11/19: 1.499 m (4' 11\").    Weight as of this encounter: 84.4 kg (186 lb). Body surface area is 1.87 meters squared.  No Pain (0) Comment: Data Unavailable   No LMP recorded. Patient is postmenopausal.  Allergies reviewed: Yes  Medications reviewed: Yes    Medications: Medication refills not needed today.  Pharmacy name entered into [facility]:    [id number] - [place], [place] - [address] [facility] [address] [facility]    Clinical concerns: follow up       Vi Delaney CMA              "

## 2020-01-16 NOTE — LETTER
2020         RE: Tosin Nina  [address]  [place] [address]        Dear Colleague,    Thank you for referring your patient, Tosin Nina, to the [facility]. Please see a copy of my visit note below.    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 She completed treatment with six cycles of carboplatin/paclitaxel on 17. She presents today for a six month surveillance visit.    HPI: Namita comes to the clinic accompanied by her daughter Cece. She is feeling well. Her only concern is that she noticed a lump sticking out of her abdomen yesterday while getting her pants on. Not painful and not visible while sitting normally. No concerns about rectal bleeding today or vaginal bleeding.  She remains unsteady and wheelchair bound secondary to sequelae of paraneoplastic syndrome, however, she is very active and exercises frequently.  She is up to date on colonoscopy, mammogram, and regular follow up with PCP. Denies unintended weight loss, fatigue,  changes in vision or hearing, shortness of breath, cough, chest pain,  nausea, vomiting,  bloating, dysuria, urinary frequency or urgency, hematuria, pelvic pain, numbness or tingling, or swelling of the extremities. She is now officially  and is happy about this but also expresses sadness for the dissolution of her marriage. Denies need for intervention.      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  She was discharged to a TCU where she is still residing with some but minimal improvement in her neurologic symptoms.  She still has quite a difficult time seeing especially with her right eye.  She also notes  weakness mostly on her left side.  Both of these make it very difficult for her to walk and thus she is having to use a wheelchair for most of her mobility.  She tolerated her first cycle quite well with her biggest side effect being nausea which improved drastically with a change in anti-emetics.       4/11/17-4/16/17:  Admit to [facility] for worsening dizziness, found to have stage IVB ovarian cancer (inguinal lymphadenopathy) causing paraneoplastic syndrome     4/11/17:  CT C/A/P:  Thrombus identified within the right lower lobe are artery and right upper lobar arteries. The right pulmonary artery is enlarged, similar to prior exams. No CT evidence of right heart strain. No suspicious mediastinal or perihilar lymphadenopathy. Bovine arch anatomy. No suspicious pulmonary nodules, evidence of infarction, or infection. Abdomen and pelvis: There are soft tissue nodules identified along the liver capsule measuring up to 3 cm inferiorly. There is a large amount of omental caking. Enlarged iliac and retroperitoneal lymph nodes for example a 2.6 cm right external iliac lymph node or group of lymph nodes. Heterogeneous enhancement of the uterine fundus could be due to fibroids or a mass. The overall size of the uterus does not appear significantly different than in 2014. The left ovary does appear slightly larger and lobular in appearance compared to prior exam. There is also a nodular area along the round ligament. It was not present on prior exam. There is an irregular lobulated mass involving the sigmoid colon. Abnormal, enlarged inguinal lymph nodes. Soft tissue implant in the posterior right retroperitoneum adjacent to the psoas was not present on prior exam. 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 Spondylolisthesis at L3-4. Small periumbilical hernia containing some of the abnormal omentum.     4/11/17:   268, CEA  .6     4/12/17:  IR omental biopsy                           Pathology:  High grade serous carcinoma     4/14/17: Cycle #1 carboplatin AUC 6 and weekly Taxol 80mg/m2.  = 2648     5/5/17:  Cycle #2 carboplatin AUC 6 and weekly Taxol 80mg/m2.  = 370     5/26/17:  Cycle #3 carboplatin AUC 6 and weekly Taxol 80mg/m2.  = 63     6/16/17:  CT C/A/P:  Chest:  Resolution of previous right-sided pulmonary emboli since April. No mediastinal, hilar or axillary adenopathy. No pleural fluid.  Port-A-Cath right superior chest with tip in the SVC.  Short linear fibrosis or discoid atelectasis anterior medial right upper lobe. Lungs otherwise clear. Abdomen and Pelvis: Marked improvement in carcinomatosis and omental metastasis since 4/11/2017. In the anterior left pelvis there is a 1.2 cm nodule with adjacent linear opacity approximately 4 cm in length in an area of previous dense omental caking and metastatic disease. There is resolution of the previous anterior right-sided omental caking with subcentimeter trace of residual nodularity in this location. There are multiple new indeterminate nodular densities in the anterior abdominal wall subcutaneous fat as well as small collections of subcutaneous air, suggesting that these are medication injection sites.  Previous subserosal implants along the inferior liver have resolved. No focal liver lesions. Normal-appearing pancreas, adrenal glands and kidneys. Tiny hypodense spleen lesion is too small to characterize, not previously seen. Spleen otherwise appears normal. No periaortic or pelvic adenopathy with resolution of previous adenopathy. Lobulated enlarged uterus redemonstrated consistent with multiple fibroids. No free fluid. No acute bowel abnormality. Resolution of mesenteric right lower quadrant nodule is compatible with metastasis. On coronal images the terminal ileum takes an unusual circular course posterior to the right colon, but there is no evidence for  obstruction. No aggressive bone lesions.     6/16/17:   = 27     6/28/17:  Robotic total laparoscopic hysterectomy, bilateral salpingo-oophorectomy, lysis of adhesions, omentectomy, optimal tumor debulking to no residual disease                           Pathology:  Minimal serous carcinoma remaining in the left ovary     7/20/17:  Cycle #4 carboplatin AUC 6 and weekly Taxol 80mg/m2. D15 cancelled due to thrombocytopenia (plt 70)   = 18     8/10/17:  Cycle #5 carboplatin AUC 6 and weekly Taxol 80mg/m2. D8 cancelled neutropenia , D15 cancelled thromobocytopenia (plt 53)  = 12     8/31/17:  Cycle #6 carboplatin AUC 5 due to myelosuppression and weekly Taxol 80mg/m2. D15 delayed thrombocytopenia (plt 78)  = 10     12/20/17:  5    4/3/18:  <5    7/9/18:  6    10/4/18:  7    1/14/19:  6    4/4/19:  7    7/8/19:  7    Past Medical History:   Diagnosis Date     Anemia      Cervical spinal stenosis      Depression      Depressive disorder      GERD (gastroesophageal reflux disease)      Hypertension      Hypokalemia      Hypothyroid      Lumbar spinal stenosis      Obesity      Ovarian cancer (H)      Paraneoplastic neuromyopathy and neuropathy (H)      PE (pulmonary thromboembolism) (H)      Thrombocytopenia (H)        Past Surgical History:   Procedure Laterality Date     AS TOTAL KNEE ARTHROPLASTY Left      BACK SURGERY  2009     CHOLECYSTECTOMY  01/01/2016     COLONOSCOPY N/A 4/20/2018    Procedure: COLONOSCOPY;  Colonoscopy ;  Surgeon: Nila Munson MD;  Location: [facility] OR     CYSTOSCOPY N/A 6/28/2017    Procedure: CYSTOSCOPY;;  Surgeon: Nessa Christina MD;  Location: [facility] OR     DAVINCI HYSTERECTOMY TOTAL, BILATERAL SALPINGO-OOPHORECTMY, NODE DISSECTION, TUMOR STAGING, COMBINED N/A 6/28/2017    Procedure: COMBINED DAVINCI HYSTERECTOMY TOTAL, SALPINGO-OOPHORECTOMY, NODE DISSECTION, TUMOR STAGING;  Robotic total laparoscopic  hysterectomy, bilateral salpingo-oophorectomy, omentectomy, lysis of adhesions, tumor debulking, cystoscopy;  Surgeon: Nessa Christina MD;  Location: [facility] OR     OPEN REDUCTION INTERNAL FIXATION WRIST Right 2009     THYROIDECTOMY         Social History     Socioeconomic History     Marital status:      Spouse name: Christiano Nina     Number of children: 1     Years of education: Not on file     Highest education level: Not on file   Occupational History     Occupation: Current: Retired     Occupation: Former:    Social Needs     Financial resource strain: Not on file     Food insecurity:     Worry: Not on file     Inability: Not on file     Transportation needs:     Medical: Not on file     Non-medical: Not on file   Tobacco Use     Smoking status: Never Smoker     Smokeless tobacco: Never Used   Substance and Sexual Activity     Alcohol use: Yes     Comment: occasionally     Drug use: No     Sexual activity: Not on file   Lifestyle     Physical activity:     Days per week: Not on file     Minutes per session: Not on file     Stress: Not on file   Relationships     Social connections:     Talks on phone: Not on file     Gets together: Not on file     Attends Judaism service: Not on file     Active member of club or organization: Not on file     Attends meetings of clubs or organizations: Not on file     Relationship status: Not on file     Intimate partner violence:     Fear of current or ex partner: Not on file     Emotionally abused: Not on file     Physically abused: Not on file     Forced sexual activity: Not on file   Other Topics Concern     Parent/sibling w/ CABG, MI or angioplasty before 65F 55M? Not Asked   Social History Narrative     Not on file       Family History   Problem Relation Age of Onset     Breast Cancer Mother 69     Heart Failure Mother      Breast Cancer Maternal Grandmother         this is maternal great grandmother     Breast Cancer Maternal Aunt 89      "Myocardial Infarction Father      Unknown/Adopted Brother      Unknown/Adopted Maternal Grandfather      Stomach Cancer Paternal Grandmother         Stomach mass-not sure if cancer     Lung Cancer Paternal Grandfather         must[employer] in WW[place]       Current Outpatient Medications   Medication     escitalopram (LEXAPRO) 10 MG tablet     hydrochlorothiazide (HYDRODIURIL) 25 MG tablet     levothyroxine (SYNTHROID) 125 MCG tablet     losartan (COZAAR) 100 MG tablet     magnesium chloride 535 (64 MG) MG TBCR CR tablet     order for DME     Potassium Chloride ER 20 MEQ TBCR     rivaroxaban ANTICOAGULANT (XARELTO) 20 MG TABS tablet     UNABLE TO FIND     Vitamin D, Cholecalciferol, 1000 UNITS TABS     Multiple Vitamins-Minerals (MULTI FOR HER PO)     No current facility-administered medications for this visit.      Facility-Administered Medications Ordered in Other Visits   Medication     sodium chloride (PF) 0.9% PF flush 10 mL          Allergies   Allergen Reactions     Amoxicillin Hives     Clarithromycin Other (See Comments)     \"I felt dopey, sleepy and like a druggie\"     Lisinopril Cough     Penicillins Rash       Review of Systems  10 point ROS negative other than the symptoms noted above in the HPI.      OBJECTIVE:    Physical Exam:    /73   Pulse 83   Temp 97.4  F (36.3  C) (Tympanic)   Resp 16   Wt 84.4 kg (186 lb)   SpO2 94%   BMI 37.57 kg/m       CONSTITUTIONAL: Alert non-toxic appearing female in no acute distress  HEAD: Normocephalic, atraumatic  NECK: Neck supple without lymphadenopathy  RESPIRATORY: Lungs clear to auscultation, no increased work of breathing noted  CV: Regular rate and rhythm, S1S2, no clicks, murmurs, rubs, or gallops; bilateral lower extremities without edema, dorsalis pedis pulses 2+ bilaterally  GASTROINTESTINAL: Normoactive bowel sounds x4 quadrants, abdomen soft, non-distended, and non-tender to palpation without masses or organomegaly; soft reducible ventral hernia " palpated with Valsalva maneuver  GENITOURINARY: External genitalia and urethral meatus pink without lesions, masses, or excoriation. Vagina pale, hypoestrogenic without masses or lesions. Vaginal cuff without nodularity, masses, or lesions. Cervix surgically absent. Bimanual exam reveals no masses or fullness. Rectovaginal exam confirms these findings.  LYMPHATIC: Cervical, supraclavicular, and inguinal nodes without lymphadenopathy  MUSCULOSKELETAL: Moves all extremities, no obvious muscle wasting  NEUROLOGIC: Able to stand but requires assistance by holding onto a chair, intention tremor to bilateral lower extremities noted  SKIN: Appropriate color for race, warm and dry, no rashes or lesions to unclothed skin  PSYCHIATRIC: Pleasant and interactive, affect bright, makes appropriate eye contact, thought process linear    Data:   pending    Assessment/Plan:  1) Surveillance for stage IVB ovarian cancer: No evidence of recurrent disease,  pending. Continue surveillance every six months x3 years (through 8/2022) then annually thereafter with  and pelvic/rectal exam. Port removal previously ordered- she would like to schedule this. Reviewed signs and symptoms  of recurrence including but not limited to bleeding from vagina, bladder, or rectum, bloating, early satiety, swelling in the lower extremities, abdominal or lower back pain, changes in bowel or bladder patterns, shortness of breath, increased fatigue, unexplained weight loss, and night sweats. Reviewed signs and symptoms for when she should contact the clinic or seek additional care. Patient to contact the clinic with any questions or concerns in the interim.   2) History of paraneoplastic syndrome: Continues to affect her balance and gait but reports this is stable, exercising frequently  3) Ventral hernia: Soft and reducible. Discussed s/sxs strangulation and when to seek care. Given written information on hernia as well.  4) Genetic testing:  Negative panel testing   5) Labs:   6) Health maintenance issues discussed today include to follow up with PCP for co-morbid conditions and non-gynecologic issues.   7) Patient verbalized understanding of and agreement with plan.    A total of 20 minutes of face to face time spent with patient, over 50% of which was spent in counseling and coordination of care.    SALVADOR Solis, ELIP-C  Division of Gynecologic Oncology  [facility]  Pager: [phone]              Again, thank you for allowing me to participate in the care of your patient.        Sincerely,        SALVADOR Solis CNP

## 2020-01-16 NOTE — PATIENT INSTRUCTIONS
Patient Education     Hernia (Adult)    A hernia can happen when there is a weakness or defect in the wall of the abdomen or groin. Intestines or nearby tissues may move from their usual location and push through the weakness in the wall. This can cause a hernia (bulge) you may see or feel.  Causes and risk factors   A hernia may be present at birth. Or it may be caused by the wear and tear of daily living. Certain factors can make a hernia more likely. These can include:    Heavy lifting    Straining, whether from lifting, movement, or constipation    Chronic cough    Injury to the abdominal wall    Excess weight    Pregnancy    Prior surgery    Older age    Family history of hernia  Symptoms  Symptoms of a hernia may come on suddenly. Or they may appear slowly over time. Some common symptoms include:    Bulge in the groin area, around the navel, or in the scrotum (the bulge may get bigger when you stand and go away when you lie down)    Pain or pressure around the bulge    Pain during activities such as lifting, coughing, or sneezing    A feeling of weakness or pressure in the groin    Pain or swelling in the scrotum  Types of hernias  There are different types of hernia. The type you have depends on its location:    Inguinal. This type is in the groin or scrotum. It is more common in men. But, women can get this hernia, too.    Femoral. This type is in the groin, upper thigh (where the leg bends), or labia. It is more common in women.    Ventral. This type is in the abdominal wall.    Umbilical. This type occurs around the navel (belly button).    Incisional. This type occurs at the site of a previous surgery.  The condition of the hernia can help determine how urgently it needs to be treated.    Reducible. It goes back in by itself, or it can be pushed back in.    Irreducible. It can t be pushed back in.    Incarcerated/strangulated. The intestine is trapped (incarcerated). If this happens, you won t be able to  push the bulge back in. If the incarcerated hernia isn t treated, it may become strangulated. This means the area loses blood supply and the tissue may die. This requires emergency surgery. You need treatment right away.  In most cases, a hernia will not heal on its own.You may need surgery to repair the defect in the abdominal wall or groin. You ll be told more about surgery, if needed.  If your symptoms are not severe, treatment may sometimes be delayed. In such cases, you will need regular follow-up visits with the provider. You ll be asked to keep track of your symptoms and to watch for signs of more serious problems. You may also be given guidelines similar to the home care instructions below.  Home care  To help keep a hernia from getting worse, you may be advised to:    Avoid heavy lifting and straining as directed.    Take steps to prevent constipation, such as eating more fiber and drinking more water. This may help reduce straining that can occur when having a bowel movement. Reducing straining may help keep your symptoms from getting worse.    Maintain a healthy weight or lose excess weight. This can help reduce strain on abdominal muscles and tissues.    Stop smoking. This can help prevent coughing that may also strain abdominal muscles and tissues.  Follow-up care  Follow up with your healthcare provider, or as directed. If imaging tests were done, they will be reviewed a doctor. You will be told the results and any new findings that may affect your care.  When to seek medical advice  Call your healthcare provider right away if any of these occur:    Hernia hardens, swells, or grows larger    Hernia can no longer be pushed back in    Pain moves to the lower right abdomen (just below the waistline), or spreads to the back  Call 911  Call 911 if any of these occur:    Severe pain, redness, or tenderness in the area near the hernia    Pain worsens quickly and doesn t get better    Inability to have a bowel  movement or pass gas    Fever of 100.4 F (38 C) or higher, or as directed by your healthcare provider  Date Last Reviewed: 3/1/2018    1351-6663 [employer]. [address] [place] [address], [place] [address]. All rights reserved. This information is not intended as a substitute for professional medical care. Always follow your healthcare professional's instructions.

## 2020-01-17 ENCOUNTER — PATIENT OUTREACH (OUTPATIENT)
Dept: ONCOLOGY | Facility: CLINIC | Age: 75
End: 2020-01-17

## 2020-01-17 LAB — CANCER AG125 SERPL-ACNC: <5 U/ML (ref 0–30)

## 2020-01-17 NOTE — PROGRESS NOTES
Ordering Clinic: Dr. Ennis, oncology  Procedure: port removal no longer needed  Arrival date: 2.10.2020  Arrival time: 1000  NPO explained: yes                 Does patient have transportation available pre and post procedure?   yes  Check-in procedure explained: yes  Allergies reviewed: no  Blood thinners: yes, xarelto Dr. Ennis will address with patient for a 2 day hold prior to procedure  Labs: see EPIC  H&P:  See EPIC  Does patient require ?    no  If so, language?      services called to order ?    date requested:    arrival time requested:    Name of individual from  services assisting with scheduling appointment:    Previous sedation:  Last oral intake:    solid: ________  Liquids: _______

## 2020-01-17 NOTE — PROGRESS NOTES
Message left for Gaetano at IR with Xarelto instructions. Patient currently on Xarelto and due to have port removed. Per Dr. Ennis hold Xarelto 2 days prior to port removal. Patient is aware of these instructions and is scheduled on 2/10/2020.  Phyllis Dover RN on 1/17/2020 at 8:30 AM

## 2020-02-10 ENCOUNTER — HOSPITAL ENCOUNTER (OUTPATIENT)
Facility: CLINIC | Age: 75
Discharge: HOME | End: 2020-02-10
Attending: RADIOLOGY | Admitting: RADIOLOGY
Payer: COMMERCIAL

## 2020-02-10 VITALS
RESPIRATION RATE: 16 BRPM | OXYGEN SATURATION: 97 % | HEART RATE: 67 BPM | DIASTOLIC BLOOD PRESSURE: 51 MMHG | SYSTOLIC BLOOD PRESSURE: 145 MMHG

## 2020-02-10 DIAGNOSIS — C56.9 OVARIAN CANCER, UNSPECIFIED LATERALITY (H): ICD-10-CM

## 2020-02-10 RX ORDER — LIDOCAINE 40 MG/G
CREAM TOPICAL
Status: DISCONTINUED | OUTPATIENT
Start: 2020-02-10 | End: 2020-02-10 | Stop reason: HOSPADM

## 2020-02-10 RX ORDER — LIDOCAINE HYDROCHLORIDE AND EPINEPHRINE 10; 10 MG/ML; UG/ML
1-30 INJECTION, SOLUTION INFILTRATION; PERINEURAL
Status: DISCONTINUED | OUTPATIENT
Start: 2020-02-10 | End: 2020-02-10 | Stop reason: HOSPADM

## 2020-02-10 RX ORDER — NICOTINE POLACRILEX 4 MG
15-30 LOZENGE BUCCAL
Status: DISCONTINUED | OUTPATIENT
Start: 2020-02-10 | End: 2020-02-10

## 2020-02-10 RX ORDER — LIDOCAINE HYDROCHLORIDE AND EPINEPHRINE 10; 10 MG/ML; UG/ML
INJECTION, SOLUTION INFILTRATION; PERINEURAL
Status: DISCONTINUED
Start: 2020-02-10 | End: 2020-02-10 | Stop reason: HOSPADM

## 2020-02-10 RX ORDER — HEPARIN SODIUM 200 [USP'U]/100ML
500 INJECTION, SOLUTION INTRAVENOUS CONTINUOUS PRN
Status: DISCONTINUED | OUTPATIENT
Start: 2020-02-10 | End: 2020-02-10 | Stop reason: HOSPADM

## 2020-02-10 RX ORDER — DEXTROSE MONOHYDRATE 25 G/50ML
25-50 INJECTION, SOLUTION INTRAVENOUS
Status: DISCONTINUED | OUTPATIENT
Start: 2020-02-10 | End: 2020-02-10

## 2020-02-10 RX ORDER — NICOTINE POLACRILEX 4 MG
15-30 LOZENGE BUCCAL
Status: DISCONTINUED | OUTPATIENT
Start: 2020-02-10 | End: 2020-02-10 | Stop reason: HOSPADM

## 2020-02-10 RX ORDER — DEXTROSE MONOHYDRATE 25 G/50ML
25-50 INJECTION, SOLUTION INTRAVENOUS
Status: DISCONTINUED | OUTPATIENT
Start: 2020-02-10 | End: 2020-02-10 | Stop reason: HOSPADM

## 2020-02-10 RX ORDER — LIDOCAINE HYDROCHLORIDE 10 MG/ML
INJECTION, SOLUTION EPIDURAL; INFILTRATION; INTRACAUDAL; PERINEURAL
Status: DISCONTINUED
Start: 2020-02-10 | End: 2020-02-10 | Stop reason: HOSPADM

## 2020-02-10 RX ORDER — LIDOCAINE HYDROCHLORIDE 10 MG/ML
1-30 INJECTION, SOLUTION EPIDURAL; INFILTRATION; INTRACAUDAL; PERINEURAL
Status: DISCONTINUED | OUTPATIENT
Start: 2020-02-10 | End: 2020-02-10 | Stop reason: HOSPADM

## 2020-02-10 RX ORDER — FENTANYL CITRATE 50 UG/ML
INJECTION, SOLUTION INTRAMUSCULAR; INTRAVENOUS
Status: DISCONTINUED
Start: 2020-02-10 | End: 2020-02-10 | Stop reason: WASHOUT

## 2020-03-09 ENCOUNTER — HOSPITAL ENCOUNTER (OUTPATIENT)
Facility: CLINIC | Age: 75
Discharge: HOME | End: 2020-03-09
Attending: RADIOLOGY | Admitting: RADIOLOGY
Payer: COMMERCIAL

## 2020-03-09 ENCOUNTER — HOSPITAL ENCOUNTER (OUTPATIENT)
Facility: CLINIC | Age: 75
Discharge: HOME | End: 2020-03-10
Attending: RADIOLOGY | Admitting: RADIOLOGY
Payer: COMMERCIAL

## 2020-03-09 ENCOUNTER — APPOINTMENT (OUTPATIENT)
Dept: INTERVENTIONAL RADIOLOGY/VASCULAR | Facility: CLINIC | Age: 75
End: 2020-03-09
Attending: NURSE PRACTITIONER
Payer: COMMERCIAL

## 2020-03-09 VITALS
SYSTOLIC BLOOD PRESSURE: 136 MMHG | OXYGEN SATURATION: 97 % | DIASTOLIC BLOOD PRESSURE: 71 MMHG | RESPIRATION RATE: 16 BRPM | HEART RATE: 74 BPM | TEMPERATURE: 98.4 F

## 2020-03-09 DIAGNOSIS — C80.1 CANCER (H): ICD-10-CM

## 2020-03-09 PROCEDURE — 25000128 H RX IP 250 OP 636: Performed by: RADIOLOGY

## 2020-03-09 PROCEDURE — 25000125 ZZHC RX 250: Performed by: RADIOLOGY

## 2020-03-09 PROCEDURE — 27210820 ZZH WOUND GLUE CR3

## 2020-03-09 PROCEDURE — 99153 MOD SED SAME PHYS/QHP EA: CPT

## 2020-03-09 PROCEDURE — 36590 REMOVAL TUNNELED CV CATH: CPT

## 2020-03-09 PROCEDURE — 99152 MOD SED SAME PHYS/QHP 5/>YRS: CPT

## 2020-03-09 PROCEDURE — 25000125 ZZHC RX 250

## 2020-03-09 RX ORDER — HEPARIN SODIUM 200 [USP'U]/100ML
500 INJECTION, SOLUTION INTRAVENOUS CONTINUOUS PRN
Status: DISCONTINUED | OUTPATIENT
Start: 2020-03-09 | End: 2020-03-09 | Stop reason: HOSPADM

## 2020-03-09 RX ORDER — LIDOCAINE HYDROCHLORIDE 10 MG/ML
INJECTION, SOLUTION EPIDURAL; INFILTRATION; INTRACAUDAL; PERINEURAL
Status: DISCONTINUED
Start: 2020-03-09 | End: 2020-03-09 | Stop reason: HOSPADM

## 2020-03-09 RX ORDER — FLUMAZENIL 0.1 MG/ML
0.2 INJECTION, SOLUTION INTRAVENOUS
Status: DISCONTINUED | OUTPATIENT
Start: 2020-03-09 | End: 2020-03-09 | Stop reason: HOSPADM

## 2020-03-09 RX ORDER — DEXTROSE MONOHYDRATE 25 G/50ML
25-50 INJECTION, SOLUTION INTRAVENOUS
Status: DISCONTINUED | OUTPATIENT
Start: 2020-03-09 | End: 2020-03-09 | Stop reason: HOSPADM

## 2020-03-09 RX ORDER — NICOTINE POLACRILEX 4 MG
15-30 LOZENGE BUCCAL
Status: CANCELLED | OUTPATIENT
Start: 2020-03-09

## 2020-03-09 RX ORDER — NICOTINE POLACRILEX 4 MG
15-30 LOZENGE BUCCAL
Status: DISCONTINUED | OUTPATIENT
Start: 2020-03-09 | End: 2020-03-09 | Stop reason: HOSPADM

## 2020-03-09 RX ORDER — LIDOCAINE HYDROCHLORIDE AND EPINEPHRINE 10; 10 MG/ML; UG/ML
INJECTION, SOLUTION INFILTRATION; PERINEURAL
Status: COMPLETED
Start: 2020-03-09 | End: 2020-03-09

## 2020-03-09 RX ORDER — FENTANYL CITRATE 50 UG/ML
25-50 INJECTION, SOLUTION INTRAMUSCULAR; INTRAVENOUS EVERY 5 MIN PRN
Status: DISCONTINUED | OUTPATIENT
Start: 2020-03-09 | End: 2020-03-09 | Stop reason: HOSPADM

## 2020-03-09 RX ORDER — NALOXONE HYDROCHLORIDE 0.4 MG/ML
.1-.4 INJECTION, SOLUTION INTRAMUSCULAR; INTRAVENOUS; SUBCUTANEOUS
Status: DISCONTINUED | OUTPATIENT
Start: 2020-03-09 | End: 2020-03-09 | Stop reason: HOSPADM

## 2020-03-09 RX ORDER — LIDOCAINE 40 MG/G
CREAM TOPICAL
Status: DISCONTINUED | OUTPATIENT
Start: 2020-03-09 | End: 2020-03-09 | Stop reason: HOSPADM

## 2020-03-09 RX ORDER — DEXTROSE MONOHYDRATE 25 G/50ML
25-50 INJECTION, SOLUTION INTRAVENOUS
Status: CANCELLED | OUTPATIENT
Start: 2020-03-09

## 2020-03-09 RX ORDER — FENTANYL CITRATE 50 UG/ML
INJECTION, SOLUTION INTRAMUSCULAR; INTRAVENOUS
Status: DISCONTINUED
Start: 2020-03-09 | End: 2020-03-09 | Stop reason: HOSPADM

## 2020-03-09 RX ADMIN — LIDOCAINE HYDROCHLORIDE 10 ML: 10 INJECTION, SOLUTION EPIDURAL; INFILTRATION; INTRACAUDAL; PERINEURAL at 10:43

## 2020-03-09 RX ADMIN — LIDOCAINE HYDROCHLORIDE,EPINEPHRINE BITARTRATE 2 ML: 10; .01 INJECTION, SOLUTION INFILTRATION; PERINEURAL at 10:43

## 2020-03-09 RX ADMIN — FENTANYL CITRATE 50 MCG: 50 INJECTION, SOLUTION INTRAMUSCULAR; INTRAVENOUS at 10:42

## 2020-03-09 RX ADMIN — MIDAZOLAM 1 MG: 1 INJECTION INTRAMUSCULAR; INTRAVENOUS at 10:43

## 2020-03-09 RX ADMIN — MIDAZOLAM 1 MG: 1 INJECTION INTRAMUSCULAR; INTRAVENOUS at 10:41

## 2020-03-09 RX ADMIN — FENTANYL CITRATE 50 MCG: 50 INJECTION, SOLUTION INTRAMUSCULAR; INTRAVENOUS at 10:40

## 2020-03-09 NOTE — IR NOTE
RADIOLOGY POST PROCEDURE NOTE w/ SEDATION  Patient name: Tosin Nina  MRN: [id number]  : 1945    Pre-procedure diagnosis: port removal  Post-procedure diagnosis: Same    Procedure Date/Time: 2020  10:34 AM  Procedure: port removal  Estimated blood loss: None  Specimen(s) collected with description: none    I determined this patient to be an appropriate candidate for the planned sedation and procedure and reassessed the patient IMMEDIATELY PRIOR to sedation and procedure.     The patient tolerated the procedure well with no immediate complications.  Significant findings:none    See imaging dictation for procedural details.    Provider name: Kayden Martinez MD  Assistant(s):None

## 2020-03-09 NOTE — IP AVS SNAPSHOT
MRN:[id number]                      After Visit Summary   3/9/2020    Tosin Nina    MRN: [id number]           Visit Information        Department      3/9/2020  9:21 AM [facility]          Review of your medicines      UNREVIEWED medicines. Ask your doctor about these medicines       Dose / Directions   escitalopram 10 MG tablet  Commonly known as:  LEXAPRO  Used for:  Reactive depression      Dose:  10 mg  Take 1 tablet (10 mg) by mouth daily  Quantity:  30 tablet  Refills:  2     hydrochlorothiazide 25 MG tablet  Commonly known as:  HYDRODIURIL  Indication:  patient is currently taking a half of tablet daily.  Used for:  Ovarian cancer, unspecified laterality (H)      Dose:  25 mg  Take 25 mg by mouth daily  Refills:  0     levothyroxine 125 MCG tablet  Commonly known as:  Synthroid  Used for:  Other pulmonary embolism without acute cor pulmonale, unspecified chronicity (H)      Dose:  125 mcg  Take 1 tablet (125 mcg) by mouth daily  Quantity:  30 tablet  Refills:  0     losartan 100 MG tablet  Commonly known as:  COZAAR      Dose:  100 mg  Take 100 mg by mouth  Refills:  0     magnesium chloride 535 (64 Mg) MG Tbcr CR tablet  Used for:  Ovarian cancer, unspecified laterality (H)      Dose:  535 mg  Take 1 tablet (535 mg) by mouth daily  Quantity:  30 tablet  Refills:  3     MULTI FOR HER PO      Refills:  0     potassium chloride ER 20 MEQ CR tablet  Commonly known as:  K-TAB  Used for:  Hypokalemia      Dose:  20 mEq  Take 1 tablet (20 mEq) by mouth 2 times daily  Quantity:  60 tablet  Refills:  3     rivaroxaban ANTICOAGULANT 20 MG Tabs tablet  Commonly known as:  XARELTO  Used for:  Other acute pulmonary embolism without acute cor pulmonale (H)      Dose:  20 mg  Take 1 tablet (20 mg) by mouth daily (with dinner)  Quantity:  30 tablet  Refills:  3     UNABLE TO FIND      MEDICATION NAME: Tumeric with black pepper  Refills:  0     Vitamin D (Cholecalciferol) 25 MCG (1000  UT) Tabs  Used for:  Paresthesias      Dose:  2,000 Units  Take 2,000 Units by mouth daily  Quantity:  60 tablet  Refills:  0        CONTINUE these medicines which have NOT CHANGED       Dose / Directions   order for DME  Used for:  Paraneoplastic neurologic disorder (H)      Equipment being ordered: electric wheelchair  Quantity:  1 Units  Refills:  0              Protect others around you: Learn how to safely use, store and throw away your medicines at www.disposemymeds.org.       Follow-ups after your visit       Your next 10 appointments already scheduled    Jul 10, 2020  3:00 PM CDT  LAB with [facility]  [facility] ([facility]) [address]  [facility] [address]  [phone]   Please do not eat 10-12 hours before your appointment if you are coming in fasting for labs on lipids, cholesterol, or glucose (sugar). Does not apply to pregnant women. Water, tea and black coffee (with nothing added) is okay. Do not drink other fluids, diet soda or gum. If you have concerns about taking your medications, please send a message by clicking on Secure Messaging, Message Your Care Team.     Jul 21, 2020  2:00 PM CDT  Return Visit with Nessa Foster MD  [facility] ([facility]) [address]  [facility] [address]  [phone]         Care Instructions       Further instructions from your care team         Going Home after the   Removal of Your Port   ______________________________________________________________________    Patient Name:  Tosin Nina  Today's Date:  March 9, 2020    The doctor who removed your port or catheter was: Dr. Martinez  at [facility]) in:    Interventional Radiology Department  When you get home:    No driving or drinking alcohol until tomorrow. You may still have side effects from   the medicine you received today (you may feel  drowsy, unsteady or forgetful).    You should have an adult with you for the first 6 hours at home (radiology patients).    You may go back to your regular diet today.     If you take aspirin or Plavix, you may begin taking it again tomorrow. You may restart all other medicines today. Use pain medicine as directed.    Avoid heavy lifting or the overuse of your shoulder for three days.    Port site and bandage care    Keep the wound clean and dry for three days. Cover it with plastic before taking a shower.     Change the bandage if it gets wet or dirty. Use bacitracin (antibiotic cream) and clean gauze.     After three days, you may use Band-Aids until the wound has healed.    If you have oozing or bleeding from the port site or catheter (tube) site:   o Put direct pressure on the wound for 5 to 10 minutes with a gauze pad.  If you still have bleeding after 10 minutes, call your doctor.  o If you are bleeding a lot and can t control it with direct pressure, call 911.    Call your Oncologist if you have:    Swelling in your neck.    Signs of infection:   o fever over 100  F (37.8 C) under the tongue  o the wound is red, tender or draining.    Additional Information About Your Visit       [employer] Information    [facility] gives you secure access to your electronic health record. If you see a primary care provider, you can also send messages to your care team and make appointments. If you have questions, please call your primary care clinic.  If you do not have a primary care provider, please call [phone] and they will assist you.       Care EveryWhere ID    This is your Care EveryWhere ID. This could be used by other organizations to access your [facility] medical records  [id number]       Your Vitals Were  Most recent update: 3/9/2020  9:36 AM    Blood Pressure   135/84 (BP Location: Right arm)    Pulse   68    Temperature   98.6  F (37  C) (Temporal)    Respirations   16    Pulse Oximetry   96%          Primary  Care Provider Office Phone # Fax #    Sy Medrano [phone]      Equal Access to Services    SARAILESLEY CUATE : Hadii aad ku hadmaximaylin Alves, waharikamarian martinez, noahkelly nguyendomingamarian lopezeboni, hetal abdulin hayaaed lopezwendy barrera laantoineed mercedes. So [facility] [phone].    ATENCIÓN: Si habla español, tiene a alarcon disposición servicios gratuitos de asistencia lingüística. Llame al [phone].    We comply with applicable federal and state civil rights laws, including the [place] Human Rights Act. We do not discriminate on the basis of race, color, creed, Oriental orthodox, national origin, marital status, age, disability, sex, sexual orientation, or gender identity.       Thank you!    Thank you for choosing [facility] for your care. Our goal is always to provide you with excellent care. Hearing back from our patients is one way we can continue to improve our services. Please take a few minutes to complete the written survey that you may receive in the mail after you visit. If you would like to speak to someone directly about your visit please contact Patient Relations at [phone]. Thank you!              Medication List      Medications          Morning Afternoon Evening Bedtime As Needed    order for DME  INSTRUCTIONS:  Equipment being ordered: electric wheelchair                       ASK your doctor about these medications          Morning Afternoon Evening Bedtime As Needed    escitalopram 10 MG tablet  Also known as:  LEXAPRO  INSTRUCTIONS:  Take 1 tablet (10 mg) by mouth daily                     hydrochlorothiazide 25 MG tablet  Also known as:  HYDRODIURIL  INSTRUCTIONS:  Take 25 mg by mouth daily  Reason for med:  patient is currently taking a half of tablet daily.                     levothyroxine 125 MCG tablet  Also known as:  Synthroid  INSTRUCTIONS:  Take 1 tablet (125 mcg) by mouth daily                     losartan 100 MG tablet  Also known as:  COZAAR  INSTRUCTIONS:  Take 100 mg by  mouth                     magnesium chloride 535 (64 Mg) MG Tbcr CR tablet  INSTRUCTIONS:  Take 1 tablet (535 mg) by mouth daily                     MULTI FOR HER PO                     potassium chloride ER 20 MEQ CR tablet  Also known as:  K-TAB  INSTRUCTIONS:  Take 1 tablet (20 mEq) by mouth 2 times daily                     rivaroxaban ANTICOAGULANT 20 MG Tabs tablet  Also known as:  XARELTO  INSTRUCTIONS:  Take 1 tablet (20 mg) by mouth daily (with dinner)                     UNABLE TO FIND  INSTRUCTIONS:  MEDICATION NAME: Tumeric with black pepper                     Vitamin D (Cholecalciferol) 25 MCG (1000 UT) Tabs  INSTRUCTIONS:  Take 2,000 Units by mouth daily

## 2020-03-09 NOTE — PRE-PROCEDURE
GENERAL PRE-PROCEDURE:   Procedure:  Port removal  Date/Time:  3/9/2020 10:32 AM    Verbal consent obtained?: Yes    Risks and benefits: Risks, benefits and alternatives were discussed    Consent given by:  Patient  Patient states understanding of procedure being performed: Yes    Patient's understanding of procedure matches consent: Yes    Procedure consent matches procedure scheduled: Yes    Appropriately NPO:  Yes  ASA Class:  Class 2- mild systemic disease, no acute problems, no functional limitations  Mallampati  :  Grade 2- soft palate, base of uvula, tonsillar pillars, and portion of posterior pharyngeal wall visible  Lungs:  Lungs clear with good breath sounds bilaterally  Heart:  Normal heart sounds and rate  History & Physical reviewed:  History and physical reviewed and no updates needed  Statement of review:  I have reviewed the lab findings, diagnostic data, medications, and the plan for sedation

## 2020-03-09 NOTE — DISCHARGE INSTRUCTIONS
Going Home after the   Removal of Your Port   ______________________________________________________________________    Patient Name:  oTsin Nina  Today's Date:  March 9, 2020    The doctor who removed your port or catheter was: Dr. Martinez  at [facility]) in:    Interventional Radiology Department  When you get home:    No driving or drinking alcohol until tomorrow. You may still have side effects from   the medicine you received today (you may feel drowsy, unsteady or forgetful).    You should have an adult with you for the first 6 hours at home (radiology patients).    You may go back to your regular diet today.     If you take aspirin or Plavix, you may begin taking it again tomorrow. You may restart all other medicines today. Use pain medicine as directed.    Avoid heavy lifting or the overuse of your shoulder for three days.    Port site and bandage care    Keep the wound clean and dry for three days. Cover it with plastic before taking a shower.     Change the bandage if it gets wet or dirty. Use bacitracin (antibiotic cream) and clean gauze.     After three days, you may use Band-Aids until the wound has healed.    If you have oozing or bleeding from the port site or catheter (tube) site:   o Put direct pressure on the wound for 5 to 10 minutes with a gauze pad.  If you still have bleeding after 10 minutes, call your doctor.  o If you are bleeding a lot and can t control it with direct pressure, call 911.    Call your Oncologist if you have:    Swelling in your neck.    Signs of infection:   o fever over 100  F (37.8 C) under the tongue  o the wound is red, tender or draining.

## 2020-03-09 NOTE — IP AVS SNAPSHOT
[facility]  [address]  [facility] [address]  Phone:  [phone]                                    After Visit Summary   3/9/2020    Tosin Nina    MRN: [id number]           After Visit Summary Signature Page    I have received my discharge instructions, and my questions have been answered. I have discussed any challenges I see with this plan with the nurse or doctor.    ..........................................................................................................................................  Patient/Patient Representative Signature      ..........................................................................................................................................  Patient Representative Print Name and Relationship to Patient    ..................................................               ................................................  Date                                   Time    ..........................................................................................................................................  Reviewed by Signature/Title    ...................................................              ..............................................  Date                                               Time          22EPIC Rev 08/18

## 2020-06-29 ENCOUNTER — PATIENT OUTREACH (OUTPATIENT)
Dept: ONCOLOGY | Facility: CLINIC | Age: 75
End: 2020-06-29

## 2020-06-29 DIAGNOSIS — C56.9 OVARIAN CANCER, UNSPECIFIED LATERALITY (H): Primary | ICD-10-CM

## 2020-06-29 NOTE — PROGRESS NOTES
Pt has return visit with Dr Ennis 7/21/2020, order placed for  prior to visit per MD.    Kim Jones, RN, BSN, OCN

## 2020-07-10 ENCOUNTER — HOSPITAL ENCOUNTER (OUTPATIENT)
Facility: CLINIC | Age: 75
Setting detail: SPECIMEN
Discharge: HOME | End: 2020-07-10
Attending: OBSTETRICS & GYNECOLOGY | Admitting: OBSTETRICS & GYNECOLOGY
Payer: COMMERCIAL

## 2020-07-10 DIAGNOSIS — C56.9 OVARIAN CANCER, UNSPECIFIED LATERALITY (H): ICD-10-CM

## 2020-07-10 LAB — CANCER AG125 SERPL-ACNC: 7 U/ML (ref 0–30)

## 2020-07-10 PROCEDURE — 36415 COLL VENOUS BLD VENIPUNCTURE: CPT

## 2020-07-10 PROCEDURE — 86304 IMMUNOASSAY TUMOR CA 125: CPT | Performed by: OBSTETRICS & GYNECOLOGY

## 2020-07-10 NOTE — PROGRESS NOTES
Medical Assistant Note:  Tosin Nina presents today for blood draw.    Patient seen by provider today: No.   present during visit today: Not Applicable.    Concerns: No Concerns.    Procedure:  Labs drawn    Post Assessment:  Labs drawn without difficulty: Yes.    Discharge Plan:  Departure Mode: Ambulatory.    Face to Face Time: 10 min.    Nila Lou CMA

## 2020-07-21 ENCOUNTER — VIRTUAL VISIT (OUTPATIENT)
Dept: ONCOLOGY | Facility: CLINIC | Age: 75
End: 2020-07-21
Attending: NURSE PRACTITIONER
Payer: COMMERCIAL

## 2020-07-21 DIAGNOSIS — G04.89: ICD-10-CM

## 2020-07-21 DIAGNOSIS — C56.9 OVARIAN CANCER, UNSPECIFIED LATERALITY (H): Primary | ICD-10-CM

## 2020-07-21 PROCEDURE — 99213 OFFICE O/P EST LOW 20 MIN: CPT | Mod: 95 | Performed by: OBSTETRICS & GYNECOLOGY

## 2020-07-21 PROCEDURE — 40001009 ZZH VIDEO/TELEPHONE VISIT; NO CHARGE

## 2020-07-21 NOTE — LETTER
"    2020         RE: Tosin Nina  [address]  [place] [address]        Dear Colleague,    Thank you for referring your patient, Tosin Nina, to the [facility]. Please see a copy of my visit note below.    Tosin Nina is a 74 year old female who is being evaluated via a billable video visit.      The patient has been notified of following:     \"This video visit will be conducted via a call between you and your physician/provider. We have found that certain health care needs can be provided without the need for an in-person physical exam.  This service lets us provide the care you need with a video conversation.  If a prescription is necessary we can send it directly to your pharmacy.  If lab work is needed we can place an order for that and you can then stop by our lab to have the test done at a later time.    Video visits are billed at different rates depending on your insurance coverage.  Please reach out to your insurance provider with any questions.    If during the course of the call the physician/provider feels a video visit is not appropriate, you will not be charged for this service.\"    Patient has given verbal consent for Video visit? Yes  How would you like to obtain your AVS? MyChart  If you are dropped from the video visit, the video invite should be resent to: Text to cell phone: [phone]:doximity  Will anyone else be joining your video visit? Danielle Lou CMA on 2020 at 1:40 PM        Video-Visit Details    Type of service:  Video Visit  Originating Location (pt. 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  She is feeling great.  She denies any " significant changes.  Denies abdominal/pelvic pain, changes in her bowel/bladder habits or vaginal bleeding.  She feels her neurologic symptoms from the para-neoplastic syndrome have improved with the exception of her ataxia which has gotten worse.      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  She was discharged to a TCU where she is still residing with some but minimal improvement in her neurologic symptoms.  She still has quite a difficult time seeing especially with her right eye.  She also notes weakness mostly on her left side.  Both of these make it very difficult for her to walk and thus she is having to use a wheelchair for most of her mobility.  She tolerated her first cycle quite well with her biggest side effect being nausea which improved drastically with a change in anti-emetics.      4/11/17-4/16/17:  Admit to [facility] for worsening dizziness, found to have stage IVB ovarian cancer (inguinal lymphadenopathy) causing paraneoplastic syndrome    4/11/17:  CT C/A/P:  Thrombus identified within the right lower lobe are artery and right upper lobar arteries. The right pulmonary artery is enlarged, similar to prior exams. No CT evidence of right heart strain. No suspicious mediastinal or perihilar lymphadenopathy. Bovine arch anatomy. No suspicious pulmonary nodules, evidence of infarction, or infection. Abdomen and pelvis: There are soft tissue nodules identified along the liver capsule measuring up to 3 cm inferiorly. There is a large amount of omental caking. Enlarged iliac and retroperitoneal lymph nodes for example a 2.6 cm right external iliac lymph node or group of lymph nodes. Heterogeneous enhancement of the uterine fundus could be due to fibroids or a mass. The overall size of the uterus does not appear significantly different than in 2014. The left ovary does appear slightly larger and  lobular in appearance compared to prior exam. There is also a nodular area along the round ligament. It was not present on prior exam. There is an irregular lobulated mass involving the sigmoid colon. Abnormal, enlarged inguinal lymph nodes. Soft tissue implant in the posterior right retroperitoneum adjacent to the psoas was not present on prior exam. 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 Spondylolisthesis at L3-4. Small periumbilical hernia containing some of the abnormal omentum.    4/11/17:   268, CEA .6    4/12/17:  IR omental biopsy   Pathology:  High grade serous carcinoma    4/14/17: Cycle #1 carboplatin AUC 6 and weekly Taxol 80mg/m2.  = 2648    5/5/17:  Cycle #2 carboplatin AUC 6 and weekly Taxol 80mg/m2.  = 370    5/26/17:  Cycle #3 carboplatin AUC 6 and weekly Taxol 80mg/m2.  = 63    6/16/17:  CT C/A/P:  Chest:  Resolution of previous right-sided pulmonary emboli since April. No mediastinal, hilar or axillary adenopathy. No pleural fluid.  Port-A-Cath right superior chest with tip in the SVC.  Short linear fibrosis or discoid atelectasis anterior medial right upper lobe. Lungs otherwise clear. Abdomen and Pelvis: Marked improvement in carcinomatosis and omental metastasis since 4/11/2017. In the anterior left pelvis there is a 1.2 cm nodule with adjacent linear opacity approximately 4 cm in length in an area of previous dense omental caking and metastatic disease. There is resolution of the previous anterior right-sided omental caking with subcentimeter trace of residual nodularity in this location. There are multiple new indeterminate nodular densities in the anterior abdominal wall subcutaneous fat as well as small collections of subcutaneous air, suggesting that these are medication injection sites.  Previous subserosal implants along the inferior liver have resolved. No focal liver  lesions. Normal-appearing pancreas, adrenal glands and kidneys. Tiny hypodense spleen lesion is too small to characterize, not previously seen. Spleen otherwise appears normal. No periaortic or pelvic adenopathy with resolution of previous adenopathy. Lobulated enlarged uterus redemonstrated consistent with multiple fibroids. No free fluid. No acute bowel abnormality. Resolution of mesenteric right lower quadrant nodule is compatible with metastasis. On coronal images the terminal ileum takes an unusual circular course posterior to the right colon, but there is no evidence for obstruction. No aggressive bone lesions.    6/16/17:   = 27    6/28/17:  Robotic total laparoscopic hysterectomy, bilateral salpingo-oophorectomy, lysis of adhesions, omentectomy, optimal tumor debulking to no residual disease   Pathology:  Minimal serous carcinoma remaining in the left ovary    7/20/17:  Cycle #4 carboplatin AUC 6 and weekly Taxol 80mg/m2. D15 cancelled due to thrombocytopenia (plt 70)   = 18    8/10/17:  Cycle #5 carboplatin AUC 6 and weekly Taxol 80mg/m2. D8 cancelled neutropenia , D15 cancelled thromobocytopenia (plt 53)  = 12    8/31/17:  Cycle #6 carboplatin AUC 5 due to myelosuppression and weekly Taxol 80mg/m2. D15 delayed thrombocytopenia (plt 78)  = 10    Review of Systems:  Systemic           no weight gain; no fatigue; no fever; no chills; no night sweats; no appetite changes  Skin           no rashes, or lesions  Eye           no irritation; no changes in vision; + visual difficulty  Ward-Laryngeal           no dysphagia; no hoarseness; + stuffiness/congestion  Pulmonary    no cough; no sputum; no shortness of breath  Cardiovascular    no chest pain; no palpitations  Gastrointestinal    no nausea; no diarrhea; no constipation; no abdominal pain; no changes in bowel  habits; no blood in stool  Genitourinary   no urinary frequency; no urinary urgency; no dysuria; no pain; no abnormal  vaginal discharge; no abnormal vaginal bleeding  Breast    no breast discharge; no breast changes; no breast pain  Musculoskeletal    no myalgias; no arthralgias; no back pain; + joint pain  Psychiatric           + depressed mood; no anxiety    Hematologic            no tender lymph nodes; no noticeable swellings or lumps   Endocrine    no hot flashes; no heat/cold intolerance         Neurological   no tremor; + numbness and tingling; + loss of balance; + difficulty walking; no headaches; no difficulty sleeping    Obstetrics and Gynecology History:  ,   Menopause at age 52, took HRT for a short while, maybe 1 year        Past Medical History:  Past Medical History:   Diagnosis Date     Anemia      Cervical spinal stenosis      Depression      Depressive disorder      GERD (gastroesophageal reflux disease)      Hypertension      Hypokalemia      Hypothyroid      Lumbar spinal stenosis      Obesity      Ovarian cancer (H)      Paraneoplastic neuromyopathy and neuropathy (H)      PE (pulmonary thromboembolism) (H)      Thrombocytopenia (H)        Past Surgical History:  Past Surgical History:   Procedure Laterality Date     AS TOTAL KNEE ARTHROPLASTY Left      BACK SURGERY       CHOLECYSTECTOMY  2016     COLONOSCOPY N/A 2018    Procedure: COLONOSCOPY;  Colonoscopy ;  Surgeon: Nila Munson MD;  Location: [facility] OR     CYSTOSCOPY N/A 2017    Procedure: CYSTOSCOPY;;  Surgeon: Nessa Christina MD;  Location: [facility] OR     DAVINCI HYSTERECTOMY TOTAL, BILATERAL SALPINGO-OOPHORECTMY, NODE DISSECTION, TUMOR STAGING, COMBINED N/A 2017    Procedure: COMBINED DAVINCI HYSTERECTOMY TOTAL, SALPINGO-OOPHORECTOMY, NODE DISSECTION, TUMOR STAGING;  Robotic total laparoscopic hysterectomy, bilateral salpingo-oophorectomy, omentectomy, lysis of adhesions, tumor debulking, cystoscopy;  Surgeon: Nessa Christina MD;  Location: [facility] OR     IR PORT REMOVAL RIGHT  3/9/2020     OPEN  REDUCTION INTERNAL FIXATION WRIST Right 2009     THYROIDECTOMY         Health Maintenance:  Last Pap Smear: 5 years              Result: normal-No need for further pap smear exams  She has not had a history of abnormal Pap smears.    Last Mammogram: 8/12/19              Result: normal      She has not had a history of abnormal mammograms.    Last Colonoscopy: 4/20/18              Result: polyps-repeat 5 years      Current Medications:   has a current medication list which includes the following prescription(s): escitalopram, hydrochlorothiazide, levothyroxine, losartan, magnesium chloride, multiple vitamins-minerals, order for dme, potassium chloride er, rivaroxaban anticoagulant, UNABLE TO FIND, and vitamin d (cholecalciferol).     Allergies:   Amoxicillin; Clarithromycin; Lisinopril; and Penicillins      Social History:  Social History     Socioeconomic History     Marital status:      Spouse name: Christiano Nina     Number of children: 1     Years of education: Not on file     Highest education level: Not on file   Occupational History     Occupation: Current: Retired     Occupation: Former:    Social Needs     Financial resource strain: Not on file     Food insecurity     Worry: Not on file     Inability: Not on file     Transportation needs     Medical: Not on file     Non-medical: Not on file   Tobacco Use     Smoking status: Never Smoker     Smokeless tobacco: Never Used   Substance and Sexual Activity     Alcohol use: Yes     Comment: occasionally     Drug use: No     Sexual activity: Not on file   Lifestyle     Physical activity     Days per week: Not on file     Minutes per session: Not on file     Stress: Not on file   Relationships     Social connections     Talks on phone: Not on file     Gets together: Not on file     Attends Sikhism service: Not on file     Active member of club or organization: Not on file     Attends meetings of clubs or organizations: Not on file      Relationship status: Not on file     Intimate partner violence     Fear of current or ex partner: Not on file     Emotionally abused: Not on file     Physically abused: Not on file     Forced sexual activity: Not on file   Other Topics Concern     Parent/sibling w/ CABG, MI or angioplasty before 65F 55M? Not Asked   Social History Narrative     Not on file     Lives with , feels safe at home.  Retired .  Enjoys playing golf, gardening.  Does have an advanced directive on file and would like her , Christiano to be her POA.  DNR/DNI    Family History:   The patient's family history is significant for.  Family History   Problem Relation Age of Onset     Breast Cancer Mother 69     Heart Failure Mother      Breast Cancer Maternal Grandmother         this is maternal great grandmother     Breast Cancer Maternal Aunt 89     Myocardial Infarction Father      Unknown/Adopted Brother      Unknown/Adopted Maternal Grandfather      Stomach Cancer Paternal Grandmother         Stomach mass-not sure if cancer     Lung Cancer Paternal Grandfather         mustard gas in WW[place]         Physical Exam:   GENERAL: Healthy, alert and no distress  EYES: Eyes grossly normal to inspection.  No discharge or erythema, or obvious scleral/conjunctival abnormalities.  HENT: Normal cephalic/atraumatic.  External ears, nose and mouth without ulcers or lesions.  No nasal drainage visible.  RESP: No audible wheeze, cough, or visible cyanosis.  No visible retractions or increased work of breathing.    SKIN: Visible skin clear. No significant rash, abnormal pigmentation or lesions.  NEURO: Cranial nerves grossly intact.  Mentation and speech appropriate for age.  PSYCH: Mentation appears normal, affect normal/bright, judgement and insight intact, normal speech and appearance well-groomed.          Assessment:    Tosin Nina is a 74 year old woman with a diagnosis of Stage IVB high grade serous ovarian cancer.     A total of  10 minutes was spent with the patient, >50% of which were spent in counseling the patient and/or treatment planning.      Plan:     1.)    Stage IVB high grade serous ovarian cancer. YONATAN s/p neoadjuvant chemotherapy, optimal interval debulking and adjuvant chemotherapy.  Reviewed signs and symptoms of a recurrence and I have asked her to call if these should occur.  Reinforced surveillance visits every 6 months for the following 3 years (9/22) then annually thereafter. She will return in 6 months with a  prior to her visit.  She had her port removed.    2.) Deconditioning and wheelchair bound state. Stable. no intervention necessary    3.) Chemotherapy side effects:  Neuropathy is the only residual effect she is having and this is mild and improving    4.) Para-neoplastic syndrome.  She is interested in seeing neurology now to see if there is any intervention to improve her ataxia.    5.) PE.  Plan lifelong anticoagulation.  She is currently on Xeralto     6.) Genetic risk factors were assessed and the patient has seen genetics and testing is pending    7.) Advanced malignancy and paraneoplastic symptoms.  She follows with palliative care but feels she has minimal symptoms at this time and thus will hold off on further appointments for now    8.) Labs and/or tests ordered include:       9.) Health maintenance issues addressed today include pt is up to date            Thank you for allowing us to participate in the care of your patient.         Sincerely,    eNssa Ennis MD  Gynecologic Oncology  [facility]       CC               Again, thank you for allowing me to participate in the care of your patient.        Sincerely,        Cristian Ennis MD

## 2020-07-21 NOTE — PROGRESS NOTES
"Tosin Nina is a 74 year old female who is being evaluated via a billable video visit.      The patient has been notified of following:     \"This video visit will be conducted via a call between you and your physician/provider. We have found that certain health care needs can be provided without the need for an in-person physical exam.  This service lets us provide the care you need with a video conversation.  If a prescription is necessary we can send it directly to your pharmacy.  If lab work is needed we can place an order for that and you can then stop by our lab to have the test done at a later time.    Video visits are billed at different rates depending on your insurance coverage.  Please reach out to your insurance provider with any questions.    If during the course of the call the physician/provider feels a video visit is not appropriate, you will not be charged for this service.\"    Patient has given verbal consent for Video visit? Yes  How would you like to obtain your AVS? MyChart  If you are dropped from the video visit, the video invite should be resent to: Text to cell phone: [phone]:doximity  Will anyone else be joining your video visit? No       Nila Lou CMA on 2020 at 1:40 PM        Video-Visit Details    Type of service:  Video Visit  Originating Location (pt. 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  She is feeling great.  She denies any significant changes.  Denies abdominal/pelvic pain, changes in her bowel/bladder habits or vaginal bleeding.  She feels her neurologic symptoms from the para-neoplastic syndrome have improved with the exception of her ataxia which has gotten worse.      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  She was discharged to a TCU where she is still residing with some but minimal improvement in her neurologic symptoms.  She still has quite a difficult time seeing especially with her right eye.  She also notes weakness mostly on her left side.  Both of these make it very difficult for her to walk and thus she is having to use a wheelchair for most of her mobility.  She tolerated her first cycle quite well with her biggest side effect being nausea which improved drastically with a change in anti-emetics.      4/11/17-4/16/17:  Admit to [facility] for worsening dizziness, found to have stage IVB ovarian cancer (inguinal lymphadenopathy) causing paraneoplastic syndrome    4/11/17:  CT C/A/P:  Thrombus identified within the right lower lobe are artery and right upper lobar arteries. The right pulmonary artery is enlarged, similar to prior exams. No CT evidence of right heart strain. No suspicious mediastinal or perihilar lymphadenopathy. Bovine arch anatomy. No suspicious pulmonary nodules, evidence of infarction, or infection. Abdomen and pelvis: There are soft tissue nodules identified along the liver capsule measuring up to 3 cm inferiorly. There is a large amount of omental caking. Enlarged iliac and retroperitoneal lymph nodes for example a 2.6 cm right external iliac lymph node or group of lymph nodes. Heterogeneous enhancement of the uterine fundus could be due to fibroids or a mass. The overall size of the uterus does not appear significantly different than in 2014. The left ovary does appear slightly larger and lobular in appearance compared to prior exam. There is also a nodular area along the round ligament. It was not present on prior exam. There is an irregular lobulated mass involving the sigmoid colon. Abnormal, enlarged inguinal lymph nodes. Soft tissue implant in  the posterior right retroperitoneum adjacent to the psoas was not present on prior exam. 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 Spondylolisthesis at L3-4. Small periumbilical hernia containing some of the abnormal omentum.    4/11/17:   268, CEA .6    4/12/17:  IR omental biopsy   Pathology:  High grade serous carcinoma    4/14/17: Cycle #1 carboplatin AUC 6 and weekly Taxol 80mg/m2.  = 2648    5/5/17:  Cycle #2 carboplatin AUC 6 and weekly Taxol 80mg/m2.  = 370    5/26/17:  Cycle #3 carboplatin AUC 6 and weekly Taxol 80mg/m2.  = 63    6/16/17:  CT C/A/P:  Chest:  Resolution of previous right-sided pulmonary emboli since April. No mediastinal, hilar or axillary adenopathy. No pleural fluid.  Port-A-Cath right superior chest with tip in the SVC.  Short linear fibrosis or discoid atelectasis anterior medial right upper lobe. Lungs otherwise clear. Abdomen and Pelvis: Marked improvement in carcinomatosis and omental metastasis since 4/11/2017. In the anterior left pelvis there is a 1.2 cm nodule with adjacent linear opacity approximately 4 cm in length in an area of previous dense omental caking and metastatic disease. There is resolution of the previous anterior right-sided omental caking with subcentimeter trace of residual nodularity in this location. There are multiple new indeterminate nodular densities in the anterior abdominal wall subcutaneous fat as well as small collections of subcutaneous air, suggesting that these are medication injection sites.  Previous subserosal implants along the inferior liver have resolved. No focal liver lesions. Normal-appearing pancreas, adrenal glands and kidneys. Tiny hypodense spleen lesion is too small to characterize, not previously seen. Spleen otherwise appears normal. No periaortic or pelvic adenopathy with resolution of previous adenopathy. Lobulated  enlarged uterus redemonstrated consistent with multiple fibroids. No free fluid. No acute bowel abnormality. Resolution of mesenteric right lower quadrant nodule is compatible with metastasis. On coronal images the terminal ileum takes an unusual circular course posterior to the right colon, but there is no evidence for obstruction. No aggressive bone lesions.    6/16/17:   = 27    6/28/17:  Robotic total laparoscopic hysterectomy, bilateral salpingo-oophorectomy, lysis of adhesions, omentectomy, optimal tumor debulking to no residual disease   Pathology:  Minimal serous carcinoma remaining in the left ovary    7/20/17:  Cycle #4 carboplatin AUC 6 and weekly Taxol 80mg/m2. D15 cancelled due to thrombocytopenia (plt 70)   = 18    8/10/17:  Cycle #5 carboplatin AUC 6 and weekly Taxol 80mg/m2. D8 cancelled neutropenia , D15 cancelled thromobocytopenia (plt 53)  = 12    8/31/17:  Cycle #6 carboplatin AUC 5 due to myelosuppression and weekly Taxol 80mg/m2. D15 delayed thrombocytopenia (plt 78)  = 10    Review of Systems:  Systemic           no weight gain; no fatigue; no fever; no chills; no night sweats; no appetite changes  Skin           no rashes, or lesions  Eye           no irritation; no changes in vision; + visual difficulty  Ward-Laryngeal           no dysphagia; no hoarseness; + stuffiness/congestion  Pulmonary    no cough; no sputum; no shortness of breath  Cardiovascular    no chest pain; no palpitations  Gastrointestinal    no nausea; no diarrhea; no constipation; no abdominal pain; no changes in bowel  habits; no blood in stool  Genitourinary   no urinary frequency; no urinary urgency; no dysuria; no pain; no abnormal vaginal discharge; no abnormal vaginal bleeding  Breast    no breast discharge; no breast changes; no breast pain  Musculoskeletal    no myalgias; no arthralgias; no back pain; + joint pain  Psychiatric           + depressed mood; no anxiety    Hematologic             no tender lymph nodes; no noticeable swellings or lumps   Endocrine    no hot flashes; no heat/cold intolerance         Neurological   no tremor; + numbness and tingling; + loss of balance; + difficulty walking; no headaches; no difficulty sleeping    Obstetrics and Gynecology History:  ,   Menopause at age 52, took HRT for a short while, maybe 1 year        Past Medical History:  Past Medical History:   Diagnosis Date     Anemia      Cervical spinal stenosis      Depression      Depressive disorder      GERD (gastroesophageal reflux disease)      Hypertension      Hypokalemia      Hypothyroid      Lumbar spinal stenosis      Obesity      Ovarian cancer (H)      Paraneoplastic neuromyopathy and neuropathy (H)      PE (pulmonary thromboembolism) (H)      Thrombocytopenia (H)        Past Surgical History:  Past Surgical History:   Procedure Laterality Date     AS TOTAL KNEE ARTHROPLASTY Left      BACK SURGERY       CHOLECYSTECTOMY  2016     COLONOSCOPY N/A 2018    Procedure: COLONOSCOPY;  Colonoscopy ;  Surgeon: Nila Munson MD;  Location: [facility] OR     CYSTOSCOPY N/A 2017    Procedure: CYSTOSCOPY;;  Surgeon: Nessa Christina MD;  Location: [facility] OR     DAVINCI HYSTERECTOMY TOTAL, BILATERAL SALPINGO-OOPHORECTMY, NODE DISSECTION, TUMOR STAGING, COMBINED N/A 2017    Procedure: COMBINED DAVINCI HYSTERECTOMY TOTAL, SALPINGO-OOPHORECTOMY, NODE DISSECTION, TUMOR STAGING;  Robotic total laparoscopic hysterectomy, bilateral salpingo-oophorectomy, omentectomy, lysis of adhesions, tumor debulking, cystoscopy;  Surgeon: Nessa Christina MD;  Location: [facility] OR     IR PORT REMOVAL RIGHT  3/9/2020     OPEN REDUCTION INTERNAL FIXATION WRIST Right      THYROIDECTOMY         Health Maintenance:  Last Pap Smear: 5 years              Result: normal-No need for further pap smear exams  She has not had a history of abnormal Pap smears.    Last Mammogram: 21               Result: normal      She has not had a history of abnormal mammograms.    Last Colonoscopy: 4/20/18              Result: polyps-repeat 5 years      Current Medications:   has a current medication list which includes the following prescription(s): escitalopram, hydrochlorothiazide, levothyroxine, losartan, magnesium chloride, multiple vitamins-minerals, order for dme, potassium chloride er, rivaroxaban anticoagulant, UNABLE TO FIND, and vitamin d (cholecalciferol).     Allergies:   Amoxicillin; Clarithromycin; Lisinopril; and Penicillins      Social History:  Social History     Socioeconomic History     Marital status:      Spouse name: Christiano Nina     Number of children: 1     Years of education: Not on file     Highest education level: Not on file   Occupational History     Occupation: Current: Retired     Occupation: Former:    Social Needs     Financial resource strain: Not on file     Food insecurity     Worry: Not on file     Inability: Not on file     Transportation needs     Medical: Not on file     Non-medical: Not on file   Tobacco Use     Smoking status: Never Smoker     Smokeless tobacco: Never Used   Substance and Sexual Activity     Alcohol use: Yes     Comment: occasionally     Drug use: No     Sexual activity: Not on file   Lifestyle     Physical activity     Days per week: Not on file     Minutes per session: Not on file     Stress: Not on file   Relationships     Social connections     Talks on phone: Not on file     Gets together: Not on file     Attends [employer] service: Not on file     Active member of club or organization: Not on file     Attends meetings of clubs or organizations: Not on file     Relationship status: Not on file     Intimate partner violence     Fear of current or ex partner: Not on file     Emotionally abused: Not on file     Physically abused: Not on file     Forced sexual activity: Not on file   Other Topics Concern     Parent/sibling w/ CABG, MI or  angioplasty before 65F 55M? Not Asked   Social History Narrative     Not on file     Lives with , feels safe at home.  Retired .  Enjoys playing golf, gardening.  Does have an advanced directive on file and would like her , Christiano to be her POA.  DNR/DNI    Family History:   The patient's family history is significant for.  Family History   Problem Relation Age of Onset     Breast Cancer Mother 69     Heart Failure Mother      Breast Cancer Maternal Grandmother         this is maternal great grandmother     Breast Cancer Maternal Aunt 89     Myocardial Infarction Father      Unknown/Adopted Brother      Unknown/Adopted Maternal Grandfather      Stomach Cancer Paternal Grandmother         Stomach mass-not sure if cancer     Lung Cancer Paternal Grandfather         mustard gas in WW[place]         Physical Exam:   GENERAL: Healthy, alert and no distress  EYES: Eyes grossly normal to inspection.  No discharge or erythema, or obvious scleral/conjunctival abnormalities.  HENT: Normal cephalic/atraumatic.  External ears, nose and mouth without ulcers or lesions.  No nasal drainage visible.  RESP: No audible wheeze, cough, or visible cyanosis.  No visible retractions or increased work of breathing.    SKIN: Visible skin clear. No significant rash, abnormal pigmentation or lesions.  NEURO: Cranial nerves grossly intact.  Mentation and speech appropriate for age.  PSYCH: Mentation appears normal, affect normal/bright, judgement and insight intact, normal speech and appearance well-groomed.          Assessment:    Tosin Nina is a 74 year old woman with a diagnosis of Stage IVB high grade serous ovarian cancer.     A total of 10 minutes was spent with the patient, >50% of which were spent in counseling the patient and/or treatment planning.      Plan:     1.)    Stage IVB high grade serous ovarian cancer. YONATAN s/p neoadjuvant chemotherapy, optimal interval debulking and adjuvant chemotherapy.   Reviewed signs and symptoms of a recurrence and I have asked her to call if these should occur.  Reinforced surveillance visits every 6 months for the following 3 years (9/22) then annually thereafter. She will return in 6 months with a  prior to her visit.  She had her port removed.    2.) Deconditioning and wheelchair bound state. Stable. no intervention necessary    3.) Chemotherapy side effects:  Neuropathy is the only residual effect she is having and this is mild and improving    4.) Para-neoplastic syndrome.  She is interested in seeing neurology now to see if there is any intervention to improve her ataxia.    5.) PE.  Plan lifelong anticoagulation.  She is currently on Xeralto     6.) Genetic risk factors were assessed and the patient has seen genetics and testing is pending    7.) Advanced malignancy and paraneoplastic symptoms.  She follows with palliative care but feels she has minimal symptoms at this time and thus will hold off on further appointments for now    8.) Labs and/or tests ordered include:       9.) Health maintenance issues addressed today include pt is up to date            Thank you for allowing us to participate in the care of your patient.         Sincerely,    Nessa Ennis MD  Gynecologic Oncology  [facility]       CC

## 2020-07-21 NOTE — PATIENT INSTRUCTIONS
Neurology visit at the [facility] they will calll patient      prior to next visit    Next visit in person with Dr Ennis in 6 months defer 3-4  months

## 2020-07-21 NOTE — LETTER
"    2020         RE: Tosin Nina  [address]  [place] [address]        Dear Colleague,    Thank you for referring your patient, Tosin Nina, to the [facility]. Please see a copy of my visit note below.    Tosin Nina is a 74 year old female who is being evaluated via a billable video visit.      The patient has been notified of following:     \"This video visit will be conducted via a call between you and your physician/provider. We have found that certain health care needs can be provided without the need for an in-person physical exam.  This service lets us provide the care you need with a video conversation.  If a prescription is necessary we can send it directly to your pharmacy.  If lab work is needed we can place an order for that and you can then stop by our lab to have the test done at a later time.    Video visits are billed at different rates depending on your insurance coverage.  Please reach out to your insurance provider with any questions.    If during the course of the call the physician/provider feels a video visit is not appropriate, you will not be charged for this service.\"    Patient has given verbal consent for Video visit? Yes  How would you like to obtain your AVS? MyChart  If you are dropped from the video visit, the video invite should be resent to: Text to cell phone: [phone]:doximity  Will anyone else be joining your video visit? Danielle Lou CMA on 2020 at 1:40 PM        Video-Visit Details    Type of service:  Video Visit  Originating Location (pt. 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  She is feeling great.  She denies any " significant changes.  Denies abdominal/pelvic pain, changes in her bowel/bladder habits or vaginal bleeding.  She feels her neurologic symptoms from the para-neoplastic syndrome have improved with the exception of her ataxia which has gotten worse.      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  She was discharged to a TCU where she is still residing with some but minimal improvement in her neurologic symptoms.  She still has quite a difficult time seeing especially with her right eye.  She also notes weakness mostly on her left side.  Both of these make it very difficult for her to walk and thus she is having to use a wheelchair for most of her mobility.  She tolerated her first cycle quite well with her biggest side effect being nausea which improved drastically with a change in anti-emetics.      4/11/17-4/16/17:  Admit to [facility] for worsening dizziness, found to have stage IVB ovarian cancer (inguinal lymphadenopathy) causing paraneoplastic syndrome    4/11/17:  CT C/A/P:  Thrombus identified within the right lower lobe are artery and right upper lobar arteries. The right pulmonary artery is enlarged, similar to prior exams. No CT evidence of right heart strain. No suspicious mediastinal or perihilar lymphadenopathy. Bovine arch anatomy. No suspicious pulmonary nodules, evidence of infarction, or infection. Abdomen and pelvis: There are soft tissue nodules identified along the liver capsule measuring up to 3 cm inferiorly. There is a large amount of omental caking. Enlarged iliac and retroperitoneal lymph nodes for example a 2.6 cm right external iliac lymph node or group of lymph nodes. Heterogeneous enhancement of the uterine fundus could be due to fibroids or a mass. The overall size of the uterus does not appear significantly different than in 2014. The left ovary does appear slightly larger and  lobular in appearance compared to prior exam. There is also a nodular area along the round ligament. It was not present on prior exam. There is an irregular lobulated mass involving the sigmoid colon. Abnormal, enlarged inguinal lymph nodes. Soft tissue implant in the posterior right retroperitoneum adjacent to the psoas was not present on prior exam. 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 Spondylolisthesis at L3-4. Small periumbilical hernia containing some of the abnormal omentum.    4/11/17:   268, CEA .6    4/12/17:  IR omental biopsy   Pathology:  High grade serous carcinoma    4/14/17: Cycle #1 carboplatin AUC 6 and weekly Taxol 80mg/m2.  = 2648    5/5/17:  Cycle #2 carboplatin AUC 6 and weekly Taxol 80mg/m2.  = 370    5/26/17:  Cycle #3 carboplatin AUC 6 and weekly Taxol 80mg/m2.  = 63    6/16/17:  CT C/A/P:  Chest:  Resolution of previous right-sided pulmonary emboli since April. No mediastinal, hilar or axillary adenopathy. No pleural fluid.  Port-A-Cath right superior chest with tip in the SVC.  Short linear fibrosis or discoid atelectasis anterior medial right upper lobe. Lungs otherwise clear. Abdomen and Pelvis: Marked improvement in carcinomatosis and omental metastasis since 4/11/2017. In the anterior left pelvis there is a 1.2 cm nodule with adjacent linear opacity approximately 4 cm in length in an area of previous dense omental caking and metastatic disease. There is resolution of the previous anterior right-sided omental caking with subcentimeter trace of residual nodularity in this location. There are multiple new indeterminate nodular densities in the anterior abdominal wall subcutaneous fat as well as small collections of subcutaneous air, suggesting that these are medication injection sites.  Previous subserosal implants along the inferior liver have resolved. No focal liver  lesions. Normal-appearing pancreas, adrenal glands and kidneys. Tiny hypodense spleen lesion is too small to characterize, not previously seen. Spleen otherwise appears normal. No periaortic or pelvic adenopathy with resolution of previous adenopathy. Lobulated enlarged uterus redemonstrated consistent with multiple fibroids. No free fluid. No acute bowel abnormality. Resolution of mesenteric right lower quadrant nodule is compatible with metastasis. On coronal images the terminal ileum takes an unusual circular course posterior to the right colon, but there is no evidence for obstruction. No aggressive bone lesions.    6/16/17:   = 27    6/28/17:  Robotic total laparoscopic hysterectomy, bilateral salpingo-oophorectomy, lysis of adhesions, omentectomy, optimal tumor debulking to no residual disease   Pathology:  Minimal serous carcinoma remaining in the left ovary    7/20/17:  Cycle #4 carboplatin AUC 6 and weekly Taxol 80mg/m2. D15 cancelled due to thrombocytopenia (plt 70)   = 18    8/10/17:  Cycle #5 carboplatin AUC 6 and weekly Taxol 80mg/m2. D8 cancelled neutropenia , D15 cancelled thromobocytopenia (plt 53)  = 12    8/31/17:  Cycle #6 carboplatin AUC 5 due to myelosuppression and weekly Taxol 80mg/m2. D15 delayed thrombocytopenia (plt 78)  = 10    Review of Systems:  Systemic           no weight gain; no fatigue; no fever; no chills; no night sweats; no appetite changes  Skin           no rashes, or lesions  Eye           no irritation; no changes in vision; + visual difficulty  Ward-Laryngeal           no dysphagia; no hoarseness; + stuffiness/congestion  Pulmonary    no cough; no sputum; no shortness of breath  Cardiovascular    no chest pain; no palpitations  Gastrointestinal    no nausea; no diarrhea; no constipation; no abdominal pain; no changes in bowel  habits; no blood in stool  Genitourinary   no urinary frequency; no urinary urgency; no dysuria; no pain; no abnormal  vaginal discharge; no abnormal vaginal bleeding  Breast    no breast discharge; no breast changes; no breast pain  Musculoskeletal    no myalgias; no arthralgias; no back pain; + joint pain  Psychiatric           + depressed mood; no anxiety    Hematologic            no tender lymph nodes; no noticeable swellings or lumps   Endocrine    no hot flashes; no heat/cold intolerance         Neurological   no tremor; + numbness and tingling; + loss of balance; + difficulty walking; no headaches; no difficulty sleeping    Obstetrics and Gynecology History:  ,   Menopause at age 52, took HRT for a short while, maybe 1 year        Past Medical History:  Past Medical History:   Diagnosis Date     Anemia      Cervical spinal stenosis      Depression      Depressive disorder      GERD (gastroesophageal reflux disease)      Hypertension      Hypokalemia      Hypothyroid      Lumbar spinal stenosis      Obesity      Ovarian cancer (H)      Paraneoplastic neuromyopathy and neuropathy (H)      PE (pulmonary thromboembolism) (H)      Thrombocytopenia (H)        Past Surgical History:  Past Surgical History:   Procedure Laterality Date     AS TOTAL KNEE ARTHROPLASTY Left      BACK SURGERY       CHOLECYSTECTOMY  2016     COLONOSCOPY N/A 2018    Procedure: COLONOSCOPY;  Colonoscopy ;  Surgeon: Nila Munson MD;  Location: [facility] OR     CYSTOSCOPY N/A 2017    Procedure: CYSTOSCOPY;;  Surgeon: Nessa Christina MD;  Location: [facility] OR     DAVINCI HYSTERECTOMY TOTAL, BILATERAL SALPINGO-OOPHORECTMY, NODE DISSECTION, TUMOR STAGING, COMBINED N/A 2017    Procedure: COMBINED DAVINCI HYSTERECTOMY TOTAL, SALPINGO-OOPHORECTOMY, NODE DISSECTION, TUMOR STAGING;  Robotic total laparoscopic hysterectomy, bilateral salpingo-oophorectomy, omentectomy, lysis of adhesions, tumor debulking, cystoscopy;  Surgeon: Nessa Christina MD;  Location: [facility] OR     IR PORT REMOVAL RIGHT  3/9/2020     OPEN  REDUCTION INTERNAL FIXATION WRIST Right 2009     THYROIDECTOMY         Health Maintenance:  Last Pap Smear: 5 years              Result: normal-No need for further pap smear exams  She has not had a history of abnormal Pap smears.    Last Mammogram: 8/12/19              Result: normal      She has not had a history of abnormal mammograms.    Last Colonoscopy: 4/20/18              Result: polyps-repeat 5 years      Current Medications:   has a current medication list which includes the following prescription(s): escitalopram, hydrochlorothiazide, levothyroxine, losartan, magnesium chloride, multiple vitamins-minerals, order for dme, potassium chloride er, rivaroxaban anticoagulant, UNABLE TO FIND, and vitamin d (cholecalciferol).     Allergies:   Amoxicillin; Clarithromycin; Lisinopril; and Penicillins      Social History:  Social History     Socioeconomic History     Marital status:      Spouse name: Christiano Nina     Number of children: 1     Years of education: Not on file     Highest education level: Not on file   Occupational History     Occupation: Current: Retired     Occupation: Former:    Social Needs     Financial resource strain: Not on file     Food insecurity     Worry: Not on file     Inability: Not on file     Transportation needs     Medical: Not on file     Non-medical: Not on file   Tobacco Use     Smoking status: Never Smoker     Smokeless tobacco: Never Used   Substance and Sexual Activity     Alcohol use: Yes     Comment: occasionally     Drug use: No     Sexual activity: Not on file   Lifestyle     Physical activity     Days per week: Not on file     Minutes per session: Not on file     Stress: Not on file   Relationships     Social connections     Talks on phone: Not on file     Gets together: Not on file     Attends [employer] service: Not on file     Active member of club or organization: Not on file     Attends meetings of clubs or organizations: Not on file      Relationship status: Not on file     Intimate partner violence     Fear of current or ex partner: Not on file     Emotionally abused: Not on file     Physically abused: Not on file     Forced sexual activity: Not on file   Other Topics Concern     Parent/sibling w/ CABG, MI or angioplasty before 65F 55M? Not Asked   Social History Narrative     Not on file     Lives with , feels safe at home.  Retired .  Enjoys playing golf, gardening.  Does have an advanced directive on file and would like her , Christiano to be her POA.  DNR/DNI    Family History:   The patient's family history is significant for.  Family History   Problem Relation Age of Onset     Breast Cancer Mother 69     Heart Failure Mother      Breast Cancer Maternal Grandmother         this is maternal great grandmother     Breast Cancer Maternal Aunt 89     Myocardial Infarction Father      Unknown/Adopted Brother      Unknown/Adopted Maternal Grandfather      Stomach Cancer Paternal Grandmother         Stomach mass-not sure if cancer     Lung Cancer Paternal Grandfather         mustard gas in WW[place]         Physical Exam:   GENERAL: Healthy, alert and no distress  EYES: Eyes grossly normal to inspection.  No discharge or erythema, or obvious scleral/conjunctival abnormalities.  HENT: Normal cephalic/atraumatic.  External ears, nose and mouth without ulcers or lesions.  No nasal drainage visible.  RESP: No audible wheeze, cough, or visible cyanosis.  No visible retractions or increased work of breathing.    SKIN: Visible skin clear. No significant rash, abnormal pigmentation or lesions.  NEURO: Cranial nerves grossly intact.  Mentation and speech appropriate for age.  PSYCH: Mentation appears normal, affect normal/bright, judgement and insight intact, normal speech and appearance well-groomed.          Assessment:    Tosin Nina is a 74 year old woman with a diagnosis of Stage IVB high grade serous ovarian cancer.     A total of  10 minutes was spent with the patient, >50% of which were spent in counseling the patient and/or treatment planning.      Plan:     1.)    Stage IVB high grade serous ovarian cancer. YONATAN s/p neoadjuvant chemotherapy, optimal interval debulking and adjuvant chemotherapy.  Reviewed signs and symptoms of a recurrence and I have asked her to call if these should occur.  Reinforced surveillance visits every 6 months for the following 3 years (9/22) then annually thereafter. She will return in 6 months with a  prior to her visit.  She had her port removed.    2.) Deconditioning and wheelchair bound state. Stable. no intervention necessary    3.) Chemotherapy side effects:  Neuropathy is the only residual effect she is having and this is mild and improving    4.) Para-neoplastic syndrome.  She is interested in seeing neurology now to see if there is any intervention to improve her ataxia.    5.) PE.  Plan lifelong anticoagulation.  She is currently on Xeralto     6.) Genetic risk factors were assessed and the patient has seen genetics and testing is pending    7.) Advanced malignancy and paraneoplastic symptoms.  She follows with palliative care but feels she has minimal symptoms at this time and thus will hold off on further appointments for now    8.) Labs and/or tests ordered include:       9.) Health maintenance issues addressed today include pt is up to date            Thank you for allowing us to participate in the care of your patient.         Sincerely,    Nessa Ennis MD  Gynecologic Oncology  [facility]       CC               Again, thank you for allowing me to participate in the care of your patient.        Sincerely,        Cristian Ennis MD

## 2020-09-24 ENCOUNTER — MYC MEDICAL ADVICE (OUTPATIENT)
Dept: ONCOLOGY | Facility: CLINIC | Age: 75
End: 2020-09-24

## 2020-09-24 NOTE — TELEPHONE ENCOUNTER
"Follow up call to pt regarding my chart message.  Pt reports new lower pelvic pain that started 9/23/2020 and describes as \"dull pain\" and rates 2-3/10 pain scale.  States pain is bilateral but more noticeable on left side.  Reports normal BM yesterday, passing gas, denies  n/v, and eating and drinking well per pt.  Pt states she was more concerned as this is new for her and has not had pain for several years. Pt has not tried tylenol or ibuprofen for discomfort.   Caridad Carroll NP notified and recommends pt continue to monitor symptoms.  If worsening, pt to call back or go to ED on the weekend if needed.  Pt may try lying on left side or using heat to area but not directly on the skin for 10 minutes several times a day.  Pt called back with recommendations and asking if she can get a  to ease her mind.      Will notify NP and call pt back with plan and pt aware.    Kim Jones, RN, BSN, OCN    "

## 2020-09-24 NOTE — TELEPHONE ENCOUNTER
Caridad Carroll NP notified and states pt may have  and order placed from Dr Ennis at last visit.  Pt called and updated as noted.  Pt will be in the area on 9/29 for another appt and requesting late am lab. Pt informed that writer will send message to  to contact pt with appt and pt verbalized understanding of plan.  She will call back with further questions or concerns.    Kim Jones RN, BSN, OCN

## 2020-09-29 ENCOUNTER — HOSPITAL ENCOUNTER (OUTPATIENT)
Facility: CLINIC | Age: 75
Setting detail: SPECIMEN
Discharge: HOME | End: 2020-09-29
Attending: OBSTETRICS & GYNECOLOGY | Admitting: OBSTETRICS & GYNECOLOGY
Payer: COMMERCIAL

## 2020-09-29 ENCOUNTER — TELEPHONE (OUTPATIENT)
Dept: ONCOLOGY | Facility: CLINIC | Age: 75
End: 2020-09-29

## 2020-09-29 DIAGNOSIS — C56.9 OVARIAN CANCER, UNSPECIFIED LATERALITY (H): Primary | ICD-10-CM

## 2020-09-29 DIAGNOSIS — C56.9 OVARIAN CANCER, UNSPECIFIED LATERALITY (H): ICD-10-CM

## 2020-09-29 LAB — CANCER AG125 SERPL-ACNC: 6 U/ML (ref 0–30)

## 2020-09-29 PROCEDURE — 86304 IMMUNOASSAY TUMOR CA 125: CPT | Performed by: OBSTETRICS & GYNECOLOGY

## 2020-09-29 PROCEDURE — 36415 COLL VENOUS BLD VENIPUNCTURE: CPT

## 2020-09-29 NOTE — TELEPHONE ENCOUNTER
Pt here in clinic for ca 125 today and pt had further questions.  Pt called and states she wanted to update writer and inform me that lower pelvic pain is improving although still notes bloating, discomfort can be more noticeable on left side at times.  Asked pt about seeing Dr Ennis today and pt has derm procedure this afternoon.  Dr Ennis notified and states we will wait for lab and then discuss further if CT scan needed.  Pt aware of plan and will call pt after discussing with Dr Ennis.    Kim Jones, RN, BSN, OCN

## 2020-09-30 NOTE — TELEPHONE ENCOUNTER
If she feels she wants a CT I am fine to get one now.  I placed the order and this can be scheduled.  I will call her with the results.

## 2020-09-30 NOTE — TELEPHONE ENCOUNTER
Notified patient that MD ordered CT, she was appreciative of this. Instructed her to call if she hasn't been called by Friday to get it scheduled. She verbalized understanding.  Toshia Gould RN, BSN, OCN

## 2020-09-30 NOTE — TELEPHONE ENCOUNTER
Patient returned call, informed of MD's recommendations per result note, patient verbalized understanding. She did want to let Ambar and Dr. Ennis know that she has a hernia and is going to be seeing a general surgeon down in [place] on Tuesday, so she will see what that doctor says and if he is going to order a CT scan. She verbalizes that she really feels she needs a CT scan done.    Asked that she call the clinic next week with an update after she meets with surgeon to let us know what the plan is.  Toshia Gould, RN, BSN, OCN

## 2020-10-12 ENCOUNTER — HOSPITAL ENCOUNTER (OUTPATIENT)
Dept: CT IMAGING | Facility: CLINIC | Age: 75
Discharge: HOME | End: 2020-10-12
Attending: OBSTETRICS & GYNECOLOGY | Admitting: OBSTETRICS & GYNECOLOGY
Payer: COMMERCIAL

## 2020-10-12 DIAGNOSIS — C56.9 OVARIAN CANCER, UNSPECIFIED LATERALITY (H): ICD-10-CM

## 2020-10-12 LAB
CREAT BLD-MCNC: 0.8 MG/DL (ref 0.52–1.04)
GFR SERPL CREATININE-BSD FRML MDRD: 71 ML/MIN/{1.73_M2}

## 2020-10-12 PROCEDURE — 250N000009 HC RX 250: Performed by: OBSTETRICS & GYNECOLOGY

## 2020-10-12 PROCEDURE — 71260 CT THORAX DX C+: CPT

## 2020-10-12 PROCEDURE — 250N000011 HC RX IP 250 OP 636: Performed by: OBSTETRICS & GYNECOLOGY

## 2020-10-12 PROCEDURE — 82565 ASSAY OF CREATININE: CPT

## 2020-10-12 RX ORDER — IOPAMIDOL 755 MG/ML
500 INJECTION, SOLUTION INTRAVASCULAR ONCE
Status: COMPLETED | OUTPATIENT
Start: 2020-10-12 | End: 2020-10-12

## 2020-10-12 RX ADMIN — SODIUM CHLORIDE 63 ML: 9 INJECTION, SOLUTION INTRAVENOUS at 11:23

## 2020-10-12 RX ADMIN — IOPAMIDOL 91 ML: 755 INJECTION, SOLUTION INTRAVENOUS at 11:23

## 2021-01-15 ENCOUNTER — HEALTH MAINTENANCE LETTER (OUTPATIENT)
Age: 76
End: 2021-01-15

## 2021-03-01 ENCOUNTER — PATIENT OUTREACH (OUTPATIENT)
Dept: ONCOLOGY | Facility: CLINIC | Age: 76
End: 2021-03-01

## 2021-03-01 NOTE — PROGRESS NOTES
Received disability paperwork from [employer].  Called patient to ask questions about her ADL's in order to complete the form.  Pt did not answer.  Left voicemail for patient to call us back to discuss.  Awaiting return call.    Pt called back.  Completed form with patient over the phone.  Will route message to Dr. Ennis's RNCC, Ambar, to have Dr. Ennis sign when in clinic tomorrow and then fax.

## 2021-03-02 ENCOUNTER — ONCOLOGY VISIT (OUTPATIENT)
Dept: ONCOLOGY | Facility: CLINIC | Age: 76
End: 2021-03-02
Attending: OBSTETRICS & GYNECOLOGY
Payer: COMMERCIAL

## 2021-03-02 ENCOUNTER — HOSPITAL ENCOUNTER (OUTPATIENT)
Facility: CLINIC | Age: 76
Setting detail: SPECIMEN
Discharge: HOME | End: 2021-03-02
Attending: OBSTETRICS & GYNECOLOGY | Admitting: OBSTETRICS & GYNECOLOGY
Payer: COMMERCIAL

## 2021-03-02 VITALS
RESPIRATION RATE: 16 BRPM | DIASTOLIC BLOOD PRESSURE: 69 MMHG | OXYGEN SATURATION: 97 % | HEART RATE: 66 BPM | BODY MASS INDEX: 37.97 KG/M2 | WEIGHT: 188 LBS | TEMPERATURE: 97.2 F | SYSTOLIC BLOOD PRESSURE: 156 MMHG

## 2021-03-02 DIAGNOSIS — C56.9 OVARIAN CANCER, UNSPECIFIED LATERALITY (H): Primary | ICD-10-CM

## 2021-03-02 DIAGNOSIS — C56.9 OVARIAN CANCER, UNSPECIFIED LATERALITY (H): ICD-10-CM

## 2021-03-02 LAB — CANCER AG125 SERPL-ACNC: 6 U/ML (ref 0–30)

## 2021-03-02 PROCEDURE — 36415 COLL VENOUS BLD VENIPUNCTURE: CPT

## 2021-03-02 PROCEDURE — 99213 OFFICE O/P EST LOW 20 MIN: CPT | Performed by: OBSTETRICS & GYNECOLOGY

## 2021-03-02 PROCEDURE — 86304 IMMUNOASSAY TUMOR CA 125: CPT | Performed by: OBSTETRICS & GYNECOLOGY

## 2021-03-02 PROCEDURE — G0463 HOSPITAL OUTPT CLINIC VISIT: HCPCS

## 2021-03-02 ASSESSMENT — PAIN SCALES - GENERAL: PAINLEVEL: MILD PAIN (3)

## 2021-03-02 NOTE — PROGRESS NOTES
Medical Assistant Note:  Tosin Nina presents today for blood draw.    Patient seen by provider today: Yes: Dr Ennis.   present during visit today: Not Applicable.    Concerns: No Concerns.    Procedure:  Labs drawn    Post Assessment:  Labs drawn without difficulty: Yes.    Discharge Plan:  Departure Mode: Ambulatory.    Face to Face Time: 10 min.    Nila Lou [facility]

## 2021-03-02 NOTE — LETTER
3/2/2021         RE: Tosin Nina  [address]  [place] [id number]        Dear Colleague,    Thank you for referring your patient, Tosin Nina, to the [facility]. Please see a copy of my visit note below.      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  She is accompanied by her daughter.  She has been overall feeling well, however about 2 weeks ago she injured her back.  She was applying a heating pack to it and since then has developed lower abdominal pain which is new.  She also noted a small amount of vaginal bleeding yesterday which she describes as mucous/pink.  She is eating/drinking well and has no changes to her bowel/bladder function.  She continues to exercise despite her ataxia.    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  She was discharged to a TCU where she is still residing with some but minimal improvement in her neurologic symptoms.  She still has quite a difficult time seeing especially with her right eye.  She also notes weakness mostly on her left side.  Both of these make it very difficult for her to walk and thus she is having to use a wheelchair for most of her mobility.  She tolerated her first cycle quite well with her biggest side effect being nausea which improved drastically with a change in anti-emetics.      17-17:  Admit to [facility] for worsening dizziness, found to have stage IVB ovarian cancer (inguinal lymphadenopathy) causing paraneoplastic syndrome    17:  CT C/A/P:  Thrombus identified within the right lower lobe are artery and right upper lobar arteries. The right pulmonary artery is enlarged, similar to prior exams. No CT evidence of right  heart strain. No suspicious mediastinal or perihilar lymphadenopathy. Bovine arch anatomy. No suspicious pulmonary nodules, evidence of infarction, or infection. Abdomen and pelvis: There are soft tissue nodules identified along the liver capsule measuring up to 3 cm inferiorly. There is a large amount of omental caking. Enlarged iliac and retroperitoneal lymph nodes for example a 2.6 cm right external iliac lymph node or group of lymph nodes. Heterogeneous enhancement of the uterine fundus could be due to fibroids or a mass. The overall size of the uterus does not appear significantly different than in 2014. The left ovary does appear slightly larger and lobular in appearance compared to prior exam. There is also a nodular area along the round ligament. It was not present on prior exam. There is an irregular lobulated mass involving the sigmoid colon. Abnormal, enlarged inguinal lymph nodes. Soft tissue implant in the posterior right retroperitoneum adjacent to the psoas was not present on prior exam. 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 Spondylolisthesis at L3-4. Small periumbilical hernia containing some of the abnormal omentum.    4/11/17:   268, CEA .6    4/12/17:  IR omental biopsy   Pathology:  High grade serous carcinoma    4/14/17: Cycle #1 carboplatin AUC 6 and weekly Taxol 80mg/m2.  = 2648    5/5/17:  Cycle #2 carboplatin AUC 6 and weekly Taxol 80mg/m2.  = 370    5/26/17:  Cycle #3 carboplatin AUC 6 and weekly Taxol 80mg/m2.  = 63    6/16/17:  CT C/A/P:  Chest:  Resolution of previous right-sided pulmonary emboli since April. No mediastinal, hilar or axillary adenopathy. No pleural fluid.  Port-A-Cath right superior chest with tip in the SVC.  Short linear fibrosis or discoid atelectasis anterior medial right upper lobe. Lungs otherwise clear. Abdomen and Pelvis: Marked improvement in  carcinomatosis and omental metastasis since 4/11/2017. In the anterior left pelvis there is a 1.2 cm nodule with adjacent linear opacity approximately 4 cm in length in an area of previous dense omental caking and metastatic disease. There is resolution of the previous anterior right-sided omental caking with subcentimeter trace of residual nodularity in this location. There are multiple new indeterminate nodular densities in the anterior abdominal wall subcutaneous fat as well as small collections of subcutaneous air, suggesting that these are medication injection sites.  Previous subserosal implants along the inferior liver have resolved. No focal liver lesions. Normal-appearing pancreas, adrenal glands and kidneys. Tiny hypodense spleen lesion is too small to characterize, not previously seen. Spleen otherwise appears normal. No periaortic or pelvic adenopathy with resolution of previous adenopathy. Lobulated enlarged uterus redemonstrated consistent with multiple fibroids. No free fluid. No acute bowel abnormality. Resolution of mesenteric right lower quadrant nodule is compatible with metastasis. On coronal images the terminal ileum takes an unusual circular course posterior to the right colon, but there is no evidence for obstruction. No aggressive bone lesions.    6/16/17:   = 27    6/28/17:  Robotic total laparoscopic hysterectomy, bilateral salpingo-oophorectomy, lysis of adhesions, omentectomy, optimal tumor debulking to no residual disease   Pathology:  Minimal serous carcinoma remaining in the left ovary    7/20/17:  Cycle #4 carboplatin AUC 6 and weekly Taxol 80mg/m2. D15 cancelled due to thrombocytopenia (plt 70)   = 18    8/10/17:  Cycle #5 carboplatin AUC 6 and weekly Taxol 80mg/m2. D8 cancelled neutropenia , D15 cancelled thromobocytopenia (plt 53)  = 12    8/31/17:  Cycle #6 carboplatin AUC 5 due to myelosuppression and weekly Taxol 80mg/m2. D15 delayed thrombocytopenia (plt  78)  = 10    10/12/20: CT C/A/P:  1. No convincing evidence of local recurrence or metastatic disease in the chest, abdomen or pelvis. 2. The previously seen scattered peritoneal nodularities on 2017 exam are no longer seen. 3. Significant hepatic steatosis. 4. Colonic diverticulosis without CT evidence of acute diverticulitis. 5. Few small left kidney stones. No right kidney stones. No hydronephrosis in either kidney.    Review of Systems:  Systemic           no weight gain; no fatigue; no fever; no chills; no night sweats; no appetite changes  Skin           no rashes, or lesions  Eye           no irritation; no changes in vision; no visual difficulty  Ward-Laryngeal           no dysphagia; no hoarseness  Pulmonary    no cough; no sputum; no shortness of breath  Cardiovascular    no chest pain; no palpitations  Gastrointestinal    no nausea; + diarrhea; no constipation; + abdominal pain; no changes in bowel  habits; no blood in stool  Genitourinary   no urinary frequency; no urinary urgency; no dysuria; no pain; no abnormal vaginal discharge; + abnormal vaginal bleeding; + incontinence  Breast    no breast discharge; no breast changes; no breast pain  Musculoskeletal    no myalgias; no arthralgias; + back pain; + muscle cramps  Psychiatric           + depressed mood; no anxiety    Hematologic            no tender lymph nodes; no noticeable swellings or lumps   Endocrine    no hot flashes; no heat/cold intolerance         Neurological   no tremor; + numbness and tingling; + loss of balance; + difficulty walking; + headaches; no difficulty sleeping    Obstetrics and Gynecology History:  ,   Menopause at age 52, took HRT for a short while, maybe 1 year        Past Medical History:  Past Medical History:   Diagnosis Date     Anemia      Cervical spinal stenosis      Depression      Depressive disorder      GERD (gastroesophageal reflux disease)      Hypertension      Hypokalemia      Hypothyroid       Lumbar spinal stenosis      Obesity      Ovarian cancer (H)      Paraneoplastic neuromyopathy and neuropathy (H)      PE (pulmonary thromboembolism) (H)      Thrombocytopenia (H)        Past Surgical History:  Past Surgical History:   Procedure Laterality Date     AS TOTAL KNEE ARTHROPLASTY Left      BACK SURGERY  2009     CHOLECYSTECTOMY  01/01/2016     COLONOSCOPY N/A 4/20/2018    Procedure: COLONOSCOPY;  Colonoscopy ;  Surgeon: Nila Munson MD;  Location: [facility] OR     CYSTOSCOPY N/A 6/28/2017    Procedure: CYSTOSCOPY;;  Surgeon: Nessa Christina MD;  Location: [facility] OR     DAVINCI HYSTERECTOMY TOTAL, BILATERAL SALPINGO-OOPHORECTMY, NODE DISSECTION, TUMOR STAGING, COMBINED N/A 6/28/2017    Procedure: COMBINED DAVINCI HYSTERECTOMY TOTAL, SALPINGO-OOPHORECTOMY, NODE DISSECTION, TUMOR STAGING;  Robotic total laparoscopic hysterectomy, bilateral salpingo-oophorectomy, omentectomy, lysis of adhesions, tumor debulking, cystoscopy;  Surgeon: Nessa Christina MD;  Location: [facility] OR     IR PORT REMOVAL RIGHT  3/9/2020     OPEN REDUCTION INTERNAL FIXATION WRIST Right 2009     THYROIDECTOMY         Health Maintenance:  Last Pap Smear: No need for further pap smear exams  She has not had a history of abnormal Pap smears.    Last Mammogram: 8/12/19              Result: normal      She has not had a history of abnormal mammograms.    Last Colonoscopy: 4/20/18              Result: polyps-repeat 5 years      Current Medications:   has a current medication list which includes the following prescription(s): escitalopram, hydrochlorothiazide, levothyroxine, losartan, magnesium chloride, potassium chloride er, rivaroxaban anticoagulant, vitamin d (cholecalciferol), multiple vitamins-minerals, order for dme, and UNABLE TO FIND.     Allergies:   Amoxicillin, Clarithromycin, Lisinopril, and Penicillins      Social History:  Social History     Socioeconomic History     Marital status:      Spouse name:  Christiano Nina     Number of children: 1     Years of education: Not on file     Highest education level: Not on file   Occupational History     Occupation: Current: Retired     Occupation: Former:    Social Needs     Financial resource strain: Not on file     Food insecurity     Worry: Not on file     Inability: Not on file     Transportation needs     Medical: Not on file     Non-medical: Not on file   Tobacco Use     Smoking status: Never Smoker     Smokeless tobacco: Never Used   Substance and Sexual Activity     Alcohol use: Yes     Comment: occasionally     Drug use: No     Sexual activity: Not on file   Lifestyle     Physical activity     Days per week: Not on file     Minutes per session: Not on file     Stress: Not on file   Relationships     Social connections     Talks on phone: Not on file     Gets together: Not on file     Attends Restorationism service: Not on file     Active member of club or organization: Not on file     Attends meetings of clubs or organizations: Not on file     Relationship status: Not on file     Intimate partner violence     Fear of current or ex partner: Not on file     Emotionally abused: Not on file     Physically abused: Not on file     Forced sexual activity: Not on file   Other Topics Concern     Parent/sibling w/ CABG, MI or angioplasty before 65F 55M? Not Asked   Social History Narrative     Not on file     Lives with , feels safe at home.  Retired .  Enjoys playing golf, gardening.  Does have an advanced directive on file and would like her , Christiano to be her POA.  DNR/DNI    Family History:   The patient's family history is significant for.  Family History   Problem Relation Age of Onset     Breast Cancer Mother 69     Heart Failure Mother      Breast Cancer Maternal Grandmother         this is maternal great grandmother     Breast Cancer Maternal Aunt 89     Myocardial Infarction Father      Unknown/Adopted Brother      Unknown/Adopted  Maternal Grandfather      Stomach Cancer Paternal Grandmother         Stomach mass-not sure if cancer     Lung Cancer Paternal Grandfather         must[employer] in [place]         Physical Exam:   BP (!) 156/69   Pulse 66   Temp 97.2  F (36.2  C) (Tympanic)   Resp 16   Wt 85.3 kg (188 lb)   SpO2 97%   BMI 37.97 kg/m     General Appearance: healthy and alert, no distress     HEENT:  no thyromegaly, no palpable nodules or masses        Cardiovascular: regular rate and rhythm, no gallops, rubs or murmurs     Respiratory: lungs clear, no rales, rhonchi or wheezes, normal diaphragmatic excursion    Musculoskeletal: extremities non tender and without edema    Skin: no lesions or rashes     Neurological: normal gait, no gross defects     Psychiatric: appropriate mood and affect                               Hematological: normal cervical, supraclavicular and inguinal lymph nodes     Gastrointestinal:       abdomen soft, non-tender, non-distended, no organomegaly or masses    Genitourinary: External genitalia and urethral meatus appears normal.  Vagina is smooth without nodularity or masses.  Cervix surgically absent.  Bimanual exam reveal no masses, nodularity or fullness.  Recto-vaginal exam confirms these findings.          Assessment:    Tosin Nina is a 75 year old woman with a diagnosis of Stage IVB high grade serous ovarian cancer.     A total of 15 minutes was spent with the patient, >50% of which were spent in counseling the patient and/or treatment planning.      Plan:     1.)    Stage IVB high grade serous ovarian cancer. YONATAN s/p neoadjuvant chemotherapy, optimal interval debulking and adjuvant chemotherapy.  No concerning findings on exam today despite new abdominal pain.  Will obtain  today and if elevated, will plan CT.  If not, this is likely MS pain related to recent injury.  Reviewed signs and symptoms of a recurrence and I have asked her to call if these should occur.  Reinforced surveillance  visits every 6 months for the following 3 years (9/22) then annually thereafter. She will return in 6 months with a  prior to her visit.  She had her port removed.    2.) Deconditioning and wheelchair bound state. Stable. no intervention necessary    3.) Chemotherapy side effects:  Neuropathy is the only residual effect she is having and this is mild and improving    4.) Para-neoplastic syndrome.  She was supposed to see neurology, however her previous neurologist has left so she needs assistance in scheduling with a new neurologist.    5.) PE.  Plan lifelong anticoagulation.  She is currently on Xeralto     6.) Genetic risk factors were assessed and the patient has seen genetics and testing is pending    7.) Advanced malignancy and paraneoplastic symptoms.  She follows with palliative care but feels she has minimal symptoms at this time and thus will hold off on further appointments for now    8.) Labs and/or tests ordered include:  , mammogram     9.) Health maintenance issues addressed today include pt is due for a mammogram which she will schedule            Thank you for allowing us to participate in the care of your patient.         Sincerely,    Nessa Ennis MD  Gynecologic Oncology  [facility]       CC               Again, thank you for allowing me to participate in the care of your patient.        Sincerely,        Cristian Ennis MD

## 2021-03-02 NOTE — NURSING NOTE
"Oncology Rooming Note    March 2, 2021 2:19 PM   Tosin Nina is a 75 year old female who presents for:    Chief Complaint   Patient presents with     Oncology Clinic Visit     vaginal  spotting/ pain     Initial Vitals: BP (!) 156/69   Pulse 66   Temp 97.2  F (36.2  C) (Tympanic)   Resp 16   Wt 85.3 kg (188 lb)   SpO2 97%   BMI 37.97 kg/m   Estimated body mass index is 37.97 kg/m  as calculated from the following:    Height as of 7/11/19: 1.499 m (4' 11\").    Weight as of this encounter: 85.3 kg (188 lb). Body surface area is 1.88 meters squared.  Mild Pain (3) Comment: Data Unavailable   No LMP recorded. Patient is postmenopausal.  Allergies reviewed: Yes  Medications reviewed: Yes    Medications: Medication refills not needed today.  Pharmacy name entered into EPIC:    [id number] - [place], [place] - [address]  [facility] [place] [address] [id number] - [place] [id number] AT [address]    Clinical concerns: vaginal spotting and pain       Vi Delaney CMA              "

## 2021-03-02 NOTE — PROGRESS NOTES
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  She is accompanied by her daughter.  She has been overall feeling well, however about 2 weeks ago she injured her back.  She was applying a heating pack to it and since then has developed lower abdominal pain which is new.  She also noted a small amount of vaginal bleeding yesterday which she describes as mucous/pink.  She is eating/drinking well and has no changes to her bowel/bladder function.  She continues to exercise despite her ataxia.    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  She was discharged to a TCU where she is still residing with some but minimal improvement in her neurologic symptoms.  She still has quite a difficult time seeing especially with her right eye.  She also notes weakness mostly on her left side.  Both of these make it very difficult for her to walk and thus she is having to use a wheelchair for most of her mobility.  She tolerated her first cycle quite well with her biggest side effect being nausea which improved drastically with a change in anti-emetics.      17-17:  Admit to [facility] for worsening dizziness, found to have stage IVB ovarian cancer (inguinal lymphadenopathy) causing paraneoplastic syndrome    17:  CT C/A/P:  Thrombus identified within the right lower lobe are artery and right upper lobar arteries. The right pulmonary artery is enlarged, similar to prior exams. No CT evidence of right heart strain. No suspicious mediastinal or perihilar lymphadenopathy. Bovine arch anatomy. No suspicious pulmonary nodules, evidence of infarction, or infection. Abdomen and pelvis: There are soft tissue nodules identified along the liver capsule measuring up to 3 cm  inferiorly. There is a large amount of omental caking. Enlarged iliac and retroperitoneal lymph nodes for example a 2.6 cm right external iliac lymph node or group of lymph nodes. Heterogeneous enhancement of the uterine fundus could be due to fibroids or a mass. The overall size of the uterus does not appear significantly different than in 2014. The left ovary does appear slightly larger and lobular in appearance compared to prior exam. There is also a nodular area along the round ligament. It was not present on prior exam. There is an irregular lobulated mass involving the sigmoid colon. Abnormal, enlarged inguinal lymph nodes. Soft tissue implant in the posterior right retroperitoneum adjacent to the psoas was not present on prior exam. 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 Spondylolisthesis at L3-4. Small periumbilical hernia containing some of the abnormal omentum.    4/11/17:   268, CEA .6    4/12/17:  IR omental biopsy   Pathology:  High grade serous carcinoma    4/14/17: Cycle #1 carboplatin AUC 6 and weekly Taxol 80mg/m2.  = 2648    5/5/17:  Cycle #2 carboplatin AUC 6 and weekly Taxol 80mg/m2.  = 370    5/26/17:  Cycle #3 carboplatin AUC 6 and weekly Taxol 80mg/m2.  = 63    6/16/17:  CT C/A/P:  Chest:  Resolution of previous right-sided pulmonary emboli since April. No mediastinal, hilar or axillary adenopathy. No pleural fluid.  Port-A-Cath right superior chest with tip in the SVC.  Short linear fibrosis or discoid atelectasis anterior medial right upper lobe. Lungs otherwise clear. Abdomen and Pelvis: Marked improvement in carcinomatosis and omental metastasis since 4/11/2017. In the anterior left pelvis there is a 1.2 cm nodule with adjacent linear opacity approximately 4 cm in length in an area of previous dense omental caking and metastatic disease. There is resolution of the previous  anterior right-sided omental caking with subcentimeter trace of residual nodularity in this location. There are multiple new indeterminate nodular densities in the anterior abdominal wall subcutaneous fat as well as small collections of subcutaneous air, suggesting that these are medication injection sites.  Previous subserosal implants along the inferior liver have resolved. No focal liver lesions. Normal-appearing pancreas, adrenal glands and kidneys. Tiny hypodense spleen lesion is too small to characterize, not previously seen. Spleen otherwise appears normal. No periaortic or pelvic adenopathy with resolution of previous adenopathy. Lobulated enlarged uterus redemonstrated consistent with multiple fibroids. No free fluid. No acute bowel abnormality. Resolution of mesenteric right lower quadrant nodule is compatible with metastasis. On coronal images the terminal ileum takes an unusual circular course posterior to the right colon, but there is no evidence for obstruction. No aggressive bone lesions.    6/16/17:   = 27    6/28/17:  Robotic total laparoscopic hysterectomy, bilateral salpingo-oophorectomy, lysis of adhesions, omentectomy, optimal tumor debulking to no residual disease   Pathology:  Minimal serous carcinoma remaining in the left ovary    7/20/17:  Cycle #4 carboplatin AUC 6 and weekly Taxol 80mg/m2. D15 cancelled due to thrombocytopenia (plt 70)   = 18    8/10/17:  Cycle #5 carboplatin AUC 6 and weekly Taxol 80mg/m2. D8 cancelled neutropenia , D15 cancelled thromobocytopenia (plt 53)  = 12    8/31/17:  Cycle #6 carboplatin AUC 5 due to myelosuppression and weekly Taxol 80mg/m2. D15 delayed thrombocytopenia (plt 78)  = 10    10/12/20: CT C/A/P:  1. No convincing evidence of local recurrence or metastatic disease in the chest, abdomen or pelvis. 2. The previously seen scattered peritoneal nodularities on 6/16/2017 exam are no longer seen. 3. Significant hepatic steatosis. 4.  Colonic diverticulosis without CT evidence of acute diverticulitis. 5. Few small left kidney stones. No right kidney stones. No hydronephrosis in either kidney.    Review of Systems:  Systemic           no weight gain; no fatigue; no fever; no chills; no night sweats; no appetite changes  Skin           no rashes, or lesions  Eye           no irritation; no changes in vision; no visual difficulty  Ward-Laryngeal           no dysphagia; no hoarseness  Pulmonary    no cough; no sputum; no shortness of breath  Cardiovascular    no chest pain; no palpitations  Gastrointestinal    no nausea; + diarrhea; no constipation; + abdominal pain; no changes in bowel  habits; no blood in stool  Genitourinary   no urinary frequency; no urinary urgency; no dysuria; no pain; no abnormal vaginal discharge; + abnormal vaginal bleeding; + incontinence  Breast    no breast discharge; no breast changes; no breast pain  Musculoskeletal    no myalgias; no arthralgias; + back pain; + muscle cramps  Psychiatric           + depressed mood; no anxiety    Hematologic            no tender lymph nodes; no noticeable swellings or lumps   Endocrine    no hot flashes; no heat/cold intolerance         Neurological   no tremor; + numbness and tingling; + loss of balance; + difficulty walking; + headaches; no difficulty sleeping    Obstetrics and Gynecology History:  ,   Menopause at age 52, took HRT for a short while, maybe 1 year        Past Medical History:  Past Medical History:   Diagnosis Date     Anemia      Cervical spinal stenosis      Depression      Depressive disorder      GERD (gastroesophageal reflux disease)      Hypertension      Hypokalemia      Hypothyroid      Lumbar spinal stenosis      Obesity      Ovarian cancer (H)      Paraneoplastic neuromyopathy and neuropathy (H)      PE (pulmonary thromboembolism) (H)      Thrombocytopenia (H)        Past Surgical History:  Past Surgical History:   Procedure Laterality Date     AS  TOTAL KNEE ARTHROPLASTY Left      BACK SURGERY  2009     CHOLECYSTECTOMY  01/01/2016     COLONOSCOPY N/A 4/20/2018    Procedure: COLONOSCOPY;  Colonoscopy ;  Surgeon: Nila Munson MD;  Location: [facility] OR     CYSTOSCOPY N/A 6/28/2017    Procedure: CYSTOSCOPY;;  Surgeon: Nessa Christina MD;  Location: [facility] OR     DAVINCI HYSTERECTOMY TOTAL, BILATERAL SALPINGO-OOPHORECTMY, NODE DISSECTION, TUMOR STAGING, COMBINED N/A 6/28/2017    Procedure: COMBINED DAVINCI HYSTERECTOMY TOTAL, SALPINGO-OOPHORECTOMY, NODE DISSECTION, TUMOR STAGING;  Robotic total laparoscopic hysterectomy, bilateral salpingo-oophorectomy, omentectomy, lysis of adhesions, tumor debulking, cystoscopy;  Surgeon: Nessa Christina MD;  Location: [facility] OR     IR PORT REMOVAL RIGHT  3/9/2020     OPEN REDUCTION INTERNAL FIXATION WRIST Right 2009     THYROIDECTOMY         Health Maintenance:  Last Pap Smear: No need for further pap smear exams  She has not had a history of abnormal Pap smears.    Last Mammogram: 8/12/19              Result: normal      She has not had a history of abnormal mammograms.    Last Colonoscopy: 4/20/18              Result: polyps-repeat 5 years      Current Medications:   has a current medication list which includes the following prescription(s): escitalopram, hydrochlorothiazide, levothyroxine, losartan, magnesium chloride, potassium chloride er, rivaroxaban anticoagulant, vitamin d (cholecalciferol), multiple vitamins-minerals, order for dme, and UNABLE TO FIND.     Allergies:   Amoxicillin, Clarithromycin, Lisinopril, and Penicillins      Social History:  Social History     Socioeconomic History     Marital status:      Spouse name: Christiano Nina     Number of children: 1     Years of education: Not on file     Highest education level: Not on file   Occupational History     Occupation: Current: Retired     Occupation: Former:    Social Needs     Financial resource strain: Not on file      Food insecurity     Worry: Not on file     Inability: Not on file     Transportation needs     Medical: Not on file     Non-medical: Not on file   Tobacco Use     Smoking status: Never Smoker     Smokeless tobacco: Never Used   Substance and Sexual Activity     Alcohol use: Yes     Comment: occasionally     Drug use: No     Sexual activity: Not on file   Lifestyle     Physical activity     Days per week: Not on file     Minutes per session: Not on file     Stress: Not on file   Relationships     Social connections     Talks on phone: Not on file     Gets together: Not on file     Attends Rastafari service: Not on file     Active member of club or organization: Not on file     Attends meetings of clubs or organizations: Not on file     Relationship status: Not on file     Intimate partner violence     Fear of current or ex partner: Not on file     Emotionally abused: Not on file     Physically abused: Not on file     Forced sexual activity: Not on file   Other Topics Concern     Parent/sibling w/ CABG, MI or angioplasty before 65F 55M? Not Asked   Social History Narrative     Not on file     Lives with , feels safe at home.  Retired .  Enjoys playing golf, gardening.  Does have an advanced directive on file and would like her , Christiano to be her POA.  DNR/DNI    Family History:   The patient's family history is significant for.  Family History   Problem Relation Age of Onset     Breast Cancer Mother 69     Heart Failure Mother      Breast Cancer Maternal Grandmother         this is maternal great grandmother     Breast Cancer Maternal Aunt 89     Myocardial Infarction Father      Unknown/Adopted Brother      Unknown/Adopted Maternal Grandfather      Stomach Cancer Paternal Grandmother         Stomach mass-not sure if cancer     Lung Cancer Paternal Grandfather         mustard gas in WW[place]         Physical Exam:   BP (!) 156/69   Pulse 66   Temp 97.2  F (36.2  C) (Tympanic)   Resp 16   Wt  85.3 kg (188 lb)   SpO2 97%   BMI 37.97 kg/m     General Appearance: healthy and alert, no distress     HEENT:  no thyromegaly, no palpable nodules or masses        Cardiovascular: regular rate and rhythm, no gallops, rubs or murmurs     Respiratory: lungs clear, no rales, rhonchi or wheezes, normal diaphragmatic excursion    Musculoskeletal: extremities non tender and without edema    Skin: no lesions or rashes     Neurological: normal gait, no gross defects     Psychiatric: appropriate mood and affect                               Hematological: normal cervical, supraclavicular and inguinal lymph nodes     Gastrointestinal:       abdomen soft, non-tender, non-distended, no organomegaly or masses    Genitourinary: External genitalia and urethral meatus appears normal.  Vagina is smooth without nodularity or masses.  Cervix surgically absent.  Bimanual exam reveal no masses, nodularity or fullness.  Recto-vaginal exam confirms these findings.          Assessment:    Tosin Nina is a 75 year old woman with a diagnosis of Stage IVB high grade serous ovarian cancer.     A total of 15 minutes was spent with the patient, >50% of which were spent in counseling the patient and/or treatment planning.      Plan:     1.)    Stage IVB high grade serous ovarian cancer. YONATAN s/p neoadjuvant chemotherapy, optimal interval debulking and adjuvant chemotherapy.  No concerning findings on exam today despite new abdominal pain.  Will obtain  today and if elevated, will plan CT.  If not, this is likely MS pain related to recent injury.  Reviewed signs and symptoms of a recurrence and I have asked her to call if these should occur.  Reinforced surveillance visits every 6 months for the following 3 years (9/22) then annually thereafter. She will return in 6 months with a  prior to her visit.  She had her port removed.    2.) Deconditioning and wheelchair bound state. Stable. no intervention  necessary    3.) Chemotherapy side effects:  Neuropathy is the only residual effect she is having and this is mild and improving    4.) Para-neoplastic syndrome.  She was supposed to see neurology, however her previous neurologist has left so she needs assistance in scheduling with a new neurologist.    5.) PE.  Plan lifelong anticoagulation.  She is currently on Xeralto     6.) Genetic risk factors were assessed and the patient has seen genetics and testing is pending    7.) Advanced malignancy and paraneoplastic symptoms.  She follows with palliative care but feels she has minimal symptoms at this time and thus will hold off on further appointments for now    8.) Labs and/or tests ordered include:  , mammogram     9.) Health maintenance issues addressed today include pt is due for a mammogram which she will schedule            Thank you for allowing us to participate in the care of your patient.         Sincerely,    Nessa Ennis MD  Gynecologic Oncology  [facility]       CC

## 2021-09-20 ENCOUNTER — LAB (OUTPATIENT)
Dept: ONCOLOGY | Facility: CLINIC | Age: 76
End: 2021-09-20
Attending: OBSTETRICS & GYNECOLOGY
Payer: COMMERCIAL

## 2021-09-20 DIAGNOSIS — C56.9 OVARIAN CANCER, UNSPECIFIED LATERALITY (H): ICD-10-CM

## 2021-09-20 LAB — CANCER AG125 SERPL-ACNC: <5 U/ML (ref 0–30)

## 2021-09-20 PROCEDURE — 36415 COLL VENOUS BLD VENIPUNCTURE: CPT

## 2021-09-20 PROCEDURE — 86304 IMMUNOASSAY TUMOR CA 125: CPT | Performed by: OBSTETRICS & GYNECOLOGY

## 2021-09-28 ENCOUNTER — ONCOLOGY VISIT (OUTPATIENT)
Dept: ONCOLOGY | Facility: CLINIC | Age: 76
End: 2021-09-28
Attending: OBSTETRICS & GYNECOLOGY
Payer: COMMERCIAL

## 2021-09-28 ENCOUNTER — TRANSFERRED RECORDS (OUTPATIENT)
Dept: HEALTH INFORMATION MANAGEMENT | Facility: CLINIC | Age: 76
End: 2021-09-28

## 2021-09-28 VITALS
BODY MASS INDEX: 37.57 KG/M2 | TEMPERATURE: 98.7 F | OXYGEN SATURATION: 95 % | WEIGHT: 186 LBS | SYSTOLIC BLOOD PRESSURE: 125 MMHG | DIASTOLIC BLOOD PRESSURE: 65 MMHG | HEART RATE: 86 BPM | RESPIRATION RATE: 16 BRPM

## 2021-09-28 DIAGNOSIS — C56.9 MALIGNANT NEOPLASM OF OVARY, UNSPECIFIED LATERALITY (H): Primary | ICD-10-CM

## 2021-09-28 PROCEDURE — 99212 OFFICE O/P EST SF 10 MIN: CPT | Performed by: OBSTETRICS & GYNECOLOGY

## 2021-09-28 ASSESSMENT — PAIN SCALES - GENERAL: PAINLEVEL: NO PAIN (0)

## 2021-09-28 NOTE — LETTER
"    2021         RE: Tosin Nina  [address]  [facility] [address]        Dear Colleague,    Thank you for referring your patient, Tosin Nina, to the [facility]. Please see a copy of my visit note below.    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  She is accompanied by her daughter.   She has been overall feeling well and is in good spirits, still at her baseline functioning with respect to balance and ambulation. She uses a motorized wheelchair to get around \"it's great\". Notes her ataxia has worsened but otherwise hasn't noticed any differences in physical symptoms. Eating, drinking without issues. No change in bowel or bladder habits, unintentional weight loss, shortness of breath, cough, chest pain, vaginal bleeding, abdominal/pelvic pain. Asking whether she should stay on Xarelto forever.     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  She was discharged to a TCU where she is still residing with some but minimal improvement in her neurologic symptoms.  She still has quite a difficult time seeing especially with her right eye.  She also notes weakness mostly on her left side.  Both of these make it very difficult for her to walk and thus she is having to use a wheelchair for most of her mobility.  She tolerated her first cycle quite well with her biggest side effect being nausea which improved drastically with a change in anti-emetics.      17-17:  Admit to [facility] for worsening dizziness, found to have stage IVB ovarian cancer (inguinal lymphadenopathy) causing paraneoplastic syndrome    17:  CT C/A/P:  Thrombus identified within the right lower lobe are artery and " right upper lobar arteries. The right pulmonary artery is enlarged, similar to prior exams. No CT evidence of right heart strain. No suspicious mediastinal or perihilar lymphadenopathy. Bovine arch anatomy. No suspicious pulmonary nodules, evidence of infarction, or infection. Abdomen and pelvis: There are soft tissue nodules identified along the liver capsule measuring up to 3 cm inferiorly. There is a large amount of omental caking. Enlarged iliac and retroperitoneal lymph nodes for example a 2.6 cm right external iliac lymph node or group of lymph nodes. Heterogeneous enhancement of the uterine fundus could be due to fibroids or a mass. The overall size of the uterus does not appear significantly different than in 2014. The left ovary does appear slightly larger and lobular in appearance compared to prior exam. There is also a nodular area along the round ligament. It was not present on prior exam. There is an irregular lobulated mass involving the sigmoid colon. Abnormal, enlarged inguinal lymph nodes. Soft tissue implant in the posterior right retroperitoneum adjacent to the psoas was not present on prior exam. 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 Spondylolisthesis at L3-4. Small periumbilical hernia containing some of the abnormal omentum.    4/11/17:   268, CEA .6    4/12/17:  IR omental biopsy   Pathology:  High grade serous carcinoma    4/14/17: Cycle #1 carboplatin AUC 6 and weekly Taxol 80mg/m2.  = 2648    5/5/17:  Cycle #2 carboplatin AUC 6 and weekly Taxol 80mg/m2.  = 370    5/26/17:  Cycle #3 carboplatin AUC 6 and weekly Taxol 80mg/m2.  = 63    6/16/17:  CT C/A/P:  Chest:  Resolution of previous right-sided pulmonary emboli since April. No mediastinal, hilar or axillary adenopathy. No pleural fluid.  Port-A-Cath right superior chest with tip in the SVC.  Short linear fibrosis or  discoid atelectasis anterior medial right upper lobe. Lungs otherwise clear. Abdomen and Pelvis: Marked improvement in carcinomatosis and omental metastasis since 4/11/2017. In the anterior left pelvis there is a 1.2 cm nodule with adjacent linear opacity approximately 4 cm in length in an area of previous dense omental caking and metastatic disease. There is resolution of the previous anterior right-sided omental caking with subcentimeter trace of residual nodularity in this location. There are multiple new indeterminate nodular densities in the anterior abdominal wall subcutaneous fat as well as small collections of subcutaneous air, suggesting that these are medication injection sites.  Previous subserosal implants along the inferior liver have resolved. No focal liver lesions. Normal-appearing pancreas, adrenal glands and kidneys. Tiny hypodense spleen lesion is too small to characterize, not previously seen. Spleen otherwise appears normal. No periaortic or pelvic adenopathy with resolution of previous adenopathy. Lobulated enlarged uterus redemonstrated consistent with multiple fibroids. No free fluid. No acute bowel abnormality. Resolution of mesenteric right lower quadrant nodule is compatible with metastasis. On coronal images the terminal ileum takes an unusual circular course posterior to the right colon, but there is no evidence for obstruction. No aggressive bone lesions.    6/16/17:   = 27    6/28/17:  Robotic total laparoscopic hysterectomy, bilateral salpingo-oophorectomy, lysis of adhesions, omentectomy, optimal tumor debulking to no residual disease   Pathology:  Minimal serous carcinoma remaining in the left ovary    7/20/17:  Cycle #4 carboplatin AUC 6 and weekly Taxol 80mg/m2. D15 cancelled due to thrombocytopenia (plt 70)   = 18    8/10/17:  Cycle #5 carboplatin AUC 6 and weekly Taxol 80mg/m2. D8 cancelled neutropenia , D15 cancelled thromobocytopenia (plt 53)  =  12    17:  Cycle #6 carboplatin AUC 5 due to myelosuppression and weekly Taxol 80mg/m2. D15 delayed thrombocytopenia (plt 78)  = 10    10/12/20: CT C/A/P:  1. No convincing evidence of local recurrence or metastatic disease in the chest, abdomen or pelvis. 2. The previously seen scattered peritoneal nodularities on 2017 exam are no longer seen. 3. Significant hepatic steatosis. 4. Colonic diverticulosis without CT evidence of acute diverticulitis. 5. Few small left kidney stones. No right kidney stones. No hydronephrosis in either kidney.    Obstetrics and Gynecology History:  ,   Menopause at age 52, took HRT for a short while, maybe 1 year        Past Medical History:  Past Medical History:   Diagnosis Date     Anemia      Cervical spinal stenosis      Depression      Depressive disorder      GERD (gastroesophageal reflux disease)      Hypertension      Hypokalemia      Hypothyroid      Lumbar spinal stenosis      Obesity      Ovarian cancer (H)      Paraneoplastic neuromyopathy and neuropathy (H)      PE (pulmonary thromboembolism) (H)      Thrombocytopenia (H)        Past Surgical History:  Past Surgical History:   Procedure Laterality Date     AS TOTAL KNEE ARTHROPLASTY Left      BACK SURGERY       CHOLECYSTECTOMY  2016     COLONOSCOPY N/A 2018    Procedure: COLONOSCOPY;  Colonoscopy ;  Surgeon: Nila Munson MD;  Location: [facility] OR     CYSTOSCOPY N/A 2017    Procedure: CYSTOSCOPY;;  Surgeon: Nessa Christina MD;  Location: [facility] OR     DAVINCI HYSTERECTOMY TOTAL, BILATERAL SALPINGO-OOPHORECTMY, NODE DISSECTION, TUMOR STAGING, COMBINED N/A 2017    Procedure: COMBINED DAVINCI HYSTERECTOMY TOTAL, SALPINGO-OOPHORECTOMY, NODE DISSECTION, TUMOR STAGING;  Robotic total laparoscopic hysterectomy, bilateral salpingo-oophorectomy, omentectomy, lysis of adhesions, tumor debulking, cystoscopy;  Surgeon: Nessa Christina MD;  Location: [facility] OR     IR  PORT REMOVAL RIGHT  3/9/2020     OPEN REDUCTION INTERNAL FIXATION WRIST Right 2009     THYROIDECTOMY         Health Maintenance:  Last Pap Smear: No need for further pap smear exams  She has not had a history of abnormal Pap smears.    Last Mammogram: 8/12/19              Result: normal      She has not had a history of abnormal mammograms.    Last Colonoscopy: 4/20/18              Result: polyps-repeat 5 years       Social History:  Lives with , feels safe at home.  Retired .  Enjoys playing golf, gardening.  Does have an advanced directive on file and would like her Christiano to be her POA.  DNR/DNI    Family History:   The patient's family history is significant for.  Family History   Problem Relation Age of Onset     Breast Cancer Mother 69     Heart Failure Mother      Breast Cancer Maternal Grandmother         this is maternal great grandmother     Breast Cancer Maternal Aunt 89     Myocardial Infarction Father      Unknown/Adopted Brother      Unknown/Adopted Maternal Grandfather      Stomach Cancer Paternal Grandmother         Stomach mass-not sure if cancer     Lung Cancer Paternal Grandfather         mustard gas in [place]         Physical Exam:   /65   Pulse 86   Temp 98.7  F (37.1  C) (Tympanic)   Resp 16   Wt 84.4 kg (186 lb)   SpO2 95%   BMI 37.57 kg/m     General Appearance: healthy and alert, no distress     Gastrointestinal:       abdomen soft, non-tender, non-distended, no organomegaly or masses    Genitourinary: External genitalia and urethral meatus appears normal.  Vagina is smooth without nodularity or masses.  Cervix surgically absent.  Bimanual exam reveal no masses, nodularity or fullness.  Recto-vaginal exam confirms these findings.          Assessment:    Tosin Nina is a 75 year old woman with a diagnosis of Stage IVB high grade serous ovarian cancer.     A total of 15 minutes was spent with the patient, >50% of which were spent in counseling the  patient and/or treatment planning.      Plan:     1.)    Stage IVB high grade serous ovarian cancer. YONATAN s/p neoadjuvant chemotherapy, optimal interval debulking and adjuvant chemotherapy.   Reviewed signs and symptoms of a recurrence and I have asked her to call if these should occur.  Reinforced surveillance visits every 6 months for the following 3 years (9/22) then annually thereafter. She will return in 6 months with a  prior to her visit.  She had her port removed.    2.) Deconditioning and wheelchair bound state. Stable. No intervention necessary    3.) Chemotherapy side effects:  Neuropathy is the only residual effect she is having and this is mild     4.) Para-neoplastic syndrome.  She was supposed to see neurology, however her previous neurologist has left so she needs assistance in scheduling with a new neurologist.    5.) PE.  Plan lifelong anticoagulation per Dr Barahona of hematology.  She is currently on Xeralto     6.) Genetic risk factors were assessed and the patient has seen genetics and testing is pending    7.) Advanced malignancy and paraneoplastic symptoms.  She had seen palliative care but feels she has minimal symptoms at this time and thus will hold off on further appointments for now    8.) Labs and/or tests ordered include:  , mammogram     9.) Health maintenance issues addressed today include pt is due for a mammogram which she will schedule            Thank you for allowing us to participate in the care of your patient.         Sincerely,    Nessa Ennis MD  Gynecologic Oncology  [facility]       CC               Again, thank you for allowing me to participate in the care of your patient.        Sincerely,        Cristian Ennis MD

## 2021-09-28 NOTE — PROGRESS NOTES
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  She is accompanied by her daughter.   She has been overall feeling well and is in good spirits, still at her baseline functioning with respect to balance and ambulation. She uses a motorized wheelchair to get around \"it's great\". Notes her ataxia has worsened but otherwise hasn't noticed any differences in physical symptoms. Eating, drinking without issues. No change in bowel or bladder habits, unintentional weight loss, shortness of breath, cough, chest pain, vaginal bleeding, abdominal/pelvic pain. Asking whether she should stay on Xarelto forever.     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  She was discharged to a TCU where she is still residing with some but minimal improvement in her neurologic symptoms.  She still has quite a difficult time seeing especially with her right eye.  She also notes weakness mostly on her left side.  Both of these make it very difficult for her to walk and thus she is having to use a wheelchair for most of her mobility.  She tolerated her first cycle quite well with her biggest side effect being nausea which improved drastically with a change in anti-emetics.      17-17:  Admit to [facility] for worsening dizziness, found to have stage IVB ovarian cancer (inguinal lymphadenopathy) causing paraneoplastic syndrome    17:  CT C/A/P:  Thrombus identified within the right lower lobe are artery and right upper lobar arteries. The right pulmonary artery is enlarged, similar to prior exams. No CT evidence of right heart strain. No suspicious mediastinal or perihilar lymphadenopathy. Bovine arch anatomy. No suspicious pulmonary nodules, evidence of infarction, or infection. " Abdomen and pelvis: There are soft tissue nodules identified along the liver capsule measuring up to 3 cm inferiorly. There is a large amount of omental caking. Enlarged iliac and retroperitoneal lymph nodes for example a 2.6 cm right external iliac lymph node or group of lymph nodes. Heterogeneous enhancement of the uterine fundus could be due to fibroids or a mass. The overall size of the uterus does not appear significantly different than in 2014. The left ovary does appear slightly larger and lobular in appearance compared to prior exam. There is also a nodular area along the round ligament. It was not present on prior exam. There is an irregular lobulated mass involving the sigmoid colon. Abnormal, enlarged inguinal lymph nodes. Soft tissue implant in the posterior right retroperitoneum adjacent to the psoas was not present on prior exam. 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 Spondylolisthesis at L3-4. Small periumbilical hernia containing some of the abnormal omentum.    4/11/17:   268, CEA .6    4/12/17:  IR omental biopsy   Pathology:  High grade serous carcinoma    4/14/17: Cycle #1 carboplatin AUC 6 and weekly Taxol 80mg/m2.  = 2648    5/5/17:  Cycle #2 carboplatin AUC 6 and weekly Taxol 80mg/m2.  = 370    5/26/17:  Cycle #3 carboplatin AUC 6 and weekly Taxol 80mg/m2.  = 63    6/16/17:  CT C/A/P:  Chest:  Resolution of previous right-sided pulmonary emboli since April. No mediastinal, hilar or axillary adenopathy. No pleural fluid.  Port-A-Cath right superior chest with tip in the SVC.  Short linear fibrosis or discoid atelectasis anterior medial right upper lobe. Lungs otherwise clear. Abdomen and Pelvis: Marked improvement in carcinomatosis and omental metastasis since 4/11/2017. In the anterior left pelvis there is a 1.2 cm nodule with adjacent linear opacity approximately 4 cm in  length in an area of previous dense omental caking and metastatic disease. There is resolution of the previous anterior right-sided omental caking with subcentimeter trace of residual nodularity in this location. There are multiple new indeterminate nodular densities in the anterior abdominal wall subcutaneous fat as well as small collections of subcutaneous air, suggesting that these are medication injection sites.  Previous subserosal implants along the inferior liver have resolved. No focal liver lesions. Normal-appearing pancreas, adrenal glands and kidneys. Tiny hypodense spleen lesion is too small to characterize, not previously seen. Spleen otherwise appears normal. No periaortic or pelvic adenopathy with resolution of previous adenopathy. Lobulated enlarged uterus redemonstrated consistent with multiple fibroids. No free fluid. No acute bowel abnormality. Resolution of mesenteric right lower quadrant nodule is compatible with metastasis. On coronal images the terminal ileum takes an unusual circular course posterior to the right colon, but there is no evidence for obstruction. No aggressive bone lesions.    6/16/17:   = 27    6/28/17:  Robotic total laparoscopic hysterectomy, bilateral salpingo-oophorectomy, lysis of adhesions, omentectomy, optimal tumor debulking to no residual disease   Pathology:  Minimal serous carcinoma remaining in the left ovary    7/20/17:  Cycle #4 carboplatin AUC 6 and weekly Taxol 80mg/m2. D15 cancelled due to thrombocytopenia (plt 70)   = 18    8/10/17:  Cycle #5 carboplatin AUC 6 and weekly Taxol 80mg/m2. D8 cancelled neutropenia , D15 cancelled thromobocytopenia (plt 53)  = 12    8/31/17:  Cycle #6 carboplatin AUC 5 due to myelosuppression and weekly Taxol 80mg/m2. D15 delayed thrombocytopenia (plt 78)  = 10    10/12/20: CT C/A/P:  1. No convincing evidence of local recurrence or metastatic disease in the chest, abdomen or pelvis. 2. The previously  seen scattered peritoneal nodularities on 2017 exam are no longer seen. 3. Significant hepatic steatosis. 4. Colonic diverticulosis without CT evidence of acute diverticulitis. 5. Few small left kidney stones. No right kidney stones. No hydronephrosis in either kidney.    Obstetrics and Gynecology History:  ,   Menopause at age 52, took HRT for a short while, maybe 1 year        Past Medical History:  Past Medical History:   Diagnosis Date     Anemia      Cervical spinal stenosis      Depression      Depressive disorder      GERD (gastroesophageal reflux disease)      Hypertension      Hypokalemia      Hypothyroid      Lumbar spinal stenosis      Obesity      Ovarian cancer (H)      Paraneoplastic neuromyopathy and neuropathy (H)      PE (pulmonary thromboembolism) (H)      Thrombocytopenia (H)        Past Surgical History:  Past Surgical History:   Procedure Laterality Date     AS TOTAL KNEE ARTHROPLASTY Left      BACK SURGERY       CHOLECYSTECTOMY  2016     COLONOSCOPY N/A 2018    Procedure: COLONOSCOPY;  Colonoscopy ;  Surgeon: Nila Munson MD;  Location: [facility] OR     CYSTOSCOPY N/A 2017    Procedure: CYSTOSCOPY;;  Surgeon: Nessa Christina MD;  Location: [facility] OR     DAVINCI HYSTERECTOMY TOTAL, BILATERAL SALPINGO-OOPHORECTMY, NODE DISSECTION, TUMOR STAGING, COMBINED N/A 2017    Procedure: COMBINED DAVINCI HYSTERECTOMY TOTAL, SALPINGO-OOPHORECTOMY, NODE DISSECTION, TUMOR STAGING;  Robotic total laparoscopic hysterectomy, bilateral salpingo-oophorectomy, omentectomy, lysis of adhesions, tumor debulking, cystoscopy;  Surgeon: Nessa Christina MD;  Location: [facility] OR     IR PORT REMOVAL RIGHT  3/9/2020     OPEN REDUCTION INTERNAL FIXATION WRIST Right      THYROIDECTOMY         Health Maintenance:  Last Pap Smear: No need for further pap smear exams  She has not had a history of abnormal Pap smears.    Last Mammogram: 19              Result:  normal      She has not had a history of abnormal mammograms.    Last Colonoscopy: 4/20/18              Result: polyps-repeat 5 years       Social History:  Lives with , feels safe at home.  Retired .  Enjoys playing golf, gardening.  Does have an advanced directive on file and would like her , Christiano to be her POA.  DNR/DNI    Family History:   The patient's family history is significant for.  Family History   Problem Relation Age of Onset     Breast Cancer Mother 69     Heart Failure Mother      Breast Cancer Maternal Grandmother         this is maternal great grandmother     Breast Cancer Maternal Aunt 89     Myocardial Infarction Father      Unknown/Adopted Brother      Unknown/Adopted Maternal Grandfather      Stomach Cancer Paternal Grandmother         Stomach mass-not sure if cancer     Lung Cancer Paternal Grandfather         mustard gas in [place]         Physical Exam:   /65   Pulse 86   Temp 98.7  F (37.1  C) (Tympanic)   Resp 16   Wt 84.4 kg (186 lb)   SpO2 95%   BMI 37.57 kg/m     General Appearance: healthy and alert, no distress     Gastrointestinal:       abdomen soft, non-tender, non-distended, no organomegaly or masses    Genitourinary: External genitalia and urethral meatus appears normal.  Vagina is smooth without nodularity or masses.  Cervix surgically absent.  Bimanual exam reveal no masses, nodularity or fullness.  Recto-vaginal exam confirms these findings.          Assessment:    Tosin Nina is a 75 year old woman with a diagnosis of Stage IVB high grade serous ovarian cancer.     A total of 15 minutes was spent with the patient, >50% of which were spent in counseling the patient and/or treatment planning.      Plan:     1.)    Stage IVB high grade serous ovarian cancer. YONATAN s/p neoadjuvant chemotherapy, optimal interval debulking and adjuvant chemotherapy.   Reviewed signs and symptoms of a recurrence and I have asked her to call if these should  occur.  Reinforced surveillance visits every 6 months for the following 3 years (9/22) then annually thereafter. She will return in 6 months with a  prior to her visit.  She had her port removed.    2.) Deconditioning and wheelchair bound state. Stable. No intervention necessary    3.) Chemotherapy side effects:  Neuropathy is the only residual effect she is having and this is mild     4.) Para-neoplastic syndrome.  She was supposed to see neurology, however her previous neurologist has left so she needs assistance in scheduling with a new neurologist.    5.) PE.  Plan lifelong anticoagulation per Dr Barahona of hematology.  She is currently on Xeralto     6.) Genetic risk factors were assessed and the patient has seen genetics and testing is pending    7.) Advanced malignancy and paraneoplastic symptoms.  She had seen palliative care but feels she has minimal symptoms at this time and thus will hold off on further appointments for now    8.) Labs and/or tests ordered include:  , mammogram     9.) Health maintenance issues addressed today include pt is due for a mammogram which she will schedule            Thank you for allowing us to participate in the care of your patient.         Sincerely,    Nessa Ennis MD  Gynecologic Oncology  [facility]       CC

## 2021-10-20 NOTE — PROGRESS NOTES
RECORDS STATUS - ALL OTHER DIAGNOSIS      RECORDS RECEIVED FROM: [facility]   DATE RECEIVED: 10/26/2021   NOTES STATUS DETAILS   OFFICE NOTE from referring provider     OFFICE NOTE from medical oncologist Complete 9/28/2021 Oncology Visit- Malignant neoplasm of ovary, unspecified laterality (H)    3/2/2021 Oncology Visit- Ovarian cancer, unspecified laterality (H)    More in EPIC   DISCHARGE SUMMARY from hospital     DISCHARGE REPORT from the ER     OPERATIVE REPORT Complete See Biopsy in [facility] 6/28/2017   MEDICATION LIST Complete [facility]   CLINICAL TRIAL TREATMENTS TO DATE     LABS     PATHOLOGY REPORTS complete 6/28/2017   Robotic total laparoscopic hysterectomy, bilateral salpingo-oophorectomy, omentectomy, lysis of adhesions, tumor debulking, cystoscopy   ANYTHING RELATED TO DIAGNOSIS Complete Labs last updated on 9/28/2021   GENONOMIC TESTING     TYPE:     IMAGING (NEED IMAGES & REPORT)     CT SCANS Complete CT Chest Abdomen Pelvis 10/12/2020 more in PACS   MRI     MAMMO     ULTRASOUND     PET

## 2021-10-24 ENCOUNTER — HEALTH MAINTENANCE LETTER (OUTPATIENT)
Age: 76
End: 2021-10-24

## 2021-10-26 ENCOUNTER — ONCOLOGY VISIT (OUTPATIENT)
Dept: ONCOLOGY | Facility: CLINIC | Age: 76
End: 2021-10-26
Attending: STUDENT IN AN ORGANIZED HEALTH CARE EDUCATION/TRAINING PROGRAM
Payer: COMMERCIAL

## 2021-10-26 ENCOUNTER — PRE VISIT (OUTPATIENT)
Dept: ONCOLOGY | Facility: CLINIC | Age: 76
End: 2021-10-26

## 2021-10-26 ENCOUNTER — ONCOLOGY VISIT (OUTPATIENT)
Dept: ONCOLOGY | Facility: CLINIC | Age: 76
End: 2021-10-26
Attending: PSYCHIATRY & NEUROLOGY
Payer: COMMERCIAL

## 2021-10-26 ENCOUNTER — HOSPITAL ENCOUNTER (OUTPATIENT)
Dept: GENERAL RADIOLOGY | Facility: CLINIC | Age: 76
Discharge: HOME | End: 2021-10-26
Attending: PSYCHIATRY & NEUROLOGY | Admitting: PSYCHIATRY & NEUROLOGY
Payer: COMMERCIAL

## 2021-10-26 VITALS
HEIGHT: 59 IN | WEIGHT: 194 LBS | TEMPERATURE: 98.1 F | BODY MASS INDEX: 39.11 KG/M2 | DIASTOLIC BLOOD PRESSURE: 77 MMHG | OXYGEN SATURATION: 96 % | RESPIRATION RATE: 16 BRPM | HEART RATE: 77 BPM | SYSTOLIC BLOOD PRESSURE: 135 MMHG

## 2021-10-26 VITALS
OXYGEN SATURATION: 95 % | WEIGHT: 194 LBS | BODY MASS INDEX: 39.18 KG/M2 | TEMPERATURE: 98.1 F | HEART RATE: 72 BPM | RESPIRATION RATE: 18 BRPM | DIASTOLIC BLOOD PRESSURE: 61 MMHG | SYSTOLIC BLOOD PRESSURE: 150 MMHG

## 2021-10-26 DIAGNOSIS — M25.552 HIP PAIN, LEFT: ICD-10-CM

## 2021-10-26 DIAGNOSIS — G31.89 PARANEOPLASTIC CEREBELLAR DEGENERATION (H): Primary | ICD-10-CM

## 2021-10-26 DIAGNOSIS — G57.12 MERALGIA PARESTHETICA OF LEFT SIDE: ICD-10-CM

## 2021-10-26 PROCEDURE — 73502 X-RAY EXAM HIP UNI 2-3 VIEWS: CPT

## 2021-10-26 PROCEDURE — 99215 OFFICE O/P EST HI 40 MIN: CPT | Performed by: PSYCHIATRY & NEUROLOGY

## 2021-10-26 PROCEDURE — 99204 OFFICE O/P NEW MOD 45 MIN: CPT | Mod: GC | Performed by: STUDENT IN AN ORGANIZED HEALTH CARE EDUCATION/TRAINING PROGRAM

## 2021-10-26 RX ORDER — IBUPROFEN 200 MG
200 TABLET ORAL EVERY 4 HOURS PRN
COMMUNITY
End: 2022-04-05

## 2021-10-26 RX ORDER — TIZANIDINE 2 MG/1
TABLET ORAL
COMMUNITY
Start: 2021-03-09 | End: 2022-04-05

## 2021-10-26 RX ORDER — ACETAMINOPHEN 500 MG
500-1000 TABLET ORAL EVERY 6 HOURS PRN
COMMUNITY

## 2021-10-26 RX ORDER — CYCLOBENZAPRINE HCL 5 MG
5 TABLET ORAL DAILY
COMMUNITY
End: 2023-09-19

## 2021-10-26 ASSESSMENT — PAIN SCALES - GENERAL
PAINLEVEL: MODERATE PAIN (4)
PAINLEVEL: MILD PAIN (3)

## 2021-10-26 ASSESSMENT — MIFFLIN-ST. JEOR: SCORE: 1285.61

## 2021-10-26 NOTE — LETTER
"    10/26/2021         RE: Tosin Nina  [address]  [facility] [address]        Dear Colleague,    Thank you for referring your patient, Tosin Nina, to the [facility]. Please see a copy of my visit note below.    NEURO-ONCOLOGY INITIAL VISIT  Oct 26, 2021    CHIEF COMPLAINT: Ms. 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 In 4/2017, prior to being diagnosed with cancer, she was admit to [facility] for worsening coordination. 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 She underwent a 5 day course of IVIG and IV methylprednisolone from 06/08 - 06/12/2017 for treatment of the paraneoplastic syndrome. She is presenting to this initial clinic visit as referred by Dr. Ennis for evaluation and recommendations on any further management.     She is accompanied today by her daughter, Cece.     HISTORY OF PRESENT ILLNESS  - She has not seen a neurologist since autumn 2017, and reports she was never given explicit follow-up instructions.   - Ataxia/cerebellar symptoms persist and are largely unchanged, with exception that her \"legs bounce\" now when she goes to stand up, duration for ~ 10 seconds.  - Namita uses a motorized wheelchair to get around and is unable to walk due to severe ataxia. No falls this year.   - Daughter is helping her with ADL's that require walking. She can stand up and transfer independently. She has trouble \"furniture surfing\". She describes that when she stands she has \"uncontrollable leg bouncing\". This leg bouncing is new this month. She think she can willingly stop it but her daughter disagrees.   - When she is active she gets floating sensation in her head like she is on a rocking boat, since the time she was diagnosed with the paraneoplastic syndrome. This is " not bothersome.    - She is having burning pain in the left thigh for 8-9 days and the left thigh feels holt and tighter than the right for 1-2 days. Pain is worse at night. Advil sometimes works but other times she has to take Dilaudid and oxycodone (pills from 2014). She had some leftover muscle relaxant pills that she started taking a few days ago and the muscle relaxants have been helpful. She is worried the symptoms are from a pinched nerve in the back. Namita is open to a referral to Dr. Ramirez with PM&R.  - She has history of back surgeries and epidural shots by orthopedic surgeon in [place] ([facility]). She reports remote history of pinched nerves in the neck that resolved.   - No current ocular symptoms, focal weakness, headaches, vision disturbances.   - Has not seen physical therapist in many years.   - Not on steroids, chemotherapy, or receiving radiation therapy.     REVIEW OF SYSTEMS  A comprehensive ROS negative except as in HPI.      MEDICATIONS   Current Outpatient Medications   Medication Sig Dispense Refill     acetaminophen (TYLENOL) 500 MG tablet Take 500-1,000 mg by mouth every 6 hours as needed for mild pain       cyclobenzaprine (FLEXERIL) 5 MG tablet Take 5 mg by mouth daily       escitalopram (LEXAPRO) 10 MG tablet Take 1 tablet (10 mg) by mouth daily 30 tablet 2     hydrochlorothiazide (HYDRODIURIL) 25 MG tablet Take 25 mg by mouth daily   0     ibuprofen (ADVIL) 200 MG tablet Take 200 mg by mouth every 4 hours as needed for mild pain       levothyroxine (SYNTHROID) 125 MCG tablet Take 1 tablet (125 mcg) by mouth daily 30 tablet      losartan (COZAAR) 100 MG tablet Take 100 mg by mouth       magnesium chloride 535 (64 MG) MG TBCR CR tablet Take 1 tablet (535 mg) by mouth daily 30 tablet 3     Potassium Chloride ER 20 MEQ TBCR Take 1 tablet (20 mEq) by mouth 2 times daily (Patient taking differently: Take 20 mEq by mouth daily ) 60 tablet 3     rivaroxaban ANTICOAGULANT (XARELTO) 20 MG TABS  "tablet Take 1 tablet (20 mg) by mouth daily (with dinner) 30 tablet 3     tiZANidine (ZANAFLEX) 2 MG tablet        Vitamin D, Cholecalciferol, 1000 UNITS TABS Take 2,000 Units by mouth daily (Patient taking differently: Take 2,000 Units by mouth daily LD 6/20/17, told to hold until after surgery) 60 tablet      DRUG ALLERGIES   Allergies   Allergen Reactions     Amoxicillin Hives     Clarithromycin Other (See Comments)     \"I felt dopey, sleepy and like a druggie\"     Lisinopril Cough     Penicillins Rash     IMMUNIZATIONS   Immunization History   Administered Date(s) Administered     COVID-19,PF,Moderna 01/12/2021, 02/11/2021     Influenza (High Dose) 3 valent vaccine 10/03/2017     PAST MEDICAL HISTORY   Past Medical History:   Diagnosis Date     Anemia      Cervical spinal stenosis      Depression      Depressive disorder      GERD (gastroesophageal reflux disease)      Hypertension      Hypokalemia      Hypothyroid      Lumbar spinal stenosis      Obesity      Ovarian cancer (H)      Paraneoplastic neuromyopathy and neuropathy (H)      PE (pulmonary thromboembolism) (H)      Thrombocytopenia (H)      PAST SURGICAL HISTORY   Past Surgical History:   Procedure Laterality Date     AS TOTAL KNEE ARTHROPLASTY Left      BACK SURGERY  2009     CHOLECYSTECTOMY  01/01/2016     COLONOSCOPY N/A 4/20/2018    Procedure: COLONOSCOPY;  Colonoscopy ;  Surgeon: Nila Munson MD;  Location:  OR     CYSTOSCOPY N/A 6/28/2017    Procedure: CYSTOSCOPY;;  Surgeon: Nessa Christina MD;  Location: [facility] OR     DAVINCI HYSTERECTOMY TOTAL, BILATERAL SALPINGO-OOPHORECTMY, NODE DISSECTION, TUMOR STAGING, COMBINED N/A 6/28/2017    Procedure: COMBINED DAVINCI HYSTERECTOMY TOTAL, SALPINGO-OOPHORECTOMY, NODE DISSECTION, TUMOR STAGING;  Robotic total laparoscopic hysterectomy, bilateral salpingo-oophorectomy, omentectomy, lysis of adhesions, tumor debulking, cystoscopy;  Surgeon: Nessa Christina MD;  Location:  OR " "    IR PORT REMOVAL RIGHT  3/9/2020     OPEN REDUCTION INTERNAL FIXATION WRIST Right 2009     THYROIDECTOMY       SOCIAL HISTORY   History   Smoking Status     Never Smoker   Smokeless Tobacco     Never Used    Social History    Substance and Sexual Activity      Alcohol use: Yes        Comment: occasionally     History   Drug Use No   Employment: Not employed    FAMILY HISTORY   Family History   Problem Relation Age of Onset     Breast Cancer Mother 69     Heart Failure Mother      Breast Cancer Maternal Grandmother         this is maternal great grandmother     Breast Cancer Maternal Aunt 89     Myocardial Infarction Father      Unknown/Adopted Brother      Unknown/Adopted Maternal Grandfather      Stomach Cancer Paternal Grandmother         Stomach mass-not sure if cancer     Lung Cancer Paternal Grandfather         mustard gas in [place]       PHYSICAL EXAMINATION  /77 (BP Location: Left arm, Patient Position: Sitting, Cuff Size: Adult Large)   Pulse 77   Temp 98.1  F (36.7  C) (Oral)   Resp 16   Ht 1.499 m (4' 11\")   Wt 88 kg (194 lb)   SpO2 96%   BMI 39.18 kg/m    Wt Readings from Last 2 Encounters:   10/26/21 88 kg (194 lb)   10/26/21 88 kg (194 lb)      Ht Readings from Last 2 Encounters:   10/26/21 1.499 m (4' 11\")   07/11/19 1.499 m (4' 11\")     KPS: 60    -Generally well appearing. Large body habitus. Sitting in wheelchair.   -Respiratory: Normal breath sounds, no audible wheezing.   -Skin: No rashes.   -Extremities: Point tenderness over left hip. Tightness of IT band on left.   -Hematologic/ lymphatic: No abnormal bruising. Bilateral lower extremity pitting edema.   -Psychiatric: Normal mood and affect. Pleasant, talkative.  -Neurologic:   MENTAL STATUS:     Alert, oriented to date.    Appropriately answers questions and provides HPI.    Speech fluent. Comprehension intact to multi-step commands.   Normal naming.   Good right-left orientation.     CRANIAL NERVES:     Pupils are equal, round.  "    Extraocular movements full, denies diplopia. No nystagmus.    Visual fields full.     Facial sensation intact to light touch.   Symmetric facial movements.   Hearing intact.   Palate moves symmetrically.     Sternocleidomastoid and trapezius strength intact.    Tongue midline.  MOTOR:    Subtle increased tone in lower extremities at ankles. This is not present in the knees, hips, or arms.    5/5 strength bilaterally in deltoids, biceps, triceps, hand , hip flexors, hip extensors, knee flexion, knee extension, plantarflexion, dorsiflexion.    Able to stand independently while holding onto arms of chair. No bouncing appreciated.    On toe/ heel walk, equal distance from floor to heels/ toes.   SENSATION:    Intact to light touch throughout.  COORDINATION:   Intact finger-nose with eyes open and closed.    Clumsy left hand with finger tapping, and this is also slowed.    HSH mildly dysmetric on the right and severely dysmetric for LLE.   REFLEXES:    2+ right patellar, absent left patellar. Trace Achilles tendon reflexes. No ankle clonus bilaterally. 3+ brachioradialis on the right with spread and 2+ biceps on the right. Biceps, brachioradialis DTR's are 2+ on the left.   GAIT:  Deferred walking due to severe ataxia.       MEDICAL RECORDS  Obtained and personally reviewed all available outside medical records in addition to reviewing any records available in our electronic system.     LABS  Personally reviewed all available lab results.     IMAGING  No new neuro-imaging to review.        IMPRESSION  Clinic time for this high complexity encounter was spent discussing in detail the nature of her cancer in the confines of her associated paraneoplastic cerebellar degeneration and answering questions pertaining to my recommendations.     Paraneoplastic cerebellar degeneration is most commonly associated with anti-Yo antibodies, which are also called Purkinje cell cytoplasmic antibody type 1 (PCA-1). The development  "of these antibodies are primarily seen in patients that are later diagnosed with breast cancer or ovary, endometrium, and fallopian tube cancer. Unfortunately, patients with paraneoplastic cerebellar degeneration associated with anti-Yo antibodies carry a poor neurologic prognosis with most not able to fully recover despite early use of steroids, etc.    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 At that time, the development of disabling ataxia prompted a paraneoplastic work-up and the autoantibody panel returned positive for PCA-1 at a high titer of 1:361439. She underwent a 5 day course of IVIG and IV methylprednisolone from 06/08 - 06/12/2017.  Now, following neoadjuvant chemotherapy, optimal interval debulking, and adjuvant chemotherapy, she is without any radiographic evidence of active cancer. As such, she is managed on surveillance imaging every 6 months per Dr. Ennis. Without any active cancer to fuel the development of autoantibodies to further injure the cerebellum, her degree of ataxia and impaired coordination has remained largely stable over the past ~4 years. As a result, there is no indication for repeat brain imaging at this time or additional recommendations for treatment/ management outside of referral to Dr. Ramirez for consideration of PT.     Additionally, Namita reports that more recently, her legs are starting to \"bounce uncontrollably\" for several seconds when she stands out. It is possible this is clonus, but we were unable to elicit this on exam today and I did not find any significant degree of spacticity on exam today. We discussed the utility of spine imaging, however, Namita stated that she would be uninterested in surgically intervening if abnormalities were identified. As a result, will not pursue additional imaging work-up.     Finally, Namita reports numbness/ burning in a distribution consistent with left-sided meralgia paraesthetica for which she has " been taking a combination of NSAID's and muscle relaxants with mild improvement. There is some point tenderness near the head of the femur and along the upper lateral aspect of left thigh, raising concern for bursitis. She is amenable to a hip xray and in seeing Dr. Ramirez in PM&R today for further evaluation of these symptoms.     PROBLEM LIST  Paraneoplastic cerebellar degeneration  Stage IVB high grade serous ovarian cancer  Ataxia  Left hip pain  Left meralgia paraesthetica     PLAN  -CANCER-DIRECTED THERAPY-  -Per Dr. Ennis.     -PARANEOPLASTIC SYNDROME-  -Paraneoplastic cerebellar degeneration. No indication for brain imaging at this time.   -Overall, relatively stable neurological/ functional disability, but would benefit from visit with PM&R and possible PT referral in future.     -SEIZURE MANAGEMENT-  -Given the lack of seizure history, there is no indication to prescribe an antiepileptic at this time.     -LEFT MEURALGIA PARASTHETICA, HIP PAIN-  -Ordered hip xray to evaluate for femoral head subluxation/bursitis.   ADDENDUM: Joint space widths appear within normal limits. No evidence of hip dislocation or fracture. Degenerative changes are noted in the mid lumbar spine.  -Referral to Dr. Ramirez for further workup/management.     -Quality of life/ MOOD/ FATIGUE-  -Denies any mood issues.    Return to clinic as needed as Namita knows to call with new concerns and can be seen in follow-up.    Patient also seen and evaluated by resident physician Nilesh Azar MD, PGY3.     Nila Colon MD  Neuro-oncology          Oncology Rooming Note    October 26, 2021 11:01 AM   Tosin Nina is a 76 year old female who presents for:    Chief Complaint   Patient presents with     Oncology Clinic Visit     Encounter for follow-up surveillance of ovarian cancer     Initial Vitals: /77 (BP Location: Left arm, Patient Position: Sitting, Cuff Size: Adult Large)   Pulse 77   Temp 98.1  F (36.7  C) (Oral)   Resp  "16   Ht 1.499 m (4' 11\")   SpO2 96%   BMI 37.57 kg/m   Estimated body mass index is 37.57 kg/m  as calculated from the following:    Height as of this encounter: 1.499 m (4' 11\").    Weight as of 9/28/21: 84.4 kg (186 lb). Body surface area is 1.87 meters squared.  Moderate Pain (4) Comment: Data Unavailable   No LMP recorded. Patient is postmenopausal.  Allergies reviewed: Yes  Medications reviewed: Yes    Medications: Medication refills not needed today.  Pharmacy name entered into EPIC:    [id number] - [place], [place] - [address] [facility] [address] [facility] [address] [facility] [address]  [facility] [place], [place] - [id number] [place], [place] [id number] - [place], [place] - [facility] AT [facility] [address]    Clinical concerns: no      Patient with daughter    Gris Shaffer, [facility]                  Again, thank you for allowing me to participate in the care of your patient.        Sincerely,        Nila Colon MD    "

## 2021-10-26 NOTE — PROGRESS NOTES
"NEURO-ONCOLOGY INITIAL VISIT  Oct 26, 2021    CHIEF COMPLAINT: Ms. 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 In 4/2017, prior to being diagnosed with cancer, she was admit to [facility] for worsening coordination. 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 She underwent a 5 day course of IVIG and IV methylprednisolone from 06/08 - 06/12/2017 for treatment of the paraneoplastic syndrome. She is presenting to this initial clinic visit as referred by Dr. Ennis for evaluation and recommendations on any further management.     She is accompanied today by her daughter, Cece.     HISTORY OF PRESENT ILLNESS  - She has not seen a neurologist since autumn 2017, and reports she was never given explicit follow-up instructions.   - Ataxia/cerebellar symptoms persist and are largely unchanged, with exception that her \"legs bounce\" now when she goes to stand up, duration for ~ 10 seconds.  - Namita uses a motorized wheelchair to get around and is unable to walk due to severe ataxia. No falls this year.   - Daughter is helping her with ADL's that require walking. She can stand up and transfer independently. She has trouble \"furniture surfing\". She describes that when she stands she has \"uncontrollable leg bouncing\". This leg bouncing is new this month. She think she can willingly stop it but her daughter disagrees.   - When she is active she gets floating sensation in her head like she is on a rocking boat, since the time she was diagnosed with the paraneoplastic syndrome. This is not bothersome.    - She is having burning pain in the left thigh for 8-9 days and the left thigh feels holt and tighter than the right for 1-2 days. Pain is worse at night. Advil sometimes works but other times she has to take Dilaudid and oxycodone (pills from 2014). " She had some leftover muscle relaxant pills that she started taking a few days ago and the muscle relaxants have been helpful. She is worried the symptoms are from a pinched nerve in the back. Namita is open to a referral to Dr. aRmirez with PM&R.  - She has history of back surgeries and epidural shots by orthopedic surgeon in [place] ([facility]). She reports remote history of pinched nerves in the neck that resolved.   - No current ocular symptoms, focal weakness, headaches, vision disturbances.   - Has not seen physical therapist in many years.   - Not on steroids, chemotherapy, or receiving radiation therapy.     REVIEW OF SYSTEMS  A comprehensive ROS negative except as in HPI.      MEDICATIONS   Current Outpatient Medications   Medication Sig Dispense Refill     acetaminophen (TYLENOL) 500 MG tablet Take 500-1,000 mg by mouth every 6 hours as needed for mild pain       cyclobenzaprine (FLEXERIL) 5 MG tablet Take 5 mg by mouth daily       escitalopram (LEXAPRO) 10 MG tablet Take 1 tablet (10 mg) by mouth daily 30 tablet 2     hydrochlorothiazide (HYDRODIURIL) 25 MG tablet Take 25 mg by mouth daily   0     ibuprofen (ADVIL) 200 MG tablet Take 200 mg by mouth every 4 hours as needed for mild pain       levothyroxine (SYNTHROID) 125 MCG tablet Take 1 tablet (125 mcg) by mouth daily 30 tablet      losartan (COZAAR) 100 MG tablet Take 100 mg by mouth       magnesium chloride 535 (64 MG) MG TBCR CR tablet Take 1 tablet (535 mg) by mouth daily 30 tablet 3     Potassium Chloride ER 20 MEQ TBCR Take 1 tablet (20 mEq) by mouth 2 times daily (Patient taking differently: Take 20 mEq by mouth daily ) 60 tablet 3     rivaroxaban ANTICOAGULANT (XARELTO) 20 MG TABS tablet Take 1 tablet (20 mg) by mouth daily (with dinner) 30 tablet 3     tiZANidine (ZANAFLEX) 2 MG tablet        Vitamin D, Cholecalciferol, 1000 UNITS TABS Take 2,000 Units by mouth daily (Patient taking differently: Take 2,000 Units by mouth daily LD 6/20/17, told to  "hold until after surgery) 60 tablet      DRUG ALLERGIES   Allergies   Allergen Reactions     Amoxicillin Hives     Clarithromycin Other (See Comments)     \"I felt dopey, sleepy and like a druggie\"     Lisinopril Cough     Penicillins Rash     IMMUNIZATIONS   Immunization History   Administered Date(s) Administered     COVID-19,PF,Moderna 01/12/2021, 02/11/2021     Influenza (High Dose) 3 valent vaccine 10/03/2017     PAST MEDICAL HISTORY   Past Medical History:   Diagnosis Date     Anemia      Cervical spinal stenosis      Depression      Depressive disorder      GERD (gastroesophageal reflux disease)      Hypertension      Hypokalemia      Hypothyroid      Lumbar spinal stenosis      Obesity      Ovarian cancer (H)      Paraneoplastic neuromyopathy and neuropathy (H)      PE (pulmonary thromboembolism) (H)      Thrombocytopenia (H)      PAST SURGICAL HISTORY   Past Surgical History:   Procedure Laterality Date     AS TOTAL KNEE ARTHROPLASTY Left      BACK SURGERY  2009     CHOLECYSTECTOMY  01/01/2016     COLONOSCOPY N/A 4/20/2018    Procedure: COLONOSCOPY;  Colonoscopy ;  Surgeon: Nila Munson MD;  Location: [facility] OR     CYSTOSCOPY N/A 6/28/2017    Procedure: CYSTOSCOPY;;  Surgeon: Nesas Christina MD;  Location: [facility] OR     DAVINCI HYSTERECTOMY TOTAL, BILATERAL SALPINGO-OOPHORECTMY, NODE DISSECTION, TUMOR STAGING, COMBINED N/A 6/28/2017    Procedure: COMBINED DAVINCI HYSTERECTOMY TOTAL, SALPINGO-OOPHORECTOMY, NODE DISSECTION, TUMOR STAGING;  Robotic total laparoscopic hysterectomy, bilateral salpingo-oophorectomy, omentectomy, lysis of adhesions, tumor debulking, cystoscopy;  Surgeon: Nessa Christina MD;  Location: [facility] OR     IR PORT REMOVAL RIGHT  3/9/2020     OPEN REDUCTION INTERNAL FIXATION WRIST Right 2009     THYROIDECTOMY       SOCIAL HISTORY   History   Smoking Status     Never Smoker   Smokeless Tobacco     Never Used    Social History    Substance and Sexual Activity      Alcohol " "use: Yes        Comment: occasionally     History   Drug Use No   Employment: Not employed    FAMILY HISTORY   Family History   Problem Relation Age of Onset     Breast Cancer Mother 69     Heart Failure Mother      Breast Cancer Maternal Grandmother         this is maternal great grandmother     Breast Cancer Maternal Aunt 89     Myocardial Infarction Father      Unknown/Adopted Brother      Unknown/Adopted Maternal Grandfather      Stomach Cancer Paternal Grandmother         Stomach mass-not sure if cancer     Lung Cancer Paternal Grandfather         mustard gas in [place]       PHYSICAL EXAMINATION  /77 (BP Location: Left arm, Patient Position: Sitting, Cuff Size: Adult Large)   Pulse 77   Temp 98.1  F (36.7  C) (Oral)   Resp 16   Ht 1.499 m (4' 11\")   Wt 88 kg (194 lb)   SpO2 96%   BMI 39.18 kg/m    Wt Readings from Last 2 Encounters:   10/26/21 88 kg (194 lb)   10/26/21 88 kg (194 lb)      Ht Readings from Last 2 Encounters:   10/26/21 1.499 m (4' 11\")   07/11/19 1.499 m (4' 11\")     KPS: 60    -Generally well appearing. Large body habitus. Sitting in wheelchair.   -Respiratory: Normal breath sounds, no audible wheezing.   -Skin: No rashes.   -Extremities: Point tenderness over left hip. Tightness of IT band on left.   -Hematologic/ lymphatic: No abnormal bruising. Bilateral lower extremity pitting edema.   -Psychiatric: Normal mood and affect. Pleasant, talkative.  -Neurologic:   MENTAL STATUS:     Alert, oriented to date.    Appropriately answers questions and provides HPI.    Speech fluent. Comprehension intact to multi-step commands.   Normal naming.   Good right-left orientation.     CRANIAL NERVES:     Pupils are equal, round.     Extraocular movements full, denies diplopia. No nystagmus.    Visual fields full.     Facial sensation intact to light touch.   Symmetric facial movements.   Hearing intact.   Palate moves symmetrically.     Sternocleidomastoid and trapezius strength intact.    Tongue " midline.  MOTOR:    Subtle increased tone in lower extremities at ankles. This is not present in the knees, hips, or arms.    5/5 strength bilaterally in deltoids, biceps, triceps, hand , hip flexors, hip extensors, knee flexion, knee extension, plantarflexion, dorsiflexion.    Able to stand independently while holding onto arms of chair. No bouncing appreciated.    On toe/ heel walk, equal distance from floor to heels/ toes.   SENSATION:    Intact to light touch throughout.  COORDINATION:   Intact finger-nose with eyes open and closed.    Clumsy left hand with finger tapping, and this is also slowed.    HSH mildly dysmetric on the right and severely dysmetric for LLE.   REFLEXES:    2+ right patellar, absent left patellar. Trace Achilles tendon reflexes. No ankle clonus bilaterally. 3+ brachioradialis on the right with spread and 2+ biceps on the right. Biceps, brachioradialis DTR's are 2+ on the left.   GAIT:  Deferred walking due to severe ataxia.       MEDICAL RECORDS  Obtained and personally reviewed all available outside medical records in addition to reviewing any records available in our electronic system.     LABS  Personally reviewed all available lab results.     IMAGING  No new neuro-imaging to review.        IMPRESSION  Clinic time for this high complexity encounter was spent discussing in detail the nature of her cancer in the confines of her associated paraneoplastic cerebellar degeneration and answering questions pertaining to my recommendations.     Paraneoplastic cerebellar degeneration is most commonly associated with anti-Yo antibodies, which are also called Purkinje cell cytoplasmic antibody type 1 (PCA-1). The development of these antibodies are primarily seen in patients that are later diagnosed with breast cancer or ovary, endometrium, and fallopian tube cancer. Unfortunately, patients with paraneoplastic cerebellar degeneration associated with anti-Yo antibodies carry a poor neurologic  "prognosis with most not able to fully recover despite early use of steroids, etc.    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 At that time, the development of disabling ataxia prompted a paraneoplastic work-up and the autoantibody panel returned positive for PCA-1 at a high titer of 1:[id number]. She underwent a 5 day course of IVIG and IV methylprednisolone from 06/08 - 06/12/2017.  Now, following neoadjuvant chemotherapy, optimal interval debulking, and adjuvant chemotherapy, she is without any radiographic evidence of active cancer. As such, she is managed on surveillance imaging every 6 months per Dr. Ennis. Without any active cancer to fuel the development of autoantibodies to further injure the cerebellum, her degree of ataxia and impaired coordination has remained largely stable over the past ~4 years. As a result, there is no indication for repeat brain imaging at this time or additional recommendations for treatment/ management outside of referral to Dr. Ramirez for consideration of PT.     Additionally, Namita reports that more recently, her legs are starting to \"bounce uncontrollably\" for several seconds when she stands out. It is possible this is clonus, but we were unable to elicit this on exam today and I did not find any significant degree of spacticity on exam today. We discussed the utility of spine imaging, however, Namita stated that she would be uninterested in surgically intervening if abnormalities were identified. As a result, will not pursue additional imaging work-up.     Finally, Namita reports numbness/ burning in a distribution consistent with left-sided meralgia paraesthetica for which she has been taking a combination of NSAID's and muscle relaxants with mild improvement. There is some point tenderness near the head of the femur and along the upper lateral aspect of left thigh, raising concern for bursitis. She is amenable to a hip xray and in seeing Dr. Ramirez " in PM&R today for further evaluation of these symptoms.     PROBLEM LIST  Paraneoplastic cerebellar degeneration  Stage IVB high grade serous ovarian cancer  Ataxia  Left hip pain  Left meralgia paraesthetica     PLAN  -CANCER-DIRECTED THERAPY-  -Per Dr. Ennis.     -PARANEOPLASTIC SYNDROME-  -Paraneoplastic cerebellar degeneration. No indication for brain imaging at this time.   -Overall, relatively stable neurological/ functional disability, but would benefit from visit with PM&R and possible PT referral in future.     -SEIZURE MANAGEMENT-  -Given the lack of seizure history, there is no indication to prescribe an antiepileptic at this time.     -LEFT MEURALGIA PARASTHETICA, HIP PAIN-  -Ordered hip xray to evaluate for femoral head subluxation/bursitis.   ADDENDUM: Joint space widths appear within normal limits. No evidence of hip dislocation or fracture. Degenerative changes are noted in the mid lumbar spine.  -Referral to Dr. Ramirez for further workup/management.     -Quality of life/ MOOD/ FATIGUE-  -Denies any mood issues.    Return to clinic as needed as Namita knows to call with new concerns and can be seen in follow-up.    Patient also seen and evaluated by resident physician Nilesh Azar MD, PGY3.     Nila Colon MD  Neuro-oncology

## 2021-10-26 NOTE — LETTER
10/26/2021         RE: Tosin Nina  [address]  [facility] [address]        Dear Colleague,    Thank you for referring your patient, Tosin Nina, to the [facility]. Please see a copy of my visit note below.           PM&R Clinic Note     Patient Name: Tosin Nina : 1945 Medical Record: [id number]     Requesting Physician/clinician: Referred by Dr. 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  She was admitted to [facility] for worsening dizziness and inguinal lymphadenopathy at that time.    Following is her cancer treatment course:  17:  CT C/A/P:  Thrombus identified within the right lower lobe are artery and right upper lobar arteries.   Nodules on the liver measuring up to 3 cm inferiorly, omental caking, enlarged iliac and retroperitoneal lymph nodes, 2.6 cm right external iliac lymph nodes.  Enlarged inguinal lymph nodes    17:   268, CEA .6,  IR omental biopsy. Pathology:  High grade serous carcinoma     : Received first 3 cycles of chemotherapy    17:  CT C/A/P:  Chest: Right-sided pulmonary emboli resolved, mental meds reduced in size, inferior liver mass also resolved, mesenteric nodules also reduced in size.  No periaortic or pelvic adenopathy was seen(resolved from the previous CT)    17:   = 27     17:  Robotic total laparoscopic hysterectomy, bilateral salpingo-oophorectomy, lysis of adhesions, omentectomy, optimal tumor debulking to no residual disease                 Pathology:  Minimal serous carcinoma remaining in the left ovary     17-8/10/2017:   Chemotherapy cycle 4,5,6 completed    10/12/20: CT C/A/P:  1. No convincing evidence of local recurrence or  metastatic disease in the chest, abdomen or pelvis. 2. The previously seen scattered peritoneal nodularities on 6/16/2017 exam are no longer seen. 3. Significant hepatic steatosis. 4. Colonic diverticulosis without CT evidence of acute diverticulitis. 5. Few small left kidney stones. 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 Her pain is located at the left lateral proximal thigh area, 3/10 intensity while sitting in the clinic, 6-7/10 after being woken up in the night. Pain is worse while sleeping(patient sleeps in a recliner with flexed hip and legs above her heart), relieved by medication, currently taking Tylenol in the morning, Flexeril 5 mg for the first 2 days and has been taking tizanidine 2 mg at the lunchtime and Advil at the bedtime.  Pain is also relieved by getting out of the recliner for an hour at night and then going back and sleeping. She said she had numbness on the lateral part of the thigh from last 8 to 10 years from her chronic low back pain for which she got laminectomy done in 2010, she was getting regular epidural corticosteroid injection the lumbar spine twice a year, last injections were in 2016 her back pain has been controlled ever since 2017 she has been in a wheelchair.  Patient said that she sleeps in a recliner chair from last 4 years, keeping her legs above her heart.  She denies any nausea, vomiting, headache, chest pain, shortness of breath, nominal pain, fever today  Therapies/HEP,  None at this point of time.  Last time did therapies in 2018    Functionally,   Wheel chair bound since 2017 and enrolled in a nursing home exercise proigram  'Sit and be fit'             Past Medical and Surgical History:     Past Medical History:   Diagnosis Date     Anemia      Cervical spinal stenosis      Depression       Depressive disorder      GERD (gastroesophageal reflux disease)      Hypertension      Hypokalemia      Hypothyroid      Lumbar spinal stenosis      Obesity      Ovarian cancer (H)      Paraneoplastic neuromyopathy and neuropathy (H)      PE (pulmonary thromboembolism) (H)      Thrombocytopenia (H)      Past Surgical History:   Procedure Laterality Date     AS TOTAL KNEE ARTHROPLASTY Left      BACK SURGERY  2009     CHOLECYSTECTOMY  01/01/2016     COLONOSCOPY N/A 4/20/2018    Procedure: COLONOSCOPY;  Colonoscopy ;  Surgeon: Nila Munson MD;  Location: [facility] OR     CYSTOSCOPY N/A 6/28/2017    Procedure: CYSTOSCOPY;;  Surgeon: Nessa Christina MD;  Location: [facility] OR     DAVINCI HYSTERECTOMY TOTAL, BILATERAL SALPINGO-OOPHORECTMY, NODE DISSECTION, TUMOR STAGING, COMBINED N/A 6/28/2017    Procedure: COMBINED DAVINCI HYSTERECTOMY TOTAL, SALPINGO-OOPHORECTOMY, NODE DISSECTION, TUMOR STAGING;  Robotic total laparoscopic hysterectomy, bilateral salpingo-oophorectomy, omentectomy, lysis of adhesions, tumor debulking, cystoscopy;  Surgeon: Nessa Christina MD;  Location: [facility] OR     IR PORT REMOVAL RIGHT  3/9/2020     OPEN REDUCTION INTERNAL FIXATION WRIST Right 2009     THYROIDECTOMY              Social History:     Social History     Tobacco Use     Smoking status: Never Smoker     Smokeless tobacco: Never Used   Substance Use Topics     Alcohol use: Yes     Comment: occasionally       Marital Status:   Living situation: Living in senior resident, living idependently  Family support: daughter(Luly)  Vocational History:   Tobacco use: Non smoker  Alcohol use: 2 glasses seltzer  Recreational drug use: None         Functional history:     Tosin Nina is wheel chair bounbd with all aspects of life.    ADLs: modified independent   Assistive devices: electric wheel chair, sleeps in electric recliner and has difficulty with from supine to sit postion  iADLs (medication  management and finances): independent   Hand dominance: right handed  Driving: Non driving           Family History:     Family History   Problem Relation Age of Onset     Breast Cancer Mother 69     Heart Failure Mother      Breast Cancer Maternal Grandmother         this is maternal great grandmother     Breast Cancer Maternal Aunt 89     Myocardial Infarction Father      Unknown/Adopted Brother      Unknown/Adopted Maternal Grandfather      Stomach Cancer Paternal Grandmother         Stomach mass-not sure if cancer     Lung Cancer Paternal Grandfather         [employer] in [place]            Medications:     Current Outpatient Medications   Medication Sig Dispense Refill     acetaminophen (TYLENOL) 500 MG tablet Take 500-1,000 mg by mouth every 6 hours as needed for mild pain       cyclobenzaprine (FLEXERIL) 5 MG tablet Take 5 mg by mouth daily       escitalopram (LEXAPRO) 10 MG tablet Take 1 tablet (10 mg) by mouth daily 30 tablet 2     hydrochlorothiazide (HYDRODIURIL) 25 MG tablet Take 25 mg by mouth daily   0     ibuprofen (ADVIL) 200 MG tablet Take 200 mg by mouth every 4 hours as needed for mild pain       levothyroxine (SYNTHROID) 125 MCG tablet Take 1 tablet (125 mcg) by mouth daily 30 tablet      losartan (COZAAR) 100 MG tablet Take 100 mg by mouth       magnesium chloride 535 (64 MG) MG TBCR CR tablet Take 1 tablet (535 mg) by mouth daily 30 tablet 3     Multiple Vitamins-Minerals (MULTI FOR HER PO)        order for DME Equipment being ordered: electric wheelchair 1 Units 0     Potassium Chloride ER 20 MEQ TBCR Take 1 tablet (20 mEq) by mouth 2 times daily 60 tablet 3     rivaroxaban ANTICOAGULANT (XARELTO) 20 MG TABS tablet Take 1 tablet (20 mg) by mouth daily (with dinner) 30 tablet 3     tiZANidine (ZANAFLEX) 2 MG tablet        UNABLE TO FIND MEDICATION NAME: Tumeric with black pepper       Vitamin D, Cholecalciferol, 1000 UNITS TABS Take 2,000 Units by mouth daily (Patient taking differently: Take  "2,000 Units by mouth daily LD 6/20/17, told to hold until after surgery) 60 tablet             Allergies:     Allergies   Allergen Reactions     Amoxicillin Hives     Clarithromycin Other (See Comments)     \"I felt dopey, sleepy and like a druggie\"     Lisinopril Cough     Penicillins Rash              ROS:     A focused ROS is negative other than the symptoms noted above in the HPI.         Physical Examiniation:     VITAL SIGNS: There were no vitals taken for this visit.  BMI: Estimated body mass index is 39.18 kg/m  as calculated from the following:    Height as of an earlier encounter on 10/26/21: 1.499 m (4' 11\").    Weight as of an earlier encounter on 10/26/21: 88 kg (194 lb).    Gen: NAD, pleasant and cooperative, sitting alone wheelchair  HEENT: Moist mucous membranes, neck supple  Cardio: regular pulse  Pulm: non-labored breathing in room air  Abd: benign  Ext: WWP, no edema in BLE, no tenderness in calves  Neuro/MSK:   Manual muscle testing:  Bilateral upper extremity 5/5 in C5-T1 myotomes  Bilateral lower extremity 5/5 in L2-S1 myotomes  Deep tendon reflexes: Bilateral biceps, triceps,  knee, ankle equivocal  Lela's negative bilaterally  Sensation grossly intact to light touch in all 4 extremities except left lateral thigh area    Hip:  Tenderness at left greater trochanter, FADIR positive on the left side       Laboratory/Imaging:   X-ray bilateral hip with pelvis 10/26/2021  As per radiology report  XR PELVIS AND HIP LEFT 1 VIEW 10/26/2021 1:39 PM      HISTORY: Concern for femoral head dislocation/ subluxation; Hip pain,  left                                                                      IMPRESSION: Joint space widths appear within normal limits. No  evidence of hip dislocation or fracture. Degenerative changes are  noted in the mid lumbar spine.            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 Patient has been in remission since 2017 after going 6 cycles of chemotherapy(last cycle done in 2017). CT chest abdomen and pelvis in 2020 was negative for worsening metastatic disease. Patient was seen for an acute onset left-sided proximal thigh pain which is burning/tingling in nature and a small distribution in the lateral thigh. Functionally patient has been dependent on wheelchair for mobility, but is independent with her transfers. She sleeps in a recliner chair which increases her ankle of hip flexion while lying down. This could be one of the cause for entrapment for lateral femoral cutaneous nerve causing meralgia paresthetica. She also has point tenderness at the left greater trochanter, her pain could also be stemming from left greater trochanteric bursitis. But as her symptoms are more with burning sensation and tingling seems like neuropathic in origin. We discussed with the patient keeping her hip in extension while sleeping could be one of the measures she can try to relieve pressure off the nerve. We also discussed about starting her on Cymbalta but patient currently is on Lexapro which was started for her mood symptoms, will discuss with the PCP to switch her to Cymbalta which can treat both her mood as well as her neuropathic symptoms.    1. Patient education: In depth discussion and education was provided about the assessment and implications of each of the below recommendations for management. Patient indicated readiness to learn, all questions were answered and understanding of material presented was confirmed.  2. Work-up: None at this time  3. Therapy/equipment/braces: None at this time. Continue \"sit and fit\" program at her current facility. Also educated patient on mechanical adjustments that can be made to help reduce symptoms/improve recovery.  4. Medications: Patient may switch to Cymbalta for her meralgia " "paresthetica of her left thigh after discussion with the PCP as she is currently on Lexapro  5. Interventions: Can consider diagnostic/therapeutic nerve block for lateral femoral cutaneous nerve in future if symptoms do not resolve.  6. Referral / follow up with other providers: None  7. Follow up: In 2 months    Patient was seen and discussed with my staff physician Dr. Ramirez, who agrees with my assessment and plan.    John Telles   PGY 3  Physical Medicine and Rehabilitation  Pager: [phone]      Physician Attestation   I, Cristina Ramirez MD, saw this patient and agree with the findings and plan of care as documented in the note. Any changes in documentation made by me are already reflected in the note.    Items personally reviewed/attestation: vitals, labs and imaging and agree with the interpretation documented in the note.    Cristina Ramirez MD  Physical Medicine & Rehabilitation          Oncology Rooming Note    October 26, 2021 2:12 PM   Tosin Nina is a 76 year old female who presents for:    Chief Complaint   Patient presents with     Oncology Clinic Visit     Initial Vitals: There were no vitals taken for this visit. Estimated body mass index is 39.18 kg/m  as calculated from the following:    Height as of an earlier encounter on 10/26/21: 1.499 m (4' 11\").    Weight as of an earlier encounter on 10/26/21: 88 kg (194 lb). There is no height or weight on file to calculate BSA.  Data Unavailable Comment: Data Unavailable   No LMP recorded. Patient is postmenopausal.  Allergies reviewed: Yes  Medications reviewed: Yes    Medications: Medication refills not needed today.  Pharmacy name entered into EPIC:    [facility] #[id number] - [place], [place] - [address] [place] [address] [address] [facility] [address]  [facility] [place] [place] [facility] [address] [id number] - [facility] [address] AT [facility] [address]    Clinical concerns: left leg pain- " inflammation  Dr. Ramirez was notified.      Shari J. Schoenberger, [facility]                Again, thank you for allowing me to participate in the care of your patient.        Sincerely,        Cristina Ramirez MD

## 2021-10-26 NOTE — PROGRESS NOTES
"Oncology Rooming Note    October 26, 2021 11:01 AM   Tosin Nina is a 76 year old female who presents for:    Chief Complaint   Patient presents with     Oncology Clinic Visit     Encounter for follow-up surveillance of ovarian cancer     Initial Vitals: /77 (BP Location: Left arm, Patient Position: Sitting, Cuff Size: Adult Large)   Pulse 77   Temp 98.1  F (36.7  C) (Oral)   Resp 16   Ht 1.499 m (4' 11\")   SpO2 96%   BMI 37.57 kg/m   Estimated body mass index is 37.57 kg/m  as calculated from the following:    Height as of this encounter: 1.499 m (4' 11\").    Weight as of 9/28/21: 84.4 kg (186 lb). Body surface area is 1.87 meters squared.  Moderate Pain (4) Comment: Data Unavailable   No LMP recorded. Patient is postmenopausal.  Allergies reviewed: Yes  Medications reviewed: Yes    Medications: Medication refills not needed today.  Pharmacy name entered into Psychiatric:    [facility] #[id number] - [place], [place] - [address] [place] [address] [address] [facility] [address]  [facility] - [place], [place] - [address] [facility][id number] - [place], [place] - [address]  AT [facility] [address]    Clinical concerns: no      Patient with daughter    Gris Shaffer, PANCHO              "

## 2021-10-26 NOTE — PROGRESS NOTES
PM&R Clinic Note     Patient Name: Tosin Nina : 1945 Medical Record: [id number]     Requesting Physician/clinician: Referred by Dr. 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  She was admitted to [facility] for worsening dizziness and inguinal lymphadenopathy at that time.    Following is her cancer treatment course:  17:  CT C/A/P:  Thrombus identified within the right lower lobe are artery and right upper lobar arteries.   Nodules on the liver measuring up to 3 cm inferiorly, omental caking, enlarged iliac and retroperitoneal lymph nodes, 2.6 cm right external iliac lymph nodes.  Enlarged inguinal lymph nodes    17:   268, CEA .6,  IR omental biopsy. Pathology:  High grade serous carcinoma     : Received first 3 cycles of chemotherapy    17:  CT C/A/P:  Chest: Right-sided pulmonary emboli resolved, mental meds reduced in size, inferior liver mass also resolved, mesenteric nodules also reduced in size.  No periaortic or pelvic adenopathy was seen(resolved from the previous CT)    17:   = 27     17:  Robotic total laparoscopic hysterectomy, bilateral salpingo-oophorectomy, lysis of adhesions, omentectomy, optimal tumor debulking to no residual disease                 Pathology:  Minimal serous carcinoma remaining in the left ovary     17-8/10/2017:   Chemotherapy cycle 4,5,6 completed    10/12/20: CT C/A/P:  1. No convincing evidence of local recurrence or metastatic disease in the chest, abdomen or pelvis. 2. The previously seen scattered peritoneal nodularities on 2017 exam are no longer seen. 3. Significant hepatic steatosis. 4. Colonic diverticulosis without CT evidence of acute diverticulitis. 5. Few small left  kidney stones. 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 Her pain is located at the left lateral proximal thigh area, 3/10 intensity while sitting in the clinic, 6-7/10 after being woken up in the night. Pain is worse while sleeping(patient sleeps in a recliner with flexed hip and legs above her heart), relieved by medication, currently taking Tylenol in the morning, Flexeril 5 mg for the first 2 days and has been taking tizanidine 2 mg at the lunchtime and Advil at the bedtime.  Pain is also relieved by getting out of the recliner for an hour at night and then going back and sleeping. She said she had numbness on the lateral part of the thigh from last 8 to 10 years from her chronic low back pain for which she got laminectomy done in 2010, she was getting regular epidural corticosteroid injection the lumbar spine twice a year, last injections were in 2016 her back pain has been controlled ever since 2017 she has been in a wheelchair.  Patient said that she sleeps in a recliner chair from last 4 years, keeping her legs above her heart.  She denies any nausea, vomiting, headache, chest pain, shortness of breath, nominal pain, fever today  Therapies/HEP,  None at this point of time.  Last time did therapies in 2018    Functionally,   Wheel chair bound since 2017 and enrolled in a nursing home exercise proigram  'Sit and be fit'             Past Medical and Surgical History:     Past Medical History:   Diagnosis Date     Anemia      Cervical spinal stenosis      Depression      Depressive disorder      GERD (gastroesophageal reflux disease)      Hypertension      Hypokalemia      Hypothyroid      Lumbar spinal stenosis      Obesity      Ovarian cancer (H)      Paraneoplastic neuromyopathy and neuropathy (H)      PE (pulmonary  thromboembolism) (H)      Thrombocytopenia (H)      Past Surgical History:   Procedure Laterality Date     AS TOTAL KNEE ARTHROPLASTY Left      BACK SURGERY  2009     CHOLECYSTECTOMY  01/01/2016     COLONOSCOPY N/A 4/20/2018    Procedure: COLONOSCOPY;  Colonoscopy ;  Surgeon: Nila Munson MD;  Location: [facility] OR     CYSTOSCOPY N/A 6/28/2017    Procedure: CYSTOSCOPY;;  Surgeon: Nessa Christina MD;  Location: [facility] OR     DAVINCI HYSTERECTOMY TOTAL, BILATERAL SALPINGO-OOPHORECTMY, NODE DISSECTION, TUMOR STAGING, COMBINED N/A 6/28/2017    Procedure: COMBINED DAVINCI HYSTERECTOMY TOTAL, SALPINGO-OOPHORECTOMY, NODE DISSECTION, TUMOR STAGING;  Robotic total laparoscopic hysterectomy, bilateral salpingo-oophorectomy, omentectomy, lysis of adhesions, tumor debulking, cystoscopy;  Surgeon: Nessa Christina MD;  Location: [facility] OR     IR PORT REMOVAL RIGHT  3/9/2020     OPEN REDUCTION INTERNAL FIXATION WRIST Right 2009     THYROIDECTOMY              Social History:     Social History     Tobacco Use     Smoking status: Never Smoker     Smokeless tobacco: Never Used   Substance Use Topics     Alcohol use: Yes     Comment: occasionally       Marital Status:   Living situation: Living in senior resident, living idependently  Family support: daughter(Luly)  Vocational History:   Tobacco use: Non smoker  Alcohol use: 2 glasses seltzer  Recreational drug use: None         Functional history:     Tosin Nina is wheel chair bounbd with all aspects of life.    ADLs: modified independent   Assistive devices: electric wheel chair, sleeps in electric recliner and has difficulty with from supine to sit postion  iADLs (medication management and finances): independent   Hand dominance: right handed  Driving: Non driving           Family History:     Family History   Problem Relation Age of Onset     Breast Cancer Mother 69     Heart Failure Mother      Breast Cancer Maternal  "Grandmother         this is maternal great grandmother     Breast Cancer Maternal Aunt 89     Myocardial Infarction Father      Unknown/Adopted Brother      Unknown/Adopted Maternal Grandfather      Stomach Cancer Paternal Grandmother         Stomach mass-not sure if cancer     Lung Cancer Paternal Grandfather         [employer] in [place]            Medications:     Current Outpatient Medications   Medication Sig Dispense Refill     acetaminophen (TYLENOL) 500 MG tablet Take 500-1,000 mg by mouth every 6 hours as needed for mild pain       cyclobenzaprine (FLEXERIL) 5 MG tablet Take 5 mg by mouth daily       escitalopram (LEXAPRO) 10 MG tablet Take 1 tablet (10 mg) by mouth daily 30 tablet 2     hydrochlorothiazide (HYDRODIURIL) 25 MG tablet Take 25 mg by mouth daily   0     ibuprofen (ADVIL) 200 MG tablet Take 200 mg by mouth every 4 hours as needed for mild pain       levothyroxine (SYNTHROID) 125 MCG tablet Take 1 tablet (125 mcg) by mouth daily 30 tablet      losartan (COZAAR) 100 MG tablet Take 100 mg by mouth       magnesium chloride 535 (64 MG) MG TBCR CR tablet Take 1 tablet (535 mg) by mouth daily 30 tablet 3     Multiple Vitamins-Minerals (MULTI FOR HER PO)        order for DME Equipment being ordered: electric wheelchair 1 Units 0     Potassium Chloride ER 20 MEQ TBCR Take 1 tablet (20 mEq) by mouth 2 times daily 60 tablet 3     rivaroxaban ANTICOAGULANT (XARELTO) 20 MG TABS tablet Take 1 tablet (20 mg) by mouth daily (with dinner) 30 tablet 3     tiZANidine (ZANAFLEX) 2 MG tablet        UNABLE TO FIND MEDICATION NAME: Tumeric with black pepper       Vitamin D, Cholecalciferol, 1000 UNITS TABS Take 2,000 Units by mouth daily (Patient taking differently: Take 2,000 Units by mouth daily LD 6/20/17, told to hold until after surgery) 60 tablet             Allergies:     Allergies   Allergen Reactions     Amoxicillin Hives     Clarithromycin Other (See Comments)     \"I felt dopey, sleepy and like a druggie\"     " "Lisinopril Cough     Penicillins Rash              ROS:     A focused ROS is negative other than the symptoms noted above in the HPI.         Physical Examiniation:     VITAL SIGNS: There were no vitals taken for this visit.  BMI: Estimated body mass index is 39.18 kg/m  as calculated from the following:    Height as of an earlier encounter on 10/26/21: 1.499 m (4' 11\").    Weight as of an earlier encounter on 10/26/21: 88 kg (194 lb).    Gen: NAD, pleasant and cooperative, sitting alone wheelchair  HEENT: Moist mucous membranes, neck supple  Cardio: regular pulse  Pulm: non-labored breathing in room air  Abd: benign  Ext: WWP, no edema in BLE, no tenderness in calves  Neuro/MSK:   Manual muscle testing:  Bilateral upper extremity 5/5 in C5-T1 myotomes  Bilateral lower extremity 5/5 in L2-S1 myotomes  Deep tendon reflexes: Bilateral biceps, triceps,  knee, ankle equivocal  Lela's negative bilaterally  Sensation grossly intact to light touch in all 4 extremities except left lateral thigh area    Hip:  Tenderness at left greater trochanter, FADIR positive on the left side       Laboratory/Imaging:   X-ray bilateral hip with pelvis 10/26/2021  As per radiology report  XR PELVIS AND HIP LEFT 1 VIEW 10/26/2021 1:39 PM      HISTORY: Concern for femoral head dislocation/ subluxation; Hip pain,  left                                                                      IMPRESSION: Joint space widths appear within normal limits. No  evidence of hip dislocation or fracture. Degenerative changes are  noted in the mid lumbar spine.            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 Patient has been in remission since 2017 after going 6 cycles of chemotherapy(last cycle done in 2017). CT chest abdomen and pelvis in 2020 was negative for worsening metastatic " "disease. Patient was seen for an acute onset left-sided proximal thigh pain which is burning/tingling in nature and a small distribution in the lateral thigh. Functionally patient has been dependent on wheelchair for mobility, but is independent with her transfers. She sleeps in a recliner chair which increases her ankle of hip flexion while lying down. This could be one of the cause for entrapment for lateral femoral cutaneous nerve causing meralgia paresthetica. She also has point tenderness at the left greater trochanter, her pain could also be stemming from left greater trochanteric bursitis. But as her symptoms are more with burning sensation and tingling seems like neuropathic in origin. We discussed with the patient keeping her hip in extension while sleeping could be one of the measures she can try to relieve pressure off the nerve. We also discussed about starting her on Cymbalta but patient currently is on Lexapro which was started for her mood symptoms, will discuss with the PCP to switch her to Cymbalta which can treat both her mood as well as her neuropathic symptoms.    1. Patient education: In depth discussion and education was provided about the assessment and implications of each of the below recommendations for management. Patient indicated readiness to learn, all questions were answered and understanding of material presented was confirmed.  2. Work-up: None at this time  3. Therapy/equipment/braces: None at this time. Continue \"sit and fit\" program at her current facility. Also educated patient on mechanical adjustments that can be made to help reduce symptoms/improve recovery.  4. Medications: Patient may switch to Cymbalta for her meralgia paresthetica of her left thigh after discussion with the PCP as she is currently on Lexapro  5. Interventions: Can consider diagnostic/therapeutic nerve block for lateral femoral cutaneous nerve in future if symptoms do not resolve.  6. Referral / follow up " with other providers: None  7. Follow up: In 2 months    Patient was seen and discussed with my staff physician Dr. Ramirez, who agrees with my assessment and plan.    Johngila Telles   PGY 3  Physical Medicine and Rehabilitation  Pager: [phone]      Physician Attestation   I, Cristina Ramirez MD, saw this patient and agree with the findings and plan of care as documented in the note. Any changes in documentation made by me are already reflected in the note.    Items personally reviewed/attestation: vitals, labs and imaging and agree with the interpretation documented in the note.    Cristina Ramirez MD  Physical Medicine & Rehabilitation

## 2021-10-26 NOTE — PROGRESS NOTES
"Oncology Rooming Note    October 26, 2021 2:12 PM   Tosin Nina is a 76 year old female who presents for:    Chief Complaint   Patient presents with     Oncology Clinic Visit     Initial Vitals: There were no vitals taken for this visit. Estimated body mass index is 39.18 kg/m  as calculated from the following:    Height as of an earlier encounter on 10/26/21: 1.499 m (4' 11\").    Weight as of an earlier encounter on 10/26/21: 88 kg (194 lb). There is no height or weight on file to calculate BSA.  Data Unavailable Comment: Data Unavailable   No LMP recorded. Patient is postmenopausal.  Allergies reviewed: Yes  Medications reviewed: Yes    Medications: Medication refills not needed today.  Pharmacy name entered into [facility]:    [id number] - [place], [place] - [address] [facility] [address] [facility] [address]  [facility] [place], [place] - [address] [id number] - [facility] [address] AT [facility] [address]    Clinical concerns: left leg pain- inflammation  Dr. Ramirez was notified.      Shari J. Schoenberger, [facility]            "

## 2022-02-13 ENCOUNTER — HEALTH MAINTENANCE LETTER (OUTPATIENT)
Age: 77
End: 2022-02-13

## 2022-03-21 NOTE — PROGRESS NOTES
Consult Notes on Referred Patient            RE: Tosin Nina  : 1945  TIMBO: 2017        I had the pleasure of seeing your patient Tosin Nina here at the Gynecologic Cancer Clinic at the [facility] on 2017.  As you know she is a very pleasant 71 year old woman with a recent diagnosis of stage IVB high grade serous ovarian cancer.  Given these findings she was subsequently sent to the Gynecologic Cancer Clinic for new patient consultation.  She is accompanied today by her .    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  She was discharged to a TCU where she is still residing with some but minimal improvement in her neurologic symptoms.  She still has quite a difficult time seeing especially with her right eye.  She also notes weakness mostly on her left side.  Both of these make it very difficult for her to walk and thus she is having to use a wheelchair for most of her mobility.  She tolerated her first cycle quite well with her biggest side effect being nausea which improved drastically with a change in anti-emetics.      17-17:  Admit to [facility] for worsening dizziness, found to have stage IVB ovarian cancer (inguinal lymphadenopathy) likely causing paraneoplastic syndrome    17:  CT C/A/P:  Thrombus identified within the right lower lobe are artery and right upper lobar arteries. The right pulmonary artery is enlarged, similar to prior exams. No CT evidence of right heart strain. No suspicious mediastinal or perihilar lymphadenopathy. Bovine arch anatomy. No suspicious pulmonary nodules, evidence of infarction, or infection. Abdomen and pelvis: There are soft tissue nodules identified along the liver capsule measuring up to 3 cm inferiorly. There is a large amount of omental caking. Enlarged iliac and retroperitoneal lymph nodes for example a 2.6 cm right  external iliac lymph node or group of lymph nodes. Heterogeneous enhancement of the uterine fundus could be due to fibroids or a mass. The overall size of the uterus does not appear significantly different than in 2014. The left ovary does appear slightly larger and lobular in appearance compared to prior exam. There is also a nodular area along the round ligament. It was not present on prior exam. There is an irregular lobulated mass involving the sigmoid colon. Abnormal, enlarged inguinal lymph nodes. Soft tissue implant in the posterior right retroperitoneum adjacent to the psoas was not present on prior exam. 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 Spondylolisthesis at L3-4. Small periumbilical hernia containing some of the abnormal omentum.    4/11/17:   268, CEA .6    4/12/17:  IR omental biopsy   Pathology:  High grade serous carcinoma    4/14/17: Cycle #1 carboplatin AUC 6 and weekly Taxol 80mg/m2.  = 2648      Review of Systems:  Systemic           + weight gain; + fatigue; no fever; no chills; no night sweats; no appetite changes  Skin           no rashes, or lesions; + hair loss  Eye           no irritation; no changes in vision; + visual difficulty  Ward-Laryngeal           no dysphagia; no hoarseness   Pulmonary    no cough; no shortness of breath  Cardiovascular    no chest pain; no palpitations  Gastrointestinal    no diarrhea; + constipation; no abdominal pain; no changes in bowel  habits; no blood in stool  Genitourinary   no urinary frequency; no urinary urgency; no dysuria; no pain; no abnormal vaginal discharge; no abnormal vaginal bleeding  Breast    no breast discharge; no breast changes; no breast pain  Musculoskeletal    no myalgias; no arthralgias; no back pain  Psychiatric           no depressed mood; no anxiety    Hematologic            no tender lymph nodes; no noticeable swellings or  lumps   Endocrine    no hot flashes; no heat/cold intolerance         Neurological   no tremor; + numbness and tingling; + loss of balance; + difficulty walking; no headaches; + difficulty sleeping    Obstetrics and Gynecology History:  ,   Menopause at age 52, took HRT for a short while, maybe 1 year        Past Medical History:  Past Medical History:   Diagnosis Date     Hypertension      Ovarian cancer (H)      PE (pulmonary thromboembolism) (H)      Spinal stenosis      Thyroid disease        Past Surgical History:  Past Surgical History:   Procedure Laterality Date     AS TOTAL KNEE ARTHROPLASTY Left      THYROIDECTOMY         Health Maintenance:  Last Pap Smear: 5 years              Result: normal  She has not had a history of abnormal Pap smears.    Last Mammogram: 10/19/16              Result: normal      She has not had a history of abnormal mammograms.    Last Colonoscopy:               Result: normal      Current Medications:   has a current medication list which includes the following prescription(s): UNABLE TO FIND, potassium chloride er, ondansetron, losartan, hydrochlorothiazide, levothyroxine, vitamin d (cholecalciferol), enoxaparin, ondansetron, prochlorperazine, acetaminophen, and ibuprofen.     Allergies:   Amoxicillin      Social History:  Social History     Social History     Marital status:      Spouse name: N/A     Number of children: N/A     Years of education: N/A     Occupational History     Not on file.     Social History Main Topics     Smoking status: Never Smoker     Smokeless tobacco: Not on file     Alcohol use Yes      Comment: occasionally     Drug use: No     Sexual activity: Not on file     Other Topics Concern     Not on file     Social History Narrative     Lives with , feels safe at home.  Retired .  Enjoys playing golf, gardening.  Does have an advanced directive on file and would like her , Christiano to be her POA.   "DNR/DNI    Family History:   The patient's family history is significant for.  Family History   Problem Relation Age of Onset     Breast Cancer Mother      Breast Cancer Maternal Grandmother      Breast Cancer Maternal Aunt          Physical Exam:   /74  Pulse 68  Temp 99  F (37.2  C) (Tympanic)  Resp 16  Ht 1.499 m (4' 11.02\")  Wt 69.9 kg (154 lb)  SpO2 97%  BMI 31.08 kg/m2  Body mass index is 31.08 kg/(m^2).    General Appearance: healthy and alert, no distress     HEENT:  no thyromegaly, no palpable nodules or masses        Cardiovascular: regular rate and rhythm, no gallops, rubs or murmurs     Respiratory: lungs clear, no rales, rhonchi or wheezes, normal diaphragmatic excursion    Musculoskeletal: extremities non tender and without edema    Skin: no lesions or rashes     Neurological: normal gait, no gross defects     Psychiatric: appropriate mood and affect                               Hematological: normal cervical, supraclavicular and inguinal lymph nodes     Gastrointestinal:       abdomen soft, non-tender, non-distended, no organomegaly or masses    Genitourinary: Deferred    Labs:  Will obtain 5/5 prior to chemo    Assessment:    Tosin Nina is a 71 year old woman with a diagnosis of Stage IVB high grade serous ovarian cancer.     A total of 45 minutes was spent with the patient, >50% of which were spent in counseling the patient and/or treatment planning.      Plan:     1.)    Stage IVB high grade serous ovarian cancer.  Plan is for 3 cycles of neoadjuvant chemotherapy with carboplatin AUC 6 q3 and paclitaxel 80 mg/m2 weekly and re-image to assess for surgical debulking at that point.  Ok to proceed with cycle #2 of chemo as planned on 5/5.  She will return on 5/30 for cycle #3.       2.) Genetic risk factors were assessed and the patient does meet the qualifications for a referral and she will schedule a visit.      3.) Labs and/or tests ordered include:  Pre-chemo labs on " 5/5.     4.) Health maintenance issues addressed today include pt due for colonoscopy which can be addressed following her upfront treatment for ovarian cancer.    5.) Chemotherapy teaching was completed today.        Thank you for allowing us to participate in the care of your patient.         Sincerely,    Nessa Ennis MD  Gynecologic Oncology  [facility]       CC        no

## 2022-04-05 ENCOUNTER — ONCOLOGY VISIT (OUTPATIENT)
Dept: ONCOLOGY | Facility: CLINIC | Age: 77
End: 2022-04-05
Attending: OBSTETRICS & GYNECOLOGY
Payer: COMMERCIAL

## 2022-04-05 VITALS
BODY MASS INDEX: 38.3 KG/M2 | HEART RATE: 88 BPM | SYSTOLIC BLOOD PRESSURE: 134 MMHG | DIASTOLIC BLOOD PRESSURE: 65 MMHG | TEMPERATURE: 98.3 F | HEIGHT: 59 IN | WEIGHT: 190 LBS | RESPIRATION RATE: 16 BRPM | OXYGEN SATURATION: 95 %

## 2022-04-05 DIAGNOSIS — C56.9 MALIGNANT NEOPLASM OF OVARY, UNSPECIFIED LATERALITY (H): ICD-10-CM

## 2022-04-05 DIAGNOSIS — C56.3 MALIGNANT NEOPLASM OF BOTH OVARIES (H): Primary | ICD-10-CM

## 2022-04-05 LAB — CANCER AG125 SERPL-ACNC: 6 U/ML (ref 0–30)

## 2022-04-05 PROCEDURE — G0463 HOSPITAL OUTPT CLINIC VISIT: HCPCS

## 2022-04-05 PROCEDURE — 99213 OFFICE O/P EST LOW 20 MIN: CPT | Performed by: OBSTETRICS & GYNECOLOGY

## 2022-04-05 PROCEDURE — 36415 COLL VENOUS BLD VENIPUNCTURE: CPT

## 2022-04-05 PROCEDURE — 86304 IMMUNOASSAY TUMOR CA 125: CPT | Performed by: OBSTETRICS & GYNECOLOGY

## 2022-04-05 ASSESSMENT — PAIN SCALES - GENERAL: PAINLEVEL: NO PAIN (0)

## 2022-04-05 NOTE — PROGRESS NOTES
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  She is accompanied by her daughter.   She has been overall feeling well and is in good spirits, still at her baseline functioning with respect to balance and ambulation but has had a worsening of her ataxia.  Her daughter also reports some mild worsening in her fine motor coordination but Namita has not noticed this herself. Eating, drinking without issues. No change in bowel or bladder habits, unintentional weight loss, shortness of breath, cough, chest pain, vaginal bleeding, abdominal/pelvic pain.     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  She was discharged to a TCU where she is still residing with some but minimal improvement in her neurologic symptoms.  She still has quite a difficult time seeing especially with her right eye.  She also notes weakness mostly on her left side.  Both of these make it very difficult for her to walk and thus she is having to use a wheelchair for most of her mobility.  She tolerated her first cycle quite well with her biggest side effect being nausea which improved drastically with a change in anti-emetics.      17-17:  Admit to [facility] for worsening dizziness, found to have stage IVB ovarian cancer (inguinal lymphadenopathy) causing paraneoplastic syndrome    17:  CT C/A/P:  Thrombus identified within the right lower lobe are artery and right upper lobar arteries. The right pulmonary artery is enlarged, similar to prior exams. No CT evidence of right heart strain. No suspicious mediastinal or perihilar lymphadenopathy. Bovine arch anatomy. No suspicious pulmonary nodules, evidence of infarction, or infection. Abdomen and pelvis: There are soft tissue nodules  identified along the liver capsule measuring up to 3 cm inferiorly. There is a large amount of omental caking. Enlarged iliac and retroperitoneal lymph nodes for example a 2.6 cm right external iliac lymph node or group of lymph nodes. Heterogeneous enhancement of the uterine fundus could be due to fibroids or a mass. The overall size of the uterus does not appear significantly different than in 2014. The left ovary does appear slightly larger and lobular in appearance compared to prior exam. There is also a nodular area along the round ligament. It was not present on prior exam. There is an irregular lobulated mass involving the sigmoid colon. Abnormal, enlarged inguinal lymph nodes. Soft tissue implant in the posterior right retroperitoneum adjacent to the psoas was not present on prior exam. 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 Spondylolisthesis at L3-4. Small periumbilical hernia containing some of the abnormal omentum.    4/11/17:   268, CEA .6    4/12/17:  IR omental biopsy   Pathology:  High grade serous carcinoma    4/14/17: Cycle #1 carboplatin AUC 6 and weekly Taxol 80mg/m2.  = 2648    5/5/17:  Cycle #2 carboplatin AUC 6 and weekly Taxol 80mg/m2.  = 370    5/26/17:  Cycle #3 carboplatin AUC 6 and weekly Taxol 80mg/m2.  = 63    6/16/17:  CT C/A/P:  Chest:  Resolution of previous right-sided pulmonary emboli since April. No mediastinal, hilar or axillary adenopathy. No pleural fluid.  Port-A-Cath right superior chest with tip in the SVC.  Short linear fibrosis or discoid atelectasis anterior medial right upper lobe. Lungs otherwise clear. Abdomen and Pelvis: Marked improvement in carcinomatosis and omental metastasis since 4/11/2017. In the anterior left pelvis there is a 1.2 cm nodule with adjacent linear opacity approximately 4 cm in length in an area of previous dense omental caking and  metastatic disease. There is resolution of the previous anterior right-sided omental caking with subcentimeter trace of residual nodularity in this location. There are multiple new indeterminate nodular densities in the anterior abdominal wall subcutaneous fat as well as small collections of subcutaneous air, suggesting that these are medication injection sites.  Previous subserosal implants along the inferior liver have resolved. No focal liver lesions. Normal-appearing pancreas, adrenal glands and kidneys. Tiny hypodense spleen lesion is too small to characterize, not previously seen. Spleen otherwise appears normal. No periaortic or pelvic adenopathy with resolution of previous adenopathy. Lobulated enlarged uterus redemonstrated consistent with multiple fibroids. No free fluid. No acute bowel abnormality. Resolution of mesenteric right lower quadrant nodule is compatible with metastasis. On coronal images the terminal ileum takes an unusual circular course posterior to the right colon, but there is no evidence for obstruction. No aggressive bone lesions.    6/16/17:   = 27    6/28/17:  Robotic total laparoscopic hysterectomy, bilateral salpingo-oophorectomy, lysis of adhesions, omentectomy, optimal tumor debulking to no residual disease   Pathology:  Minimal serous carcinoma remaining in the left ovary    7/20/17:  Cycle #4 carboplatin AUC 6 and weekly Taxol 80mg/m2. D15 cancelled due to thrombocytopenia (plt 70)   = 18    8/10/17:  Cycle #5 carboplatin AUC 6 and weekly Taxol 80mg/m2. D8 cancelled neutropenia , D15 cancelled thromobocytopenia (plt 53)  = 12    8/31/17:  Cycle #6 carboplatin AUC 5 due to myelosuppression and weekly Taxol 80mg/m2. D15 delayed thrombocytopenia (plt 78)  = 10    10/12/20: CT C/A/P:  1. No convincing evidence of local recurrence or metastatic disease in the chest, abdomen or pelvis. 2. The previously seen scattered peritoneal nodularities on 6/16/2017 exam  are no longer seen. 3. Significant hepatic steatosis. 4. Colonic diverticulosis without CT evidence of acute diverticulitis. 5. Few small left kidney stones. No right kidney stones. No hydronephrosis in either kidney.    Obstetrics and Gynecology History:  ,   Menopause at age 52, took HRT for a short while, maybe 1 year        Past Medical History:  Past Medical History:   Diagnosis Date     Anemia      Cervical spinal stenosis      Depression      Depressive disorder      GERD (gastroesophageal reflux disease)      Hypertension      Hypokalemia      Hypothyroid      Lumbar spinal stenosis      Obesity      Ovarian cancer (H)      Paraneoplastic neuromyopathy and neuropathy (H)      PE (pulmonary thromboembolism) (H)      Thrombocytopenia (H)        Past Surgical History:  Past Surgical History:   Procedure Laterality Date     AS TOTAL KNEE ARTHROPLASTY Left      BACK SURGERY       CHOLECYSTECTOMY  2016     COLONOSCOPY N/A 2018    Procedure: COLONOSCOPY;  Colonoscopy ;  Surgeon: Nila Munson MD;  Location: [facility] OR     CYSTOSCOPY N/A 2017    Procedure: CYSTOSCOPY;;  Surgeon: Nessa Christina MD;  Location: [facility] OR     DAVINCI HYSTERECTOMY TOTAL, BILATERAL SALPINGO-OOPHORECTMY, NODE DISSECTION, TUMOR STAGING, COMBINED N/A 2017    Procedure: COMBINED DAVINCI HYSTERECTOMY TOTAL, SALPINGO-OOPHORECTOMY, NODE DISSECTION, TUMOR STAGING;  Robotic total laparoscopic hysterectomy, bilateral salpingo-oophorectomy, omentectomy, lysis of adhesions, tumor debulking, cystoscopy;  Surgeon: Nessa Christina MD;  Location: [facility] OR     IR PORT REMOVAL RIGHT  3/9/2020     OPEN REDUCTION INTERNAL FIXATION WRIST Right      THYROIDECTOMY         Health Maintenance:  Last Pap Smear: No need for further pap smear exams  She has not had a history of abnormal Pap smears.    Last Mammogram: 23              Result: normal      She has not had a history of abnormal  "mammograms.    Last Colonoscopy: 4/20/18              Result: polyps-repeat 5 years       Social History:  Lives with , feels safe at home.  Retired .  Enjoys playing golf, gardening.  Does have an advanced directive on file and would like her Christiano to be her POA.  DNR/DNI    Family History:   The patient's family history is significant for.  Family History   Problem Relation Age of Onset     Breast Cancer Mother 69     Heart Failure Mother      Breast Cancer Maternal Grandmother         this is maternal great grandmother     Breast Cancer Maternal Aunt 89     Myocardial Infarction Father      Unknown/Adopted Brother      Unknown/Adopted Maternal Grandfather      Stomach Cancer Paternal Grandmother         Stomach mass-not sure if cancer     Lung Cancer Paternal Grandfather         mustard gas in [place]         Physical Exam:   /65   Pulse 88   Temp 98.3  F (36.8  C) (Tympanic)   Resp 16   Ht 1.499 m (4' 11\")   Wt 86.2 kg (190 lb)   SpO2 95%   BMI 38.38 kg/m     General Appearance: healthy and alert, no distress     Gastrointestinal:       abdomen soft, non-tender, non-distended, no organomegaly or masses    Genitourinary: External genitalia and urethral meatus appears normal.  Vagina is smooth without nodularity or masses.  Cervix surgically absent.  Bimanual exam reveal no masses, nodularity or fullness.  Recto-vaginal exam confirms these findings.          Assessment:    Tosin Nina is a 76 year old woman with a diagnosis of Stage IVB high grade serous ovarian cancer.     A total of 15 minutes was spent with the patient, >50% of which were spent in counseling the patient and/or treatment planning.      Plan:     1.)    Stage IVB high grade serous ovarian cancer. YONATAN s/p neoadjuvant chemotherapy, optimal interval debulking and adjuvant chemotherapy.   Reviewed signs and symptoms of a recurrence and I have asked her to call if these should occur.  Reinforced " surveillance visits every 6 months for the following 3 years (9/22) then annually thereafter. She will return in 6 months with a  prior to her visit.  She had her port removed.    2.) Chemotherapy side effects:  Neuropathy is the only residual effect she is having and this is mild     3.) Para-neoplastic syndrome with residual neurologic deficits.  No further imaging/treatment required per Dr Colon of neuro-oncology    4.) PE.  Plan lifelong anticoagulation per Dr Barahona of hematology.  She is currently on Xeralto     5.) Genetic risk factors were assessed and the patient underwent genetic testing which was negative    6.) Labs and/or tests ordered include:       7.) Health maintenance issues addressed today include pt is up to date            Thank you for allowing us to participate in the care of your patient.         Sincerely,    Nessa Ennis MD  Gynecologic Oncology  [facility]       CC

## 2022-04-05 NOTE — LETTER
2022         RE: Tosin Nina  [address]  [place] [address]        Dear Colleague,    Thank you for referring your patient, Tosin Nina, to the [facility]. Please see a copy of my visit note below.    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  She is accompanied by her daughter.   She has been overall feeling well and is in good spirits, still at her baseline functioning with respect to balance and ambulation but has had a worsening of her ataxia.  Her daughter also reports some mild worsening in her fine motor coordination but Namita has not noticed this herself. Eating, drinking without issues. No change in bowel or bladder habits, unintentional weight loss, shortness of breath, cough, chest pain, vaginal bleeding, abdominal/pelvic pain.     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  She was discharged to a TCU where she is still residing with some but minimal improvement in her neurologic symptoms.  She still has quite a difficult time seeing especially with her right eye.  She also notes weakness mostly on her left side.  Both of these make it very difficult for her to walk and thus she is having to use a wheelchair for most of her mobility.  She tolerated her first cycle quite well with her biggest side effect being nausea which improved drastically with a change in anti-emetics.      17-17:  Admit to [facility] for worsening dizziness, found to have stage IVB ovarian cancer (inguinal lymphadenopathy) causing paraneoplastic syndrome    17:  CT C/A/P:  Thrombus identified within the right lower lobe are artery and right upper lobar arteries. The right pulmonary artery is  enlarged, similar to prior exams. No CT evidence of right heart strain. No suspicious mediastinal or perihilar lymphadenopathy. Bovine arch anatomy. No suspicious pulmonary nodules, evidence of infarction, or infection. Abdomen and pelvis: There are soft tissue nodules identified along the liver capsule measuring up to 3 cm inferiorly. There is a large amount of omental caking. Enlarged iliac and retroperitoneal lymph nodes for example a 2.6 cm right external iliac lymph node or group of lymph nodes. Heterogeneous enhancement of the uterine fundus could be due to fibroids or a mass. The overall size of the uterus does not appear significantly different than in 2014. The left ovary does appear slightly larger and lobular in appearance compared to prior exam. There is also a nodular area along the round ligament. It was not present on prior exam. There is an irregular lobulated mass involving the sigmoid colon. Abnormal, enlarged inguinal lymph nodes. Soft tissue implant in the posterior right retroperitoneum adjacent to the psoas was not present on prior exam. 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 Spondylolisthesis at L3-4. Small periumbilical hernia containing some of the abnormal omentum.    4/11/17:   268, CEA .6    4/12/17:  IR omental biopsy   Pathology:  High grade serous carcinoma    4/14/17: Cycle #1 carboplatin AUC 6 and weekly Taxol 80mg/m2.  = 2648    5/5/17:  Cycle #2 carboplatin AUC 6 and weekly Taxol 80mg/m2.  = 370    5/26/17:  Cycle #3 carboplatin AUC 6 and weekly Taxol 80mg/m2.  = 63    6/16/17:  CT C/A/P:  Chest:  Resolution of previous right-sided pulmonary emboli since April. No mediastinal, hilar or axillary adenopathy. No pleural fluid.  Port-A-Cath right superior chest with tip in the SVC.  Short linear fibrosis or discoid atelectasis anterior medial right upper lobe. Lungs  otherwise clear. Abdomen and Pelvis: Marked improvement in carcinomatosis and omental metastasis since 4/11/2017. In the anterior left pelvis there is a 1.2 cm nodule with adjacent linear opacity approximately 4 cm in length in an area of previous dense omental caking and metastatic disease. There is resolution of the previous anterior right-sided omental caking with subcentimeter trace of residual nodularity in this location. There are multiple new indeterminate nodular densities in the anterior abdominal wall subcutaneous fat as well as small collections of subcutaneous air, suggesting that these are medication injection sites.  Previous subserosal implants along the inferior liver have resolved. No focal liver lesions. Normal-appearing pancreas, adrenal glands and kidneys. Tiny hypodense spleen lesion is too small to characterize, not previously seen. Spleen otherwise appears normal. No periaortic or pelvic adenopathy with resolution of previous adenopathy. Lobulated enlarged uterus redemonstrated consistent with multiple fibroids. No free fluid. No acute bowel abnormality. Resolution of mesenteric right lower quadrant nodule is compatible with metastasis. On coronal images the terminal ileum takes an unusual circular course posterior to the right colon, but there is no evidence for obstruction. No aggressive bone lesions.    6/16/17:   = 27    6/28/17:  Robotic total laparoscopic hysterectomy, bilateral salpingo-oophorectomy, lysis of adhesions, omentectomy, optimal tumor debulking to no residual disease   Pathology:  Minimal serous carcinoma remaining in the left ovary    7/20/17:  Cycle #4 carboplatin AUC 6 and weekly Taxol 80mg/m2. D15 cancelled due to thrombocytopenia (plt 70)   = 18    8/10/17:  Cycle #5 carboplatin AUC 6 and weekly Taxol 80mg/m2. D8 cancelled neutropenia , D15 cancelled thromobocytopenia (plt 53)  = 12    8/31/17:  Cycle #6 carboplatin AUC 5 due to myelosuppression  and weekly Taxol 80mg/m2. D15 delayed thrombocytopenia (plt 78)  = 10    10/12/20: CT C/A/P:  1. No convincing evidence of local recurrence or metastatic disease in the chest, abdomen or pelvis. 2. The previously seen scattered peritoneal nodularities on 2017 exam are no longer seen. 3. Significant hepatic steatosis. 4. Colonic diverticulosis without CT evidence of acute diverticulitis. 5. Few small left kidney stones. No right kidney stones. No hydronephrosis in either kidney.    Obstetrics and Gynecology History:  ,   Menopause at age 52, took HRT for a short while, maybe 1 year        Past Medical History:  Past Medical History:   Diagnosis Date     Anemia      Cervical spinal stenosis      Depression      Depressive disorder      GERD (gastroesophageal reflux disease)      Hypertension      Hypokalemia      Hypothyroid      Lumbar spinal stenosis      Obesity      Ovarian cancer (H)      Paraneoplastic neuromyopathy and neuropathy (H)      PE (pulmonary thromboembolism) (H)      Thrombocytopenia (H)        Past Surgical History:  Past Surgical History:   Procedure Laterality Date     AS TOTAL KNEE ARTHROPLASTY Left      BACK SURGERY       CHOLECYSTECTOMY  2016     COLONOSCOPY N/A 2018    Procedure: COLONOSCOPY;  Colonoscopy ;  Surgeon: Nila Munson MD;  Location: [facility] OR     CYSTOSCOPY N/A 2017    Procedure: CYSTOSCOPY;;  Surgeon: Nessa Christina MD;  Location: [facility]     DAVINCI HYSTERECTOMY TOTAL, BILATERAL SALPINGO-OOPHORECTMY, NODE DISSECTION, TUMOR STAGING, COMBINED N/A 2017    Procedure: COMBINED DAVINCI HYSTERECTOMY TOTAL, SALPINGO-OOPHORECTOMY, NODE DISSECTION, TUMOR STAGING;  Robotic total laparoscopic hysterectomy, bilateral salpingo-oophorectomy, omentectomy, lysis of adhesions, tumor debulking, cystoscopy;  Surgeon: Nessa Christina MD;  Location: [facility] OR     IR PORT REMOVAL RIGHT  3/9/2020     OPEN REDUCTION INTERNAL FIXATION  "WRIST Right 2009     THYROIDECTOMY         Health Maintenance:  Last Pap Smear: No need for further pap smear exams  She has not had a history of abnormal Pap smears.    Last Mammogram: 9/28/21              Result: normal      She has not had a history of abnormal mammograms.    Last Colonoscopy: 4/20/18              Result: polyps-repeat 5 years       Social History:  Lives with , feels safe at home.  Retired .  Enjoys playing golf, gardening.  Does have an advanced directive on file and would like her , Christiano to be her POA.  DNR/DNI    Family History:   The patient's family history is significant for.  Family History   Problem Relation Age of Onset     Breast Cancer Mother 69     Heart Failure Mother      Breast Cancer Maternal Grandmother         this is maternal great grandmother     Breast Cancer Maternal Aunt 89     Myocardial Infarction Father      Unknown/Adopted Brother      Unknown/Adopted Maternal Grandfather      Stomach Cancer Paternal Grandmother         Stomach mass-not sure if cancer     Lung Cancer Paternal Grandfather         mustard gas in [place]         Physical Exam:   /65   Pulse 88   Temp 98.3  F (36.8  C) (Tympanic)   Resp 16   Ht 1.499 m (4' 11\")   Wt 86.2 kg (190 lb)   SpO2 95%   BMI 38.38 kg/m     General Appearance: healthy and alert, no distress     Gastrointestinal:       abdomen soft, non-tender, non-distended, no organomegaly or masses    Genitourinary: External genitalia and urethral meatus appears normal.  Vagina is smooth without nodularity or masses.  Cervix surgically absent.  Bimanual exam reveal no masses, nodularity or fullness.  Recto-vaginal exam confirms these findings.          Assessment:    Tosin Nina is a 76 year old woman with a diagnosis of Stage IVB high grade serous ovarian cancer.     A total of 15 minutes was spent with the patient, >50% of which were spent in counseling the patient and/or treatment planning.      Plan: "     1.)    Stage IVB high grade serous ovarian cancer. YONATAN s/p neoadjuvant chemotherapy, optimal interval debulking and adjuvant chemotherapy.   Reviewed signs and symptoms of a recurrence and I have asked her to call if these should occur.  Reinforced surveillance visits every 6 months for the following 3 years (9/22) then annually thereafter. She will return in 6 months with a  prior to her visit.  She had her port removed.    2.) Chemotherapy side effects:  Neuropathy is the only residual effect she is having and this is mild     3.) Para-neoplastic syndrome with residual neurologic deficits.  No further imaging/treatment required per Dr Colon of neuro-oncology    4.) PE.  Plan lifelong anticoagulation per Dr Barahona of hematology.  She is currently on Xeralto     5.) Genetic risk factors were assessed and the patient underwent genetic testing which was negative    6.) Labs and/or tests ordered include:       7.) Health maintenance issues addressed today include pt is up to date            Thank you for allowing us to participate in the care of your patient.         Sincerely,    Nessa Ennis MD  Gynecologic Oncology  [facility]       CC               Again, thank you for allowing me to participate in the care of your patient.        Sincerely,        Cristian Ennis MD

## 2022-04-05 NOTE — NURSING NOTE
"Oncology Rooming Note    April 5, 2022 2:24 PM   Tosin Nina is a 76 year old female who presents for:    Chief Complaint   Patient presents with     Oncology Clinic Visit     Encounter for follow-up surveillance of ovarian cancer     Initial Vitals: /65   Pulse 88   Temp 98.3  F (36.8  C) (Tympanic)   Resp 16   Ht 1.499 m (4' 11\")   Wt 86.2 kg (190 lb)   SpO2 95%   BMI 38.38 kg/m   Estimated body mass index is 38.38 kg/m  as calculated from the following:    Height as of this encounter: 1.499 m (4' 11\").    Weight as of this encounter: 86.2 kg (190 lb). Body surface area is 1.89 meters squared.  No Pain (0) Comment: Data Unavailable   No LMP recorded. Patient is postmenopausal.  Allergies reviewed: Yes  Medications reviewed: Yes    Medications: Medication refills not needed today.  Pharmacy name entered into [employer]: [id number] - [place], [place] - [address] AT [facility] [address]    Clinical concerns: f/u       Danyelle Orellana CMA              "

## 2022-04-05 NOTE — PROGRESS NOTES
Medical Assistant Note:  Tosin Nina presents today for blood draw.    Patient seen by provider today: Yes: Dr. Ennis.   present during visit today: Not Applicable.    Concerns: No Concerns.    Procedure:  Labs drawn    Post Assessment:  Labs drawn without difficulty: Yes.    Discharge Plan:  Departure Mode: Ambulatory.    Face to Face Time: 10 min.    Nila Lou [facility]

## 2022-08-23 NOTE — PROGRESS NOTES
Infusion Nursing Note:  Tosin Nina presents today for a Neupogen SQ injection.    Patient seen by provider today: No   present during visit today: Not Applicable.    Note: Given into left arm.    Intravenous Access:  No Intravenous access/labs at this visit.    Treatment Conditions:  Not Applicable.      Post Infusion Assessment:  Patient tolerated injection without incident.    Discharge Plan:   Discharge instructions reviewed with: Patient.  Patient and/or family verbalized understanding of discharge instructions and all questions answered.  Copy of AVS reviewed with patient and/or family.  Patient will return 8/24/17 for next Taxol  Patient discharged in stable condition accompanied by: .  Departure Mode: Wheelchair.    Dara Maxwell RN                      
same name as above

## 2022-09-13 ENCOUNTER — LAB (OUTPATIENT)
Dept: ONCOLOGY | Facility: CLINIC | Age: 77
End: 2022-09-13
Attending: OBSTETRICS & GYNECOLOGY
Payer: COMMERCIAL

## 2022-09-13 DIAGNOSIS — C56.3 MALIGNANT NEOPLASM OF BOTH OVARIES (H): ICD-10-CM

## 2022-09-13 LAB — CANCER AG125 SERPL-ACNC: 8 U/ML

## 2022-09-13 PROCEDURE — 36415 COLL VENOUS BLD VENIPUNCTURE: CPT

## 2022-09-13 PROCEDURE — 86304 IMMUNOASSAY TUMOR CA 125: CPT | Performed by: OBSTETRICS & GYNECOLOGY

## 2022-09-20 ENCOUNTER — ONCOLOGY VISIT (OUTPATIENT)
Dept: ONCOLOGY | Facility: CLINIC | Age: 77
End: 2022-09-20
Attending: OBSTETRICS & GYNECOLOGY
Payer: COMMERCIAL

## 2022-09-20 VITALS
TEMPERATURE: 97.5 F | SYSTOLIC BLOOD PRESSURE: 136 MMHG | DIASTOLIC BLOOD PRESSURE: 69 MMHG | HEART RATE: 70 BPM | RESPIRATION RATE: 16 BRPM | BODY MASS INDEX: 37.16 KG/M2 | OXYGEN SATURATION: 95 % | WEIGHT: 184 LBS

## 2022-09-20 DIAGNOSIS — C56.3 MALIGNANT NEOPLASM OF BOTH OVARIES (H): Primary | ICD-10-CM

## 2022-09-20 PROCEDURE — 99213 OFFICE O/P EST LOW 20 MIN: CPT | Performed by: OBSTETRICS & GYNECOLOGY

## 2022-09-20 PROCEDURE — G0463 HOSPITAL OUTPT CLINIC VISIT: HCPCS

## 2022-09-20 ASSESSMENT — PAIN SCALES - GENERAL: PAINLEVEL: NO PAIN (0)

## 2022-09-20 NOTE — LETTER
2022         RE: Tosin Nina  [address]  [facility] [id number]        Dear Colleague,    Thank you for referring your patient, Tosin Nina, to the [facility]. Please see a copy of my visit note below.    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  She is accompanied by her daughter.   She feels overall very well and has no new concerns.  She continues to have ataxia and difficulty with mobility, however otherwise feels great.  She is very excited and grateful to be 5 years cancer free. No concerning symptoms.    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  She was discharged to a TCU where she is still residing with some but minimal improvement in her neurologic symptoms.  She still has quite a difficult time seeing especially with her right eye.  She also notes weakness mostly on her left side.  Both of these make it very difficult for her to walk and thus she is having to use a wheelchair for most of her mobility.  She tolerated her first cycle quite well with her biggest side effect being nausea which improved drastically with a change in anti-emetics.      17-17:  Admit to [facility] for worsening dizziness, found to have stage IVB ovarian cancer (inguinal lymphadenopathy) causing paraneoplastic syndrome    17:  CT C/A/P:  Thrombus identified within the right lower lobe are artery and right upper lobar arteries. The right pulmonary artery is enlarged, similar to prior exams. No CT evidence of right heart strain. No suspicious mediastinal or perihilar lymphadenopathy. Bovine arch anatomy. No suspicious pulmonary nodules, evidence of infarction, or infection. Abdomen and  pelvis: There are soft tissue nodules identified along the liver capsule measuring up to 3 cm inferiorly. There is a large amount of omental caking. Enlarged iliac and retroperitoneal lymph nodes for example a 2.6 cm right external iliac lymph node or group of lymph nodes. Heterogeneous enhancement of the uterine fundus could be due to fibroids or a mass. The overall size of the uterus does not appear significantly different than in 2014. The left ovary does appear slightly larger and lobular in appearance compared to prior exam. There is also a nodular area along the round ligament. It was not present on prior exam. There is an irregular lobulated mass involving the sigmoid colon. Abnormal, enlarged inguinal lymph nodes. Soft tissue implant in the posterior right retroperitoneum adjacent to the psoas was not present on prior exam. 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 Spondylolisthesis at L3-4. Small periumbilical hernia containing some of the abnormal omentum.    4/11/17:   268, CEA .6    4/12/17:  IR omental biopsy   Pathology:  High grade serous carcinoma    4/14/17: Cycle #1 carboplatin AUC 6 and weekly Taxol 80mg/m2.  = 2648    5/5/17:  Cycle #2 carboplatin AUC 6 and weekly Taxol 80mg/m2.  = 370    5/26/17:  Cycle #3 carboplatin AUC 6 and weekly Taxol 80mg/m2.  = 63    6/16/17:  CT C/A/P:  Chest:  Resolution of previous right-sided pulmonary emboli since April. No mediastinal, hilar or axillary adenopathy. No pleural fluid.  Port-A-Cath right superior chest with tip in the SVC.  Short linear fibrosis or discoid atelectasis anterior medial right upper lobe. Lungs otherwise clear. Abdomen and Pelvis: Marked improvement in carcinomatosis and omental metastasis since 4/11/2017. In the anterior left pelvis there is a 1.2 cm nodule with adjacent linear opacity approximately 4 cm in length in an area  of previous dense omental caking and metastatic disease. There is resolution of the previous anterior right-sided omental caking with subcentimeter trace of residual nodularity in this location. There are multiple new indeterminate nodular densities in the anterior abdominal wall subcutaneous fat as well as small collections of subcutaneous air, suggesting that these are medication injection sites.  Previous subserosal implants along the inferior liver have resolved. No focal liver lesions. Normal-appearing pancreas, adrenal glands and kidneys. Tiny hypodense spleen lesion is too small to characterize, not previously seen. Spleen otherwise appears normal. No periaortic or pelvic adenopathy with resolution of previous adenopathy. Lobulated enlarged uterus redemonstrated consistent with multiple fibroids. No free fluid. No acute bowel abnormality. Resolution of mesenteric right lower quadrant nodule is compatible with metastasis. On coronal images the terminal ileum takes an unusual circular course posterior to the right colon, but there is no evidence for obstruction. No aggressive bone lesions.    6/16/17:   = 27    6/28/17:  Robotic total laparoscopic hysterectomy, bilateral salpingo-oophorectomy, lysis of adhesions, omentectomy, optimal tumor debulking to no residual disease   Pathology:  Minimal serous carcinoma remaining in the left ovary    7/20/17:  Cycle #4 carboplatin AUC 6 and weekly Taxol 80mg/m2. D15 cancelled due to thrombocytopenia (plt 70)   = 18    8/10/17:  Cycle #5 carboplatin AUC 6 and weekly Taxol 80mg/m2. D8 cancelled neutropenia , D15 cancelled thromobocytopenia (plt 53)  = 12    8/31/17:  Cycle #6 carboplatin AUC 5 due to myelosuppression and weekly Taxol 80mg/m2. D15 delayed thrombocytopenia (plt 78)  = 10    10/12/20: CT C/A/P:  1. No convincing evidence of local recurrence or metastatic disease in the chest, abdomen or pelvis. 2. The previously seen scattered  peritoneal nodularities on 2017 exam are no longer seen. 3. Significant hepatic steatosis. 4. Colonic diverticulosis without CT evidence of acute diverticulitis. 5. Few small left kidney stones. No right kidney stones. No hydronephrosis in either kidney.    Obstetrics and Gynecology History:  ,   Menopause at age 52, took HRT for a short while, maybe 1 year        Past Medical History:  Past Medical History:   Diagnosis Date     Anemia      Cervical spinal stenosis      Depression      Depressive disorder      GERD (gastroesophageal reflux disease)      Hypertension      Hypokalemia      Hypothyroid      Lumbar spinal stenosis      Obesity      Ovarian cancer (H)      Paraneoplastic neuromyopathy and neuropathy (H)      PE (pulmonary thromboembolism) (H)      Thrombocytopenia (H)        Past Surgical History:  Past Surgical History:   Procedure Laterality Date     AS TOTAL KNEE ARTHROPLASTY Left      BACK SURGERY       CHOLECYSTECTOMY  2016     COLONOSCOPY N/A 2018    Procedure: COLONOSCOPY;  Colonoscopy ;  Surgeon: Nila Munson MD;  Location: [facility] OR     CYSTOSCOPY N/A 2017    Procedure: CYSTOSCOPY;;  Surgeon: Nessa Christina MD;  Location: [facility] OR     DAVINCI HYSTERECTOMY TOTAL, BILATERAL SALPINGO-OOPHORECTMY, NODE DISSECTION, TUMOR STAGING, COMBINED N/A 2017    Procedure: COMBINED DAVINCI HYSTERECTOMY TOTAL, SALPINGO-OOPHORECTOMY, NODE DISSECTION, TUMOR STAGING;  Robotic total laparoscopic hysterectomy, bilateral salpingo-oophorectomy, omentectomy, lysis of adhesions, tumor debulking, cystoscopy;  Surgeon: Nessa Christina MD;  Location: [facility] OR     IR PORT REMOVAL RIGHT  3/9/2020     OPEN REDUCTION INTERNAL FIXATION WRIST Right      THYROIDECTOMY         Health Maintenance:  Last Pap Smear: No need for further pap smear exams  She has not had a history of abnormal Pap smears.    Last Mammogram: 23              Result: normal      She has  not had a history of abnormal mammograms.    Last Colonoscopy: 4/20/18              Result: polyps-repeat 5 years       Social History:  Lives with , feels safe at home.  Retired .  Enjoys playing golf, gardening.  Does have an advanced directive on file and would like her , Christiano to be her POA.  DNR/DNI    Family History:   The patient's family history is significant for.  Family History   Problem Relation Age of Onset     Breast Cancer Mother 69     Heart Failure Mother      Breast Cancer Maternal Grandmother         this is maternal great grandmother     Breast Cancer Maternal Aunt 89     Myocardial Infarction Father      Unknown/Adopted Brother      Unknown/Adopted Maternal Grandfather      Stomach Cancer Paternal Grandmother         Stomach mass-not sure if cancer     Lung Cancer Paternal Grandfather         must[employer] gas in [place]         Physical Exam:   /69   Pulse 70   Temp 97.5  F (36.4  C) (Tympanic)   Resp 16   Wt 83.5 kg (184 lb)   SpO2 95%   BMI 37.16 kg/m     General Appearance: healthy and alert, no distress     Gastrointestinal:       abdomen soft, non-tender, non-distended, no organomegaly or masses    Genitourinary: External genitalia and urethral meatus appears normal.  Vagina is smooth without nodularity or masses.  Cervix surgically absent.  Bimanual exam reveal no masses, nodularity or fullness.  Recto-vaginal exam confirms these findings.          Assessment:    Tosin Nina is a 76 year old woman with a diagnosis of Stage IVB high grade serous ovarian cancer.     A total of 15 minutes was spent with the patient, >50% of which were spent in counseling the patient and/or treatment planning.      Plan:     1.)    Stage IVB high grade serous ovarian cancer. YONATAN s/p neoadjuvant chemotherapy, optimal interval debulking and adjuvant chemotherapy with  low at 8.   Reviewed signs and symptoms of a recurrence and I have asked her to call if these should  occur.  Given she has reached the 5 year ainsley, we will see her annually moving forward unless concerns arise. She will return in 1 year with a  prior to her visit.  She had her port removed.    2.) Chemotherapy side effects:  Neuropathy is the only residual effect she is having and this is mild     3.) Para-neoplastic syndrome with residual neurologic deficits.  No further imaging/treatment required per Dr Colon of neuro-oncology    4.) PE.  Plan lifelong anticoagulation per Dr Barahona of hematology.  She is currently on Xeralto     5.) Genetic risk factors were assessed and the patient underwent genetic testing which was negative    6.) Labs and/or tests ordered include:       7.) Health maintenance issues addressed today include pt is up to date            Thank you for allowing us to participate in the care of your patient.         Sincerely,    Nessa Ennis MD  Gynecologic Oncology  [facility]       CC               Again, thank you for allowing me to participate in the care of your patient.        Sincerely,        Cristian Ennis MD

## 2022-09-20 NOTE — PROGRESS NOTES
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  She is accompanied by her daughter.   She feels overall very well and has no new concerns.  She continues to have ataxia and difficulty with mobility, however otherwise feels great.  She is very excited and grateful to be 5 years cancer free. No concerning symptoms.    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  She was discharged to a TCU where she is still residing with some but minimal improvement in her neurologic symptoms.  She still has quite a difficult time seeing especially with her right eye.  She also notes weakness mostly on her left side.  Both of these make it very difficult for her to walk and thus she is having to use a wheelchair for most of her mobility.  She tolerated her first cycle quite well with her biggest side effect being nausea which improved drastically with a change in anti-emetics.      17-17:  Admit to [facility] for worsening dizziness, found to have stage IVB ovarian cancer (inguinal lymphadenopathy) causing paraneoplastic syndrome    17:  CT C/A/P:  Thrombus identified within the right lower lobe are artery and right upper lobar arteries. The right pulmonary artery is enlarged, similar to prior exams. No CT evidence of right heart strain. No suspicious mediastinal or perihilar lymphadenopathy. Bovine arch anatomy. No suspicious pulmonary nodules, evidence of infarction, or infection. Abdomen and pelvis: There are soft tissue nodules identified along the liver capsule measuring up to 3 cm inferiorly. There is a large amount of omental caking. Enlarged iliac and retroperitoneal lymph nodes for example a 2.6 cm right external iliac lymph node or group of lymph nodes.  Heterogeneous enhancement of the uterine fundus could be due to fibroids or a mass. The overall size of the uterus does not appear significantly different than in 2014. The left ovary does appear slightly larger and lobular in appearance compared to prior exam. There is also a nodular area along the round ligament. It was not present on prior exam. There is an irregular lobulated mass involving the sigmoid colon. Abnormal, enlarged inguinal lymph nodes. Soft tissue implant in the posterior right retroperitoneum adjacent to the psoas was not present on prior exam. 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 Spondylolisthesis at L3-4. Small periumbilical hernia containing some of the abnormal omentum.    4/11/17:   268, CEA .6    4/12/17:  IR omental biopsy   Pathology:  High grade serous carcinoma    4/14/17: Cycle #1 carboplatin AUC 6 and weekly Taxol 80mg/m2.  = 2648    5/5/17:  Cycle #2 carboplatin AUC 6 and weekly Taxol 80mg/m2.  = 370    5/26/17:  Cycle #3 carboplatin AUC 6 and weekly Taxol 80mg/m2.  = 63    6/16/17:  CT C/A/P:  Chest:  Resolution of previous right-sided pulmonary emboli since April. No mediastinal, hilar or axillary adenopathy. No pleural fluid.  Port-A-Cath right superior chest with tip in the SVC.  Short linear fibrosis or discoid atelectasis anterior medial right upper lobe. Lungs otherwise clear. Abdomen and Pelvis: Marked improvement in carcinomatosis and omental metastasis since 4/11/2017. In the anterior left pelvis there is a 1.2 cm nodule with adjacent linear opacity approximately 4 cm in length in an area of previous dense omental caking and metastatic disease. There is resolution of the previous anterior right-sided omental caking with subcentimeter trace of residual nodularity in this location. There are multiple new indeterminate nodular densities in the anterior  abdominal wall subcutaneous fat as well as small collections of subcutaneous air, suggesting that these are medication injection sites.  Previous subserosal implants along the inferior liver have resolved. No focal liver lesions. Normal-appearing pancreas, adrenal glands and kidneys. Tiny hypodense spleen lesion is too small to characterize, not previously seen. Spleen otherwise appears normal. No periaortic or pelvic adenopathy with resolution of previous adenopathy. Lobulated enlarged uterus redemonstrated consistent with multiple fibroids. No free fluid. No acute bowel abnormality. Resolution of mesenteric right lower quadrant nodule is compatible with metastasis. On coronal images the terminal ileum takes an unusual circular course posterior to the right colon, but there is no evidence for obstruction. No aggressive bone lesions.    6/16/17:   = 27    6/28/17:  Robotic total laparoscopic hysterectomy, bilateral salpingo-oophorectomy, lysis of adhesions, omentectomy, optimal tumor debulking to no residual disease   Pathology:  Minimal serous carcinoma remaining in the left ovary    7/20/17:  Cycle #4 carboplatin AUC 6 and weekly Taxol 80mg/m2. D15 cancelled due to thrombocytopenia (plt 70)   = 18    8/10/17:  Cycle #5 carboplatin AUC 6 and weekly Taxol 80mg/m2. D8 cancelled neutropenia , D15 cancelled thromobocytopenia (plt 53)  = 12    8/31/17:  Cycle #6 carboplatin AUC 5 due to myelosuppression and weekly Taxol 80mg/m2. D15 delayed thrombocytopenia (plt 78)  = 10    10/12/20: CT C/A/P:  1. No convincing evidence of local recurrence or metastatic disease in the chest, abdomen or pelvis. 2. The previously seen scattered peritoneal nodularities on 6/16/2017 exam are no longer seen. 3. Significant hepatic steatosis. 4. Colonic diverticulosis without CT evidence of acute diverticulitis. 5. Few small left kidney stones. No right kidney stones. No hydronephrosis in either  kidney.    Obstetrics and Gynecology History:  ,   Menopause at age 52, took HRT for a short while, maybe 1 year        Past Medical History:  Past Medical History:   Diagnosis Date     Anemia      Cervical spinal stenosis      Depression      Depressive disorder      GERD (gastroesophageal reflux disease)      Hypertension      Hypokalemia      Hypothyroid      Lumbar spinal stenosis      Obesity      Ovarian cancer (H)      Paraneoplastic neuromyopathy and neuropathy (H)      PE (pulmonary thromboembolism) (H)      Thrombocytopenia (H)        Past Surgical History:  Past Surgical History:   Procedure Laterality Date     AS TOTAL KNEE ARTHROPLASTY Left      BACK SURGERY       CHOLECYSTECTOMY  2016     COLONOSCOPY N/A 2018    Procedure: COLONOSCOPY;  Colonoscopy ;  Surgeon: Nila Munson MD;  Location: [facility] OR     CYSTOSCOPY N/A 2017    Procedure: CYSTOSCOPY;;  Surgeon: Nessa Christina MD;  Location: [facility]     DAVINCI HYSTERECTOMY TOTAL, BILATERAL SALPINGO-OOPHORECTMY, NODE DISSECTION, TUMOR STAGING, COMBINED N/A 2017    Procedure: COMBINED DAVINCI HYSTERECTOMY TOTAL, SALPINGO-OOPHORECTOMY, NODE DISSECTION, TUMOR STAGING;  Robotic total laparoscopic hysterectomy, bilateral salpingo-oophorectomy, omentectomy, lysis of adhesions, tumor debulking, cystoscopy;  Surgeon: Nessa Christina MD;  Location: [facility] OR     IR PORT REMOVAL RIGHT  3/9/2020     OPEN REDUCTION INTERNAL FIXATION WRIST Right      THYROIDECTOMY         Health Maintenance:  Last Pap Smear: No need for further pap smear exams  She has not had a history of abnormal Pap smears.    Last Mammogram: 23              Result: normal      She has not had a history of abnormal mammograms.    Last Colonoscopy: 18              Result: polyps-repeat 5 years       Social History:  Lives with , feels safe at home.  Retired .  Enjoys playing golf, gardening.  Does have an  advanced directive on file and would like her , Christiano to be her POA.  DNR/DNI    Family History:   The patient's family history is significant for.  Family History   Problem Relation Age of Onset     Breast Cancer Mother 69     Heart Failure Mother      Breast Cancer Maternal Grandmother         this is maternal great grandmother     Breast Cancer Maternal Aunt 89     Myocardial Infarction Father      Unknown/Adopted Brother      Unknown/Adopted Maternal Grandfather      Stomach Cancer Paternal Grandmother         Stomach mass-not sure if cancer     Lung Cancer Paternal Grandfather         must[employer] gas in [place]         Physical Exam:   /69   Pulse 70   Temp 97.5  F (36.4  C) (Tympanic)   Resp 16   Wt 83.5 kg (184 lb)   SpO2 95%   BMI 37.16 kg/m     General Appearance: healthy and alert, no distress     Gastrointestinal:       abdomen soft, non-tender, non-distended, no organomegaly or masses    Genitourinary: External genitalia and urethral meatus appears normal.  Vagina is smooth without nodularity or masses.  Cervix surgically absent.  Bimanual exam reveal no masses, nodularity or fullness.  Recto-vaginal exam confirms these findings.          Assessment:    Tosin Nina is a 76 year old woman with a diagnosis of Stage IVB high grade serous ovarian cancer.     A total of 15 minutes was spent with the patient, >50% of which were spent in counseling the patient and/or treatment planning.      Plan:     1.)    Stage IVB high grade serous ovarian cancer. YONATAN s/p neoadjuvant chemotherapy, optimal interval debulking and adjuvant chemotherapy with  low at 8.   Reviewed signs and symptoms of a recurrence and I have asked her to call if these should occur.  Given she has reached the 5 year ainsley, we will see her annually moving forward unless concerns arise. She will return in 1 year with a  prior to her visit.  She had her port removed.    2.) Chemotherapy side effects:  Neuropathy is the  only residual effect she is having and this is mild     3.) Para-neoplastic syndrome with residual neurologic deficits.  No further imaging/treatment required per Dr Colon of neuro-oncology    4.) PE.  Plan lifelong anticoagulation per Dr Barahona of hematology.  She is currently on Xeralto     5.) Genetic risk factors were assessed and the patient underwent genetic testing which was negative    6.) Labs and/or tests ordered include:       7.) Health maintenance issues addressed today include pt is up to date            Thank you for allowing us to participate in the care of your patient.         Sincerely,    Nessa Ennis MD  Gynecologic Oncology  [facility]       CC

## 2022-09-20 NOTE — NURSING NOTE
"Oncology Rooming Note    September 20, 2022 11:24 AM   Tosin Nina is a 76 year old female who presents for:    Chief Complaint   Patient presents with     Oncology Clinic Visit     Malignant neoplasm of ovary, unspecified laterality     Initial Vitals: /69   Pulse 70   Temp 97.5  F (36.4  C) (Tympanic)   Resp 16   Wt 83.5 kg (184 lb)   SpO2 95%   BMI 37.16 kg/m   Estimated body mass index is 37.16 kg/m  as calculated from the following:    Height as of 4/5/22: 1.499 m (4' 11\").    Weight as of this encounter: 83.5 kg (184 lb). Body surface area is 1.86 meters squared.  No Pain (0) Comment: Data Unavailable   No LMP recorded. Patient is postmenopausal.  Allergies reviewed: Yes  Medications reviewed: Yes    Medications: Medication refills not needed today.  Pharmacy name entered into [facility] Data: [id number] - [place], [place] - [address] AT SEC OF [address]              "

## 2022-09-23 NOTE — ANESTHESIA CARE TRANSFER NOTE
Patient: Tosin Nina    Procedure(s):  Robotic total laparoscopic hysterectomy, bilateral salpingo-oophorectomy, omentectomy, lysis of adhesions, tumor debulking, cystoscopy - Wound Class: II-Clean Contaminated   - Wound Class: II-Clean Contaminated    Diagnosis: Ovarian Cancer  Diagnosis Additional Information: No value filed.    Anesthesia Type:   General, ETT     Note:  Airway :Face Mask  Patient transferred to:PACU  Comments: To PACU, VSS, airway patent, RN at bedside.      Vitals: (Last set prior to Anesthesia Care Transfer)    CRNA VITALS  6/28/2017 1452 - 6/28/2017 1529      6/28/2017             Pulse: 68    SpO2: 97 %    Resp Rate (observed): 17                Electronically Signed By: SALVADOR Mcmanus CRNA  June 28, 2017  3:29 PM   Pt continues to be on bipap, pt DNR-CC pt not responding at this time. Current settings 16/8 60%. Writer would like to place pt on non-rebreather for more comfort. RN aware and will let writer know if writer is able to.

## 2022-10-16 ENCOUNTER — HEALTH MAINTENANCE LETTER (OUTPATIENT)
Age: 77
End: 2022-10-16

## 2022-10-31 ENCOUNTER — TRANSFERRED RECORDS (OUTPATIENT)
Dept: HEALTH INFORMATION MANAGEMENT | Facility: CLINIC | Age: 77
End: 2022-10-31

## 2023-03-26 ENCOUNTER — HEALTH MAINTENANCE LETTER (OUTPATIENT)
Age: 78
End: 2023-03-26

## 2023-09-11 ENCOUNTER — LAB (OUTPATIENT)
Dept: ONCOLOGY | Facility: CLINIC | Age: 78
End: 2023-09-11
Attending: OBSTETRICS & GYNECOLOGY
Payer: COMMERCIAL

## 2023-09-11 DIAGNOSIS — C56.3 MALIGNANT NEOPLASM OF BOTH OVARIES (H): ICD-10-CM

## 2023-09-11 LAB — CANCER AG125 SERPL-ACNC: 7 U/ML

## 2023-09-11 PROCEDURE — 36415 COLL VENOUS BLD VENIPUNCTURE: CPT

## 2023-09-11 PROCEDURE — 86304 IMMUNOASSAY TUMOR CA 125: CPT | Performed by: OBSTETRICS & GYNECOLOGY

## 2023-09-11 NOTE — NURSING NOTE
Medical Assistant Note:  Tosin Nina presents today for blood draw.    Patient seen by provider today: No.   present during visit today: Not Applicable.    Concerns: No Concerns.    Procedure:  Lab draw site: Right AC, Needle type: butterfly, Gauge: 21G.    Post Assessment:  Labs drawn without difficulty: Yes.    Discharge Plan:  Departure Mode: Wheelchair.    Face to Face Time: 10 min .    Pat Mccurdy CMA on 9/11/2023 at 11:11 AM

## 2023-09-17 NOTE — PROGRESS NOTES
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  She is accompanied by her daughter.   She continues to feel overall very well.  Her ataxia has worsened a bit in the past several months.  She denies any concerning symptoms.    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  She was discharged to a TCU where she is still residing with some but minimal improvement in her neurologic symptoms.  She still has quite a difficult time seeing especially with her right eye.  She also notes weakness mostly on her left side.  Both of these make it very difficult for her to walk and thus she is having to use a wheelchair for most of her mobility.  She tolerated her first cycle quite well with her biggest side effect being nausea which improved drastically with a change in anti-emetics.      17-17:  Admit to [facility] for worsening dizziness, found to have stage IVB ovarian cancer (inguinal lymphadenopathy) causing paraneoplastic syndrome    17:  CT C/A/P:  Thrombus identified within the right lower lobe are artery and right upper lobar arteries. The right pulmonary artery is enlarged, similar to prior exams. No CT evidence of right heart strain. No suspicious mediastinal or perihilar lymphadenopathy. Bovine arch anatomy. No suspicious pulmonary nodules, evidence of infarction, or infection. Abdomen and pelvis: There are soft tissue nodules identified along the liver capsule measuring up to 3 cm inferiorly. There is a large amount of omental caking. Enlarged iliac and retroperitoneal lymph nodes for example a 2.6 cm right external iliac lymph node or group of lymph nodes. Heterogeneous enhancement of the uterine fundus could be due to fibroids or a mass. The overall size  of the uterus does not appear significantly different than in 2014. The left ovary does appear slightly larger and lobular in appearance compared to prior exam. There is also a nodular area along the round ligament. It was not present on prior exam. There is an irregular lobulated mass involving the sigmoid colon. Abnormal, enlarged inguinal lymph nodes. Soft tissue implant in the posterior right retroperitoneum adjacent to the psoas was not present on prior exam. 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 Spondylolisthesis at L3-4. Small periumbilical hernia containing some of the abnormal omentum.    4/11/17:   268, CEA .6    4/12/17:  IR omental biopsy   Pathology:  High grade serous carcinoma    4/14/17: Cycle #1 carboplatin AUC 6 and weekly Taxol 80mg/m2.  = 2648    5/5/17:  Cycle #2 carboplatin AUC 6 and weekly Taxol 80mg/m2.  = 370    5/26/17:  Cycle #3 carboplatin AUC 6 and weekly Taxol 80mg/m2.  = 63    6/16/17:  CT C/A/P:  Chest:  Resolution of previous right-sided pulmonary emboli since April. No mediastinal, hilar or axillary adenopathy. No pleural fluid.  Port-A-Cath right superior chest with tip in the SVC.  Short linear fibrosis or discoid atelectasis anterior medial right upper lobe. Lungs otherwise clear. Abdomen and Pelvis: Marked improvement in carcinomatosis and omental metastasis since 4/11/2017. In the anterior left pelvis there is a 1.2 cm nodule with adjacent linear opacity approximately 4 cm in length in an area of previous dense omental caking and metastatic disease. There is resolution of the previous anterior right-sided omental caking with subcentimeter trace of residual nodularity in this location. There are multiple new indeterminate nodular densities in the anterior abdominal wall subcutaneous fat as well as small collections of subcutaneous air, suggesting that these are  medication injection sites.  Previous subserosal implants along the inferior liver have resolved. No focal liver lesions. Normal-appearing pancreas, adrenal glands and kidneys. Tiny hypodense spleen lesion is too small to characterize, not previously seen. Spleen otherwise appears normal. No periaortic or pelvic adenopathy with resolution of previous adenopathy. Lobulated enlarged uterus redemonstrated consistent with multiple fibroids. No free fluid. No acute bowel abnormality. Resolution of mesenteric right lower quadrant nodule is compatible with metastasis. On coronal images the terminal ileum takes an unusual circular course posterior to the right colon, but there is no evidence for obstruction. No aggressive bone lesions.    17:   = 27    17:  Robotic total laparoscopic hysterectomy, bilateral salpingo-oophorectomy, lysis of adhesions, omentectomy, optimal tumor debulking to no residual disease   Pathology:  Minimal serous carcinoma remaining in the left ovary    17:  Cycle #4 carboplatin AUC 6 and weekly Taxol 80mg/m2. D15 cancelled due to thrombocytopenia (plt 70)   = 18    8/10/17:  Cycle #5 carboplatin AUC 6 and weekly Taxol 80mg/m2. D8 cancelled neutropenia , D15 cancelled thromobocytopenia (plt 53)  = 12    17:  Cycle #6 carboplatin AUC 5 due to myelosuppression and weekly Taxol 80mg/m2. D15 delayed thrombocytopenia (plt 78)  = 10    10/12/20: CT C/A/P:  1. No convincing evidence of local recurrence or metastatic disease in the chest, abdomen or pelvis. 2. The previously seen scattered peritoneal nodularities on 2017 exam are no longer seen. 3. Significant hepatic steatosis. 4. Colonic diverticulosis without CT evidence of acute diverticulitis. 5. Few small left kidney stones. No right kidney stones. No hydronephrosis in either kidney.    Obstetrics and Gynecology History:  ,   Menopause at age 52, took HRT for a short while, maybe 1  year        Past Medical History:  Past Medical History:   Diagnosis Date    Anemia     Cervical spinal stenosis     Depression     Depressive disorder     GERD (gastroesophageal reflux disease)     Hypertension     Hypokalemia     Hypothyroid     Lumbar spinal stenosis     Obesity     Ovarian cancer (H)     Paraneoplastic neuromyopathy and neuropathy (H)     PE (pulmonary thromboembolism) (H)     Thrombocytopenia (H)        Past Surgical History:  Past Surgical History:   Procedure Laterality Date    AS TOTAL KNEE ARTHROPLASTY Left     BACK SURGERY  2009    CHOLECYSTECTOMY  01/01/2016    COLONOSCOPY N/A 4/20/2018    Procedure: COLONOSCOPY;  Colonoscopy ;  Surgeon: Nila Munson MD;  Location: [facility] OR    CYSTOSCOPY N/A 6/28/2017    Procedure: CYSTOSCOPY;;  Surgeon: Nessa Christina MD;  Location: [facility] OR    DAVINCI HYSTERECTOMY TOTAL, BILATERAL SALPINGO-OOPHORECTMY, NODE DISSECTION, TUMOR STAGING, COMBINED N/A 6/28/2017    Procedure: COMBINED DAVINCI HYSTERECTOMY TOTAL, SALPINGO-OOPHORECTOMY, NODE DISSECTION, TUMOR STAGING;  Robotic total laparoscopic hysterectomy, bilateral salpingo-oophorectomy, omentectomy, lysis of adhesions, tumor debulking, cystoscopy;  Surgeon: Nessa Christina MD;  Location: [facility] OR    IR PORT REMOVAL RIGHT  3/9/2020    OPEN REDUCTION INTERNAL FIXATION WRIST Right 2009    THYROIDECTOMY         Health Maintenance:  Last Pap Smear: No need for further pap smear exams  She has not had a history of abnormal Pap smears.    Last Mammogram: 10/31/22             Result: normal      She has not had a history of abnormal mammograms.    Last Colonoscopy: 4/20/18              Result: polyps-repeat 5 years       Social History:  Lives with , feels safe at home.  Retired .  Enjoys playing golf, gardening.  Does have an advanced directive on file and would like her , Christiano to be her POA.  DNR/DNI    Family History:   The patient's family history is  significant for.  Family History   Problem Relation Age of Onset    Breast Cancer Mother 69    Heart Failure Mother     Breast Cancer Maternal Grandmother         this is maternal great grandmother    Breast Cancer Maternal Aunt 89    Myocardial Infarction Father     Unknown/Adopted Brother     Unknown/Adopted Maternal Grandfather     Stomach Cancer Paternal Grandmother         Stomach mass-not sure if cancer    Lung Cancer Paternal Grandfather         mustard gas in [place]         Physical Exam:   /80 (Cuff Size: Adult Large)   Pulse 76   Resp 16   Wt 86.6 kg (191 lb)   SpO2 99%   BMI 38.58 kg/m     General Appearance: healthy and alert, no distress     Gastrointestinal:       abdomen soft, non-tender, non-distended, no organomegaly or masses    Genitourinary: External genitalia and urethral meatus appears normal.  Vagina is smooth without nodularity or masses.  Cervix surgically absent.  Bimanual exam reveal no masses, nodularity or fullness.  Recto-vaginal exam confirms these findings.      Labs:   = 7    Assessment:    Tosin Nina is a 77 year old woman with a diagnosis of Stage IVB high grade serous ovarian cancer.     A total of 15 minutes was spent with the patient, >50% of which were spent in counseling the patient and/or treatment planning.      Plan:     1.)    Stage IVB high grade serous ovarian cancer. YONATAN s/p neoadjuvant chemotherapy, optimal interval debulking and adjuvant chemotherapy with  low at 7.   Reviewed signs and symptoms of a recurrence and I have asked her to call if these should occur.  Continue with annual visits unless concerns arise. She will return in 1 year with a  prior to her visit.  She had her port removed.    2.) Chemotherapy side effects:  Neuropathy is the only residual effect she is having and this is mild     3.) Para-neoplastic syndrome with residual neurologic deficits.  No further imaging/treatment required per Dr Colon of  neuro-oncology    4.) PE.  Plan lifelong anticoagulation per hematology.  She is currently on Xeralto     5.) Genetic risk factors were assessed and the patient underwent genetic testing which was negative    6.) Labs and/or tests ordered include:       7.) Health maintenance issues addressed today include pt is up to date            Thank you for allowing us to participate in the care of your patient.         Sincerely,    Nessa Ennis MD  Gynecologic Oncology  [facility]       CC

## 2023-09-17 NOTE — PATIENT INSTRUCTIONS
in 1 year - Deferred until 2/1/24 per  scheduling    Next visit in person with Dr Ennis in 1 year - Deferred until 2/1/24 per scheduling    Mia Parmar, RN, BSN    RN Care Coordinator  [facility]  [phone]

## 2023-09-19 ENCOUNTER — ONCOLOGY VISIT (OUTPATIENT)
Dept: ONCOLOGY | Facility: CLINIC | Age: 78
End: 2023-09-19
Attending: OBSTETRICS & GYNECOLOGY
Payer: COMMERCIAL

## 2023-09-19 VITALS
OXYGEN SATURATION: 99 % | WEIGHT: 191 LBS | RESPIRATION RATE: 16 BRPM | SYSTOLIC BLOOD PRESSURE: 134 MMHG | BODY MASS INDEX: 38.58 KG/M2 | DIASTOLIC BLOOD PRESSURE: 80 MMHG | HEART RATE: 76 BPM

## 2023-09-19 DIAGNOSIS — C56.3 MALIGNANT NEOPLASM OF BOTH OVARIES (H): Primary | ICD-10-CM

## 2023-09-19 PROCEDURE — 99213 OFFICE O/P EST LOW 20 MIN: CPT | Performed by: OBSTETRICS & GYNECOLOGY

## 2023-09-19 PROCEDURE — G0463 HOSPITAL OUTPT CLINIC VISIT: HCPCS | Performed by: OBSTETRICS & GYNECOLOGY

## 2023-09-19 ASSESSMENT — PAIN SCALES - GENERAL: PAINLEVEL: NO PAIN (0)

## 2023-09-19 NOTE — LETTER
2023         RE: Tosin Nina  [address]  [place] [id number]        Dear Colleague,    Thank you for referring your patient, Tosin Nina, to the [facility]. Please see a copy of my visit note below.    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  She is accompanied by her daughter.   She continues to feel overall very well.  Her ataxia has worsened a bit in the past several months.  She denies any concerning symptoms.    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  She was discharged to a TCU where she is still residing with some but minimal improvement in her neurologic symptoms.  She still has quite a difficult time seeing especially with her right eye.  She also notes weakness mostly on her left side.  Both of these make it very difficult for her to walk and thus she is having to use a wheelchair for most of her mobility.  She tolerated her first cycle quite well with her biggest side effect being nausea which improved drastically with a change in anti-emetics.      17-17:  Admit to [facility] for worsening dizziness, found to have stage IVB ovarian cancer (inguinal lymphadenopathy) causing paraneoplastic syndrome    17:  CT C/A/P:  Thrombus identified within the right lower lobe are artery and right upper lobar arteries. The right pulmonary artery is enlarged, similar to prior exams. No CT evidence of right heart strain. No suspicious mediastinal or perihilar lymphadenopathy. Bovine arch anatomy. No suspicious pulmonary nodules, evidence of infarction, or infection. Abdomen and pelvis: There are soft tissue nodules identified along the liver capsule measuring up to 3 cm  inferiorly. There is a large amount of omental caking. Enlarged iliac and retroperitoneal lymph nodes for example a 2.6 cm right external iliac lymph node or group of lymph nodes. Heterogeneous enhancement of the uterine fundus could be due to fibroids or a mass. The overall size of the uterus does not appear significantly different than in 2014. The left ovary does appear slightly larger and lobular in appearance compared to prior exam. There is also a nodular area along the round ligament. It was not present on prior exam. There is an irregular lobulated mass involving the sigmoid colon. Abnormal, enlarged inguinal lymph nodes. Soft tissue implant in the posterior right retroperitoneum adjacent to the psoas was not present on prior exam. 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 Spondylolisthesis at L3-4. Small periumbilical hernia containing some of the abnormal omentum.    4/11/17:   268, CEA .6    4/12/17:  IR omental biopsy   Pathology:  High grade serous carcinoma    4/14/17: Cycle #1 carboplatin AUC 6 and weekly Taxol 80mg/m2.  = 2648    5/5/17:  Cycle #2 carboplatin AUC 6 and weekly Taxol 80mg/m2.  = 370    5/26/17:  Cycle #3 carboplatin AUC 6 and weekly Taxol 80mg/m2.  = 63    6/16/17:  CT C/A/P:  Chest:  Resolution of previous right-sided pulmonary emboli since April. No mediastinal, hilar or axillary adenopathy. No pleural fluid.  Port-A-Cath right superior chest with tip in the SVC.  Short linear fibrosis or discoid atelectasis anterior medial right upper lobe. Lungs otherwise clear. Abdomen and Pelvis: Marked improvement in carcinomatosis and omental metastasis since 4/11/2017. In the anterior left pelvis there is a 1.2 cm nodule with adjacent linear opacity approximately 4 cm in length in an area of previous dense omental caking and metastatic disease. There is resolution of the previous  anterior right-sided omental caking with subcentimeter trace of residual nodularity in this location. There are multiple new indeterminate nodular densities in the anterior abdominal wall subcutaneous fat as well as small collections of subcutaneous air, suggesting that these are medication injection sites.  Previous subserosal implants along the inferior liver have resolved. No focal liver lesions. Normal-appearing pancreas, adrenal glands and kidneys. Tiny hypodense spleen lesion is too small to characterize, not previously seen. Spleen otherwise appears normal. No periaortic or pelvic adenopathy with resolution of previous adenopathy. Lobulated enlarged uterus redemonstrated consistent with multiple fibroids. No free fluid. No acute bowel abnormality. Resolution of mesenteric right lower quadrant nodule is compatible with metastasis. On coronal images the terminal ileum takes an unusual circular course posterior to the right colon, but there is no evidence for obstruction. No aggressive bone lesions.    6/16/17:   = 27    6/28/17:  Robotic total laparoscopic hysterectomy, bilateral salpingo-oophorectomy, lysis of adhesions, omentectomy, optimal tumor debulking to no residual disease   Pathology:  Minimal serous carcinoma remaining in the left ovary    7/20/17:  Cycle #4 carboplatin AUC 6 and weekly Taxol 80mg/m2. D15 cancelled due to thrombocytopenia (plt 70)   = 18    8/10/17:  Cycle #5 carboplatin AUC 6 and weekly Taxol 80mg/m2. D8 cancelled neutropenia , D15 cancelled thromobocytopenia (plt 53)  = 12    8/31/17:  Cycle #6 carboplatin AUC 5 due to myelosuppression and weekly Taxol 80mg/m2. D15 delayed thrombocytopenia (plt 78)  = 10    10/12/20: CT C/A/P:  1. No convincing evidence of local recurrence or metastatic disease in the chest, abdomen or pelvis. 2. The previously seen scattered peritoneal nodularities on 6/16/2017 exam are no longer seen. 3. Significant hepatic steatosis. 4.  Colonic diverticulosis without CT evidence of acute diverticulitis. 5. Few small left kidney stones. No right kidney stones. No hydronephrosis in either kidney.    Obstetrics and Gynecology History:  ,   Menopause at age 52, took HRT for a short while, maybe 1 year        Past Medical History:  Past Medical History:   Diagnosis Date     Anemia      Cervical spinal stenosis      Depression      Depressive disorder      GERD (gastroesophageal reflux disease)      Hypertension      Hypokalemia      Hypothyroid      Lumbar spinal stenosis      Obesity      Ovarian cancer (H)      Paraneoplastic neuromyopathy and neuropathy (H)      PE (pulmonary thromboembolism) (H)      Thrombocytopenia (H)        Past Surgical History:  Past Surgical History:   Procedure Laterality Date     AS TOTAL KNEE ARTHROPLASTY Left      BACK SURGERY       CHOLECYSTECTOMY  2016     COLONOSCOPY N/A 2018    Procedure: COLONOSCOPY;  Colonoscopy ;  Surgeon: Nila Munson MD;  Location: [facility] OR     CYSTOSCOPY N/A 2017    Procedure: CYSTOSCOPY;;  Surgeon: Nessa Christina MD;  Location: [facility]     DAVINCI HYSTERECTOMY TOTAL, BILATERAL SALPINGO-OOPHORECTMY, NODE DISSECTION, TUMOR STAGING, COMBINED N/A 2017    Procedure: COMBINED DAVINCI HYSTERECTOMY TOTAL, SALPINGO-OOPHORECTOMY, NODE DISSECTION, TUMOR STAGING;  Robotic total laparoscopic hysterectomy, bilateral salpingo-oophorectomy, omentectomy, lysis of adhesions, tumor debulking, cystoscopy;  Surgeon: Nessa Christina MD;  Location: [facility] OR     IR PORT REMOVAL RIGHT  3/9/2020     OPEN REDUCTION INTERNAL FIXATION WRIST Right      THYROIDECTOMY         Health Maintenance:  Last Pap Smear: No need for further pap smear exams  She has not had a history of abnormal Pap smears.    Last Mammogram: 10/31/22             Result: normal      She has not had a history of abnormal mammograms.    Last Colonoscopy: 18              Result:  polyps-repeat 5 years       Social History:  Lives with , feels safe at home.  Retired .  Enjoys playing golf, gardening.  Does have an advanced directive on file and would like her , Christiano to be her POA.  DNR/DNI    Family History:   The patient's family history is significant for.  Family History   Problem Relation Age of Onset     Breast Cancer Mother 69     Heart Failure Mother      Breast Cancer Maternal Grandmother         this is maternal great grandmother     Breast Cancer Maternal Aunt 89     Myocardial Infarction Father      Unknown/Adopted Brother      Unknown/Adopted Maternal Grandfather      Stomach Cancer Paternal Grandmother         Stomach mass-not sure if cancer     Lung Cancer Paternal Grandfather         mustard gas in [place]         Physical Exam:   /80 (Cuff Size: Adult Large)   Pulse 76   Resp 16   Wt 86.6 kg (191 lb)   SpO2 99%   BMI 38.58 kg/m     General Appearance: healthy and alert, no distress     Gastrointestinal:       abdomen soft, non-tender, non-distended, no organomegaly or masses    Genitourinary: External genitalia and urethral meatus appears normal.  Vagina is smooth without nodularity or masses.  Cervix surgically absent.  Bimanual exam reveal no masses, nodularity or fullness.  Recto-vaginal exam confirms these findings.      Labs:   = 7    Assessment:    Tosin Nina is a 77 year old woman with a diagnosis of Stage IVB high grade serous ovarian cancer.     A total of 15 minutes was spent with the patient, >50% of which were spent in counseling the patient and/or treatment planning.      Plan:     1.)    Stage IVB high grade serous ovarian cancer. YONATAN s/p neoadjuvant chemotherapy, optimal interval debulking and adjuvant chemotherapy with  low at 7.   Reviewed signs and symptoms of a recurrence and I have asked her to call if these should occur.  Continue with annual visits unless concerns arise. She will return in 1 year with a   prior to her visit.  She had her port removed.    2.) Chemotherapy side effects:  Neuropathy is the only residual effect she is having and this is mild     3.) Para-neoplastic syndrome with residual neurologic deficits.  No further imaging/treatment required per Dr Colon of neuro-oncology    4.) PE.  Plan lifelong anticoagulation per hematology.  She is currently on Xeralto     5.) Genetic risk factors were assessed and the patient underwent genetic testing which was negative    6.) Labs and/or tests ordered include:       7.) Health maintenance issues addressed today include pt is up to date            Thank you for allowing us to participate in the care of your patient.         Sincerely,    Nessa Ennis MD  Gynecologic Oncology  [facility]       CC           Again, thank you for allowing me to participate in the care of your patient.        Sincerely,        Cristian Ennis MD

## 2023-09-19 NOTE — NURSING NOTE
"Oncology Rooming Note    September 19, 2023 11:27 AM   Tosin Nina is a 77 year old female who presents for:    Chief Complaint   Patient presents with    Oncology Clinic Visit     Initial Vitals: /80 (Cuff Size: Adult Large)   Pulse 76   Resp 16   Wt 86.6 kg (191 lb)   SpO2 99%   BMI 38.58 kg/m   Estimated body mass index is 38.58 kg/m  as calculated from the following:    Height as of 4/5/22: 1.499 m (4' 11\").    Weight as of this encounter: 86.6 kg (191 lb). Body surface area is 1.9 meters squared.  No Pain (0) Comment: Data Unavailable   No LMP recorded. Patient is postmenopausal.  Allergies reviewed: Yes  Medications reviewed: Yes    Medications: Medication refills not needed today.  Pharmacy name entered into [employer]: [id number] - [place], [place] - [address] AT [address]    Clinical concerns: f/u       Nessa Swain, PANCHO              "

## 2023-10-18 NOTE — PROGRESS NOTES
From: Jaylen Chu  To: Darin Reyes  Sent: 10/17/2023 8:29 PM CDT  Subject: Went into er/fibroids     Went Into the e.r for very bad bleeding and pain of the private area , got imaging and was diagnosed with fibroids and still been bleeding and having pain . Was wondering have you received any of the lab results and what should we do next thank you . Please give me a call at [phone] unable to reach me please call [phone] I’m very concerned.   Infusion Nursing Note:  Tosin Nina presents today for magnesium and potassium. Chemo deferred today    Patient seen by provider today: Yes: Dr. Ennis   present during visit today: Not Applicable.    Note: Caregiver assessment done on patient's . chemo deferred today. Magnesium and potassium replaced per protocol.    Intravenous Access:  Labs drawn without difficulty.  Implanted Port.    Treatment Conditions:  Lab Results   Component Value Date    HGB 7.7 06/20/2017     Lab Results   Component Value Date    WBC 4.9 06/20/2017      Lab Results   Component Value Date    ANEU 3.0 06/20/2017     Lab Results   Component Value Date    PLT 76 06/20/2017      Lab Results   Component Value Date     06/20/2017                   Lab Results   Component Value Date    POTASSIUM 3.3 06/20/2017           Lab Results   Component Value Date    MAG 1.4 06/20/2017            Lab Results   Component Value Date    CR 0.53 06/20/2017                   Lab Results   Component Value Date    PATTI 7.8 06/20/2017                Lab Results   Component Value Date    BILITOTAL 0.2 06/20/2017           Lab Results   Component Value Date    ALBUMIN 2.7 06/20/2017                    Lab Results   Component Value Date    ALT 31 06/20/2017           Lab Results   Component Value Date    AST 33 06/20/2017         Post Infusion Assessment:  Patient tolerated infusion without incident.  Blood return noted pre and post infusion.  Site patent and intact, free from redness, edema or discomfort.  No evidence of extravasations.  Access discontinued per protocol.    Discharge Plan:   Patient declined prescription refills.  Discharge instructions reviewed with: Patient.  Patient and/or family verbalized understanding of discharge instructions and all questions answered.  Copy of AVS reviewed with patient and/or family.  Patient will return 6/27/17 for next appointment.  Patient discharged in stable condition accompanied by:  .  Departure Mode: Wheelchair.    Shruthi Crockett RN

## 2023-11-30 ENCOUNTER — TRANSFERRED RECORDS (OUTPATIENT)
Dept: HEALTH INFORMATION MANAGEMENT | Facility: CLINIC | Age: 78
End: 2023-11-30
Payer: COMMERCIAL

## 2024-06-01 ENCOUNTER — HEALTH MAINTENANCE LETTER (OUTPATIENT)
Age: 79
End: 2024-06-01

## 2024-09-09 NOTE — PATIENT INSTRUCTIONS
Visit with Dr Colon in Neuro-oncology at [facility] now     in 1 year    Next visit in person with Dr Ennis in 1 year

## 2024-09-09 NOTE — PROGRESS NOTES
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  She is accompanied by her daughter.  She reports no gynecologic concerns.  Her only concern is that her ataxia has worsened lately.  She also notes a sore on her left buttock.    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  She was discharged to a TCU where she is still residing with some but minimal improvement in her neurologic symptoms.  She still has quite a difficult time seeing especially with her right eye.  She also notes weakness mostly on her left side.  Both of these make it very difficult for her to walk and thus she is having to use a wheelchair for most of her mobility.  She tolerated her first cycle quite well with her biggest side effect being nausea which improved drastically with a change in anti-emetics.      17-17:  Admit to [facility] for worsening dizziness, found to have stage IVB ovarian cancer (inguinal lymphadenopathy) causing paraneoplastic syndrome    17:  CT C/A/P:  Thrombus identified within the right lower lobe are artery and right upper lobar arteries. The right pulmonary artery is enlarged, similar to prior exams. No CT evidence of right heart strain. No suspicious mediastinal or perihilar lymphadenopathy. Bovine arch anatomy. No suspicious pulmonary nodules, evidence of infarction, or infection. Abdomen and pelvis: There are soft tissue nodules identified along the liver capsule measuring up to 3 cm inferiorly. There is a large amount of omental caking. Enlarged iliac and retroperitoneal lymph nodes for example a 2.6 cm right external iliac lymph node or group of lymph nodes. Heterogeneous enhancement of the uterine fundus could be due to fibroids or a mass. The overall  size of the uterus does not appear significantly different than in 2014. The left ovary does appear slightly larger and lobular in appearance compared to prior exam. There is also a nodular area along the round ligament. It was not present on prior exam. There is an irregular lobulated mass involving the sigmoid colon. Abnormal, enlarged inguinal lymph nodes. Soft tissue implant in the posterior right retroperitoneum adjacent to the psoas was not present on prior exam. 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 Spondylolisthesis at L3-4. Small periumbilical hernia containing some of the abnormal omentum.    4/11/17:   268, CEA .6    4/12/17:  IR omental biopsy   Pathology:  High grade serous carcinoma    4/14/17: Cycle #1 carboplatin AUC 6 and weekly Taxol 80mg/m2.  = 2648    5/5/17:  Cycle #2 carboplatin AUC 6 and weekly Taxol 80mg/m2.  = 370    5/26/17:  Cycle #3 carboplatin AUC 6 and weekly Taxol 80mg/m2.  = 63    6/16/17:  CT C/A/P:  Chest:  Resolution of previous right-sided pulmonary emboli since April. No mediastinal, hilar or axillary adenopathy. No pleural fluid.  Port-A-Cath right superior chest with tip in the SVC.  Short linear fibrosis or discoid atelectasis anterior medial right upper lobe. Lungs otherwise clear. Abdomen and Pelvis: Marked improvement in carcinomatosis and omental metastasis since 4/11/2017. In the anterior left pelvis there is a 1.2 cm nodule with adjacent linear opacity approximately 4 cm in length in an area of previous dense omental caking and metastatic disease. There is resolution of the previous anterior right-sided omental caking with subcentimeter trace of residual nodularity in this location. There are multiple new indeterminate nodular densities in the anterior abdominal wall subcutaneous fat as well as small collections of subcutaneous air, suggesting that these  are medication injection sites.  Previous subserosal implants along the inferior liver have resolved. No focal liver lesions. Normal-appearing pancreas, adrenal glands and kidneys. Tiny hypodense spleen lesion is too small to characterize, not previously seen. Spleen otherwise appears normal. No periaortic or pelvic adenopathy with resolution of previous adenopathy. Lobulated enlarged uterus redemonstrated consistent with multiple fibroids. No free fluid. No acute bowel abnormality. Resolution of mesenteric right lower quadrant nodule is compatible with metastasis. On coronal images the terminal ileum takes an unusual circular course posterior to the right colon, but there is no evidence for obstruction. No aggressive bone lesions.    17:   = 27    17:  Robotic total laparoscopic hysterectomy, bilateral salpingo-oophorectomy, lysis of adhesions, omentectomy, optimal tumor debulking to no residual disease   Pathology:  Minimal serous carcinoma remaining in the left ovary    17:  Cycle #4 carboplatin AUC 6 and weekly Taxol 80mg/m2. D15 cancelled due to thrombocytopenia (plt 70)   = 18    8/10/17:  Cycle #5 carboplatin AUC 6 and weekly Taxol 80mg/m2. D8 cancelled neutropenia , D15 cancelled thromobocytopenia (plt 53)  = 12    17:  Cycle #6 carboplatin AUC 5 due to myelosuppression and weekly Taxol 80mg/m2. D15 delayed thrombocytopenia (plt 78)  = 10    10/12/20: CT C/A/P:  1. No convincing evidence of local recurrence or metastatic disease in the chest, abdomen or pelvis. 2. The previously seen scattered peritoneal nodularities on 2017 exam are no longer seen. 3. Significant hepatic steatosis. 4. Colonic diverticulosis without CT evidence of acute diverticulitis. 5. Few small left kidney stones. No right kidney stones. No hydronephrosis in either kidney.    Obstetrics and Gynecology History:  ,   Menopause at age 52, took HRT for a short while, maybe 1  year        Past Medical History:  Past Medical History:   Diagnosis Date    Anemia     Cervical spinal stenosis     Depression     Depressive disorder     GERD (gastroesophageal reflux disease)     Hypertension     Hypokalemia     Hypothyroid     Lumbar spinal stenosis     Obesity     Ovarian cancer (H)     Paraneoplastic neuromyopathy and neuropathy (H)     PE (pulmonary thromboembolism) (H)     Thrombocytopenia (H)        Past Surgical History:  Past Surgical History:   Procedure Laterality Date    AS TOTAL KNEE ARTHROPLASTY Left     BACK SURGERY  2009    CHOLECYSTECTOMY  01/01/2016    COLONOSCOPY N/A 4/20/2018    Procedure: COLONOSCOPY;  Colonoscopy ;  Surgeon: Nila Munson MD;  Location: [facility] OR    CYSTOSCOPY N/A 6/28/2017    Procedure: CYSTOSCOPY;;  Surgeon: Nessa Christina MD;  Location: [facility] OR    DAVINCI HYSTERECTOMY TOTAL, BILATERAL SALPINGO-OOPHORECTMY, NODE DISSECTION, TUMOR STAGING, COMBINED N/A 6/28/2017    Procedure: COMBINED DAVINCI HYSTERECTOMY TOTAL, SALPINGO-OOPHORECTOMY, NODE DISSECTION, TUMOR STAGING;  Robotic total laparoscopic hysterectomy, bilateral salpingo-oophorectomy, omentectomy, lysis of adhesions, tumor debulking, cystoscopy;  Surgeon: Nessa Christina MD;  Location: [facility] OR    IR PORT REMOVAL RIGHT  3/9/2020    OPEN REDUCTION INTERNAL FIXATION WRIST Right 2009    THYROIDECTOMY         Health Maintenance:  Last Pap Smear: No need for further pap smear exams  She has not had a history of abnormal Pap smears.    Last Mammogram: 11/30/23             Result: normal      She has not had a history of abnormal mammograms.    Last Colonoscopy: 4/20/18              Result: polyps-repeat 5 years       Social History:  Lives with , feels safe at home.  Retired .  Enjoys playing golf, gardening.  Does have an advanced directive on file and would like her , Christiano to be her POA.  DNR/DNI    Family History:   The patient's family history is  significant for.  Family History   Problem Relation Age of Onset    Breast Cancer Mother 69    Heart Failure Mother     Breast Cancer Maternal Grandmother         this is maternal great grandmother    Breast Cancer Maternal Aunt 89    Myocardial Infarction Father     Unknown/Adopted Brother     Unknown/Adopted Maternal Grandfather     Stomach Cancer Paternal Grandmother         Stomach mass-not sure if cancer    Lung Cancer Paternal Grandfather         [employer] in [place]         Physical Exam:   /66   Pulse 70   Temp 97  F (36.1  C) (Tympanic)   Resp 16   SpO2 96%    General Appearance: healthy and alert, no distress     Gastrointestinal:       abdomen soft, non-tender, non-distended, no organomegaly or masses    Genitourinary: External genitalia and urethral meatus appears normal.  Vagina is smooth without nodularity or masses.  Cervix surgically absent.  Bimanual exam reveal no masses, nodularity or fullness.  Recto-vaginal exam confirms these findings. Small 1 cm stage 1 decubitus ulcer on her left buttock     Labs:   = 7    Assessment:    Tosin Nina is a 78 year old woman with a diagnosis of Stage IVB high grade serous ovarian cancer.     A total of 15 minutes was spent with the patient, >50% of which were spent in counseling the patient and/or treatment planning.      Plan:     1.)    Stage IVB high grade serous ovarian cancer. YONATAN s/p neoadjuvant chemotherapy, optimal interval debulking and adjuvant chemotherapy with  low at 7.   Reviewed signs and symptoms of a recurrence and I have asked her to call if these should occur.  Continue with annual visits unless concerns arise. She will return in 1 year with a  prior to her visit.  She had her port removed.    [facility]  Neurology  wound clinic    2.) Chemotherapy side effects:  Neuropathy is the only residual effect she is having and this is mild     3.) Para-neoplastic syndrome with residual neurologic deficits.  No further  imaging/treatment required per Dr Colon of neuro-oncology.  Referral to neurology for possible treatments for ataxia    4.) PE.  Plan lifelong anticoagulation per hematology.  She is currently on Xeralto     5.) Genetic risk factors were assessed and the patient underwent genetic testing which was negative    6.) Labs and/or tests ordered include:  , neurology, wound clinic     7.) Health maintenance issues addressed today include pt is up to date    8.) Stage 1 decubitus ulcer-prescription for mepilex.  Referral to wound clinic, she will also call her PCP to see if there are any clinics closer to her home            Thank you for allowing us to participate in the care of your patient.         Sincerely,    Nessa Ennis MD  Gynecologic Oncology  [facility]       CC

## 2024-09-10 ENCOUNTER — ONCOLOGY VISIT (OUTPATIENT)
Dept: ONCOLOGY | Facility: CLINIC | Age: 79
End: 2024-09-10
Attending: OBSTETRICS & GYNECOLOGY
Payer: COMMERCIAL

## 2024-09-10 VITALS
RESPIRATION RATE: 16 BRPM | SYSTOLIC BLOOD PRESSURE: 124 MMHG | OXYGEN SATURATION: 96 % | DIASTOLIC BLOOD PRESSURE: 66 MMHG | HEART RATE: 70 BPM | TEMPERATURE: 97 F

## 2024-09-10 DIAGNOSIS — C56.3 MALIGNANT NEOPLASM OF BOTH OVARIES (H): ICD-10-CM

## 2024-09-10 DIAGNOSIS — G93.9 CEREBELLAR DISORDER: ICD-10-CM

## 2024-09-10 DIAGNOSIS — L89.321 PRESSURE INJURY OF LEFT BUTTOCK, STAGE 1: ICD-10-CM

## 2024-09-10 DIAGNOSIS — C56.3 MALIGNANT NEOPLASM OF BOTH OVARIES (H): Primary | ICD-10-CM

## 2024-09-10 LAB — CANCER AG125 SERPL-ACNC: 7 U/ML

## 2024-09-10 PROCEDURE — 99213 OFFICE O/P EST LOW 20 MIN: CPT | Performed by: OBSTETRICS & GYNECOLOGY

## 2024-09-10 PROCEDURE — 86304 IMMUNOASSAY TUMOR CA 125: CPT | Performed by: OBSTETRICS & GYNECOLOGY

## 2024-09-10 PROCEDURE — 36415 COLL VENOUS BLD VENIPUNCTURE: CPT

## 2024-09-10 PROCEDURE — G0463 HOSPITAL OUTPT CLINIC VISIT: HCPCS | Performed by: OBSTETRICS & GYNECOLOGY

## 2024-09-10 RX ORDER — MULTIVITAMIN
1 TABLET ORAL DAILY
COMMUNITY
Start: 2023-10-01

## 2024-09-10 RX ORDER — METFORMIN HCL 500 MG
500 TABLET, EXTENDED RELEASE 24 HR ORAL 2 TIMES DAILY WITH MEALS
COMMUNITY
Start: 2024-05-04

## 2024-09-10 ASSESSMENT — PAIN SCALES - GENERAL: PAINLEVEL: NO PAIN (0)

## 2024-09-10 NOTE — NURSING NOTE
"Oncology Rooming Note    September 10, 2024 1:12 PM   Tosin Nina is a 78 year old female who presents for:    Chief Complaint   Patient presents with    Oncology Clinic Visit     Initial Vitals: /66   Pulse 70   Temp 97  F (36.1  C) (Tympanic)   Resp 16   SpO2 96%  Estimated body mass index is 38.58 kg/m  as calculated from the following:    Height as of 4/5/22: 1.499 m (4' 11\").    Weight as of 9/19/23: 86.6 kg (191 lb). There is no height or weight on file to calculate BSA.  No Pain (0) Comment: Data Unavailable   No LMP recorded. Patient is postmenopausal.  Allergies reviewed: Yes  Medications reviewed: Yes    Medications: Medication refills not needed today.  Pharmacy name entered into [employer]: [id number] - [place], [place] - [address]    Frailty Screening:   Is the patient here for a new oncology consult visit in cancer care? 2. No      Clinical concerns: f/u  And sore on lt buttock started 2 weeks ago    Nessa Swain, CMA              "

## 2024-09-10 NOTE — PROGRESS NOTES
Medical Assistant Note:  Tosin Nina presents today for blood draw.    Patient seen by provider today: Yes: Dr. Ennis.   present during visit today: Not Applicable.    Concerns: No Concerns.    Procedure:  Lab draw site: right antecub, Needle type: butterfly, Gauge: 23.    Post Assessment:  Labs drawn without difficulty: Yes.    Discharge Plan:  Departure Mode: Wheelchair.    Face to Face Time: 10 minutes.    Nora Jain MA

## 2024-09-10 NOTE — LETTER
9/10/2024      Tosin Nina  [address]  [address]  [facility] [address]      Dear Colleague,    Thank you for referring your patient, Tosin Nina, to the [facility]. Please see a copy of my visit note below.    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  She is accompanied by her daughter.  She reports no gynecologic concerns.  Her only concern is that her ataxia has worsened lately.  She also notes a sore on her left buttock.    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  She was discharged to a TCU where she is still residing with some but minimal improvement in her neurologic symptoms.  She still has quite a difficult time seeing especially with her right eye.  She also notes weakness mostly on her left side.  Both of these make it very difficult for her to walk and thus she is having to use a wheelchair for most of her mobility.  She tolerated her first cycle quite well with her biggest side effect being nausea which improved drastically with a change in anti-emetics.      17-17:  Admit to [facility] for worsening dizziness, found to have stage IVB ovarian cancer (inguinal lymphadenopathy) causing paraneoplastic syndrome    17:  CT C/A/P:  Thrombus identified within the right lower lobe are artery and right upper lobar arteries. The right pulmonary artery is enlarged, similar to prior exams. No CT evidence of right heart strain. No suspicious mediastinal or perihilar lymphadenopathy. Bovine arch anatomy. No suspicious pulmonary nodules, evidence of infarction, or infection. Abdomen and pelvis: There are soft tissue nodules identified along the liver capsule measuring up to 3 cm inferiorly.  There is a large amount of omental caking. Enlarged iliac and retroperitoneal lymph nodes for example a 2.6 cm right external iliac lymph node or group of lymph nodes. Heterogeneous enhancement of the uterine fundus could be due to fibroids or a mass. The overall size of the uterus does not appear significantly different than in 2014. The left ovary does appear slightly larger and lobular in appearance compared to prior exam. There is also a nodular area along the round ligament. It was not present on prior exam. There is an irregular lobulated mass involving the sigmoid colon. Abnormal, enlarged inguinal lymph nodes. Soft tissue implant in the posterior right retroperitoneum adjacent to the psoas was not present on prior exam. 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 Spondylolisthesis at L3-4. Small periumbilical hernia containing some of the abnormal omentum.    4/11/17:   268, CEA .6    4/12/17:  IR omental biopsy   Pathology:  High grade serous carcinoma    4/14/17: Cycle #1 carboplatin AUC 6 and weekly Taxol 80mg/m2.  = 2648    5/5/17:  Cycle #2 carboplatin AUC 6 and weekly Taxol 80mg/m2.  = 370    5/26/17:  Cycle #3 carboplatin AUC 6 and weekly Taxol 80mg/m2.  = 63    6/16/17:  CT C/A/P:  Chest:  Resolution of previous right-sided pulmonary emboli since April. No mediastinal, hilar or axillary adenopathy. No pleural fluid.  Port-A-Cath right superior chest with tip in the SVC.  Short linear fibrosis or discoid atelectasis anterior medial right upper lobe. Lungs otherwise clear. Abdomen and Pelvis: Marked improvement in carcinomatosis and omental metastasis since 4/11/2017. In the anterior left pelvis there is a 1.2 cm nodule with adjacent linear opacity approximately 4 cm in length in an area of previous dense omental caking and metastatic disease. There is resolution of the previous anterior  right-sided omental caking with subcentimeter trace of residual nodularity in this location. There are multiple new indeterminate nodular densities in the anterior abdominal wall subcutaneous fat as well as small collections of subcutaneous air, suggesting that these are medication injection sites.  Previous subserosal implants along the inferior liver have resolved. No focal liver lesions. Normal-appearing pancreas, adrenal glands and kidneys. Tiny hypodense spleen lesion is too small to characterize, not previously seen. Spleen otherwise appears normal. No periaortic or pelvic adenopathy with resolution of previous adenopathy. Lobulated enlarged uterus redemonstrated consistent with multiple fibroids. No free fluid. No acute bowel abnormality. Resolution of mesenteric right lower quadrant nodule is compatible with metastasis. On coronal images the terminal ileum takes an unusual circular course posterior to the right colon, but there is no evidence for obstruction. No aggressive bone lesions.    6/16/17:   = 27    6/28/17:  Robotic total laparoscopic hysterectomy, bilateral salpingo-oophorectomy, lysis of adhesions, omentectomy, optimal tumor debulking to no residual disease   Pathology:  Minimal serous carcinoma remaining in the left ovary    7/20/17:  Cycle #4 carboplatin AUC 6 and weekly Taxol 80mg/m2. D15 cancelled due to thrombocytopenia (plt 70)   = 18    8/10/17:  Cycle #5 carboplatin AUC 6 and weekly Taxol 80mg/m2. D8 cancelled neutropenia , D15 cancelled thromobocytopenia (plt 53)  = 12    8/31/17:  Cycle #6 carboplatin AUC 5 due to myelosuppression and weekly Taxol 80mg/m2. D15 delayed thrombocytopenia (plt 78)  = 10    10/12/20: CT C/A/P:  1. No convincing evidence of local recurrence or metastatic disease in the chest, abdomen or pelvis. 2. The previously seen scattered peritoneal nodularities on 6/16/2017 exam are no longer seen. 3. Significant hepatic steatosis. 4. Colonic  diverticulosis without CT evidence of acute diverticulitis. 5. Few small left kidney stones. No right kidney stones. No hydronephrosis in either kidney.    Obstetrics and Gynecology History:  ,   Menopause at age 52, took HRT for a short while, maybe 1 year        Past Medical History:  Past Medical History:   Diagnosis Date     Anemia      Cervical spinal stenosis      Depression      Depressive disorder      GERD (gastroesophageal reflux disease)      Hypertension      Hypokalemia      Hypothyroid      Lumbar spinal stenosis      Obesity      Ovarian cancer (H)      Paraneoplastic neuromyopathy and neuropathy (H)      PE (pulmonary thromboembolism) (H)      Thrombocytopenia (H)        Past Surgical History:  Past Surgical History:   Procedure Laterality Date     AS TOTAL KNEE ARTHROPLASTY Left      BACK SURGERY       CHOLECYSTECTOMY  2016     COLONOSCOPY N/A 2018    Procedure: COLONOSCOPY;  Colonoscopy ;  Surgeon: Nila Munson MD;  Location: [facility] OR     CYSTOSCOPY N/A 2017    Procedure: CYSTOSCOPY;;  Surgeon: Nessa Christina MD;  Location: [facility] OR     DAVINCI HYSTERECTOMY TOTAL, BILATERAL SALPINGO-OOPHORECTMY, NODE DISSECTION, TUMOR STAGING, COMBINED N/A 2017    Procedure: COMBINED DAVINCI HYSTERECTOMY TOTAL, SALPINGO-OOPHORECTOMY, NODE DISSECTION, TUMOR STAGING;  Robotic total laparoscopic hysterectomy, bilateral salpingo-oophorectomy, omentectomy, lysis of adhesions, tumor debulking, cystoscopy;  Surgeon: Nessa Christina MD;  Location: [facility] OR     IR PORT REMOVAL RIGHT  3/9/2020     OPEN REDUCTION INTERNAL FIXATION WRIST Right      THYROIDECTOMY         Health Maintenance:  Last Pap Smear: No need for further pap smear exams  She has not had a history of abnormal Pap smears.    Last Mammogram: 23             Result: normal      She has not had a history of abnormal mammograms.    Last Colonoscopy: 18              Result: polyps-repeat 5  years       Social History:  Lives with , feels safe at home.  Retired .  Enjoys playing golf, gardening.  Does have an advanced directive on file and would like her , Christiano to be her POA.  DNR/DNI    Family History:   The patient's family history is significant for.  Family History   Problem Relation Age of Onset     Breast Cancer Mother 69     Heart Failure Mother      Breast Cancer Maternal Grandmother         this is maternal great grandmother     Breast Cancer Maternal Aunt 89     Myocardial Infarction Father      Unknown/Adopted Brother      Unknown/Adopted Maternal Grandfather      Stomach Cancer Paternal Grandmother         Stomach mass-not sure if cancer     Lung Cancer Paternal Grandfather         [employer] in [place]         Physical Exam:   /66   Pulse 70   Temp 97  F (36.1  C) (Tympanic)   Resp 16   SpO2 96%    General Appearance: healthy and alert, no distress     Gastrointestinal:       abdomen soft, non-tender, non-distended, no organomegaly or masses    Genitourinary: External genitalia and urethral meatus appears normal.  Vagina is smooth without nodularity or masses.  Cervix surgically absent.  Bimanual exam reveal no masses, nodularity or fullness.  Recto-vaginal exam confirms these findings. Small 1 cm stage 1 decubitus ulcer on her left buttock     Labs:   = 7    Assessment:    Tosin Nina is a 78 year old woman with a diagnosis of Stage IVB high grade serous ovarian cancer.     A total of 15 minutes was spent with the patient, >50% of which were spent in counseling the patient and/or treatment planning.      Plan:     1.)    Stage IVB high grade serous ovarian cancer. YONATAN s/p neoadjuvant chemotherapy, optimal interval debulking and adjuvant chemotherapy with  low at 7.   Reviewed signs and symptoms of a recurrence and I have asked her to call if these should occur.  Continue with annual visits unless concerns arise. She will return in 1 year  with a  prior to her visit.  She had her port removed.    Mepilex  Neurology  wound clinic    2.) Chemotherapy side effects:  Neuropathy is the only residual effect she is having and this is mild     3.) Para-neoplastic syndrome with residual neurologic deficits.  No further imaging/treatment required per Dr Colon of neuro-oncology.  Referral to neurology for possible treatments for ataxia    4.) PE.  Plan lifelong anticoagulation per hematology.  She is currently on Xeralto     5.) Genetic risk factors were assessed and the patient underwent genetic testing which was negative    6.) Labs and/or tests ordered include:  , neurology, wound clinic     7.) Health maintenance issues addressed today include pt is up to date    8.) Stage 1 decubitus ulcer-prescription for mepilex.  Referral to wound clinic, she will also call her PCP to see if there are any clinics closer to her home            Thank you for allowing us to participate in the care of your patient.         Sincerely,    Nessa Ennis MD  Gynecologic Oncology  [facility]       CC           Again, thank you for allowing me to participate in the care of your patient.        Sincerely,        Cristian Ennis MD

## 2024-09-11 ENCOUNTER — MYC MEDICAL ADVICE (OUTPATIENT)
Dept: ONCOLOGY | Facility: CLINIC | Age: 79
End: 2024-09-11
Payer: COMMERCIAL

## 2024-09-12 NOTE — TELEPHONE ENCOUNTER
Patient sent another [employer] message stating to disregard the message about mepilex. I do not see that this has been prescribed. Called patient and left message to call back to confirm status of prescription.    Kim Jones RN, BSN, OCN

## 2024-09-13 ENCOUNTER — MYC MEDICAL ADVICE (OUTPATIENT)
Dept: ONCOLOGY | Facility: CLINIC | Age: 79
End: 2024-09-13
Payer: COMMERCIAL

## 2024-09-13 NOTE — TELEPHONE ENCOUNTER
See updated [employer] message from patient, no further assistance needed per patient.    Kim Jones RN, BSN, OCN

## 2024-09-23 ENCOUNTER — PATIENT OUTREACH (OUTPATIENT)
Dept: ONCOLOGY | Facility: CLINIC | Age: 79
End: 2024-09-23
Payer: COMMERCIAL

## 2024-09-23 NOTE — PROGRESS NOTES
[facility]: Cancer Care                                                                                          Per Dr. Colon, Dr. Ennis, and Dr. Ramirez patient should see Dr. Ramirez to address patient's ataxia and potential recommend rehab PT/OT  Called to schedule with Dr. Ramirez. [facility]    Signature:  Vi Reyes RN

## 2024-10-07 NOTE — DISCHARGE INSTRUCTIONS
Going Home after Your Port Is Inserted  _______________________________________    Patient Name: Tosin Nina  Today's Date: April 20, 2017    The doctor who inserted your port was:  Dr. Simmons at [facility]     Have your port site and/or neck wound checked on:  4/21 at Chem[facility]        Your port may be accessed right away. A nurse needs to flush your port every 30 days or after each use.     When you get home:    You should have an adult with you for the first 6 hours.    No driving or drinking alcohol until tomorrow. You may still have side effects from the medicine you received today (you may feel drowsy, unsteady or forgetful).     You may go back to your regular diet today.     If you take aspirin or Plavix, you may begin taking it again tomorrow. You may restart all other medicines today. Use pain medicine as directed.    Avoid heavy lifting or the overuse of your shoulder for three days.    Caring for the port site and/or neck wound:    Keep the port site clean and dry. Cover the site with plastic before taking a shower. Do this until the site has healed.     Keep the bandage on your port site for three days. Change it if it gets wet or dirty. After three days, you may use Band-Aids until the wound has healed.    For a neck wound, leave the tape on for three days. You may cover it with a Band-Aid for comfort.     If you have oozing or bleeding from the port site or from the cut in your neck:     Put direct pressure on the wound for 5 to 10 minutes with a gauze pad.  If you still have bleeding after 10 minutes, call your doctor.    If you are bleeding a lot and can't control it with direct pressure, call 911.    Call your doctor     Bleeding from a wound after 10 minutes of direct pressure.    Swelling in your neck or over your port site.    Signs of infection: a fever over 100 F (37.8 C) under the tongue; the site is red, tender or draining.     The catheter was inserted into the  ostial infrarenal abdominal aorta.

## 2024-10-22 ENCOUNTER — ONCOLOGY VISIT (OUTPATIENT)
Dept: ONCOLOGY | Facility: CLINIC | Age: 79
End: 2024-10-22
Attending: OBSTETRICS & GYNECOLOGY
Payer: COMMERCIAL

## 2024-10-22 VITALS
HEART RATE: 80 BPM | OXYGEN SATURATION: 96 % | RESPIRATION RATE: 16 BRPM | DIASTOLIC BLOOD PRESSURE: 83 MMHG | SYSTOLIC BLOOD PRESSURE: 142 MMHG

## 2024-10-22 DIAGNOSIS — C56.3 MALIGNANT NEOPLASM OF BOTH OVARIES (H): Primary | ICD-10-CM

## 2024-10-22 DIAGNOSIS — R27.0 ATAXIA: ICD-10-CM

## 2024-10-22 DIAGNOSIS — G93.9 CEREBELLAR DISORDER: ICD-10-CM

## 2024-10-22 PROCEDURE — G0463 HOSPITAL OUTPT CLINIC VISIT: HCPCS | Performed by: STUDENT IN AN ORGANIZED HEALTH CARE EDUCATION/TRAINING PROGRAM

## 2024-10-22 PROCEDURE — 99215 OFFICE O/P EST HI 40 MIN: CPT | Performed by: STUDENT IN AN ORGANIZED HEALTH CARE EDUCATION/TRAINING PROGRAM

## 2024-10-22 ASSESSMENT — PAIN SCALES - GENERAL: PAINLEVEL: NO PAIN (0)

## 2024-10-22 NOTE — LETTER
"10/22/2024      Tosin Nina  [address]  [facility] [address]      Dear Colleague,    Thank you for referring your patient, Tosin Nina, to the [facility]CER [facility]. Please see a copy of my visit note below.    Oncology Rooming Note    October 22, 2024 10:58 AM   Tosin Nina is a 79 year old female who presents for:    Chief Complaint   Patient presents with     Oncology Clinic Visit     Initial Vitals: There were no vitals taken for this visit. Estimated body mass index is 38.58 kg/m  as calculated from the following:    Height as of 4/5/22: 1.499 m (4' 11\").    Weight as of 9/19/23: 86.6 kg (191 lb). There is no height or weight on file to calculate BSA.  Data Unavailable Comment: Data Unavailable   No LMP recorded. Patient is postmenopausal.  Allergies reviewed: Yes  Medications reviewed: Yes    Medications: Medication refills not needed today.  Pharmacy name entered into [employer]: [id number] - [place] [id number] AT [facility] [address]    Frailty Screening:   Is the patient here for a new oncology consult visit in cancer care? 2. No        Roshni Lindsay MA              [facility]   PM&R clinic note        Interval history:     Tosin Nina presents to clinic today for follow up reg her rehab needs.   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  Was last seen in clinic on 10/26/21.  Recommendations included:  Work-up: None at this time  Therapy/equipment/braces: None at this time. Continue \"sit and fit\" program at her current facility. Also educated patient on mechanical adjustments that can be made to help reduce symptoms/improve recovery.  Medications: Patient may switch to Cymbalta for her meralgia paresthetica of her left thigh after discussion with the PCP as she is currently on Lexapro  Interventions: Can consider " diagnostic/therapeutic nerve block for lateral femoral cutaneous nerve in future if symptoms do not resolve.  Referral / follow up with other providers: None  Follow up: In 2 months    4/11/17:  CT C/A/P:  Thrombus identified within the right lower lobe are artery and right upper lobar arteries.   Nodules on the liver measuring up to 3 cm inferiorly, omental caking, enlarged iliac and retroperitoneal lymph nodes, 2.6 cm right external iliac lymph nodes.  Enlarged inguinal lymph nodes     4/11/17:   268, CEA .6,  IR omental biopsy. Pathology:  High grade serous carcinoma     4/14/17-5/26/2017: Received first 3 cycles of chemotherapy     6/16/17:  CT C/A/P:  Chest: Right-sided pulmonary emboli resolved, mental meds reduced in size, inferior liver mass also resolved, mesenteric nodules also reduced in size.  No periaortic or pelvic adenopathy was seen(resolved from the previous CT)     6/16/17:   = 27     6/28/17:  Robotic total laparoscopic hysterectomy, bilateral salpingo-oophorectomy, lysis of adhesions, omentectomy, optimal tumor debulking to no residual disease                 Pathology:  Minimal serous carcinoma remaining in the left ovary     7/20/17-8/10/2017:   Chemotherapy cycle 4,5,6 completed     10/12/20: CT C/A/P:  1. No convincing evidence of local recurrence or metastatic disease in the chest, abdomen or pelvis. 2. The previously seen scattered peritoneal nodularities on 6/16/2017 exam are no longer seen. 3. Significant hepatic steatosis. 4. Colonic diverticulosis without CT evidence of acute diverticulitis. 5. Few small left kidney stones. No right kidney         Symptoms,  Namita was seen for a return visit today.  She complains of worsening ataxia over the last year or so.  Her worsening ataxia has been associated with her left side, which is generally slightly weaker than the right.  She feels like her left-sided weakness has not gotten any worse, but she is more ataxic with her gait over  the last several months.  She is also noted some increasing difficulty with fine motor skills like buttoning buttons or putting earrings on as well as typing/texting or cutting meat.  This is also been noticed over the last 3 to 4 months or so.  She denies any weakness per se in her hands.  She has not had any falls over the last several months.  She has also noticed a worsening bouncing as she calls it in her legs, especially when she attempts to transfer.  She feels like the bouncing starts and takes a minute or 2 to stop.  It occurs at various times aside from transfers including getting dressed, when she is lightheaded or while she is trying to move faster.  And it feels better when she sits down and rests.  She denies any sensory changes in her legs.  She also denies any visual changes or swallowing difficulties.  She has continued to struggle with lower extremity edema that goes up to mid calf level bilaterally.  She has not been wearing her compression stockings as they have been difficult to get on.  Her daughter is present for the visit today.      Therapies/HEP,  Has not participated with outpatient physical or occupational therapy more recently.  Stopped several months ago.      Functionally,   Independent for community mobility and mobility within the home.  Needs assist for transfers.      Social history is unchanged.      Medications:  Current Outpatient Medications   Medication Sig Dispense Refill     acetaminophen (TYLENOL) 500 MG tablet Take 500-1,000 mg by mouth every 6 hours as needed for mild pain       escitalopram (LEXAPRO) 10 MG tablet Take 1 tablet (10 mg) by mouth daily 30 tablet 2     hydrochlorothiazide (HYDRODIURIL) 25 MG tablet Take 25 mg by mouth daily   0     levothyroxine (SYNTHROID) 125 MCG tablet Take 1 tablet (125 mcg) by mouth daily 30 tablet      losartan (COZAAR) 100 MG tablet Take 100 mg by mouth       metFORMIN (GLUCOPHAGE XR) 500 MG 24 hr tablet Take 500 mg by mouth 2 times  daily (with meals).       multivitamin w/minerals (MULTI-VITAMIN) tablet Take 1 tablet by mouth daily.       Potassium Chloride ER 20 MEQ TBCR Take 1 tablet (20 mEq) by mouth 2 times daily (Patient taking differently: Take 20 mEq by mouth daily.) 60 tablet 3     rivaroxaban ANTICOAGULANT (XARELTO) 20 MG TABS tablet Take 1 tablet (20 mg) by mouth daily (with dinner) 30 tablet 3              Physical Exam:   BP (!) 142/83   Pulse 80   Resp 16   SpO2 96%   Gen: NAD, pleasant and cooperative, sitting alone wheelchair  HEENT: Moist mucous membranes, neck supple  Cardio: regular pulse  Pulm: non-labored breathing in room air  Abd: benign  Ext: WWP, no edema in BLE, no tenderness in calves  Neuro/MSK:   Manual muscle testing:  Bilateral upper extremity 5/5 in C5-T1 myotomes  Bilateral lower extremity 5/5 in L2-S1 myotomes  Deep tendon reflexes: Bilateral biceps, triceps,  knee, ankle equivocal  Lela's negative bilaterally  Sensation grossly intact to light touch in all 4 extremities except left lateral thigh area  Transfer observed during visit, and patient has ataxia when attempting to ambulate even a few feet, left lean and some visible shaking of her left foot and lower leg.  No tone on MAS assessment for spasticity, 0/4 in all muscle groups of bilateral lower extremities.    Labs/Imaging:  Lab Results   Component Value Date    WBC 3.7 (L) 09/21/2017    HGB 9.2 (L) 09/21/2017    HCT 28.5 (L) 09/21/2017     (H) 09/21/2017    PLT 93 (L) 09/21/2017     Lab Results   Component Value Date     08/31/2017    POTASSIUM 3.6 09/21/2017    CHLORIDE 105 08/31/2017    CO2 28 08/31/2017     (H) 08/31/2017     Lab Results   Component Value Date    GFRESTIMATED 71 10/12/2020    GFRESTBLACK 85 10/12/2020     Lab Results   Component Value Date    AST 14 08/31/2017    ALT 16 08/31/2017    ALKPHOS 102 08/31/2017    BILITOTAL 0.3 08/31/2017     Lab Results   Component Value Date    INR 1.06 04/11/2017     Lab  Results   Component Value Date    BUN 7 08/31/2017    CR 0.52 08/31/2017              Assessment/Plan   Tosin Nina presents to clinic today for follow up reg her rehab needs.   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 Was last seen in clinic on 10/26/21.  Multiple rehabilitation considerations were discussed with her at today's visit she has had worsening ataxia and worsening fine motor deficits and coordination over the last few months which is concerning to her and her family.  We discussed reaching out to Dr. Colon to inform her team to see if any further evaluation or visit with Dr. Colon would be needed.  There is no spasticity in her left lower extremity on exam today, she does have ataxic gait when attempted transfer during visit.  In addition, she would benefit from restarting outpatient physical therapy for work on her balance and gait, and a referral will be placed to the [facility] in [place] which is where she wants to go.  We will plan a return visit in 3 months.  She is in agreement with this plan.      Therapy/equipment/braces:  Outpatient physical therapy with [facility] in [place] for gait and balance.  Continue to use wheelchair for community mobility and home mobility.  Referral / follow up with other providers:  We will follow-up with Dr. Colon regarding any further evaluation for her worsening ataxia.  Follow up: 3 months.      Cristina Ramirez MD  Physical Medicine & Rehabilitation      50 minutes spent on the date of the encounter doing chart review, history and exam, documentation and further activities as noted above.               Again, thank you for allowing me to participate in the care of your patient.        Sincerely,        Cristina Ramirez MD

## 2024-10-22 NOTE — PROGRESS NOTES
"[facility]   PM&R clinic note        Interval history:     Tosin Nina presents to clinic today for follow up reg her rehab needs.   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  Was last seen in clinic on 10/26/21.  Recommendations included:  Work-up: None at this time  Therapy/equipment/braces: None at this time. Continue \"sit and fit\" program at her current facility. Also educated patient on mechanical adjustments that can be made to help reduce symptoms/improve recovery.  Medications: Patient may switch to Cymbalta for her meralgia paresthetica of her left thigh after discussion with the PCP as she is currently on Lexapro  Interventions: Can consider diagnostic/therapeutic nerve block for lateral femoral cutaneous nerve in future if symptoms do not resolve.  Referral / follow up with other providers: None  Follow up: In 2 months    4/11/17:  CT C/A/P:  Thrombus identified within the right lower lobe are artery and right upper lobar arteries.   Nodules on the liver measuring up to 3 cm inferiorly, omental caking, enlarged iliac and retroperitoneal lymph nodes, 2.6 cm right external iliac lymph nodes.  Enlarged inguinal lymph nodes     4/11/17:   268, CEA .6,  IR omental biopsy. Pathology:  High grade serous carcinoma     4/14/17-5/26/2017: Received first 3 cycles of chemotherapy     6/16/17:  CT C/A/P:  Chest: Right-sided pulmonary emboli resolved, mental meds reduced in size, inferior liver mass also resolved, mesenteric nodules also reduced in size.  No periaortic or pelvic adenopathy was seen(resolved from the previous CT)     6/16/17:   = 27     6/28/17:  Robotic total laparoscopic hysterectomy, bilateral salpingo-oophorectomy, lysis of adhesions, omentectomy, optimal tumor debulking to no residual disease                 Pathology:  Minimal serous carcinoma remaining in the left ovary   "   7/20/17-8/10/2017:   Chemotherapy cycle 4,5,6 completed     10/12/20: CT C/A/P:  1. No convincing evidence of local recurrence or metastatic disease in the chest, abdomen or pelvis. 2. The previously seen scattered peritoneal nodularities on 6/16/2017 exam are no longer seen. 3. Significant hepatic steatosis. 4. Colonic diverticulosis without CT evidence of acute diverticulitis. 5. Few small left kidney stones. No right kidney         Symptoms,  Namita was seen for a return visit today.  She complains of worsening ataxia over the last year or so.  Her worsening ataxia has been associated with her left side, which is generally slightly weaker than the right.  She feels like her left-sided weakness has not gotten any worse, but she is more ataxic with her gait over the last several months.  She is also noted some increasing difficulty with fine motor skills like buttoning buttons or putting earrings on as well as typing/texting or cutting meat.  This is also been noticed over the last 3 to 4 months or so.  She denies any weakness per se in her hands.  She has not had any falls over the last several months.  She has also noticed a worsening bouncing as she calls it in her legs, especially when she attempts to transfer.  She feels like the bouncing starts and takes a minute or 2 to stop.  It occurs at various times aside from transfers including getting dressed, when she is lightheaded or while she is trying to move faster.  And it feels better when she sits down and rests.  She denies any sensory changes in her legs.  She also denies any visual changes or swallowing difficulties.  She has continued to struggle with lower extremity edema that goes up to mid calf level bilaterally.  She has not been wearing her compression stockings as they have been difficult to get on.  Her daughter is present for the visit today.      Therapies/HEP,  Has not participated with outpatient physical or occupational therapy more recently.   Stopped several months ago.      Functionally,   Independent for community mobility and mobility within the home.  Needs assist for transfers.      Social history is unchanged.      Medications:  Current Outpatient Medications   Medication Sig Dispense Refill    acetaminophen (TYLENOL) 500 MG tablet Take 500-1,000 mg by mouth every 6 hours as needed for mild pain      escitalopram (LEXAPRO) 10 MG tablet Take 1 tablet (10 mg) by mouth daily 30 tablet 2    hydrochlorothiazide (HYDRODIURIL) 25 MG tablet Take 25 mg by mouth daily   0    levothyroxine (SYNTHROID) 125 MCG tablet Take 1 tablet (125 mcg) by mouth daily 30 tablet     losartan (COZAAR) 100 MG tablet Take 100 mg by mouth      metFORMIN (GLUCOPHAGE XR) 500 MG 24 hr tablet Take 500 mg by mouth 2 times daily (with meals).      multivitamin w/minerals (MULTI-VITAMIN) tablet Take 1 tablet by mouth daily.      Potassium Chloride ER 20 MEQ TBCR Take 1 tablet (20 mEq) by mouth 2 times daily (Patient taking differently: Take 20 mEq by mouth daily.) 60 tablet 3    rivaroxaban ANTICOAGULANT (XARELTO) 20 MG TABS tablet Take 1 tablet (20 mg) by mouth daily (with dinner) 30 tablet 3              Physical Exam:   BP (!) 142/83   Pulse 80   Resp 16   SpO2 96%   Gen: NAD, pleasant and cooperative, sitting alone wheelchair  HEENT: Moist mucous membranes, neck supple  Cardio: regular pulse  Pulm: non-labored breathing in room air  Abd: benign  Ext: WWP, no edema in BLE, no tenderness in calves  Neuro/MSK:   Manual muscle testing:  Bilateral upper extremity 5/5 in C5-T1 myotomes  Bilateral lower extremity 5/5 in L2-S1 myotomes  Deep tendon reflexes: Bilateral biceps, triceps,  knee, ankle equivocal  Lela's negative bilaterally  Sensation grossly intact to light touch in all 4 extremities except left lateral thigh area  Transfer observed during visit, and patient has ataxia when attempting to ambulate even a few feet, left lean and some visible shaking of her left foot and  lower leg.  No tone on MAS assessment for spasticity, 0/4 in all muscle groups of bilateral lower extremities.    Labs/Imaging:  Lab Results   Component Value Date    WBC 3.7 (L) 09/21/2017    HGB 9.2 (L) 09/21/2017    HCT 28.5 (L) 09/21/2017     (H) 09/21/2017    PLT 93 (L) 09/21/2017     Lab Results   Component Value Date     08/31/2017    POTASSIUM 3.6 09/21/2017    CHLORIDE 105 08/31/2017    CO2 28 08/31/2017     (H) 08/31/2017     Lab Results   Component Value Date    GFRESTIMATED 71 10/12/2020    GFRESTBLACK 85 10/12/2020     Lab Results   Component Value Date    AST 14 08/31/2017    ALT 16 08/31/2017    ALKPHOS 102 08/31/2017    BILITOTAL 0.3 08/31/2017     Lab Results   Component Value Date    INR 1.06 04/11/2017     Lab Results   Component Value Date    BUN 7 08/31/2017    CR 0.52 08/31/2017              Assessment/Plan   Tosin Nina presents to clinic today for follow up reg her rehab needs.   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 Was last seen in clinic on 10/26/21.  Multiple rehabilitation considerations were discussed with her at today's visit she has had worsening ataxia and worsening fine motor deficits and coordination over the last few months which is concerning to her and her family.  We discussed reaching out to Dr. Colon to inform her team to see if any further evaluation or visit with Dr. Colon would be needed.  There is no spasticity in her left lower extremity on exam today, she does have ataxic gait when attempted transfer during visit.  In addition, she would benefit from restarting outpatient physical therapy for work on her balance and gait, and a referral will be placed to the [facility] in [place] which is where she wants to go.  We will plan a return visit in 3 months.  She is in agreement with this plan.      Therapy/equipment/braces:  Outpatient physical therapy with [facility] in [place]  for gait and balance.  Continue to use wheelchair for community mobility and home mobility.  Referral / follow up with other providers:  We will follow-up with Dr. Colon regarding any further evaluation for her worsening ataxia.  Follow up: 3 months.      Cristina Ramirez MD  Physical Medicine & Rehabilitation      50 minutes spent on the date of the encounter doing chart review, history and exam, documentation and further activities as noted above.

## 2024-10-22 NOTE — PROGRESS NOTES
"Oncology Rooming Note    October 22, 2024 10:58 AM   Tosin Nina is a 79 year old female who presents for:    Chief Complaint   Patient presents with    Oncology Clinic Visit     Initial Vitals: There were no vitals taken for this visit. Estimated body mass index is 38.58 kg/m  as calculated from the following:    Height as of 4/5/22: 1.499 m (4' 11\").    Weight as of 9/19/23: 86.6 kg (191 lb). There is no height or weight on file to calculate BSA.  Data Unavailable Comment: Data Unavailable   No LMP recorded. Patient is postmenopausal.  Allergies reviewed: Yes  Medications reviewed: Yes    Medications: Medication refills not needed today.  Pharmacy name entered into [employer]: [id number] - [place] [id number] [facility] [address]    Frailty Screening:   Is the patient here for a new oncology consult visit in cancer care? 2. No        Roshni Lindsay MA            "

## 2024-10-22 NOTE — PATIENT INSTRUCTIONS
It was nice to see you again today.    1.  A referral was placed to physical therapy at the [facility] in [place] for your gait and balance.  2.  We will reach out to Dr. Colon's team to inform them of your worsening ataxia to see if any further evaluation is needed.  3.  Continue to use your wheelchair for all community mobility.  4.  Follow-up with Dr. Ramirez in 3 months.

## 2024-10-23 ENCOUNTER — PATIENT OUTREACH (OUTPATIENT)
Dept: ONCOLOGY | Facility: CLINIC | Age: 79
End: 2024-10-23
Payer: COMMERCIAL

## 2024-10-23 NOTE — PROGRESS NOTES
[facility]: Cancer Care                                                                                          Faxed PT referral to [facility] per patient request    Signature:  Vi Reyes RN

## 2024-12-05 ENCOUNTER — TRANSFERRED RECORDS (OUTPATIENT)
Dept: HEALTH INFORMATION MANAGEMENT | Facility: CLINIC | Age: 79
End: 2024-12-05
Payer: COMMERCIAL

## 2025-06-14 ENCOUNTER — HEALTH MAINTENANCE LETTER (OUTPATIENT)
Age: 80
End: 2025-06-14

## (undated) DEVICE — ENDO FORCEP ENDOJAW BIOPSY 2.8MMX230CM FB-220U

## (undated) DEVICE — PREP SCRUB CARE CHLOROXYLENOL (PCMX) 4OZ 29902-004

## (undated) DEVICE — SUCTION CANISTER MEDIVAC LINER 3000ML W/LID 65651-530

## (undated) DEVICE — RETR ELEV / UTERINE MANIPULATOR V-CARE MED CUP 60-6085-201A

## (undated) DEVICE — Device

## (undated) DEVICE — DAVINCI XI NDL DRIVER MEGA SUTURE CUT 8MM 470309

## (undated) DEVICE — LINEN TOWEL PACK X30 5481

## (undated) DEVICE — BLADE CLIPPER SGL USE 9680

## (undated) DEVICE — TUBING IRRIG CYSTO/BLADDER SET 81" LF 2C4040

## (undated) DEVICE — DAVINCI XI GRASPER ENDOWRIST PROGRASP 470093

## (undated) DEVICE — SYR 30ML LL W/O NDL 302832

## (undated) DEVICE — KOH COLPOTOMIZER OCCLUDER  CPO-6

## (undated) DEVICE — DEVICE SUTURE GRASPER TROCAR CLOSURE 14GA PMITCSG

## (undated) DEVICE — ENDO RETRACTOR II FAN 10MM  176647

## (undated) DEVICE — SU DERMABOND ADVANCED .7ML DNX12

## (undated) DEVICE — LINEN TOWEL PACK X6 WHITE 5487

## (undated) DEVICE — ESU GROUND PAD ADULT REM W/15' CORD E7507DB

## (undated) DEVICE — DAVINCI XI SEAL UNIVERSAL 5-8MM 470361

## (undated) DEVICE — SU WND CLOSURE VLOC 180 ABS 0 9" GS-21 VLOCL0346

## (undated) DEVICE — GOWN XLG DISP 9545

## (undated) DEVICE — ENDO SNARE POLYPECTOMY OVAL 15MM LOOP SD-240U-15

## (undated) DEVICE — DRAPE SHEET REV FOLD 3/4 9349

## (undated) DEVICE — SUCTION MANIFOLD DORNOCH ULTRA CART UL-CL500

## (undated) DEVICE — DRAPE LEGGINGS CLEAR 8430

## (undated) DEVICE — PACK GOWN 3/PK DISP XL SBA32GPFCB

## (undated) DEVICE — DAVINCI HOT SHEARS TIP COVER  400180

## (undated) DEVICE — GLOVE PROTEXIS POWDER FREE SMT 6.5  2D72PT65X

## (undated) DEVICE — DAVINCI XI MONOPOLAR SCISSORS HOT SHEARS 8MM 470179

## (undated) DEVICE — TUBING SMOKE EVAC .635CMX3M SEA3705

## (undated) DEVICE — SYSTEM CLEARIFY VISUALIZATION 21-345

## (undated) DEVICE — SU VICRYL 4-0 PS-2 18" UND J496H

## (undated) DEVICE — COVER FOOTSWITCH W/CINCH 20X24" 923267

## (undated) DEVICE — BAG CLEAR TRASH 1.3M 39X33" P4040C

## (undated) DEVICE — JELLY LUBRICATING SURGILUBE 2OZ TUBE

## (undated) DEVICE — DAVINCI XI FCP BIPOLAR FENESTRATED 470205

## (undated) DEVICE — SU VICRYL 0 TIE 54" J608H

## (undated) DEVICE — DAVINCI TROCAR 8MM BLADELESS 470357

## (undated) DEVICE — SUCTION IRR STRYKERFLOW II W/TIP 250-070-520

## (undated) DEVICE — NDL INSUFFLATION 120MM VERRES 172015

## (undated) DEVICE — LINEN FULL SHEET 5511

## (undated) DEVICE — PREP CHLORAPREP 26ML TINTED ORANGE  260815

## (undated) DEVICE — DAVINCI XI DRAPE COLUMN 470341

## (undated) DEVICE — SOL WATER IRRIG 1000ML BOTTLE 2F7114

## (undated) DEVICE — LINEN HALF SHEET 5512

## (undated) DEVICE — ENDO TROCAR 12MM VERSAONE BLADELESS W/STD FIX CAN NONB12STF

## (undated) DEVICE — GLOVE PROTEXIS BLUE W/NEU-THERA 7.0  2D73EB70

## (undated) DEVICE — SOL NACL 0.9% INJ 1000ML BAG 07983-09

## (undated) DEVICE — TUBING SUCTION 6"X3/16" N56A

## (undated) DEVICE — DRAPE MAYO STAND 23X54 8337

## (undated) DEVICE — DAVINCI XI DRAPE ARM 470015

## (undated) DEVICE — WIPES FOLEY CARE SURESTEP PROVON DFC100

## (undated) DEVICE — KIT PATIENT POSITIONING PIGAZZI LATEX FREE 40580

## (undated) RX ORDER — HYDROMORPHONE HYDROCHLORIDE 1 MG/ML
INJECTION, SOLUTION INTRAMUSCULAR; INTRAVENOUS; SUBCUTANEOUS
Status: DISPENSED
Start: 2017-06-28

## (undated) RX ORDER — GLYCOPYRROLATE 0.2 MG/ML
INJECTION, SOLUTION INTRAMUSCULAR; INTRAVENOUS
Status: DISPENSED
Start: 2017-06-28

## (undated) RX ORDER — DEXAMETHASONE SODIUM PHOSPHATE 4 MG/ML
INJECTION, SOLUTION INTRA-ARTICULAR; INTRALESIONAL; INTRAMUSCULAR; INTRAVENOUS; SOFT TISSUE
Status: DISPENSED
Start: 2017-06-28

## (undated) RX ORDER — CEFAZOLIN SODIUM 2 G/100ML
INJECTION, SOLUTION INTRAVENOUS
Status: DISPENSED
Start: 2017-06-28

## (undated) RX ORDER — PHENAZOPYRIDINE HYDROCHLORIDE 200 MG/1
TABLET, FILM COATED ORAL
Status: DISPENSED
Start: 2017-06-28

## (undated) RX ORDER — HEPARIN SODIUM 5000 [USP'U]/.5ML
INJECTION, SOLUTION INTRAVENOUS; SUBCUTANEOUS
Status: DISPENSED
Start: 2017-06-28

## (undated) RX ORDER — PROPOFOL 10 MG/ML
INJECTION, EMULSION INTRAVENOUS
Status: DISPENSED
Start: 2017-06-28

## (undated) RX ORDER — FENTANYL CITRATE 50 UG/ML
INJECTION, SOLUTION INTRAMUSCULAR; INTRAVENOUS
Status: DISPENSED
Start: 2017-04-12

## (undated) RX ORDER — LIDOCAINE HYDROCHLORIDE 20 MG/ML
INJECTION, SOLUTION EPIDURAL; INFILTRATION; INTRACAUDAL; PERINEURAL
Status: DISPENSED
Start: 2017-06-28

## (undated) RX ORDER — LIDOCAINE 40 MG/G
CREAM TOPICAL
Status: DISPENSED
Start: 2017-06-28

## (undated) RX ORDER — ONDANSETRON 2 MG/ML
INJECTION INTRAMUSCULAR; INTRAVENOUS
Status: DISPENSED
Start: 2017-06-28

## (undated) RX ORDER — HEPARIN SODIUM (PORCINE) LOCK FLUSH IV SOLN 100 UNIT/ML 100 UNIT/ML
SOLUTION INTRAVENOUS
Status: DISPENSED
Start: 2018-04-20

## (undated) RX ORDER — FENTANYL CITRATE 50 UG/ML
INJECTION, SOLUTION INTRAMUSCULAR; INTRAVENOUS
Status: DISPENSED
Start: 2017-06-28

## (undated) RX ORDER — SODIUM CHLORIDE, SODIUM LACTATE, POTASSIUM CHLORIDE, CALCIUM CHLORIDE 600; 310; 30; 20 MG/100ML; MG/100ML; MG/100ML; MG/100ML
INJECTION, SOLUTION INTRAVENOUS
Status: DISPENSED
Start: 2017-06-28

## (undated) RX ORDER — KETAMINE HYDROCHLORIDE 10 MG/ML
INJECTION INTRAMUSCULAR; INTRAVENOUS
Status: DISPENSED
Start: 2018-04-20

## (undated) RX ORDER — PHENYLEPHRINE HCL IN 0.9% NACL 1 MG/10 ML
SYRINGE (ML) INTRAVENOUS
Status: DISPENSED
Start: 2017-06-28